# Patient Record
Sex: MALE | Race: BLACK OR AFRICAN AMERICAN | Employment: OTHER | URBAN - METROPOLITAN AREA
[De-identification: names, ages, dates, MRNs, and addresses within clinical notes are randomized per-mention and may not be internally consistent; named-entity substitution may affect disease eponyms.]

---

## 2017-01-16 ENCOUNTER — HOSPITAL ENCOUNTER (EMERGENCY)
Age: 57
Discharge: HOME | End: 2017-01-16
Attending: EMERGENCY MEDICINE
Payer: COMMERCIAL

## 2017-01-16 ENCOUNTER — APPOINTMENT (OUTPATIENT)
Dept: GENERAL RADIOLOGY | Age: 57
End: 2017-01-16
Attending: PHYSICIAN ASSISTANT
Payer: COMMERCIAL

## 2017-01-16 VITALS
TEMPERATURE: 98.1 F | HEART RATE: 69 BPM | DIASTOLIC BLOOD PRESSURE: 117 MMHG | RESPIRATION RATE: 18 BRPM | SYSTOLIC BLOOD PRESSURE: 192 MMHG | HEIGHT: 69 IN | OXYGEN SATURATION: 99 %

## 2017-01-16 DIAGNOSIS — M19.031 ARTHRITIS OF RIGHT WRIST: Primary | ICD-10-CM

## 2017-01-16 PROCEDURE — 99283 EMERGENCY DEPT VISIT LOW MDM: CPT

## 2017-01-16 PROCEDURE — 73110 X-RAY EXAM OF WRIST: CPT

## 2017-01-16 PROCEDURE — 74011250637 HC RX REV CODE- 250/637: Performed by: PHYSICIAN ASSISTANT

## 2017-01-16 RX ORDER — CARVEDILOL 25 MG/1
25 TABLET ORAL
Status: COMPLETED | OUTPATIENT
Start: 2017-01-16 | End: 2017-01-16

## 2017-01-16 RX ORDER — HYDROCODONE BITARTRATE AND ACETAMINOPHEN 5; 325 MG/1; MG/1
2 TABLET ORAL
Status: COMPLETED | OUTPATIENT
Start: 2017-01-16 | End: 2017-01-16

## 2017-01-16 RX ORDER — COLCHICINE 0.6 MG/1
0.6 CAPSULE ORAL DAILY
Qty: 10 CAP | Refills: 0 | Status: SHIPPED | OUTPATIENT
Start: 2017-01-16 | End: 2017-02-21 | Stop reason: SDUPTHER

## 2017-01-16 RX ORDER — HYDRALAZINE HYDROCHLORIDE 25 MG/1
25 TABLET, FILM COATED ORAL
Status: COMPLETED | OUTPATIENT
Start: 2017-01-16 | End: 2017-01-16

## 2017-01-16 RX ORDER — HYDROCODONE BITARTRATE AND ACETAMINOPHEN 5; 325 MG/1; MG/1
1 TABLET ORAL
Qty: 20 TAB | Refills: 0 | Status: SHIPPED | OUTPATIENT
Start: 2017-01-16 | End: 2017-02-21 | Stop reason: ALTCHOICE

## 2017-01-16 RX ADMIN — HYDROCODONE BITARTRATE AND ACETAMINOPHEN 2 TABLET: 5; 325 TABLET ORAL at 05:15

## 2017-01-16 RX ADMIN — HYDRALAZINE HYDROCHLORIDE 25 MG: 25 TABLET, FILM COATED ORAL at 05:26

## 2017-01-16 RX ADMIN — CARVEDILOL 25 MG: 25 TABLET, FILM COATED ORAL at 05:26

## 2017-01-16 NOTE — ED PROVIDER NOTES
HPI Comments: 50yo male presents to ER complaining of right wrist discomfort x 2-3 days with swelling in right hand x one day. History of similar in past.  Denies any recent or remote injuries. Patient is right hand dominant. Admits to gout but denies any known specific testing and denies taking any medications for gout. Patient has follow up with PCP in 2 days. Admits similar symptoms have happened in left wrist before. Denies any knee, ankle, toe swelling pain. Patient talks about not eating well as a cause of his symptoms suggesting to me that someone has given him the gout diet restrictions. Patient has poor insight. Patient is a 55 y.o. male presenting with hand swelling. Hand Swelling    Pertinent negatives include no back pain. Past Medical History:   Diagnosis Date    Chronic kidney disease     Diastolic [facility] ([facility]     Dilated cardiomyopathy ([facility]     History of alcohol abuse     History of echocardiogram 02/24/2014     Mod-marked LVE. EF 15%. Severe, diffuse hypk. Mild LVH. Gr 1 DDfx. Mod LAE. Mild-mod FIDELIA. Mild AoRE. No significant change from echo of 10/23/09.  History of gout     History of myocardial perfusion scan 05/25/2006     No evidence of ischemia or scarring. Gross LVE. EF 28%. Severe global hypk. Neg EKG on submaximal EST. Ex time 8 min 25 sec.  HTN (hypertension)     Hypercholesterolemia     Hypertensive cardiovascular disease        Past Surgical History:   Procedure Laterality Date    Hx hernia repair  04/2016         Family History:   Problem Relation Age of Onset    Cancer Father      Lung    Heart Failure Mother     Cancer Sister        Social History     Social History    Marital status: SINGLE     Spouse name: N/A    Number of children: N/A    Years of education: N/A     Occupational History    Not on file.      Social History Main Topics    Smoking status: Never Smoker    Smokeless tobacco: Never Used    Alcohol use No    Drug use: No    Sexual activity: No     Other Topics Concern    Not on file     Social History Narrative         ALLERGIES: Review of patient's allergies indicates no known allergies. Review of Systems   Constitutional: Negative for activity change and appetite change. HENT: Negative. Respiratory: Negative for cough and shortness of breath. Cardiovascular: Negative. Negative for chest pain. Gastrointestinal: Negative. Musculoskeletal: Positive for arthralgias and joint swelling. Negative for back pain and gait problem. All other systems reviewed and are negative. Vitals:    01/16/17 0210   BP: (!) 196/119   Pulse: 74   Resp: 18   Temp: 98.6 °F (37 °C)   SpO2: 99%   Height: 5' 9\" (1.753 m)            Physical Exam   Constitutional: He is oriented to person, place, and time. He appears well-developed. No distress. Obese   Neck: Normal range of motion. Cardiovascular: Normal rate, regular rhythm and normal heart sounds. Pulmonary/Chest: Effort normal. He has wheezes. Slight upper posterior lung field with expiratory wheezes otherwise clear. Cough sounds non-productive. Musculoskeletal:   Right wrist is moderately swollen has well as hand. Strong radial pulse. Pain with passive and active ROMof wrist joint. Mild warmth, no obvious overlying erythema. No swelling of forearm, normal ROM of elbow. Left hand appears slightly puffy, no TTP of wrist.    Bilateral lower extremities without pitting edema. Neurological: He is alert and oriented to person, place, and time. Skin: Skin is warm and dry. He is not diaphoretic. No erythema. Nursing note and vitals reviewed. MDM  Number of Diagnoses or Management Options  Diagnosis management comments: 50yo male presenting to ER with painful swollen right wrist.  Denies trauma. Xray show moderate arthritis. Given chronic renal insufficiency will avoid NSAIDS. Rx for Norco and colchicine written.   Patient advised to continue regular medications and follow up with PCP as scheduled this week.      ED Course       Procedures

## 2017-01-16 NOTE — ED NOTES
Patient A/O x 4, swelling noted to both hands and right wrist. Patient denies injury to wrist and hands. Patient states, his hands swells after he eats salty foods. Patient denies having Hx of gout today. Awaiting evaluation from provider.

## 2017-01-16 NOTE — LETTER
NOTIFICATION RETURN TO WORK / SCHOOL 
 
1/16/2017 4:55 AM 
 
Mr. Wanda Rojas [address] [facility] [id number] To Whom It May Concern: 
 
Wanda Rojas is currently under the care of [facility]. He will return to work/school on: 01/18/17 If there are questions or concerns please have the patient contact our office.  
 
 
 
Sincerely, 
 
 
 
 
 
 
ELHAM Correa

## 2017-01-16 NOTE — ED TRIAGE NOTES
Patient with both hands swollen. Patient states that left hand has been swelling last Saturday, and is starting to go down. Right hand is swelling at this time, it started swelling Saturday morning.

## 2017-01-16 NOTE — ED NOTES
I have reviewed discharge instructions with the patient. The patient verbalized understanding. Patient looks comfortable, left ED in stable condition. Patient denies any new complaints at this time. Ambulatory, steady gait noted.

## 2017-01-16 NOTE — ED NOTES
Patient blood pressure is elevated, ELHAM Tobias made aware. Received RBVO to administer patient's morning dose of his blood pressure medication. Will administer now.

## 2017-01-17 DIAGNOSIS — N18.30 CHRONIC KIDNEY DISEASE (CKD), STAGE III (MODERATE) (HCC): ICD-10-CM

## 2017-01-17 DIAGNOSIS — I10 ESSENTIAL HYPERTENSION: ICD-10-CM

## 2017-01-17 DIAGNOSIS — I50.32 DIASTOLIC CHF, CHRONIC (HCC): ICD-10-CM

## 2017-01-17 DIAGNOSIS — E78.2 MIXED HYPERLIPIDEMIA: ICD-10-CM

## 2017-01-17 NOTE — TELEPHONE ENCOUNTER
Requested Prescriptions     Pending Prescriptions Disp Refills    furosemide (LASIX) 40 mg tablet 30 Tab 0     Sig: Take 1 Tab by mouth daily.  carvedilol (COREG) 25 mg tablet 60 Tab 0     Sig: Take 1 Tab by mouth two (2) times daily (with meals).      Completely out of medication

## 2017-01-18 RX ORDER — ISOSORBIDE MONONITRATE 30 MG/1
30 TABLET, EXTENDED RELEASE ORAL DAILY
Qty: 30 TAB | Refills: 0 | Status: SHIPPED | OUTPATIENT
Start: 2017-01-18 | End: 2017-02-21 | Stop reason: SDUPTHER

## 2017-01-18 RX ORDER — PRAVASTATIN SODIUM 20 MG/1
20 TABLET ORAL
Qty: 30 TAB | Refills: 0 | Status: SHIPPED | OUTPATIENT
Start: 2017-01-18 | End: 2017-02-21 | Stop reason: SDUPTHER

## 2017-01-18 RX ORDER — HYDRALAZINE HYDROCHLORIDE 25 MG/1
25 TABLET, FILM COATED ORAL EVERY 8 HOURS
Qty: 90 TAB | Refills: 0 | Status: SHIPPED | OUTPATIENT
Start: 2017-01-18 | End: 2017-02-21 | Stop reason: SDUPTHER

## 2017-01-18 RX ORDER — FUROSEMIDE 40 MG/1
40 TABLET ORAL DAILY
Qty: 30 TAB | Refills: 0 | Status: SHIPPED | OUTPATIENT
Start: 2017-01-18 | End: 2017-02-21 | Stop reason: SDUPTHER

## 2017-01-18 RX ORDER — CARVEDILOL 25 MG/1
25 TABLET ORAL 2 TIMES DAILY WITH MEALS
Qty: 60 TAB | Refills: 0 | Status: SHIPPED | OUTPATIENT
Start: 2017-01-18 | End: 2017-02-21 | Stop reason: SDUPTHER

## 2017-01-18 NOTE — TELEPHONE ENCOUNTER
1/18/2017  10:01 AM    Chief Complaint   Patient presents with    Medication Refill       Noted refill for Lasix and Coreg. Last seen 11/2016 and upcoming appointment 2/2017. He also should be out of several other medications including Pravastatin, Hydralazine, and Imdur. Refilled all at this time for 30 days. Will follow up with patient per next scheduled visit.

## 2017-02-21 ENCOUNTER — OFFICE VISIT (OUTPATIENT)
Dept: FAMILY MEDICINE CLINIC | Age: 57
End: 2017-02-21

## 2017-02-21 VITALS
OXYGEN SATURATION: 96 % | HEIGHT: 69 IN | TEMPERATURE: 98.3 F | HEART RATE: 72 BPM | DIASTOLIC BLOOD PRESSURE: 100 MMHG | SYSTOLIC BLOOD PRESSURE: 150 MMHG | RESPIRATION RATE: 20 BRPM | WEIGHT: 224 LBS | BODY MASS INDEX: 33.18 KG/M2

## 2017-02-21 DIAGNOSIS — N18.30 CHRONIC KIDNEY DISEASE (CKD), STAGE III (MODERATE) (HCC): ICD-10-CM

## 2017-02-21 DIAGNOSIS — M10.9 GOUT OF HAND, UNSPECIFIED CAUSE, UNSPECIFIED CHRONICITY, UNSPECIFIED LATERALITY: ICD-10-CM

## 2017-02-21 DIAGNOSIS — Z12.11 SCREENING FOR COLORECTAL CANCER: ICD-10-CM

## 2017-02-21 DIAGNOSIS — I50.32 DIASTOLIC CHF, CHRONIC (HCC): ICD-10-CM

## 2017-02-21 DIAGNOSIS — E78.2 MIXED HYPERLIPIDEMIA: ICD-10-CM

## 2017-02-21 DIAGNOSIS — Z11.59 NEED FOR HEPATITIS C SCREENING TEST: ICD-10-CM

## 2017-02-21 DIAGNOSIS — I10 ESSENTIAL HYPERTENSION: Primary | ICD-10-CM

## 2017-02-21 DIAGNOSIS — I42.8 NONISCHEMIC CARDIOMYOPATHY (HCC): ICD-10-CM

## 2017-02-21 DIAGNOSIS — Z12.5 SCREENING FOR PROSTATE CANCER: ICD-10-CM

## 2017-02-21 DIAGNOSIS — Z12.12 SCREENING FOR COLORECTAL CANCER: ICD-10-CM

## 2017-02-21 RX ORDER — COLCHICINE 0.6 MG/1
CAPSULE ORAL
Qty: 3 CAP | Refills: 1 | Status: SHIPPED | OUTPATIENT
Start: 2017-02-21 | End: 2017-07-12 | Stop reason: SDUPTHER

## 2017-02-21 RX ORDER — PRAVASTATIN SODIUM 20 MG/1
20 TABLET ORAL
Qty: 30 TAB | Refills: 3 | Status: SHIPPED | OUTPATIENT
Start: 2017-02-21 | End: 2017-06-06 | Stop reason: SDUPTHER

## 2017-02-21 RX ORDER — HYDRALAZINE HYDROCHLORIDE 25 MG/1
25 TABLET, FILM COATED ORAL EVERY 8 HOURS
Qty: 90 TAB | Refills: 3 | Status: SHIPPED | OUTPATIENT
Start: 2017-02-21 | End: 2017-06-06 | Stop reason: SDUPTHER

## 2017-02-21 RX ORDER — GUAIFENESIN 100 MG/5ML
81 LIQUID (ML) ORAL DAILY
Qty: 30 TAB | Refills: 11 | Status: SHIPPED | OUTPATIENT
Start: 2017-02-21

## 2017-02-21 RX ORDER — FUROSEMIDE 40 MG/1
40 TABLET ORAL DAILY
Qty: 30 TAB | Refills: 3 | Status: SHIPPED | OUTPATIENT
Start: 2017-02-21 | End: 2017-06-06 | Stop reason: SDUPTHER

## 2017-02-21 RX ORDER — ISOSORBIDE MONONITRATE 30 MG/1
30 TABLET, EXTENDED RELEASE ORAL DAILY
Qty: 30 TAB | Refills: 3 | Status: SHIPPED | OUTPATIENT
Start: 2017-02-21 | End: 2017-06-06 | Stop reason: SDUPTHER

## 2017-02-21 RX ORDER — CARVEDILOL 25 MG/1
25 TABLET ORAL 2 TIMES DAILY WITH MEALS
Qty: 60 TAB | Refills: 3 | Status: SHIPPED | OUTPATIENT
Start: 2017-02-21 | End: 2017-06-06 | Stop reason: SDUPTHER

## 2017-02-21 NOTE — PROGRESS NOTES
HISTORY OF PRESENT ILLNESS  Maribell Lopez is a 55 y.o. male. 2/21/2017  4:21 PM    Chief Complaint   Patient presents with    Follow Up Chronic Condition     pt here for follow up chronic condition HTN, Diastolic CHF       HPI: Here today for follow up on chronic conditions. Last labs 11/2016. Not following with any specialists- should be seeing cardiology and nephrology. Hypertension/Hyperlipidemia/CHD/Cardiomyopathy: Reports that he has been taking his medications as prescribed- currently on 4 BP agents, statin, and ASA. He does have a BP monitor at home- no log available for review and he is unable to recall his last readings. Denies any headaches, chest pain, SOB, or leg swelling. Has not been following with cardiology. CKD: Has been referred to nephrology in past- he has not been following with. Not taking NSAIDs. Is on Lasix for fluid control. Gout: Not on any maintenance medication- recent flare in 1/2017- took Colchicine and symptoms resolved. No other complaints. Symptoms occurred in hands. Review of Systems   Constitutional: Negative for chills, fever and malaise/fatigue. Eyes: Negative for double vision, photophobia and pain. Respiratory: Negative for cough, shortness of breath and wheezing. Cardiovascular: Negative for chest pain, palpitations and leg swelling. Gastrointestinal: Negative for abdominal pain, constipation, diarrhea, nausea and vomiting. Genitourinary: Negative for dysuria, frequency and urgency. Musculoskeletal: Negative for back pain, joint pain and myalgias. Neurological: Negative for dizziness, weakness and headaches.         PHQ Screening   PHQ 2 / 9, over the last two weeks 11/16/2016   Little interest or pleasure in doing things Not at all   Feeling down, depressed or hopeless Not at all   Total Score PHQ 2 0         History  Past Medical History   Diagnosis Date    Chronic kidney disease     Di[facility]F ([facility] [address]     Dilated cardiomyopathy [facility])     History of alcohol abuse     History of echocardiogram 02/24/2014     Mod-marked LVE. EF 15%. Severe, diffuse hypk. Mild LVH. Gr 1 DDfx. Mod LAE. Mild-mod FIDELIA. Mild AoRE. No significant change from echo of 10/23/09.  History of gout     History of myocardial perfusion scan 05/25/2006     No evidence of ischemia or scarring. Gross LVE. EF 28%. Severe global hypk. Neg EKG on submaximal EST. Ex time 8 min 25 sec.  HTN (hypertension)     Hypercholesterolemia     Hypertensive cardiovascular disease        Past Surgical History   Procedure Laterality Date    Hx hernia repair  04/2016       Social History     Social History    Marital status: SINGLE     Spouse name: N/A    Number of children: N/A    Years of education: N/A     Occupational History    Not on file. Social History Main Topics    Smoking status: Never Smoker    Smokeless tobacco: Never Used    Alcohol use No    Drug use: No    Sexual activity: No     Other Topics Concern    Not on file     Social History Narrative       Family History   Problem Relation Age of Onset    Cancer Father      Lung    Heart Failure Mother     Cancer Sister        No Known Allergies    Current Outpatient Prescriptions   Medication Sig Dispense Refill    furosemide (LASIX) 40 mg tablet Take 1 Tab by mouth daily. 30 Tab 0    carvedilol (COREG) 25 mg tablet Take 1 Tab by mouth two (2) times daily (with meals). 60 Tab 0    pravastatin (PRAVACHOL) 20 mg tablet Take 1 Tab by mouth nightly. 30 Tab 0    hydrALAZINE (APRESOLINE) 25 mg tablet Take 1 Tab by mouth every eight (8) hours. 90 Tab 0    isosorbide mononitrate ER (IMDUR) 30 mg tablet Take 1 Tab by mouth daily. 30 Tab 0    aspirin 81 mg chewable tablet Take 1 Tab by mouth daily. 30 Tab 0    HYDROcodone-acetaminophen (NORCO) 5-325 mg per tablet Take 1 Tab by mouth every four (4) hours as needed for Pain. Max Daily Amount: 6 Tabs.  20 Tab 0    colchicine (MITIGARE) 0.6 mg capsule Take 1 Cap by mouth daily. 10 Cap 0       Health Maintenance and Screenings  Colon Cancer: Colonoscopy referral placed today  COPD Screen/ Lung Cancer:   CAD Screen: LDL 44 10/2014- Lipids reordered today- on statin and ASA  Diabetes: Glucose 92 4/2016- glucose reordered today  STD and HIV Screen: Hep C screening ordered today  Osteoporosis Screen: Not indicated for early screening   Prostate Cancer: PSA ordered today- no previous results available  Abdominal Aneurysm Screen:   Opthalmology:   Dentistry Services:   Hearing Impairment: No hearing loss noted  Skin Cancer Screen:   Obesity Screen: Body mass index is 33.08 kg/(m^2). Flu Vaccination: Declines  Pneumococcal Vaccine:   Shingles Vaccine:   Tetanus Booster:   Hepatitis B Vaccinations:     *will follow up on      Advance Care Planning:   Patient was offered the opportunity to discuss advance care planning NO   Does patient have an Advance Directive:  NO   If no, did you provide information on [facility]? Patient Care Team:  Patient Care Team:  Taras Brooks NP as PCP - General (Nurse Practitioner)  Eleni Franco MD (Cardiology)        LABS:  None new to review    RADIOLOGY:  None new to review      Physical Exam   Constitutional: He is oriented to person, place, and time. He appears well-developed and well-nourished. No distress. Neck: Normal range of motion. Neck supple. No thyromegaly present. Cardiovascular: Normal rate, regular rhythm and normal heart sounds. No murmur heard. Pulmonary/Chest: Effort normal and breath sounds normal. No respiratory distress. Abdominal: Soft. Bowel sounds are normal. There is no tenderness. Musculoskeletal: He exhibits no edema. Neurological: He is alert and oriented to person, place, and time. He exhibits normal muscle tone. Coordination normal.   Skin: Skin is warm and dry.         Vitals:    02/21/17 1617   BP: (!) 165/110   Pulse: 72   Resp: 20   Temp: 98.3 °F (36.8 °C)   TempSrc: Oral   SpO2: 96%   Weight: 224 lb (101.6 kg)   Height: 5' 9\" (1.753 m)   PainSc:   0 - No pain       Recheck manual  Visit Vitals    BP (!) 150/100       BP Readings from Last 3 Encounters:   02/21/17 (!) 165/110   01/16/17 (!) 192/117   11/16/16 127/74       ASSESSMENT and PLAN  Socorro Daugherty was seen today for follow up chronic condition. Diagnoses and all orders for this visit:    Essential hypertension  *Refilled on medications. Uncontrolled at this time. Recommended patient to monitor BP at home and take BP cuff with him to appointments. Recommended call to cardiology and nephrology as soon as possible to get in for appointment. *Check labs. -     furosemide (LASIX) 40 mg tablet; Take 1 Tab by mouth daily. -     carvedilol (COREG) 25 mg tablet; Take 1 Tab by mouth two (2) times daily (with meals). -     hydrALAZINE (APRESOLINE) 25 mg tablet; Take 1 Tab by mouth every eight (8) hours. -     isosorbide mononitrate ER (IMDUR) 30 mg tablet; Take 1 Tab by mouth daily.  -     METABOLIC PANEL, COMPREHENSIVE; Future    Mixed hyperlipidemia  *Refilled. Check labs. -     pravastatin (PRAVACHOL) 20 mg tablet; Take 1 Tab by mouth nightly. -     LIPID PANEL; Future  -     METABOLIC PANEL, COMPREHENSIVE; Future    Diastolic CHF, chronic (HCC)/ Nonischemic cardiomyopathy (HCC)  *Refilled. Encouraged patient to weigh self daily and monitor salt intake. Cardiology appointment encouraged. -     furosemide (LASIX) 40 mg tablet; Take 1 Tab by mouth daily. -     aspirin 81 mg chewable tablet; Take 1 Tab by mouth daily. Chronic kidney disease (CKD), stage III (moderate)  *Refilled. Nephrology appointment encouraged- given contact information.   -     furosemide (LASIX) 40 mg tablet; Take 1 Tab by mouth daily. Gout of hand, unspecified cause, unspecified chronicity, unspecified laterality  *Will check uric acid levels. He reports infrequent flares that are caused by foods that he eats.  Will check levels and if high chronically, consider prophylactic medication. Will dose PRN Colchicine at this time. -     URIC ACID; Future  -     colchicine (MITIGARE) 0.6 mg capsule; 1.2 mg PO at first sign of flare, then 0.6 mg 1 hr later. Screening for prostate cancer  -     PSA SCREENING (SCREENING); Future    Screening for colorectal cancer  -     REFERRAL FOR COLONOSCOPY    Need for hepatitis C screening test  -     HEPATITIS C AB; Future      *Plan of care reviewed with patient. Patient in agreement with plan and expresses understanding. All questions answered and patient encouraged to call or RTO if further questions or concerns. Follow-up Disposition:  Return in about 3 months (around 5/21/2017) for chronic disease followup- 30 min.

## 2017-02-21 NOTE — PATIENT INSTRUCTIONS
Learning About Heart Failure Zones  What are heart failure zones? Heart failure zones give you an easy way to see changes in your heart failure symptoms. They also tell you when you need to get help. Check every day to see which zone you are in. Green zone. You are doing well. This is where you want to be. · Your weight is stable. This means it is not going up or down. · You breathe easily. · You are sleeping well. You are able to lie flat without shortness of breath. · You can do your usual activities. Yellow zone. Be careful. Your symptoms are changing. Call your doctor. · You have new or increased shortness of breath. · You are dizzy or lightheaded, or you feel like you may faint. · You have sudden weight gain, such as 3 pounds or more in 2 to 3 days. · You have increased swelling in your legs, ankles, or feet. · You are so tired or weak that you cannot do your usual activities. · You are not sleeping well. Shortness of breath wakes you up at night. You need extra pillows. Your doctor's name: ____________________________________________________________  Your doctor's contact information: _________________________________________________  Red zone. This is an emergency. Call 911. You have symptoms of sudden heart failure, such as:  · You have severe trouble breathing. · You cough up pink, foamy mucus. · You have a new irregular or fast heartbeat. You have symptoms of a heart attack. These may include:  · Chest pain or pressure, or a strange feeling in the chest.  · Sweating. · Shortness of breath. · Nausea or vomiting. · Pain, pressure, or a strange feeling in the back, neck, jaw, or upper belly or in one or both shoulders or arms. · Lightheadedness or sudden weakness. · A fast or irregular heartbeat. If you have symptoms of a heart attack: After you call 911, the  may tell you to chew 1 adult-strength or 2 to 4 low-dose aspirin. Wait for an ambulance.  Do not try to drive yourself. Follow-up care is a key part of your treatment and safety. Be sure to make and go to all appointments, and call your doctor if you are having problems. It's also a good idea to know your test results and keep a list of the medicines you take. Where can you learn more? Go to http://rachael-lauren.info/. Enter T174 in the search box to learn more about \"Learning About Heart Failure Zones. \"  Current as of: January 27, 2016  Content Version: 11.1  © 4383-1073 [employer]. Care instructions adapted under license by [employer] (which disclaims liability or warranty for this information). If you have questions about a medical condition or this instruction, always ask your healthcare professional. [place] [address] any warranty or liability for your use of this information.       Cardiology    [facility]  [address] [place] [address] Hernandez Bianchi  Phone: [phone]  Fax: [phone]        Nephrology    Dr. Narayan [place] [address] Hernandez Bianchi  Phone: [phone]      *Need to make appointments as soon as possible

## 2017-02-21 NOTE — MR AVS SNAPSHOT
Visit Information Date & Time Provider Department Dept. Phone Encounter #  
 2/21/2017  4:30 PM Ben Hunter, [address] [facility] [phone] [id number] Follow-up Instructions Return in about 3 months (around 5/21/2017) for chronic disease followup- 30 min. Upcoming Health Maintenance Date Due Hepatitis C Screening 1960 COLONOSCOPY 3/1/1979 Pneumococcal 19-64 Highest Risk (1 of 3 - PCV13) 3/1/1980 DTaP/Tdap/Td series (1 - Tdap) 3/1/1982 Allergies as of 2/21/2017  Review Complete On: 2/21/2017 By: Ben Hunter NP No Known Allergies Current Immunizations  Never Reviewed No immunizations on file. Not reviewed this visit You Were Diagnosed With   
  
 Codes Comments Essential hypertension    -  Primary ICD-10-CM: I10 
ICD-9-CM: 401.9 Mixed hyperlipidemia     ICD-10-CM: E78.2 ICD-9-CM: 272.2 Diastolic CHF, chronic (HCC)     ICD-10-CM: I50.32 
ICD-9-CM: 428.32, 428.0 Nonischemic cardiomyopathy ([facility] [address]     ICD-10-CM: I42.9 ICD-9-CM: 425. 4 Chronic kidney disease (CKD), stage III (moderate)     ICD-10-CM: N18.3 ICD-9-CM: 031. 3 Screening for prostate cancer     ICD-10-CM: Z12.5 ICD-9-CM: V76.44 Gout of hand, unspecified cause, unspecified chronicity, unspecified laterality     ICD-10-CM: M10.9 ICD-9-CM: 274.9 Screening for colorectal cancer     ICD-10-CM: Z12.11, Z12.12 
ICD-9-CM: V76.51 Need for hepatitis C screening test     ICD-10-CM: Z11.59 
ICD-9-CM: V73.89 Vitals BP Pulse Temp Resp Height(growth percentile) Weight(growth percentile) (!) 150/100 72 98.3 °F (36.8 °C) (Oral) 20 5' 9\" (1.753 m) 224 lb (101.6 kg) SpO2 BMI Smoking Status 96% 33.08 kg/m2 Never Smoker Vitals History BMI and BSA Data Body Mass Index Body Surface Area 33.08 kg/m 2 2.22 m 2 Preferred Pharmacy Pharmacy Name Phone [facility] [address] [place] [address] [phone] Your Updated Medication List  
  
   
This list is accurate as of: 2/21/17  4:43 PM.  Always use your most recent med list.  
  
  
  
  
 aspirin 81 mg chewable tablet Take 1 Tab by mouth daily. carvedilol 25 mg tablet Commonly known as:  Kathlean Due Take 1 Tab by mouth two (2) times daily (with meals). colchicine 0.6 mg capsule Commonly known as:  MITIGARE  
1.2 mg PO at first sign of flare, then 0.6 mg 1 hr later. furosemide 40 mg tablet Commonly known as:  LASIX Take 1 Tab by mouth daily. hydrALAZINE 25 mg tablet Commonly known as:  APRESOLINE Take 1 Tab by mouth every eight (8) hours. isosorbide mononitrate ER 30 mg tablet Commonly known as:  IMDUR Take 1 Tab by mouth daily. pravastatin 20 mg tablet Commonly known as:  PRAVACHOL Take 1 Tab by mouth nightly. Prescriptions Sent to Pharmacy Refills  
 furosemide (LASIX) 40 mg tablet 3 Sig: Take 1 Tab by mouth daily. Class: Normal  
 Pharmacy: [address] Ph #: [phone] Route: Oral  
 carvedilol (COREG) 25 mg tablet 3 Sig: Take 1 Tab by mouth two (2) times daily (with meals). Class: Normal  
 Pharmacy: [address] Ph #: [phone] Route: Oral  
 pravastatin (PRAVACHOL) 20 mg tablet 3 Sig: Take 1 Tab by mouth nightly. Class: Normal  
 Pharmacy: [address] Ph #: [phone] Route: Oral  
 hydrALAZINE (APRESOLINE) 25 mg tablet 3 Sig: Take 1 Tab by mouth every eight (8) hours.   
 Class: Normal  
 Pharmacy: [address], [address] AT [address] [place] [address] Ph #: [phone] Route: Oral  
 isosorbide mononitrate ER (IMDUR) 30 mg tablet 3 Sig: Take 1 Tab by mouth daily. Class: Normal  
 Pharmacy: [facility] [address] [place], [place] [address] Ph #: [phone] Route: Oral  
 aspirin 81 mg chewable tablet 11 Sig: Take 1 Tab by mouth daily. Class: Normal  
 Pharmacy: [facility] [address] [place] [address] Ph #: [phone] Route: Oral  
 colchicine (MITIGARE) 0.6 mg capsule 1 Si.2 mg PO at first sign of flare, then 0.6 mg 1 hr later. Class: Normal  
 Pharmacy: [facility] [address] [place] [address] Ph #: [phone] We Performed the Following REFERRAL FOR COLONOSCOPY [[id number] Custom] Comments:  
 Please evaluate patient for screening colonoscopy. Referral to Alessio Duggan at [facility] if they take his insurance. Follow-up Instructions Return in about 3 months (around 2017) for chronic disease followup- 30 min. To-Do List   
 2017 Lab:  HEPATITIS C AB   
  
 2017 Lab:  PSA SCREENING (SCREENING) Around 2017 Lab:  URIC ACID Around 2017 Lab:  LIPID PANEL Around 2017 Lab:  METABOLIC PANEL, COMPREHENSIVE Referral Information Referral ID Referred By Referred To  
  
 [id number] Jae ORNELAS Not Available Visits Status Start Date End Date 1 New Request 17 If your referral has a status of pending review or denied, additional information will be sent to support the outcome of this decision. Patient Instructions Learning About Heart Failure Zones What are heart failure zones? Heart failure zones give you an easy way to see changes in your heart failure symptoms. They also tell you when you need to get help.  Check every day to see which zone you are in. Green zone. You are doing well. This is where you want to be. · Your weight is stable. This means it is not going up or down. · You breathe easily. · You are sleeping well. You are able to lie flat without shortness of breath. · You can do your usual activities. Yellow zone. Be careful. Your symptoms are changing. Call your doctor. · You have new or increased shortness of breath. · You are dizzy or lightheaded, or you feel like you may faint. · You have sudden weight gain, such as 3 pounds or more in 2 to 3 days. · You have increased swelling in your legs, ankles, or feet. · You are so tired or weak that you cannot do your usual activities. · You are not sleeping well. Shortness of breath wakes you up at night. You need extra pillows. Your doctor's name: ____________________________________________________________ Your doctor's contact information: _________________________________________________ Red zone. This is an emergency. Call 911. You have symptoms of sudden heart failure, such as: 
· You have severe trouble breathing. · You cough up pink, foamy mucus. · You have a new irregular or fast heartbeat. You have symptoms of a heart attack. These may include: · Chest pain or pressure, or a strange feeling in the chest. 
· Sweating. · Shortness of breath. · Nausea or vomiting. · Pain, pressure, or a strange feeling in the back, neck, jaw, or upper belly or in one or both shoulders or arms. · Lightheadedness or sudden weakness. · A fast or irregular heartbeat. If you have symptoms of a heart attack: After you call 911, the  may tell you to chew 1 adult-strength or 2 to 4 low-dose aspirin. Wait for an ambulance. Do not try to drive yourself. Follow-up care is a key part of your treatment and safety.  Be sure to make and go to all appointments, and call your doctor if you are having problems. It's also a good idea to know your test results and keep a list of the medicines you take. Where can you learn more? Go to http://rachael-lauren.info/. Enter T174 in the search box to learn more about \"Learning About Heart Failure Zones. \" Current as of: January 27, 2016 Content Version: 11.1 © 9321-5897 [employer]. Care instructions adapted under license by [employer] (which disclaims liability or warranty for this information). If you have questions about a medical condition or this instruction, always ask your healthcare professional. [place] [address] any warranty or liability for your use of this information. Cardiology [facility], [address] [place] [address]  Phone: [phone] Fax: [phone] Nephrology Dr. Carmelo Hernandez [address]  Phone: [phone] *Need to make appointments as soon as possible Introducing [facility]! Jessie Marley introduces [employer] patient portal. Now you can access parts of your medical record, email your doctor's office, and request medication refills online. 1. In your internet browser, go to [email or web address] 2. Click on the First Time User? Click Here link in the Sign In box. You will see the New Member Sign Up page. 3. Enter your [employer] Access Code exactly as it appears below. You will not need to use this code after youve completed the sign-up process. If you do not sign up before the expiration date, you must request a new code. · [employer] Access Code: [email or web address] Expires: 5/22/2017  4:43 PM 
 
4. Enter the last four digits of your Social Security Number (xxxx) and Date of Birth (mm/dd/yyyy) as indicated and click Submit. You will be taken to the next sign-up page. 5. Create a [employer] ID.  This will be your [employer] login ID and cannot be changed, so think of one that is secure and easy to remember. 6. Create a [employer] password. You can change your password at any time. 7. Enter your Password Reset Question and Answer. This can be used at a later time if you forget your password. 8. Enter your e-mail address. You will receive e-mail notification when new information is available in [address] 9. Click Sign Up. You can now view and download portions of your medical record. 10. Click the Download Summary menu link to download a portable copy of your medical information. If you have questions, please visit the Frequently Asked Questions section of the [employer] website. Remember, [employer] is NOT to be used for urgent needs. For medical emergencies, dial 911. Now available from your iPhone and Android! Please provide this summary of care documentation to your next provider. Your primary care clinician is listed as Ron Maza. If you have any questions after today's visit, please call [phone].

## 2017-02-22 DIAGNOSIS — E78.2 MIXED HYPERLIPIDEMIA: ICD-10-CM

## 2017-02-22 DIAGNOSIS — I10 ESSENTIAL HYPERTENSION: ICD-10-CM

## 2017-04-11 ENCOUNTER — HOSPITAL ENCOUNTER (OUTPATIENT)
Dept: LAB | Age: 57
Discharge: HOME | End: 2017-04-11

## 2017-04-11 PROCEDURE — 99001 SPECIMEN HANDLING PT-LAB: CPT | Performed by: NURSE PRACTITIONER

## 2017-04-12 LAB
ALBUMIN SERPL-MCNC: 4.2 G/DL (ref 3.5–5.5)
ALBUMIN/GLOB SERPL: 1.3 {RATIO} (ref 1.2–2.2)
ALP SERPL-CCNC: 89 IU/L (ref 39–117)
ALT SERPL-CCNC: 13 IU/L (ref 0–44)
AST SERPL-CCNC: 15 IU/L (ref 0–40)
BILIRUB SERPL-MCNC: 0.3 MG/DL (ref 0–1.2)
BUN SERPL-MCNC: 38 MG/DL (ref 6–24)
BUN/CREAT SERPL: 17 (ref 9–20)
CALCIUM SERPL-MCNC: 8.8 MG/DL (ref 8.7–10.2)
CHLORIDE SERPL-SCNC: 99 MMOL/L (ref 96–106)
CHOLEST SERPL-MCNC: 130 MG/DL (ref 100–199)
CO2 SERPL-SCNC: 26 MMOL/L (ref 18–29)
CREAT SERPL-MCNC: 2.27 MG/DL (ref 0.76–1.27)
GLOBULIN SER CALC-MCNC: 3.2 G/DL (ref 1.5–4.5)
GLUCOSE SERPL-MCNC: 85 MG/DL (ref 65–99)
HCV AB S/CO SERPL IA: <0.1 S/CO RATIO (ref 0–0.9)
HDLC SERPL-MCNC: 75 MG/DL
INTERPRETATION, 910389: NORMAL
INTERPRETATION: NORMAL
LDLC SERPL CALC-MCNC: 40 MG/DL (ref 0–99)
PDF IMAGE, 910387: NORMAL
POTASSIUM SERPL-SCNC: 5 MMOL/L (ref 3.5–5.2)
PROT SERPL-MCNC: 7.4 G/DL (ref 6–8.5)
PSA SERPL-MCNC: 0.7 NG/ML (ref 0–4)
SODIUM SERPL-SCNC: 140 MMOL/L (ref 134–144)
TRIGL SERPL-MCNC: 75 MG/DL (ref 0–149)
URATE SERPL-MCNC: 11.5 MG/DL (ref 3.7–8.6)
VLDLC SERPL CALC-MCNC: 15 MG/DL (ref 5–40)

## 2017-04-27 DIAGNOSIS — M10.9 GOUT OF HAND, UNSPECIFIED CAUSE, UNSPECIFIED CHRONICITY, UNSPECIFIED LATERALITY: Primary | ICD-10-CM

## 2017-04-27 RX ORDER — FEBUXOSTAT 40 MG/1
40 TABLET, FILM COATED ORAL DAILY
Qty: 30 TAB | Refills: 0 | Status: SHIPPED | OUTPATIENT
Start: 2017-04-27 | End: 2017-09-05 | Stop reason: SDDI

## 2017-05-19 ENCOUNTER — DOCUMENTATION ONLY (OUTPATIENT)
Dept: FAMILY MEDICINE CLINIC | Age: 57
End: 2017-05-19

## 2017-05-19 NOTE — PROGRESS NOTES
5/19/2017  3:39 PM    Chief Complaint   Patient presents with    Prior Auth       Noted that PA needed for [employer]. PA completed via CoverMyMeds. Approved until 5/19/2018. Faxed approval to pharmacy.

## 2017-06-06 ENCOUNTER — OFFICE VISIT (OUTPATIENT)
Dept: FAMILY MEDICINE CLINIC | Age: 57
End: 2017-06-06

## 2017-06-06 VITALS
DIASTOLIC BLOOD PRESSURE: 72 MMHG | WEIGHT: 226.8 LBS | SYSTOLIC BLOOD PRESSURE: 125 MMHG | HEIGHT: 69 IN | HEART RATE: 87 BPM | OXYGEN SATURATION: 96 % | RESPIRATION RATE: 18 BRPM | TEMPERATURE: 99.2 F | BODY MASS INDEX: 33.59 KG/M2

## 2017-06-06 DIAGNOSIS — E78.2 MIXED HYPERLIPIDEMIA: ICD-10-CM

## 2017-06-06 DIAGNOSIS — I50.32 DIASTOLIC CHF, CHRONIC (HCC): ICD-10-CM

## 2017-06-06 DIAGNOSIS — M10.9 GOUT OF HAND, UNSPECIFIED CAUSE, UNSPECIFIED CHRONICITY, UNSPECIFIED LATERALITY: ICD-10-CM

## 2017-06-06 DIAGNOSIS — N18.30 CHRONIC KIDNEY DISEASE (CKD), STAGE III (MODERATE) (HCC): ICD-10-CM

## 2017-06-06 DIAGNOSIS — I10 ESSENTIAL HYPERTENSION: Primary | ICD-10-CM

## 2017-06-06 RX ORDER — ISOSORBIDE MONONITRATE 30 MG/1
30 TABLET, EXTENDED RELEASE ORAL DAILY
Qty: 30 TAB | Refills: 3 | Status: SHIPPED | OUTPATIENT
Start: 2017-06-06 | End: 2017-09-05 | Stop reason: SINTOL

## 2017-06-06 RX ORDER — FUROSEMIDE 40 MG/1
40 TABLET ORAL DAILY
Qty: 30 TAB | Refills: 3 | Status: SHIPPED | OUTPATIENT
Start: 2017-06-06 | End: 2017-09-05 | Stop reason: SDUPTHER

## 2017-06-06 RX ORDER — PRAVASTATIN SODIUM 20 MG/1
20 TABLET ORAL
Qty: 30 TAB | Refills: 3 | Status: SHIPPED | OUTPATIENT
Start: 2017-06-06 | End: 2017-09-05 | Stop reason: SDUPTHER

## 2017-06-06 RX ORDER — CARVEDILOL 25 MG/1
25 TABLET ORAL 2 TIMES DAILY WITH MEALS
Qty: 60 TAB | Refills: 3 | Status: SHIPPED | OUTPATIENT
Start: 2017-06-06 | End: 2017-09-05 | Stop reason: SDUPTHER

## 2017-06-06 RX ORDER — HYDRALAZINE HYDROCHLORIDE 25 MG/1
25 TABLET, FILM COATED ORAL EVERY 8 HOURS
Qty: 90 TAB | Refills: 3 | Status: SHIPPED | OUTPATIENT
Start: 2017-06-06 | End: 2017-09-05 | Stop reason: SDUPTHER

## 2017-06-06 RX ORDER — FEBUXOSTAT 40 MG/1
40 TABLET, FILM COATED ORAL DAILY
Qty: 30 TAB | Refills: 0 | Status: CANCELLED | OUTPATIENT
Start: 2017-06-06

## 2017-06-06 NOTE — MR AVS SNAPSHOT
Visit Information Date & Time Provider Department Dept. Phone Encounter #  
 6/6/2017  4:00 PM [facility] [phone] [id number] Follow-up Instructions Return in about 3 months (around 9/6/2017) for chronic disease followup- 30 min. Upcoming Health Maintenance Date Due COLONOSCOPY 3/1/1979 DTaP/Tdap/Td series (1 - Tdap) 3/1/1982 INFLUENZA AGE 9 TO ADULT 8/1/2017 Prostate-Specific Antigen 4/11/2018 Allergies as of 6/6/2017  Review Complete On: 6/6/2017 By: Adiel Pelletier LPN No Known Allergies Current Immunizations  Never Reviewed No immunizations on file. Not reviewed this visit You Were Diagnosed With   
  
 Codes Comments Essential hypertension     ICD-10-CM: I10 
ICD-9-CM: 401.9 Chronic kidney disease (CKD), stage III (moderate)     ICD-10-CM: N18.3 ICD-9-CM: 567. 3 Diastolic CHF, chronic (HCC)     ICD-10-CM: I50.32 
ICD-9-CM: 428.32, 428.0 Gout of hand, unspecified cause, unspecified chronicity, unspecified laterality     ICD-10-CM: M10.9 ICD-9-CM: 274.9 Mixed hyperlipidemia     ICD-10-CM: E78.2 ICD-9-CM: 272.2 Vitals BP Pulse Temp Resp Height(growth percentile) Weight(growth percentile) 125/72 (BP 1 Location: Right arm, BP Patient Position: Sitting) 87 99.2 °F (37.3 °C) (Oral) 18 5' 9\" (1.753 m) 226 lb 12.8 oz (102.9 kg) SpO2 BMI Smoking Status 96% 33.49 kg/m2 Never Smoker BMI and BSA Data Body Mass Index Body Surface Area  
 33.49 kg/m 2 2.24 m 2 Preferred Pharmacy Pharmacy Name Phone [facility] [address] [place] [address] [place] [address] [place] [address] [phone] Your Updated Medication List  
  
   
This list is accurate as of: 6/6/17  4:39 PM.  Always use your most recent med list.  
  
  
  
  
 aspirin 81 mg chewable tablet Take 1 Tab by mouth daily. carvedilol 25 mg tablet Commonly known as:  Macarena  Take 1 Tab by mouth two (2) times daily (with meals). colchicine 0.6 mg capsule Commonly known as:  MITIGARE  
1.2 mg PO at first sign of flare, then 0.6 mg 1 hr later. febuxostat 40 mg Tab tablet Commonly known as:  Rosa Elena Lowing Take 1 Tab by mouth daily. furosemide 40 mg tablet Commonly known as:  LASIX Take 1 Tab by mouth daily. hydrALAZINE 25 mg tablet Commonly known as:  APRESOLINE Take 1 Tab by mouth every eight (8) hours. isosorbide mononitrate ER 30 mg tablet Commonly known as:  IMDUR Take 1 Tab by mouth daily. pravastatin 20 mg tablet Commonly known as:  PRAVACHOL Take 1 Tab by mouth nightly. Prescriptions Sent to Pharmacy Refills  
 furosemide (LASIX) 40 mg tablet 3 Sig: Take 1 Tab by mouth daily. Class: Normal  
 Pharmacy: [address] Ph #: [phone] Route: Oral  
 carvedilol (COREG) 25 mg tablet 3 Sig: Take 1 Tab by mouth two (2) times daily (with meals). Class: Normal  
 Pharmacy: [address] Ph #: [phone] Route: Oral  
 pravastatin (PRAVACHOL) 20 mg tablet 3 Sig: Take 1 Tab by mouth nightly. Class: Normal  
 Pharmacy: [address] Ph #: [phone] Route: Oral  
 hydrALAZINE (APRESOLINE) 25 mg tablet 3 Sig: Take 1 Tab by mouth every eight (8) hours. Class: Normal  
 Pharmacy: [address] Ph #: [phone] Route: Oral  
 isosorbide mononitrate ER (IMDUR) 30 mg tablet 3 Sig: Take 1 Tab by mouth daily. Class: Normal  
 Pharmacy: [address] Ph #: [phone] Route: Oral  
  
Follow-up Instructions Return in about 3 months (around 9/6/2017) for chronic disease followup- 30 min. Patient Instructions Cardiology [facility] [address], [address] [place] [address], [address] Phone: [phone] Fax: [phone] 
& Address: [place] [address]., [place], [address]  Phone: [phone] [facility]- also has [address] [facility] locations [address] [facility] [address], [place], [address] Phone: [phone] Nephrology Dr. Gardner [facility] [address] [facility], [address] Phone: [phone] Colonoscopy-  colorectal cancer [facility] [address], [address] [place], [facility] 53 also locations at [place] [address] Phone: [phone] Introducing [facility]! Susanna Chambers introduces [employer] patient portal. Now you can access parts of your medical record, email your doctor's office, and request medication refills online. 1. In your internet browser, go to [email or web address] 2. Click on the First Time User? Click Here link in the Sign In box. You will see the New Member Sign Up page. 3. Enter your [employer] Access Code exactly as it appears below. You will not need to use this code after youve completed the sign-up process. If you do not sign up before the expiration date, you must request a new code. · [employer] Access Code: [email or web address] Expires: 8/27/2017  4:36 PM 
 
4. Enter the last four digits of your Social Security Number (xxxx) and Date of Birth (mm/dd/yyyy) as indicated and click Submit. You will be taken to the next sign-up page. 5. Create a [employer] ID. This will be your [employer] login ID and cannot be changed, so think of one that is secure and easy to remember. 6. Create a [employer] password. You can change your password at any time. 7. Enter your Password Reset Question and Answer. This can be used at a later time if you forget your password. 8. Enter your e-mail address. You will receive e-mail notification when new information is available in [address] 9. Click Sign Up. You can now view and download portions of your medical record. 10. Click the Download Summary menu link to download a portable copy of your medical information. If you have questions, please visit the Frequently Asked Questions section of the [employer] website. Remember, [employer] is NOT to be used for urgent needs. For medical emergencies, dial 911. Now available from your iPhone and Android! Please provide this summary of care documentation to your next provider. Your primary care clinician is listed as Leigh Alejo. If you have any questions after today's visit, please call [phone].

## 2017-06-06 NOTE — PROGRESS NOTES
HISTORY OF PRESENT ILLNESS  Eve Borrero is a 65 y.o. male. 6/6/2017  4:25 PM    Chief Complaint   Patient presents with    Follow Up Chronic Condition     HTN, DENIES ANY BLURRED VISION, HEADACHES,OR CHEST PAIN       HPI: Here today for follow up on chronic conditions. Last labs done 4/2017. Not following with any specialists- should be seeing cardiology and nephrology. Hypertension/Hyperlipidemia/CHD/Cardiomyopathy: Reports that he has been taking his medications as prescribed- currently on 4 BP agents, statin, and ASA. He does have a BP monitor at home- no log available for review and he is unable to recall his last readings. Denies any headaches, chest pain, SOB, or leg swelling. Has not been following with cardiology. CKD: Has been referred to nephrology in past- he has not been following with. Not taking NSAIDs. Is on Lasix for fluid control. Gout: Not on any maintenance medication (previously sent in [place]- has bot been taking)- recent flare in 1/2017- took Colchicine and symptoms resolved. No other complaints. Symptoms occurred in hands. Review of Systems   Constitutional: Negative for chills, fever and malaise/fatigue. Eyes: Negative for double vision, photophobia and pain. Respiratory: Negative for cough and wheezing. Cardiovascular: Negative for palpitations and leg swelling. Gastrointestinal: Negative for constipation, diarrhea, nausea and vomiting. Genitourinary: Negative for dysuria, frequency and urgency. Musculoskeletal: Negative for back pain, joint pain and myalgias. Neurological: Negative for dizziness and weakness.         PHQ Screening   PHQ over the last two weeks 11/16/2016   Little interest or pleasure in doing things Not at all   Feeling down, depressed or hopeless Not at all   Total Score PHQ 2 0         History  Past Medical History:   Diagnosis Date    Chronic kidney disease     [facility]     Dilated cardiomyopathy ([facility]     History of alcohol abuse     History of echocardiogram 02/24/2014    Mod-marked LVE. EF 15%. Severe, diffuse hypk. Mild LVH. Gr 1 DDfx. Mod LAE. Mild-mod FIDELIA. Mild AoRE. No significant change from echo of 10/23/09.  History of gout     History of myocardial perfusion scan 05/25/2006    No evidence of ischemia or scarring. Gross LVE. EF 28%. Severe global hypk. Neg EKG on submaximal EST. Ex time 8 min 25 sec.  HTN (hypertension)     Hypercholesterolemia     Hypertensive cardiovascular disease        Past Surgical History:   Procedure Laterality Date    HX HERNIA REPAIR  04/2016       Social History     Social History    Marital status: SINGLE     Spouse name: N/A    Number of children: N/A    Years of education: N/A     Occupational History    Not on file. Social History Main Topics    Smoking status: Never Smoker    Smokeless tobacco: Never Used    Alcohol use No    Drug use: No    Sexual activity: No     Other Topics Concern    Not on file     Social History Narrative       Family History   Problem Relation Age of Onset    Cancer Father      Lung    Heart Failure Mother     Cancer Sister        No Known Allergies    Current Outpatient Prescriptions   Medication Sig Dispense Refill    febuxostat (ULORIC) 40 mg tab tablet Take 1 Tab by mouth daily. 30 Tab 0    furosemide (LASIX) 40 mg tablet Take 1 Tab by mouth daily. 30 Tab 3    carvedilol (COREG) 25 mg tablet Take 1 Tab by mouth two (2) times daily (with meals). 60 Tab 3    pravastatin (PRAVACHOL) 20 mg tablet Take 1 Tab by mouth nightly. 30 Tab 3    hydrALAZINE (APRESOLINE) 25 mg tablet Take 1 Tab by mouth every eight (8) hours. 90 Tab 3    isosorbide mononitrate ER (IMDUR) 30 mg tablet Take 1 Tab by mouth daily. 30 Tab 3    aspirin 81 mg chewable tablet Take 1 Tab by mouth daily. 30 Tab 11    colchicine (MITIGARE) 0.6 mg capsule 1.2 mg PO at first sign of flare, then 0.6 mg 1 hr later.  3 Cap 1       Health Maintenance and Screenings  Colon Cancer: Colonoscopy referral placed previously- encouraged to get set up for appt  COPD Screen/ Lung Cancer: None smoker  CAD Screen: LDL 40 4/2017 on statin and ASA  Diabetes: Glucose 85 4/2017  STD and HIV Screen: Hep C screening negative 4/2017  Osteoporosis Screen: Not indicated for early screening   Prostate Cancer: PSA 0.7 4/2017  Abdominal Aneurysm Screen: Not indicated- nonsmoker  Opthalmology:   Dentistry Services:   Hearing Impairment: No hearing loss noted  Skin Cancer Screen:   Obesity Screen: Body mass index is 33.49 kg/(m^2). Flu Vaccination: Out of season  Pneumococcal Vaccine: Not indicated for early vaccination  Shingles Vaccine: Not indicated for early vaccination   Tetanus Booster: Unsure of last booster- no medical indication today  Hepatitis B Vaccinations: Low risk, not indicated        Advance Care Planning:   Patient was offered the opportunity to discuss advance care planning NO   Does patient have an Advance Directive:  NO   If no, did you provide information on [facility]? Patient Care Team:  Patient Care Team:  Audrey Meredith NP as PCP - General (Nurse Practitioner)  Babs Dhaliwal MD (Nephrology)  Mago Robin MD (Cardiology)        LABS:     Ref.  Range 4/11/2017 16:35   Sodium Latest Ref Range: 134 - 144 mmol/L 140   Potassium Latest Ref Range: 3.5 - 5.2 mmol/L 5.0   Chloride Latest Ref Range: 96 - 106 mmol/L 99   CO2 Latest Ref Range: 18 - 29 mmol/L 26   Glucose Latest Ref Range: 65 - 99 mg/dL 85   BUN Latest Ref Range: 6 - 24 mg/dL 38 (H)   Creatinine Latest Ref Range: 0.76 - 1.27 mg/dL 2.27 (H)   BUN/Creatinine ratio Latest Ref Range: 9 - 20  17   Calcium Latest Ref Range: 8.7 - 10.2 mg/dL 8.8   GFR est non-AA Latest Ref Range: >59 mL/min/1.73 31 (L)   GFR est AA Latest Ref Range: >59 mL/min/1.73 36 (L)   Bilirubin, total Latest Ref Range: 0.0 - 1.2 mg/dL 0.3   Protein, total Latest Ref Range: 6.0 - 8.5 g/dL 7.4   Albumin Latest Ref Range: 3.5 - 5.5 g/dL 4.2   A-G Ratio Latest Ref Range: 1.2 - 2.2  1.3   ALT Latest Ref Range: 0 - 44 IU/L 13   AST Latest Ref Range: 0 - 40 IU/L 15   Alk. phosphatase Latest Ref Range: 39 - 117 IU/L 89   Uric acid Latest Ref Range: 3.7 - 8.6 mg/dL 11.5 (H)   Triglyceride Latest Ref Range: 0 - 149 mg/dL 75   Cholesterol, total Latest Ref Range: 100 - 199 mg/dL 130   HDL Cholesterol Latest Ref Range: >39 mg/dL 75   LDL, calculated Latest Ref Range: 0 - 99 mg/dL 40   VLDL, calculated Latest Ref Range: 5 - 40 mg/dL 15   HEPATITIS C AB Unknown Neg   Prostate Specific Ag Latest Ref Range: 0.0 - 4.0 ng/mL 0.7       RADIOLOGY:  None new to review      Physical Exam   Constitutional: He is oriented to person, place, and time. He appears well-developed and well-nourished. No distress. Neck: Normal range of motion. Neck supple. No thyromegaly present. Cardiovascular: Normal rate, regular rhythm and normal heart sounds. No murmur heard. Pulmonary/Chest: Effort normal and breath sounds normal. No respiratory distress. Abdominal: Soft. Bowel sounds are normal. There is no tenderness. Musculoskeletal: He exhibits no edema. Neurological: He is alert and oriented to person, place, and time. He exhibits normal muscle tone. Coordination normal.   Skin: Skin is warm and dry. Vitals:    06/06/17 1613   BP: 125/72   Pulse: 87   Resp: 18   Temp: 99.2 °F (37.3 °C)   TempSrc: Oral   SpO2: 96%   Weight: 226 lb 12.8 oz (102.9 kg)   Height: 5' 9\" (1.753 m)   PainSc:   0 - No pain       ASSESSMENT and PLAN  Peggy Nova was seen today for follow up chronic condition. Diagnoses and all orders for this visit:    Essential hypertension  *Controlled today. Continue current medications. - furosemide (LASIX) 40 mg tablet; Take 1 Tab by mouth daily. Dispense: 30 Tab; Refill: 3  - carvedilol (COREG) 25 mg tablet; Take 1 Tab by mouth two (2) times daily (with meals). Dispense: 60 Tab;  Refill: 3  - hydrALAZINE (APRESOLINE) 25 mg tablet; Take 1 Tab by mouth every eight (8) hours. Dispense: 90 Tab; Refill: 3  - isosorbide mononitrate ER (IMDUR) 30 mg tablet; Take 1 Tab by mouth daily. Dispense: 30 Tab; Refill: 3    Mixed hyperlipidemia  *Continue current statin. LDL controlled. - pravastatin (PRAVACHOL) 20 mg tablet; Take 1 Tab by mouth nightly. Dispense: 30 Tab; Refill: 3    Diastolic CHF, chronic (HCC)  *Refilled. Encouraged patient to weigh self daily and monitor salt intake. Cardiology appointment encouraged again. - furosemide (LASIX) 40 mg tablet; Take 1 Tab by mouth daily. Dispense: 30 Tab; Refill: 3    Chronic kidney disease (CKD), stage III (moderate)  *Refilled. Nephrology appointment encouraged- given contact information again. - furosemide (LASIX) 40 mg tablet; Take 1 Tab by mouth daily. Dispense: 30 Tab; Refill: 3    Gout of hand, unspecified cause, unspecified chronicity, unspecified laterality  *He reports infrequent flares that are caused by foods that he eats. Not taking prophylactic medication. He understands when to take Colchicine. *Plan of care reviewed with patient. Patient in agreement with plan and expresses understanding. All questions answered and patient encouraged to call or RTO if further questions or concerns. Follow-up Disposition:  Return in about 3 months (around 9/6/2017) for chronic disease followup- 30 min.

## 2017-06-06 NOTE — PATIENT INSTRUCTIONS
Cardiology    [facility]  [address], [address] [place] [address], [address]  Phone: [phone]  Fax: [phone]  &  Address: [address], [place], [address]   Phone: [phone]      [facility]- also has [place] and [place] locations  [facility], [address]  Phone: [phone]      Nephrology    Dr. Enrique Daigle  [address] [facility], [address]  Phone: [phone]        Colonoscopy-  colorectal cancer      [facility] [address] [place] [address], [address] [place], [address]- also locations at \[facility]\"" and [place]   Phone: [phone]

## 2017-07-12 DIAGNOSIS — M10.9 GOUT OF HAND, UNSPECIFIED CAUSE, UNSPECIFIED CHRONICITY, UNSPECIFIED LATERALITY: ICD-10-CM

## 2017-07-12 NOTE — TELEPHONE ENCOUNTER
Requested Prescriptions     Pending Prescriptions Disp Refills    colchicine (MITIGARE) 0.6 mg capsule 3 Cap 1     Si.2 mg PO at first sign of flare, then 0.6 mg 1 hr later.

## 2017-07-13 RX ORDER — COLCHICINE 0.6 MG/1
CAPSULE ORAL
Qty: 3 CAP | Refills: 1 | Status: SHIPPED | OUTPATIENT
Start: 2017-07-13 | End: 2017-07-21 | Stop reason: CLARIF

## 2017-07-13 NOTE — TELEPHONE ENCOUNTER
7/13/2017  9:23 AM    Chief Complaint   Patient presents with    Medication Refill       Noted refill request for colchicine that he uses PRN for gout flares. Last office visit 6/2017. Refill completed.

## 2017-07-19 ENCOUNTER — DOCUMENTATION ONLY (OUTPATIENT)
Dept: FAMILY MEDICINE CLINIC | Age: 57
End: 2017-07-19

## 2017-07-19 NOTE — PROGRESS NOTES
7/19/2017  8:30 AM    Chief Complaint   Patient presents with    Prior Auth       Noted PA required for colchicine used PRN for gout flares. PA completed via CoverMyMeds. Awaiting determination.

## 2017-07-21 ENCOUNTER — TELEPHONE (OUTPATIENT)
Dept: FAMILY MEDICINE CLINIC | Age: 57
End: 2017-07-21

## 2017-07-21 DIAGNOSIS — M10.9 GOUT OF HAND, UNSPECIFIED CAUSE, UNSPECIFIED CHRONICITY, UNSPECIFIED LATERALITY: Primary | ICD-10-CM

## 2017-07-21 RX ORDER — COLCHICINE 0.6 MG/1
TABLET ORAL
Qty: 3 TAB | Refills: 2 | Status: SHIPPED | OUTPATIENT
Start: 2017-07-21 | End: 2018-06-05 | Stop reason: SDUPTHER

## 2017-07-21 NOTE — TELEPHONE ENCOUNTER
7/21/2017  2:34 PM    Chief Complaint   Patient presents with    Medication Problem       Noted that insurance will not cover any form of colchicine. Ran pricing on [employer] and noted that both tablets and capsules #3 tabs are around $13. Called pharmacy at this time and LM agreeing with dispense as cash pricing.

## 2017-07-21 NOTE — PROGRESS NOTES
7/21/2017  1:26 PM    Colchicine capsules denied coverage by insurance, will try tablets. Sent to pharmacy at this time.

## 2017-07-21 NOTE — TELEPHONE ENCOUNTER
Pharmacy called and stated that the colchicine is not covered by insurance and neither is the generic brand. She would like to know if you want to change the medication or if you want to go ahead and have them fill it for the cash price.

## 2017-07-21 NOTE — PROGRESS NOTES
7/21/2017  1:23 PM    Chief Complaint   Patient presents with    Prior Auth       Noted in CoverMyMeds that colchicine capsules PA has been denied. Will attempt to get colchicine tablets for PRN use.

## 2017-07-22 DIAGNOSIS — I50.32 DIASTOLIC CHF, CHRONIC (HCC): ICD-10-CM

## 2017-07-22 DIAGNOSIS — N18.30 CHRONIC KIDNEY DISEASE (CKD), STAGE III (MODERATE) (HCC): ICD-10-CM

## 2017-07-22 DIAGNOSIS — I10 ESSENTIAL HYPERTENSION: ICD-10-CM

## 2017-07-26 DIAGNOSIS — I10 ESSENTIAL HYPERTENSION: ICD-10-CM

## 2017-07-26 DIAGNOSIS — N18.30 CHRONIC KIDNEY DISEASE (CKD), STAGE III (MODERATE) (HCC): ICD-10-CM

## 2017-07-26 DIAGNOSIS — I50.32 DIASTOLIC CHF, CHRONIC (HCC): ICD-10-CM

## 2017-07-26 RX ORDER — FUROSEMIDE 40 MG/1
TABLET ORAL
Qty: 30 TAB | Refills: 0 | OUTPATIENT
Start: 2017-07-26

## 2017-07-26 NOTE — TELEPHONE ENCOUNTER
7/26/2017  8:25 AM    Chief Complaint   Patient presents with    Medication Refill       Noted [employer] refill request for Lasix. This was sent previously on 6/6 with 3 refills. No further action needed.

## 2017-07-27 RX ORDER — FUROSEMIDE 40 MG/1
40 TABLET ORAL DAILY
Qty: 30 TAB | Refills: 3 | OUTPATIENT
Start: 2017-07-27

## 2017-07-27 NOTE — TELEPHONE ENCOUNTER
Call made to Pt using two identifiers name and . Pt made aware that he need to call his pharmacy because he has refills left on the Lasix. Pt verbalized understanding.

## 2017-07-27 NOTE — TELEPHONE ENCOUNTER
7/27/2017  4:00 PM    Chief Complaint   Patient presents with    Medication Refill       Noted refill request from patient for Lasix. Rx sent on 6/6 for 30 day supply and 3 refills. Patient only needs to call his pharmacy. Forwarded to clinical staff to make patient aware.

## 2017-09-05 ENCOUNTER — HOSPITAL ENCOUNTER (OUTPATIENT)
Dept: GENERAL RADIOLOGY | Age: 57
Discharge: HOME | End: 2017-09-05
Payer: COMMERCIAL

## 2017-09-05 ENCOUNTER — OFFICE VISIT (OUTPATIENT)
Dept: FAMILY MEDICINE CLINIC | Age: 57
End: 2017-09-05

## 2017-09-05 VITALS
BODY MASS INDEX: 34.04 KG/M2 | HEART RATE: 80 BPM | OXYGEN SATURATION: 97 % | DIASTOLIC BLOOD PRESSURE: 98 MMHG | TEMPERATURE: 98.8 F | HEIGHT: 69 IN | SYSTOLIC BLOOD PRESSURE: 151 MMHG | RESPIRATION RATE: 18 BRPM | WEIGHT: 229.8 LBS

## 2017-09-05 DIAGNOSIS — E78.2 MIXED HYPERLIPIDEMIA: ICD-10-CM

## 2017-09-05 DIAGNOSIS — G89.29 CHRONIC PAIN OF LEFT KNEE: ICD-10-CM

## 2017-09-05 DIAGNOSIS — M10.9 GOUT OF HAND, UNSPECIFIED CAUSE, UNSPECIFIED CHRONICITY, UNSPECIFIED LATERALITY: ICD-10-CM

## 2017-09-05 DIAGNOSIS — I10 ESSENTIAL HYPERTENSION: Primary | ICD-10-CM

## 2017-09-05 DIAGNOSIS — M25.562 CHRONIC PAIN OF LEFT KNEE: ICD-10-CM

## 2017-09-05 DIAGNOSIS — I50.32 CHRONIC DIASTOLIC CONGESTIVE HEART FAILURE (HCC): ICD-10-CM

## 2017-09-05 DIAGNOSIS — N18.30 CHRONIC KIDNEY DISEASE (CKD), STAGE III (MODERATE) (HCC): ICD-10-CM

## 2017-09-05 DIAGNOSIS — I42.8 NONISCHEMIC CARDIOMYOPATHY (HCC): ICD-10-CM

## 2017-09-05 PROCEDURE — 73564 X-RAY EXAM KNEE 4 OR MORE: CPT

## 2017-09-05 RX ORDER — FUROSEMIDE 40 MG/1
40 TABLET ORAL DAILY
Qty: 30 TAB | Refills: 1 | Status: SHIPPED | OUTPATIENT
Start: 2017-09-05 | End: 2017-12-12 | Stop reason: SDUPTHER

## 2017-09-05 RX ORDER — HYDRALAZINE HYDROCHLORIDE 25 MG/1
37.5 TABLET, FILM COATED ORAL EVERY 8 HOURS
Qty: 135 TAB | Refills: 1 | Status: SHIPPED | OUTPATIENT
Start: 2017-09-05 | End: 2018-01-27 | Stop reason: SDUPTHER

## 2017-09-05 RX ORDER — PRAVASTATIN SODIUM 20 MG/1
20 TABLET ORAL
Qty: 30 TAB | Refills: 1 | Status: SHIPPED | OUTPATIENT
Start: 2017-09-05 | End: 2017-11-06 | Stop reason: SDUPTHER

## 2017-09-05 RX ORDER — CARVEDILOL 25 MG/1
25 TABLET ORAL 2 TIMES DAILY WITH MEALS
Qty: 60 TAB | Refills: 1 | Status: SHIPPED | OUTPATIENT
Start: 2017-09-05 | End: 2017-12-19 | Stop reason: SDUPTHER

## 2017-09-05 RX ORDER — ALLOPURINOL 100 MG/1
100 TABLET ORAL DAILY
Qty: 60 TAB | Refills: 2 | Status: SHIPPED | OUTPATIENT
Start: 2017-09-05 | End: 2018-01-24 | Stop reason: SDUPTHER

## 2017-09-05 NOTE — MR AVS SNAPSHOT
Visit Information Date & Time Provider Department Dept. Phone Encounter #  
 9/5/2017  4:00 PM Denise Tracey, [address] [phone] [id number] Follow-up Instructions Return in about 3 months (around 12/5/2017) for chronic disease routine care- 30 min. Your Appointments 12/5/2017  4:00 PM  
FOLLOW UP EXAM with ELHAM Thibodeaux [address] [facility] ([address]) Appt Note: chronic disease f/u -30 minutes [address] [facility] [id number]  
[facility] [id number] Upcoming Health Maintenance Date Due COLONOSCOPY 3/1/1979 DTaP/Tdap/Td series (1 - Tdap) 3/1/1982 Prostate-Specific Antigen 4/11/2018 Allergies as of 9/5/2017  Review Complete On: 9/5/2017 By: Denise Tracey NP No Known Allergies Current Immunizations  Never Reviewed No immunizations on file. Not reviewed this visit You Were Diagnosed With   
  
 Codes Comments Essential hypertension    -  Primary ICD-10-CM: I10 
ICD-9-CM: 401.9 Mixed hyperlipidemia     ICD-10-CM: E78.2 ICD-9-CM: 272.2 Diastolic CHF, chronic (HCC)     ICD-10-CM: I50.32 
ICD-9-CM: 428.32, 428.0 Chronic kidney disease (CKD), stage III (moderate)     ICD-10-CM: N18.3 ICD-9-CM: 585.3 Gout of hand, unspecified cause, unspecified chronicity, unspecified laterality     ICD-10-CM: M10.9 ICD-9-CM: 274.9 Chronic pain of left knee     ICD-10-CM: M25.562, G89.29 ICD-9-CM: 719.46, 338.29 Vitals BP Pulse Temp Resp Height(growth percentile) Weight(growth percentile) (!) 151/98 (BP 1 Location: Right arm, BP Patient Position: Sitting) 80 98.8 °F (37.1 °C) (Oral) 18 5' 9\" (1.753 m) 229 lb 12.8 oz (104.2 kg) SpO2 BMI Smoking Status 97% 33.94 kg/m2 Never Smoker Vitals History BMI and BSA Data Body Mass Index Body Surface Area  33.94 kg/m 2 2.25 m 2  
  
  
 Preferred Pharmacy Pharmacy Name Phone [facility] [address] [place] [address] [phone] Your Updated Medication List  
  
   
This list is accurate as of: 9/5/17  5:19 PM.  Always use your most recent med list.  
  
  
  
  
 allopurinol 100 mg tablet Commonly known as:  Pollgwendolynn Raja Take 1 Tab by mouth daily. Increase to 200 mg in one week. aspirin 81 mg chewable tablet Take 1 Tab by mouth daily. carvedilol 25 mg tablet Commonly known as:  Imani Shade Take 1 Tab by mouth two (2) times daily (with meals). colchicine 0.6 mg tablet 2 tablets at first sign of gout flare and then 1 tablet 1 hour later  
  
 furosemide 40 mg tablet Commonly known as:  LASIX Take 1 Tab by mouth daily. hydrALAZINE 25 mg tablet Commonly known as:  APRESOLINE Take 1.5 Tabs by mouth every eight (8) hours. pravastatin 20 mg tablet Commonly known as:  PRAVACHOL Take 1 Tab by mouth nightly. Prescriptions Sent to Pharmacy Refills  
 allopurinol (ZYLOPRIM) 100 mg tablet 2 Sig: Take 1 Tab by mouth daily. Increase to 200 mg in one week. Class: Normal  
 Pharmacy: [address] Ph #: [phone] Route: Oral  
 furosemide (LASIX) 40 mg tablet 1 Sig: Take 1 Tab by mouth daily. Class: Normal  
 Pharmacy: [address] Ph #: [phone] Route: Oral  
 carvedilol (COREG) 25 mg tablet 1 Sig: Take 1 Tab by mouth two (2) times daily (with meals). Class: Normal  
 Pharmacy: [address] Ph #: [phone] Route: Oral  
 pravastatin (PRAVACHOL) 20 mg tablet 1 Sig: Take 1 Tab by mouth nightly.   
 Class: Normal  
 Pharmacy: [facility] [place] [address] [place] [address] Ph #: [phone] Route: Oral  
 hydrALAZINE (APRESOLINE) 25 mg tablet 1 Sig: Take 1.5 Tabs by mouth every eight (8) hours. Class: Normal  
 Pharmacy: [facility] [address] [place] [address] [address] Ph #: [phone] Route: Oral  
  
Follow-up Instructions Return in about 3 months (around 12/5/2017) for chronic disease routine care- 30 min. To-Do List   
 Around 09/05/2017 Lab:  URIC ACID   
  
 09/05/2017 Imaging:  XR KNEE LT MIN 4 V Around 09/06/2017 Lab:  CBC W/O DIFF Around 09/06/2017 Lab:  LIPID PANEL Around 09/06/2017 Lab:  METABOLIC PANEL, COMPREHENSIVE Patient Instructions Gastroenterology [facility] 1946- multiple locations [place] [address] [address], [place], [place] Phone: [phone] Introducing [facility]! [employer] introduces [employer] patient portal. Now you can access parts of your medical record, email your doctor's office, and request medication refills online. 1. In your internet browser, go to [email or web address] 2. Click on the First Time User? Click Here link in the Sign In box. You will see the New Member Sign Up page. 3. Enter your [employer] Access Code exactly as it appears below. You will not need to use this code after youve completed the sign-up process. If you do not sign up before the expiration date, you must request a new code. · [employer] Access Code: [email or web address] Expires: 12/4/2017  5:19 PM 
 
4. Enter the last four digits of your Social Security Number (xxxx) and Date of Birth (mm/dd/yyyy) as indicated and click Submit. You will be taken to the next sign-up page. 5. Create a [employer] ID.  This will be your [employer] login ID and cannot be changed, so think of one that is secure and easy to remember. 6. Create a [employer] password. You can change your password at any time. 7. Enter your Password Reset Question and Answer. This can be used at a later time if you forget your password. 8. Enter your e-mail address. You will receive e-mail notification when new information is available in [address] 9. Click Sign Up. You can now view and download portions of your medical record. 10. Click the Download Summary menu link to download a portable copy of your medical information. If you have questions, please visit the Frequently Asked Questions section of the [employer] website. Remember, [employer] is NOT to be used for urgent needs. For medical emergencies, dial 911. Now available from your iPhone and Android! Please provide this summary of care documentation to your next provider. Your primary care clinician is listed as Millie Woo. If you have any questions after today's visit, please call [phone].

## 2017-09-05 NOTE — PROGRESS NOTES
HISTORY OF PRESENT ILLNESS  Ольга Katz is a 65 y.o. male. 9/5/2017  4:25 PM    Chief Complaint   Patient presents with    Follow Up Chronic Condition     HTN, DOING WELL     Knee Swelling     Pt c/o having left knee pain and swelling for 3 months but has gotten worse       HPI: Here today for follow up on chronic conditions. Last labs done 4/2017. Not following with any specialists- should be seeing cardiology and nephrology. Hypertension/Hyperlipidemia/CHF/Cardiomyopathy: Taking most medications as prescribed- on Lasix, Coreg, Pravastatin, Hydralazine, and ASA- not taking Imdur any longer- states that it was giving him headaches all the time- since stopping, has not had. He does have a BP monitor at home- no log available for review and he is unable to recall his last readings. Denies any headaches, chest pain, SOB, or leg swelling. Has not been following with cardiology. CKD: Has been referred to nephrology in past- he has not been following with. Not taking NSAIDs. Is on Lasix for fluid control. Gout: Not on any maintenance medication (previously sent in [place]- has bot been taking). Has been having flares every month or two- using Colchicine but finds it hard to afford. Symptoms occurs in hands with flares. Complains of left knee pain that has been happening for about 2-3 months and has been worsening. Associated swelling noted. Has not been using anything for the pain. Denies any injuries that he can remember. Has not xrays completed yet. Review of Systems   Constitutional: Negative for chills, fever and malaise/fatigue. Eyes: Negative for double vision, photophobia and pain. Respiratory: Negative for cough, shortness of breath and wheezing. Cardiovascular: Negative for chest pain, palpitations and leg swelling. Gastrointestinal: Negative for abdominal pain, constipation, diarrhea, nausea and vomiting. Genitourinary: Negative for dysuria, frequency and urgency. Musculoskeletal: Positive for joint pain. Negative for myalgias. Neurological: Negative for dizziness, weakness and headaches. PHQ Screening   PHQ over the last two weeks 9/5/2017   Little interest or pleasure in doing things Not at all   Feeling down, depressed or hopeless Not at all   Total Score PHQ 2 0         History  Past Medical History:   Diagnosis Date    Chronic kidney disease     Diastolic CHF ([facility]     Dilated cardiomyopathy ([facility]     History of alcohol abuse     History of echocardiogram 02/24/2014    Mod-marked LVE. EF 15%. Severe, diffuse hypk. Mild LVH. Gr 1 DDfx. Mod LAE. Mild-mod FIDELIA. Mild AoRE. No significant change from echo of 10/23/09.  History of gout     History of myocardial perfusion scan 05/25/2006    No evidence of ischemia or scarring. Gross LVE. EF 28%. Severe global hypk. Neg EKG on submaximal EST. Ex time 8 min 25 sec.  HTN (hypertension)     Hypercholesterolemia     Hypertensive cardiovascular disease        Past Surgical History:   Procedure Laterality Date    HX HERNIA REPAIR  04/2016       Social History     Social History    Marital status: SINGLE     Spouse name: N/A    Number of children: N/A    Years of education: N/A     Occupational History    Not on file. Social History Main Topics    Smoking status: Never Smoker    Smokeless tobacco: Never Used    Alcohol use No    Drug use: No    Sexual activity: No     Other Topics Concern    Not on file     Social History Narrative       Family History   Problem Relation Age of Onset    Cancer Father      Lung    Heart Failure Mother     Cancer Sister        No Known Allergies    Current Outpatient Prescriptions   Medication Sig Dispense Refill    colchicine 0.6 mg tablet 2 tablets at first sign of gout flare and then 1 tablet 1 hour later 3 Tab 2    furosemide (LASIX) 40 mg tablet Take 1 Tab by mouth daily.  30 Tab 3    carvedilol (COREG) 25 mg tablet Take 1 Tab by mouth two (2) times daily (with meals). 60 Tab 3    pravastatin (PRAVACHOL) 20 mg tablet Take 1 Tab by mouth nightly. 30 Tab 3    hydrALAZINE (APRESOLINE) 25 mg tablet Take 1 Tab by mouth every eight (8) hours. 90 Tab 3    aspirin 81 mg chewable tablet Take 1 Tab by mouth daily. 30 Tab 11    isosorbide mononitrate ER (IMDUR) 30 mg tablet Take 1 Tab by mouth daily. 30 Tab 3       Health Maintenance and Screenings  Colon Cancer: Colonoscopy referral placed previously- encouraged to get set up for appt  COPD Screen/ Lung Cancer: Non smoker  CAD Screen: LDL 40 4/2017 on statin and ASA  Diabetes: Glucose 85 4/2017  STD and HIV Screen: Hep C screening negative 4/2017  Osteoporosis Screen: Not indicated for early screening   Prostate Cancer: PSA 0.7 4/2017  Abdominal Aneurysm Screen: Not indicated- nonsmoker  Opthalmology: Recommended  Dentistry Services: Recommended  Hearing Impairment: No hearing loss noted  Skin Cancer Screen: Self checks encouraged  Obesity Screen: Body mass index is 33.94 kg/(m^2). I have reviewed/discussed the above normal BMI with the patient. I have recommended the following interventions: dietary management education, guidance, and counseling and monitor weight . Cecil Goodman Flu Vaccination: Declines  Pneumococcal Vaccine: Not indicated for early vaccination  Shingles Vaccine: Declines at this time  Tetanus Booster: Unsure of last booster- no medical indication today  Hepatitis B Vaccinations: Low risk, not indicated        Advance Care Planning:   Patient was offered the opportunity to discuss advance care planning NO   Does patient have an Advance Directive:  NO   If no, did you provide information on [facility] Connections? Patient Care Team:  Patient Care Team:  Roberta Presslye NP as PCP - General (Nurse Practitioner)  Billy Benitez MD (Nephrology)  Noe Mejia MD (Cardiology)        LABS:     Ref.  Range 4/11/2017 16:35   Sodium Latest Ref Range: 134 - 144 mmol/L 140   Potassium Latest Ref Range: 3.5 - 5.2 mmol/L 5.0   Chloride Latest Ref Range: 96 - 106 mmol/L 99   CO2 Latest Ref Range: 18 - 29 mmol/L 26   Glucose Latest Ref Range: 65 - 99 mg/dL 85   BUN Latest Ref Range: 6 - 24 mg/dL 38 (H)   Creatinine Latest Ref Range: 0.76 - 1.27 mg/dL 2.27 (H)   BUN/Creatinine ratio Latest Ref Range: 9 - 20  17   Calcium Latest Ref Range: 8.7 - 10.2 mg/dL 8.8   GFR est non-AA Latest Ref Range: >59 mL/min/1.73 31 (L)   GFR est AA Latest Ref Range: >59 mL/min/1.73 36 (L)   Bilirubin, total Latest Ref Range: 0.0 - 1.2 mg/dL 0.3   Protein, total Latest Ref Range: 6.0 - 8.5 g/dL 7.4   Albumin Latest Ref Range: 3.5 - 5.5 g/dL 4.2   A-G Ratio Latest Ref Range: 1.2 - 2.2  1.3   ALT (SGPT) Latest Ref Range: 0 - 44 IU/L 13   AST Latest Ref Range: 0 - 40 IU/L 15   Alk. phosphatase Latest Ref Range: 39 - 117 IU/L 89   Uric acid Latest Ref Range: 3.7 - 8.6 mg/dL 11.5 (H)       RADIOLOGY:  None new to review      Physical Exam   Constitutional: He is oriented to person, place, and time. He appears well-developed and well-nourished. No distress. Neck: Normal range of motion. Neck supple. Cardiovascular: Normal rate, regular rhythm and normal heart sounds. No murmur heard. Pulmonary/Chest: Effort normal and breath sounds normal. No respiratory distress. Abdominal: Soft. Bowel sounds are normal. There is no tenderness. Musculoskeletal: He exhibits no edema. Left knee: He exhibits decreased range of motion, swelling and effusion. He exhibits no erythema, no LCL laxity, normal patellar mobility and no MCL laxity. Tenderness found. Neurological: He is alert and oriented to person, place, and time. He exhibits normal muscle tone. Coordination normal.   Skin: Skin is warm and dry.         Vitals:    09/05/17 1646 09/05/17 1650   BP: (!) 157/105 (!) 151/98   Pulse: 82 80   Resp: 18 18   Temp: 98.8 °F (37.1 °C)    TempSrc: Oral    SpO2: 97% 97%   Weight: 229 lb 12.8 oz (104.2 kg)    Height: 5' 9\" (1.753 m) PainSc:  10 - Worst pain ever    PainLoc: Knee        BP Readings from Last 3 Encounters:   09/05/17 (!) 151/98   06/06/17 125/72   02/21/17 (!) 150/100       ASSESSMENT and PLAN    Diagnoses and all orders for this visit:    Essential hypertension  *Elevated reading noted since self discontinuing Imdur. Will increase up on Hydralazine to accommodate.   -     furosemide (LASIX) 40 mg tablet; Take 1 Tab by mouth daily. -     carvedilol (COREG) 25 mg tablet; Take 1 Tab by mouth two (2) times daily (with meals). -     hydrALAZINE (APRESOLINE) 25 mg tablet; Take 1.5 Tabs by mouth every eight (8) hours. -     METABOLIC PANEL, COMPREHENSIVE; Future    Mixed hyperlipidemia  *Refilled. Check labs before next visit. -     pravastatin (PRAVACHOL) 20 mg tablet; Take 1 Tab by mouth nightly. -     LIPID PANEL; Future    Chronic diastolic congestive heart failure (HCC)/ Nonischemic cardiomyopathy ([facility] [address]  *I have encouraged and recommended for him to follow with cardiology on multiple occasions and continued noncompliance. *Will improve BP control, continue diuretic, and continue BB. Taken off ACE in 2016 during admission. Advised patient of need to monitor weight and follow DASH, low fat, low purine diet. -     furosemide (LASIX) 40 mg tablet; Take 1 Tab by mouth daily. -     carvedilol (COREG) 25 mg tablet; Take 1 Tab by mouth two (2) times daily (with meals). Assessment & Plan:  Stable, based on history, physical exam and review of pertinent labs, studies and medications; meds reconciled; continue current treatment plan, encouraged to see cardiology. Key CAD CHF Meds             furosemide (LASIX) 40 mg tablet  (Taking) Take 1 Tab by mouth daily. carvedilol (COREG) 25 mg tablet  (Taking) Take 1 Tab by mouth two (2) times daily (with meals). hydrALAZINE (APRESOLINE) 25 mg tablet  (Taking) Take 1.5 Tabs by mouth every eight (8) hours. aspirin 81 mg chewable tablet  (Taking) Take 1 Tab by mouth daily. Key Antihyperlipidemia Meds             pravastatin (PRAVACHOL) 20 mg tablet  (Taking) Take 1 Tab by mouth nightly. Lab Results   Component Value Date/Time    Sodium 140 04/11/2017 04:35 PM    Potassium 5.0 04/11/2017 04:35 PM    Cholesterol, total 130 04/11/2017 04:35 PM    HDL Cholesterol 75 04/11/2017 04:35 PM    LDL, calculated 40 04/11/2017 04:35 PM    Triglyceride 75 04/11/2017 04:35 PM    INR 1.1 11/09/2016 07:55 AM    Prothrombin time 13.5 11/09/2016 07:55 AM       Chronic kidney disease (CKD), stage III (moderate)  *Again, I highly recommended for him to see nephrology. Avoid use of NSAIDs. Continue on Lasix which is most likely contributing to elevated uric acid levels below, in addition to CKD. -     furosemide (LASIX) 40 mg tablet; Take 1 Tab by mouth daily.  -     CBC W/O DIFF; Future    Gout of hand, unspecified cause, unspecified chronicity, unspecified laterality  *Start on Allopurinol due to frequent flares that have been occurring since last visit. Check Uric Acid levels once on therapy for a few weeks. Continue with Colchicine and Tylenol PRN. -     allopurinol (ZYLOPRIM) 100 mg tablet; Take 1 Tab by mouth daily. Increase to 200 mg in one week. -     URIC ACID; Future    Chronic pain of left knee  *Discussed with patient regarding possible causes including arthritis vs gout. Recommend knee xray. PRN Tylenol, rest, and ice.   -     XR KNEE LT MIN 4 V; Future        *Plan of care reviewed with patient. Patient in agreement with plan and expresses understanding. All questions answered and patient encouraged to call or RTO if further questions or concerns. Follow-up Disposition:  Return in about 3 months (around 12/5/2017) for chronic disease routine care- 30 min.

## 2017-09-05 NOTE — PATIENT INSTRUCTIONS
Cardiology    [facility]- also has [address] and [address] locations  [facility], [address]  Phone: [phone]        Nephrology    Dr. Daniel Sampson  [address] [facility], [address] [place] [address]   Phone: [phone]           Colonoscopy-  colorectal cancer        [facility] [address] [place] [address], [address]- also locations at [facility] and [place]   Phone: [phone]

## 2017-09-06 DIAGNOSIS — N18.30 CHRONIC KIDNEY DISEASE (CKD), STAGE III (MODERATE) (HCC): ICD-10-CM

## 2017-09-06 DIAGNOSIS — I10 ESSENTIAL HYPERTENSION: ICD-10-CM

## 2017-09-06 DIAGNOSIS — E78.2 MIXED HYPERLIPIDEMIA: ICD-10-CM

## 2017-09-10 NOTE — ASSESSMENT & PLAN NOTE
Stable, based on history, physical exam and review of pertinent labs, studies and medications; meds reconciled; continue current treatment plan, encouraged to see cardiology. Key CAD CHF Meds             furosemide (LASIX) 40 mg tablet  (Taking) Take 1 Tab by mouth daily. carvedilol (COREG) 25 mg tablet  (Taking) Take 1 Tab by mouth two (2) times daily (with meals). hydrALAZINE (APRESOLINE) 25 mg tablet  (Taking) Take 1.5 Tabs by mouth every eight (8) hours. aspirin 81 mg chewable tablet  (Taking) Take 1 Tab by mouth daily. Key Antihyperlipidemia Meds             pravastatin (PRAVACHOL) 20 mg tablet  (Taking) Take 1 Tab by mouth nightly.         Lab Results   Component Value Date/Time    Sodium 140 04/11/2017 04:35 PM    Potassium 5.0 04/11/2017 04:35 PM    Cholesterol, total 130 04/11/2017 04:35 PM    HDL Cholesterol 75 04/11/2017 04:35 PM    LDL, calculated 40 04/11/2017 04:35 PM    Triglyceride 75 04/11/2017 04:35 PM    INR 1.1 11/09/2016 07:55 AM    Prothrombin time 13.5 11/09/2016 07:55 AM

## 2017-09-18 ENCOUNTER — APPOINTMENT (OUTPATIENT)
Dept: GENERAL RADIOLOGY | Age: 57
End: 2017-09-18
Attending: PHYSICIAN ASSISTANT
Payer: COMMERCIAL

## 2017-09-18 ENCOUNTER — HOSPITAL ENCOUNTER (EMERGENCY)
Age: 57
Discharge: HOME | End: 2017-09-18
Attending: EMERGENCY MEDICINE | Admitting: EMERGENCY MEDICINE
Payer: COMMERCIAL

## 2017-09-18 VITALS
OXYGEN SATURATION: 98 % | DIASTOLIC BLOOD PRESSURE: 93 MMHG | TEMPERATURE: 98.1 F | BODY MASS INDEX: 33.97 KG/M2 | RESPIRATION RATE: 16 BRPM | WEIGHT: 230 LBS | SYSTOLIC BLOOD PRESSURE: 194 MMHG | HEART RATE: 76 BPM

## 2017-09-18 DIAGNOSIS — N18.30 CKD (CHRONIC KIDNEY DISEASE) STAGE 3, GFR 30-59 ML/MIN (HCC): ICD-10-CM

## 2017-09-18 DIAGNOSIS — M79.89 LEFT LEG SWELLING: Primary | ICD-10-CM

## 2017-09-18 DIAGNOSIS — R03.0 ELEVATED BLOOD PRESSURE READING: ICD-10-CM

## 2017-09-18 LAB
ALBUMIN SERPL-MCNC: 3.2 G/DL (ref 3.4–5)
ALBUMIN/GLOB SERPL: 0.7 {RATIO} (ref 0.8–1.7)
ALP SERPL-CCNC: 93 U/L (ref 45–117)
ALT SERPL-CCNC: 14 U/L (ref 16–61)
ANION GAP SERPL CALC-SCNC: 3 MMOL/L (ref 3–18)
AST SERPL-CCNC: 11 U/L (ref 15–37)
BASOPHILS # BLD: 0 K/UL (ref 0–0.06)
BASOPHILS NFR BLD: 0 % (ref 0–2)
BILIRUB DIRECT SERPL-MCNC: 0.1 MG/DL (ref 0–0.2)
BILIRUB SERPL-MCNC: 0.3 MG/DL (ref 0.2–1)
BNP SERPL-MCNC: 1371 PG/ML (ref 0–900)
BUN SERPL-MCNC: 39 MG/DL (ref 7–18)
BUN/CREAT SERPL: 16 (ref 12–20)
CALCIUM SERPL-MCNC: 8.9 MG/DL (ref 8.5–10.1)
CHLORIDE SERPL-SCNC: 104 MMOL/L (ref 100–108)
CO2 SERPL-SCNC: 31 MMOL/L (ref 21–32)
CREAT SERPL-MCNC: 2.51 MG/DL (ref 0.6–1.3)
DIFFERENTIAL METHOD BLD: ABNORMAL
EOSINOPHIL # BLD: 0.1 K/UL (ref 0–0.4)
EOSINOPHIL NFR BLD: 2 % (ref 0–5)
ERYTHROCYTE [DISTWIDTH] IN BLOOD BY AUTOMATED COUNT: 14.3 % (ref 11.6–14.5)
GLOBULIN SER CALC-MCNC: 4.7 G/DL (ref 2–4)
GLUCOSE SERPL-MCNC: 103 MG/DL (ref 74–99)
HCT VFR BLD AUTO: 35.9 % (ref 36–48)
HGB BLD-MCNC: 11.2 G/DL (ref 13–16)
LYMPHOCYTES # BLD: 1.1 K/UL (ref 0.9–3.6)
LYMPHOCYTES NFR BLD: 17 % (ref 21–52)
MCH RBC QN AUTO: 27 PG (ref 24–34)
MCHC RBC AUTO-ENTMCNC: 31.2 G/DL (ref 31–37)
MCV RBC AUTO: 86.5 FL (ref 74–97)
MONOCYTES # BLD: 0.7 K/UL (ref 0.05–1.2)
MONOCYTES NFR BLD: 11 % (ref 3–10)
NEUTS SEG # BLD: 4.7 K/UL (ref 1.8–8)
NEUTS SEG NFR BLD: 70 % (ref 40–73)
PLATELET # BLD AUTO: 236 K/UL (ref 135–420)
PMV BLD AUTO: 9.6 FL (ref 9.2–11.8)
POTASSIUM SERPL-SCNC: 5.1 MMOL/L (ref 3.5–5.5)
PROT SERPL-MCNC: 7.9 G/DL (ref 6.4–8.2)
RBC # BLD AUTO: 4.15 M/UL (ref 4.7–5.5)
SODIUM SERPL-SCNC: 138 MMOL/L (ref 136–145)
WBC # BLD AUTO: 6.6 K/UL (ref 4.6–13.2)

## 2017-09-18 PROCEDURE — 83880 ASSAY OF NATRIURETIC PEPTIDE: CPT | Performed by: PHYSICIAN ASSISTANT

## 2017-09-18 PROCEDURE — 77030029684 HC NEB SM VOL KT MONA -A

## 2017-09-18 PROCEDURE — 99283 EMERGENCY DEPT VISIT LOW MDM: CPT

## 2017-09-18 PROCEDURE — 93971 EXTREMITY STUDY: CPT

## 2017-09-18 PROCEDURE — 74011000250 HC RX REV CODE- 250: Performed by: PHYSICIAN ASSISTANT

## 2017-09-18 PROCEDURE — 74011250636 HC RX REV CODE- 250/636: Performed by: PHYSICIAN ASSISTANT

## 2017-09-18 PROCEDURE — 94640 AIRWAY INHALATION TREATMENT: CPT

## 2017-09-18 PROCEDURE — 80076 HEPATIC FUNCTION PANEL: CPT | Performed by: PHYSICIAN ASSISTANT

## 2017-09-18 PROCEDURE — 96374 THER/PROPH/DIAG INJ IV PUSH: CPT

## 2017-09-18 PROCEDURE — 71010 XR CHEST PORT: CPT

## 2017-09-18 PROCEDURE — 85025 COMPLETE CBC W/AUTO DIFF WBC: CPT | Performed by: PHYSICIAN ASSISTANT

## 2017-09-18 PROCEDURE — 80048 BASIC METABOLIC PNL TOTAL CA: CPT | Performed by: PHYSICIAN ASSISTANT

## 2017-09-18 RX ORDER — IPRATROPIUM BROMIDE AND ALBUTEROL SULFATE 2.5; .5 MG/3ML; MG/3ML
3 SOLUTION RESPIRATORY (INHALATION)
Status: COMPLETED | OUTPATIENT
Start: 2017-09-18 | End: 2017-09-18

## 2017-09-18 RX ORDER — FUROSEMIDE 10 MG/ML
40 INJECTION INTRAMUSCULAR; INTRAVENOUS
Status: COMPLETED | OUTPATIENT
Start: 2017-09-18 | End: 2017-09-18

## 2017-09-18 RX ADMIN — IPRATROPIUM BROMIDE AND ALBUTEROL SULFATE 3 ML: .5; 3 SOLUTION RESPIRATORY (INHALATION) at 11:38

## 2017-09-18 RX ADMIN — FUROSEMIDE 40 MG: 10 INJECTION, SOLUTION INTRAMUSCULAR; INTRAVENOUS at 11:38

## 2017-09-18 NOTE — Clinical Note
Please return immediately to the Emergency Room for re-evaluation if you are not improving, develop any new symptoms, or develop worsening of current symptoms! If you have been prescribed a medication and are unable to take this medication for any r uma, please return to the Emergency Department for further evaluation! If you have been referred for follow-up to a specialist, but are unable to follow-up and your symptoms are either not improving or are worsening, please return to the Emergency D epartment for further evaluation!

## 2017-09-18 NOTE — DISCHARGE INSTRUCTIONS
Elevated Blood Pressure: Care Instructions  Your Care Instructions    Blood pressure is a measure of how hard the blood pushes against the walls of your arteries. It's normal for blood pressure to go up and down throughout the day. But if it stays up over time, you have high blood pressure. Two numbers tell you your blood pressure. The first number is the systolic pressure. It shows how hard the blood pushes when your heart is pumping. The second number is the diastolic pressure. It shows how hard the blood pushes between heartbeats, when your heart is relaxed and filling with blood. An ideal blood pressure in adults is less than 120/80 (say \"120 over 80\"). High blood pressure is 140/90 or higher. You have high blood pressure if your top number is 140 or higher or your bottom number is 90 or higher, or both. The main test for high blood pressure is simple, fast, and painless. To diagnose high blood pressure, your doctor will test your blood pressure at different times. After testing your blood pressure, your doctor may ask you to test it again when you are home. If you are diagnosed with high blood pressure, you can work with your doctor to make a long-term plan to manage it. Follow-up care is a key part of your treatment and safety. Be sure to make and go to all appointments, and call your doctor if you are having problems. It's also a good idea to know your test results and keep a list of the medicines you take. How can you care for yourself at home? · Do not smoke. Smoking increases your risk for heart attack and stroke. If you need help quitting, talk to your doctor about stop-smoking programs and medicines. These can increase your chances of quitting for good. · Stay at a healthy weight. · Try to limit how much sodium you eat to less than 2,300 milligrams (mg) a day. Your doctor may ask you to try to eat less than 1,500 mg a day. · Be physically active.  Get at least 30 minutes of exercise on most days of the week. Walking is a good choice. You also may want to do other activities, such as running, swimming, cycling, or playing tennis or team sports. · Avoid or limit alcohol. Talk to your doctor about whether you can drink any alcohol. · Eat plenty of fruits, vegetables, and low-fat dairy products. Eat less saturated and total fats. · Learn how to check your blood pressure at home. When should you call for help? Call your doctor now or seek immediate medical care if:  · Your blood pressure is much higher than normal (such as 180/110 or higher). · You think high blood pressure is causing symptoms such as:  ¨ Severe headache. ¨ Blurry vision. Watch closely for changes in your health, and be sure to contact your doctor if:  · You do not get better as expected. Where can you learn more? Go to http://rachaelWeBRANDlauren.info/. Enter K082 in the search box to learn more about \"Elevated Blood Pressure: Care Instructions. \"  Current as of: April 3, 2017  Content Version: 11.3  © 9416-4173 [employer]. Care instructions adapted under license by [employer] (which disclaims liability or warranty for this information). If you have questions about a medical condition or this instruction, always ask your healthcare professional. [address] any warranty or liability for your use of this information. Medicines to Avoid With Kidney Disease: Care Instructions  Your Care Instructions  Kidney disease means that your kidneys are not able to get rid of waste from the blood. So they can't keep your body's fluids and chemicals in balance. Usually, the kidneys get rid of waste from the blood through the urine. And they balance the fluids in the body. When your kidneys don't work as they should, you have to be careful about some medicines. They may harm your kidneys. Your doctor may tell you not to take them. Or he or she may change the dose.   Medicines for pain and swelling, such as ibuprofen (Advil or Motrin) or naproxen (Aleve), can cause harm. So can some antibiotics and antacids. And you need to be careful about some drugs that treat cancer, lower blood pressure, or get rid of water from the body. Some herbal products could cause harm too. Follow-up care is a key part of your treatment and safety. Be sure to make and go to all appointments, and call your doctor if you are having problems. It's also a good idea to know your test results and keep a list of the medicines you take. How can you care for yourself at home? · Tell your doctor all the prescription, herbal, or over-the-counter medicines you take. Do not take any new ones unless you talk to your doctor first.  · Do not take anti-inflammatory medicines. These include ibuprofen (Advil, Motrin) and naproxen (Aleve). You can use acetaminophen (Tylenol) for pain. · Do not take two or more pain medicines at the same time unless the doctor told you to. Many pain medicines have acetaminophen, which is Tylenol. Too much acetaminophen (Tylenol) can be harmful. · Tell all doctors and others who work with your health care that you have kidney disease. · Wear medical alert jewelry that lists your health problem. You can buy this at most drugstores. Where can you learn more? Go to http://rachael-lauren.info/. Enter T886 in the search box to learn more about \"Medicines to Avoid With Kidney Disease: Care Instructions. \"  Current as of: April 3, 2017  Content Version: 11.3  © 1388-4637 [employer]. Care instructions adapted under license by [employer] (which disclaims liability or warranty for this information). If you have questions about a medical condition or this instruction, always ask your healthcare professional. [address] any warranty or liability for your use of this information.          Low Sodium Diet (2,000 Milligram): Care Instructions  Your Care Instructions  Too much sodium causes your body to hold on to extra water. This can raise your blood pressure and force your heart and kidneys to work harder. In very serious cases, this could cause you to be put in the hospital. It might even be life-threatening. By limiting sodium, you will feel better and lower your risk of serious problems. The most common source of sodium is salt. People get most of the salt in their diet from canned, prepared, and packaged foods. Fast food and restaurant meals also are very high in sodium. Your doctor will probably limit your sodium to less than 2,000 milligrams (mg) a day. This limit counts all the sodium in prepared and packaged foods and any salt you add to your food. Follow-up care is a key part of your treatment and safety. Be sure to make and go to all appointments, and call your doctor if you are having problems. It's also a good idea to know your test results and keep a list of the medicines you take. How can you care for yourself at home? Read food labels  · Read labels on cans and food packages. The labels tell you how much sodium is in each serving. Make sure that you look at the serving size. If you eat more than the serving size, you have eaten more sodium. · Food labels also tell you the Percent Daily Value for sodium. Choose products with low Percent Daily Values for sodium. · Be aware that sodium can come in forms other than salt, including monosodium glutamate (MSG), sodium citrate, and sodium bicarbonate (baking soda). MSG is often added to Asian food. When you eat out, you can sometimes ask for food without MSG or added salt. Buy low-sodium foods  · Buy foods that are labeled \"unsalted\" (no salt added), \"sodium-free\" (less than 5 mg of sodium per serving), or \"low-sodium\" (less than 140 mg of sodium per serving). Foods labeled \"reduced-sodium\" and \"light sodium\" may still have too much sodium.  Be sure to read the label to see how much sodium you are getting. · Buy fresh vegetables, or frozen vegetables without added sauces. Buy low-sodium versions of canned vegetables, soups, and other canned goods. Prepare low-sodium meals  · Cut back on the amount of salt you use in cooking. This will help you adjust to the taste. Do not add salt after cooking. One teaspoon of salt has about 2,300 mg of sodium. · Take the salt shaker off the table. · Flavor your food with garlic, lemon juice, onion, vinegar, herbs, and spices. Do not use soy sauce, lite soy sauce, steak sauce, onion salt, garlic salt, celery salt, mustard, or ketchup on your food. · Use low-sodium salad dressings, sauces, and ketchup. Or make your own salad dressings and sauces without adding salt. · Use less salt (or none) when recipes call for it. You can often use half the salt a recipe calls for without losing flavor. Other foods such as rice, pasta, and grains do not need added salt. · Rinse canned vegetables, and cook them in fresh water. This removes some--but not all--of the salt. · Avoid water that is naturally high in sodium or that has been treated with water softeners, which add sodium. Call your local water company to find out the sodium content of your water supply. If you buy bottled water, read the label and choose a sodium-free brand. Avoid high-sodium foods  · Avoid eating:  ¨ Smoked, cured, salted, and canned meat, fish, and poultry. ¨ Ham, oglesby, hot dogs, and luncheon meats. ¨ Regular, hard, and processed cheese and regular peanut butter. ¨ Crackers with salted tops, and other salted snack foods such as pretzels, chips, and salted popcorn. ¨ Frozen prepared meals, unless labeled low-sodium. ¨ Canned and dried soups, broths, and bouillon, unless labeled sodium-free or low-sodium. ¨ Canned vegetables, unless labeled sodium-free or low-sodium. ¨ Western Franci fries, pizza, tacos, and other fast foods.   ¨ Pickles, olives, ketchup, and other condiments, especially soy sauce, unless labeled sodium-free or low-sodium. Where can you learn more? Go to http://rachael-lauren.info/. Enter W471 in the search box to learn more about \"Low Sodium Diet (2,000 Milligram): Care Instructions. \"  Current as of: July 26, 2016  Content Version: 11.3  © 9831-1435 [employer]. Care instructions adapted under license by [employer] (which disclaims liability or warranty for this information). If you have questions about a medical condition or this instruction, always ask your healthcare professional. [place] [address] any warranty or liability for your use of this information.

## 2017-09-18 NOTE — LETTER
NOTIFICATION OF RETURN TO WORK / SCHOOL 
 
9/18/2017 12:36 PM 
 
Mr. Can Cornejo [address] [facility] [address] Terese Abbott To Whom It May Concern: 
 
Can Cornejo was under the care of [facility] EMERGENCY DEPT. He will be able to return to work on Friday, 9/22/17. If there are questions or concerns please have the patient contact our office. Sincerely, Geoffrey Carl PA-C

## 2017-09-18 NOTE — ED PROVIDER NOTES
HPI Comments: Adriano Manzanares is a 65 y.o. Male with a PMHx of HTN, CHF, CKD, arthritis of L knee and gout who presents to the ED with c/o L foot pain and leg swelling for the past week. Notes swelling began in L knee and spread down to L foot. Reports hx of gout, notes sx are similar to gout flare. Denies fever, chills, nausea, vomiting or diarrhea. Denies hx of DM. Reports hx of CHF for which he takes Lasix. Reports standing 12 hours at work, denies recent change in activity. Denies recent travel or hx of DVT or PE. No other symptoms or concerns were expressed. The history is provided by the patient. Past Medical History:   Diagnosis Date    Arthritis of left knee 09/2017    moderate to severe, with effusion on xray    Chronic kidney disease     Diastolic CHF ([address]     Dilated cardiomyopathy ([address]     History of alcohol abuse     History of echocardiogram 02/24/2014    Mod-marked LVE. EF 15%. Severe, diffuse hypk. Mild LVH. Gr 1 DDfx. Mod LAE. Mild-mod FIDELIA. Mild AoRE. No significant change from echo of 10/23/09.  History of gout     History of myocardial perfusion scan 05/25/2006    No evidence of ischemia or scarring. Gross LVE. EF 28%. Severe global hypk. Neg EKG on submaximal EST. Ex time 8 min 25 sec.  HTN (hypertension)     Hypercholesterolemia     Hypertensive cardiovascular disease        Past Surgical History:   Procedure Laterality Date    HX HERNIA REPAIR  04/2016         Family History:   Problem Relation Age of Onset    Cancer Father      Lung    Heart Failure Mother     Cancer Sister        Social History     Social History    Marital status: SINGLE     Spouse name: N/A    Number of children: N/A    Years of education: N/A     Occupational History    Not on file.      Social History Main Topics    Smoking status: Never Smoker    Smokeless tobacco: Never Used    Alcohol use No    Drug use: No    Sexual activity: No     Other Topics Concern    Not on file     Social History Narrative         ALLERGIES: Review of patient's allergies indicates no known allergies. Review of Systems   Constitutional: Negative for chills and fever. HENT: Negative for congestion, rhinorrhea and sore throat. Respiratory: Negative for cough and shortness of breath. Cardiovascular: Positive for leg swelling. Negative for chest pain. Gastrointestinal: Negative for abdominal pain, blood in stool, constipation, diarrhea, nausea and vomiting. Genitourinary: Negative for dysuria, frequency and hematuria. Musculoskeletal: Positive for joint swelling. Negative for back pain and myalgias. Skin: Negative for rash and wound. Neurological: Negative for dizziness and headaches. Vitals:    09/18/17 0952 09/18/17 1002   BP: (!) 194/93    Pulse: 76    Resp: 16    Temp: 98.1 °F (36.7 °C)    SpO2: 98% 98%   Weight: 104.3 kg (230 lb)             Physical Exam   Constitutional: He is oriented to person, place, and time. He appears well-developed and well-nourished. No distress. HENT:   Head: Normocephalic and atraumatic. Eyes: Conjunctivae and EOM are normal. Right eye exhibits no discharge. Left eye exhibits no discharge. Neck: Normal range of motion. Neck supple. No thyromegaly present. Cardiovascular: Normal rate, regular rhythm and normal heart sounds. Pulmonary/Chest: Effort normal. No respiratory distress. He has wheezes. He has rales. He exhibits no tenderness. Fine bibasilar rales. Scarce wheezing. Abdominal: Soft. Bowel sounds are normal.   Musculoskeletal: Normal range of motion. He exhibits edema. He exhibits no deformity. 2+ pitting edema noted to LLE. 2+ distal pulses and normal sensation. Non-TTP. No erythema, crepitance, ecchymosis noted   Lymphadenopathy:     He has no cervical adenopathy. Neurological: He is alert and oriented to person, place, and time. Skin: Skin is warm and dry. He is not diaphoretic.    Psychiatric: He has a normal mood and affect. Nursing note and vitals reviewed. MDM  Number of Diagnoses or Management Options  CKD (chronic kidney disease) stage 3, GFR 30-59 ml/min: new and requires workup  Elevated blood pressure reading: new and requires workup  Left leg swelling: new and requires workup  Diagnosis management comments: Differential Diagnosis:  Abscess, cellulitis, erysipelas, folliculitis, impetigo, osteomyelitis, septic arthritis, lymphadenitis, lymphangitis, allergic reaction, DVT, necrotizing fasciitis, venous insufficiency    Plan:  Pt presents ambulatory in NAD, well-hydrated, non-toxic in appearance, with elevated BP and otherwise normal vitals. Exam c/w lower extremity edema. Few rales/wheezes on auscultation. Exam is not consistent with cellulitis or gout. No SOB. PVL negative for DVT. Labs are stable/unremarkable for patient. CXR with only minimal pulmonary vascular prominence. Mildly fluid overloaded. Treated here with IV Lasix, neb. Will defer additional diuretics to PCP given kidney function. At this time, patient is stable and appropriate for discharge home. Patient demonstrates understanding of current diagnoses and is in agreement with the treatment plan. They are advised that while the likelihood of serious underlying condition is low at this point given the evaluation performed today, we cannot fully rule it out. They are advised to immediately return with any new symptoms or worsening of current condition. All questions have been answered. Patient is given educational material regarding their diagnoses, including danger symptoms and when to return to the ED. Follow-up with PCP.              Amount and/or Complexity of Data Reviewed  Clinical lab tests: ordered and reviewed  Tests in the radiology section of CPT®: ordered and reviewed  Review and summarize past medical records: yes    Risk of Complications, Morbidity, and/or Mortality  Presenting problems: moderate  Diagnostic procedures: moderate  Management options: moderate    Patient Progress  Patient progress: improved    ED Course       Procedures    -------------------------------------------------------------------------------------------------------------------  Orders:  Orders Placed This Encounter    DUPLEX LOWER EXT VENOUS LEFT     Standing Status:   Standing     Number of Occurrences:   1     Order Specific Question:   Transport     Answer:   Wheelchair [7]     Order Specific Question:   Where do you want this procedure performed? Answer:   Vascular Lab    XR CHEST PORT     Standing Status:   Standing     Number of Occurrences:   1     Order Specific Question:   Reason for Exam     Answer:   chest pain, sob, and/or arrhythmia    CBC WITH AUTOMATED DIFF     Standing Status:   Standing     Number of Occurrences:   1    METABOLIC PANEL, BASIC     Standing Status:   Standing     Number of Occurrences:   1    HEPATIC FUNCTION PANEL     Standing Status:   Standing     Number of Occurrences:   1    Pro BNP     Standing Status:   Standing     Number of Occurrences:   1    SALINE LOCK IV ONE TIME STAT     Standing Status:   Standing     Number of Occurrences:   1    furosemide (LASIX) injection 40 mg    albuterol-ipratropium (DUO-NEB) 2.5 MG-0.5 MG/3 ML     Order Specific Question:   MODE OF DELIVERY     Answer:   Nebulizer        Lab Results:   Recent Results (from the past 12 hour(s))   CBC WITH AUTOMATED DIFF    Collection Time: 09/18/17 10:43 AM   Result Value Ref Range    WBC 6.6 4.6 - 13.2 K/uL    RBC 4.15 (L) 4.70 - 5.50 M/uL    HGB 11.2 (L) 13.0 - 16.0 g/dL    HCT 35.9 (L) 36.0 - 48.0 %    MCV 86.5 74.0 - 97.0 FL    MCH 27.0 24.0 - 34.0 PG    MCHC 31.2 31.0 - 37.0 g/dL    RDW 14.3 11.6 - 14.5 %    PLATELET 003 951 - 679 K/uL    MPV 9.6 9.2 - 11.8 FL    NEUTROPHILS 70 40 - 73 %    LYMPHOCYTES 17 (L) 21 - 52 %    MONOCYTES 11 (H) 3 - 10 %    EOSINOPHILS 2 0 - 5 %    BASOPHILS 0 0 - 2 %    ABS.  NEUTROPHILS 4.7 1.8 - 8.0 K/UL    ABS. LYMPHOCYTES 1.1 0.9 - 3.6 K/UL    ABS. MONOCYTES 0.7 0.05 - 1.2 K/UL    ABS. EOSINOPHILS 0.1 0.0 - 0.4 K/UL    ABS. BASOPHILS 0.0 0.0 - 0.06 K/UL    DF AUTOMATED     METABOLIC PANEL, BASIC    Collection Time: 09/18/17 10:43 AM   Result Value Ref Range    Sodium 138 136 - 145 mmol/L    Potassium 5.1 3.5 - 5.5 mmol/L    Chloride 104 100 - 108 mmol/L    CO2 31 21 - 32 mmol/L    Anion gap 3 3.0 - 18 mmol/L    Glucose 103 (H) 74 - 99 mg/dL    BUN 39 (H) 7.0 - 18 MG/DL    Creatinine 2.51 (H) 0.6 - 1.3 MG/DL    BUN/Creatinine ratio 16 12 - 20      GFR est AA 32 (L) >60 ml/min/1.73m2    GFR est non-AA 27 (L) >60 ml/min/1.73m2    Calcium 8.9 8.5 - 10.1 MG/DL   HEPATIC FUNCTION PANEL    Collection Time: 09/18/17 10:43 AM   Result Value Ref Range    Protein, total 7.9 6.4 - 8.2 g/dL    Albumin 3.2 (L) 3.4 - 5.0 g/dL    Globulin 4.7 (H) 2.0 - 4.0 g/dL    A-G Ratio 0.7 (L) 0.8 - 1.7      Bilirubin, total 0.3 0.2 - 1.0 MG/DL    Bilirubin, direct 0.1 0.0 - 0.2 MG/DL    Alk. phosphatase 93 45 - 117 U/L    AST (SGOT) 11 (L) 15 - 37 U/L    ALT (SGPT) 14 (L) 16 - 61 U/L   NT-PRO BNP    Collection Time: 09/18/17 10:43 AM   Result Value Ref Range    NT pro-BNP 1371 (H) 0 - 900 PG/ML       Radiology Results:  Xr Chest Port    Result Date: 9/18/2017  Portable chest x-ray, upright AP view, time 1038 hours on 9/18/2017: INDICATION: Chest pain. History of hypertension, diabetes, cardiomyopathy, and chronic kidney disease. COMPARISON STUDY: Chest x-ray on 11/9/2016, 2/25/2014. FINDINGS: There is mild pulmonary hypoventilation. There is borderline cardiac size. Pulmonary vascularity is minimally prominent but not cephalized. Otherwise, lungs are clear. See pins are sharp bilaterally. IMPRESSION: Borderline cardiac size. Only minimal pulmonary vascular prominence. Otherwise no cardiac decompensation. Mild pulmonary hypoventilation. Otherwise lungs are clear without definable focal abnormality.       Progress Notes:  12:09 PM:  Jessica Esquivel PA-C was at the pt's bedside, assessed the pt and answered the pt's questions regarding treatment.    -------------------------------------------------------------------------------------------------------------------    Disposition:  Diagnosis:   1. Left leg swelling    2. Elevated blood pressure reading    3. CKD (chronic kidney disease) stage 3, GFR 30-59 ml/min        Disposition: [place] Home    Follow-up Information     Follow up With Details Comments Contact Info    Tsering Galo NP Call in 1 day For follow-up [address] [facility] [address] [facility] Go to As needed, If symptoms worsen [address] [place] [id number] [phone]          Patient's Medications   Start Taking    No medications on file   Continue Taking    ALLOPURINOL (ZYLOPRIM) 100 MG TABLET    Take 1 Tab by mouth daily. Increase to 200 mg in one week. ASPIRIN 81 MG CHEWABLE TABLET    Take 1 Tab by mouth daily. CARVEDILOL (COREG) 25 MG TABLET    Take 1 Tab by mouth two (2) times daily (with meals). COLCHICINE 0.6 MG TABLET    2 tablets at first sign of gout flare and then 1 tablet 1 hour later    FUROSEMIDE (LASIX) 40 MG TABLET    Take 1 Tab by mouth daily. HYDRALAZINE (APRESOLINE) 25 MG TABLET    Take 1.5 Tabs by mouth every eight (8) hours. PRAVASTATIN (PRAVACHOL) 20 MG TABLET    Take 1 Tab by mouth nightly. These Medications have changed    No medications on file   Stop Taking    No medications on file            Scribe Attestation      Jossy Greco acting as a scribe for and in the presence of Ninfa Matt, [address]      September 18, 2017 at 10:42 AM       Provider Attestation:      I personally performed the services described in the documentation, reviewed the documentation, as recorded by the scribe in my presence, and it accurately and completely records my words and actions.  September 18, 2017 at 10:42 AM - ELHAM Bach

## 2017-09-18 NOTE — PROCEDURES
[facility] ***    Name: Nini Piedra  MRN: [id number]  : 01 Mar 1960  HIS Order #: [id number]  [id number] [facility] Visit #: [id number]  Date: 18 Sep 2017    TYPE OF TEST: Peripheral Venous Testing    REASON FOR TEST  Pain in limb, Limb swelling    Left Leg:-  Deep venous thrombosis:           No  Superficial venous thrombosis:    No  Deep venous insufficiency:        Not examined  Superficial venous insufficiency: Not examined      INTERPRETATION/FINDINGS  Duplex images were obtained using 2-D gray scale, color flow, and  spectral Doppler analysis. Left leg :  1. No evidence of deep venous thrombosis detected in the veins  visualized. 2. Deep vein(s) visualized include the common femoral, femoral,  popliteal, posterior tibial, and peroneal veins. 3. No evidence of superficial thrombosis detected. 4. Superficial vein(s) visualized include the great saphenous vein at  the sapheno-femoral junction. 5. No evidence of deep vein thrombosis in the contralateral common  femoral vein. ADDITIONAL COMMENTS    I have personally reviewed the data relevant to the interpretation of  this  study. TECHNOLOGIST: Irina Pereira RVT  Signed: 2017 11:11 AM    PHYSICIAN: Jody Santiago MD  Signed: 2017 12:09 AM

## 2017-10-06 ENCOUNTER — HOSPITAL ENCOUNTER (OUTPATIENT)
Dept: LAB | Age: 57
Discharge: HOME | End: 2017-10-06

## 2017-10-06 PROCEDURE — 99001 SPECIMEN HANDLING PT-LAB: CPT | Performed by: NURSE PRACTITIONER

## 2017-10-07 LAB
ALBUMIN SERPL-MCNC: 3.8 G/DL (ref 3.5–5.5)
ALBUMIN/GLOB SERPL: 1.2 {RATIO} (ref 1.2–2.2)
ALP SERPL-CCNC: 88 IU/L (ref 39–117)
ALT SERPL-CCNC: 11 IU/L (ref 0–44)
AST SERPL-CCNC: 13 IU/L (ref 0–40)
BILIRUB SERPL-MCNC: <0.2 MG/DL (ref 0–1.2)
BUN SERPL-MCNC: 28 MG/DL (ref 6–24)
BUN/CREAT SERPL: 14 (ref 9–20)
CALCIUM SERPL-MCNC: 8.7 MG/DL (ref 8.7–10.2)
CHLORIDE SERPL-SCNC: 104 MMOL/L (ref 96–106)
CHOLEST SERPL-MCNC: 103 MG/DL (ref 100–199)
CO2 SERPL-SCNC: 25 MMOL/L (ref 18–29)
CREAT SERPL-MCNC: 2.07 MG/DL (ref 0.76–1.27)
ERYTHROCYTE [DISTWIDTH] IN BLOOD BY AUTOMATED COUNT: 14.8 % (ref 12.3–15.4)
GLOBULIN SER CALC-MCNC: 3.1 G/DL (ref 1.5–4.5)
GLUCOSE SERPL-MCNC: 98 MG/DL (ref 65–99)
HCT VFR BLD AUTO: 32 % (ref 37.5–51)
HDLC SERPL-MCNC: 69 MG/DL
HGB BLD-MCNC: 10.3 G/DL (ref 12.6–17.7)
INTERPRETATION, 910389: NORMAL
INTERPRETATION: NORMAL
LDLC SERPL CALC-MCNC: 28 MG/DL (ref 0–99)
MCH RBC QN AUTO: 27.1 PG (ref 26.6–33)
MCHC RBC AUTO-ENTMCNC: 32.2 G/DL (ref 31.5–35.7)
MCV RBC AUTO: 84 FL (ref 79–97)
PDF IMAGE, 910387: NORMAL
PLATELET # BLD AUTO: 227 X10E3/UL (ref 150–379)
POTASSIUM SERPL-SCNC: 5.5 MMOL/L (ref 3.5–5.2)
PROT SERPL-MCNC: 6.9 G/DL (ref 6–8.5)
RBC # BLD AUTO: 3.8 X10E6/UL (ref 4.14–5.8)
SODIUM SERPL-SCNC: 143 MMOL/L (ref 134–144)
TRIGL SERPL-MCNC: 30 MG/DL (ref 0–149)
URATE SERPL-MCNC: 8.3 MG/DL (ref 3.7–8.6)
VLDLC SERPL CALC-MCNC: 6 MG/DL (ref 5–40)
WBC # BLD AUTO: 3.6 X10E3/UL (ref 3.4–10.8)

## 2017-10-11 ENCOUNTER — OFFICE VISIT (OUTPATIENT)
Dept: FAMILY MEDICINE CLINIC | Age: 57
End: 2017-10-11

## 2017-10-11 VITALS
OXYGEN SATURATION: 97 % | HEIGHT: 69 IN | TEMPERATURE: 98.6 F | BODY MASS INDEX: 33.33 KG/M2 | RESPIRATION RATE: 16 BRPM | HEART RATE: 75 BPM | DIASTOLIC BLOOD PRESSURE: 110 MMHG | SYSTOLIC BLOOD PRESSURE: 171 MMHG | WEIGHT: 225 LBS

## 2017-10-11 DIAGNOSIS — E87.5 HYPERKALEMIA: ICD-10-CM

## 2017-10-11 DIAGNOSIS — M25.462 SWELLING OF LEFT KNEE JOINT: Primary | ICD-10-CM

## 2017-10-11 DIAGNOSIS — M25.562 ACUTE PAIN OF LEFT KNEE: ICD-10-CM

## 2017-10-11 RX ORDER — HYDROCODONE BITARTRATE AND ACETAMINOPHEN 5; 325 MG/1; MG/1
1 TABLET ORAL
Qty: 10 TAB | Refills: 0 | Status: SHIPPED | OUTPATIENT
Start: 2017-10-11 | End: 2018-03-03

## 2017-10-11 NOTE — PROGRESS NOTES
Patient: Christin Aguilar MRN: [id number]  SSN: [id number]    YOB: 1960  Age: 65 y.o. Sex: male      Date of Service: 10/15/2017   Provider: ELHAM Navarro   Office Location:   [address] [facility], [address] [place] [address], [address]  Office Phone: [phone]  Office Fax: [phone]        REASON FOR VISIT:   Chief Complaint   Patient presents with    Leg Swelling     pt presents for left leg swelling and left foot swelling pt states \" that pt has been having this swelling for a while which is worst and very painful \" pt was seen at ER anp pt states \" that pt wa stold it was gout ot fluid. \"        VITALS:   Visit Vitals    BP (!) 171/110    Pulse 75    Temp 98.6 °F (37 °C) (Oral)    Resp 16    Ht 5' 9\" (1.753 m)    Wt 225 lb (102.1 kg)    SpO2 97%    BMI 33.23 kg/m2       MEDICATIONS:   Current Outpatient Prescriptions   Medication Sig Dispense Refill    HYDROcodone-acetaminophen (NORCO) 5-325 mg per tablet Take 1 Tab by mouth two (2) times daily as needed for Pain. Max Daily Amount: 2 Tabs. 10 Tab 0    allopurinol (ZYLOPRIM) 100 mg tablet Take 1 Tab by mouth daily. Increase to 200 mg in one week. 60 Tab 2    furosemide (LASIX) 40 mg tablet Take 1 Tab by mouth daily. 30 Tab 1    carvedilol (COREG) 25 mg tablet Take 1 Tab by mouth two (2) times daily (with meals). 60 Tab 1    pravastatin (PRAVACHOL) 20 mg tablet Take 1 Tab by mouth nightly. 30 Tab 1    hydrALAZINE (APRESOLINE) 25 mg tablet Take 1.5 Tabs by mouth every eight (8) hours. 135 Tab 1    aspirin 81 mg chewable tablet Take 1 Tab by mouth daily.  30 Tab 11    colchicine 0.6 mg tablet 2 tablets at first sign of gout flare and then 1 tablet 1 hour later 3 Tab 2        ALLERGIES:   No Known Allergies     ACTIVE MEDICAL PROBLEMS:  Patient Active Problem List   Diagnosis Code    Chronic kidney disease (CKD), stage III (moderate) N18.3    Nonischemic cardiomyopathy (HCC) R48.2    Diastolic CHF ([facility] I50.30    Hyperlipemia E78.5    Essential hypertension I10    Gout of hand M10.9        MEDICAL/SURGICAL HISTORY:  Past Medical History:   Diagnosis Date    Arthritis of left knee 09/2017    moderate to severe, with effusion on xray    Chronic kidney disease     Diastolic CHF ([facility]     [facility]     History of alcohol abuse     History of echocardiogram 02/24/2014    Mod-marked LVE. EF 15%. Severe, diffuse hypk. Mild LVH. Gr 1 DDfx. Mod LAE. Mild-mod FIDELIA. Mild AoRE. No significant change from echo of 10/23/09.  History of gout     History of myocardial perfusion scan 05/25/2006    No evidence of ischemia or scarring. Gross LVE. EF 28%. Severe global hypk. Neg EKG on submaximal EST. Ex time 8 min 25 sec.  HTN (hypertension)     Hypercholesterolemia     Hypertensive cardiovascular disease       Past Surgical History:   Procedure Laterality Date    HX HERNIA REPAIR  04/2016        FAMILY HISTORY:  Family History   Problem Relation Age of Onset    Cancer Father      Lung    Heart Failure Mother     Cancer Sister         SOCIAL HISTORY:  Social History   Substance Use Topics    Smoking status: Never Smoker    Smokeless tobacco: Never Used    Alcohol use No          HISTORY OF PRESENT ILLNESS:   Anum Pantoja is a 65 y.o. male who presents to the office for acute care. Patient complains of atraumatic pain and swelling in his left leg x over a month. He had mentioned this to his PCP at his last routine follow up visit on 9/5/17, and knee x-ray was ordered at that time, showing moderate to severe osteoarthritis with a small joint effusion and mild soft tissue swelling. He was offered a referral to ortho, but never contact the office to have this order placed. He was then seen in the ED on 9/18/17 for the same symptoms. Venous duplex was ordered and DVT ruled out. Pain and swelling were thought to be secondary to acute gout vs. a musculoskeletal injury. Patient reports he has never had gout in his knees, and that he has been taking his allopurinol diligently. Uric acid level has improved from 11.5 to 8.3. He denies any fevers, chills, redness of the joint or leg, bruising, rashes, numbness or tingling     REVIEW OF SYSTEMS:  Review of Systems   Constitutional: Negative for chills and fever. Respiratory: Negative for cough, shortness of breath and wheezing. Cardiovascular: Negative for chest pain and palpitations. Musculoskeletal: Positive for joint pain ( L knee). Neurological: Negative for dizziness and headaches. PHYSICAL EXAMINATION:  Physical Exam   Constitutional: He is oriented to person, place, and time and well-developed, well-nourished, and in no distress. Cardiovascular: Normal rate, regular rhythm and normal heart sounds. Exam reveals no gallop and no friction rub. No murmur heard. Pulmonary/Chest: Effort normal and breath sounds normal. He has no wheezes. He has no rales. Musculoskeletal: He exhibits edema ( L knee and lower leg. No calf tenderness). Neurological: He is alert and oriented to person, place, and time. gait antalgic, favoring the left   Skin: Skin is warm and dry. No rash noted. No erythema. No pallor. RESULTS:  No results found for this visit on 10/11/17. ASSESSMENT/PLAN:  Diagnoses and all orders for this visit:    1. Swelling of left knee joint  2. Acute pain of left knee  - Exact etiology unclear. Does not appear characteristic of gout, but may need joint aspiration to definitively r/o   - Small supply of Norco given as below to use sparingly as needed for pain  - Will refer to ortho for further evaluation   -     [facility]  -     HYDROcodone-acetaminophen (NORCO) 5-325 mg per tablet; Take 1 Tab by mouth two (2) times daily as needed for Pain. Max Daily Amount: 2 Tabs.     3. Hyperkalemia  -     METABOLIC PANEL, BASIC    Follow up as scheduled or sooner as needed if symptoms persist or worsen    Patient expresses understanding and is agreeable with the above plan.         PATIENT CARE TEAM:   Patient Care Team:  Heladio Connolly NP as PCP - General (Nurse Practitioner)  Allyson Edward MD (Nephrology)  Unique Slaughter MD (Cardiology)       Homer Doty   October 15, 2017    2:25 PM

## 2017-10-11 NOTE — MR AVS SNAPSHOT
Visit Information Date & Time Provider Department Dept. Phone Encounter #  
 10/11/2017  5:00 PM Dru Edmondson [facility] 81 [phone] [id number] Your Appointments 12/5/2017  4:00 PM  
FOLLOW UP EXAM with ELHAM Edmondson [facility] 81 ([address]) Appt Note: chronic disease f/u -30 minutes [address] [facility] [address]  
[facility] [address] Upcoming Health Maintenance Date Due COLONOSCOPY 3/1/1979 DTaP/Tdap/Td series (1 - Tdap) 3/1/1982 Prostate-Specific Antigen 4/11/2018 Allergies as of 10/11/2017  Review Complete On: 10/11/2017 By: Shelby Busby No Known Allergies Current Immunizations  Never Reviewed No immunizations on file. Not reviewed this visit You Were Diagnosed With   
  
 Codes Comments Swelling of left knee joint    -  Primary ICD-10-CM: K60.299 ICD-9-CM: 719.06 Acute pain of left knee     ICD-10-CM: M25.562 ICD-9-CM: 719.46 Hyperkalemia     ICD-10-CM: E87.5 ICD-9-CM: 276.7 Vitals BP Pulse Temp Resp Height(growth percentile) Weight(growth percentile) (!) 171/110 75 98.6 °F (37 °C) (Oral) 16 5' 9\" (1.753 m) 225 lb (102.1 kg) SpO2 BMI Smoking Status 97% 33.23 kg/m2 Never Smoker Vitals History BMI and BSA Data Body Mass Index Body Surface Area  
 33.23 kg/m 2 2.23 m 2 Preferred Pharmacy Pharmacy Name Phone [facility] [address] [place] [address] Ayala Valverde [address] [place] [address] [phone] Your Updated Medication List  
  
   
This list is accurate as of: 10/11/17  5:39 PM.  Always use your most recent med list.  
  
  
  
  
 allopurinol 100 mg tablet Commonly known as:  Estee Bibber Take 1 Tab by mouth daily. Increase to 200 mg in one week. aspirin 81 mg chewable tablet Take 1 Tab by mouth daily. carvedilol 25 mg tablet Commonly known as:  Haskel Scarce Take 1 Tab by mouth two (2) times daily (with meals). colchicine 0.6 mg tablet 2 tablets at first sign of gout flare and then 1 tablet 1 hour later  
  
 furosemide 40 mg tablet Commonly known as:  LASIX Take 1 Tab by mouth daily. hydrALAZINE 25 mg tablet Commonly known as:  APRESOLINE Take 1.5 Tabs by mouth every eight (8) hours. HYDROcodone-acetaminophen 5-325 mg per tablet Commonly known as:  Steven Herrlich Take 1 Tab by mouth two (2) times daily as needed for Pain. Max Daily Amount: 2 Tabs. pravastatin 20 mg tablet Commonly known as:  PRAVACHOL Take 1 Tab by mouth nightly. Prescriptions Printed Refills HYDROcodone-acetaminophen (NORCO) 5-325 mg per tablet 0 Sig: Take 1 Tab by mouth two (2) times daily as needed for Pain. Max Daily Amount: 2 Tabs. Class: Print Route: Oral  
  
We Performed the Following METABOLIC PANEL, BASIC [75890 CPT(R)] REFERRAL TO ORTHOPEDICS [VGL979 Custom] Referral Information Referral ID Referred By Referred To  
  
 [id number] [facility], [address] [place] [address], MD   
   [address] [place] [address] Orthopeadic and Spine Specialist Lenard Weinberg, [address] Phone: [phone] Fax: [phone] Visits Status Start Date End Date 1 New Request 10/11/17 10/11/18 If your referral has a status of pending review or denied, additional information will be sent to support the outcome of this decision. Introducing [facility] & HEALTH SERVICES! [facility] introduces 21via[employer] patient portal. Now you can access parts of your medical record, email your doctor's office, and request medication refills online. 1. In your internet browser, go to [email or web address] 2. Click on the First Time User? Click Here link in the Sign In box. You will see the New Member Sign Up page. 3. Enter your DxUpClose Access Code exactly as it appears below. You will not need to use this code after youve completed the sign-up process. If you do not sign up before the expiration date, you must request a new code. · DxUpClose Access Code: [email or web address] Expires: 12/4/2017  5:19 PM 
 
4. Enter the last four digits of your Social Security Number (xxxx) and Date of Birth (mm/dd/yyyy) as indicated and click Submit. You will be taken to the next sign-up page. 5. Create a DxUpClose ID. This will be your DxUpClose login ID and cannot be changed, so think of one that is secure and easy to remember. 6. Create a DxUpClose password. You can change your password at any time. 7. Enter your Password Reset Question and Answer. This can be used at a later time if you forget your password. 8. Enter your e-mail address. You will receive e-mail notification when new information is available in [address] 9. Click Sign Up. You can now view and download portions of your medical record. 10. Click the Download Summary menu link to download a portable copy of your medical information. If you have questions, please visit the Frequently Asked Questions section of the DxUpClose website. Remember, DxUpClose is NOT to be used for urgent needs. For medical emergencies, dial 911. Now available from your iPhone and Android! Please provide this summary of care documentation to your next provider. Your primary care clinician is listed as Geremias Hernandez. If you have any questions after today's visit, please call [phone].

## 2017-10-20 ENCOUNTER — OFFICE VISIT (OUTPATIENT)
Dept: ORTHOPEDIC SURGERY | Facility: CLINIC | Age: 57
End: 2017-10-20

## 2017-10-20 ENCOUNTER — TELEPHONE (OUTPATIENT)
Dept: FAMILY MEDICINE CLINIC | Age: 57
End: 2017-10-20

## 2017-10-20 ENCOUNTER — HOSPITAL ENCOUNTER (EMERGENCY)
Age: 57
Discharge: HOME | End: 2017-10-20
Attending: EMERGENCY MEDICINE
Payer: COMMERCIAL

## 2017-10-20 VITALS
SYSTOLIC BLOOD PRESSURE: 215 MMHG | RESPIRATION RATE: 19 BRPM | DIASTOLIC BLOOD PRESSURE: 122 MMHG | OXYGEN SATURATION: 99 % | WEIGHT: 222.4 LBS | TEMPERATURE: 97.8 F | HEIGHT: 69 IN | HEART RATE: 67 BPM | BODY MASS INDEX: 32.94 KG/M2

## 2017-10-20 VITALS
SYSTOLIC BLOOD PRESSURE: 142 MMHG | WEIGHT: 222 LBS | HEART RATE: 64 BPM | HEIGHT: 69 IN | BODY MASS INDEX: 32.88 KG/M2 | OXYGEN SATURATION: 99 % | DIASTOLIC BLOOD PRESSURE: 91 MMHG | RESPIRATION RATE: 18 BRPM | TEMPERATURE: 97.1 F

## 2017-10-20 DIAGNOSIS — M25.669 DECREASED RANGE OF KNEE MOVEMENT: Primary | ICD-10-CM

## 2017-10-20 DIAGNOSIS — I10 ESSENTIAL HYPERTENSION: Primary | ICD-10-CM

## 2017-10-20 DIAGNOSIS — I10 POORLY-CONTROLLED HYPERTENSION: ICD-10-CM

## 2017-10-20 DIAGNOSIS — M17.12 PRIMARY OSTEOARTHRITIS OF LEFT KNEE: ICD-10-CM

## 2017-10-20 DIAGNOSIS — R52 PAIN: ICD-10-CM

## 2017-10-20 PROCEDURE — 99283 EMERGENCY DEPT VISIT LOW MDM: CPT

## 2017-10-20 PROCEDURE — 74011250637 HC RX REV CODE- 250/637: Performed by: PHYSICIAN ASSISTANT

## 2017-10-20 RX ORDER — HYDRALAZINE HYDROCHLORIDE 25 MG/1
25 TABLET, FILM COATED ORAL
Status: COMPLETED | OUTPATIENT
Start: 2017-10-20 | End: 2017-10-20

## 2017-10-20 RX ORDER — CARVEDILOL 25 MG/1
25 TABLET ORAL
Status: COMPLETED | OUTPATIENT
Start: 2017-10-20 | End: 2017-10-20

## 2017-10-20 RX ORDER — CLONIDINE HYDROCHLORIDE 0.1 MG/1
0.1 TABLET ORAL
Status: COMPLETED | OUTPATIENT
Start: 2017-10-20 | End: 2017-10-20

## 2017-10-20 RX ORDER — HYDROCODONE BITARTRATE AND ACETAMINOPHEN 5; 325 MG/1; MG/1
1 TABLET ORAL
Status: COMPLETED | OUTPATIENT
Start: 2017-10-20 | End: 2017-10-20

## 2017-10-20 RX ADMIN — HYDRALAZINE HYDROCHLORIDE 25 MG: 25 TABLET, FILM COATED ORAL at 18:28

## 2017-10-20 RX ADMIN — CARVEDILOL 25 MG: 25 TABLET, FILM COATED ORAL at 18:28

## 2017-10-20 RX ADMIN — HYDROCODONE BITARTRATE AND ACETAMINOPHEN 1 TABLET: 5; 325 TABLET ORAL at 18:28

## 2017-10-20 RX ADMIN — CLONIDINE HYDROCHLORIDE 0.1 MG: 0.1 TABLET ORAL at 18:28

## 2017-10-20 NOTE — DISCHARGE INSTRUCTIONS
High Blood Pressure: Care Instructions  Your Care Instructions  If your blood pressure is usually above 140/90, you have high blood pressure, or hypertension. That means the top number is 140 or higher or the bottom number is 90 or higher, or both. Despite what a lot of people think, high blood pressure usually doesn't cause headaches or make you feel dizzy or lightheaded. It usually has no symptoms. But it does increase your risk for heart attack, stroke, and kidney or eye damage. The higher your blood pressure, the more your risk increases. Your doctor will give you a goal for your blood pressure. Your goal will be based on your health and your age. An example of a goal is to keep your blood pressure below 140/90. Lifestyle changes, such as eating healthy and being active, are always important to help lower blood pressure. You might also take medicine to reach your blood pressure goal.  Follow-up care is a key part of your treatment and safety. Be sure to make and go to all appointments, and call your doctor if you are having problems. It's also a good idea to know your test results and keep a list of the medicines you take. How can you care for yourself at home? Medical treatment  · If you stop taking your medicine, your blood pressure will go back up. You may take one or more types of medicine to lower your blood pressure. Be safe with medicines. Take your medicine exactly as prescribed. Call your doctor if you think you are having a problem with your medicine. · Talk to your doctor before you start taking aspirin every day. Aspirin can help certain people lower their risk of a heart attack or stroke. But taking aspirin isn't right for everyone, because it can cause serious bleeding. · See your doctor regularly. You may need to see the doctor more often at first or until your blood pressure comes down.   · If you are taking blood pressure medicine, talk to your doctor before you take decongestants or anti-inflammatory medicine, such as ibuprofen. Some of these medicines can raise blood pressure. · Learn how to check your blood pressure at home. Lifestyle changes  · Stay at a healthy weight. This is especially important if you put on weight around the waist. Losing even 10 pounds can help you lower your blood pressure. · If your doctor recommends it, get more exercise. Walking is a good choice. Bit by bit, increase the amount you walk every day. Try for at least 30 minutes on most days of the week. You also may want to swim, bike, or do other activities. · Avoid or limit alcohol. Talk to your doctor about whether you can drink any alcohol. · Try to limit how much sodium you eat to less than 2,300 milligrams (mg) a day. Your doctor may ask you to try to eat less than 1,500 mg a day. · Eat plenty of fruits (such as bananas and oranges), vegetables, legumes, whole grains, and low-fat dairy products. · Lower the amount of saturated fat in your diet. Saturated fat is found in animal products such as milk, cheese, and meat. Limiting these foods may help you lose weight and also lower your risk for heart disease. · Do not smoke. Smoking increases your risk for heart attack and stroke. If you need help quitting, talk to your doctor about stop-smoking programs and medicines. These can increase your chances of quitting for good. When should you call for help? Call 911 anytime you think you may need emergency care. This may mean having symptoms that suggest that your blood pressure is causing a serious heart or blood vessel problem. Your blood pressure may be over 180/110. For example, call 911 if:  · You have symptoms of a heart attack. These may include:  ¨ Chest pain or pressure, or a strange feeling in the chest.  ¨ Sweating. ¨ Shortness of breath. ¨ Nausea or vomiting. ¨ Pain, pressure, or a strange feeling in the back, neck, jaw, or upper belly or in one or both shoulders or arms.   ¨ Lightheadedness or sudden weakness. ¨ A fast or irregular heartbeat. · You have symptoms of a stroke. These may include:  ¨ Sudden numbness, tingling, weakness, or loss of movement in your face, arm, or leg, especially on only one side of your body. ¨ Sudden vision changes. ¨ Sudden trouble speaking. ¨ Sudden confusion or trouble understanding simple statements. ¨ Sudden problems with walking or balance. ¨ A sudden, severe headache that is different from past headaches. · You have severe back or belly pain. Do not wait until your blood pressure comes down on its own. Get help right away. Call your doctor now or seek immediate care if:  · Your blood pressure is much higher than normal (such as 180/110 or higher), but you don't have symptoms. · You think high blood pressure is causing symptoms, such as:  ¨ Severe headache. ¨ Blurry vision. Watch closely for changes in your health, and be sure to contact your doctor if:  · Your blood pressure measures 140/90 or higher at least 2 times. That means the top number is 140 or higher or the bottom number is 90 or higher, or both. · You think you may be having side effects from your blood pressure medicine. · Your blood pressure is usually normal, but it goes above normal at least 2 times. Where can you learn more? Go to http://rachael-lauren.info/. Enter H249 in the search box to learn more about \"High Blood Pressure: Care Instructions. \"  Current as of: August 8, 2016  Content Version: 11.3  © 0348-4464 [employer]. Care instructions adapted under license by [employer] (which disclaims liability or warranty for this information). If you have questions about a medical condition or this instruction, always ask your healthcare professional. [place] [address] any warranty or liability for your use of this information.          DASH Diet: Care Instructions  Your Care Instructions  The DASH diet is an eating plan that can help lower your blood pressure. DASH stands for Dietary Approaches to Stop Hypertension. Hypertension is high blood pressure. The DASH diet focuses on eating foods that are high in calcium, potassium, and magnesium. These nutrients can lower blood pressure. The foods that are highest in these nutrients are fruits, vegetables, low-fat dairy products, nuts, seeds, and legumes. But taking calcium, potassium, and magnesium supplements instead of eating foods that are high in those nutrients does not have the same effect. The DASH diet also includes whole grains, fish, and poultry. The DASH diet is one of several lifestyle changes your doctor may recommend to lower your high blood pressure. Your doctor may also want you to decrease the amount of sodium in your diet. Lowering sodium while following the DASH diet can lower blood pressure even further than just the DASH diet alone. Follow-up care is a key part of your treatment and safety. Be sure to make and go to all appointments, and call your doctor if you are having problems. It's also a good idea to know your test results and keep a list of the medicines you take. How can you care for yourself at home? Following the DASH diet  · Eat 4 to 5 servings of fruit each day. A serving is 1 medium-sized piece of fruit, ½ cup chopped or canned fruit, 1/4 cup dried fruit, or 4 ounces (½ cup) of fruit juice. Choose fruit more often than fruit juice. · Eat 4 to 5 servings of vegetables each day. A serving is 1 cup of lettuce or raw leafy vegetables, ½ cup of chopped or cooked vegetables, or 4 ounces (½ cup) of vegetable juice. Choose vegetables more often than vegetable juice. · Get 2 to 3 servings of low-fat and fat-free dairy each day. A serving is 8 ounces of milk, 1 cup of yogurt, or 1 ½ ounces of cheese. · Eat 6 to 8 servings of grains each day.  A serving is 1 slice of bread, 1 ounce of dry cereal, or ½ cup of cooked rice, pasta, or cooked cereal. Try to choose whole-grain products as much as possible. · Limit lean meat, poultry, and fish to 2 servings each day. A serving is 3 ounces, about the size of a deck of cards. · Eat 4 to 5 servings of nuts, seeds, and legumes (cooked dried beans, lentils, and split peas) each week. A serving is 1/3 cup of nuts, 2 tablespoons of seeds, or ½ cup of cooked beans or peas. · Limit fats and oils to 2 to 3 servings each day. A serving is 1 teaspoon of vegetable oil or 2 tablespoons of salad dressing. · Limit sweets and added sugars to 5 servings or less a week. A serving is 1 tablespoon jelly or jam, ½ cup sorbet, or 1 cup of lemonade. · Eat less than 2,300 milligrams (mg) of sodium a day. If you limit your sodium to 1,500 mg a day, you can lower your blood pressure even more. Tips for success  · Start small. Do not try to make dramatic changes to your diet all at once. You might feel that you are missing out on your favorite foods and then be more likely to not follow the plan. Make small changes, and stick with them. Once those changes become habit, add a few more changes. · Try some of the following:  ¨ Make it a goal to eat a fruit or vegetable at every meal and at snacks. This will make it easy to get the recommended amount of fruits and vegetables each day. ¨ Try yogurt topped with fruit and nuts for a snack or healthy dessert. ¨ Add lettuce, tomato, cucumber, and onion to sandwiches. ¨ Combine a ready-made pizza crust with low-fat mozzarella cheese and lots of vegetable toppings. Try using tomatoes, squash, spinach, broccoli, carrots, cauliflower, and onions. ¨ Have a variety of cut-up vegetables with a low-fat dip as an appetizer instead of chips and dip. ¨ Sprinkle sunflower seeds or chopped almonds over salads. Or try adding chopped walnuts or almonds to cooked vegetables. ¨ Try some vegetarian meals using beans and peas. Add garbanzo or kidney beans to salads.  Make burritos and tacos with mashed conte beans or black beans. Where can you learn more? Go to http://rachael-lauren.info/. Enter T956 in the search box to learn more about \"DASH Diet: Care Instructions. \"  Current as of: April 3, 2017  Content Version: 11.3  © 6540-1040 [employer]. Care instructions adapted under license by [employer] (which disclaims liability or warranty for this information). If you have questions about a medical condition or this instruction, always ask your healthcare professional. [address] any warranty or liability for your use of this information. [employer] Activation    Thank you for requesting access to [employer] Please follow the instructions below to securely access and download your online medical record. [employer] allows you to send messages to your doctor, view your test results, renew your prescriptions, schedule appointments, and more. How Do I Sign Up? 1. In your internet browser, go to [email or web address]  2. Click on the First Time User? Click Here link in the Sign In box. You will be redirect to the New Member Sign Up page. 3. Enter your [employer] Access Code exactly as it appears below. You will not need to use this code after youve completed the sign-up process. If you do not sign up before the expiration date, you must request a new code. [employer] Access Code: [email or web address]  Expires: 2017  5:19 PM (This is the date your [employer] access code will )    4. Enter the last four digits of your Social Security Number (xxxx) and Date of Birth (mm/dd/yyyy) as indicated and click Submit. You will be taken to the next sign-up page. 5. Create a [employer] ID. This will be your [employer] login ID and cannot be changed, so think of one that is secure and easy to remember. 6. Create a [employer] password. You can change your password at any time. 7. Enter your Password Reset Question and Answer.  This can be used at a later time if you forget your password. 8. Enter your e-mail address. You will receive e-mail notification when new information is available in [address] 9. Click Sign Up. You can now view and download portions of your medical record. 10. Click the Download Summary menu link to download a portable copy of your medical information. Additional Information    If you have questions, please visit the Frequently Asked Questions section of the GreenVolts website at [email or web address] Remember, GreenVolts is NOT to be used for urgent needs. For medical emergencies, dial 911.

## 2017-10-20 NOTE — MR AVS SNAPSHOT
Visit Information Date & Time Provider Department Dept. Phone Encounter #  
 10/20/2017  3:00 PM Lei Nageotte, PA-C [facility] [place] [phone] Your Appointments 12/5/2017  4:00 PM  
FOLLOW UP EXAM with ELHAM Navarro [facility] ([address]) Appt Note: chronic disease f/u -30 minutes [address] [facility] [address]  
[facility] [address] Upcoming Health Maintenance Date Due COLONOSCOPY 3/1/1979 DTaP/Tdap/Td series (1 - Tdap) 3/1/1982 Prostate-Specific Antigen 4/11/2018 Allergies as of 10/20/2017  Review Complete On: 10/20/2017 By: Lei Nageotte, PA-C No Known Allergies Current Immunizations  Never Reviewed No immunizations on file. Not reviewed this visit You Were Diagnosed With   
  
 Codes Comments Decreased range of knee movement    -  Primary ICD-10-CM: T47.765 ICD-9-CM: 719.56 Pain     ICD-10-CM: R52 ICD-9-CM: 780.96 Primary osteoarthritis of left knee     ICD-10-CM: M17.12 
ICD-9-CM: 715.16 Poorly-controlled hypertension     ICD-10-CM: I10 
ICD-9-CM: 401.9 Vitals BP Pulse Temp Resp Height(growth percentile) Weight(growth percentile) (!) 215/122 (BP 1 Location: Right arm, BP Patient Position: Sitting) 67 97.8 °F (36.6 °C) (Oral) 19 5' 9\" (1.753 m) 222 lb 6.4 oz (100.9 kg) SpO2 BMI Smoking Status 99% 32.84 kg/m2 Never Smoker Vitals History BMI and BSA Data Body Mass Index Body Surface Area  
 32.84 kg/m 2 2.22 m 2 Preferred Pharmacy Pharmacy Name Phone [facility] [address] [place], [place] [address]Ayala [address] [place] [address] [phone] Your Updated Medication List  
  
   
This list is accurate as of: 10/20/17  3:09 PM.  Always use your most recent med list.  
  
  
  
  
 allopurinol 100 mg tablet Commonly known as:  Bernett Raymundo Take 1 Tab by mouth daily. Increase to 200 mg in one week. aspirin 81 mg chewable tablet Take 1 Tab by mouth daily. carvedilol 25 mg tablet Commonly known as:  Caleb Liter Take 1 Tab by mouth two (2) times daily (with meals). colchicine 0.6 mg tablet 2 tablets at first sign of gout flare and then 1 tablet 1 hour later  
  
 furosemide 40 mg tablet Commonly known as:  LASIX Take 1 Tab by mouth daily. hydrALAZINE 25 mg tablet Commonly known as:  APRESOLINE Take 1.5 Tabs by mouth every eight (8) hours. HYDROcodone-acetaminophen 5-325 mg per tablet Commonly known as:  Airam Fothergill Take 1 Tab by mouth two (2) times daily as needed for Pain. Max Daily Amount: 2 Tabs. pravastatin 20 mg tablet Commonly known as:  PRAVACHOL Take 1 Tab by mouth nightly. Introducing [facility] & HEALTH SERVICES! [facility] introduces [employer] patient portal. Now you can access parts of your medical record, email your doctor's office, and request medication refills online. 1. In your internet browser, go to [email or web address] 2. Click on the First Time User? Click Here link in the Sign In box. You will see the New Member Sign Up page. 3. Enter your [employer] Access Code exactly as it appears below. You will not need to use this code after youve completed the sign-up process. If you do not sign up before the expiration date, you must request a new code. · [employer] Access Code: [email or web address] Expires: 12/4/2017  5:19 PM 
 
4. Enter the last four digits of your Social Security Number (xxxx) and Date of Birth (mm/dd/yyyy) as indicated and click Submit. You will be taken to the next sign-up page. 5. Create a Smartsyt ID. This will be your [employer] login ID and cannot be changed, so think of one that is secure and easy to remember. 6. Create a Smartsyt password. You can change your password at any time. 7. Enter your Password Reset Question and Answer. This can be used at a later time if you forget your password. 8. Enter your e-mail address. You will receive e-mail notification when new information is available in [address] 9. Click Sign Up. You can now view and download portions of your medical record. 10. Click the Download Summary menu link to download a portable copy of your medical information. If you have questions, please visit the Frequently Asked Questions section of the Isolation Network website. Remember, Is[employer] Network is NOT to be used for urgent needs. For medical emergencies, dial 911. Now available from your iPhone and Android! Please provide this summary of care documentation to your next provider. Your primary care clinician is listed as Srinivasan Hill. If you have any questions after today's visit, please call [phone].

## 2017-10-20 NOTE — PROGRESS NOTES
HISTORY OF PRESENT ILLNESS:  Kalpesh Cosme is a pleasant, 27-year-old, obese, -American gentleman who presents to the office for followup regarding left knee pain. He has a history of x-rays obtained on September 2017 ordered from his PCP, midlevel. X-rays are significant for moderate to severe medial joint space narrowing with other tricompartmental findings noted. He is limited in his abilities to walk and stand, as well as climb and descend stairs secondary to pain. The patient presented today with an intake blood pressure of 182/122. That was repeated to even be higher in a seated position over 212/122 again. The patient is asymptomatic while in the office today. No chest pain. No shortness of breath. No fevers, chills, or night sweats. No headache. PLAN:   An attempt to call his PCP today, but unfortunately, at 3:00 oclock in the afternoon on Friday, there was little that could be done for him. For his safety, I am sending him to the emergency department for evaluation and treatment. He may need IV Lopressor or Clonidine to reduce his pressures. Certainly, he is at great risk for a heart attack, stroke, or something that could be fatal involving the cardiovascular system. Today, all his questions were answered to his satisfaction. He may follow with our office once his blood pressure is better controlled.

## 2017-10-20 NOTE — ED PROVIDER NOTES
HPI Comments: 5:40 PM  65 y.o. male with PMH of CKD, CHF, gout, HTN who was sent to the ED by the orthopedic physician for elevated blood pressure reading in the office today. Pt notes he took his morning dose of blood pressure medication. Denies dizziness, headache, changes in vision, chest pain, dyspnea, weakness, numbness/tingling. Notes he has been taking Norco for L foot pain and thinks his pressure is elevated due to pain. Pt denies any other sxs or complaints. Written by Carlos Hobson PA-C      Patient is a 65 y.o. male presenting with foot pain and hypertension. The history is provided by the patient. Foot Pain    Pertinent negatives include no numbness. Hypertension    Pertinent negatives include no chest pain, no palpitations, no headaches, no dizziness, no shortness of breath, no nausea and no vomiting. Past Medical History:   Diagnosis Date    Arthritis of left knee 09/2017    moderate to severe, with effusion on xray    Chronic kidney disease     Diastolic CHF ([address]     Dilated cardiomyopathy ([address]     History of alcohol abuse     History of echocardiogram 02/24/2014    Mod-marked LVE. EF 15%. Severe, diffuse hypk. Mild LVH. Gr 1 DDfx. Mod LAE. Mild-mod FIDELIA. Mild AoRE. No significant change from echo of 10/23/09.  History of gout     History of myocardial perfusion scan 05/25/2006    No evidence of ischemia or scarring. Gross LVE. EF 28%. Severe global hypk. Neg EKG on submaximal EST. Ex time 8 min 25 sec.     HTN (hypertension)     Hypercholesterolemia     Hypertensive cardiovascular disease        Past Surgical History:   Procedure Laterality Date    HX HERNIA REPAIR  04/2016         Family History:   Problem Relation Age of Onset    Cancer Father      Lung    Heart Failure Mother     Cancer Sister        Social History     Social History    Marital status: SINGLE     Spouse name: N/A    Number of children: N/A    Years of education: N/A Occupational History    Not on file. Social History Main Topics    Smoking status: Never Smoker    Smokeless tobacco: Never Used    Alcohol use No    Drug use: No    Sexual activity: No     Other Topics Concern    Not on file     Social History Narrative         ALLERGIES: Review of patient's allergies indicates no known allergies. Review of Systems   Constitutional: Negative for chills, diaphoresis, fatigue and fever. Respiratory: Negative for shortness of breath. Cardiovascular: Negative for chest pain, palpitations and leg swelling. Gastrointestinal: Negative for abdominal pain, nausea and vomiting. Skin: Negative for rash. Neurological: Negative for dizziness, facial asymmetry, weakness, light-headedness, numbness and headaches. All other systems reviewed and are negative. Vitals:    10/20/17 1604 10/20/17 1922   BP: (!) 223/124 (!) 142/91   Pulse: 60 64   Resp: 16 18   Temp: 98 °F (36.7 °C) 97.1 °F (36.2 °C)   SpO2: 100% 99%   Weight: 100.7 kg (222 lb)    Height: 5' 9\" (1.753 m)             Physical Exam   Constitutional: He is oriented to person, place, and time. He appears well-developed and well-nourished. No distress. HENT:   Head: Normocephalic and atraumatic. Eyes: Pupils are equal, round, and reactive to light. Neck: Normal range of motion. Neck supple. Cardiovascular: Normal rate, regular rhythm and normal heart sounds. Exam reveals no gallop and no friction rub. No murmur heard. Pulmonary/Chest: Effort normal and breath sounds normal. No respiratory distress. He has no wheezes. He has no rales. Neurological: He is alert and oriented to person, place, and time. He has normal strength. No cranial nerve deficit or sensory deficit. Coordination and gait normal. GCS eye subscore is 4. GCS verbal subscore is 5. GCS motor subscore is 6. Skin: Skin is warm. No rash noted. He is not diaphoretic. Nursing note and vitals reviewed.        MDM  ED Course Procedures    RESULTS:    No orders to display       Labs Reviewed - No data to display    No results found for this or any previous visit (from the past 12 hour(s)). PROGRESS NOTE:   5:42 PM  Pt with asymptomatic HTN. Will give dose of nightly blood pressure medication, pain control for L foot pain, and repeat blood pressure reading. Reviewed importance of follow-up with PMD for reassessment and repeat blood pressure check Monday.     7:21 PM  BP reduced to 142/91. IMPRESSION AND MEDICAL DECISION MAKING:   HTN D/c: The patient has S/S c/w uncontrolled HTN w/o specific S/S of a CVA, TIA, or acute neurologic process, an acute cardiovascular, pulmonary, or renal process, or acute end-organ injury. The patient is stable for d/c with close outpatient follow-up. CONDITION ON DISCHARGE:  stable    DISCHARGE NOTE:  6:42 PM  Chas Robles's  results have been reviewed with him. He has been counseled regarding his diagnosis, treatment, and plan. He verbally conveys understanding and agreement of the signs, symptoms, diagnosis, treatment and prognosis and additionally agrees to follow up as discussed. He also agrees with the care-plan and conveys that all of his questions have been answered. I have also provided discharge instructions for him that include: educational information regarding their diagnosis and treatment, and list of reasons why they would want to return to the ED prior to their follow-up appointment, should his condition change. CLINICAL IMPRESSION:    1.  Essential hypertension        AFTER VISIT PLAN:    Current Discharge Medication List           Follow-up Information     Follow up With Details Comments Contact Info    [facility] EMERGENCY DEPT  If symptoms worsen [address] [place] [address] [address] [place], [place] In 2 days  [address]             Written by Hank Michelle PA-C

## 2017-10-20 NOTE — ED TRIAGE NOTES
Pt asymptomatic from dr office for left foot pain not due to trauma and severe hypertension, pt has been med compliant, pt denies HA, CP respirations even and unlabored, pt ambulatory strong steady gait,

## 2017-10-20 NOTE — TELEPHONE ENCOUNTER
[address] Specialist called stating patient has a blood pressure of 215/122 and asked if we could see him today. I explained that Kitty Santos was his PCP and offered the phone number for that location. She then asked if anyone was able to see the patient here at this location.  After speaking with the nurse, advised her to send patient to the ER with his blood pressure that high

## 2017-10-20 NOTE — TELEPHONE ENCOUNTER
Noted. Dr. Live Diego did not have any open appointments today to see the pt. Therefore it was advised that the pt go to the ER to be evaluated. Dr. Live Diego made aware. Forwarded to the provider for review.

## 2017-11-06 DIAGNOSIS — E78.2 MIXED HYPERLIPIDEMIA: ICD-10-CM

## 2017-11-07 RX ORDER — PRAVASTATIN SODIUM 20 MG/1
TABLET ORAL
Qty: 30 TAB | Refills: 0 | Status: SHIPPED | OUTPATIENT
Start: 2017-11-07 | End: 2017-12-05 | Stop reason: SDUPTHER

## 2017-12-05 DIAGNOSIS — E78.2 MIXED HYPERLIPIDEMIA: ICD-10-CM

## 2017-12-06 RX ORDER — PRAVASTATIN SODIUM 20 MG/1
TABLET ORAL
Qty: 30 TAB | Refills: 0 | Status: SHIPPED | OUTPATIENT
Start: 2017-12-06 | End: 2018-01-02 | Stop reason: SDUPTHER

## 2017-12-12 DIAGNOSIS — N18.30 CHRONIC KIDNEY DISEASE (CKD), STAGE III (MODERATE) (HCC): ICD-10-CM

## 2017-12-12 DIAGNOSIS — I10 ESSENTIAL HYPERTENSION: ICD-10-CM

## 2017-12-12 DIAGNOSIS — I50.32 CHRONIC DIASTOLIC CONGESTIVE HEART FAILURE (HCC): ICD-10-CM

## 2017-12-12 RX ORDER — FUROSEMIDE 40 MG/1
40 TABLET ORAL DAILY
Qty: 30 TAB | Refills: 1 | Status: SHIPPED | OUTPATIENT
Start: 2017-12-12 | End: 2018-02-16 | Stop reason: SDUPTHER

## 2017-12-12 NOTE — TELEPHONE ENCOUNTER
Requested Prescriptions     Pending Prescriptions Disp Refills    furosemide (LASIX) 40 mg tablet 30 Tab 1     Sig: Take 1 Tab by mouth daily.

## 2017-12-12 NOTE — TELEPHONE ENCOUNTER
Pt was last seen on 10/11/2017 next [facility]

## 2017-12-19 DIAGNOSIS — I10 ESSENTIAL HYPERTENSION: ICD-10-CM

## 2017-12-19 DIAGNOSIS — I50.32 CHRONIC DIASTOLIC CONGESTIVE HEART FAILURE (HCC): ICD-10-CM

## 2017-12-20 RX ORDER — CARVEDILOL 25 MG/1
TABLET ORAL
Qty: 60 TAB | Refills: 0 | Status: SHIPPED | OUTPATIENT
Start: 2017-12-20 | End: 2018-01-18 | Stop reason: SDUPTHER

## 2017-12-22 ENCOUNTER — OFFICE VISIT (OUTPATIENT)
Dept: FAMILY MEDICINE CLINIC | Age: 57
End: 2017-12-22

## 2017-12-22 VITALS
HEIGHT: 69 IN | WEIGHT: 236 LBS | SYSTOLIC BLOOD PRESSURE: 180 MMHG | OXYGEN SATURATION: 100 % | RESPIRATION RATE: 18 BRPM | DIASTOLIC BLOOD PRESSURE: 110 MMHG | BODY MASS INDEX: 34.96 KG/M2 | HEART RATE: 71 BPM | TEMPERATURE: 97.5 F

## 2017-12-22 DIAGNOSIS — I50.32 CHRONIC DIASTOLIC CONGESTIVE HEART FAILURE (HCC): ICD-10-CM

## 2017-12-22 DIAGNOSIS — N18.30 CHRONIC KIDNEY DISEASE (CKD), STAGE III (MODERATE) (HCC): ICD-10-CM

## 2017-12-22 DIAGNOSIS — E78.2 MIXED HYPERLIPIDEMIA: ICD-10-CM

## 2017-12-22 DIAGNOSIS — I10 ESSENTIAL HYPERTENSION: Primary | ICD-10-CM

## 2017-12-22 DIAGNOSIS — I42.8 NONISCHEMIC CARDIOMYOPATHY (HCC): ICD-10-CM

## 2017-12-22 RX ORDER — AMLODIPINE BESYLATE 5 MG/1
5 TABLET ORAL DAILY
Qty: 30 TAB | Refills: 1 | Status: SHIPPED | OUTPATIENT
Start: 2017-12-22 | End: 2018-01-24 | Stop reason: DRUGHIGH

## 2017-12-22 NOTE — PATIENT INSTRUCTIONS
DASH Diet: Care Instructions  Your Care Instructions    The DASH diet is an eating plan that can help lower your blood pressure. DASH stands for Dietary Approaches to Stop Hypertension. Hypertension is high blood pressure. The DASH diet focuses on eating foods that are high in calcium, potassium, and magnesium. These nutrients can lower blood pressure. The foods that are highest in these nutrients are fruits, vegetables, low-fat dairy products, nuts, seeds, and legumes. But taking calcium, potassium, and magnesium supplements instead of eating foods that are high in those nutrients does not have the same effect. The DASH diet also includes whole grains, fish, and poultry. The DASH diet is one of several lifestyle changes your doctor may recommend to lower your high blood pressure. Your doctor may also want you to decrease the amount of sodium in your diet. Lowering sodium while following the DASH diet can lower blood pressure even further than just the DASH diet alone. Follow-up care is a key part of your treatment and safety. Be sure to make and go to all appointments, and call your doctor if you are having problems. It's also a good idea to know your test results and keep a list of the medicines you take. How can you care for yourself at home? Following the DASH diet  · Eat 4 to 5 servings of fruit each day. A serving is 1 medium-sized piece of fruit, ½ cup chopped or canned fruit, 1/4 cup dried fruit, or 4 ounces (½ cup) of fruit juice. Choose fruit more often than fruit juice. · Eat 4 to 5 servings of vegetables each day. A serving is 1 cup of lettuce or raw leafy vegetables, ½ cup of chopped or cooked vegetables, or 4 ounces (½ cup) of vegetable juice. Choose vegetables more often than vegetable juice. · Get 2 to 3 servings of low-fat and fat-free dairy each day. A serving is 8 ounces of milk, 1 cup of yogurt, or 1 ½ ounces of cheese. · Eat 6 to 8 servings of grains each day.  A serving is 1 slice of bread, 1 ounce of dry cereal, or ½ cup of cooked rice, pasta, or cooked cereal. Try to choose whole-grain products as much as possible. · Limit lean meat, poultry, and fish to 2 servings each day. A serving is 3 ounces, about the size of a deck of cards. · Eat 4 to 5 servings of nuts, seeds, and legumes (cooked dried beans, lentils, and split peas) each week. A serving is 1/3 cup of nuts, 2 tablespoons of seeds, or ½ cup of cooked beans or peas. · Limit fats and oils to 2 to 3 servings each day. A serving is 1 teaspoon of vegetable oil or 2 tablespoons of salad dressing. · Limit sweets and added sugars to 5 servings or less a week. A serving is 1 tablespoon jelly or jam, ½ cup sorbet, or 1 cup of lemonade. · Eat less than 2,300 milligrams (mg) of sodium a day. If you limit your sodium to 1,500 mg a day, you can lower your blood pressure even more. Tips for success  · Start small. Do not try to make dramatic changes to your diet all at once. You might feel that you are missing out on your favorite foods and then be more likely to not follow the plan. Make small changes, and stick with them. Once those changes become habit, add a few more changes. · Try some of the following:  ¨ Make it a goal to eat a fruit or vegetable at every meal and at snacks. This will make it easy to get the recommended amount of fruits and vegetables each day. ¨ Try yogurt topped with fruit and nuts for a snack or healthy dessert. ¨ Add lettuce, tomato, cucumber, and onion to sandwiches. ¨ Combine a ready-made pizza crust with low-fat mozzarella cheese and lots of vegetable toppings. Try using tomatoes, squash, spinach, broccoli, carrots, cauliflower, and onions. ¨ Have a variety of cut-up vegetables with a low-fat dip as an appetizer instead of chips and dip. ¨ Sprinkle sunflower seeds or chopped almonds over salads. Or try adding chopped walnuts or almonds to cooked vegetables.   ¨ Try some vegetarian meals using beans and peas. Add garbanzo or kidney beans to salads. Make burritos and tacos with mashed conte beans or black beans. Where can you learn more? Go to http://rachael-lauren.info/. Enter L612 in the search box to learn more about \"DASH Diet: Care Instructions. \"  Current as of: September 21, 2016  Content Version: 11.4  © 7088-5562 [employer]. Care instructions adapted under license by [employer] (which disclaims liability or warranty for this information). If you have questions about a medical condition or this instruction, always ask your healthcare professional. [address] any warranty or liability for your use of this information. Low Sodium Diet (2,000 Milligram): Care Instructions  Your Care Instructions    Too much sodium causes your body to hold on to extra water. This can raise your blood pressure and force your heart and kidneys to work harder. In very serious cases, this could cause you to be put in the hospital. It might even be life-threatening. By limiting sodium, you will feel better and lower your risk of serious problems. The most common source of sodium is salt. People get most of the salt in their diet from canned, prepared, and packaged foods. Fast food and restaurant meals also are very high in sodium. Your doctor will probably limit your sodium to less than 2,000 milligrams (mg) a day. This limit counts all the sodium in prepared and packaged foods and any salt you add to your food. Follow-up care is a key part of your treatment and safety. Be sure to make and go to all appointments, and call your doctor if you are having problems. It's also a good idea to know your test results and keep a list of the medicines you take. How can you care for yourself at home? Read food labels  · Read labels on cans and food packages. The labels tell you how much sodium is in each serving. Make sure that you look at the serving size.  If you eat more than the serving size, you have eaten more sodium. · Food labels also tell you the Percent Daily Value for sodium. Choose products with low Percent Daily Values for sodium. · Be aware that sodium can come in forms other than salt, including monosodium glutamate (MSG), sodium citrate, and sodium bicarbonate (baking soda). MSG is often added to Asian food. When you eat out, you can sometimes ask for food without MSG or added salt. Buy low-sodium foods  · Buy foods that are labeled \"unsalted\" (no salt added), \"sodium-free\" (less than 5 mg of sodium per serving), or \"low-sodium\" (less than 140 mg of sodium per serving). Foods labeled \"reduced-sodium\" and \"light sodium\" may still have too much sodium. Be sure to read the label to see how much sodium you are getting. · Buy fresh vegetables, or frozen vegetables without added sauces. Buy low-sodium versions of canned vegetables, soups, and other canned goods. Prepare low-sodium meals  · Cut back on the amount of salt you use in cooking. This will help you adjust to the taste. Do not add salt after cooking. One teaspoon of salt has about 2,300 mg of sodium. · Take the salt shaker off the table. · Flavor your food with garlic, lemon juice, onion, vinegar, herbs, and spices. Do not use soy sauce, lite soy sauce, steak sauce, onion salt, garlic salt, celery salt, mustard, or ketchup on your food. · Use low-sodium salad dressings, sauces, and ketchup. Or make your own salad dressings and sauces without adding salt. · Use less salt (or none) when recipes call for it. You can often use half the salt a recipe calls for without losing flavor. Other foods such as rice, pasta, and grains do not need added salt. · Rinse canned vegetables, and cook them in fresh water. This removes some-but not all-of the salt. · Avoid water that is naturally high in sodium or that has been treated with water softeners, which add sodium.  Call your local water company to find out the sodium content of your water supply. If you buy bottled water, read the label and choose a sodium-free brand. Avoid high-sodium foods  · Avoid eating:  ¨ Smoked, cured, salted, and canned meat, fish, and poultry. ¨ Ham, oglesby, hot dogs, and luncheon meats. ¨ Regular, hard, and processed cheese and regular peanut butter. ¨ Crackers with salted tops, and other salted snack foods such as pretzels, chips, and salted popcorn. ¨ Frozen prepared meals, unless labeled low-sodium. ¨ Canned and dried soups, broths, and bouillon, unless labeled sodium-free or low-sodium. ¨ Canned vegetables, unless labeled sodium-free or low-sodium. ¨ Western [place]i fries, pizza, tacos, and other fast foods. ¨ Pickles, olives, ketchup, and other condiments, especially soy sauce, unless labeled sodium-free or low-sodium. Where can you learn more? Go to http://rachael-lauren.info/. Enter V954 in the search box to learn more about \"Low Sodium Diet (2,000 Milligram): Care Instructions. \"  Current as of: May 12, 2017  Content Version: 11.4  © 5203-4692 [employer]. Care instructions adapted under license by [employer] (which disclaims liability or warranty for this information). If you have questions about a medical condition or this instruction, always ask your healthcare professional. [place] [address] any warranty or liability for your use of this information.

## 2017-12-23 NOTE — PROGRESS NOTES
Today's Date:  2017   Patient:  Randolph Hartman  Patient :  1960    Subjective:     Chief Complaint   Patient presents with    Follow Up Chronic Condition       Randolph Hartman is a 65 y.o. male who presents for follow up for chronic diseases. BP today is not at goal today >130/80. States that he checks BP at home and is usually in the 180's/100's  Medications regimen is as follows: patient is taking . Daily exercise and diet are as follows: Does not exercise and does not follow a low salt low fat diet. Is compliant with medication and denies side effects.    Patient is on a statin denies myalgia    Has chronic Diastolic CHF but states that he has not seen a Cardiologist   Also has chronic kidney disease but states that he is not seeing Nephrology. Last LDL: 28 10/2017  Last DM eye exam: Long time ago  Last urine microalbumin:   Denies nausea, vomiting, diarrhea, CP dizziness, SOB, weakness, paresthesias, headache, lightheadedness or vision change. Current Outpatient Meds and Allergies     Current Outpatient Prescriptions on File Prior to Visit   Medication Sig Dispense Refill    carvedilol (COREG) 25 mg tablet TAKE 1 TABLET BY MOUTH TWICE DAILY WITH MEALS 60 Tab 0    furosemide (LASIX) 40 mg tablet Take 1 Tab by mouth daily. 30 Tab 1    pravastatin (PRAVACHOL) 20 mg tablet TAKE 1 TABLET BY MOUTH EVERY NIGHT 30 Tab 0    HYDROcodone-acetaminophen (NORCO) 5-325 mg per tablet Take 1 Tab by mouth two (2) times daily as needed for Pain. Max Daily Amount: 2 Tabs. 10 Tab 0    allopurinol (ZYLOPRIM) 100 mg tablet Take 1 Tab by mouth daily. Increase to 200 mg in one week. 60 Tab 2    hydrALAZINE (APRESOLINE) 25 mg tablet Take 1.5 Tabs by mouth every eight (8) hours. 135 Tab 1    aspirin 81 mg chewable tablet Take 1 Tab by mouth daily.  30 Tab 11    colchicine 0.6 mg tablet 2 tablets at first sign of gout flare and then 1 tablet 1 hour later 3 Tab 2     No current facility-administered medications on file prior to visit. These medications have been reviewed and reconciled with the patient during today's visit. No Known Allergies      ROS:     CONST:   Denies fatigue, weight change, appetite change   NEURO:   Denies headaches, vision changes, dizziness, loss of consciousness  CV:      Denies chest pain, palpitations, orthopnea, PND  PULM:  Denies SOB, wheezing, cough, hemoptysis  GI:             Denies nausea, vomiting, abdominal pain, greasy stools, blood in stool,     diarrhea, constipation  :       Denies dysuria, hematuria, change in urine  MS:      Denies muscle/joint pain, joint swelling  SKIN:        Denies rashes, skin changes  ALLERGY: Denies seasonal allergies, itchy eyes    Objective:     VS:    Visit Vitals    BP (!) 180/110 (BP 1 Location: Left arm, BP Patient Position: Sitting)    Pulse 71    Temp 97.5 °F (36.4 °C) (Oral)    Resp 18    Ht 5' 9\" (1.753 m)    Wt 236 lb (107 kg)    SpO2 100%    BMI 34.85 kg/m2       General:   Well-nourished, well-groomed, pleasant, alert, in no acute distress. Head:  Normocephalic, atraumatic, MMM,   Neck:  Neck supple with normal ROM for age, no thyromegaly,  Cardiovasc:   Regular rate and rhythm, no murmurs, no rubs, no gallops,   Pulmonary:   Clear breath sounds bilaterally, good air movement, no wheezing, no rales, no rhonchi, normal respiratory effort  Abdomen:   Abdomen soft, nontender, nondistended, NABS,  Extremities:   No edema, no tenderness with palpation of calves, warm and well-perfused  Neuro:   Alert, conversant, appropriate, following commands, no focal deficits. Pertinent diagnostic procedures include:  No visits with results within 3 Month(s) from this visit.   Latest known visit with results is:    Admission on 09/18/2017, Discharged on 09/18/2017   Component Date Value Ref Range Status    WBC 09/18/2017 6.6  4.6 - 13.2 K/uL Final    RBC 09/18/2017 4.15* 4.70 - 5.50 M/uL Final    HGB 09/18/2017 11.2* 13.0 - 16.0 g/dL Final    HCT 09/18/2017 35.9* 36.0 - 48.0 % Final    MCV 09/18/2017 86.5  74.0 - 97.0 FL Final    MCH 09/18/2017 27.0  24.0 - 34.0 PG Final    MCHC 09/18/2017 31.2  31.0 - 37.0 g/dL Final    RDW 09/18/2017 14.3  11.6 - 14.5 % Final    PLATELET 12/45/2956 645  135 - 420 K/uL Final    MPV 09/18/2017 9.6  9.2 - 11.8 FL Final    NEUTROPHILS 09/18/2017 70  40 - 73 % Final    LYMPHOCYTES 09/18/2017 17* 21 - 52 % Final    MONOCYTES 09/18/2017 11* 3 - 10 % Final    EOSINOPHILS 09/18/2017 2  0 - 5 % Final    BASOPHILS 09/18/2017 0  0 - 2 % Final    ABS. NEUTROPHILS 09/18/2017 4.7  1.8 - 8.0 K/UL Final    ABS. LYMPHOCYTES 09/18/2017 1.1  0.9 - 3.6 K/UL Final    ABS. MONOCYTES 09/18/2017 0.7  0.05 - 1.2 K/UL Final    ABS. EOSINOPHILS 09/18/2017 0.1  0.0 - 0.4 K/UL Final    ABS. BASOPHILS 09/18/2017 0.0  0.0 - 0.06 K/UL Final    DF 09/18/2017 AUTOMATED    Final    Sodium 09/18/2017 138  136 - 145 mmol/L Final    Potassium 09/18/2017 5.1  3.5 - 5.5 mmol/L Final    Chloride 09/18/2017 104  100 - 108 mmol/L Final    CO2 09/18/2017 31  21 - 32 mmol/L Final    Anion gap 09/18/2017 3  3.0 - 18 mmol/L Final    Glucose 09/18/2017 103* 74 - 99 mg/dL Final    BUN 09/18/2017 39* 7.0 - 18 MG/DL Final    Creatinine 09/18/2017 2.51* 0.6 - 1.3 MG/DL Final    BUN/Creatinine ratio 09/18/2017 16  12 - 20   Final    GFR est AA 09/18/2017 32* >60 ml/min/1.73m2 Final    GFR est non-AA 09/18/2017 27* >60 ml/min/1.73m2 Final    Comment: (NOTE)  Estimated GFR is calculated using the Modification of Diet in Renal   Disease (MDRD) Study equation, reported for both  Americans   (GFRAA) and non- Americans (GFRNA), and normalized to 1.73m2   body surface area. The physician must decide which value applies to   the patient. The MDRD study equation should only be used in   individuals age 25 or older.  It has not been validated for the   following: pregnant women, patients with serious comorbid conditions,   or on certain medications, or persons with extremes of body size,   muscle mass, or nutritional status.  Calcium 09/18/2017 8.9  8.5 - 10.1 MG/DL Final    Protein, total 09/18/2017 7.9  6.4 - 8.2 g/dL Final    Albumin 09/18/2017 3.2* 3.4 - 5.0 g/dL Final    Globulin 09/18/2017 4.7* 2.0 - 4.0 g/dL Final    A-G Ratio 09/18/2017 0.7* 0.8 - 1.7   Final    Bilirubin, total 09/18/2017 0.3  0.2 - 1.0 MG/DL Final    Bilirubin, direct 09/18/2017 0.1  0.0 - 0.2 MG/DL Final    Alk. phosphatase 09/18/2017 93  45 - 117 U/L Final    AST (SGOT) 09/18/2017 11* 15 - 37 U/L Final    ALT (SGPT) 09/18/2017 14* 16 - 61 U/L Final    NT pro-BNP 09/18/2017 1371* 0 - 900 PG/ML Final    Comment:           For patients with dyspnea, NT proBNP is highly sensitive for detecting acute congestive heart failure. Also, an NT proBNP <300 pg/mL effectively rules out acute congestive heart failure, with 99% negative predictive value. Our reference ranges are for acute dyspnea. Age              Range (pg/ml)                            0-49                0-450        50-75               0-900        >75                 0-1800       For ambulatory office patients, the ranges below apply:     Age 76 and below, use cutoff of 125 pg/ml     Age 68 and above, use cutoff of 450 pg/ml         Assessment:       1. Essential hypertension    2. Chronic kidney disease (CKD), stage III (moderate)    3. Chronic diastolic congestive heart failure ([facility] [address]    4. Nonischemic cardiomyopathy ([facility] [address]        Plan:       Orders Placed This Encounter    REFERRAL TO NEPHROLOGY     Referral Priority:   Routine     Referral Type:   Consultation     Referral Reason:   Specialty Services Required     [facility]. Referred to Provider:   MD Jeff Martinez Cardio ref Zygmunt Arlether [facility]     Referral Priority:   Routine     Referral Type:   Consultation     Referral Reason:   Specialty Services Required Referred to Provider:   Job Campos MD    REFERRAL TO OPHTHALMOLOGY     Referral Priority:   Routine     Referral Type:   Consultation     Referral Reason:   Specialty Services Required     Referred to Provider:   Familia Lomeli, OD    amLODIPine (NORVASC) 5 mg tablet     Sig: Take 1 Tab by mouth daily. Indications: hypertension     Dispense:  30 Tab     Refill:  1     Hypertension: BP uncontrolled, added low dose Amlodipine on his regiment; patient to check BP daily, record and bring log and next visit. Hyperlipidemia: Well controlled with medication. Chronic Kidney Disease: Referred to Nephrology. Chronic Diastolic Congestive Heart failure: Last EF was 15%, referred to Cardiology. Encourage to avoid food high in sal  I have discussed the diagnosis with the patient and the intended plan as seen in the above orders. The patient has received an after-visit summary along with patient information handout. I have discussed medication side effects and warnings with the patient as well. Pt verbalized understanding. Follow-up Disposition:  Return in about 2 weeks (around 1/5/2018), or if symptoms worsen or fail to improve.   STEFFANIE Rhdoes  12/22/2017, 7:15 PM

## 2018-01-02 DIAGNOSIS — E78.2 MIXED HYPERLIPIDEMIA: ICD-10-CM

## 2018-01-02 RX ORDER — PRAVASTATIN SODIUM 20 MG/1
TABLET ORAL
Qty: 30 TAB | Refills: 0 | Status: SHIPPED | OUTPATIENT
Start: 2018-01-02 | End: 2018-01-24 | Stop reason: SDUPTHER

## 2018-01-24 ENCOUNTER — HOSPITAL ENCOUNTER (OUTPATIENT)
Dept: LAB | Age: 58
Discharge: HOME | End: 2018-01-24

## 2018-01-24 ENCOUNTER — OFFICE VISIT (OUTPATIENT)
Dept: FAMILY MEDICINE CLINIC | Age: 58
End: 2018-01-24

## 2018-01-24 VITALS
OXYGEN SATURATION: 96 % | WEIGHT: 241 LBS | SYSTOLIC BLOOD PRESSURE: 166 MMHG | BODY MASS INDEX: 35.7 KG/M2 | DIASTOLIC BLOOD PRESSURE: 112 MMHG | HEART RATE: 77 BPM | RESPIRATION RATE: 18 BRPM | TEMPERATURE: 98.2 F | HEIGHT: 69 IN

## 2018-01-24 DIAGNOSIS — M10.9 GOUT OF HAND, UNSPECIFIED CAUSE, UNSPECIFIED CHRONICITY, UNSPECIFIED LATERALITY: ICD-10-CM

## 2018-01-24 DIAGNOSIS — N18.30 CHRONIC KIDNEY DISEASE (CKD), STAGE III (MODERATE) (HCC): ICD-10-CM

## 2018-01-24 DIAGNOSIS — E78.2 MIXED HYPERLIPIDEMIA: ICD-10-CM

## 2018-01-24 DIAGNOSIS — N18.30 CHRONIC KIDNEY DISEASE (CKD), STAGE III (MODERATE) (HCC): Primary | ICD-10-CM

## 2018-01-24 DIAGNOSIS — I50.32 CHRONIC DIASTOLIC CONGESTIVE HEART FAILURE (HCC): ICD-10-CM

## 2018-01-24 DIAGNOSIS — I10 ESSENTIAL HYPERTENSION: ICD-10-CM

## 2018-01-24 LAB — SENTARA SPECIMEN COL,SENBCF: NORMAL

## 2018-01-24 PROCEDURE — 99001 SPECIMEN HANDLING PT-LAB: CPT | Performed by: PHYSICIAN ASSISTANT

## 2018-01-24 RX ORDER — PRAVASTATIN SODIUM 20 MG/1
20 TABLET ORAL
Qty: 30 TAB | Refills: 5 | Status: SHIPPED | OUTPATIENT
Start: 2018-01-24 | End: 2018-06-05 | Stop reason: SDUPTHER

## 2018-01-24 RX ORDER — AMLODIPINE BESYLATE 10 MG/1
10 TABLET ORAL DAILY
Qty: 30 TAB | Refills: 2 | Status: SHIPPED | OUTPATIENT
Start: 2018-01-24 | End: 2018-04-17 | Stop reason: SDUPTHER

## 2018-01-24 RX ORDER — ALLOPURINOL 100 MG/1
100 TABLET ORAL DAILY
Qty: 60 TAB | Refills: 2 | Status: SHIPPED | OUTPATIENT
Start: 2018-01-24 | End: 2018-05-09 | Stop reason: SDUPTHER

## 2018-01-24 RX ORDER — AMLODIPINE BESYLATE 5 MG/1
5 TABLET ORAL DAILY
Qty: 30 TAB | Refills: 1 | Status: CANCELLED | OUTPATIENT
Start: 2018-01-24

## 2018-01-24 NOTE — PROGRESS NOTES
HISTORY OF PRESENT ILLNESS  Issac Wang is a 65 y.o. male. HPI  Issac Wang is a 65 y.o. male who presents to the office today for HTN. He comes in for HTN follow up. He was seen by Breana Alexander last month and she added norvasc 5mg to his regimen, which is hydralazine and coreg. His BP is slightly better but still quite elevated. After reviewing his diet, he is eating foods that are very high in salt. A lot of fast food daily, junk food, prepared meals, preserved meats, etc. He is not checking his blood pressure at home. He has CKD and has not seen nephrology yet, but says his appt is next week. He has a hx of CHF and has not seen cardiology yet as well, but his appt is scheduled for next week. He has gained 5lbs since last month. He is not following a low sodium diet, nor daily weights. Chief Complaint   Patient presents with    Follow Up Chronic Condition    Medication Refill       Current Outpatient Prescriptions on File Prior to Visit   Medication Sig Dispense Refill    carvedilol (COREG) 25 mg tablet TAKE 1 TABLET BY MOUTH TWICE DAILY WITH MEALS 60 Tab 3    furosemide (LASIX) 40 mg tablet Take 1 Tab by mouth daily. 30 Tab 1    HYDROcodone-acetaminophen (NORCO) 5-325 mg per tablet Take 1 Tab by mouth two (2) times daily as needed for Pain. Max Daily Amount: 2 Tabs. 10 Tab 0    hydrALAZINE (APRESOLINE) 25 mg tablet Take 1.5 Tabs by mouth every eight (8) hours. 135 Tab 1    colchicine 0.6 mg tablet 2 tablets at first sign of gout flare and then 1 tablet 1 hour later 3 Tab 2    aspirin 81 mg chewable tablet Take 1 Tab by mouth daily. 30 Tab 11     No current facility-administered medications on file prior to visit.       No Known Allergies  Past Medical History:   Diagnosis Date    Arthritis of left knee 09/2017    moderate to severe, with effusion on xray    Chronic kidney disease     Diastolic CHF ([address]     Dilated cardiomyopathy ([address]     History of alcohol abuse     History of echocardiogram 02/24/2014    Mod-marked LVE. EF 15%. Severe, diffuse hypk. Mild LVH. Gr 1 DDfx. Mod LAE. Mild-mod FIDELIA. Mild AoRE. No significant change from echo of 10/23/09.  History of gout     History of myocardial perfusion scan 05/25/2006    No evidence of ischemia or scarring. Gross LVE. EF 28%. Severe global hypk. Neg EKG on submaximal EST. Ex time 8 min 25 sec.  HTN (hypertension)     Hypercholesterolemia     Hypertensive cardiovascular disease      History   Smoking Status    Never Smoker   Smokeless Tobacco    Never Used     History   Alcohol Use No     Family History   Problem Relation Age of Onset    Cancer Father      Lung    Heart Failure Mother     Cancer Sister        Review of Systems   Constitutional: Negative for chills, diaphoresis and fever. Eyes: Negative for blurred vision and double vision. Respiratory: Negative for cough and shortness of breath. Cardiovascular: Positive for leg swelling. Negative for chest pain and palpitations. Gastrointestinal: Negative for abdominal pain, constipation, diarrhea, nausea and vomiting. Musculoskeletal: Negative for falls. Skin: Negative for rash. Neurological: Negative for dizziness, loss of consciousness, weakness and headaches. Psychiatric/Behavioral: The patient is not nervous/anxious. Visit Vitals    BP (!) 166/112 (BP 1 Location: Right arm, BP Patient Position: Sitting)    Pulse 77    Temp 98.2 °F (36.8 °C) (Oral)    Resp 18    Ht 5' 9\" (1.753 m)    Wt 241 lb (109.3 kg)    SpO2 96%    BMI 35.59 kg/m2     Physical Exam   Constitutional: He is oriented to person, place, and time. He appears well-developed and well-nourished. No distress. obese   Neck: Neck supple. No JVD present. No thyromegaly present. Cardiovascular: Normal rate, regular rhythm and normal heart sounds. Pulmonary/Chest: Effort normal and breath sounds normal. No respiratory distress. He has no wheezes. He has no rales. Musculoskeletal: He exhibits edema. Pitting LE edema   Lymphadenopathy:     He has no cervical adenopathy. Neurological: He is alert and oriented to person, place, and time. Psychiatric: He has a normal mood and affect. His behavior is normal. Thought content normal.   Nursing note and vitals reviewed. ASSESSMENT and PLAN    ICD-10-CM ICD-9-CM    1. Chronic kidney disease (CKD), stage III (moderate) N18.3 585.3 CBC WITH AUTOMATED DIFF      METABOLIC PANEL, COMPREHENSIVE   2. Mixed hyperlipidemia E78.2 272.2 pravastatin (PRAVACHOL) 20 mg tablet      CBC WITH AUTOMATED DIFF      METABOLIC PANEL, COMPREHENSIVE   3. Gout of hand, unspecified cause, unspecified chronicity, unspecified laterality M10.9 274.9 allopurinol (ZYLOPRIM) 100 mg tablet   4. Essential hypertension I10 401.9 amLODIPine (NORVASC) 10 mg tablet      CBC WITH AUTOMATED DIFF      METABOLIC PANEL, COMPREHENSIVE   5. Chronic diastolic congestive heart failure (HCC) I50.32 428.32      428.0       Increased norvasc to 10mg daily. Will continue hydralazine and coreg. Keep appt with cards next week and appreciate their help with BP control, fluid overload in setting of CKD. We had a long discussion on following a low sodium diet for HTN and heart failure. He says he will start making changes. He should also be monitoring his weight every morning. Reviewed medication and side effects. Patient agrees with the plan and verbalizes understanding. Follow-up Disposition:  Return in about 1 month (around 2/24/2018) for HTN.     Marilyn thakkar PA-C  1/24/2018

## 2018-01-24 NOTE — MR AVS SNAPSHOT
[address] [facility] [address] [phone] Patient: Jordyn Martines MRN: [id number] Visit Information Date & Time Provider Department Dept. Phone Encounter #  
 1/24/2018  3:00 PM [facility] [phone] [id number] Follow-up Instructions Return in about 1 month (around 2/24/2018) for HTN. Upcoming Health Maintenance Date Due COLONOSCOPY 3/1/1979 DTaP/Tdap/Td series (1 - Tdap) 3/1/1982 Prostate-Specific Antigen 4/11/2018 Allergies as of 1/24/2018  Review Complete On: 1/24/2018 By: Jaleel Trujillo PA-C No Known Allergies Current Immunizations  Never Reviewed No immunizations on file. Not reviewed this visit You Were Diagnosed With   
  
 Codes Comments Chronic kidney disease (CKD), stage III (moderate)    -  Primary ICD-10-CM: N18.3 ICD-9-CM: 620. 3 Mixed hyperlipidemia     ICD-10-CM: E78.2 ICD-9-CM: 272.2 Gout of hand, unspecified cause, unspecified chronicity, unspecified laterality     ICD-10-CM: M10.9 ICD-9-CM: 274.9 Essential hypertension     ICD-10-CM: I10 
ICD-9-CM: 401.9 Chronic diastolic congestive heart failure (HCC)     ICD-10-CM: I50.32 
ICD-9-CM: 428.32, 428.0 Vitals BP Pulse Temp Resp Height(growth percentile) Weight(growth percentile) (!) 166/112 (BP 1 Location: Right arm, BP Patient Position: Sitting) 77 98.2 °F (36.8 °C) (Oral) 18 5' 9\" (1.753 m) 241 lb (109.3 kg) SpO2 BMI Smoking Status 96% 35.59 kg/m2 Never Smoker Vitals History BMI and BSA Data Body Mass Index Body Surface Area 35.59 kg/m 2 2.31 m 2 Preferred Pharmacy Pharmacy Name Phone [facility] [address] [phone] Your Updated Medication List  
  
   
 This list is accurate as of: 1/24/18  3:20 PM.  Always use your most recent med list.  
  
  
  
  
 allopurinol 100 mg tablet Commonly known as:  Muna Leona Take 1 Tab by mouth daily. Increase to 200 mg in one week. amLODIPine 10 mg tablet Commonly known as:  Aranza Lopez Take 1 Tab by mouth daily. aspirin 81 mg chewable tablet Take 1 Tab by mouth daily. carvedilol 25 mg tablet Commonly known as:  COREG  
TAKE 1 TABLET BY MOUTH TWICE DAILY WITH MEALS  
  
 colchicine 0.6 mg tablet 2 tablets at first sign of gout flare and then 1 tablet 1 hour later  
  
 furosemide 40 mg tablet Commonly known as:  LASIX Take 1 Tab by mouth daily. hydrALAZINE 25 mg tablet Commonly known as:  APRESOLINE Take 1.5 Tabs by mouth every eight (8) hours. HYDROcodone-acetaminophen 5-325 mg per tablet Commonly known as:  [address] Take 1 Tab by mouth two (2) times daily as needed for Pain. Max Daily Amount: 2 Tabs. pravastatin 20 mg tablet Commonly known as:  PRAVACHOL Take 1 Tab by mouth nightly. Prescriptions Sent to Pharmacy Refills  
 pravastatin (PRAVACHOL) 20 mg tablet 5 Sig: Take 1 Tab by mouth nightly. Class: Normal  
 Pharmacy: [address] Ph #: [phone] Route: Oral  
 allopurinol (ZYLOPRIM) 100 mg tablet 2 Sig: Take 1 Tab by mouth daily. Increase to 200 mg in one week. Class: Normal  
 Pharmacy: [address] Ph #: [phone] Route: Oral  
 amLODIPine (NORVASC) 10 mg tablet 2 Sig: Take 1 Tab by mouth daily. Class: Normal  
 Pharmacy: [address] Ph #: [phone] Route: Oral  
  
Follow-up Instructions Return in about 1 month (around 2/24/2018) for HTN. To-Do List   
 01/24/2018 Lab:  CBC WITH AUTOMATED DIFF   
  
 01/24/2018 Lab:  METABOLIC PANEL, COMPREHENSIVE Patient Instructions DASH Diet: Care Instructions Your Care Instructions The DASH diet is an eating plan that can help lower your blood pressure. DASH stands for Dietary Approaches to Stop Hypertension. Hypertension is high blood pressure. The DASH diet focuses on eating foods that are high in calcium, potassium, and magnesium. These nutrients can lower blood pressure. The foods that are highest in these nutrients are fruits, vegetables, low-fat dairy products, nuts, seeds, and legumes. But taking calcium, potassium, and magnesium supplements instead of eating foods that are high in those nutrients does not have the same effect. The DASH diet also includes whole grains, fish, and poultry. The DASH diet is one of several lifestyle changes your doctor may recommend to lower your high blood pressure. Your doctor may also want you to decrease the amount of sodium in your diet. Lowering sodium while following the DASH diet can lower blood pressure even further than just the DASH diet alone. Follow-up care is a key part of your treatment and safety. Be sure to make and go to all appointments, and call your doctor if you are having problems. It's also a good idea to know your test results and keep a list of the medicines you take. How can you care for yourself at home? Following the DASH diet · Eat 4 to 5 servings of fruit each day. A serving is 1 medium-sized piece of fruit, ½ cup chopped or canned fruit, 1/4 cup dried fruit, or 4 ounces (½ cup) of fruit juice. Choose fruit more often than fruit juice. · Eat 4 to 5 servings of vegetables each day. A serving is 1 cup of lettuce or raw leafy vegetables, ½ cup of chopped or cooked vegetables, or 4 ounces (½ cup) of vegetable juice. Choose vegetables more often than vegetable juice. · Get 2 to 3 servings of low-fat and fat-free dairy each day.  A serving is 8 ounces of milk, 1 cup of yogurt, or 1 ½ ounces of cheese. · Eat 6 to 8 servings of grains each day. A serving is 1 slice of bread, 1 ounce of dry cereal, or ½ cup of cooked rice, pasta, or cooked cereal. Try to choose whole-grain products as much as possible. · Limit lean meat, poultry, and fish to 2 servings each day. A serving is 3 ounces, about the size of a deck of cards. · Eat 4 to 5 servings of nuts, seeds, and legumes (cooked dried beans, lentils, and split peas) each week. A serving is 1/3 cup of nuts, 2 tablespoons of seeds, or ½ cup of cooked beans or peas. · Limit fats and oils to 2 to 3 servings each day. A serving is 1 teaspoon of vegetable oil or 2 tablespoons of salad dressing. · Limit sweets and added sugars to 5 servings or less a week. A serving is 1 tablespoon jelly or jam, ½ cup sorbet, or 1 cup of lemonade. · Eat less than 2,300 milligrams (mg) of sodium a day. If you limit your sodium to 1,500 mg a day, you can lower your blood pressure even more. Tips for success · Start small. Do not try to make dramatic changes to your diet all at once. You might feel that you are missing out on your favorite foods and then be more likely to not follow the plan. Make small changes, and stick with them. Once those changes become habit, add a few more changes. · Try some of the following: ¨ Make it a goal to eat a fruit or vegetable at every meal and at snacks. This will make it easy to get the recommended amount of fruits and vegetables each day. ¨ Try yogurt topped with fruit and nuts for a snack or healthy dessert. ¨ Add lettuce, tomato, cucumber, and onion to sandwiches. ¨ Combine a ready-made pizza crust with low-fat mozzarella cheese and lots of vegetable toppings. Try using tomatoes, squash, spinach, broccoli, carrots, cauliflower, and onions. ¨ Have a variety of cut-up vegetables with a low-fat dip as an appetizer instead of chips and dip. ¨ Sprinkle sunflower seeds or chopped almonds over salads. Or try adding chopped walnuts or almonds to cooked vegetables. ¨ Try some vegetarian meals using beans and peas. Add garbanzo or kidney beans to salads. Make burritos and tacos with mashed conte beans or black beans. Where can you learn more? Go to http://rachael-lauren.info/. Enter T258 in the search box to learn more about \"DASH Diet: Care Instructions. \" Current as of: September 21, 2016 Content Version: 11.4 © 6007-1771 [employer]. Care instructions adapted under license by [employer] (which disclaims liability or warranty for this information). If you have questions about a medical condition or this instruction, always ask your healthcare professional. [address] any warranty or liability for your use of this information. Low Sodium Diet (2,000 Milligram): Care Instructions Your Care Instructions Too much sodium causes your body to hold on to extra water. This can raise your blood pressure and force your heart and kidneys to work harder. In very serious cases, this could cause you to be put in the hospital. It might even be life-threatening. By limiting sodium, you will feel better and lower your risk of serious problems. The most common source of sodium is salt. People get most of the salt in their diet from canned, prepared, and packaged foods. Fast food and restaurant meals also are very high in sodium. Your doctor will probably limit your sodium to less than 2,000 milligrams (mg) a day. This limit counts all the sodium in prepared and packaged foods and any salt you add to your food. Follow-up care is a key part of your treatment and safety. Be sure to make and go to all appointments, and call your doctor if you are having problems. It's also a good idea to know your test results and keep a list of the medicines you take. How can you care for yourself at home? Read food labels · Read labels on cans and food packages. The labels tell you how much sodium is in each serving. Make sure that you look at the serving size. If you eat more than the serving size, you have eaten more sodium. · Food labels also tell you the Percent Daily Value for sodium. Choose products with low Percent Daily Values for sodium. · Be aware that sodium can come in forms other than salt, including monosodium glutamate (MSG), sodium citrate, and sodium bicarbonate (baking soda). MSG is often added to Asian food. When you eat out, you can sometimes ask for food without MSG or added salt. Buy low-sodium foods · Buy foods that are labeled \"unsalted\" (no salt added), \"sodium-free\" (less than 5 mg of sodium per serving), or \"low-sodium\" (less than 140 mg of sodium per serving). Foods labeled \"reduced-sodium\" and \"light sodium\" may still have too much sodium. Be sure to read the label to see how much sodium you are getting. · Buy fresh vegetables, or frozen vegetables without added sauces. Buy low-sodium versions of canned vegetables, soups, and other canned goods. Prepare low-sodium meals · Cut back on the amount of salt you use in cooking. This will help you adjust to the taste. Do not add salt after cooking. One teaspoon of salt has about 2,300 mg of sodium. · Take the salt shaker off the table. · Flavor your food with garlic, lemon juice, onion, vinegar, herbs, and spices. Do not use soy sauce, lite soy sauce, steak sauce, onion salt, garlic salt, celery salt, mustard, or ketchup on your food. · Use low-sodium salad dressings, sauces, and ketchup. Or make your own salad dressings and sauces without adding salt. · Use less salt (or none) when recipes call for it. You can often use half the salt a recipe calls for without losing flavor. Other foods such as rice, pasta, and grains do not need added salt. · Rinse canned vegetables, and cook them in fresh water.  This removes some-but not all-of the salt. · Avoid water that is naturally high in sodium or that has been treated with water softeners, which add sodium. Call your local water company to find out the sodium content of your water supply. If you buy bottled water, read the label and choose a sodium-free brand. Avoid high-sodium foods · Avoid eating: ¨ Smoked, cured, salted, and canned meat, fish, and poultry. ¨ Ham, oglesby, hot dogs, and luncheon meats. ¨ Regular, hard, and processed cheese and regular peanut butter. ¨ Crackers with salted tops, and other salted snack foods such as pretzels, chips, and salted popcorn. ¨ Frozen prepared meals, unless labeled low-sodium. ¨ Canned and dried soups, broths, and bouillon, unless labeled sodium-free or low-sodium. ¨ Canned vegetables, unless labeled sodium-free or low-sodium. ¨ Western Franci fries, pizza, tacos, and other fast foods. ¨ Pickles, olives, ketchup, and other condiments, especially soy sauce, unless labeled sodium-free or low-sodium. Where can you learn more? Go to http://rachael-lauren.info/. Enter M559 in the search box to learn more about \"Low Sodium Diet (2,000 Milligram): Care Instructions. \" Current as of: May 12, 2017 Content Version: 11.4 © 6254-9692 [employer]. Care instructions adapted under license by [employer] (which disclaims liability or warranty for this information). If you have questions about a medical condition or this instruction, always ask your healthcare professional. [place] [address] any warranty or liability for your use of this information. Introducing [facility]! Odin Cruz introduces Clar[employer] patient portal. Now you can access parts of your medical record, email your doctor's office, and request medication refills online. 1. In your internet browser, go to [email or web address] 2. Click on the First Time User? Click Here link in the Sign In box. You will see the New Member Sign Up page. 3. Enter your Brightkite Access Code exactly as it appears below. You will not need to use this code after youve completed the sign-up process. If you do not sign up before the expiration date, you must request a new code. · Brightkite Access Code: [email or web address] Expires: 3/22/2018  5:10 PM 
 
4. Enter the last four digits of your Social Security Number (xxxx) and Date of Birth (mm/dd/yyyy) as indicated and click Submit. You will be taken to the next sign-up page. 5. Create a Brightkite ID. This will be your Brightkite login ID and cannot be changed, so think of one that is secure and easy to remember. 6. Create a Brightkite password. You can change your password at any time. 7. Enter your Password Reset Question and Answer. This can be used at a later time if you forget your password. 8. Enter your e-mail address. You will receive e-mail notification when new information is available in [address] 9. Click Sign Up. You can now view and download portions of your medical record. 10. Click the Download Summary menu link to download a portable copy of your medical information. If you have questions, please visit the Frequently Asked Questions section of the Brightkite website. Remember, Brightkite is NOT to be used for urgent needs. For medical emergencies, dial 911. Now available from your iPhone and Android! Please provide this summary of care documentation to your next provider. Your primary care clinician is listed as Phys Other. If you have any questions after today's visit, please call [phone].

## 2018-01-24 NOTE — PATIENT INSTRUCTIONS
DASH Diet: Care Instructions  Your Care Instructions    The DASH diet is an eating plan that can help lower your blood pressure. DASH stands for Dietary Approaches to Stop Hypertension. Hypertension is high blood pressure. The DASH diet focuses on eating foods that are high in calcium, potassium, and magnesium. These nutrients can lower blood pressure. The foods that are highest in these nutrients are fruits, vegetables, low-fat dairy products, nuts, seeds, and legumes. But taking calcium, potassium, and magnesium supplements instead of eating foods that are high in those nutrients does not have the same effect. The DASH diet also includes whole grains, fish, and poultry. The DASH diet is one of several lifestyle changes your doctor may recommend to lower your high blood pressure. Your doctor may also want you to decrease the amount of sodium in your diet. Lowering sodium while following the DASH diet can lower blood pressure even further than just the DASH diet alone. Follow-up care is a key part of your treatment and safety. Be sure to make and go to all appointments, and call your doctor if you are having problems. It's also a good idea to know your test results and keep a list of the medicines you take. How can you care for yourself at home? Following the DASH diet  · Eat 4 to 5 servings of fruit each day. A serving is 1 medium-sized piece of fruit, ½ cup chopped or canned fruit, 1/4 cup dried fruit, or 4 ounces (½ cup) of fruit juice. Choose fruit more often than fruit juice. · Eat 4 to 5 servings of vegetables each day. A serving is 1 cup of lettuce or raw leafy vegetables, ½ cup of chopped or cooked vegetables, or 4 ounces (½ cup) of vegetable juice. Choose vegetables more often than vegetable juice. · Get 2 to 3 servings of low-fat and fat-free dairy each day. A serving is 8 ounces of milk, 1 cup of yogurt, or 1 ½ ounces of cheese. · Eat 6 to 8 servings of grains each day.  A serving is 1 slice of bread, 1 ounce of dry cereal, or ½ cup of cooked rice, pasta, or cooked cereal. Try to choose whole-grain products as much as possible. · Limit lean meat, poultry, and fish to 2 servings each day. A serving is 3 ounces, about the size of a deck of cards. · Eat 4 to 5 servings of nuts, seeds, and legumes (cooked dried beans, lentils, and split peas) each week. A serving is 1/3 cup of nuts, 2 tablespoons of seeds, or ½ cup of cooked beans or peas. · Limit fats and oils to 2 to 3 servings each day. A serving is 1 teaspoon of vegetable oil or 2 tablespoons of salad dressing. · Limit sweets and added sugars to 5 servings or less a week. A serving is 1 tablespoon jelly or jam, ½ cup sorbet, or 1 cup of lemonade. · Eat less than 2,300 milligrams (mg) of sodium a day. If you limit your sodium to 1,500 mg a day, you can lower your blood pressure even more. Tips for success  · Start small. Do not try to make dramatic changes to your diet all at once. You might feel that you are missing out on your favorite foods and then be more likely to not follow the plan. Make small changes, and stick with them. Once those changes become habit, add a few more changes. · Try some of the following:  ¨ Make it a goal to eat a fruit or vegetable at every meal and at snacks. This will make it easy to get the recommended amount of fruits and vegetables each day. ¨ Try yogurt topped with fruit and nuts for a snack or healthy dessert. ¨ Add lettuce, tomato, cucumber, and onion to sandwiches. ¨ Combine a ready-made pizza crust with low-fat mozzarella cheese and lots of vegetable toppings. Try using tomatoes, squash, spinach, broccoli, carrots, cauliflower, and onions. ¨ Have a variety of cut-up vegetables with a low-fat dip as an appetizer instead of chips and dip. ¨ Sprinkle sunflower seeds or chopped almonds over salads. Or try adding chopped walnuts or almonds to cooked vegetables.   ¨ Try some vegetarian meals using beans and peas. Add garbanzo or kidney beans to salads. Make burritos and tacos with mashed conte beans or black beans. Where can you learn more? Go to http://rachael-lauren.info/. Enter H045 in the search box to learn more about \"DASH Diet: Care Instructions. \"  Current as of: September 21, 2016  Content Version: 11.4  © 9672-0228 [employer]. Care instructions adapted under license by [employer] (which disclaims liability or warranty for this information). If you have questions about a medical condition or this instruction, always ask your healthcare professional. [address] any warranty or liability for your use of this information. Low Sodium Diet (2,000 Milligram): Care Instructions  Your Care Instructions    Too much sodium causes your body to hold on to extra water. This can raise your blood pressure and force your heart and kidneys to work harder. In very serious cases, this could cause you to be put in the hospital. It might even be life-threatening. By limiting sodium, you will feel better and lower your risk of serious problems. The most common source of sodium is salt. People get most of the salt in their diet from canned, prepared, and packaged foods. Fast food and restaurant meals also are very high in sodium. Your doctor will probably limit your sodium to less than 2,000 milligrams (mg) a day. This limit counts all the sodium in prepared and packaged foods and any salt you add to your food. Follow-up care is a key part of your treatment and safety. Be sure to make and go to all appointments, and call your doctor if you are having problems. It's also a good idea to know your test results and keep a list of the medicines you take. How can you care for yourself at home? Read food labels  · Read labels on cans and food packages. The labels tell you how much sodium is in each serving. Make sure that you look at the serving size.  If you eat more than the serving size, you have eaten more sodium. · Food labels also tell you the Percent Daily Value for sodium. Choose products with low Percent Daily Values for sodium. · Be aware that sodium can come in forms other than salt, including monosodium glutamate (MSG), sodium citrate, and sodium bicarbonate (baking soda). MSG is often added to Asian food. When you eat out, you can sometimes ask for food without MSG or added salt. Buy low-sodium foods  · Buy foods that are labeled \"unsalted\" (no salt added), \"sodium-free\" (less than 5 mg of sodium per serving), or \"low-sodium\" (less than 140 mg of sodium per serving). Foods labeled \"reduced-sodium\" and \"light sodium\" may still have too much sodium. Be sure to read the label to see how much sodium you are getting. · Buy fresh vegetables, or frozen vegetables without added sauces. Buy low-sodium versions of canned vegetables, soups, and other canned goods. Prepare low-sodium meals  · Cut back on the amount of salt you use in cooking. This will help you adjust to the taste. Do not add salt after cooking. One teaspoon of salt has about 2,300 mg of sodium. · Take the salt shaker off the table. · Flavor your food with garlic, lemon juice, onion, vinegar, herbs, and spices. Do not use soy sauce, lite soy sauce, steak sauce, onion salt, garlic salt, celery salt, mustard, or ketchup on your food. · Use low-sodium salad dressings, sauces, and ketchup. Or make your own salad dressings and sauces without adding salt. · Use less salt (or none) when recipes call for it. You can often use half the salt a recipe calls for without losing flavor. Other foods such as rice, pasta, and grains do not need added salt. · Rinse canned vegetables, and cook them in fresh water. This removes some-but not all-of the salt. · Avoid water that is naturally high in sodium or that has been treated with water softeners, which add sodium.  Call your local water company to find out the sodium content of your water supply. If you buy bottled water, read the label and choose a sodium-free brand. Avoid high-sodium foods  · Avoid eating:  ¨ Smoked, cured, salted, and canned meat, fish, and poultry. ¨ Ham, oglesby, hot dogs, and luncheon meats. ¨ Regular, hard, and processed cheese and regular peanut butter. ¨ Crackers with salted tops, and other salted snack foods such as pretzels, chips, and salted popcorn. ¨ Frozen prepared meals, unless labeled low-sodium. ¨ Canned and dried soups, broths, and bouillon, unless labeled sodium-free or low-sodium. ¨ Canned vegetables, unless labeled sodium-free or low-sodium. ¨ Western Franci fries, pizza, tacos, and other fast foods. ¨ Pickles, olives, ketchup, and other condiments, especially soy sauce, unless labeled sodium-free or low-sodium. Where can you learn more? Go to http://rachael-lauren.info/. Enter B538 in the search box to learn more about \"Low Sodium Diet (2,000 Milligram): Care Instructions. \"  Current as of: May 12, 2017  Content Version: 11.4  © 7448-8877 [employer]. Care instructions adapted under license by [employer] (which disclaims liability or warranty for this information). If you have questions about a medical condition or this instruction, always ask your healthcare professional. [place] [address] any warranty or liability for your use of this information.

## 2018-01-25 LAB
A-G RATIO,AGRAT: 1.3 RATIO (ref 1.1–2.6)
ABSOLUTE LYMPHOCYTE COUNT, 10803: 1.3 K/UL (ref 1–4.8)
ALBUMIN SERPL-MCNC: 3.8 G/DL (ref 3.5–5)
ALP SERPL-CCNC: 92 U/L (ref 25–115)
ALT SERPL-CCNC: 8 U/L (ref 5–40)
ANION GAP SERPL CALC-SCNC: 14 MMOL/L
AST SERPL W P-5'-P-CCNC: 17 U/L (ref 10–37)
BASOPHILS # BLD: 0 K/UL (ref 0–0.2)
BASOPHILS NFR BLD: 1 % (ref 0–2)
BILIRUB SERPL-MCNC: 0.1 MG/DL (ref 0.2–1.2)
BUN SERPL-MCNC: 48 MG/DL (ref 6–22)
CALCIUM SERPL-MCNC: 8.1 MG/DL (ref 8.4–10.4)
CHLORIDE SERPL-SCNC: 101 MMOL/L (ref 98–110)
CO2 SERPL-SCNC: 22 MMOL/L (ref 20–32)
CREAT SERPL-MCNC: 2.9 MG/DL (ref 0.5–1.2)
EOSINOPHIL # BLD: 0.3 K/UL (ref 0–0.5)
EOSINOPHIL NFR BLD: 7 % (ref 0–6)
ERYTHROCYTE [DISTWIDTH] IN BLOOD BY AUTOMATED COUNT: 14.2 % (ref 10–16)
GFRAA, 66117: 27.5
GFRNA, 66118: 22.7
GLOBULIN,GLOB: 2.9 G/DL (ref 2–4)
GLUCOSE SERPL-MCNC: 91 MG/DL (ref 70–99)
GRANULOCYTES,GRANS: 47 % (ref 40–75)
HCT VFR BLD AUTO: 35.3 % (ref 39.3–51.6)
HGB BLD-MCNC: 10.7 G/DL (ref 13.1–17.2)
LYMPHOCYTES, LYMLT: 32 % (ref 27–45)
MCH RBC QN AUTO: 27 PG (ref 26–34)
MCHC RBC AUTO-ENTMCNC: 30 G/DL (ref 32–36)
MCV RBC AUTO: 89 FL (ref 80–95)
MONOCYTES # BLD: 0.6 K/UL (ref 0.1–0.9)
MONOCYTES NFR BLD: 14 % (ref 3–9)
NEUTROPHILS # BLD AUTO: 1.8 K/UL (ref 1.8–7.7)
PLATELET # BLD AUTO: 246 K/UL (ref 140–440)
PMV BLD AUTO: 10 FL (ref 6–10.8)
POTASSIUM SERPL-SCNC: 4.9 MMOL/L (ref 3.5–5.5)
PROT SERPL-MCNC: 6.7 G/DL (ref 6.4–8.3)
RBC # BLD AUTO: 3.97 M/UL (ref 3.8–5.8)
SODIUM SERPL-SCNC: 137 MMOL/L (ref 133–145)
WBC # BLD AUTO: 4 K/UL (ref 4–11)

## 2018-01-26 ENCOUNTER — TELEPHONE (OUTPATIENT)
Dept: FAMILY MEDICINE CLINIC | Age: 58
End: 2018-01-26

## 2018-01-26 NOTE — TELEPHONE ENCOUNTER
----- Message from [address], PA-C sent at 1/26/2018 11:31 AM EST -----  Kidney function is worsening. It is very important that he keep his appointment with nephrology.

## 2018-01-27 DIAGNOSIS — I10 ESSENTIAL HYPERTENSION: ICD-10-CM

## 2018-01-30 RX ORDER — HYDRALAZINE HYDROCHLORIDE 25 MG/1
TABLET, FILM COATED ORAL
Qty: 135 TAB | Refills: 0 | Status: SHIPPED | OUTPATIENT
Start: 2018-01-30 | End: 2018-03-16 | Stop reason: SDUPTHER

## 2018-01-30 NOTE — TELEPHONE ENCOUNTER
1/30/2018  3:19 PM    Chief Complaint   Patient presents with    Medication Refill       Noted refill request for below medication. Previously, I was PCP for patient. [facility]. still and last seen by KENDRA Strong on 1/24.  Forwarded to this provider for further discretion on refill request.       Requested Prescriptions     Pending Prescriptions Disp Refills    hydrALAZINE (APRESOLINE) 25 mg tablet [Pharmacy Med Name: HYDRALAZINE 25MG TABLETS (ORANGE)] 135 Tab 0     Sig: TAKE 1 AND 1/2 TABLETS BY MOUTH EVERY 8 HOURS

## 2018-02-07 ENCOUNTER — HOSPITAL ENCOUNTER (OUTPATIENT)
Dept: ULTRASOUND IMAGING | Age: 58
Discharge: HOME | End: 2018-02-07
Attending: INTERNAL MEDICINE
Payer: COMMERCIAL

## 2018-02-07 DIAGNOSIS — N18.30 CHRONIC KIDNEY DISEASE, STAGE III (MODERATE) (HCC): ICD-10-CM

## 2018-02-07 PROCEDURE — 76770 US EXAM ABDO BACK WALL COMP: CPT

## 2018-02-16 DIAGNOSIS — I50.32 CHRONIC DIASTOLIC CONGESTIVE HEART FAILURE (HCC): ICD-10-CM

## 2018-02-16 DIAGNOSIS — N18.30 CHRONIC KIDNEY DISEASE (CKD), STAGE III (MODERATE) (HCC): ICD-10-CM

## 2018-02-16 DIAGNOSIS — I10 ESSENTIAL HYPERTENSION: ICD-10-CM

## 2018-02-28 RX ORDER — FUROSEMIDE 40 MG/1
TABLET ORAL
Qty: 30 TAB | Refills: 0 | Status: SHIPPED | OUTPATIENT
Start: 2018-02-28 | End: 2018-03-29 | Stop reason: SDUPTHER

## 2018-03-03 ENCOUNTER — HOSPITAL ENCOUNTER (EMERGENCY)
Age: 58
Discharge: HOME | End: 2018-03-03
Attending: EMERGENCY MEDICINE
Payer: COMMERCIAL

## 2018-03-03 VITALS
SYSTOLIC BLOOD PRESSURE: 150 MMHG | TEMPERATURE: 97.9 F | DIASTOLIC BLOOD PRESSURE: 88 MMHG | WEIGHT: 220 LBS | HEIGHT: 67 IN | BODY MASS INDEX: 34.53 KG/M2 | RESPIRATION RATE: 14 BRPM | OXYGEN SATURATION: 98 % | HEART RATE: 78 BPM

## 2018-03-03 DIAGNOSIS — M10.9 ACUTE GOUT OF RIGHT WRIST, UNSPECIFIED CAUSE: Primary | ICD-10-CM

## 2018-03-03 PROCEDURE — 99281 EMR DPT VST MAYX REQ PHY/QHP: CPT

## 2018-03-03 RX ORDER — PREDNISONE 10 MG/1
TABLET ORAL
Qty: 1 PACKAGE | Refills: 0 | Status: SHIPPED | OUTPATIENT
Start: 2018-03-03 | End: 2018-03-12 | Stop reason: ALTCHOICE

## 2018-03-03 RX ORDER — HYDROCODONE BITARTRATE AND ACETAMINOPHEN 5; 325 MG/1; MG/1
TABLET ORAL
Qty: 5 TAB | Refills: 0 | Status: SHIPPED | OUTPATIENT
Start: 2018-03-03 | End: 2018-03-12 | Stop reason: ALTCHOICE

## 2018-03-03 NOTE — DISCHARGE INSTRUCTIONS
Gout: Care Instructions  Your Care Instructions    Gout is a form of arthritis caused by a buildup of uric acid crystals in a joint. It causes sudden attacks of pain, swelling, redness, and stiffness, usually in one joint, especially the big toe. Gout usually comes on without a cause. But it can be brought on by drinking alcohol (especially beer) or eating seafood and red meat. Taking certain medicines, such as diuretics or aspirin, also can bring on an attack of gout. Taking your medicines as prescribed and following up with your doctor regularly can help you avoid gout attacks in the future. Follow-up care is a key part of your treatment and safety. Be sure to make and go to all appointments, and call your doctor if you are having problems. It's also a good idea to know your test results and keep a list of the medicines you take. How can you care for yourself at home? · If the joint is swollen, put ice or a cold pack on the area for 10 to 20 minutes at a time. Put a thin cloth between the ice and your skin. · Prop up the sore limb on a pillow when you ice it or anytime you sit or lie down during the next 3 days. Try to keep it above the level of your heart. This will help reduce swelling. · Rest sore joints. Avoid activities that put weight or strain on the joints for a few days. Take short rest breaks from your regular activities during the day. · Take your medicines exactly as prescribed. Call your doctor if you think you are having a problem with your medicine. · Take pain medicines exactly as directed. ¨ If the doctor gave you a prescription medicine for pain, take it as prescribed. ¨ If you are not taking a prescription pain medicine, ask your doctor if you can take an over-the-counter medicine. · Eat less seafood and red meat. · Check with your doctor before drinking alcohol. · Losing weight, if you are overweight, may help reduce attacks of gout. But do not go on a [employer]. \" Losing a lot of weight in a short amount of time can cause a gout attack. When should you call for help? Call your doctor now or seek immediate medical care if:  ? · You have a fever. ? · The joint is so painful you cannot use it. ? · You have sudden, unexplained swelling, redness, warmth, or severe pain in one or more joints. ? Watch closely for changes in your health, and be sure to contact your doctor if:  ? · You have joint pain. ? · Your symptoms get worse or are not improving after 2 or 3 days. Where can you learn more? Go to http://rachael-lauren.info/. Enter C108 in the search box to learn more about \"Gout: Care Instructions. \"  Current as of: October 31, 2016  Content Version: 11.4  © 5984-2735 [employer]. Care instructions adapted under license by [employer] (which disclaims liability or warranty for this information). If you have questions about a medical condition or this instruction, always ask your healthcare professional. [place] [address] any warranty or liability for your use of this information. [employer] Activation    Thank you for requesting access to [employer]. Please follow the instructions below to securely access and download your online medical record. [employer] allows you to send messages to your doctor, view your test results, renew your prescriptions, schedule appointments, and more. How Do I Sign Up? 1. In your internet browser, go to [email or web address]  2. Click on the First Time User? Click Here link in the Sign In box. You will be redirect to the New Member Sign Up page. 3. Enter your [employer] Access Code exactly as it appears below. You will not need to use this code after youve completed the sign-up process. If you do not sign up before the expiration date, you must request a new code.     [employer] Access Code: [email or web address]  Expires: 3/22/2018  5:10 PM (This is the date your [employer] access code will )    4. Enter the last four digits of your Social Security Number (xxxx) and Date of Birth (mm/dd/yyyy) as indicated and click Submit. You will be taken to the next sign-up page. 5. Create a [employer] ID. This will be your [employer] login ID and cannot be changed, so think of one that is secure and easy to remember. 6. Create a [employer] password. You can change your password at any time. 7. Enter your Password Reset Question and Answer. This can be used at a later time if you forget your password. 8. Enter your e-mail address. You will receive e-mail notification when new information is available in [address] 9. Click Sign Up. You can now view and download portions of your medical record. 10. Click the Download Summary menu link to download a portable copy of your medical information. Additional Information    If you have questions, please visit the Frequently Asked Questions section of the [employer] website at [email or web address] Remember, [employer] is NOT to be used for urgent needs. For medical emergencies, dial 911.

## 2018-03-03 NOTE — ED PROVIDER NOTES
EMERGENCY DEPARTMENT HISTORY AND PHYSICAL EXAM    9:54 AM      Date: 3/3/2018  Patient Name: Olivia Figueroa    History of Presenting Illness     Chief Complaint   Patient presents with    Gout         History Provided By: Patient    Chief Complaint: R wrist pain and hand swelling x 2 days  Duration:  as noted above   Timing:  Acute  Location: as noted above  Quality: Aching and Sharp  Severity: 7 out of 10  Modifying Factors: none   Associated Symptoms: denies any other associated signs or symptoms      Additional History (Context): Olivia Figueroa is a 63 y.o. male with hypertension and gout  who presents with sx as noted above. No fall or injury to the site,  no numbness tingling to extremity. Per patient \" I had some sea food yesterday and might have flared my symptoms\"\  Currently on allopurinol. Desiree Gardner PCP: Moise Hart MD    Current Outpatient Prescriptions   Medication Sig Dispense Refill    predniSONE (STERAPRED DS) 10 mg dose pack 6 day dose pack per package instructions 1 Package 0    HYDROcodone-acetaminophen (NORCO) 5-325 mg per tablet Take one tablet every 4-6 hours for pain 5 Tab 0    furosemide (LASIX) 40 mg tablet TAKE 1 TABLET BY MOUTH DAILY 30 Tab 0    hydrALAZINE (APRESOLINE) 25 mg tablet TAKE 1 AND 1/2 TABLETS BY MOUTH EVERY 8 HOURS 135 Tab 0    pravastatin (PRAVACHOL) 20 mg tablet Take 1 Tab by mouth nightly. 30 Tab 5    allopurinol (ZYLOPRIM) 100 mg tablet Take 1 Tab by mouth daily. Increase to 200 mg in one week. 60 Tab 2    amLODIPine (NORVASC) 10 mg tablet Take 1 Tab by mouth daily. 30 Tab 2    carvedilol (COREG) 25 mg tablet TAKE 1 TABLET BY MOUTH TWICE DAILY WITH MEALS 60 Tab 3    colchicine 0.6 mg tablet 2 tablets at first sign of gout flare and then 1 tablet 1 hour later 3 Tab 2    aspirin 81 mg chewable tablet Take 1 Tab by mouth daily.  30 Tab 11       Past History     Past Medical History:  Past Medical History:   Diagnosis Date    Arthritis of left knee 09/2017 moderate to severe, with effusion on xray    Chronic kidney disease     Diastolic CHF ([address]     Dilated cardiomyopathy ([address]     History of alcohol abuse     History of echocardiogram 02/24/2014    Mod-marked LVE. EF 15%. Severe, diffuse hypk. Mild LVH. Gr 1 DDfx. Mod LAE. Mild-mod FIDELIA. Mild AoRE. No significant change from echo of 10/23/09.  History of gout     History of myocardial perfusion scan 05/25/2006    No evidence of ischemia or scarring. Gross LVE. EF 28%. Severe global hypk. Neg EKG on submaximal EST. Ex time 8 min 25 sec.  HTN (hypertension)     Hypercholesterolemia     Hypertensive cardiovascular disease        Past Surgical History:  Past Surgical History:   Procedure Laterality Date    HX HERNIA REPAIR  04/2016       Family History:  Family History   Problem Relation Age of Onset    Cancer Father      Lung    Heart Failure Mother     Cancer Sister        Social History:  Social History   Substance Use Topics    Smoking status: Never Smoker    Smokeless tobacco: Never Used    Alcohol use No       Allergies:  No Known Allergies      Review of Systems       Review of Systems   Constitutional: Negative for fever. Musculoskeletal:        R hand swelling. Neurological: Negative for numbness. All other systems reviewed and are negative. Physical Exam     Visit Vitals    /88 (BP 1 Location: Left arm, BP Patient Position: Sitting)    Pulse 78    Temp 97.9 °F (36.6 °C)    Resp 14    Ht 5' 7\" (1.702 m)    Wt 99.8 kg (220 lb)    SpO2 98%    BMI 34.46 kg/m2         Physical Exam   Constitutional: He is oriented to person, place, and time. He appears well-developed and well-nourished. No distress. HENT:   Head: Normocephalic and atraumatic. Eyes: Conjunctivae and EOM are normal. Pupils are equal, round, and reactive to light. Neck: Normal range of motion. Cardiovascular: Normal rate, regular rhythm and normal heart sounds.     Pulmonary/Chest: Effort normal and breath sounds normal. No respiratory distress. He has no wheezes. He has no rales. He exhibits no tenderness. Abdominal: Soft. Bowel sounds are normal. He exhibits no distension. There is no tenderness. There is no rebound and no guarding. Musculoskeletal:        Arms:  Neurological: He is alert and oriented to person, place, and time. Skin: Skin is warm. He is not diaphoretic. Psychiatric: He has a normal mood and affect. His behavior is normal. Judgment and thought content normal.         Diagnostic Study Results     Labs -  No results found for this or any previous visit (from the past 12 hour(s)). Radiologic Studies -   No orders to display         Medical Decision Making   I am the first provider for this patient. I reviewed the vital signs, available nursing notes, past medical history, past surgical history, family history and social history. Vital Signs-Reviewed the patient's vital signs. Provider Notes (Medical Decision Making):   Given rx for pain and prednisone. All question answered, will discharge. Diagnosis     Clinical Impression:   1. Acute gout of right wrist, unspecified cause        Disposition: HOME    Follow-up Information     Follow up With Details Comments Contact Info    primary care provider  Follow up with your PCP     [facility] EMERGENCY DEPT  If symptoms worsen [address] [facility] [address]  [phone]           Patient's Medications   Start Taking    HYDROCODONE-ACETAMINOPHEN (NORCO) 5-325 MG PER TABLET    Take one tablet every 4-6 hours for pain    PREDNISONE (STERAPRED DS) 10 MG DOSE PACK    6 day dose pack per package instructions   Continue Taking    ALLOPURINOL (ZYLOPRIM) 100 MG TABLET    Take 1 Tab by mouth daily. Increase to 200 mg in one week. AMLODIPINE (NORVASC) 10 MG TABLET    Take 1 Tab by mouth daily. ASPIRIN 81 MG CHEWABLE TABLET    Take 1 Tab by mouth daily.     CARVEDILOL (COREG) 25 MG TABLET    TAKE 1 TABLET BY MOUTH TWICE DAILY WITH MEALS    COLCHICINE 0.6 MG TABLET    2 tablets at first sign of gout flare and then 1 tablet 1 hour later    FUROSEMIDE (LASIX) 40 MG TABLET    TAKE 1 TABLET BY MOUTH DAILY    HYDRALAZINE (APRESOLINE) 25 MG TABLET    TAKE 1 AND 1/2 TABLETS BY MOUTH EVERY 8 HOURS    PRAVASTATIN (PRAVACHOL) 20 MG TABLET    Take 1 Tab by mouth nightly. These Medications have changed    No medications on file   Stop Taking    HYDROCODONE-ACETAMINOPHEN (NORCO) 5-325 MG PER TABLET    Take 1 Tab by mouth two (2) times daily as needed for Pain. Max Daily Amount: 2 Tabs.

## 2018-03-03 NOTE — LETTER
[facility] 
[address] [facility] [address] 
[phone] Work/School Note Date: 3/3/2018 To Whom It May concern: 
 
Cheryle Harris was seen and treated today in the emergency room by the following provider(s): 
Attending Provider: Dipesh Marcial MD 
Physician Assistant: ELHAM Stephens. Cheryle Harris may return to work on 03/06/2018.  
 
Sincerely, 
 
 
 
 
Yasemin Thao RN

## 2018-03-12 ENCOUNTER — OFFICE VISIT (OUTPATIENT)
Dept: FAMILY MEDICINE CLINIC | Age: 58
End: 2018-03-12

## 2018-03-12 VITALS
BODY MASS INDEX: 37.67 KG/M2 | WEIGHT: 240 LBS | RESPIRATION RATE: 18 BRPM | HEART RATE: 70 BPM | OXYGEN SATURATION: 98 % | HEIGHT: 67 IN | SYSTOLIC BLOOD PRESSURE: 176 MMHG | DIASTOLIC BLOOD PRESSURE: 118 MMHG | TEMPERATURE: 98 F

## 2018-03-12 DIAGNOSIS — I50.32 CHRONIC DIASTOLIC CONGESTIVE HEART FAILURE (HCC): ICD-10-CM

## 2018-03-12 DIAGNOSIS — N18.30 CHRONIC KIDNEY DISEASE (CKD), STAGE III (MODERATE) (HCC): ICD-10-CM

## 2018-03-12 DIAGNOSIS — M10.9 GOUT OF HAND, UNSPECIFIED CAUSE, UNSPECIFIED CHRONICITY, UNSPECIFIED LATERALITY: ICD-10-CM

## 2018-03-12 DIAGNOSIS — I10 ESSENTIAL HYPERTENSION: ICD-10-CM

## 2018-03-12 RX ORDER — COLCHICINE 0.6 MG/1
TABLET ORAL
Qty: 3 TAB | Refills: 2 | Status: CANCELLED | OUTPATIENT
Start: 2018-03-12

## 2018-03-12 RX ORDER — LISINOPRIL 10 MG/1
10 TABLET ORAL DAILY
Qty: 30 TAB | Refills: 1 | Status: SHIPPED | OUTPATIENT
Start: 2018-03-12 | End: 2018-03-14 | Stop reason: CLARIF

## 2018-03-12 RX ORDER — ALLOPURINOL 100 MG/1
100 TABLET ORAL DAILY
Qty: 60 TAB | Refills: 2 | Status: CANCELLED | OUTPATIENT
Start: 2018-03-12

## 2018-03-12 RX ORDER — FUROSEMIDE 40 MG/1
TABLET ORAL
Qty: 30 TAB | Refills: 0 | Status: CANCELLED | OUTPATIENT
Start: 2018-03-12

## 2018-03-12 NOTE — MR AVS SNAPSHOT
[address] [address] 
[phone] Patient: Jordyn Martines MRN: [id number] Visit Information Date & Time Provider Department Dept. Phone Encounter #  
 3/12/2018  5:00 PM [address] [phone] [id number] Upcoming Health Maintenance Date Due COLONOSCOPY 3/1/1979 DTaP/Tdap/Td series (1 - Tdap) 3/1/1982 Prostate-Specific Antigen 4/11/2018 Allergies as of 3/12/2018  Review Complete On: 3/12/2018 By: 2200 [facility], [facility] No Known Allergies Current Immunizations  Never Reviewed No immunizations on file. Not reviewed this visit You Were Diagnosed With   
  
 Codes Comments Gout of hand, unspecified cause, unspecified chronicity, unspecified laterality     ICD-10-CM: M10.9 ICD-9-CM: 274.9 Essential hypertension     ICD-10-CM: I10 
ICD-9-CM: 401.9 Chronic kidney disease (CKD), stage III (moderate)     ICD-10-CM: N18.3 ICD-9-CM: 342. 3 Chronic diastolic congestive heart failure (HCC)     ICD-10-CM: I50.32 
ICD-9-CM: 428.32, 428.0 Vitals BP Pulse Temp Resp Height(growth percentile) Weight(growth percentile) (!) 176/118 (BP 1 Location: Left arm, BP Patient Position: Sitting) 70 98 °F (36.7 °C) (Oral) 18 5' 7\" (1.702 m) 240 lb (108.9 kg) SpO2 BMI Smoking Status 98% 37.59 kg/m2 Never Smoker Vitals History BMI and BSA Data Body Mass Index Body Surface Area  
 37.59 kg/m 2 2.27 m 2 Preferred Pharmacy Pharmacy Name Phone [facility] [address] [place] [address] [phone] Your Updated Medication List  
  
   
This list is accurate as of 3/12/18  5:33 PM.  Always use your most recent med list.  
  
  
  
  
 allopurinol 100 mg tablet Commonly known as:  Anamaria Archibald  
 Take 1 Tab by mouth daily. Increase to 200 mg in one week. amLODIPine 10 mg tablet Commonly known as:  Cookie Boozer Take 1 Tab by mouth daily. aspirin 81 mg chewable tablet Take 1 Tab by mouth daily. carvedilol 25 mg tablet Commonly known as:  COREG  
TAKE 1 TABLET BY MOUTH TWICE DAILY WITH MEALS  
  
 colchicine 0.6 mg tablet 2 tablets at first sign of gout flare and then 1 tablet 1 hour later  
  
 furosemide 40 mg tablet Commonly known as:  LASIX TAKE 1 TABLET BY MOUTH DAILY  
  
 hydrALAZINE 25 mg tablet Commonly known as:  APRESOLINE  
TAKE 1 AND 1/2 TABLETS BY MOUTH EVERY 8 HOURS  
  
 lisinopril 10 mg tablet Commonly known as:  Leander Kalata Take 1 Tab by mouth daily. Indications: hypertension  
  
 pravastatin 20 mg tablet Commonly known as:  PRAVACHOL Take 1 Tab by mouth nightly. Prescriptions Sent to Pharmacy Refills  
 lisinopril (PRINIVIL, ZESTRIL) 10 mg tablet 1 Sig: Take 1 Tab by mouth daily. Indications: hypertension Class: Normal  
 Pharmacy: [address] [place] [address] [facility] [address] [place] [address] #: [phone] Route: Oral  
  
Patient Instructions Purine-Restricted Diet: Care Instructions Your Care Instructions Purines are substances that are found in some foods. Your body turns purines into uric acid. High levels of uric acid can cause gout, which is a form of arthritis that causes pain and inflammation in joints. You may be able to help control the amount of uric acid in your body by limiting high-purine foods in your diet. Follow-up care is a key part of your treatment and safety. Be sure to make and go to all appointments, and call your doctor if you are having problems. It's also a good idea to know your test results and keep a list of the medicines you take. How can you care for yourself at home?  
· Plan your meals and snacks around foods that are low in purines and are safe for you to eat. These foods include: ¨ Green vegetables and tomatoes. ¨ Fruits. ¨ Whole-grain breads, rice, and cereals. ¨ Eggs, peanut butter, and nuts. ¨ Low-fat milk, cheese, and other milk products. ¨ Popcorn. ¨ Gelatin desserts, chocolate, cocoa, and cakes and sweets, in small amounts. · You can eat certain foods that are medium-high in purines, but eat them only once in a while. These foods include: ¨ Legumes, such as dried beans and dried peas. You can have 1 cup cooked legumes each day. ¨ Asparagus, cauliflower, spinach, mushrooms, and green peas. ¨ Fish and seafood (other than very high-purine seafood). ¨ Oatmeal, wheat bran, and wheat germ. · Limit very high-purine foods, including: ¨ Organ meats, such as liver, kidneys, sweetbreads, and brains. ¨ Meats, including oglesby, beef, pork, and lamb. ¨ Game meats and any other meats in large amounts. ¨ Anchovies, sardines, herring, mackerel, and scallops. ¨ Gravy. ¨ Beer. Where can you learn more? Go to http://rachael-lauren.info/. Enter F448 in the search box to learn more about \"Purine-Restricted Diet: Care Instructions. \" Current as of: May 12, 2017 Content Version: 11.4 © 2667-8498 [employer]. Care instructions adapted under license by [employer] (which disclaims liability or warranty for this information). If you have questions about a medical condition or this instruction, always ask your healthcare professional. [place] [address] any warranty or liability for your use of this information. Low Sodium Diet (2,000 Milligram): Care Instructions Your Care Instructions Too much sodium causes your body to hold on to extra water. This can raise your blood pressure and force your heart and kidneys to work harder. In very serious cases, this could cause you to be put in the hospital. It might even be life-threatening.  By limiting sodium, you will feel better and lower your risk of serious problems. The most common source of sodium is salt. People get most of the salt in their diet from canned, prepared, and packaged foods. Fast food and restaurant meals also are very high in sodium. Your doctor will probably limit your sodium to less than 2,000 milligrams (mg) a day. This limit counts all the sodium in prepared and packaged foods and any salt you add to your food. Follow-up care is a key part of your treatment and safety. Be sure to make and go to all appointments, and call your doctor if you are having problems. It's also a good idea to know your test results and keep a list of the medicines you take. How can you care for yourself at home? Read food labels · Read labels on cans and food packages. The labels tell you how much sodium is in each serving. Make sure that you look at the serving size. If you eat more than the serving size, you have eaten more sodium. · Food labels also tell you the Percent Daily Value for sodium. Choose products with low Percent Daily Values for sodium. · Be aware that sodium can come in forms other than salt, including monosodium glutamate (MSG), sodium citrate, and sodium bicarbonate (baking soda). MSG is often added to Asian food. When you eat out, you can sometimes ask for food without MSG or added salt. Buy low-sodium foods · Buy foods that are labeled \"unsalted\" (no salt added), \"sodium-free\" (less than 5 mg of sodium per serving), or \"low-sodium\" (less than 140 mg of sodium per serving). Foods labeled \"reduced-sodium\" and \"light sodium\" may still have too much sodium. Be sure to read the label to see how much sodium you are getting. · Buy fresh vegetables, or frozen vegetables without added sauces. Buy low-sodium versions of canned vegetables, soups, and other canned goods. Prepare low-sodium meals · Cut back on the amount of salt you use in cooking.  This will help you adjust to the taste. Do not add salt after cooking. One teaspoon of salt has about 2,300 mg of sodium. · Take the salt shaker off the table. · Flavor your food with garlic, lemon juice, onion, vinegar, herbs, and spices. Do not use soy sauce, lite soy sauce, steak sauce, onion salt, garlic salt, celery salt, mustard, or ketchup on your food. · Use low-sodium salad dressings, sauces, and ketchup. Or make your own salad dressings and sauces without adding salt. · Use less salt (or none) when recipes call for it. You can often use half the salt a recipe calls for without losing flavor. Other foods such as rice, pasta, and grains do not need added salt. · Rinse canned vegetables, and cook them in fresh water. This removes some-but not all-of the salt. · Avoid water that is naturally high in sodium or that has been treated with water softeners, which add sodium. Call your local water company to find out the sodium content of your water supply. If you buy bottled water, read the label and choose a sodium-free brand. Avoid high-sodium foods · Avoid eating: ¨ Smoked, cured, salted, and canned meat, fish, and poultry. ¨ Ham, oglesby, hot dogs, and luncheon meats. ¨ Regular, hard, and processed cheese and regular peanut butter. ¨ Crackers with salted tops, and other salted snack foods such as pretzels, chips, and salted popcorn. ¨ Frozen prepared meals, unless labeled low-sodium. ¨ Canned and dried soups, broths, and bouillon, unless labeled sodium-free or low-sodium. ¨ Canned vegetables, unless labeled sodium-free or low-sodium. ¨ Western Franci fries, pizza, tacos, and other fast foods. ¨ Pickles, olives, ketchup, and other condiments, especially soy sauce, unless labeled sodium-free or low-sodium. Where can you learn more? Go to http://rachael-lauren.info/. Enter T762 in the search box to learn more about \"Low Sodium Diet (2,000 Milligram): Care Instructions. \" Current as of: May 12, 2017 Content Version: 11.4 © 7429-8009 Healthwise, [employer]. Care instructions adapted under license by [employer] (which disclaims liability or warranty for this information). If you have questions about a medical condition or this instruction, always ask your healthcare professional. [address] any warranty or liability for your use of this information. Introducing [facility]! [employer] introduces Gradwell patient portal. Now you can access parts of your medical record, email your doctor's office, and request medication refills online. 1. In your internet browser, go to [email or web address] 2. Click on the First Time User? Click Here link in the Sign In box. You will see the New Member Sign Up page. 3. Enter your Gradwell Access Code exactly as it appears below. You will not need to use this code after youve completed the sign-up process. If you do not sign up before the expiration date, you must request a new code. · Gradwell Access Code: [email or web address] Expires: 3/22/2018  6:10 PM 
 
4. Enter the last four digits of your Social Security Number (xxxx) and Date of Birth (mm/dd/yyyy) as indicated and click Submit. You will be taken to the next sign-up page. 5. Create a Gradwell ID. This will be your Gradwell login ID and cannot be changed, so think of one that is secure and easy to remember. 6. Create a Gradwell password. You can change your password at any time. 7. Enter your Password Reset Question and Answer. This can be used at a later time if you forget your password. 8. Enter your e-mail address. You will receive e-mail notification when new information is available in [address] 9. Click Sign Up. You can now view and download portions of your medical record. 10. Click the Download Summary menu link to download a portable copy of your medical information. If you have questions, please visit the Frequently Asked Questions section of the [employer] website. Remember, [employer] is NOT to be used for urgent needs. For medical emergencies, dial 911. Now available from your iPhone and Android! Please provide this summary of care documentation to your next provider. Your primary care clinician is listed as Phys Other. If you have any questions after today's visit, please call [phone].

## 2018-03-12 NOTE — PATIENT INSTRUCTIONS
Purine-Restricted Diet: Care Instructions  Your Care Instructions    Purines are substances that are found in some foods. Your body turns purines into uric acid. High levels of uric acid can cause gout, which is a form of arthritis that causes pain and inflammation in joints. You may be able to help control the amount of uric acid in your body by limiting high-purine foods in your diet. Follow-up care is a key part of your treatment and safety. Be sure to make and go to all appointments, and call your doctor if you are having problems. It's also a good idea to know your test results and keep a list of the medicines you take. How can you care for yourself at home? · Plan your meals and snacks around foods that are low in purines and are safe for you to eat. These foods include:  ¨ Green vegetables and tomatoes. ¨ Fruits. ¨ Whole-grain breads, rice, and cereals. ¨ Eggs, peanut butter, and nuts. ¨ Low-fat milk, cheese, and other milk products. ¨ Popcorn. ¨ Gelatin desserts, chocolate, cocoa, and cakes and sweets, in small amounts. · You can eat certain foods that are medium-high in purines, but eat them only once in a while. These foods include:  ¨ Legumes, such as dried beans and dried peas. You can have 1 cup cooked legumes each day. ¨ Asparagus, cauliflower, spinach, mushrooms, and green peas. ¨ Fish and seafood (other than very high-purine seafood). ¨ Oatmeal, wheat bran, and wheat germ. · Limit very high-purine foods, including:  ¨ Organ meats, such as liver, kidneys, sweetbreads, and brains. ¨ Meats, including oglesby, beef, pork, and lamb. ¨ Game meats and any other meats in large amounts. ¨ Anchovies, sardines, herring, mackerel, and scallops. ¨ Gravy. ¨ Beer. Where can you learn more? Go to http://rachael-lauren.info/. Enter F448 in the search box to learn more about \"Purine-Restricted Diet: Care Instructions. \"  Current as of:  May 12, 2017  Content Version: 11.4  © 6519-4078 Healthwise, [employer]. Care instructions adapted under license by [employer] (which disclaims liability or warranty for this information). If you have questions about a medical condition or this instruction, always ask your healthcare professional. [address] any warranty or liability for your use of this information. Low Sodium Diet (2,000 Milligram): Care Instructions  Your Care Instructions    Too much sodium causes your body to hold on to extra water. This can raise your blood pressure and force your heart and kidneys to work harder. In very serious cases, this could cause you to be put in the hospital. It might even be life-threatening. By limiting sodium, you will feel better and lower your risk of serious problems. The most common source of sodium is salt. People get most of the salt in their diet from canned, prepared, and packaged foods. Fast food and restaurant meals also are very high in sodium. Your doctor will probably limit your sodium to less than 2,000 milligrams (mg) a day. This limit counts all the sodium in prepared and packaged foods and any salt you add to your food. Follow-up care is a key part of your treatment and safety. Be sure to make and go to all appointments, and call your doctor if you are having problems. It's also a good idea to know your test results and keep a list of the medicines you take. How can you care for yourself at home? Read food labels  · Read labels on cans and food packages. The labels tell you how much sodium is in each serving. Make sure that you look at the serving size. If you eat more than the serving size, you have eaten more sodium. · Food labels also tell you the Percent Daily Value for sodium. Choose products with low Percent Daily Values for sodium. · Be aware that sodium can come in forms other than salt, including monosodium glutamate (MSG), sodium citrate, and sodium bicarbonate (baking soda).  MSG is often added to Asian food. When you eat out, you can sometimes ask for food without MSG or added salt. Buy low-sodium foods  · Buy foods that are labeled \"unsalted\" (no salt added), \"sodium-free\" (less than 5 mg of sodium per serving), or \"low-sodium\" (less than 140 mg of sodium per serving). Foods labeled \"reduced-sodium\" and \"light sodium\" may still have too much sodium. Be sure to read the label to see how much sodium you are getting. · Buy fresh vegetables, or frozen vegetables without added sauces. Buy low-sodium versions of canned vegetables, soups, and other canned goods. Prepare low-sodium meals  · Cut back on the amount of salt you use in cooking. This will help you adjust to the taste. Do not add salt after cooking. One teaspoon of salt has about 2,300 mg of sodium. · Take the salt shaker off the table. · Flavor your food with garlic, lemon juice, onion, vinegar, herbs, and spices. Do not use soy sauce, lite soy sauce, steak sauce, onion salt, garlic salt, celery salt, mustard, or ketchup on your food. · Use low-sodium salad dressings, sauces, and ketchup. Or make your own salad dressings and sauces without adding salt. · Use less salt (or none) when recipes call for it. You can often use half the salt a recipe calls for without losing flavor. Other foods such as rice, pasta, and grains do not need added salt. · Rinse canned vegetables, and cook them in fresh water. This removes some-but not all-of the salt. · Avoid water that is naturally high in sodium or that has been treated with water softeners, which add sodium. Call your local water company to find out the sodium content of your water supply. If you buy bottled water, read the label and choose a sodium-free brand. Avoid high-sodium foods  · Avoid eating:  ¨ Smoked, cured, salted, and canned meat, fish, and poultry. ¨ Ham, oglesby, hot dogs, and luncheon meats. ¨ Regular, hard, and processed cheese and regular peanut butter.   ¨ Crackers with salted tops, and other salted snack foods such as pretzels, chips, and salted popcorn. ¨ Frozen prepared meals, unless labeled low-sodium. ¨ Canned and dried soups, broths, and bouillon, unless labeled sodium-free or low-sodium. ¨ Canned vegetables, unless labeled sodium-free or low-sodium. ¨ Western Franci fries, pizza, tacos, and other fast foods. ¨ Pickles, olives, ketchup, and other condiments, especially soy sauce, unless labeled sodium-free or low-sodium. Where can you learn more? Go to http://rachael-lauren.info/. Enter L780 in the search box to learn more about \"Low Sodium Diet (2,000 Milligram): Care Instructions. \"  Current as of: May 12, 2017  Content Version: 11.4  © 0886-7116 Healthwise, [employer]. Care instructions adapted under license by [employer] (which disclaims liability or warranty for this information). If you have questions about a medical condition or this instruction, always ask your healthcare professional. [place] [address] any warranty or liability for your use of this information.

## 2018-03-12 NOTE — PROGRESS NOTES
Chief Complaint   Patient presents with    Hypertension     follow up    Medication Refill     1. Have you been to the ER, urgent care clinic since your last visit? Hospitalized since your last visit? Yes When: 3 mar 2018 Where: [facility] Reason for visit: Gout flare up in right hand    2. Have you seen or consulted any other health care providers outside of the [address] [place] [address] since your last visit? Include any pap smears or colon screening.  No

## 2018-03-14 ENCOUNTER — TELEPHONE (OUTPATIENT)
Dept: FAMILY MEDICINE CLINIC | Age: 58
End: 2018-03-14

## 2018-03-14 NOTE — TELEPHONE ENCOUNTER
Pt called stating we need to schedule an appt with Dr. Lorri Reed [phone]. He called them and they said for us to call and schedule.

## 2018-03-14 NOTE — TELEPHONE ENCOUNTER
I called Cardiology and they stated \" that pt had no showed to several appointments so that is why our office needed to call and schedule pt . And if pt continue not to no show then he may not be scheduled again. So pt has an appointment scheduled for 04/06/2018 at 11:15 am with Dr. Noelle Matson in the [place] office. I contacted pt and left him a voicemail of this.

## 2018-03-14 NOTE — PROGRESS NOTES
Today's Date:  3/14/2018   Patient:  Jessica Dowling  Patient :  1960    Subjective:     Chief Complaint   Patient presents with    Hypertension     follow up    Medication Refill       Jessica Dowling is a 63 y.o. male who presents for follow up     BP today is not at goal today >130/80.     Patient report compliance with medication and denies side effects. Daily exercise and diet are as follows: patient reports walking. Patient is on a statin denies myalgia    Was recently seen at the ED for Gout attack. States that he ate what he was not supposed to eat but it was his birthday. Denies headache, chest pain, lightheadedness, vision change. Has no complaints today. Current Outpatient Meds and Allergies     Current Outpatient Prescriptions on File Prior to Visit   Medication Sig Dispense Refill    furosemide (LASIX) 40 mg tablet TAKE 1 TABLET BY MOUTH DAILY 30 Tab 0    hydrALAZINE (APRESOLINE) 25 mg tablet TAKE 1 AND 1/2 TABLETS BY MOUTH EVERY 8 HOURS 135 Tab 0    pravastatin (PRAVACHOL) 20 mg tablet Take 1 Tab by mouth nightly. 30 Tab 5    allopurinol (ZYLOPRIM) 100 mg tablet Take 1 Tab by mouth daily. Increase to 200 mg in one week. 60 Tab 2    amLODIPine (NORVASC) 10 mg tablet Take 1 Tab by mouth daily. 30 Tab 2    carvedilol (COREG) 25 mg tablet TAKE 1 TABLET BY MOUTH TWICE DAILY WITH MEALS 60 Tab 3    colchicine 0.6 mg tablet 2 tablets at first sign of gout flare and then 1 tablet 1 hour later 3 Tab 2    aspirin 81 mg chewable tablet Take 1 Tab by mouth daily. 30 Tab 11     No current facility-administered medications on file prior to visit. These medications have been reviewed and reconciled with the patient during today's visit. No Known Allergies      ROS:     CONST:   Denies fatigue, weight change, appetite change   NEURO:   Denies headaches, vision changes, dizziness, loss of consciousness  CV:      Edema of the lower extremities;  Denies chest pain, palpitations, orthopnea, PND  PULM:  Denies SOB, wheezing, cough, hemoptysis  GI:             Denies nausea, vomiting, abdominal pain, greasy stools, blood in stool, diarrhea, constipation  :       Denies dysuria, hematuria, change in urine  MS:      Denies muscle/joint pain, joint swelling  SKIN:        Denies rashes, skin changes  PSYCH: No agitation, confusion/disorientation, suicidal or homicidal ideation. Objective:     VS:    Visit Vitals    BP (!) 176/118 (BP 1 Location: Left arm, BP Patient Position: Sitting)    Pulse 70    Temp 98 °F (36.7 °C) (Oral)    Resp 18    Ht 5' 7\" (1.702 m)    Wt 240 lb (108.9 kg)    SpO2 98%  Comment: room air    BMI 37.59 kg/m2       General:   Well-nourished, well-groomed, pleasant, alert, in no acute distress. Head:  Normocephalic, atraumatic, MMM,   Cardiovasc:   Regular rate and rhythm, no murmurs, no rubs, no gallops,   Pulmonary:   Clear breath sounds bilaterally, good air movement, no wheezing, no rales, no rhonchi, normal respiratory effort  Abdomen:   Abdomen soft, nontender, nondistended, NABS,  Extremities:   Left lower leg edema, no tenderness with palpation of calves, warm and well-perfused  Neuro:   Alert, conversant, appropriate, following commands, no focal deficits. Psychiatric: Oriented to time, place and person. Normal mood, no agitation or anxiety. Normal affect. Pleasant and cooperative.     Pertinent diagnostic procedures include:  Hospital Outpatient Visit on 01/24/2018   Component Date Value Ref Range Status    [facility] SPE[facility]IM[facility] COL 01/24/2018 Specimens collected/sent to [facility]    Final   Orders Only on 01/24/2018   Component Date Value Ref Range Status    WBC 01/24/2018 4.0  4.0 - 11.0 K/uL Final    RBC 01/24/2018 3.97  3.80 - 5.80 M/uL Final    HGB 01/24/2018 10.7* 13.1 - 17.2 g/dL Final    HCT 01/24/2018 35.3* 39.3 - 51.6 % Final    MCV 01/24/2018 89  80 - 95 fL Final    MCH 01/24/2018 27  26 - 34 pg Final    MCHC 01/24/2018 30* 32 - 36 g/dL Final    RDW 01/24/2018 14.2  10.0 - 16.0 % Final    PLATELET 74/97/2512 846  140 - 440 K/uL Final    MPV 01/24/2018 10.0  6.0 - 10.8 fL Final    NEUTROPHILS 01/24/2018 47  40 - 75 % Final    Lymphocytes 01/24/2018 32  27 - 45 % Final    MONOCYTES 01/24/2018 14* 3 - 9 % Final    EOSINOPHILS 01/24/2018 7* 0 - 6 % Final    BASOPHILS 01/24/2018 1  0 - 2 % Final    ABS. NEUTROPHILS 01/24/2018 1.8  1.8 - 7.7 K/uL Final    ABSOLUTE LYMPHOCYTE COUNT 01/24/2018 1.3  1.0 - 4.8 K/uL Final    ABS. MONOCYTES 01/24/2018 0.6  0.1 - 0.9 K/uL Final    ABS. EOSINOPHILS 01/24/2018 0.3  0.0 - 0.5 K/uL Final    ABS. BASOPHILS 01/24/2018 0.0  0.0 - 0.2 K/uL Final    Glucose 01/24/2018 91  70 - 99 mg/dL Final    BUN 01/24/2018 48* 6 - 22 mg/dL Final    Creatinine 01/24/2018 2.9* 0.5 - 1.2 mg/dL Final    Sodium 01/24/2018 137  133 - 145 mmol/L Final    Potassium 01/24/2018 4.9  3.5 - 5.5 mmol/L Final    Chloride 01/24/2018 101  98 - 110 mmol/L Final    CO2 01/24/2018 22  20 - 32 mmol/L Final    AST (SGOT) 01/24/2018 17  10 - 37 U/L Final    ALT (SGPT) 01/24/2018 8  5 - 40 U/L Final    Alk. phosphatase 01/24/2018 92  25 - 115 U/L Final    Bilirubin, total 01/24/2018 0.1* 0.2 - 1.2 mg/dL Final    Calcium 01/24/2018 8.1* 8.4 - 10.4 mg/dL Final    Protein, total 01/24/2018 6.7  6.4 - 8.3 g/dL Final    Albumin 01/24/2018 3.8  3.5 - 5.0 g/dL Final    A-G Ratio 01/24/2018 1.3  1.1 - 2.6 ratio Final    Globulin 01/24/2018 2.9  2.0 - 4.0 g/dL Final    Anion gap 01/24/2018 14.0  mmol/L Final    Comment: Test includes Albumin, Alkaline Phosphatase, ALT, AST, BUN, Calcium, CO2,  Chloride, Creatinine, Glucose, Potassium, Sodium, Total Bilirubin and Total  Protein. Estimated GFR results are reported in mL/min/1.73 sq.m. by the MDRD equation. This eGFR is validated for stable chronic renal failure patients. This   equation  is unreliable in acute illness or patients with normal renal function.       GFRAA 01/24/2018 27.5* >60.0 Final    GFRNA 01/24/2018 22.7* >60.0 Final       Assessment/Plan:       1. Gout of hand, unspecified cause, unspecified chronicity, unspecified laterality  Start taking Allopurinol. 2. Essential hypertension  Follow up with Cardiology    3. Chronic kidney disease (CKD), stage III (moderate)  Encouraged him to make an appointment to see Dr. Bala Lopez, Nephrology to who he was referred months ago. 4. Chronic diastolic congestive heart failure ([address]  Follow up with Cardiology. I have discussed the diagnosis with the patient and the intended plan as seen in the above orders. The patient has received an after-visit summary along with patient information handout. I have discussed medication side effects and warnings with the patient as well. Pt verbalized understanding. Follow-up Disposition:  Return in about 3 months (around 6/12/2018), or if symptoms worsen or fail to improve. STEFFANIE Chopra  3/14/2018, 10:29 AM    He was prescribed Lisinopril at this visit and discontinued today. Called the pharmacy but he has already pick medication up. Attempted to call him but was unable to reach him. Spoke with [employer], Medical assistant,  and asked her to call him and tell him to stop medication; also ask him to call the Nephrology and Cardiology offices and make appointments to be seen.

## 2018-03-14 NOTE — TELEPHONE ENCOUNTER
I called and spoke with pt per NP Mala Perez to inform pt to stop taking the medication Lisinopril due to pt Kidney issues. Pt was at work on his lunch break at the time I called and he states \" that he has so many medications let him get a pen and paper to wilder done the name of the medication\" I spelled  out the name of the medication  Lisinopril for pt as he wrote it down. I also discussed with pt that pt needs to contact Nephrology and Cardiology to schedule a follow up . Py states \" that he has an appointment on 03/28/2018 with Nephrology but doesn't have the number for Cardiology. \" I gave Mr. Taiwo Kelly the contact number for Cardiology Dr. Bessie Motley which he wrote down and he states \" that he will give them a call to scheduled. I also made sure that I told pt to just throw away pt medication Lisinopril so that he will not get it confused and continue to take this medication. Pt verbalized understanding.

## 2018-03-29 DIAGNOSIS — I10 ESSENTIAL HYPERTENSION: ICD-10-CM

## 2018-03-29 DIAGNOSIS — I50.32 CHRONIC DIASTOLIC CONGESTIVE HEART FAILURE (HCC): ICD-10-CM

## 2018-03-29 DIAGNOSIS — N18.30 CHRONIC KIDNEY DISEASE (CKD), STAGE III (MODERATE) (HCC): ICD-10-CM

## 2018-03-29 RX ORDER — FUROSEMIDE 40 MG/1
TABLET ORAL
Qty: 30 TAB | Refills: 1 | Status: SHIPPED | OUTPATIENT
Start: 2018-03-29 | End: 2018-05-27 | Stop reason: SDUPTHER

## 2018-03-29 NOTE — TELEPHONE ENCOUNTER
Last seen by you at [facility], [facility].

## 2018-03-29 NOTE — TELEPHONE ENCOUNTER
Requested Prescriptions     Pending Prescriptions Disp Refills    furosemide (LASIX) 40 mg tablet 30 Tab 0

## 2018-04-06 ENCOUNTER — OFFICE VISIT (OUTPATIENT)
Dept: CARDIOLOGY CLINIC | Age: 58
End: 2018-04-06

## 2018-04-06 VITALS
DIASTOLIC BLOOD PRESSURE: 106 MMHG | BODY MASS INDEX: 40.81 KG/M2 | SYSTOLIC BLOOD PRESSURE: 176 MMHG | WEIGHT: 260 LBS | HEIGHT: 67 IN | HEART RATE: 66 BPM

## 2018-04-06 DIAGNOSIS — N18.30 CHRONIC KIDNEY DISEASE (CKD), STAGE III (MODERATE) (HCC): ICD-10-CM

## 2018-04-06 DIAGNOSIS — I10 ESSENTIAL HYPERTENSION: ICD-10-CM

## 2018-04-06 DIAGNOSIS — I50.22 SYSTOLIC CHF, CHRONIC (HCC): Primary | ICD-10-CM

## 2018-04-06 DIAGNOSIS — I42.8 NONISCHEMIC CARDIOMYOPATHY (HCC): ICD-10-CM

## 2018-04-06 DIAGNOSIS — E78.5 HYPERLIPIDEMIA, UNSPECIFIED HYPERLIPIDEMIA TYPE: ICD-10-CM

## 2018-04-06 RX ORDER — HYDRALAZINE HYDROCHLORIDE 100 MG/1
100 TABLET, FILM COATED ORAL 3 TIMES DAILY
Qty: 90 TAB | Refills: 6 | Status: SHIPPED | OUTPATIENT
Start: 2018-04-06 | End: 2018-08-31

## 2018-04-06 NOTE — PROGRESS NOTES
1. Have you been to the ER, urgent care clinic since your last visit? Hospitalized since your last visit?    no    2. Have you seen or consulted any other health care providers outside of the [address] [place] [address] since your last visit? Include any pap smears or colon screening. Yes, pcp    3. Since your last visit, have you had any of the following symptoms? Swelling in legs    4. Have you had any blood work, X-rays or cardiac testing?      no            5.  Where do you normally have your labs drawn?   [facility]    6. Do you need any refills today?    no

## 2018-04-06 NOTE — MR AVS SNAPSHOT
Julian Chavez 
 
 
 [address], [address] [facility] [address] 
[phone] Patient: Dalton Rawls MRN: [id number] Visit Information Date & Time Provider Department Dept. Phone Encounter #  
 2018 11:15 AM Devan Jules MD [facility] [address] [place] [address] [id number] Follow-up Instructions Return in about 1 month (around 2018). Your Appointments 2018  4:00 PM  
FOLLOW UP EXAM with [address] [place] [address], [facility] ([address]) Appt Note: 4  wk f/u HTN  
 [address] [facility] [address]  
[facility] [address]  
  
    
 2018  1:30 PM  
PROCEDURE with [place] ECHO [facility] [place] ([address]) Appt Note: echo feder[facility] [address] [facility] [address]  
[address], [address] [facility] [address] 2018  3:00 PM  
Office Visit with Devan Jules MD  
[facility]) Appt Note: 1 month post Echo  
 [address] [facility] [address]  
[address] [place] [address] Upcoming Health Maintenance Date Due COLONOSCOPY 3/1/1979 DTaP/Tdap/Td series (1 - Tdap) 3/1/1982 Prostate-Specific Antigen 2018 Allergies as of 2018  Review Complete On: 2018 By: Jame Waters LPN No Known Allergies Current Immunizations  Never Reviewed No immunizations on file. Not reviewed this visit You Were Diagnosed With   
  
 Codes Comments Hyperlipidemia, unspecified hyperlipidemia type    -  Primary ICD-10-CM: E78.5 ICD-9-CM: 272.4 Essential hypertension     ICD-10-CM: I10 
ICD-9-CM: 401.9 Systolic CHF, chronic (HCC)     ICD-10-CM: I50.22 ICD-9-CM: 428.22, 428.0 Vitals BP Pulse Height(growth percentile) Weight(growth percentile) BMI Smoking Status (!) 176/106 66 5' 7\" (1.702 m) 260 lb (117.9 kg) 40.72 kg/m2 Never Smoker Vitals History BMI and BSA Data Body Mass Index Body Surface Area 40.72 kg/m 2 2.36 m 2 Preferred Pharmacy Pharmacy Name Phone [facility] [address] [place] [address] [phone] Your Updated Medication List  
  
   
This list is accurate as of 4/6/18 12:40 PM.  Always use your most recent med list.  
  
  
  
  
 allopurinol 100 mg tablet Commonly known as:  Bonna Fátima Take 1 Tab by mouth daily. Increase to 200 mg in one week. amLODIPine 10 mg tablet Commonly known as:  Maiden Bars Take 1 Tab by mouth daily. aspirin 81 mg chewable tablet Take 1 Tab by mouth daily. carvedilol 25 mg tablet Commonly known as:  COREG  
TAKE 1 TABLET BY MOUTH TWICE DAILY WITH MEALS  
  
 colchicine 0.6 mg tablet 2 tablets at first sign of gout flare and then 1 tablet 1 hour later  
  
 furosemide 40 mg tablet Commonly known as:  LASIX TAKE 1 TABLET BY MOUTH DAILY  
  
 hydrALAZINE 100 mg tablet Commonly known as:  APRESOLINE Take 1 Tab by mouth three (3) times daily. pravastatin 20 mg tablet Commonly known as:  PRAVACHOL Take 1 Tab by mouth nightly. Prescriptions Sent to Pharmacy Refills  
 hydrALAZINE (APRESOLINE) 100 mg tablet 6 Sig: Take 1 Tab by mouth three (3) times daily. Class: Normal  
 Pharmacy: [facility] [address] [place] [address] Ph #: [phone] Route: Oral  
  
We Performed the Following AMB POC EKG ROUTINE W/ 12 LEADS, INTER & REP [32335 CPT(R)] Follow-up Instructions Return in about 1 month (around 5/6/2018). To-Do List   
 05/06/2018 Cardiac Services:  2D ECHO COMPLETE ADULT (TTE) Introducing [facility] & HEALTH SERVICES! [employer] introduces Captual patient portal. Now you can access parts of your medical record, email your doctor's office, and request medication refills online. 1. In your internet browser, go to [email or web address] 2. Click on the First Time User? Click Here link in the Sign In box. You will see the New Member Sign Up page. 3. Enter your Captual Access Code exactly as it appears below. You will not need to use this code after youve completed the sign-up process. If you do not sign up before the expiration date, you must request a new code. · Captual Access Code: [email or web address] Expires: 6/16/2018  5:24 AM 
 
4. Enter the last four digits of your Social Security Number (xxxx) and Date of Birth (mm/dd/yyyy) as indicated and click Submit. You will be taken to the next sign-up page. 5. Create a Captual ID. This will be your Captual login ID and cannot be changed, so think of one that is secure and easy to remember. 6. Create a Captual password. You can change your password at any time. 7. Enter your Password Reset Question and Answer. This can be used at a later time if you forget your password. 8. Enter your e-mail address. You will receive e-mail notification when new information is available in [address] 9. Click Sign Up. You can now view and download portions of your medical record. 10. Click the Download Summary menu link to download a portable copy of your medical information. If you have questions, please visit the Frequently Asked Questions section of the Captual website. Remember, Captual is NOT to be used for urgent needs. For medical emergencies, dial 911. Now available from your iPhone and Android! Please provide this summary of care documentation to your next provider. Your primary care clinician is listed as Phys Other. If you have any questions after today's visit, please call [phone].

## 2018-04-09 ENCOUNTER — OFFICE VISIT (OUTPATIENT)
Dept: FAMILY MEDICINE CLINIC | Age: 58
End: 2018-04-09

## 2018-04-09 VITALS
OXYGEN SATURATION: 99 % | DIASTOLIC BLOOD PRESSURE: 83 MMHG | WEIGHT: 255 LBS | RESPIRATION RATE: 18 BRPM | HEIGHT: 67 IN | SYSTOLIC BLOOD PRESSURE: 144 MMHG | HEART RATE: 79 BPM | BODY MASS INDEX: 40.02 KG/M2 | TEMPERATURE: 97.9 F

## 2018-04-09 DIAGNOSIS — M10.9 GOUT OF HAND, UNSPECIFIED CAUSE, UNSPECIFIED CHRONICITY, UNSPECIFIED LATERALITY: ICD-10-CM

## 2018-04-09 PROBLEM — E66.01 SEVERE OBESITY (BMI 35.0-39.9) WITH COMORBIDITY (HCC): Status: ACTIVE | Noted: 2018-04-09

## 2018-04-09 RX ORDER — HYDRALAZINE HYDROCHLORIDE 25 MG/1
25 TABLET, FILM COATED ORAL 3 TIMES DAILY
COMMUNITY
End: 2018-06-05 | Stop reason: DRUGHIGH

## 2018-04-09 NOTE — MR AVS SNAPSHOT
[address] [facility] [address] 
[phone] Patient: Yeny Alegria MRN: [id number] QDO:[birth year] Visit Information Date & Time Provider Department Dept. Phone Encounter #  
 4/9/2018  4:00 PM Siddharth BurchSTEFFANIE [facility] [phone] [id number] Follow-up Instructions Return in about 3 months (around 7/9/2018). Your Appointments 5/9/2018  1:30 PM  
PROCEDURE with [place] ECHO [facility] [place] [facility] Appt Note: echo federico [address] [place] [address]  
[address], [address] [facility] [address] [place] [address] 5/17/2018  3:00 PM  
Office Visit with Rad Vyas MD  
[facility] Appt Note: 1 month post Echo  
 [address], [address] [place] [address]  
[address], [address] Upcoming Health Maintenance Date Due COLONOSCOPY 3/1/1979 DTaP/Tdap/Td series (1 - Tdap) 3/1/1982 Prostate-Specific Antigen 4/11/2018 Allergies as of 4/9/2018  Review Complete On: 4/9/2018 By: Georgina Neal LPN No Known Allergies Current Immunizations  Never Reviewed No immunizations on file. Not reviewed this visit You Were Diagnosed With   
  
 Codes Comments Gout of hand, unspecified cause, unspecified chronicity, unspecified laterality     ICD-10-CM: M10.9 ICD-9-CM: 274.9 Vitals BP Pulse Temp Resp Height(growth percentile) Weight(growth percentile) 144/83 (BP 1 Location: Left arm, BP Patient Position: Sitting) 79 97.9 °F (36.6 °C) (Oral) 18 5' 7\" (1.702 m) 255 lb (115.7 kg) SpO2 BMI Smoking Status 99% 39.94 kg/m2 Never Smoker Vitals History BMI and BSA Data Body Mass Index Body Surface Area  
 39.94 kg/m 2 2.34 m 2 Preferred Pharmacy Pharmacy Name Phone [facility] [address] [facility] [address] [facility] [phone] Your Updated Medication List  
  
   
This list is accurate as of 4/9/18  4:35 PM.  Always use your most recent med list.  
  
  
  
  
 allopurinol 100 mg tablet Commonly known as:  Theoplis Spry Take 1 Tab by mouth daily. Increase to 200 mg in one week. amLODIPine 10 mg tablet Commonly known as:  Marina Raddle Take 1 Tab by mouth daily. aspirin 81 mg chewable tablet Take 1 Tab by mouth daily. carvedilol 25 mg tablet Commonly known as:  COREG  
TAKE 1 TABLET BY MOUTH TWICE DAILY WITH MEALS  
  
 colchicine 0.6 mg tablet 2 tablets at first sign of gout flare and then 1 tablet 1 hour later  
  
 furosemide 40 mg tablet Commonly known as:  LASIX TAKE 1 TABLET BY MOUTH DAILY * hydrALAZINE 25 mg tablet Commonly known as:  APRESOLINE Take 25 mg by mouth three (3) times daily. * hydrALAZINE 100 mg tablet Commonly known as:  APRESOLINE Take 1 Tab by mouth three (3) times daily. pravastatin 20 mg tablet Commonly known as:  PRAVACHOL Take 1 Tab by mouth nightly. * Notice: This list has 2 medication(s) that are the same as other medications prescribed for you. Read the directions carefully, and ask your doctor or other care provider to review them with you. Follow-up Instructions Return in about 3 months (around 7/9/2018). Patient Instructions Purine-Restricted Diet: Care Instructions Your Care Instructions Purines are substances that are found in some foods. Your body turns purines into uric acid. High levels of uric acid can cause gout, which is a form of arthritis that causes pain and inflammation in joints. You may be able to help control the amount of uric acid in your body by limiting high-purine foods in your diet. Follow-up care is a key part of your treatment and safety.  Be sure to make and go to all appointments, and call your doctor if you are having problems. It's also a good idea to know your test results and keep a list of the medicines you take. How can you care for yourself at home? · Plan your meals and snacks around foods that are low in purines and are safe for you to eat. These foods include: ¨ Green vegetables and tomatoes. ¨ Fruits. ¨ Whole-grain breads, rice, and cereals. ¨ Eggs, peanut butter, and nuts. ¨ Low-fat milk, cheese, and other milk products. ¨ Popcorn. ¨ Gelatin desserts, chocolate, cocoa, and cakes and sweets, in small amounts. · You can eat certain foods that are medium-high in purines, but eat them only once in a while. These foods include: ¨ Legumes, such as dried beans and dried peas. You can have 1 cup cooked legumes each day. ¨ Asparagus, cauliflower, spinach, mushrooms, and green peas. ¨ Fish and seafood (other than very high-purine seafood). ¨ Oatmeal, wheat bran, and wheat germ. · Limit very high-purine foods, including: ¨ Organ meats, such as liver, kidneys, sweetbreads, and brains. ¨ Meats, including oglesby, beef, pork, and lamb. ¨ Game meats and any other meats in large amounts. ¨ Anchovies, sardines, herring, mackerel, and scallops. ¨ Gravy. ¨ Beer. Where can you learn more? Go to http://rachael-lauren.info/. Enter F448 in the search box to learn more about \"Purine-Restricted Diet: Care Instructions. \" Current as of: May 12, 2017 Content Version: 11.4 © 4176-5471 [employer]. Care instructions adapted under license by [employer] (which disclaims liability or warranty for this information). If you have questions about a medical condition or this instruction, always ask your healthcare professional. Fabio Jacinto any warranty or liability for your use of this information. Introducing [facility] & HEALTH SERVICES! [employer] introduces Smallaa patient portal. Now you can access parts of your medical record, email your doctor's office, and request medication refills online. 1. In your internet browser, go to [email or web address]/[employer] 2. Click on the First Time User? Click Here link in the Sign In box. You will see the New Member Sign Up page. 3. Enter your Smallaa Access Code exactly as it appears below. You will not need to use this code after youve completed the sign-up process. If you do not sign up before the expiration date, you must request a new code. · Smallaa Access Code: [email or web address] Expires: 6/16/2018  5:24 AM 
 
4. Enter the last four digits of your Social Security Number (xxxx) and Date of Birth (mm/dd/yyyy) as indicated and click Submit. You will be taken to the next sign-up page. 5. Create a Smallaa ID. This will be your Smallaa login ID and cannot be changed, so think of one that is secure and easy to remember. 6. Create a Smallaa password. You can change your password at any time. 7. Enter your Password Reset Question and Answer. This can be used at a later time if you forget your password. 8. Enter your e-mail address. You will receive e-mail notification when new information is available in [address] 9. Click Sign Up. You can now view and download portions of your medical record. 10. Click the Download Summary menu link to download a portable copy of your medical information. If you have questions, please visit the Frequently Asked Questions section of the Smallaa website. Remember, Smallaa is NOT to be used for urgent needs. For medical emergencies, dial 911. Now available from your iPhone and Android! Please provide this summary of care documentation to your next provider. Your primary care clinician is listed as Phys Other. If you have any questions after today's visit, please call [phone].

## 2018-04-09 NOTE — PROGRESS NOTES
Today's Date:  2018   Patient:  Ольга Katz  Patient :  1960    Subjective:     Chief Complaint   Patient presents with    Hypertension       Ольга Katz is a 63 y.o. male who presents for follow up for Hypertension and Gout. BP today is not at goal today >130/80 but is better than previously. States that dose of medication was increased by Cardiology.      Patient report compliance with medication and denies side effects. Brought in medication bag. Daily exercise and diet are as follows: patient reports walking. Has not had gout flare ups. States that he is trying to eat a low punine diet. Current Outpatient Meds and Allergies     Current Outpatient Prescriptions on File Prior to Visit   Medication Sig Dispense Refill    hydrALAZINE (APRESOLINE) 100 mg tablet Take 1 Tab by mouth three (3) times daily. 90 Tab 6    furosemide (LASIX) 40 mg tablet TAKE 1 TABLET BY MOUTH DAILY 30 Tab 1    pravastatin (PRAVACHOL) 20 mg tablet Take 1 Tab by mouth nightly. 30 Tab 5    allopurinol (ZYLOPRIM) 100 mg tablet Take 1 Tab by mouth daily. Increase to 200 mg in one week. 60 Tab 2    amLODIPine (NORVASC) 10 mg tablet Take 1 Tab by mouth daily. 30 Tab 2    carvedilol (COREG) 25 mg tablet TAKE 1 TABLET BY MOUTH TWICE DAILY WITH MEALS 60 Tab 3    colchicine 0.6 mg tablet 2 tablets at first sign of gout flare and then 1 tablet 1 hour later 3 Tab 2    aspirin 81 mg chewable tablet Take 1 Tab by mouth daily. 30 Tab 11     No current facility-administered medications on file prior to visit. These medications have been reviewed and reconciled with the patient during today's visit.       No Known Allergies      ROS:     CONST:   Denies fatigue, weight change, appetite change   NEURO:   Denies headaches, vision changes, dizziness, loss of consciousness  CV:      Denies chest pain, palpitations, orthopnea, PND  PULM:  Denies SOB, wheezing, cough, hemoptysis  MS:      Denies muscle/joint pain, joint swelling    Objective:     VS:    Visit Vitals    /83 (BP 1 Location: Left arm, BP Patient Position: Sitting)    Pulse 79    Temp 97.9 °F (36.6 °C) (Oral)    Resp 18    Ht 5' 7\" (1.702 m)    Wt 255 lb (115.7 kg)    SpO2 99%    BMI 39.94 kg/m2       General:   Well-nourished, well-groomed, pleasant, alert, in no acute distress. Cardiovasc:   Regular rate and rhythm, no murmurs, no rubs, no gallops,   Pulmonary:   Clear breath sounds bilaterally, good air movement, no wheezing, no rales, no rhonchi, normal respiratory effort  Extremities:   No edema, no tenderness with palpation of calves, warm and well-perfused  Neuro:   Alert, conversant, appropriate, following commands, no focal deficits. Pertinent diagnostic procedures include:  Hospital Outpatient Visit on 01/24/2018   Component Date Value Ref Range Status    [facility] 01/24/2018 Specimens collected/sent to [facility]    Final   Orders Only on 01/24/2018   Component Date Value Ref Range Status    WBC 01/24/2018 4.0  4.0 - 11.0 K/uL Final    RBC 01/24/2018 3.97  3.80 - 5.80 M/uL Final    HGB 01/24/2018 10.7* 13.1 - 17.2 g/dL Final    HCT 01/24/2018 35.3* 39.3 - 51.6 % Final    MCV 01/24/2018 89  80 - 95 fL Final    MCH 01/24/2018 27  26 - 34 pg Final    MCHC 01/24/2018 30* 32 - 36 g/dL Final    RDW 01/24/2018 14.2  10.0 - 16.0 % Final    PLATELET 83/14/5946 491  140 - 440 K/uL Final    MPV 01/24/2018 10.0  6.0 - 10.8 fL Final    NEUTROPHILS 01/24/2018 47  40 - 75 % Final    Lymphocytes 01/24/2018 32  27 - 45 % Final    MONOCYTES 01/24/2018 14* 3 - 9 % Final    EOSINOPHILS 01/24/2018 7* 0 - 6 % Final    BASOPHILS 01/24/2018 1  0 - 2 % Final    ABS. NEUTROPHILS 01/24/2018 1.8  1.8 - 7.7 K/uL Final    ABSOLUTE LYMPHOCYTE COUNT 01/24/2018 1.3  1.0 - 4.8 K/uL Final    ABS. MONOCYTES 01/24/2018 0.6  0.1 - 0.9 K/uL Final    ABS. EOSINOPHILS 01/24/2018 0.3  0.0 - 0.5 K/uL Final    ABS.  BASOPHILS 01/24/2018 0.0  0.0 - 0.2 K/uL Final    Glucose 01/24/2018 91  70 - 99 mg/dL Final    BUN 01/24/2018 48* 6 - 22 mg/dL Final    Creatinine 01/24/2018 2.9* 0.5 - 1.2 mg/dL Final    Sodium 01/24/2018 137  133 - 145 mmol/L Final    Potassium 01/24/2018 4.9  3.5 - 5.5 mmol/L Final    Chloride 01/24/2018 101  98 - 110 mmol/L Final    CO2 01/24/2018 22  20 - 32 mmol/L Final    AST (SGOT) 01/24/2018 17  10 - 37 U/L Final    ALT (SGPT) 01/24/2018 8  5 - 40 U/L Final    Alk. phosphatase 01/24/2018 92  25 - 115 U/L Final    Bilirubin, total 01/24/2018 0.1* 0.2 - 1.2 mg/dL Final    Calcium 01/24/2018 8.1* 8.4 - 10.4 mg/dL Final    Protein, total 01/24/2018 6.7  6.4 - 8.3 g/dL Final    Albumin 01/24/2018 3.8  3.5 - 5.0 g/dL Final    A-G Ratio 01/24/2018 1.3  1.1 - 2.6 ratio Final    Globulin 01/24/2018 2.9  2.0 - 4.0 g/dL Final    Anion gap 01/24/2018 14.0  mmol/L Final    Comment: Test includes Albumin, Alkaline Phosphatase, ALT, AST, BUN, Calcium, CO2,  Chloride, Creatinine, Glucose, Potassium, Sodium, Total Bilirubin and Total  Protein. Estimated GFR results are reported in mL/min/1.73 sq.m. by the MDRD equation. This eGFR is validated for stable chronic renal failure patients. This   equation  is unreliable in acute illness or patients with normal renal function.  GFRAA 01/24/2018 27.5* >60.0 Final    GFRNA 01/24/2018 22.7* >60.0 Final       Assessment/Plan:       1. Gout of hand, unspecified cause, unspecified chronicity, unspecified laterality  Continue current treatment     I have discussed the diagnosis with the patient and the intended plan as seen in the above orders. The patient has received an after-visit summary along with patient information handout. I have discussed medication side effects and warnings with the patient as well. Pt verbalized understanding. Follow-up Disposition:  Return in about 3 months (around 7/9/2018).   STEFFANIE Meraz  4/9/2018, 4:35 PM

## 2018-04-09 NOTE — PROGRESS NOTES
Cicero Landau is a 63 y.o. male here today for a follow up on his HTN. He needs a refill on his Allopurinol 100 mg. Learning assessment previously completed. 1. Have you been to the ER, urgent care clinic or hospitalized since your last visit? no     2. Have you seen or consulted any other health care providers outside of the [address] [place] [address] since your last visit (Include any pap smears or colon screening)? Cardio last week     Do you have an Advanced Directive? no    Would you like information on Advanced Directives?  no

## 2018-04-09 NOTE — PATIENT INSTRUCTIONS
Purine-Restricted Diet: Care Instructions  Your Care Instructions    Purines are substances that are found in some foods. Your body turns purines into uric acid. High levels of uric acid can cause gout, which is a form of arthritis that causes pain and inflammation in joints. You may be able to help control the amount of uric acid in your body by limiting high-purine foods in your diet. Follow-up care is a key part of your treatment and safety. Be sure to make and go to all appointments, and call your doctor if you are having problems. It's also a good idea to know your test results and keep a list of the medicines you take. How can you care for yourself at home? · Plan your meals and snacks around foods that are low in purines and are safe for you to eat. These foods include:  ¨ Green vegetables and tomatoes. ¨ Fruits. ¨ Whole-grain breads, rice, and cereals. ¨ Eggs, peanut butter, and nuts. ¨ Low-fat milk, cheese, and other milk products. ¨ Popcorn. ¨ Gelatin desserts, chocolate, cocoa, and cakes and sweets, in small amounts. · You can eat certain foods that are medium-high in purines, but eat them only once in a while. These foods include:  ¨ Legumes, such as dried beans and dried peas. You can have 1 cup cooked legumes each day. ¨ Asparagus, cauliflower, spinach, mushrooms, and green peas. ¨ Fish and seafood (other than very high-purine seafood). ¨ Oatmeal, wheat bran, and wheat germ. · Limit very high-purine foods, including:  ¨ Organ meats, such as liver, kidneys, sweetbreads, and brains. ¨ Meats, including [place], beef, pork, and lamb. ¨ Game meats and any other meats in large amounts. ¨ Anchovies, sardines, herring, mackerel, and scallops. ¨ Gravy. ¨ Beer. Where can you learn more? Go to http://rachael-lauren.info/. Enter F448 in the search box to learn more about \"Purine-Restricted Diet: Care Instructions. \"  Current as of:  May 12, 2017  Content Version: 11.4  © 4520-1761 [employer]. Care instructions adapted under license by TM (which disclaims liability or warranty for this information). If you have questions about a medical condition or this instruction, always ask your healthcare professional. [place] [address] any warranty or liability for your use of this information.

## 2018-04-12 NOTE — PROGRESS NOTES
HISTORY OF PRESENT ILLNESS  Ani Colbert is a 63 y.o. male. CHF   The history is provided by the patient. This is a chronic problem. The problem occurs every several days. The problem has been gradually improving. Pertinent negatives include no chest pain, no abdominal pain, no headaches and no shortness of breath. Hypertension   The history is provided by the patient. This is a chronic problem. The problem occurs daily. The problem has been gradually worsening. Pertinent negatives include no chest pain, no abdominal pain, no headaches and no shortness of breath. Review of Systems   Constitutional: Positive for malaise/fatigue. Negative for chills, diaphoresis, fever and weight loss. HENT: Negative for congestion, ear discharge, ear pain, hearing loss, nosebleeds and tinnitus. Eyes: Negative for blurred vision. Respiratory: Negative for cough, hemoptysis, sputum production, shortness of breath, wheezing and stridor. Cardiovascular: Negative for chest pain, palpitations, orthopnea, claudication, leg swelling and PND. Gastrointestinal: Negative for abdominal pain, heartburn, nausea and vomiting. Musculoskeletal: Negative for myalgias and neck pain. Skin: Negative for itching and rash. Neurological: Negative for dizziness, tingling, tremors, focal weakness, loss of consciousness, weakness and headaches. Psychiatric/Behavioral: Negative for depression and suicidal ideas. Family History   Problem Relation Age of Onset    Cancer Father      Lung    Heart Failure Mother     Cancer Sister        Past Medical History:   Diagnosis Date    Arthritis of left knee 09/2017    moderate to severe, with effusion on xray    Chronic kidney disease     Diastolic CHF ([address]     Dilated cardiomyopathy ([address]     History of alcohol abuse     History of echocardiogram 02/24/2014    Mod-marked LVE. EF 15%. Severe, diffuse hypk. Mild LVH. Gr 1 DDfx. Mod LAE. Mild-mod FIDELIA. Mild AoRE.   No significant change from echo of 10/23/09.  History of gout     History of myocardial perfusion scan 05/25/2006    No evidence of ischemia or scarring. Gross LVE. EF 28%. Severe global hypk. Neg EKG on submaximal EST. Ex time 8 min 25 sec.  HTN (hypertension)     Hypercholesterolemia     Hypertensive cardiovascular disease        Past Surgical History:   Procedure Laterality Date    HX HERNIA REPAIR  04/2016       Social History   Substance Use Topics    Smoking status: Never Smoker    Smokeless tobacco: Never Used    Alcohol use No      Comment: stop 3 years ago in feb       No Known Allergies    Outpatient Prescriptions Marked as Taking for the 4/6/18 encounter (Office Visit) with Vimal Rothman MD   Medication Sig Dispense Refill    hydrALAZINE (APRESOLINE) 100 mg tablet Take 1 Tab by mouth three (3) times daily. 90 Tab 6    furosemide (LASIX) 40 mg tablet TAKE 1 TABLET BY MOUTH DAILY 30 Tab 1    pravastatin (PRAVACHOL) 20 mg tablet Take 1 Tab by mouth nightly. 30 Tab 5    allopurinol (ZYLOPRIM) 100 mg tablet Take 1 Tab by mouth daily. Increase to 200 mg in one week. 60 Tab 2    amLODIPine (NORVASC) 10 mg tablet Take 1 Tab by mouth daily. 30 Tab 2    carvedilol (COREG) 25 mg tablet TAKE 1 TABLET BY MOUTH TWICE DAILY WITH MEALS 60 Tab 3    colchicine 0.6 mg tablet 2 tablets at first sign of gout flare and then 1 tablet 1 hour later 3 Tab 2    aspirin 81 mg chewable tablet Take 1 Tab by mouth daily. 30 Tab 11        Visit Vitals    BP (!) 176/106    Pulse 66    Ht 5' 7\" (1.702 m)    Wt 117.9 kg (260 lb)    BMI 40.72 kg/m2     Physical Exam   Constitutional: He is oriented to person, place, and time. He appears well-developed and well-nourished. No distress. HENT:   Head: Atraumatic. Mouth/Throat: No oropharyngeal exudate. Eyes: Conjunctivae are normal. No scleral icterus. Neck: Neck supple. No JVD present. Cardiovascular: Normal rate and regular rhythm.   Exam reveals no gallop. No murmur heard. Pulmonary/Chest: Effort normal and breath sounds normal. No stridor. He has no wheezes. He has no rales. Abdominal: Soft. There is no tenderness. There is no rebound. Musculoskeletal: Normal range of motion. He exhibits no edema or tenderness. Neurological: He is alert and oriented to person, place, and time. He exhibits normal muscle tone. Skin: Skin is warm. He is not diaphoretic. Psychiatric: He has a normal mood and affect. His behavior is normal.     Echo 2016:  SUMMARY:  Left ventricle: The ventricle was dilated. Ejection fraction was estimated  to be 15 %. There was severe diffuse hypokinesis. Wall thickness was  moderately increased. Features were consistent with a pseudonormal left  ventricular filling pattern, with concomitant abnormal relaxation and  increased filling pressure (grade 2 diastolic dysfunction). Left atrium: The atrium was severely dilated. Mitral valve: There was mild regurgitation, there were two jets noted in  the apical two window. Aortic valve: There was moderate regurgitation. ASSESSMENT and PLAN    ICD-10-CM ICD-9-CM    1. Systolic CHF, chronic (HCC) I50.22 428.22 2D ECHO COMPLETE ADULT (TTE)     428.0     NYHA class II   2. Hyperlipidemia, unspecified hyperlipidemia type E78.5 272.4 AMB POC EKG ROUTINE W/ 12 LEADS, INTER & REP   3. Essential hypertension I10 401.9    4. Nonischemic cardiomyopathy (HCC) I42.8 425.4    5. Chronic kidney disease (CKD), stage III (moderate) N18.3 585.3      Orders Placed This Encounter    2D ECHO COMPLETE ADULT (TTE)     Standing Status:   Future     Standing Expiration Date:   10/6/2018     Order Specific Question:   Reason for Exam:     Answer:   CHF    AMB POC EKG ROUTINE W/ 12 LEADS, INTER & REP     Order Specific Question:   Reason for Exam:     Answer:   htn    hydrALAZINE (APRESOLINE) 100 mg tablet     Sig: Take 1 Tab by mouth three (3) times daily.      Dispense:  90 Tab     Refill:  6 Follow-up Disposition:  Return in about 1 month (around 5/6/2018). reviewed diet, exercise and weight control  cardiovascular risk and specific lipid/LDL goals reviewed  use of aspirin to prevent MI and TIA's discussed. Patient with suspected non ischemic cardiomyopathy EF 15% seen for f/u  Will increase hydralazine to 100 mg tid.   Continue current meds  Will repeat echo to assess LV function and f/u in 1 month

## 2018-05-09 DIAGNOSIS — M10.9 GOUT OF HAND, UNSPECIFIED CAUSE, UNSPECIFIED CHRONICITY, UNSPECIFIED LATERALITY: ICD-10-CM

## 2018-05-09 NOTE — TELEPHONE ENCOUNTER
Requested Prescriptions     Pending Prescriptions Disp Refills    allopurinol (ZYLOPRIM) 100 mg tablet 60 Tab 2     Sig: Take 1 Tab by mouth daily. Increase to 200 mg in one week.        [facility] [place]

## 2018-05-11 RX ORDER — ALLOPURINOL 100 MG/1
200 TABLET ORAL DAILY
Qty: 120 TAB | Refills: 2 | Status: SHIPPED | OUTPATIENT
Start: 2018-05-11

## 2018-06-05 ENCOUNTER — OFFICE VISIT (OUTPATIENT)
Dept: FAMILY MEDICINE CLINIC | Facility: CLINIC | Age: 58
End: 2018-06-05

## 2018-06-05 VITALS
DIASTOLIC BLOOD PRESSURE: 103 MMHG | HEIGHT: 67 IN | SYSTOLIC BLOOD PRESSURE: 160 MMHG | WEIGHT: 256.8 LBS | OXYGEN SATURATION: 96 % | HEART RATE: 87 BPM | RESPIRATION RATE: 18 BRPM | BODY MASS INDEX: 40.3 KG/M2 | TEMPERATURE: 98.1 F

## 2018-06-05 DIAGNOSIS — N18.4 CKD (CHRONIC KIDNEY DISEASE) STAGE 4, GFR 15-29 ML/MIN (HCC): ICD-10-CM

## 2018-06-05 DIAGNOSIS — E66.01 CLASS 3 SEVERE OBESITY DUE TO EXCESS CALORIES WITH SERIOUS COMORBIDITY AND BODY MASS INDEX (BMI) OF 40.0 TO 44.9 IN ADULT (HCC): ICD-10-CM

## 2018-06-05 DIAGNOSIS — I50.32 CHRONIC DIASTOLIC CONGESTIVE HEART FAILURE (HCC): ICD-10-CM

## 2018-06-05 DIAGNOSIS — E78.2 MIXED HYPERLIPIDEMIA: ICD-10-CM

## 2018-06-05 DIAGNOSIS — E78.5 HYPERLIPIDEMIA, UNSPECIFIED HYPERLIPIDEMIA TYPE: ICD-10-CM

## 2018-06-05 DIAGNOSIS — Z12.11 COLON CANCER SCREENING: ICD-10-CM

## 2018-06-05 DIAGNOSIS — Z12.5 PROSTATE CANCER SCREENING: ICD-10-CM

## 2018-06-05 DIAGNOSIS — M1A.09X0 IDIOPATHIC CHRONIC GOUT OF MULTIPLE SITES WITHOUT TOPHUS: ICD-10-CM

## 2018-06-05 DIAGNOSIS — I10 ESSENTIAL HYPERTENSION: Primary | ICD-10-CM

## 2018-06-05 PROBLEM — M1A.09X1 IDIOPATHIC CHRONIC GOUT OF MULTIPLE SITES WITH TOPHUS: Status: ACTIVE | Noted: 2017-02-21

## 2018-06-05 RX ORDER — PRAVASTATIN SODIUM 20 MG/1
20 TABLET ORAL
Qty: 90 TAB | Refills: 3 | Status: SHIPPED | OUTPATIENT
Start: 2018-06-05 | End: 2019-12-17 | Stop reason: SDUPTHER

## 2018-06-05 RX ORDER — COLCHICINE 0.6 MG/1
TABLET ORAL
Qty: 3 TAB | Refills: 5 | Status: SHIPPED | OUTPATIENT
Start: 2018-06-05

## 2018-06-05 RX ORDER — TERAZOSIN 1 MG/1
1 CAPSULE ORAL
Qty: 30 CAP | Refills: 11 | Status: ON HOLD | OUTPATIENT
Start: 2018-06-05 | End: 2018-11-15

## 2018-06-05 RX ORDER — CARVEDILOL 25 MG/1
25 TABLET ORAL 2 TIMES DAILY WITH MEALS
Qty: 180 TAB | Refills: 3 | Status: SHIPPED | OUTPATIENT
Start: 2018-06-05 | End: 2018-08-31

## 2018-06-05 NOTE — PROGRESS NOTES
HISTORY OF PRESENT ILLNESS  Pebbles Mayorga is a 63 y.o. male. HPI Comments: Presents to establish care for HTN, hyperlipidemia, CHF, CKD Stage 3, gout, obesity. No problems with medications. He gets gout with dietary indiscretions - colchicine works when this happens. His uric acid was still high in October. He is followed by Cardiology for CHF. He is due for colon cancer screening. Hypertension    Pertinent negatives include no chest pain, no palpitations, no headaches, no shortness of breath, no nausea and no vomiting. CHF   Pertinent negatives include no chest pain, no headaches and no shortness of breath. Past Medical History:   Diagnosis Date    Arthritis of left knee 09/2017    moderate to severe, with effusion on xray    Chronic kidney disease     Diastolic CHF ([address]     Dilated cardiomyopathy ([address]     History of alcohol abuse     History of echocardiogram 02/24/2014    Mod-marked LVE. EF 15%. Severe, diffuse hypk. Mild LVH. Gr 1 DDfx. Mod LAE. Mild-mod FIDELIA. Mild AoRE. No significant change from echo of 10/23/09.  History of gout     History of myocardial perfusion scan 05/25/2006    No evidence of ischemia or scarring. Gross LVE. EF 28%. Severe global hypk. Neg EKG on submaximal EST. Ex time 8 min 25 sec.  HTN (hypertension)     Hypercholesterolemia     Hypertensive cardiovascular disease        Past Surgical History:   Procedure Laterality Date    HX HERNIA REPAIR  04/2016       History   Smoking Status    Never Smoker   Smokeless Tobacco    Never Used     Current Outpatient Prescriptions   Medication Sig    furosemide (LASIX) 40 mg tablet TAKE 1 TABLET BY MOUTH DAILY    allopurinol (ZYLOPRIM) 100 mg tablet Take 2 Tabs by mouth daily.  amLODIPine (NORVASC) 10 mg tablet TAKE 1 TABLET BY MOUTH DAILY    hydrALAZINE (APRESOLINE) 100 mg tablet Take 1 Tab by mouth three (3) times daily.  pravastatin (PRAVACHOL) 20 mg tablet Take 1 Tab by mouth nightly.     carvedilol (COREG) 25 mg tablet TAKE 1 TABLET BY MOUTH TWICE DAILY WITH MEALS    colchicine 0.6 mg tablet 2 tablets at first sign of gout flare and then 1 tablet 1 hour later    aspirin 81 mg chewable tablet Take 1 Tab by mouth daily. No current facility-administered medications for this visit. Review of Systems   Constitutional: Negative for chills and fever. Respiratory: Negative for shortness of breath. Cardiovascular: Negative for chest pain, palpitations and leg swelling. Gastrointestinal: Negative for nausea and vomiting. Neurological: Negative for tingling, focal weakness and headaches. Psychiatric/Behavioral: The patient does not have insomnia. Visit Vitals    BP (!) 160/103 (BP 1 Location: Left arm, BP Patient Position: Sitting)    Pulse 87    Temp 98.1 °F (36.7 °C)    Resp 18    Ht 5' 7\" (1.702 m)    Wt 256 lb 12.8 oz (116.5 kg)    SpO2 96%    BMI 40.22 kg/m2       Physical Exam   Constitutional: He is oriented to person, place, and time. He appears well-developed and well-nourished. No distress. Neck: Neck supple. No thyromegaly present. Carotid bruit absent   Cardiovascular: Normal rate, regular rhythm and intact distal pulses. Exam reveals no gallop and no friction rub. No murmur heard. Pulmonary/Chest: Effort normal and breath sounds normal. No respiratory distress. Musculoskeletal: He exhibits edema. Lymphadenopathy:     He has no cervical adenopathy. Neurological: He is alert and oriented to person, place, and time. Skin: Skin is warm and dry. Psychiatric: He has a normal mood and affect. His behavior is normal. Judgment and thought content normal.   Nursing note and vitals reviewed. ASSESSMENT and PLAN    ICD-10-CM ICD-9-CM    1. Essential hypertension I10 401.9 carvedilol (COREG) 25 mg tablet      terazosin (HYTRIN) 1 mg capsule   2. Idiopathic chronic gout of multiple sites without tophus M1A. 09X0 274.02 colchicine 0.6 mg tablet URIC ACID   3. CKD (chronic kidney disease) stage 4, GFR 15-29 ml/min ([facility]) N18.4 585.4    4. Hyperlipidemia, unspecified hyperlipidemia type E78.5 272.4 pravastatin (PRAVACHOL) 20 mg tablet   5. Class 3 severe obesity due to excess calories with serious comorbidity and body mass index (BMI) of 40.0 to 44.9 in adult ([facility]) E66.01 278.01     Z68.41 V85.41    6. Chronic diastolic congestive heart failure ([facility]) I50.32 428.32 carvedilol (COREG) 25 mg tablet     428.0    7. Prostate cancer screening Z12.5 V76.44 PSA, DIAGNOSTIC (PROSTATE SPECIFIC AG)   8. Colon cancer screening Z12.11 V76.51 REFERRAL TO COLON AND RECTAL SURGERY     Follow-up Disposition:  Return in about 3 months (around 9/5/2018). the following changes in treatment are made: Add Hytrin for BP control. Refills as noted. lab results and schedule of future lab studies reviewed with patient  reviewed diet, exercise and weight control  Referral for colonoscopy. reviewed medications and side effects in detail  Plan of care reviewed - patient verbalize(s) understanding and agreement.

## 2018-06-05 NOTE — PROGRESS NOTES
Chief Complaint   Patient presents with    Hypertension     follow up    CHF     follow up patient seen at [facility], [facility].       Pt preferred language for health care discussion is english. Is someone accompanying this pt? no    Is the patient using any DME equipment during OV? no    Depression Screening:  PHQ over the last two weeks 6/5/2018 4/6/2018 3/12/2018 9/5/2017 11/16/2016 4/15/2015 6/5/2014   Little interest or pleasure in doing things Not at all Not at all Not at all Not at all Not at all Not at all Not at all   Feeling down, depressed or hopeless Not at all Not at all Not at all Not at all Not at all Not at all Not at all   Total Score PHQ 2 0 0 0 0 0 0 0       Learning Assessment:  Learning Assessment 4/6/2018 4/15/2015 4/11/2014   PRIMARY LEARNER Patient Patient Patient   HIGHEST LEVEL OF EDUCATION - PRIMARY LEARNER  - GRADUATED [address] LEARNER - - [address] CAREGIVER - - No   PRIMARY LANGUAGE ENGLISH ENGLISH ENGLISH   LEARNER PREFERENCE PRIMARY LISTENING DEMONSTRATION READING     - LISTENING -     - VIDEOS -     - READING -   ANSWERED BY patient patient patient   [id number] Maintenance reviewed and discussed per provider. Yes    Pt is due for   Health Maintenance Due   Topic Date Due    COLONOSCOPY  03/01/1979    DTaP/Tdap/Td series (1 - Tdap) 03/01/1982    Prostate-Specific Antigen  04/11/2018   . Please order/place referral if appropriate. Coordination of Care:  1. Have you been to the ER, urgent care clinic since your last visit? Hospitalized since your last visit? no    2. Have you seen or consulted any other health care providers outside of the [address] [place] [address] since your last visit? Include any pap smears or colon screening.  no

## 2018-06-05 NOTE — MR AVS SNAPSHOT
[address] [facility] [address] 
[phone] Patient: Reynaldo Hope MRN: [id number] GAC:[id number] Visit Information Date & Time Provider Department Dept. Phone Encounter #  
 6/5/2018  1:00 PM Holli Rao MD [employer] [phone] [id number] Follow-up Instructions Return in about 3 months (around 9/5/2018). Your Appointments 6/8/2018 10:45 AM  
PROCEDURE with [place] E[facility] ([facility]) Appt Note: echo federico; lmom 5/8/18 hlr; [facility] [address] [facility], [address] [facility] [address]  
[address], [address] [address]  
  
    
 6/28/2018  3:00 PM  
Office Visit with Sasha Soares MD  
[facility]) Appt Note: 1 month post Echo; [facility] [address] [facility] [facility] [address], [address] [facility] [address]  
[address], [address] Upcoming Health Maintenance Date Due COLONOSCOPY 3/1/1979 DTaP/Tdap/Td series (1 - Tdap) 3/1/1982 Prostate-Specific Antigen 4/11/2018 Influenza Age 5 to Adult 8/1/2018 Allergies as of 6/5/2018  Review Complete On: 6/5/2018 By: Holli Rao MD  
 No Known Allergies Current Immunizations  Never Reviewed No immunizations on file. Not reviewed this visit You Were Diagnosed With   
  
 Codes Comments Essential hypertension    -  Primary ICD-10-CM: I10 
ICD-9-CM: 401.9 Idiopathic chronic gout of multiple sites without tophus     ICD-10-CM: M1A. 39D2 ICD-9-CM: 274.02   
 CKD (chronic kidney disease) stage 4, GFR 15-29 ml/min ([facility])     ICD-10-CM: N18.4 ICD-9-CM: 385. 4 Hyperlipidemia, unspecified hyperlipidemia type     ICD-10-CM: E78.5 ICD-9-CM: 272.4  Class 3 severe obesity due to excess calories with serious comorbidity and body mass index (BMI) of 40.0 to 44.9 in adult [facility])     ICD-10-CM: E66.01, Z68.41 
ICD-9-CM: 278.01, V85.41 Chronic diastolic congestive heart failure (HCC)     ICD-10-CM: I50.32 
ICD-9-CM: 428.32, 428.0 Prostate cancer screening     ICD-10-CM: Z12.5 ICD-9-CM: V76.44 Colon cancer screening     ICD-10-CM: Z12.11 ICD-9-CM: V76.51 Vitals BP Pulse Temp Resp Height(growth percentile) Weight(growth percentile) (!) 160/103 (BP 1 Location: Left arm, BP Patient Position: Sitting) 87 98.1 °F (36.7 °C) 18 5' 7\" (1.702 m) 256 lb 12.8 oz (116.5 kg) SpO2 BMI Smoking Status 96% 40.22 kg/m2 Never Smoker BMI and BSA Data Body Mass Index Body Surface Area  
 40.22 kg/m 2 2.35 m 2 Preferred Pharmacy Pharmacy Name Phone [facility] [address] [phone] Your Updated Medication List  
  
   
This list is accurate as of 6/5/18  2:03 PM.  Always use your most recent med list.  
  
  
  
  
 allopurinol 100 mg tablet Commonly known as:  Geovanna Torres Take 2 Tabs by mouth daily. amLODIPine 10 mg tablet Commonly known as:  Jhonatan Lenora TAKE 1 TABLET BY MOUTH DAILY  
  
 aspirin 81 mg chewable tablet Take 1 Tab by mouth daily. carvedilol 25 mg tablet Commonly known as:  Logan Lacrosse Take 1 Tab by mouth two (2) times daily (with meals). Indications: chronic heart failure, hypertension  
  
 colchicine 0.6 mg tablet 2 tablets at first sign of gout flare and then 1 tablet 1 hour later  Indications: acute gouty arthritis  
  
 furosemide 40 mg tablet Commonly known as:  LASIX TAKE 1 TABLET BY MOUTH DAILY  
  
 hydrALAZINE 100 mg tablet Commonly known as:  APRESOLINE Take 1 Tab by mouth three (3) times daily. pravastatin 20 mg tablet Commonly known as:  PRAVACHOL Take 1 Tab by mouth nightly. Indications: hyperlipidemia  
  
 terazosin 1 mg capsule Commonly known as:  HYTRIN Take 1 Cap by mouth nightly. Indications: hypertension Prescriptions Sent to Pharmacy Refills  
 carvedilol (COREG) 25 mg tablet 3 Sig: Take 1 Tab by mouth two (2) times daily (with meals). Indications: chronic heart failure, hypertension Class: Normal  
 Pharmacy: [facility] [address] [place] [address] Ph #: [phone] Route: Oral  
 pravastatin (PRAVACHOL) 20 mg tablet 3 Sig: Take 1 Tab by mouth nightly. Indications: hyperlipidemia Class: Normal  
 Pharmacy: [facility] [address] [place] [address] Ph #: [phone] Route: Oral  
 colchicine 0.6 mg tablet 5 Si tablets at first sign of gout flare and then 1 tablet 1 hour later  Indications: acute gouty arthritis Class: Normal  
 Pharmacy: [facility] [id number] Ph #: [phone]  
 terazosin (HYTRIN) 1 mg capsule 11 Sig: Take 1 Cap by mouth nightly. Indications: hypertension Class: Normal  
 Pharmacy: [facility] [address] [place] [address] Ph #: [phone] Route: Oral  
  
We Performed the Following REFERRAL TO COLON AND RECTAL SURGERY [REF17 Custom] Comments:  
 Please see for colon cancer screening. Follow-up Instructions Return in about 3 months (around 2018). To-Do List   
 Around 2018 Lab:  PSA, DIAGNOSTIC (PROSTATE SPECIFIC AG) 2018 Lab:  URIC ACID Referral Information Referral ID Referred By Referred To  
  
 [id number] [place], [address] [facility] [address] Phone: [phone] Fax: [phone] Visits Status Start Date End Date 1 New Request 18 If your referral has a status of pending review or denied, additional information will be sent to support the outcome of this decision. Introducing [facility]! [employer] introduces [employer] patient portal. Now you can access parts of your medical record, email your doctor's office, and request medication refills online. 1. In your internet browser, go to [email or web address] 2. Click on the First Time User? Click Here link in the Sign In box. You will see the New Member Sign Up page. 3. Enter your [employer] Access Code exactly as it appears below. You will not need to use this code after youve completed the sign-up process. If you do not sign up before the expiration date, you must request a new code. · [employer] Access Code: [email or web address] Expires: 6/16/2018  5:24 AM 
 
4. Enter the last four digits of your Social Security Number (xxxx) and Date of Birth (mm/dd/yyyy) as indicated and click Submit. You will be taken to the next sign-up page. 5. Create a [employer] ID. This will be your [employer] login ID and cannot be changed, so think of one that is secure and easy to remember. 6. Create a [employer] password. You can change your password at any time. 7. Enter your Password Reset Question and Answer. This can be used at a later time if you forget your password. 8. Enter your e-mail address. You will receive e-mail notification when new information is available in [address] 9. Click Sign Up. You can now view and download portions of your medical record. 10. Click the Download Summary menu link to download a portable copy of your medical information. If you have questions, please visit the Frequently Asked Questions section of the [employer] website. Remember, [employer] is NOT to be used for urgent needs. For medical emergencies, dial 911. Now available from your iPhone and Android! Please provide this summary of care documentation to your next provider. Your primary care clinician is listed as Enrique Hilliard. If you have any questions after today's visit, please call [phone].

## 2018-06-06 ENCOUNTER — HOSPITAL ENCOUNTER (OUTPATIENT)
Dept: LAB | Age: 58
Discharge: HOME | End: 2018-06-06

## 2018-06-06 LAB — SENTARA SPECIMEN COL,SENBCF: NORMAL

## 2018-06-06 PROCEDURE — 99001 SPECIMEN HANDLING PT-LAB: CPT | Performed by: INTERNAL MEDICINE

## 2018-06-07 ENCOUNTER — HOSPITAL ENCOUNTER (OUTPATIENT)
Dept: LAB | Age: 58
Discharge: HOME | End: 2018-06-07

## 2018-06-07 LAB
PSA SERPL-MCNC: 0.56 NG/ML
SENTARA SPECIMEN COL,SENBCF: NORMAL
URATE SERPL-MCNC: 8.3 MG/DL (ref 3.9–9)

## 2018-06-07 PROCEDURE — 99001 SPECIMEN HANDLING PT-LAB: CPT | Performed by: INTERNAL MEDICINE

## 2018-06-08 RX ORDER — PRAVASTATIN SODIUM 20 MG/1
TABLET ORAL
Qty: 30 TAB | Refills: 0 | OUTPATIENT
Start: 2018-06-08

## 2018-06-27 NOTE — PROGRESS NOTES
Please notify patient that results are normal except that uric acid isn't low enough yet. Will adjust Allopurinol at his next visit in September.

## 2018-07-20 NOTE — PROGRESS NOTES
Attempted to contact pt at  number, no answer. Lvm for pt that we will see him at his upcoming appointment in September, but to call the office at [phone] with any questions.

## 2018-08-23 ENCOUNTER — APPOINTMENT (OUTPATIENT)
Dept: GENERAL RADIOLOGY | Age: 58
DRG: 280 | End: 2018-08-23
Attending: EMERGENCY MEDICINE
Payer: COMMERCIAL

## 2018-08-23 ENCOUNTER — HOSPITAL ENCOUNTER (INPATIENT)
Age: 58
LOS: 8 days | Discharge: HOME HEALTH | DRG: 280 | End: 2018-08-31
Attending: EMERGENCY MEDICINE | Admitting: INTERNAL MEDICINE
Payer: COMMERCIAL

## 2018-08-23 ENCOUNTER — APPOINTMENT (OUTPATIENT)
Dept: ULTRASOUND IMAGING | Age: 58
DRG: 280 | End: 2018-08-23
Attending: INTERNAL MEDICINE
Payer: COMMERCIAL

## 2018-08-23 DIAGNOSIS — R77.8 ELEVATED TROPONIN: ICD-10-CM

## 2018-08-23 DIAGNOSIS — I50.9 ACUTE ON CHRONIC CONGESTIVE HEART FAILURE, UNSPECIFIED HEART FAILURE TYPE (HCC): ICD-10-CM

## 2018-08-23 DIAGNOSIS — I16.1 HYPERTENSIVE EMERGENCY: ICD-10-CM

## 2018-08-23 DIAGNOSIS — R06.03 RESPIRATORY DISTRESS: Primary | ICD-10-CM

## 2018-08-23 DIAGNOSIS — E87.29 RESPIRATORY ACIDOSIS: ICD-10-CM

## 2018-08-23 DIAGNOSIS — N17.9 ACUTE KIDNEY INJURY SUPERIMPOSED ON CHRONIC KIDNEY DISEASE (HCC): ICD-10-CM

## 2018-08-23 DIAGNOSIS — N18.9 ACUTE KIDNEY INJURY SUPERIMPOSED ON CHRONIC KIDNEY DISEASE (HCC): ICD-10-CM

## 2018-08-23 DIAGNOSIS — J18.9 PNEUMONIA OF RIGHT LOWER LOBE DUE TO INFECTIOUS ORGANISM: ICD-10-CM

## 2018-08-23 LAB
AMORPH CRY URNS QL MICRO: ABNORMAL
AMPHET UR QL SCN: NEGATIVE
ANION GAP SERPL CALC-SCNC: 12 MMOL/L (ref 3–18)
ANION GAP SERPL CALC-SCNC: 12 MMOL/L (ref 3–18)
ANION GAP SERPL CALC-SCNC: 8 MMOL/L (ref 3–18)
APPEARANCE UR: ABNORMAL
APTT PPP: 28.1 SEC (ref 23–36.4)
ARTERIAL PATENCY WRIST A: YES
ATRIAL RATE: 104 BPM
BACTERIA SPEC CULT: NORMAL
BACTERIA URNS QL MICRO: ABNORMAL /HPF
BARBITURATES UR QL SCN: NEGATIVE
BASE DEFICIT BLD-SCNC: 2 MMOL/L
BASE DEFICIT BLD-SCNC: 4 MMOL/L
BASE DEFICIT BLD-SCNC: 6 MMOL/L
BASOPHILS # BLD: 0 K/UL (ref 0–0.1)
BASOPHILS NFR BLD: 0 % (ref 0–2)
BDY SITE: ABNORMAL
BENZODIAZ UR QL: NEGATIVE
BILIRUB UR QL: NEGATIVE
BNP SERPL-MCNC: ABNORMAL PG/ML (ref 0–900)
BODY TEMPERATURE: 97.9
BODY TEMPERATURE: 98.3
BUN SERPL-MCNC: 67 MG/DL (ref 7–18)
BUN SERPL-MCNC: 68 MG/DL (ref 7–18)
BUN SERPL-MCNC: 71 MG/DL (ref 7–18)
BUN/CREAT SERPL: 10 (ref 12–20)
CALCIUM SERPL-MCNC: 6.9 MG/DL (ref 8.5–10.1)
CALCIUM SERPL-MCNC: 7.2 MG/DL (ref 8.5–10.1)
CALCIUM SERPL-MCNC: 7.3 MG/DL (ref 8.5–10.1)
CALCULATED P AXIS, ECG09: 46 DEGREES
CALCULATED R AXIS, ECG10: -40 DEGREES
CALCULATED T AXIS, ECG11: 108 DEGREES
CANNABINOIDS UR QL SCN: NEGATIVE
CHLORIDE SERPL-SCNC: 108 MMOL/L (ref 100–108)
CHLORIDE SERPL-SCNC: 108 MMOL/L (ref 100–108)
CHLORIDE SERPL-SCNC: 109 MMOL/L (ref 100–108)
CHOLEST SERPL-MCNC: 155 MG/DL
CK MB CFR SERPL CALC: 1.1 % (ref 0–4)
CK MB SERPL-MCNC: 4.8 NG/ML (ref 5–25)
CK SERPL-CCNC: 426 U/L (ref 39–308)
CO2 SERPL-SCNC: 22 MMOL/L (ref 21–32)
CO2 SERPL-SCNC: 23 MMOL/L (ref 21–32)
CO2 SERPL-SCNC: 27 MMOL/L (ref 21–32)
COCAINE UR QL SCN: NEGATIVE
COLOR UR: YELLOW
CREAT SERPL-MCNC: 6.88 MG/DL (ref 0.6–1.3)
CREAT SERPL-MCNC: 6.9 MG/DL (ref 0.6–1.3)
CREAT SERPL-MCNC: 6.93 MG/DL (ref 0.6–1.3)
CREAT UR-MCNC: 56.8 MG/DL (ref 30–125)
DIAGNOSIS, 93000: NORMAL
DIFFERENTIAL METHOD BLD: ABNORMAL
EOSINOPHIL # BLD: 0.3 K/UL (ref 0–0.4)
EOSINOPHIL #/AREA URNS HPF: NORMAL /[HPF]
EOSINOPHIL NFR BLD: 4 % (ref 0–5)
EPITH CASTS URNS QL MICRO: NEGATIVE /LPF (ref 0–5)
ERYTHROCYTE [DISTWIDTH] IN BLOOD BY AUTOMATED COUNT: 13.4 % (ref 11.6–14.5)
EST. AVERAGE GLUCOSE BLD GHB EST-MCNC: ABNORMAL MG/DL
GAS FLOW.O2 O2 DELIVERY SYS: ABNORMAL L/MIN
GAS FLOW.O2 SETTING OXYMISER: 3 L/M
GLUCOSE BLD STRIP.AUTO-MCNC: 112 MG/DL (ref 70–110)
GLUCOSE BLD STRIP.AUTO-MCNC: 122 MG/DL (ref 70–110)
GLUCOSE BLD STRIP.AUTO-MCNC: 70 MG/DL (ref 70–110)
GLUCOSE SERPL-MCNC: 135 MG/DL (ref 74–99)
GLUCOSE SERPL-MCNC: 234 MG/DL (ref 74–99)
GLUCOSE SERPL-MCNC: 87 MG/DL (ref 74–99)
GLUCOSE UR STRIP.AUTO-MCNC: 100 MG/DL
HBA1C MFR BLD: 4 % (ref 4.2–5.6)
HCO3 BLD-SCNC: 22.6 MMOL/L (ref 22–26)
HCO3 BLD-SCNC: 23.3 MMOL/L (ref 22–26)
HCO3 BLD-SCNC: 23.3 MMOL/L (ref 22–26)
HCT VFR BLD AUTO: 35.7 % (ref 36–48)
HDLC SERPL-MCNC: 72 MG/DL (ref 40–60)
HDLC SERPL: 2.2 {RATIO} (ref 0–5)
HDSCOM,HDSCOM: NORMAL
HGB BLD-MCNC: 11 G/DL (ref 13–16)
HGB UR QL STRIP: ABNORMAL
INR PPP: 1 (ref 0.8–1.2)
KETONES UR QL STRIP.AUTO: NEGATIVE MG/DL
LACTATE BLD-SCNC: 1.1 MMOL/L (ref 0.4–2)
LDLC SERPL CALC-MCNC: 71 MG/DL (ref 0–100)
LEUKOCYTE ESTERASE UR QL STRIP.AUTO: NEGATIVE
LIPID PROFILE,FLP: ABNORMAL
LYMPHOCYTES # BLD: 2.7 K/UL (ref 0.9–3.6)
LYMPHOCYTES NFR BLD: 35 % (ref 21–52)
MCH RBC QN AUTO: 27.2 PG (ref 24–34)
MCHC RBC AUTO-ENTMCNC: 30.8 G/DL (ref 31–37)
MCV RBC AUTO: 88.1 FL (ref 74–97)
METHADONE UR QL: NEGATIVE
MONOCYTES # BLD: 0.9 K/UL (ref 0.05–1.2)
MONOCYTES NFR BLD: 11 % (ref 3–10)
NEUTS SEG # BLD: 3.7 K/UL (ref 1.8–8)
NEUTS SEG NFR BLD: 50 % (ref 40–73)
NITRITE UR QL STRIP.AUTO: NEGATIVE
O2/TOTAL GAS SETTING VFR VENT: 60 %
O2/TOTAL GAS SETTING VFR VENT: 60 %
OPIATES UR QL: NEGATIVE
P-R INTERVAL, ECG05: 168 MS
PCO2 BLD: 38.5 MMHG (ref 35–45)
PCO2 BLD: 54.1 MMHG (ref 35–45)
PCO2 BLD: 61 MMHG (ref 35–45)
PCP UR QL: NEGATIVE
PEEP RESPIRATORY: 6 CMH2O
PEEP RESPIRATORY: 7 CMH2O
PH BLD: 7.18 [PH] (ref 7.35–7.45)
PH BLD: 7.24 [PH] (ref 7.35–7.45)
PH BLD: 7.39 [PH] (ref 7.35–7.45)
PH UR STRIP: 5.5 [PH] (ref 5–8)
PIP ISTAT,IPIP: 18
PIP ISTAT,IPIP: 19
PLATELET # BLD AUTO: 229 K/UL (ref 135–420)
PMV BLD AUTO: 9.9 FL (ref 9.2–11.8)
PO2 BLD: 63 MMHG (ref 80–100)
PO2 BLD: 67 MMHG (ref 80–100)
PO2 BLD: 98 MMHG (ref 80–100)
POTASSIUM SERPL-SCNC: 4.5 MMOL/L (ref 3.5–5.5)
POTASSIUM SERPL-SCNC: 4.8 MMOL/L (ref 3.5–5.5)
POTASSIUM SERPL-SCNC: 5.4 MMOL/L (ref 3.5–5.5)
PRESSURE SUPPORT SETTING VENT: 12 CMH2O
PRESSURE SUPPORT SETTING VENT: 12 CMH2O
PROT UR STRIP-MCNC: >300 MG/DL
PROT UR-MCNC: 194 MG/DL
PROTHROMBIN TIME: 13.2 SEC (ref 11.5–15.2)
Q-T INTERVAL, ECG07: 406 MS
QRS DURATION, ECG06: 110 MS
QTC CALCULATION (BEZET), ECG08: 533 MS
RBC # BLD AUTO: 4.05 M/UL (ref 4.7–5.5)
RBC #/AREA URNS HPF: NEGATIVE /HPF (ref 0–5)
SAO2 % BLD: 85 % (ref 92–97)
SAO2 % BLD: 93 % (ref 92–97)
SAO2 % BLD: 96 % (ref 92–97)
SERVICE CMNT-IMP: 60 L/MIN
SERVICE CMNT-IMP: 60 L/MIN
SERVICE CMNT-IMP: ABNORMAL
SERVICE CMNT-IMP: NORMAL
SODIUM SERPL-SCNC: 143 MMOL/L (ref 136–145)
SODIUM UR-SCNC: 93 MMOL/L (ref 20–110)
SP GR UR REFRACTOMETRY: 1.02 (ref 1–1.03)
SPECIMEN TYPE: ABNORMAL
SPONTANEOUS TIMED, IST: YES
SPONTANEOUS TIMED, IST: YES
T4 FREE SERPL-MCNC: 1 NG/DL (ref 0.7–1.5)
TOTAL RESP. RATE, ITRR: 18
TOTAL RESP. RATE, ITRR: 37
TOTAL RESP. RATE, ITRR: 43
TRIGL SERPL-MCNC: 60 MG/DL (ref ?–150)
TROPONIN I SERPL-MCNC: 0.77 NG/ML (ref 0–0.04)
TROPONIN I SERPL-MCNC: 0.8 NG/ML (ref 0–0.04)
TROPONIN I SERPL-MCNC: 0.82 NG/ML (ref 0–0.04)
TSH SERPL DL<=0.05 MIU/L-ACNC: 1.17 UIU/ML (ref 0.36–3.74)
UROBILINOGEN UR QL STRIP.AUTO: 0.2 EU/DL (ref 0.2–1)
VENTRICULAR RATE, ECG03: 104 BPM
VLDLC SERPL CALC-MCNC: 12 MG/DL
WBC # BLD AUTO: 7.7 K/UL (ref 4.6–13.2)
WBC URNS QL MICRO: ABNORMAL /HPF (ref 0–4)

## 2018-08-23 PROCEDURE — 74011250636 HC RX REV CODE- 250/636: Performed by: INTERNAL MEDICINE

## 2018-08-23 PROCEDURE — 84300 ASSAY OF URINE SODIUM: CPT | Performed by: INTERNAL MEDICINE

## 2018-08-23 PROCEDURE — 74011250637 HC RX REV CODE- 250/637: Performed by: INTERNAL MEDICINE

## 2018-08-23 PROCEDURE — 74011000250 HC RX REV CODE- 250: Performed by: PHYSICIAN ASSISTANT

## 2018-08-23 PROCEDURE — 5A09357 ASSISTANCE WITH RESPIRATORY VENTILATION, LESS THAN 24 CONSECUTIVE HOURS, CONTINUOUS POSITIVE AIRWAY PRESSURE: ICD-10-PCS | Performed by: INTERNAL MEDICINE

## 2018-08-23 PROCEDURE — 87641 MR-STAPH DNA AMP PROBE: CPT | Performed by: INTERNAL MEDICINE

## 2018-08-23 PROCEDURE — 80307 DRUG TEST PRSMV CHEM ANLYZR: CPT | Performed by: INTERNAL MEDICINE

## 2018-08-23 PROCEDURE — 36600 WITHDRAWAL OF ARTERIAL BLOOD: CPT

## 2018-08-23 PROCEDURE — 84443 ASSAY THYROID STIM HORMONE: CPT | Performed by: INTERNAL MEDICINE

## 2018-08-23 PROCEDURE — 77030037878 HC DRSG MEPILEX >48IN BORD MOLN -B

## 2018-08-23 PROCEDURE — 82570 ASSAY OF URINE CREATININE: CPT | Performed by: INTERNAL MEDICINE

## 2018-08-23 PROCEDURE — 82550 ASSAY OF CK (CPK): CPT | Performed by: EMERGENCY MEDICINE

## 2018-08-23 PROCEDURE — 82962 GLUCOSE BLOOD TEST: CPT

## 2018-08-23 PROCEDURE — 96375 TX/PRO/DX INJ NEW DRUG ADDON: CPT

## 2018-08-23 PROCEDURE — 94640 AIRWAY INHALATION TREATMENT: CPT

## 2018-08-23 PROCEDURE — 77030013033 HC MSK BPAP/CPAP MMKA -B

## 2018-08-23 PROCEDURE — 83036 HEMOGLOBIN GLYCOSYLATED A1C: CPT | Performed by: INTERNAL MEDICINE

## 2018-08-23 PROCEDURE — 83605 ASSAY OF LACTIC ACID: CPT

## 2018-08-23 PROCEDURE — 74011250637 HC RX REV CODE- 250/637: Performed by: PHYSICIAN ASSISTANT

## 2018-08-23 PROCEDURE — 85025 COMPLETE CBC W/AUTO DIFF WBC: CPT | Performed by: EMERGENCY MEDICINE

## 2018-08-23 PROCEDURE — 74011000258 HC RX REV CODE- 258: Performed by: INTERNAL MEDICINE

## 2018-08-23 PROCEDURE — 94660 CPAP INITIATION&MGMT: CPT

## 2018-08-23 PROCEDURE — 84439 ASSAY OF FREE THYROXINE: CPT | Performed by: INTERNAL MEDICINE

## 2018-08-23 PROCEDURE — 85610 PROTHROMBIN TIME: CPT | Performed by: EMERGENCY MEDICINE

## 2018-08-23 PROCEDURE — 80061 LIPID PANEL: CPT | Performed by: INTERNAL MEDICINE

## 2018-08-23 PROCEDURE — 76770 US EXAM ABDO BACK WALL COMP: CPT

## 2018-08-23 PROCEDURE — 74011250636 HC RX REV CODE- 250/636: Performed by: PHYSICIAN ASSISTANT

## 2018-08-23 PROCEDURE — 93005 ELECTROCARDIOGRAM TRACING: CPT

## 2018-08-23 PROCEDURE — 71045 X-RAY EXAM CHEST 1 VIEW: CPT

## 2018-08-23 PROCEDURE — 81001 URINALYSIS AUTO W/SCOPE: CPT | Performed by: INTERNAL MEDICINE

## 2018-08-23 PROCEDURE — 87040 BLOOD CULTURE FOR BACTERIA: CPT | Performed by: PHYSICIAN ASSISTANT

## 2018-08-23 PROCEDURE — 80048 BASIC METABOLIC PNL TOTAL CA: CPT | Performed by: INTERNAL MEDICINE

## 2018-08-23 PROCEDURE — 36415 COLL VENOUS BLD VENIPUNCTURE: CPT | Performed by: INTERNAL MEDICINE

## 2018-08-23 PROCEDURE — 65610000006 HC RM INTENSIVE CARE

## 2018-08-23 PROCEDURE — 84156 ASSAY OF PROTEIN URINE: CPT | Performed by: INTERNAL MEDICINE

## 2018-08-23 PROCEDURE — 83880 ASSAY OF NATRIURETIC PEPTIDE: CPT | Performed by: EMERGENCY MEDICINE

## 2018-08-23 PROCEDURE — 80048 BASIC METABOLIC PNL TOTAL CA: CPT | Performed by: EMERGENCY MEDICINE

## 2018-08-23 PROCEDURE — 87205 SMEAR GRAM STAIN: CPT | Performed by: INTERNAL MEDICINE

## 2018-08-23 PROCEDURE — 96365 THER/PROPH/DIAG IV INF INIT: CPT

## 2018-08-23 PROCEDURE — 85730 THROMBOPLASTIN TIME PARTIAL: CPT | Performed by: EMERGENCY MEDICINE

## 2018-08-23 PROCEDURE — 99285 EMERGENCY DEPT VISIT HI MDM: CPT

## 2018-08-23 PROCEDURE — 96367 TX/PROPH/DG ADDL SEQ IV INF: CPT

## 2018-08-23 PROCEDURE — 77010033678 HC OXYGEN DAILY

## 2018-08-23 PROCEDURE — 82803 BLOOD GASES ANY COMBINATION: CPT

## 2018-08-23 RX ORDER — LORAZEPAM 2 MG/ML
1 INJECTION INTRAMUSCULAR
Status: DISCONTINUED | OUTPATIENT
Start: 2018-08-23 | End: 2018-08-31 | Stop reason: HOSPADM

## 2018-08-23 RX ORDER — MAGNESIUM SULFATE 100 %
4 CRYSTALS MISCELLANEOUS AS NEEDED
Status: DISCONTINUED | OUTPATIENT
Start: 2018-08-23 | End: 2018-08-23 | Stop reason: SDUPTHER

## 2018-08-23 RX ORDER — LORAZEPAM 2 MG/ML
3 INJECTION INTRAMUSCULAR
Status: DISCONTINUED | OUTPATIENT
Start: 2018-08-23 | End: 2018-08-31 | Stop reason: HOSPADM

## 2018-08-23 RX ORDER — FOLIC ACID 1 MG/1
1 TABLET ORAL DAILY
Status: DISCONTINUED | OUTPATIENT
Start: 2018-08-24 | End: 2018-08-28

## 2018-08-23 RX ORDER — DEXTROSE 50 % IN WATER (D50W) INTRAVENOUS SYRINGE
25-50 AS NEEDED
Status: DISCONTINUED | OUTPATIENT
Start: 2018-08-23 | End: 2018-08-23 | Stop reason: SDUPTHER

## 2018-08-23 RX ORDER — NITROGLYCERIN 40 MG/100ML
0-20 INJECTION INTRAVENOUS
Status: DISCONTINUED | OUTPATIENT
Start: 2018-08-23 | End: 2018-08-24

## 2018-08-23 RX ORDER — PRAVASTATIN SODIUM 20 MG/1
20 TABLET ORAL
Status: DISCONTINUED | OUTPATIENT
Start: 2018-08-23 | End: 2018-08-31 | Stop reason: HOSPADM

## 2018-08-23 RX ORDER — INSULIN LISPRO 100 [IU]/ML
INJECTION, SOLUTION INTRAVENOUS; SUBCUTANEOUS EVERY 6 HOURS
Status: DISCONTINUED | OUTPATIENT
Start: 2018-08-23 | End: 2018-08-23

## 2018-08-23 RX ORDER — MAGNESIUM SULFATE 100 %
4 CRYSTALS MISCELLANEOUS AS NEEDED
Status: DISCONTINUED | OUTPATIENT
Start: 2018-08-23 | End: 2018-08-31 | Stop reason: HOSPADM

## 2018-08-23 RX ORDER — HYDRALAZINE HYDROCHLORIDE 20 MG/ML
10 INJECTION INTRAMUSCULAR; INTRAVENOUS ONCE
Status: COMPLETED | OUTPATIENT
Start: 2018-08-23 | End: 2018-08-23

## 2018-08-23 RX ORDER — SODIUM CHLORIDE 0.9 % (FLUSH) 0.9 %
5-10 SYRINGE (ML) INJECTION AS NEEDED
Status: DISCONTINUED | OUTPATIENT
Start: 2018-08-23 | End: 2018-08-31 | Stop reason: HOSPADM

## 2018-08-23 RX ORDER — FUROSEMIDE 10 MG/ML
60 INJECTION INTRAMUSCULAR; INTRAVENOUS
Status: COMPLETED | OUTPATIENT
Start: 2018-08-23 | End: 2018-08-23

## 2018-08-23 RX ORDER — THERA TABS 400 MCG
1 TAB ORAL DAILY
Status: DISCONTINUED | OUTPATIENT
Start: 2018-08-24 | End: 2018-08-28

## 2018-08-23 RX ORDER — AMLODIPINE BESYLATE 10 MG/1
10 TABLET ORAL DAILY
Status: DISCONTINUED | OUTPATIENT
Start: 2018-08-24 | End: 2018-08-27

## 2018-08-23 RX ORDER — GUAIFENESIN 100 MG/5ML
81 LIQUID (ML) ORAL DAILY
Status: DISCONTINUED | OUTPATIENT
Start: 2018-08-24 | End: 2018-08-31 | Stop reason: HOSPADM

## 2018-08-23 RX ORDER — SODIUM CHLORIDE 0.9 % (FLUSH) 0.9 %
5-10 SYRINGE (ML) INJECTION EVERY 8 HOURS
Status: DISCONTINUED | OUTPATIENT
Start: 2018-08-23 | End: 2018-08-31 | Stop reason: HOSPADM

## 2018-08-23 RX ORDER — HYDRALAZINE HYDROCHLORIDE 50 MG/1
100 TABLET, FILM COATED ORAL 3 TIMES DAILY
Status: DISCONTINUED | OUTPATIENT
Start: 2018-08-23 | End: 2018-08-27

## 2018-08-23 RX ORDER — DEXTROSE 50 % IN WATER (D50W) INTRAVENOUS SYRINGE
25-50 AS NEEDED
Status: DISCONTINUED | OUTPATIENT
Start: 2018-08-23 | End: 2018-08-31 | Stop reason: HOSPADM

## 2018-08-23 RX ORDER — ACETAMINOPHEN 325 MG/1
650 TABLET ORAL
Status: DISCONTINUED | OUTPATIENT
Start: 2018-08-23 | End: 2018-08-31 | Stop reason: HOSPADM

## 2018-08-23 RX ORDER — INSULIN LISPRO 100 [IU]/ML
INJECTION, SOLUTION INTRAVENOUS; SUBCUTANEOUS
Status: DISCONTINUED | OUTPATIENT
Start: 2018-08-23 | End: 2018-08-31 | Stop reason: HOSPADM

## 2018-08-23 RX ORDER — ASPIRIN 325 MG
325 TABLET ORAL
Status: COMPLETED | OUTPATIENT
Start: 2018-08-23 | End: 2018-08-23

## 2018-08-23 RX ORDER — SODIUM CHLORIDE 0.9 % (FLUSH) 0.9 %
5-10 SYRINGE (ML) INJECTION AS NEEDED
Status: DISCONTINUED | OUTPATIENT
Start: 2018-08-23 | End: 2018-08-24 | Stop reason: SDUPTHER

## 2018-08-23 RX ORDER — HEPARIN SODIUM 5000 [USP'U]/ML
5000 INJECTION, SOLUTION INTRAVENOUS; SUBCUTANEOUS EVERY 8 HOURS
Status: DISCONTINUED | OUTPATIENT
Start: 2018-08-23 | End: 2018-08-23

## 2018-08-23 RX ORDER — SODIUM BICARBONATE 1 MEQ/ML
50 SYRINGE (ML) INTRAVENOUS
Status: COMPLETED | OUTPATIENT
Start: 2018-08-23 | End: 2018-08-23

## 2018-08-23 RX ORDER — LORAZEPAM 1 MG/1
1 TABLET ORAL
Status: DISCONTINUED | OUTPATIENT
Start: 2018-08-23 | End: 2018-08-31 | Stop reason: HOSPADM

## 2018-08-23 RX ORDER — LORAZEPAM 2 MG/ML
2 INJECTION INTRAMUSCULAR
Status: DISCONTINUED | OUTPATIENT
Start: 2018-08-23 | End: 2018-08-31 | Stop reason: HOSPADM

## 2018-08-23 RX ORDER — LORAZEPAM 1 MG/1
2 TABLET ORAL
Status: DISCONTINUED | OUTPATIENT
Start: 2018-08-23 | End: 2018-08-31 | Stop reason: HOSPADM

## 2018-08-23 RX ORDER — HEPARIN SODIUM 5000 [USP'U]/ML
5000 INJECTION, SOLUTION INTRAVENOUS; SUBCUTANEOUS EVERY 8 HOURS
Status: DISCONTINUED | OUTPATIENT
Start: 2018-08-23 | End: 2018-08-31 | Stop reason: HOSPADM

## 2018-08-23 RX ORDER — IPRATROPIUM BROMIDE AND ALBUTEROL SULFATE 2.5; .5 MG/3ML; MG/3ML
3 SOLUTION RESPIRATORY (INHALATION)
Status: DISCONTINUED | OUTPATIENT
Start: 2018-08-23 | End: 2018-08-24

## 2018-08-23 RX ORDER — LANOLIN ALCOHOL/MO/W.PET/CERES
100 CREAM (GRAM) TOPICAL DAILY
Status: DISCONTINUED | OUTPATIENT
Start: 2018-08-24 | End: 2018-08-31 | Stop reason: HOSPADM

## 2018-08-23 RX ORDER — ASPIRIN 325 MG/1
100 TABLET, FILM COATED ORAL DAILY
Status: DISCONTINUED | OUTPATIENT
Start: 2018-08-24 | End: 2018-08-23 | Stop reason: RX

## 2018-08-23 RX ORDER — SODIUM CHLORIDE 0.9 % (FLUSH) 0.9 %
5-10 SYRINGE (ML) INJECTION EVERY 8 HOURS
Status: DISCONTINUED | OUTPATIENT
Start: 2018-08-23 | End: 2018-08-23

## 2018-08-23 RX ADMIN — CEFTRIAXONE SODIUM 2 G: 2 INJECTION, POWDER, FOR SOLUTION INTRAMUSCULAR; INTRAVENOUS at 09:13

## 2018-08-23 RX ADMIN — HYDRALAZINE HYDROCHLORIDE 100 MG: 50 TABLET, FILM COATED ORAL at 15:11

## 2018-08-23 RX ADMIN — Medication 10 ML: at 21:00

## 2018-08-23 RX ADMIN — FUROSEMIDE 60 MG: 10 INJECTION, SOLUTION INTRAVENOUS at 09:07

## 2018-08-23 RX ADMIN — NITROGLYCERIN 10 MCG/MIN: 40 INJECTION INTRAVENOUS at 08:32

## 2018-08-23 RX ADMIN — AZITHROMYCIN MONOHYDRATE 500 MG: 500 INJECTION, POWDER, LYOPHILIZED, FOR SOLUTION INTRAVENOUS at 09:24

## 2018-08-23 RX ADMIN — IPRATROPIUM BROMIDE AND ALBUTEROL SULFATE 3 ML: .5; 3 SOLUTION RESPIRATORY (INHALATION) at 17:09

## 2018-08-23 RX ADMIN — ASPIRIN 325 MG ORAL TABLET 325 MG: 325 PILL ORAL at 09:12

## 2018-08-23 RX ADMIN — SODIUM BICARBONATE 50 MEQ: 84 INJECTION, SOLUTION INTRAVENOUS at 13:48

## 2018-08-23 RX ADMIN — Medication 10 ML: at 13:53

## 2018-08-23 RX ADMIN — HEPARIN SODIUM 5000 UNITS: 5000 INJECTION, SOLUTION INTRAVENOUS; SUBCUTANEOUS at 22:12

## 2018-08-23 RX ADMIN — PRAVASTATIN SODIUM 20 MG: 20 TABLET ORAL at 22:12

## 2018-08-23 RX ADMIN — HEPARIN SODIUM 5000 UNITS: 5000 INJECTION, SOLUTION INTRAVENOUS; SUBCUTANEOUS at 15:11

## 2018-08-23 RX ADMIN — SODIUM CHLORIDE 10 MG/HR: 900 INJECTION, SOLUTION INTRAVENOUS at 16:20

## 2018-08-23 RX ADMIN — ACETAMINOPHEN 650 MG: 325 TABLET ORAL at 15:11

## 2018-08-23 RX ADMIN — IPRATROPIUM BROMIDE AND ALBUTEROL SULFATE 3 ML: .5; 3 SOLUTION RESPIRATORY (INHALATION) at 23:56

## 2018-08-23 RX ADMIN — HYDRALAZINE HYDROCHLORIDE 100 MG: 50 TABLET, FILM COATED ORAL at 22:12

## 2018-08-23 RX ADMIN — IPRATROPIUM BROMIDE AND ALBUTEROL SULFATE 3 ML: .5; 3 SOLUTION RESPIRATORY (INHALATION) at 19:32

## 2018-08-23 RX ADMIN — Medication 10 ML: at 14:50

## 2018-08-23 RX ADMIN — HYDRALAZINE HYDROCHLORIDE 10 MG: 20 INJECTION INTRAMUSCULAR; INTRAVENOUS at 15:33

## 2018-08-23 NOTE — ROUTINE PROCESS
Bedside and Verbal shift change report given to Rae Fuller RN (oncoming nurse) by Aren Sanchez RN (offgoing nurse). Report included the following information SBAR, Kardex, MAR and Recent Results. SITUATION:    Code Status: Full Code   Reason for Admission: Respiratory distress   [facility]F [facility]congestive heart failure) ([facility]   Hypertensive emergency    [facility] day: 0   Problem List:       Hospital Problems  Date Reviewed: 6/5/2018          Codes Class Noted POA    CHF (congestive heart failure) ([facility] ICD-10-CM: I50.9  ICD-9-CM: 428.0  8/23/2018 Unknown        Respiratory distress ICD-10-CM: R06.03  ICD-9-CM: 786.09  8/23/2018 Unknown        Hypertensive emergency ICD-10-CM: I16.1  ICD-9-CM: 401.9  8/23/2018 Unknown              BACKGROUND:    Past Medical History:   Past Medical History:   Diagnosis Date    Arthritis of left knee 09/2017    moderate to severe, with effusion on xray    Chronic kidney disease     Diastolic CHF ([facility]     Dilated cardiomyopathy ([facility]     History of alcohol abuse     History of echocardiogram 02/24/2014    Mod-marked LVE. EF 15%. Severe, diffuse hypk. Mild LVH. Gr 1 DDfx. Mod LAE. Mild-mod FIDELIA. Mild AoRE. No significant change from echo of 10/23/09.  History of gout     History of myocardial perfusion scan 05/25/2006    No evidence of ischemia or scarring. Gross LVE. EF 28%. Severe global hypk. Neg EKG on submaximal EST. Ex time 8 min 25 sec.  HTN (hypertension)     Hypercholesterolemia     Hypertensive cardiovascular disease          Patient taking anticoagulants: Yes     ASSESSMENT:    Changes in Assessment Throughout Shift: Admitted to ICU # [id number] from ED. NTG drip titrated off due symptomatic SBP < 160 mmHg. Furosemide drip started at 10 mg/hr. BIPAP removed and tolerating 3 LPM NC. Tolerating diet without complication.      Patient has Central Line: No     Patient has Vitale Cath: Yes: Reasons if yes: Retention; Strict intake & output  Last Vitals:     Vitals:    08/23/18 1830 08/23/18 1845 08/23/18 1900 08/23/18 1932   BP: 139/69 138/71 145/78    Pulse: 82 80 90    Resp: 17 18 19    Temp:       SpO2: 97% 97% 97% 99%   Weight:       Height:            IV and DRAINS (will only show if present)   Peripheral IV 08/23/18 Left Antecubital-Site Assessment: Clean, dry, & intact  Peripheral IV 08/23/18 Right Antecubital-Site Assessment: Clean, dry, & intact     WOUND (if present)   Wound Type:  none   Dressing present Dressing Present : Yes (Preventative Mepilex dressing applied to sacrum)   Wound Concerns/Notes:  none     PAIN    Pain Assessment    Pain Intensity 1: 0 (08/23/18 1600)    Pain Location 1: Head    Pain Intervention(s) 1: Medication (see MAR) (PRN Tylenol given)    Patient Stated Pain Goal: 0  o Interventions for Pain:  PRN Tylenol  o Intervention effective: yes  o Time of last intervention: 0464   o Reassessment Completed: yes      Last 3 Weights:  Last 3 Recorded Weights in this Encounter    08/23/18 0800 08/23/18 1417   Weight: 117.9 kg (260 lb) 115.6 kg (254 lb 12.8 oz)     Weight change:      INTAKE/OUPUT    Current Shift:      Last three shifts: 08/22 0701 - 08/23 1900  In: 42.7 [I.V.:42.7]  Out: 1495 [Urine:1495]     LAB RESULTS     Recent Labs      08/23/18   0750   WBC  7.7   HGB  11.0*   HCT  35.7*   PLT  229        Recent Labs      08/23/18   1550  08/23/18   0750   NA  143  143   K  5.4  4.8   GLU  87  234*   BUN  68*  67*   CREA  6.90*  6.88*   CA  6.9*  7.3*   INR   --   1.0       RECOMMENDATIONS AND DISCHARGE PLANNING     1. Pending tests/procedures/ Plan of Care or Other Needs: Echocardiogram; Monitor respiratory status; Monitor BP    2. Discharge plan for patient and Needs/Barriers: TBD    3. Estimated Discharge Date: TBD; Posted on Whiteboard in Patients Room: Yes      4. The patient's care plan was reviewed with the oncoming nurse.        \"HEALS\" SAFETY CHECK      Fall Risk    Total Score: 2    Safety Measures: Safety Measures: Bed/Chair-Wheels locked, Bed in low position, Call light within reach, Emergency bedside equipment, Fall prevention (comment), Nurse at bedside, Side rails X 3    A safety check occurred in the patient's room between off going nurse and oncoming nurse listed above. The safety check included the below items  Area Items   H  High Alert Medications - Verify all high alert medication drips (heparin, PCA, etc.)   E  Equipment - Suction is set up for ALL patients (with arabella)  - Red plugs utilized for all equipment (IV pumps, etc.)  - WOWs wiped down at end of shift.  - Room stocked with oxygen, suction, and other unit-specific supplies   A  Alarms - Bed alarm is set for fall risk patients  - Ensure chair alarm is in place and activated if patient is up in a chair   L  Lines - Check IV for any infiltration  - Vitale bag is empty if patient has a Vitale   - Tubing and IV bags are labeled   S  Safety   - Room is clean, patient is clean, and equipment is clean. - Hallways are clear from equipment besides carts. - Fall bracelet on for fall risk patients  - Ensure room is clear and free of clutter  - Suction is set up for ALL patients (with arabella)  - Hallways are clear from equipment besides carts.    - Isolation precautions followed, supplies available outside room, sign posted     Aminah Laird RN

## 2018-08-23 NOTE — CONSULTS
[facility]  Pulmonary, Critical Care, and Sleep Medicine    Initial Patient Consult    Name: Christin Aguilar MRN: [id number]   : 1960 Hospital: [address] [place] [address]   Date: 2018        IMPRESSION:   · Acute hypoxic hypercapnic respiratory failure due to pulmonary edema/fluid overload/acute on chronic kidney injury superimposed on chronic systolic heart failure, responding to BIPAP and diuresis  · Hypertensive emergency  · Acute in chronic kidney injury Creatinine, baseline 2.9  · Dilated cardiomyopathy EF 15% in 2016  · Doubt pneumonia as pt without fever or leukocytosis or left shift  · Hyperglycemia likely related to stress       RECOMMENDATIONS:   · Continue BIPAP prn and transition to NC as tolerated  · NPO while on BIPAP  · Respiratory failure likely pulmonary edema and unlikely to be pneumonia patti with lack of fever and leukocytosis with left shift and a normal lactate. Would hold off on antibiotic therapy  · Monitor renal indices, HD per renal service. Doubt need for emergent dialysis   · Schedule new Echo  · DVT prophylaxis  · Glycemic monitoring and control, see orders  · NTG gtt titrated to BP response, resume Coreg and Norvasc once over CHF acute decompensation   · Titrate FiO2 to maintain saturation greater than 90%  · Discussed with nursing  · Critical care time 50 minutes excluding procedures     Subjective: This patient has been seen and evaluated at the request of JOVAN Mcknight for acute respiratory failure. Patient is a 63 y.o. male with chronic kidney injury and HTN who presented with worsening shortness of breath over a week, preceded by PND, orthopnea and progressive pedal edema. Pt denies chest pain or hemoptysis but was admitted and intubated in  for similar symptoms. He was placed on BIPAP and diuresed in ED and reports significant improvement in SOB. Upon arrival pt was hypertensive needing NTG gtt.   Pt is being slated for ICU admission    Past Medical History:   Diagnosis Date    Arthritis of left knee 09/2017    moderate to severe, with effusion on xray    Chronic kidney disease     Di[facility]     Dilated cardiomyopathy ([facility]     History of alcohol abuse     History of echocardiogram 02/24/2014    Mod-marked LVE. EF 15%. Severe, diffuse hypk. Mild LVH. Gr 1 DDfx. Mod LAE. Mild-mod FIDELIA. Mild AoRE. No significant change from echo of 10/23/09.  History of gout     History of myocardial perfusion scan 05/25/2006    No evidence of ischemia or scarring. Gross LVE. EF 28%. Severe global hypk. Neg EKG on submaximal EST. Ex time 8 min 25 sec.  HTN (hypertension)     Hypercholesterolemia     Hypertensive cardiovascular disease       Past Surgical History:   Procedure Laterality Date    HX HERNIA REPAIR  04/2016      Prior to Admission medications    Medication Sig Start Date End Date Taking? Authorizing Provider   carvedilol (COREG) 25 mg tablet Take 1 Tab by mouth two (2) times daily (with meals). Indications: chronic heart failure, hypertension 6/5/18   Arcelia Fu MD   pravastatin (PRAVACHOL) 20 mg tablet Take 1 Tab by mouth nightly. Indications: hyperlipidemia 6/5/18   Arcelia Fu MD   colchicine 0.6 mg tablet 2 tablets at first sign of gout flare and then 1 tablet 1 hour later  Indications: acute gouty arthritis 6/5/18   Arcelia Fu MD   terazosin (HYTRIN) 1 mg capsule Take 1 Cap by mouth nightly. Indications: hypertension 6/5/18   Arcelia Fu MD   furosemide (LASIX) 40 mg tablet TAKE 1 TABLET BY MOUTH DAILY 5/29/18   STEFFANIE Phelan   allopurinol (ZYLOPRIM) 100 mg tablet Take 2 Tabs by mouth daily. 5/11/18   STEFFANIE Phelan   amLODIPine (NORVASC) 10 mg tablet TAKE 1 TABLET BY MOUTH DAILY 4/19/18   STEFFANIE Phelan   hydrALAZINE (APRESOLINE) 100 mg tablet Take 1 Tab by mouth three (3) times daily. 4/6/18   Marlene Diaz MD   aspirin 81 mg chewable tablet Take 1 Tab by mouth daily. 17   Ron Maza NP     No Known Allergies   Social History   Substance Use Topics    Smoking status: Never Smoker    Smokeless tobacco: Never Used    Alcohol use No      Comment: stop 3 years ago in feb      Family History   Problem Relation Age of Onset    Cancer Father      Lung    Heart Failure Mother     Cancer Sister         Current Facility-Administered Medications   Medication Dose Route Frequency    cefTRIAXone (ROCEPHIN) 2 g in sterile water (preservative free) 20 mL IV syringe  2 g IntraVENous Q24H    azithromycin (ZITHROMAX) 500 mg in 0.9% sodium chloride (MBP/ADV) 250 mL adv  500 mg IntraVENous Q24H    nitroglycerin (TRIDIL) 400 mcg/ml infusion  0-20 mcg/min IntraVENous TITRATE    sodium bicarbonate 8.4 % (1 mEq/mL) injection 50 mEq  50 mEq IntraVENous NOW       Review of Systems:  Pertinent items are noted in HPI. Objective:   Vital Signs:    Visit Vitals    BP (!) 185/124    Pulse 74    Temp 98.3 °F (36.8 °C)    Resp 15    Ht 5' 7\" (1.702 m)    Wt 117.9 kg (260 lb)    SpO2 100%    BMI 40.72 kg/m2       O2 Device: BIPAP       Temp (24hrs), Av.3 °F (36.8 °C), Min:98.3 °F (36.8 °C), Max:98.3 °F (36.8 °C)       Intake/Output:   Last shift:       0701 -  1900  In: -   Out: 800 [Urine:800]  Last 3 shifts:      Intake/Output Summary (Last 24 hours) at 18 1344  Last data filed at 18 1306   Gross per 24 hour   Intake                0 ml   Output              800 ml   Net             -800 ml      Physical Exam:   General:  Alert, cooperative, no distress on BIPAP, appears stated age. Head:  Normocephalic, without obvious abnormality, atraumatic. Eyes:  Conjunctivae/corneas clear. PERRL, EOMs intact. Nose: BIPAP   Throat: BIPAP   Neck: Supple, symmetrical, trachea midline, no adenopathy, thyroid: no enlargment/tenderness/nodules, no carotid bruit and no JVD.    Back:   Symmetric    Lungs:   Bilateral scattered rales without egophony, no wheezes or rhonchi   Chest wall:  No tenderness or deformity. Heart:  Regular rate and rhythm, S1, S2 normal, no murmur, click, rub or gallop. Abdomen:   Soft, non-tender. Bowel sounds normal. No masses,  No organomegaly. Extremities: Extremities normal, atraumatic, no cyanosis or edema. Pulses: 2+ and symmetric all extremities. Skin: Skin color, texture, turgor normal. No rashes or lesions   Lymph nodes: Cervical nodes normal.   Neurologic: Grossly nonfocal     Data review:     Recent Results (from the past 24 hour(s))   METABOLIC PANEL, BASIC    Collection Time: 08/23/18  7:50 AM   Result Value Ref Range    Sodium 143 136 - 145 mmol/L    Potassium 4.8 3.5 - 5.5 mmol/L    Chloride 108 100 - 108 mmol/L    CO2 23 21 - 32 mmol/L    Anion gap 12 3.0 - 18 mmol/L    Glucose 234 (H) 74 - 99 mg/dL    BUN 67 (H) 7.0 - 18 MG/DL    Creatinine 6.88 (H) 0.6 - 1.3 MG/DL    BUN/Creatinine ratio 10 (L) 12 - 20      GFR est AA 10 (L) >60 ml/min/1.73m2    GFR est non-AA 8 (L) >60 ml/min/1.73m2    Calcium 7.3 (L) 8.5 - 10.1 MG/DL   CBC WITH AUTOMATED DIFF    Collection Time: 08/23/18  7:50 AM   Result Value Ref Range    WBC 7.7 4.6 - 13.2 K/uL    RBC 4.05 (L) 4.70 - 5.50 M/uL    HGB 11.0 (L) 13.0 - 16.0 g/dL    HCT 35.7 (L) 36.0 - 48.0 %    MCV 88.1 74.0 - 97.0 FL    MCH 27.2 24.0 - 34.0 PG    MCHC 30.8 (L) 31.0 - 37.0 g/dL    RDW 13.4 11.6 - 14.5 %    PLATELET 415 630 - 288 K/uL    MPV 9.9 9.2 - 11.8 FL    NEUTROPHILS 50 40 - 73 %    LYMPHOCYTES 35 21 - 52 %    MONOCYTES 11 (H) 3 - 10 %    EOSINOPHILS 4 0 - 5 %    BASOPHILS 0 0 - 2 %    ABS. NEUTROPHILS 3.7 1.8 - 8.0 K/UL    ABS. LYMPHOCYTES 2.7 0.9 - 3.6 K/UL    ABS. MONOCYTES 0.9 0.05 - 1.2 K/UL    ABS. EOSINOPHILS 0.3 0.0 - 0.4 K/UL    ABS.  BASOPHILS 0.0 0.0 - 0.1 K/UL    DF AUTOMATED     NT-PRO BNP    Collection Time: 08/23/18  7:50 AM   Result Value Ref Range    NT pro-BNP 58810 (H) 0 - 900 PG/ML   PROTHROMBIN TIME + INR    Collection Time: 08/23/18  7:50 AM   Result Value Ref Range    Prothrombin time 13.2 11.5 - 15.2 sec    INR 1.0 0.8 - 1.2     PTT    Collection Time: 08/23/18  7:50 AM   Result Value Ref Range    aPTT 28.1 23.0 - 36.4 SEC   CARDIAC PANEL,(CK, CKMB & TROPONIN)    Collection Time: 08/23/18  7:50 AM   Result Value Ref Range     (H) 39 - 308 U/L    CK - MB 4.8 (H) <3.6 ng/ml    CK-MB Index 1.1 0.0 - 4.0 %    Troponin-I, Qt. 0.82 (H) 0.0 - 0.045 NG/ML   EKG, 12 LEAD, INITIAL    Collection Time: 08/23/18  7:59 AM   Result Value Ref Range    Ventricular Rate 104 BPM    Atrial Rate 104 BPM    P-R Interval 168 ms    QRS Duration 110 ms    Q-T Interval 406 ms    QTC Calculation (Bezet) 533 ms    Calculated P Axis 46 degrees    Calculated R Axis -40 degrees    Calculated T Axis 108 degrees    Diagnosis       Sinus tachycardia  Possible Left atrial enlargement  Left axis deviation  Left ventricular hypertrophy  ST & T wave abnormality, consider lateral ischemia  Prolonged QT  Abnormal ECG  When compared with ECG of 09-NOV-2016 07:50,  premature ventricular complexes are no longer present  ST now depressed in Lateral leads  T wave inversion more evident in Lateral leads     POC G3    Collection Time: 08/23/18  8:13 AM   Result Value Ref Range    Device: BIPAP      FIO2 (POC) 60 %    pH (POC) 7.176 (LL) 7.35 - 7.45      pCO2 (POC) 61.0 (H) 35.0 - 45.0 MMHG    pO2 (POC) 63 (L) 80 - 100 MMHG    HCO3 (POC) 22.6 22 - 26 MMOL/L    sO2 (POC) 85 (L) 92 - 97 %    Base deficit (POC) 6 mmol/L    PEEP/CPAP (POC) 6 cmH2O    PIP (POC) 18      Pressure support 12 cmH2O    Allens test (POC) YES      Bleed In 60 L/min    Total resp.  rate 43      Site LEFT RADIAL      Patient temp. 98.3      Specimen type (POC) ARTERIAL      Performed by Daniella García timed YES     POC LACTIC ACID    Collection Time: 08/23/18  8:35 AM   Result Value Ref Range    Lactic Acid (POC) 1.1 0.4 - 2.0 mmol/L   POC G3    Collection Time: 08/23/18  9:47 AM   Result Value Ref Range    Device: BIPAP FIO2 (POC) 60 %    pH (POC) 7.242 (LL) 7.35 - 7.45      pCO2 (POC) 54.1 (H) 35.0 - 45.0 MMHG    pO2 (POC) 98 80 - 100 MMHG    HCO3 (POC) 23.3 22 - 26 MMOL/L    sO2 (POC) 96 92 - 97 %    Base deficit (POC) 4 mmol/L    PEEP/CPAP (POC) 7 cmH2O    PIP (POC) 19      Pressure support 12 cmH2O    Allens test (POC) YES      Bleed In 60 L/min    Total resp. rate 37      Site RIGHT RADIAL      Specimen type (POC) ARTERIAL      Performed by Yadira Doran     Spontaneous timed YES     URINALYSIS W/ RFLX MICROSCOPIC    Collection Time: 08/23/18 12:31 PM   Result Value Ref Range    Color YELLOW      Appearance HAZY      Specific gravity 1.020 1.003 - 1.030      pH (UA) 5.5 5.0 - 8.0      Protein >300 (A) NEG mg/dL    Glucose 100 (A) NEG mg/dL    Ketone NEGATIVE  NEG mg/dL    Bilirubin NEGATIVE  NEG      Blood SMALL (A) NEG      Urobilinogen 0.2 0.2 - 1.0 EU/dL    Nitrites NEGATIVE  NEG      Leukocyte Esterase NEGATIVE  NEG     CREATININE, UR, RANDOM    Collection Time: 08/23/18 12:31 PM   Result Value Ref Range    Creatinine, urine 56.80 30 - 125 mg/dL   PROTEIN URINE, RANDOM    Collection Time: 08/23/18 12:31 PM   Result Value Ref Range    Protein, urine random 194 (H) <11.9 mg/dL   URINE MICROSCOPIC ONLY    Collection Time: 08/23/18 12:31 PM   Result Value Ref Range    WBC 0 to 1 0 - 4 /hpf    RBC NEGATIVE  0 - 5 /hpf    Epithelial cells NEGATIVE  0 - 5 /lpf    Bacteria 1+ (A) NEG /hpf    Amorphous Crystals FEW (A) NEG         Imaging:  I have personally reviewed the patients radiographs and have reviewed the reports:  XR Results (most recent):    Results from Hospital Encounter encounter on 08/23/18   XR CHEST PORT   Narrative EXAM:  Chest Portable    INDICATION:  Shortness of breath. COMPARISON:  9/18/17. TECHNIQUE:  AP portable chest study obtained in semiupright position. FINDINGS:     - New airspace opacities are noted in the right mid to lower lung zones.     - There may be additional areas of airspace opacities in the retrocardiac region  as well. - No pneumothorax or pleural effusion is detected. - No significant cardiac silhouette enlargement. Impression IMPRESSION:    New airspace opacities at least in the right mid to lower lung zones. Possible  additional area of airspace opacities in the retrocardiac region. Differential  considerations of pneumonia vs. aspiration vs. hemorrhage.                       Malinda Mercado MD

## 2018-08-23 NOTE — CONSULTS
Consult noted for JULIA , Patient seen and examined , orders placed, A/P as below    Assessment:   1) JULIA on CKD4 ( baseline ~ 2.5-3) in the setting of acute CHF/respiratory failure/HTN urgency       Volume overloaded, presently  Anuric for 2 hrs , will place a martinez and try diuresing , if not       Responding to diuresis may need isolated UF. Electrolytes and acid base acceptable range      Discussed with the patient.  Renal US to evaluate for acute hydro  2) HTN urgency , on nitro drip , goal BP ~ 160-180 in 24 hrs , avoid hypotension      Renal duplex US to evaluate for KELLY  3) AC on chronic CHF , EF 15% in 2016 , needs a new ECHO  4) AC respiratory failure d/t pulm edema , possible PNA , on AB , diuresis/RRT as above       Presently on BIPAP in the ED  5) H/O alcohol abuse    Please call with questions,    Ousmane Morrow MD [facility]  Cell [phone]  Pager: [phone]

## 2018-08-23 NOTE — IP AVS SNAPSHOT
Oksana Marcelo 
 
 
 [address] [facility] [address] 
[phone] Patient: Christin Aguilar MRN: [id number] PMD:0/1/4891 About your hospitalization You were admitted on:  August 23, 2018 You last received care in the:  [facility] 2 CV STEPDOWN You were discharged on:  August 31, 2018 Why you were hospitalized Your primary diagnosis was:  Not on File Your diagnoses also included:  Chf (Congestive Heart Failure) (Hcc), Respiratory Distress, Hypertensive Emergency Follow-up Information Follow up With Details Comments Contact Info Zev Serrano MD   [address] [facility] [address] [place], [place] [address] 
[phone] Your Scheduled Appointments Wednesday September 05, 2018  1:30 PM EDT ROUTINE CARE with Zev Serrano MD  
[facility] Main Office ([facility]) [address] [facility] [address] [facility] [address] [phone] Tuesday September 18, 2018 12:30 PM EDT Office Visit with Solitario Martin MD  
[facility] [address], [address] [address] [phone] Discharge Orders None A check shannon indicates which time of day the medication should be taken. My Medications START taking these medications Instructions Each Dose to Equal  
 Morning Noon Evening Bedtime  
 b complex-vitamin c-folic acid 1 mg capsule Commonly known as:  Janessa Ambrose Start taking on:  9/1/2018 Your last dose was: Your next dose is: Take 1 Cap by mouth daily. 1 Cap  
    
   
   
   
  
 calcium acetate 667 mg Cap Commonly known as:  PHOSLO Your last dose was: Your next dose is: Take 1 Cap by mouth three (3) times daily (with meals). 1 Cap  
    
   
   
   
  
 tamsulosin 0.4 mg capsule Commonly known as:  FLOMAX Your last dose was: Your next dose is: Take 1 Cap by mouth nightly. 0.4 mg  
    
   
   
   
  
 thiamine  mg tablet Commonly known as:  B-1 Your last dose was: Your next dose is: Take 1 Tab by mouth daily. 100 mg CHANGE how you take these medications Instructions Each Dose to Equal  
 Morning Noon Evening Bedtime  
 carvedilol 12.5 mg tablet Commonly known as:  Kwan Jack What changed:   
- medication strength 
- how much to take - when to take this Your last dose was: Your next dose is: Take 1 Tab by mouth every twelve (12) hours. 12.5 mg  
    
   
   
   
  
 hydrALAZINE 50 mg tablet Commonly known as:  APRESOLINE What changed:   
- medication strength 
- how much to take - when to take this Your last dose was: Your next dose is: Take 1 Tab by mouth every eight (8) hours. 50 mg CONTINUE taking these medications Instructions Each Dose to Equal  
 Morning Noon Evening Bedtime  
 allopurinol 100 mg tablet Commonly known as:  Fiona De León Your last dose was: Your next dose is: Take 2 Tabs by mouth daily. 200 mg  
    
   
   
   
  
 amLODIPine 10 mg tablet Commonly known as:  Rosalind Holder Your last dose was: Your next dose is: TAKE 1 TABLET BY MOUTH DAILY  
     
   
   
   
  
 aspirin 81 mg chewable tablet Your last dose was: Your next dose is: Take 1 Tab by mouth daily. 81 mg  
    
   
   
   
  
 colchicine 0.6 mg tablet Your last dose was: Your next dose is:    
   
   
 2 tablets at first sign of gout flare and then 1 tablet 1 hour later  Indications: acute gouty arthritis  
     
   
   
   
  
 pravastatin 20 mg tablet Commonly known as:  PRAVACHOL Your last dose was: Your next dose is: Take 1 Tab by mouth nightly. Indications: hyperlipidemia 20 mg  
    
   
   
   
  
 terazosin 1 mg capsule Commonly known as:  HYTRIN Your last dose was: Your next dose is: Take 1 Cap by mouth nightly. Indications: hypertension 1 mg STOP taking these medications   
 furosemide 40 mg tablet Commonly known as:  LASIX Where to Get Your Medications Information on where to get these meds will be given to you by the nurse or doctor. ! Ask your nurse or doctor about these medications  
  b complex-vitamin c-folic acid 1 mg capsule  
 calcium acetate 667 mg Cap  
 carvedilol 12.5 mg tablet  
 hydrALAZINE 50 mg tablet  
 tamsulosin 0.4 mg capsule  
 thiamine  mg tablet Discharge Instructions [facility] Announce[facility] We are excited to announce that we are making your provider's discharge notes available to you in [employer]. You will see these notes when they are completed and signed by the physician that discharged you from your recent hospital stay. If you have any questions or concerns about any information you see in [employer], please call the Health Information Department where you were seen or reach out to your Primary Care Provider for more information about your plan of care. Introducing [facility]! Vasyl West introduces [employer] patient portal. Now you can access parts of your medical record, email your doctor's office, and request medication refills online. 1. In your internet browser, go to [email or web address] 2. Click on the First Time User? Click Here link in the Sign In box. You will see the New Member Sign Up page. 3. Enter your [employer] Access Code exactly as it appears below. You will not need to use this code after youve completed the sign-up process. If you do not sign up before the expiration date, you must request a new code. · E[employer] Access Code: [email or web address] Expires: 11/21/2018  8:05 AM 
 
4. Enter the last four digits of your Social Security Number (xxxx) and Date of Birth (mm/dd/yyyy) as indicated and click Submit. You will be taken to the next sign-up page. 5. Create a E[employer]rog[employer]es ID. This will be your [employer] login ID and cannot be changed, so think of one that is secure and easy to remember. 6. Create a [employer]es password. You can change your password at any time. 7. Enter your Password Reset Question and Answer. This can be used at a later time if you forget your password. 8. Enter your e-mail address. You will receive e-mail notification when new information is available in [address] 9. Click Sign Up. You can now view and download portions of your medical record. 10. Click the Download Summary menu link to download a portable copy of your medical information. If you have questions, please visit the Frequently Asked Questions section of the [employer] website. Remember, [employer] is NOT to be used for urgent needs. For medical emergencies, dial 911. Now available from your iPhone and Android! Introducing Faustino Juárez As a [facility] patient, I wanted to make you aware of our electronic visit tool called Faustino Juárez. [facility] 24/7 allows you to connect within minutes with a medical provider 24 hours a day, seven days a week via a mobile device or tablet or logging into a secure website from your computer. You can access Faustino Juárez from anywhere in the [place]. A virtual visit might be right for you when you have a simple condition and feel like you just dont want to get out of bed, or cant get away from work for an appointment, when your regular [facility] provider is not available (evenings, weekends or holidays), or when youre out of town and need minor care.   Electronic visits cost only $49 and if the [employer] [facility] 24/7 provider determines a prescription is needed to treat your condition, one can be electronically transmitted to a nearby pharmacy*. Please take a moment to enroll today if you have not already done so. The enrollment process is free and takes just a few minutes. To enroll, please download the [employer] 24/7 etelvina to your tablet or phone, or visit [email or web address] to enroll on your computer. And, as an [address] [place] [address] patient with a [facility] Online account, the results of your visits will be scanned into your electronic medical record and your primary care provider will be able to view the scanned results. We urge you to continue to see your regular [facility] provider for your ongoing medical care. And while your primary care provider may not be the one available when you seek a [employer] virtual visit, the peace of mind you get from getting a real diagnosis real time can be priceless. For more information on [employer], view our Frequently Asked Questions (FAQs) at [email or web address]. Sincerely, 
 
Marcel Tang MD 
Chief Medical Officer [address] [place] [address] *:  certain medications cannot be prescribed via [employer]omNow Providers Seen During Your Hospitalization Provider Specialty Primary office phone Lin Krishna DO Emergency Medicine [phone] Dorene Carr MD Internal Medicine [phone] Yakov Lema MD Internal Medicine [phone] Brielle Medellin MD Family Practice [phone] Yaneli Pike MD Internal Medicine [phone] Your Primary Care Physician (PCP) Primary Care Physician Office Phone Office Fax 1039 [address] [phone] You are allergic to the following No active allergies Recent Documentation Height Weight BMI Smoking Status 1.702 m 111.5 kg 38.5 kg/m2 Never Smoker Emergency Contacts Name Discharge Info Relation Home Work Mobile [facility] DISCHARGE CAREGIVER [3] Sister [23] [phone] Patient Belongings The following personal items are in your possession at time of discharge: 
  Dental Appliances: None  Visual Aid: At bedside      Home Medications: None   Jewelry: None  Clothing: At bedside, Footwear, Shirt, Socks, Pants    Other Valuables: [employer], [employer]  Personal Items Sent to Safe: None Please provide this summary of care documentation to your next provider. Signatures-by signing, you are acknowledging that this After Visit Summary has been reviewed with you and you have received a copy. Patient Signature:  ____________________________________________________________ Date:  ____________________________________________________________  
  
[facility] Provider Signature:  ____________________________________________________________ Date:  ____________________________________________________________

## 2018-08-23 NOTE — ED TRIAGE NOTES
The patient presents for evaluation of shortness of breath. Denies chest pain. He arrived to the ED on C-PAP. He received Nitroglycerin 0.4 mg SL and 1 inch Nitroglycerin paste placed on left anterior chest wall by medic prior to arrival.  He is currently on BiPAP.

## 2018-08-23 NOTE — PROGRESS NOTES
Pt removed from Bipap and placed on NC 3L.      08/23/18 1448   Oxygen Therapy   O2 Sat (%) 100 %   Pulse via Oximetry 79 beats per minute   O2 Device Nasal cannula   O2 Flow Rate (L/min) 3 l/min   Respiratory   Respiratory (WDL) WDL   CPAP/BIPAP   CPAP/BIPAP Start/Stop Off

## 2018-08-23 NOTE — Clinical Note
Status[de-identified] Inpatient [101] Type of Bed: Intensive Care [6] Inpatient Hospitalization Certified Necessary for the Following Reasons: 3. Patient receiving treatment that can only be provided in an inpatient setting (further clarification in H&P documentation) Admitting Diagnosis: Respiratory distress [402561] Admitting Diagnosis: [facility] (congestive [facility] failure) ([facility] [[id number]] Admitting Physician: David Brownlee [[id number]] Attending Physician: David Brownlee [[id number]] Estimated Length of Stay: 3-4 Midnights Discharge Plan[de-identified] Home with Office Follow-up

## 2018-08-23 NOTE — ED NOTES
Patient laying in bed alert and oriented x3 on bi-pap. Patient remains on monitors. No signs of distress noted at this time. Will continue to monitor.

## 2018-08-23 NOTE — ED PROVIDER NOTES
EMERGENCY DEPARTMENT HISTORY AND PHYSICAL EXAM    8:09 AM      Date: 8/23/2018  Patient Name: Nicolette Malone    History of Presenting Illness     Chief Complaint   Patient presents with    Shortness of Breath         History Provided By: Patient and EMS    Chief Complaint: SOB       Additional History (Context): Nicolette Malone is a 63 y.o. male with hypertension, hyperlipidemia, renal insufficiency and diastolic CHF with EF 30% who presents with shortness of breath that started this morning. Mild symptoms yesterday. Has had productive cough for about a week. He confirms lower extremity swelling. He is not able to give a complete history as he is on Bipap and in distress. He has been taking his meds as prescribed. He denies chest pain. He does not have history of lung disease. He has history of admit for pulmonary edema with similar presentation requiring intubation upon arrival to ED in 2014. PCP: Damian Shen MD    Current Facility-Administered Medications   Medication Dose Route Frequency Provider Last Rate Last Dose    cefTRIAXone (ROCEPHIN) 2 g in sterile water (preservative free) 20 mL IV syringe  2 g IntraVENous Q24H Javier Mcknight PA-C   2 g at 08/23/18 0913    azithromycin (ZITHROMAX) 500 mg in 0.9% sodium chloride (MBP/ADV) 250 mL adv  500 mg IntraVENous Q24H Javier Mcknight PA-C 250 mL/hr at 08/23/18 0924 500 mg at 08/23/18 7628    nitroglycerin (TRIDIL) 400 mcg/ml infusion  0-20 mcg/min IntraVENous TITRATE Javier Mcknight PA-C 3 mL/hr at 08/23/18 0911 20 mcg/min at 08/23/18 0911    sodium chloride (NS) flush 5-10 mL  5-10 mL IntraVENous PRN Javier Mcknight PA-C        sodium bicarbonate 8.4 % (1 mEq/mL) injection 50 mEq  50 mEq IntraVENous NOW Javier Mcknight PA-C         Current Outpatient Prescriptions   Medication Sig Dispense Refill    carvedilol (COREG) 25 mg tablet Take 1 Tab by mouth two (2) times daily (with meals).  Indications: chronic heart failure, hypertension 180 Tab 3    pravastatin (PRAVACHOL) 20 mg tablet Take 1 Tab by mouth nightly. Indications: hyperlipidemia 90 Tab 3    colchicine 0.6 mg tablet 2 tablets at first sign of gout flare and then 1 tablet 1 hour later  Indications: acute gouty arthritis 3 Tab 5    terazosin (HYTRIN) 1 mg capsule Take 1 Cap by mouth nightly. Indications: hypertension 30 Cap 11    furosemide (LASIX) 40 mg tablet TAKE 1 TABLET BY MOUTH DAILY 30 Tab 1    allopurinol (ZYLOPRIM) 100 mg tablet Take 2 Tabs by mouth daily. 120 Tab 2    amLODIPine (NORVASC) 10 mg tablet TAKE 1 TABLET BY MOUTH DAILY 30 Tab 2    hydrALAZINE (APRESOLINE) 100 mg tablet Take 1 Tab by mouth three (3) times daily. 90 Tab 6    aspirin 81 mg chewable tablet Take 1 Tab by mouth daily. 27 Tab 11       Past History     Past Medical History:  Past Medical History:   Diagnosis Date    Arthritis of left knee 09/2017    moderate to severe, with effusion on xray    Chronic kidney disease     [facility]     Dilated cardiomyopathy ([facility]     History of alcohol abuse     History of echocardiogram 02/24/2014    Mod-marked LVE. EF 15%. Severe, diffuse hypk. Mild LVH. Gr 1 DDfx. Mod LAE. Mild-mod FIDELIA. Mild AoRE. No significant change from echo of 10/23/09.  History of gout     History of myocardial perfusion scan 05/25/2006    No evidence of ischemia or scarring. Gross LVE. EF 28%. Severe global hypk. Neg EKG on submaximal EST. Ex time 8 min 25 sec.     HTN (hypertension)     Hypercholesterolemia     Hypertensive cardiovascular disease        Past Surgical History:  Past Surgical History:   Procedure Laterality Date    HX HERNIA REPAIR  04/2016       Family History:  Family History   Problem Relation Age of Onset    Cancer Father      Lung    Heart Failure Mother     Cancer Sister        Social History:  Social History   Substance Use Topics    Smoking status: Never Smoker    Smokeless tobacco: Never Used    Alcohol use No Comment: stop 3 years ago in feb       Allergies:  No Known Allergies      Review of Systems       Review of Systems   Constitutional: Positive for diaphoresis. Negative for fever. HENT: Negative for facial swelling. Eyes: Negative for visual disturbance. Respiratory: Positive for cough and shortness of breath. Cardiovascular: Positive for leg swelling. Negative for chest pain. Gastrointestinal: Negative for abdominal pain. Genitourinary: Negative for dysuria. Musculoskeletal: Negative for neck pain. Skin: Negative for rash. Neurological: Negative for dizziness. Psychiatric/Behavioral: Negative for confusion. All other systems reviewed and are negative. Physical Exam     Visit Vitals    BP (!) 206/130    Pulse 100    Temp 98.3 °F (36.8 °C)    Resp (!) 45    Ht 5' 7\" (1.702 m)    Wt 117.9 kg (260 lb)    SpO2 90%    BMI 40.72 kg/m2         Physical Exam   Constitutional: He appears well-developed and well-nourished. He appears distressed. HENT:   Head: Normocephalic and atraumatic. Eyes: Conjunctivae are normal.   Neck: Normal range of motion. Neck supple. Cardiovascular: Regular rhythm and normal heart sounds. Tachycardia present. Pulmonary/Chest: He is in respiratory distress. He has rales. Abdominal: Soft. There is no tenderness. Musculoskeletal: Normal range of motion. 2+ pitting edema BLE    Neurological: He is alert. Skin: Skin is warm. He is diaphoretic. Psychiatric: He has a normal mood and affect. Nursing note and vitals reviewed.         Diagnostic Study Results     Labs -  Recent Results (from the past 12 hour(s))   METABOLIC PANEL, BASIC    Collection Time: 08/23/18  7:50 AM   Result Value Ref Range    Sodium 143 136 - 145 mmol/L    Potassium 4.8 3.5 - 5.5 mmol/L    Chloride 108 100 - 108 mmol/L    CO2 23 21 - 32 mmol/L    Anion gap 12 3.0 - 18 mmol/L    Glucose 234 (H) 74 - 99 mg/dL    BUN 67 (H) 7.0 - 18 MG/DL    Creatinine 6.88 (H) 0.6 - 1.3 MG/DL    BUN/Creatinine ratio 10 (L) 12 - 20      GFR est AA 10 (L) >60 ml/min/1.73m2    GFR est non-AA 8 (L) >60 ml/min/1.73m2    Calcium 7.3 (L) 8.5 - 10.1 MG/DL   CBC WITH AUTOMATED DIFF    Collection Time: 08/23/18  7:50 AM   Result Value Ref Range    WBC 7.7 4.6 - 13.2 K/uL    RBC 4.05 (L) 4.70 - 5.50 M/uL    HGB 11.0 (L) 13.0 - 16.0 g/dL    HCT 35.7 (L) 36.0 - 48.0 %    MCV 88.1 74.0 - 97.0 FL    MCH 27.2 24.0 - 34.0 PG    MCHC 30.8 (L) 31.0 - 37.0 g/dL    RDW 13.4 11.6 - 14.5 %    PLATELET 971 682 - 839 K/uL    MPV 9.9 9.2 - 11.8 FL    NEUTROPHILS 50 40 - 73 %    LYMPHOCYTES 35 21 - 52 %    MONOCYTES 11 (H) 3 - 10 %    EOSINOPHILS 4 0 - 5 %    BASOPHILS 0 0 - 2 %    ABS. NEUTROPHILS 3.7 1.8 - 8.0 K/UL    ABS. LYMPHOCYTES 2.7 0.9 - 3.6 K/UL    ABS. MONOCYTES 0.9 0.05 - 1.2 K/UL    ABS. EOSINOPHILS 0.3 0.0 - 0.4 K/UL    ABS.  BASOPHILS 0.0 0.0 - 0.1 K/UL    DF AUTOMATED     NT-PRO BNP    Collection Time: 08/23/18  7:50 AM   Result Value Ref Range    NT pro-BNP 33259 (H) 0 - 900 PG/ML   PROTHROMBIN TIME + INR    Collection Time: 08/23/18  7:50 AM   Result Value Ref Range    Prothrombin time 13.2 11.5 - 15.2 sec    INR 1.0 0.8 - 1.2     PTT    Collection Time: 08/23/18  7:50 AM   Result Value Ref Range    aPTT 28.1 23.0 - 36.4 SEC   CARDIAC PANEL,(CK, CKMB & TROPONIN)    Collection Time: 08/23/18  7:50 AM   Result Value Ref Range     (H) 39 - 308 U/L    CK - MB 4.8 (H) <3.6 ng/ml    CK-MB Index 1.1 0.0 - 4.0 %    Troponin-I, Qt. 0.82 (H) 0.0 - 0.045 NG/ML   EKG, 12 LEAD, INITIAL    Collection Time: 08/23/18  7:59 AM   Result Value Ref Range    Ventricular Rate 104 BPM    Atrial Rate 104 BPM    P-R Interval 168 ms    QRS Duration 110 ms    Q-T Interval 406 ms    QTC Calculation (Bezet) 533 ms    Calculated P Axis 46 degrees    Calculated R Axis -40 degrees    Calculated T Axis 108 degrees    Diagnosis       Sinus tachycardia  Possible Left atrial enlargement  Left axis deviation  Left ventricular hypertrophy  ST & T wave abnormality, consider lateral ischemia  Prolonged QT  Abnormal ECG  When compared with ECG of 09-NOV-2016 07:50,  premature ventricular complexes are no longer present  ST now depressed in Lateral leads  T wave inversion more evident in Lateral leads     POC G3    Collection Time: 08/23/18  8:13 AM   Result Value Ref Range    Device: BIPAP      FIO2 (POC) 60 %    pH (POC) 7.176 (LL) 7.35 - 7.45      pCO2 (POC) 61.0 (H) 35.0 - 45.0 MMHG    pO2 (POC) 63 (L) 80 - 100 MMHG    HCO3 (POC) 22.6 22 - 26 MMOL/L    sO2 (POC) 85 (L) 92 - 97 %    Base deficit (POC) 6 mmol/L    PEEP/CPAP (POC) 6 cmH2O    PIP (POC) 18      Pressure support 12 cmH2O    Allens test (POC) YES      Bleed In 60 L/min    Total resp. rate 43      Site LEFT RADIAL      Patient temp. 98.3      Specimen type (POC) ARTERIAL      Performed by Ortega Schwalbe     Spontaneous timed YES     POC LACTIC ACID    Collection Time: 08/23/18  8:35 AM   Result Value Ref Range    Lactic Acid (POC) 1.1 0.4 - 2.0 mmol/L   POC G3    Collection Time: 08/23/18  9:47 AM   Result Value Ref Range    Device: BIPAP      FIO2 (POC) 60 %    pH (POC) 7.242 (LL) 7.35 - 7.45      pCO2 (POC) 54.1 (H) 35.0 - 45.0 MMHG    pO2 (POC) 98 80 - 100 MMHG    HCO3 (POC) 23.3 22 - 26 MMOL/L    sO2 (POC) 96 92 - 97 %    Base deficit (POC) 4 mmol/L    PEEP/CPAP (POC) 7 cmH2O    PIP (POC) 19      Pressure support 12 cmH2O    Allens test (POC) YES      Bleed In 60 L/min    Total resp. rate 37      Site RIGHT RADIAL      Specimen type (POC) ARTERIAL      Performed by Ortega Schwalbe     Spontaneous timed YES         Radiologic Studies -   XR CHEST PORT   Final Result            Medical Decision Making   I am the first provider for this patient. I reviewed the vital signs, available nursing notes, past medical history, past surgical history, family history and social history. Vital Signs-Reviewed the patient's vital signs.     Pulse Oximetry Analysis -  93% on Bipap (Interpretation)    Cardiac Monitor:  Rate: 98  Rhythm:  Sinus Tachycardia      EKG: Interpreted by the EP. Dr. Yoni Lawrence   Time Interpreted: 0802   Rate: 104   Rhythm: Sinus Tachycardia     Interpretation: sinus tach, LA enlargement, LAD, LVH, prolonged QT (QTc 533), T wave inversion in lateral   Comparison: ST depression in lateral leads new, T wave inversion unchanged. Previous EKG from 2016     Records Reviewed: Nursing Notes, Old Medical Records, Previous electrocardiograms and Previous Laboratory Studies (Time of Review: 8:09 AM)    ED Course: Progress Notes, Reevaluation, and Consults:    8:10 AM  Patient evaluated upon arrival by myself and Dr. Yoni Lawrence. He is diaphoretic and in respiratory distress. Lungs fields have diffuse rales and BLE have 2+ pitting edema. He does not have h/o respiratory dz but has CHF with EF of 15%. BP is elevated. He was given nitro SL and paste en route and started on CPAP. Aspirin, nitro drip, lasix, and Bipap were ordered. abx to cover for possible pneumonia. Sepsis workup ordered; fluid overload is most likely cause of vital signs as opposed to sepsis so fluids were held. Afebrile. ABG and labs pending. Discussed plan with Dr. Yoni Lawrence. 9:48 AM  Patient is breathing better, states symptoms have improved. No longer diaphoretic.      9:57 AM  Rpeat ABG confirms improvement in respiratory acidosis. Intubation was initially considered but he has gradually improved with BiPap and treatment. Fluids continue to be held due to fluid overload and confirmed CHF exacerbation. Possible pneumonia on CXR, also treated; white count and lactic and temp were all normal so doubt sepsis. Acute worsening of CKD. Trop and BNP both elevated, both could be related to the elevated Creatinine. Trop has been elevated like this in the past.  He is not complaining of any chest pain. Consulted hospitalist for admit to ICU.      10:29 AM  Dr. Dev Gordon agrees to admit.   Consulted intensivist for ICU care.      11:55 AM  I have discussed plan with hospitalist as well as nephrology who will both consult on patient. Provider Notes (Medical Decision Making): MDM  Number of Diagnoses or Management Options  Diagnosis management comments: The patient presents with chest pain with a differential diagnosis of  ACS, acute MI (no STEMI on EKG), pulmonary edema/CHF, bronchitis, pericarditis, pnuemothorax (unlikely, no pain or signs on exam), COPD (unlikely, no history) and pnuemonia. Based on history, CHF most likely vs ACS vs pneumonia. Attending attestation of a face to face patient encounter:  7:50 AM  The patient presents with the following symptoms: sob, respiratory distress with history CHF. My exam shows: severe respiratory distress, using accessory abd muscles  Impression/plan:emergent switch from EMS cpap to bipap in ED, give lasix, nitro gtt, monitor closely for airway, consider intubate. Get ABG, admit. Will repeat ABG. I have reviewed the chart, personally evaluated the patient, and discussed with the ALENA our assessment and plan. I agree with the documentation recorded by the ALENA, including the findings, treatment plan, and disposition. [employer], DO      Procedures:     Core Measures:     Critical Care Time: 100 minutes   Critical Care Time:  The services I provided to this patient were to treat and/or prevent clinically significant deterioration that could result in the failure of one or more body systems and/or organ systems due to respiratory distress and hypertensive emergency.     Services included the following:  -reviewing nursing notes and old charts  -vital sign assessments  -direct patient care  -medication orders and management  -interpreting and reviewing diagnostic studies/labs  -re-evaluations  -documentation time    Aggregate critical care time was 100 minutes, which includes only time during which I was engaged in work directly related to the patient's care as described above, whether I was at bedside or elsewhere in the Emergency Department. It did not include time spent performing other reported procedures or the services of residents, students, nurses, or advance practice providers. Javier Mcknight PA-C      9:56 AM      For Hospitalized Patients:    1. Hospitalization Decision Time:  The decision to hospitalize the patient was made by Dr. Sarina Hyman at 1029AM on 8/23/2018    2. Aspirin: Aspirin was given    Diagnosis     Clinical Impression:   1. Respiratory distress    2. Acute on chronic congestive heart failure, unspecified heart failure type ([facility] [address]    3. Pneumonia of right lower lobe due to infectious organism ([facility]    4. Acute kidney injury superimposed on chronic kidney disease ([facility]    5. Respiratory acidosis    6. Elevated troponin    7. Hypertensive emergency        Disposition:     Follow-up Information     None           Patient's Medications   Start Taking    No medications on file   Continue Taking    ALLOPURINOL (ZYLOPRIM) 100 MG TABLET    Take 2 Tabs by mouth daily. AMLODIPINE (NORVASC) 10 MG TABLET    TAKE 1 TABLET BY MOUTH DAILY    ASPIRIN 81 MG CHEWABLE TABLET    Take 1 Tab by mouth daily. CARVEDILOL (COREG) 25 MG TABLET    Take 1 Tab by mouth two (2) times daily (with meals). Indications: chronic heart failure, hypertension    COLCHICINE 0.6 MG TABLET    2 tablets at first sign of gout flare and then 1 tablet 1 hour later  Indications: acute gouty arthritis    FUROSEMIDE (LASIX) 40 MG TABLET    TAKE 1 TABLET BY MOUTH DAILY    HYDRALAZINE (APRESOLINE) 100 MG TABLET    Take 1 Tab by mouth three (3) times daily. PRAVASTATIN (PRAVACHOL) 20 MG TABLET    Take 1 Tab by mouth nightly. Indications: hyperlipidemia    TERAZOSIN (HYTRIN) 1 MG CAPSULE    Take 1 Cap by mouth nightly.  Indications: hypertension   These Medications have changed    No medications on file   Stop Taking    No medications on file _______________________________    Attestations:  8:09 AM  8/23/2018  Danitza Thompson PA-C  _______________________________        I was personally available for consultation in the emergency department. I have reviewed the chart prior to the patient being discharged and agree with the documentation recorded by the [facility], including the assessment, treatment plan, and disposition.   Lin Krishna DO

## 2018-08-23 NOTE — IP AVS SNAPSHOT
Summary of Care Report The Summary of Care report has been created to help improve care coordination. Users with access to [employer] or [employer] [address] (Web-based application) may access additional patient information including the Discharge Summary. If you are not currently a [address] user and need more information, please call the number listed below in the [place] [address] section and ask to be connected with Medical Records. Facility Information Name Address Phone [address] [place] [address] [facility] [id number] [phone] Patient Information Patient Name Sex CINDY Pantoja ([id number]) Male 1960 Discharge Information Admitting Provider Service Area Unit You Sharp MD / [address] [place] [address] / [phone] Discharge Provider Discharge Date/Time Discharge Disposition Destination (none) 2018 (Pending) [facility] (none) Patient Language Language ENGLISH [13] Hospital Problems as of 2018  Reviewed: 2018  1:27 PM by Arcelia Fu MD  
  
  
  
 Class Noted - Resolved Last Modified POA Active Problems CHF (congestive heart failure) ([facility]  2018 - Present 2018 by Sofía Barrow PA-C Unknown Entered by Sofía Barrow PA-C Respiratory distress  2018 - Present 2018 by Sofía Barrow PA-C Unknown Entered by Sofía Barrow PA-C Hypertensive emergency  2018 - Present 2018 by You Sharp MD Unknown Entered by You Sharp MD  
  
Non-Hospital Problems as of 2018  Reviewed: 2018  1:27 PM by Arcelia Fu MD  
  
  
  
 Class Noted - Resolved Last Modified Active Problems Chronic kidney disease (CKD), stage III (moderate)  2014 - Present 2016 by Corrinne Linea, MD  
  Entered by Lenin Aldana Nonischemic cardiomyopathy ([facility]  2/24/2014 - Present 11/9/2016 by Kianna Raymundo MD  
  Entered by Pacheco Ramey Diastolic CHF ([facility]  Unknown - Present 4/11/2014 by Adiel Burch Entered by Adiel Burch Hyperlipidemia  5/27/2014 - Present 6/5/2018 by Kaylee Kang MD  
  Entered by Xavi Wallis Essential hypertension  11/17/2016 - Present 11/17/2016 by Morgan Elena, NP Entered by Morgan Elena, NP Idiopathic chronic gout of multiple sites without tophus  2/21/2017 - Present 6/5/2018 by Kaylee Kang MD  
  Entered by Morgan Elena NP Systolic CHF, chronic ([facility]  4/6/2018 - Present 4/6/2018 by Larry Zapien MD  
  Entered by Larry Zapien MD  
  Severe obesity (BMI 35.0-39. 9) with comorbidity ([facility]  4/9/2018 - Present 4/9/2018 by STEFFANIE Danielle Entered by STEFFANIE Danielle You are allergic to the following No active allergies Current Discharge Medication List  
  
START taking these medications Dose & Instructions Dispensing Information Comments  
 b complex-vitamin c-folic acid 1 mg capsule Commonly known as:  Ebony Sacks Start taking on:  9/1/2018 Dose:  1 Cap Take 1 Cap by mouth daily. Quantity:  30 Cap Refills:  1  
   
 calcium acetate 667 mg Cap Commonly known as:  PHOSLO Dose:  1 Cap Take 1 Cap by mouth three (3) times daily (with meals). Quantity:  90 Cap Refills:  1  
   
 tamsulosin 0.4 mg capsule Commonly known as:  FLOMAX Dose:  0.4 mg Take 1 Cap by mouth nightly. Quantity:  30 Cap Refills:  1  
   
 thiamine  mg tablet Commonly known as:  B-1 Dose:  100 mg Take 1 Tab by mouth daily. Quantity:  30 Tab Refills:  1 CONTINUE these medications which have CHANGED Dose & Instructions Dispensing Information Comments  
 carvedilol 12.5 mg tablet Commonly known as:  Lindsey David What changed:   
- medication strength 
- how much to take - when to take this Dose:  12.5 mg Take 1 Tab by mouth every twelve (12) hours. Quantity:  60 Tab Refills:  1  
   
 hydrALAZINE 50 mg tablet Commonly known as:  APRESOLINE What changed:   
- medication strength 
- how much to take - when to take this Dose:  50 mg Take 1 Tab by mouth every eight (8) hours. Quantity:  90 Tab Refills:  1 CONTINUE these medications which have NOT CHANGED Dose & Instructions Dispensing Information Comments  
 allopurinol 100 mg tablet Commonly known as:  Gaylan Elders Dose:  200 mg Take 2 Tabs by mouth daily. Quantity:  120 Tab Refills:  2  
   
 amLODIPine 10 mg tablet Commonly known as:  Fabi Darting TAKE 1 TABLET BY MOUTH DAILY Quantity:  30 Tab Refills:  2  
   
 aspirin 81 mg chewable tablet Dose:  81 mg Take 1 Tab by mouth daily. Quantity:  30 Tab Refills:  11  
   
 colchicine 0.6 mg tablet 2 tablets at first sign of gout flare and then 1 tablet 1 hour later  Indications: acute gouty arthritis Quantity:  3 Tab Refills:  5  
   
 pravastatin 20 mg tablet Commonly known as:  PRAVACHOL Dose:  20 mg Take 1 Tab by mouth nightly. Indications: hyperlipidemia Quantity:  90 Tab Refills:  3  
   
 terazosin 1 mg capsule Commonly known as:  HYTRIN Dose:  1 mg Take 1 Cap by mouth nightly. Indications: hypertension Quantity:  30 Cap Refills:  11 STOP taking these medications Comments  
 furosemide 40 mg tablet Commonly known as:  LASIX Follow-up Information Follow up With Details Comments Contact Info Damian Shen MD   14 [facility] [address] [place] [address] 
[phone] Discharge Instructions None Chart Review Routing History Recipient Method Report Sent By Niecy Regalado MD  
Phone: [phone]  In Basket IP Auto Routed Tete Monreal MD [[id number]] 3/3/2014  7:30 PM 03/03/2014 Noreen Peralta MD  
Phone: [phone] In [employer] IP Auto Routed MD Veena [[id number]] 3/3/2014  7:30 PM 03/03/2014 Antonio Antonio MD  
Phone: [phone] In [employer] IP Auto Routed MD Veena [[id number]] 3/3/2014  7:30 PM 03/03/2014 Edilberto Gutiérrez MD  
Phone: [phone] In [employer] Routed Barb Landry MD [[id number]] 4/25/2016  9:29 AM 04/25/2016 Jacy Montano MD  
Phone: [phone] In [employer] Routed Barb Landry MD [[id number]] 4/25/2016  9:29 AM 04/25/2016 Virgil Chamberlain MD  
Phone: [phone] In [employer] Routed Barb Landry MD [[id number]] 4/25/2016  9:29 AM 04/25/2016 Edilberto Gutiérrez MD  
Phone: [phone] In [employer] Routed Barb Landry MD [[id number]] 4/26/2016 10:26 AM 04/26/2016 Virgil Chamberlain MD  
Phone: [phone] In [employer] Routed Barb Landry MD [[id number]] 4/26/2016 10:26 AM 04/26/2016 Edilberto Gutiérrez MD  
Phone: [phone] In [employer] Routed Barb Landry MD [[id number]] 4/27/2016  7:22 AM 04/27/2016 Virgil Chamberlain MD  
Phone: [phone] In [employer] Routed Barb Landry MD [[id number]] 4/27/2016  7:22 AM 04/27/2016 Karen Sahu MD  
Fax: [phone] Phone: [phone] Fax IP Auto Routed Konrad Landry  [phone] 11/11/2016 12:34 PM 11/11/2016 Joselyn Mccullough MD  
Phone: [phone] In Basket IP Auto Routed [employer], MD [[id number]] 11/11/2016 12:34 PM 11/11/2016 Ines Templeton MD  
Phone: [phone] In Basket IP Auto Routed [employer], MD [[id number]] 11/11/2016 12:34 PM 11/11/2016 ELHAM Cavanaugh Fax: [phone] Phone: [phone] Fax IP Auto Routed Konrad Landry  [phone] 11/11/2016 12:34 PM 11/11/2016 Richard Fox MD  
Fax: [phone] Phone: [phone] Fax IP Auto Routed Konrad Landry  [phone] 11/11/2016 12:34 PM 11/11/2016

## 2018-08-23 NOTE — H&P
Hospitalist Admission Note    NAME: Ania Alicia   :  1960   MRN:  [id number]     Date/Time of admission:  2018 10:28 AM    Patient PCP: Zachariah Rodriguez MD  ________________________________________________________________________    My assessment of this patient's clinical condition and my plan of care is as follows. Assessment / Plan:  1. Hypertensive emergency  2. Acute on chronic end stage CHF exacerbation - EF 15% in 2016  3. Acute respiratory acidosis d/t above  4. Bilateral diffuse pulmonary opacities - likely d/t #2, but pna vs hemorrhage not yet ruled out  5. JULIA on CKD III d/t #1  6. H/o Hypertensive cardiomyopathy  7. Type 2 DM  8. NSTEMI, type 2 (demand ischemia d/t #2 and #2)  9. HLD  10. H/o ETOH abuse    1. Admit to ICU and continue nitro gtt and diuresis  2. Continue bipap and wean off as able - ED calling PCCM  3. Will have to hold ace-I/arb, aldactone, and other renal toxic medications for now  4. Start bblockade in 1-2 days once more compensated  5. Consult cardiology and update echocardiogram  6. ASA, Statin  7. Trend cardiac enzymes, thyroid studies, a1c, lipids  8. Correctional coverage for DM  9. [facility] coverage    Code Status: full for now, but pt needs palliative discussion  Surrogate Decision Maker: patient    DVT Prophylaxis: sc hep  GI Prophylaxis: not indicated          Subjective:   CHIEF COMPLAINT: sob    HISTORY OF PRESENT ILLNESS:     Sowmya Brown is a 63 y.o.  male who presents with extensive sob and respiratory acidosis. Pt has the pmhx of CHF with EF 15%, malignant HTN, DM, CKD, ETOH abuse. He reportedly had a progressive cough for 1 week and his sob became overwhelming this morning. EMS was called and placed patient on cpap. Upon reaching the ED, pt was acutely acidodic and placed on bipap. CXR showed dense diffuse opacities and pt's bp was in range of htn emergency with sbp in the 250's.  Pt was started on a nitro gtt, give 60 mg of iv lasix, started on empiric abx for pna and abg improved from 7.18/61/63 to 7/24/54/98 while on bipap. Pt was given ASA and bicarb as well. Pt's NT pro bnp was elevated at 50,822 and troponin was elevated at 0.82. Upon review of chart, pt's last echocardiogram was in 11/2016 and his EF was 15% then. He does not have an AICD. We were asked to admit for work up and evaluation of the above problems. Past Medical History:   Diagnosis Date    Arthritis of left knee 09/2017    moderate to severe, with effusion on xray    Chronic kidney disease     Diastolic CHF ([address]     Dilated cardiomyopathy ([address]     History of alcohol abuse     History of echocardiogram 02/24/2014    Mod-marked LVE. EF 15%. Severe, diffuse hypk. Mild LVH. Gr 1 DDfx. Mod LAE. Mild-mod FIDELIA. Mild AoRE. No significant change from echo of 10/23/09.  History of gout     History of myocardial perfusion scan 05/25/2006    No evidence of ischemia or scarring. Gross LVE. EF 28%. Severe global hypk. Neg EKG on submaximal EST. Ex time 8 min 25 sec.  HTN (hypertension)     Hypercholesterolemia     Hypertensive cardiovascular disease         Past Surgical History:   Procedure Laterality Date    HX HERNIA REPAIR  04/2016       Social History   Substance Use Topics    Smoking status: Never Smoker    Smokeless tobacco: Never Used    Alcohol use No      Comment: stop 3 years ago in feb        Family History   Problem Relation Age of Onset    Cancer Father      Lung    Heart Failure Mother     Cancer Sister      No Known Allergies     Prior to Admission medications    Medication Sig Start Date End Date Taking? Authorizing Provider   carvedilol (COREG) 25 mg tablet Take 1 Tab by mouth two (2) times daily (with meals). Indications: chronic heart failure, hypertension 6/5/18   Iván John MD   pravastatin (PRAVACHOL) 20 mg tablet Take 1 Tab by mouth nightly.  Indications: hyperlipidemia 6/5/18   Iván John MD   colchicine 0.6 mg tablet 2 tablets at first sign of gout flare and then 1 tablet 1 hour later  Indications: acute gouty arthritis 6/5/18   Russ Israel MD   terazosin (HYTRIN) 1 mg capsule Take 1 Cap by mouth nightly. Indications: hypertension 6/5/18   Russ Israel MD   furosemide (LASIX) 40 mg tablet TAKE 1 TABLET BY MOUTH DAILY 5/29/18   STEFFANIE Tavarez   allopurinol (ZYLOPRIM) 100 mg tablet Take 2 Tabs by mouth daily. 5/11/18   STEFFANIE Tavarez   amLODIPine (NORVASC) 10 mg tablet TAKE 1 TABLET BY MOUTH DAILY 4/19/18   JojoSTEFFANIE Chavira   hydrALAZINE (APRESOLINE) 100 mg tablet Take 1 Tab by mouth three (3) times daily. 4/6/18   Nahid Clemons MD   aspirin 81 mg chewable tablet Take 1 Tab by mouth daily. 2/21/17   Joey Kaye NP       REVIEW OF SYSTEMS:     I am not able to complete the review of systems because:    The patient is intubated and sedated    The patient has altered mental status due to his acute medical problems    The patient has baseline aphasia from prior stroke(s)    The patient has baseline dementia and is not reliable historian    The patient is in acute medical distress and unable to provide information           Total of 12 systems reviewed as follows:       POSITIVE= bolded text  Negative = text not underlined  General:  fever, chills, sweats, generalized weakness, weight loss/gain,      loss of appetite   Eyes:    blurred vision, eye pain, loss of vision, double vision  ENT:    rhinorrhea, pharyngitis   Respiratory:   cough, sputum production, SOB, BUSTAMANTE, wheezing, pleuritic pain   Cardiology:   chest pain, palpitations, orthopnea, PND, edema, syncope   Gastrointestinal:  abdominal pain , N/V, diarrhea, dysphagia, constipation, bleeding   Genitourinary:  frequency, urgency, dysuria, hematuria, incontinence   Muskuloskeletal :  arthralgia, myalgia, back pain  Hematology:  easy bruising, nose or gum bleeding, lymphadenopathy   Dermatological: rash, ulceration, pruritis, color change / jaundice  Endocrine:   hot flashes or polydipsia   Neurological:  headache, dizziness, confusion, focal weakness, paresthesia,     Speech difficulties, memory loss, gait difficulty  Psychological: Feelings of anxiety, depression, agitation    Objective:   VITALS:    Visit Vitals    BP (!) 206/130    Pulse 100    Temp 98.3 °F (36.8 °C)    Resp (!) 45    Ht 5' 7\" (1.702 m)    Wt 117.9 kg (260 lb)    SpO2 90%    BMI 40.72 kg/m2       PHYSICAL EXAM:    General:    Alert, cooperative, moderate distress, appears stated age. HEENT: Atraumatic, anicteric sclerae, pink conjunctivae     No oral ulcers, mucosa moist, throat clear, dentition fair  Neck:  Supple, symmetrical,  thyroid: non tender  Lungs:   Rales diffusely; decreased breath sounds on right  Chest wall:  No tenderness  No Accessory muscle use. Heart:   Regular  rhythm,  No  murmur  3+ edema  Abdomen:   Soft, non-tender. Not distended. Bowel sounds normal  Extremities: No cyanosis. No clubbing,      Skin turgor normal, Capillary refill normal, Radial dial pulse 2+  Skin:     Not pale. Not Jaundiced  No rashes   Psych:  Good insight. Not depressed. Not anxious or agitated. Neurologic: EOMs intact. No facial asymmetry. No aphasia or slurred speech. Symmetrical strength, Sensation grossly intact.  Alert and oriented X 4.     _______________________________________________________________________  Care Plan discussed with:    Comments   Patient x    Family      RN     Care Manager                    Consultant:      _______________________________________________________________________  Expected  Disposition:   Home with Family    HH/PT/OT/RN x   SNF/LTC    PERCY    ________________________________________________________________________  TOTAL TIME:  72 Minutes    Critical Care Provided     Minutes non procedure based      Comments    x Reviewed previous records   >50% of visit spent in counseling and coordination of care x Discussion with patient and/or family and questions answered       ________________________________________________________________________      Procedures: see electronic medical records for all procedures/Xrays and details which were not copied into this note but were reviewed prior to creation of Plan. LAB DATA REVIEWED:    Recent Results (from the past 24 hour(s))   METABOLIC PANEL, BASIC    Collection Time: 08/23/18  7:50 AM   Result Value Ref Range    Sodium 143 136 - 145 mmol/L    Potassium 4.8 3.5 - 5.5 mmol/L    Chloride 108 100 - 108 mmol/L    CO2 23 21 - 32 mmol/L    Anion gap 12 3.0 - 18 mmol/L    Glucose 234 (H) 74 - 99 mg/dL    BUN 67 (H) 7.0 - 18 MG/DL    Creatinine 6.88 (H) 0.6 - 1.3 MG/DL    BUN/Creatinine ratio 10 (L) 12 - 20      GFR est AA 10 (L) >60 ml/min/1.73m2    GFR est non-AA 8 (L) >60 ml/min/1.73m2    Calcium 7.3 (L) 8.5 - 10.1 MG/DL   CBC WITH AUTOMATED DIFF    Collection Time: 08/23/18  7:50 AM   Result Value Ref Range    WBC 7.7 4.6 - 13.2 K/uL    RBC 4.05 (L) 4.70 - 5.50 M/uL    HGB 11.0 (L) 13.0 - 16.0 g/dL    HCT 35.7 (L) 36.0 - 48.0 %    MCV 88.1 74.0 - 97.0 FL    MCH 27.2 24.0 - 34.0 PG    MCHC 30.8 (L) 31.0 - 37.0 g/dL    RDW 13.4 11.6 - 14.5 %    PLATELET 945 918 - 436 K/uL    MPV 9.9 9.2 - 11.8 FL    NEUTROPHILS 50 40 - 73 %    LYMPHOCYTES 35 21 - 52 %    MONOCYTES 11 (H) 3 - 10 %    EOSINOPHILS 4 0 - 5 %    BASOPHILS 0 0 - 2 %    ABS. NEUTROPHILS 3.7 1.8 - 8.0 K/UL    ABS. LYMPHOCYTES 2.7 0.9 - 3.6 K/UL    ABS. MONOCYTES 0.9 0.05 - 1.2 K/UL    ABS. EOSINOPHILS 0.3 0.0 - 0.4 K/UL    ABS.  BASOPHILS 0.0 0.0 - 0.1 K/UL    DF AUTOMATED     NT-PRO BNP    Collection Time: 08/23/18  7:50 AM   Result Value Ref Range    NT pro-BNP 13631 (H) 0 - 900 PG/ML   PROTHROMBIN TIME + INR    Collection Time: 08/23/18  7:50 AM   Result Value Ref Range    Prothrombin time 13.2 11.5 - 15.2 sec    INR 1.0 0.8 - 1.2     PTT    Collection Time: 08/23/18  7:50 AM   Result Value Ref Range    aPTT 28.1 23.0 - 36.4 SEC CARDIAC PANEL,(CK, CKMB & TROPONIN)    Collection Time: 08/23/18  7:50 AM   Result Value Ref Range     (H) 39 - 308 U/L    CK - MB 4.8 (H) <3.6 ng/ml    CK-MB Index 1.1 0.0 - 4.0 %    Troponin-I, Qt. 0.82 (H) 0.0 - 0.045 NG/ML   EKG, 12 LEAD, INITIAL    Collection Time: 08/23/18  7:59 AM   Result Value Ref Range    Ventricular Rate 104 BPM    Atrial Rate 104 BPM    P-R Interval 168 ms    QRS Duration 110 ms    Q-T Interval 406 ms    QTC Calculation (Bezet) 533 ms    Calculated P Axis 46 degrees    Calculated R Axis -40 degrees    Calculated T Axis 108 degrees    Diagnosis       Sinus tachycardia  Possible Left atrial enlargement  Left axis deviation  Left ventricular hypertrophy  ST & T wave abnormality, consider lateral ischemia  Prolonged QT  Abnormal ECG  When compared with ECG of 09-NOV-2016 07:50,  premature ventricular complexes are no longer present  ST now depressed in Lateral leads  T wave inversion more evident in Lateral leads     POC G3    Collection Time: 08/23/18  8:13 AM   Result Value Ref Range    Device: BIPAP      FIO2 (POC) 60 %    pH (POC) 7.176 (LL) 7.35 - 7.45      pCO2 (POC) 61.0 (H) 35.0 - 45.0 MMHG    pO2 (POC) 63 (L) 80 - 100 MMHG    HCO3 (POC) 22.6 22 - 26 MMOL/L    sO2 (POC) 85 (L) 92 - 97 %    Base deficit (POC) 6 mmol/L    PEEP/CPAP (POC) 6 cmH2O    PIP (POC) 18      Pressure support 12 cmH2O    Allens test (POC) YES      Bleed In 60 L/min    Total resp.  rate 43      Site LEFT RADIAL      Patient temp. 98.3      Specimen type (POC) ARTERIAL      Performed by Tamy Aguero     Spontaneous timed YES     POC LACTIC ACID    Collection Time: 08/23/18  8:35 AM   Result Value Ref Range    Lactic Acid (POC) 1.1 0.4 - 2.0 mmol/L   POC G3    Collection Time: 08/23/18  9:47 AM   Result Value Ref Range    Device: BIPAP      FIO2 (POC) 60 %    pH (POC) 7.242 (LL) 7.35 - 7.45      pCO2 (POC) 54.1 (H) 35.0 - 45.0 MMHG    pO2 (POC) 98 80 - 100 MMHG    HCO3 (POC) 23.3 22 - 26 MMOL/L    sO2 (POC) 96 92 - 97 %    Base deficit (POC) 4 mmol/L    PEEP/CPAP (POC) 7 cmH2O    PIP (POC) 19      Pressure support 12 cmH2O    Allens test (POC) YES      Bleed In 60 L/min    Total resp.  rate 37      Site RIGHT RADIAL      Specimen type (POC) ARTERIAL      Performed by Nikolai Llamas     Spontaneous timed Verito Wan MD  Internal Medicine  Hospitalist Division

## 2018-08-23 NOTE — ED NOTES
No changes noted in patient status at this time, will continue to monitor. Patient remains on bi-pap.

## 2018-08-23 NOTE — ROUTINE PROCESS
TRANSFER - IN REPORT:    Verbal report received from Yanique Hernandez RN (name) on Christin Aguilar  being received from ED (unit) for routine progression of care      Report consisted of patients Situation, Background, Assessment and   Recommendations(SBAR). Information from the following report(s) ED Summary, Intake/Output and Recent Results was reviewed with the receiving nurse. Opportunity for questions and clarification was provided. Assessment completed upon patients arrival to unit and care assumed. See doc flowsheets for admission assessment details.

## 2018-08-23 NOTE — CONSULTS
Consult Note  Consult requested by: Dr. Chambers Margy is a 63 y.o. male [address] who is being seen on consult for acute on chronic kidney disease  Chief Complaint   Patient presents with    Shortness of Breath     Admission diagnosis: short of breath. 61y M admitted for short of breath, seen for JULIA  Impression & Plan:   IMPRESSION:   Acute on chronic kidney injury , cardiorenal syndrome, HTN urgency, was on diuretics at home. CKD 4; HTN/DM nephropathy, with heavy protienuria   Acute on chronic systolic heart failure, low EF  Short of breath  From heart failure  HTN, uncontrolled, on nitro drip  H/o etoh abuse   PLAN:    He received lasix 60mg in ER, will repeat chemistry and lasix later today . If cardiorenal syndorme then diuresis would improve renal perfusion and renal function, monitor repsone. Discussed with him if his renal function is refractory to diuresis then he will need HD support for better volume management. He agrees with dialysis if needed. Follow up echo report and cardiology colleauge input. Continue nitro drip per ICU team, avoid hypotension. Check urine toxicology  Adjust meds per current renal function status. Following very closely. Discussed with Dr. Adriel Corona HPI:  61y M with PMH HTN, DM, known CKD 4, heart failure presented to ER today with short of breath. His work ups in ER shows, severe elevated BP,  JULIA, elvated BNP. His imaging study shows congestive heart failure. He does not have any fever or elevated leucocytosis. His blood gas in ER shows hypercapnic respiratory acidosis, required bipap. He is known to me over year now, follows in office,his last creatinine was at 3 in June. I reviewed his chart from my office. During my evaluation, he is on bipap, denies for any chest pain, nausea, vomiting. He has martinez catheter placed, approx 200cc urine in martinez catheter. His blood pressure is gradually improving with nitro drip.    Past Medical History:   Diagnosis Date    Arthritis of left knee 09/2017    moderate to severe, with effusion on xray    Chronic kidney disease     Diastolic CHF ([address]     Dilated cardiomyopathy ([address]     History of alcohol abuse     History of echocardiogram 02/24/2014    Mod-marked LVE. EF 15%. Severe, diffuse hypk. Mild LVH. Gr 1 DDfx. Mod LAE. Mild-mod FIDELIA. Mild AoRE. No significant change from echo of 10/23/09.  History of gout     History of myocardial perfusion scan 05/25/2006    No evidence of ischemia or scarring. Gross LVE. EF 28%. Severe global hypk. Neg EKG on submaximal EST. Ex time 8 min 25 sec.  HTN (hypertension)     Hypercholesterolemia     Hypertensive cardiovascular disease       Past Surgical History:   Procedure Laterality Date    HX HERNIA REPAIR  04/2016       Social History     Social History    Marital status: SINGLE     Spouse name: N/A    Number of children: N/A    Years of education: N/A     Occupational History    Not on file. Social History Main Topics    Smoking status: Never Smoker    Smokeless tobacco: Never Used    Alcohol use No      Comment: stop 3 years ago in feb    Drug use: No    Sexual activity: No     Other Topics Concern    Not on file     Social History Narrative       Family History   Problem Relation Age of Onset    Cancer Father      Lung    Heart Failure Mother     Cancer Sister      No Known Allergies     Home Medications:     Prior to Admission Medications   Prescriptions Last Dose Informant Patient Reported? Taking?   allopurinol (ZYLOPRIM) 100 mg tablet   No No   Sig: Take 2 Tabs by mouth daily. amLODIPine (NORVASC) 10 mg tablet   No No   Sig: TAKE 1 TABLET BY MOUTH DAILY   aspirin 81 mg chewable tablet   No No   Sig: Take 1 Tab by mouth daily. carvedilol (COREG) 25 mg tablet   No No   Sig: Take 1 Tab by mouth two (2) times daily (with meals).  Indications: chronic heart failure, hypertension   colchicine 0.6 mg tablet No No   Si tablets at first sign of gout flare and then 1 tablet 1 hour later  Indications: acute gouty arthritis   furosemide (LASIX) 40 mg tablet   No No   Sig: TAKE 1 TABLET BY MOUTH DAILY   hydrALAZINE (APRESOLINE) 100 mg tablet   No No   Sig: Take 1 Tab by mouth three (3) times daily. pravastatin (PRAVACHOL) 20 mg tablet   No No   Sig: Take 1 Tab by mouth nightly. Indications: hyperlipidemia   terazosin (HYTRIN) 1 mg capsule   No No   Sig: Take 1 Cap by mouth nightly. Indications: hypertension      Facility-Administered Medications: None       Current Facility-Administered Medications   Medication Dose Route Frequency    cefTRIAXone (ROCEPHIN) 2 g in sterile water (preservative free) 20 mL IV syringe  2 g IntraVENous Q24H    azithromycin (ZITHROMAX) 500 mg in 0.9% sodium chloride (MBP/ADV) 250 mL adv  500 mg IntraVENous Q24H    nitroglycerin (TRIDIL) 400 mcg/ml infusion  0-20 mcg/min IntraVENous TITRATE    sodium chloride (NS) flush 5-10 mL  5-10 mL IntraVENous PRN    sodium bicarbonate 8.4 % (1 mEq/mL) injection 50 mEq  50 mEq IntraVENous NOW     Current Outpatient Prescriptions   Medication Sig    carvedilol (COREG) 25 mg tablet Take 1 Tab by mouth two (2) times daily (with meals). Indications: chronic heart failure, hypertension    pravastatin (PRAVACHOL) 20 mg tablet Take 1 Tab by mouth nightly. Indications: hyperlipidemia    colchicine 0.6 mg tablet 2 tablets at first sign of gout flare and then 1 tablet 1 hour later  Indications: acute gouty arthritis    terazosin (HYTRIN) 1 mg capsule Take 1 Cap by mouth nightly. Indications: hypertension    furosemide (LASIX) 40 mg tablet TAKE 1 TABLET BY MOUTH DAILY    allopurinol (ZYLOPRIM) 100 mg tablet Take 2 Tabs by mouth daily.  amLODIPine (NORVASC) 10 mg tablet TAKE 1 TABLET BY MOUTH DAILY    hydrALAZINE (APRESOLINE) 100 mg tablet Take 1 Tab by mouth three (3) times daily.  aspirin 81 mg chewable tablet Take 1 Tab by mouth daily. Review of Systems:      Complete 10-point review of systems were obtained and discussed in length  with the patient. Complete review of systems was negative/unremarkable  except as mentioned in HPI section. Data Review:    Labs: Results:       Chemistry Recent Labs      08/23/18   0750   GLU  234*   NA  143   K  4.8   CL  108   CO2  23   BUN  67*   CREA  6.88*   CA  7.3*   AGAP  12   BUCR  10*      CBC w/Diff Recent Labs      08/23/18   0750   WBC  7.7   RBC  4.05*   HGB  11.0*   HCT  35.7*   PLT  229   GRANS  50   LYMPH  35   EOS  4      Coagulation Recent Labs      08/23/18   0750   PTP  13.2   INR  1.0   APTT  28.1       Iron/Ferritin No results for input(s): IRON in the last 72 hours. No lab exists for component: TIBCCALC   BNP No results for input(s): BNPP in the last 72 hours. Cardiac Enzymes Recent Labs      08/23/18   0750   CPK  426*   CKND1  1.1      Liver Enzymes No results for input(s): TP, ALB, TBIL, AP, SGOT, GPT in the last 72 hours.     No lab exists for component: DBIL   Thyroid Studies Lab Results   Component Value Date/Time    TSH 0.88 11/09/2016 05:00 PM         EKG: sinus   Physical Assessment:     Visit Vitals    BP (!) 185/124    Pulse 74    Temp 98.3 °F (36.8 °C)    Resp 15    Ht 5' 7\" (1.702 m)    Wt 117.9 kg (260 lb)    SpO2 100%    BMI 40.72 kg/m2     Weight change:     Intake/Output Summary (Last 24 hours) at 08/23/18 1345  Last data filed at 08/23/18 1306   Gross per 24 hour   Intake                0 ml   Output              800 ml   Net             -800 ml     Physical Exam:   General: comfortable, no acute distress   HEENT sclera anicteric, supple neck, no thyromegaly  CVS: S1S2 heard,  no rub  RS: + air entry b/l, + crakles   Abd: Soft, Non tender,   Neuro: non focal, awake, alert , CN II-XII are grossly intact  Extrm: ++edema, no cyanosis, clubbing   Skin: no visible  Rash  Musculoskeletal: No gross joints or bone deformities     Procedures/imaging: see electronic medical records for all procedures, Xrays and details which were not copied into this note but were reviewed prior to creation of Vale Bingham MD  August 23, 2018  [facility]  Office [phone]

## 2018-08-23 NOTE — IP AVS SNAPSHOT
[address] [address] [phone] Patient: Kalyan Mcdowell MRN: [id number] M:[birth year] A check shannon indicates which time of day the medication should be taken. My Medications START taking these medications Instructions Each Dose to Equal  
 Morning Noon Evening Bedtime  
 b complex-vitamin c-folic acid 1 mg capsule Commonly known as:  Silke Flavors Start taking on:  9/1/2018 Your last dose was: Your next dose is: Take 1 Cap by mouth daily. 1 Cap  
    
   
   
   
  
 calcium acetate 667 mg Cap Commonly known as:  PHOSLO Your last dose was: Your next dose is: Take 1 Cap by mouth three (3) times daily (with meals). 1 Cap  
    
   
   
   
  
 tamsulosin 0.4 mg capsule Commonly known as:  FLOMAX Your last dose was: Your next dose is: Take 1 Cap by mouth nightly. 0.4 mg  
    
   
   
   
  
 thiamine  mg tablet Commonly known as:  B-1 Your last dose was: Your next dose is: Take 1 Tab by mouth daily. 100 mg CHANGE how you take these medications Instructions Each Dose to Equal  
 Morning Noon Evening Bedtime  
 carvedilol 12.5 mg tablet Commonly known as:  Miguel Cooley What changed:   
- medication strength 
- how much to take - when to take this Your last dose was: Your next dose is: Take 1 Tab by mouth every twelve (12) hours. 12.5 mg  
    
   
   
   
  
 hydrALAZINE 50 mg tablet Commonly known as:  APRESOLINE What changed:   
- medication strength 
- how much to take - when to take this Your last dose was: Your next dose is: Take 1 Tab by mouth every eight (8) hours. 50 mg CONTINUE taking these medications  Instructions Each Dose to Equal  
 Morning Noon Evening Bedtime  
 allopurinol 100 mg tablet Commonly known as:  Fernando Leal Your last dose was: Your next dose is: Take 2 Tabs by mouth daily. 200 mg  
    
   
   
   
  
 amLODIPine 10 mg tablet Commonly known as:  Rodrigo Lawanda Your last dose was: Your next dose is: TAKE 1 TABLET BY MOUTH DAILY  
     
   
   
   
  
 aspirin 81 mg chewable tablet Your last dose was: Your next dose is: Take 1 Tab by mouth daily. 81 mg  
    
   
   
   
  
 colchicine 0.6 mg tablet Your last dose was: Your next dose is:    
   
   
 2 tablets at first sign of gout flare and then 1 tablet 1 hour later  Indications: acute gouty arthritis  
     
   
   
   
  
 pravastatin 20 mg tablet Commonly known as:  PRAVACHOL Your last dose was: Your next dose is: Take 1 Tab by mouth nightly. Indications: hyperlipidemia 20 mg  
    
   
   
   
  
 terazosin 1 mg capsule Commonly known as:  HYTRIN Your last dose was: Your next dose is: Take 1 Cap by mouth nightly. Indications: hypertension 1 mg STOP taking these medications   
 furosemide 40 mg tablet Commonly known as:  LASIX Where to Get Your Medications Information on where to get these meds will be given to you by the nurse or doctor. ! Ask your nurse or doctor about these medications  
  b complex-vitamin c-folic acid 1 mg capsule  
 calcium acetate 667 mg Cap  
 carvedilol 12.5 mg tablet  
 hydrALAZINE 50 mg tablet  
 tamsulosin 0.4 mg capsule  
 thiamine  mg tablet

## 2018-08-23 NOTE — ED NOTES
Patient on bi-pap, tolerating well. No signs of distress noted at this time. Patient states that he is feeling better than upon arrival. He is currently on cardiac monitoring equipment, will continue to monitor.

## 2018-08-24 ENCOUNTER — APPOINTMENT (OUTPATIENT)
Dept: NON INVASIVE DIAGNOSTICS | Age: 58
DRG: 280 | End: 2018-08-24
Attending: INTERNAL MEDICINE
Payer: COMMERCIAL

## 2018-08-24 ENCOUNTER — APPOINTMENT (OUTPATIENT)
Dept: GENERAL RADIOLOGY | Age: 58
DRG: 280 | End: 2018-08-24
Attending: PHYSICIAN ASSISTANT
Payer: COMMERCIAL

## 2018-08-24 LAB
ANION GAP SERPL CALC-SCNC: 10 MMOL/L (ref 3–18)
ANION GAP SERPL CALC-SCNC: 8 MMOL/L (ref 3–18)
BASOPHILS # BLD: 0 K/UL (ref 0–0.1)
BASOPHILS NFR BLD: 0 % (ref 0–2)
BUN SERPL-MCNC: 70 MG/DL (ref 7–18)
BUN SERPL-MCNC: 70 MG/DL (ref 7–18)
BUN/CREAT SERPL: 10 (ref 12–20)
BUN/CREAT SERPL: 10 (ref 12–20)
CALCIUM SERPL-MCNC: 7 MG/DL (ref 8.5–10.1)
CALCIUM SERPL-MCNC: 7 MG/DL (ref 8.5–10.1)
CALCIUM SERPL-MCNC: 7.4 MG/DL (ref 8.5–10.1)
CHLORIDE SERPL-SCNC: 107 MMOL/L (ref 100–108)
CHLORIDE SERPL-SCNC: 107 MMOL/L (ref 100–108)
CO2 SERPL-SCNC: 25 MMOL/L (ref 21–32)
CO2 SERPL-SCNC: 25 MMOL/L (ref 21–32)
CREAT SERPL-MCNC: 7.09 MG/DL (ref 0.6–1.3)
CREAT SERPL-MCNC: 7.1 MG/DL (ref 0.6–1.3)
DIFFERENTIAL METHOD BLD: ABNORMAL
EOSINOPHIL # BLD: 0.1 K/UL (ref 0–0.4)
EOSINOPHIL NFR BLD: 1 % (ref 0–5)
ERYTHROCYTE [DISTWIDTH] IN BLOOD BY AUTOMATED COUNT: 13.5 % (ref 11.6–14.5)
GLUCOSE BLD STRIP.AUTO-MCNC: 114 MG/DL (ref 70–110)
GLUCOSE BLD STRIP.AUTO-MCNC: 117 MG/DL (ref 70–110)
GLUCOSE BLD STRIP.AUTO-MCNC: 119 MG/DL (ref 70–110)
GLUCOSE BLD STRIP.AUTO-MCNC: 168 MG/DL (ref 70–110)
GLUCOSE SERPL-MCNC: 105 MG/DL (ref 74–99)
GLUCOSE SERPL-MCNC: 152 MG/DL (ref 74–99)
HCT VFR BLD AUTO: 32.6 % (ref 36–48)
HGB BLD-MCNC: 10 G/DL (ref 13–16)
LYMPHOCYTES # BLD: 1 K/UL (ref 0.9–3.6)
LYMPHOCYTES NFR BLD: 18 % (ref 21–52)
MAGNESIUM SERPL-MCNC: 2 MG/DL (ref 1.6–2.6)
MCH RBC QN AUTO: 26.6 PG (ref 24–34)
MCHC RBC AUTO-ENTMCNC: 30.7 G/DL (ref 31–37)
MCV RBC AUTO: 86.7 FL (ref 74–97)
MONOCYTES # BLD: 0.7 K/UL (ref 0.05–1.2)
MONOCYTES NFR BLD: 13 % (ref 3–10)
NEUTS SEG # BLD: 4 K/UL (ref 1.8–8)
NEUTS SEG NFR BLD: 68 % (ref 40–73)
PLATELET # BLD AUTO: 194 K/UL (ref 135–420)
PMV BLD AUTO: 10.7 FL (ref 9.2–11.8)
POTASSIUM SERPL-SCNC: 4.3 MMOL/L (ref 3.5–5.5)
POTASSIUM SERPL-SCNC: 4.3 MMOL/L (ref 3.5–5.5)
PTH-INTACT SERPL-MCNC: 602.9 PG/ML (ref 18.4–88)
RBC # BLD AUTO: 3.76 M/UL (ref 4.7–5.5)
SODIUM SERPL-SCNC: 140 MMOL/L (ref 136–145)
SODIUM SERPL-SCNC: 142 MMOL/L (ref 136–145)
WBC # BLD AUTO: 5.9 K/UL (ref 4.6–13.2)

## 2018-08-24 PROCEDURE — 74011250637 HC RX REV CODE- 250/637: Performed by: INTERNAL MEDICINE

## 2018-08-24 PROCEDURE — 74011000258 HC RX REV CODE- 258: Performed by: INTERNAL MEDICINE

## 2018-08-24 PROCEDURE — 94640 AIRWAY INHALATION TREATMENT: CPT

## 2018-08-24 PROCEDURE — 71045 X-RAY EXAM CHEST 1 VIEW: CPT

## 2018-08-24 PROCEDURE — 74011636637 HC RX REV CODE- 636/637: Performed by: INTERNAL MEDICINE

## 2018-08-24 PROCEDURE — 83970 ASSAY OF PARATHORMONE: CPT | Performed by: INTERNAL MEDICINE

## 2018-08-24 PROCEDURE — 74011250636 HC RX REV CODE- 250/636

## 2018-08-24 PROCEDURE — 80048 BASIC METABOLIC PNL TOTAL CA: CPT | Performed by: INTERNAL MEDICINE

## 2018-08-24 PROCEDURE — 74011250636 HC RX REV CODE- 250/636: Performed by: INTERNAL MEDICINE

## 2018-08-24 PROCEDURE — 80048 BASIC METABOLIC PNL TOTAL CA: CPT | Performed by: PHYSICIAN ASSISTANT

## 2018-08-24 PROCEDURE — 74011250637 HC RX REV CODE- 250/637: Performed by: PHYSICIAN ASSISTANT

## 2018-08-24 PROCEDURE — 36415 COLL VENOUS BLD VENIPUNCTURE: CPT | Performed by: PHYSICIAN ASSISTANT

## 2018-08-24 PROCEDURE — 65660000000 HC RM CCU STEPDOWN

## 2018-08-24 PROCEDURE — 85025 COMPLETE CBC W/AUTO DIFF WBC: CPT | Performed by: PHYSICIAN ASSISTANT

## 2018-08-24 PROCEDURE — 83735 ASSAY OF MAGNESIUM: CPT | Performed by: PHYSICIAN ASSISTANT

## 2018-08-24 PROCEDURE — 93306 TTE W/DOPPLER COMPLETE: CPT

## 2018-08-24 PROCEDURE — 74011000250 HC RX REV CODE- 250: Performed by: PHYSICIAN ASSISTANT

## 2018-08-24 PROCEDURE — 82962 GLUCOSE BLOOD TEST: CPT

## 2018-08-24 RX ORDER — LABETALOL HCL 20 MG/4 ML
20 SYRINGE (ML) INTRAVENOUS ONCE
Status: COMPLETED | OUTPATIENT
Start: 2018-08-24 | End: 2018-08-24

## 2018-08-24 RX ORDER — IPRATROPIUM BROMIDE AND ALBUTEROL SULFATE 2.5; .5 MG/3ML; MG/3ML
3 SOLUTION RESPIRATORY (INHALATION)
Status: DISCONTINUED | OUTPATIENT
Start: 2018-08-24 | End: 2018-08-31 | Stop reason: HOSPADM

## 2018-08-24 RX ORDER — FUROSEMIDE 40 MG/1
40 TABLET ORAL 2 TIMES DAILY
Status: DISCONTINUED | OUTPATIENT
Start: 2018-08-24 | End: 2018-08-26

## 2018-08-24 RX ORDER — LABETALOL HCL 20 MG/4 ML
SYRINGE (ML) INTRAVENOUS
Status: COMPLETED
Start: 2018-08-24 | End: 2018-08-24

## 2018-08-24 RX ADMIN — AMLODIPINE BESYLATE 10 MG: 10 TABLET ORAL at 08:39

## 2018-08-24 RX ADMIN — Medication 10 ML: at 16:11

## 2018-08-24 RX ADMIN — HEPARIN SODIUM 5000 UNITS: 5000 INJECTION, SOLUTION INTRAVENOUS; SUBCUTANEOUS at 06:32

## 2018-08-24 RX ADMIN — HYDRALAZINE HYDROCHLORIDE 100 MG: 50 TABLET, FILM COATED ORAL at 08:39

## 2018-08-24 RX ADMIN — FOLIC ACID 1 MG: 1 TABLET ORAL at 08:39

## 2018-08-24 RX ADMIN — HEPARIN SODIUM 5000 UNITS: 5000 INJECTION, SOLUTION INTRAVENOUS; SUBCUTANEOUS at 16:08

## 2018-08-24 RX ADMIN — HYDRALAZINE HYDROCHLORIDE 100 MG: 50 TABLET, FILM COATED ORAL at 21:44

## 2018-08-24 RX ADMIN — Medication 10 ML: at 22:10

## 2018-08-24 RX ADMIN — IPRATROPIUM BROMIDE AND ALBUTEROL SULFATE 3 ML: .5; 3 SOLUTION RESPIRATORY (INHALATION) at 08:48

## 2018-08-24 RX ADMIN — Medication 100 MG: at 08:39

## 2018-08-24 RX ADMIN — HEPARIN SODIUM 5000 UNITS: 5000 INJECTION, SOLUTION INTRAVENOUS; SUBCUTANEOUS at 22:10

## 2018-08-24 RX ADMIN — SODIUM CHLORIDE 10 MG/HR: 900 INJECTION, SOLUTION INTRAVENOUS at 03:13

## 2018-08-24 RX ADMIN — Medication 20 MG: at 06:31

## 2018-08-24 RX ADMIN — HYDRALAZINE HYDROCHLORIDE 100 MG: 50 TABLET, FILM COATED ORAL at 16:08

## 2018-08-24 RX ADMIN — FUROSEMIDE 40 MG: 40 TABLET ORAL at 11:13

## 2018-08-24 RX ADMIN — Medication 10 ML: at 05:51

## 2018-08-24 RX ADMIN — PRAVASTATIN SODIUM 20 MG: 20 TABLET ORAL at 21:44

## 2018-08-24 RX ADMIN — THERA TABS 1 TABLET: TAB at 08:39

## 2018-08-24 RX ADMIN — INSULIN LISPRO 2 UNITS: 100 INJECTION, SOLUTION INTRAVENOUS; SUBCUTANEOUS at 12:18

## 2018-08-24 RX ADMIN — FUROSEMIDE 40 MG: 40 TABLET ORAL at 17:10

## 2018-08-24 RX ADMIN — ASPIRIN 81 MG CHEWABLE TABLET 81 MG: 81 TABLET CHEWABLE at 08:39

## 2018-08-24 RX ADMIN — LABETALOL 20 MG/4 ML (5 MG/ML) INTRAVENOUS SYRINGE 20 MG: at 06:31

## 2018-08-24 NOTE — PROGRESS NOTES
Progress Note  Pulmonary, Critical Care, and Sleep Medicine    Name: Kalyan Mcdowell MRN: [id number]   : 1960 Hospital: [facility]   Date: 2018        IMPRESSION:   · Acute hypoxic hypercapnic respiratory failure due to pulmonary edema/fluid overload/acute on chronic kidney injury superimposed on chronic systolic heart failure, responding to BIPAP and diuresis. Oxygenation and CXR improved  · Hypertensive emergency off NTG gtt  · Acute in chronic kidney injury Creatinine, baseline 2.9, worse today  · Dilated cardiomyopathy EF 15% in 2016  · Doubt pneumonia as pt without fever or leukocytosis or left shift  · Hyperglycemia likely related to stress        PLAN:   · No current need for BIPAP, pt doing well on RA  · Diet advanced  · Glycemic  Control  · Echo results pending  · Renal indices worsening, pt may be heading to dialysis but will defer to Nephrology  · Bronchial hygiene  · BP meds resumed  · DVT prophylaxis  · Discussed in ICU interdisciplinary rounds  · Discussed with Dr. Delma Ogden      Subjective/Interval History:   I have reviewed the flowsheet and previous days notes. Reviewed interval history. Discussed management with nursing staff. Pt did well overnight, with resolution of SOB and good oxygenation on RA. Denies chest pain or orthopnea but still with pedal edema. This patient has been seen and evaluated at the request of JOVAN Mcknight for acute respiratory failure. Patient is a 63 y.o. male with chronic kidney injury and HTN who presented with worsening shortness of breath over a week, preceded by PND, orthopnea and progressive pedal edema. Pt denies chest pain or hemoptysis but was admitted and intubated in  for similar symptoms. He was placed on BIPAP and diuresed in ED and reports significant improvement in SOB. Upon arrival pt was hypertensive needing NTG gtt. ROS:Pertinent items are noted in HPI. Orders reviewed including medications. Changes made if indicated. Telemetry monitor reviewed at the bedside. Objective:   Vital Signs:    Visit Vitals    /84    Pulse (!) 101    Temp 98.6 °F (37 °C)    Resp 23    Ht 5' 7\" (1.702 m)    Wt 115.2 kg (254 lb)    SpO2 96%    BMI 39.78 kg/m2       O2 Device: Room air   O2 Flow Rate (L/min): 2 l/min   Temp (24hrs), Av.2 °F (36.8 °C), Min:97.9 °F (36.6 °C), Max:98.6 °F (37 °C)       Intake/Output:   Last shift:       07 -  1900  In: 30 [I.V.:30]  Out: 2937 [Urine:1073]  Last 3 shifts: 1901 -  0700  In: 762.7 [P.O.:600; I.V.:162.7]  Out: 3495 [Urine:3495]    Intake/Output Summary (Last 24 hours) at 18 1324  Last data filed at 18 1200   Gross per 24 hour   Intake           792.69 ml   Output             3768 ml   Net         -2975.31 ml        Physical Exam:    General:  Alert, cooperative, in no distress, appears stated age. Head:  Normocephalic, without obvious abnormality, atraumatic. Eyes:  ANicteric, PERRL,   Nose: Nares normal.  Mucosa normal. No drainage or sinus tenderness. Throat: Lips, mucosa, and tongue normal. NO Thrush   Neck: Supple, symmetrical, trachea midline, no adenopathy, thyroid: no enlargment/tenderness/nodules    Back:   Symmetric    Lungs:   Bilateral auscultation minimal crackles right base, no wheezes   Chest wall:  No tenderness or deformity. NO intercostal retractions   Heart:  Regular rate and rhythm, S1, S2 normal, no murmur, click, rub or gallop. Abdomen:   Soft, non-tender. Bowel sounds normal. No masses,  No organomegaly. NO paradoxical motion   Extremities: normal, atraumatic, no cyanosis, 1+ pedal edema   Pulses: 2+ and symmetric all extremities. Skin: Skin color, texture, turgor normal. No rashes or lesions.  NO clubbing   Lymph nodes: Cervical nodes normal.   Neurologic: Grossly nonfocal      :        Devices:             Drips:    DATA:  Labs:  Recent Labs      18   0234  18   0750   WBC  5.9  7.7   HGB  10.0*  11.0*   HCT 32.6*  35.7*   PLT  194  229     Recent Labs      08/24/18   1145  08/24/18   0234  08/23/18   2140   08/23/18   0750   NA  140  142  143   < >  143   K  4.3  4.3  4.5   < >  4.8   CL  107  107  109*   < >  108   CO2  25  25  22   < >  23   GLU  152*  105*  135*   < >  234*   BUN  70*  70*  71*   < >  67*   CREA  7.10*  7.09*  6.93*   < >  6.88*   CA  7.0*  7.0*  7.2*   < >  7.3*   MG   --   2.0   --    --    --    INR   --    --    --    --   1.0    < > = values in this interval not displayed. No results for input(s): PH, PCO2, PO2, HCO3, FIO2 in the last 72 hours. Imaging:  []        I have personally reviewed the patients radiographs and reports  []         []        No change from prior, tubes and lines in adequate position  []        Improved   []        Worsening  High complexity decision making was performed during this consultation and evaluation.  Critical care time exclusive of procedures spent managing the patient:  46    minutes    Shahnaz Alves MD

## 2018-08-24 NOTE — PROGRESS NOTES
conducted an initial consultation and Spiritual Assessment for Pau Foss, who is a 63 y. o.,male. Patients Primary Language is: [place]. According to the patients EMR Faith Affiliation is: No Christian. The reason the Patient came to the hospital is:   Patient Active Problem List    Diagnosis Date Noted    CHF (congestive heart failure) ([facility] 08/23/2018    Respiratory distress 08/23/2018    Hypertensive emergency 08/23/2018    Severe obesity (BMI 35.0-39. 9) with comorbidity ([address] 30/21/6751    Systolic CHF, chronic ([address] 04/06/2018    Idiopathic chronic gout of multiple sites without tophus 02/21/2017    Essential hypertension 11/17/2016    Hyperlipidemia 34/15/3245    Diastolic CHF (HCC)     Chronic kidney disease (CKD), stage III (moderate) 02/24/2014    Nonischemic cardiomyopathy ([facility] 02/24/2014        The  provided the following Interventions:  Initiated a relationship of care and support. Explored issues of con, belief, spirituality and Scientology/ritual needs while hospitalized. Listened empathically. Provided information about Spiritual Care Services. Offered prayer and assurance of continued prayers on patient's behalf. Chart reviewed. 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  confirmed Patient's Zoroastrianism Faith Affiliation. Patient processed feeling about current hospitalization. Patient expressed gratitude for 's visit. Assessment:  Patient does not have any Scientology/cultural needs that will affect patients preferences in health care. There are no spiritual or Scientology issues which require intervention at this time. Plan:  Chaplains will continue to follow and will provide pastoral care on an as needed/requested basis.    recommends bedside caregivers page  on duty if patient shows signs of acute spiritual or emotional distress.     Chaplain Resident [address]   [phone]

## 2018-08-24 NOTE — PROGRESS NOTES
[facility] Hospitalist Group  Progress Note    Patient: Arin Lee Age: 63 y.o. : 1960 MR#: [id number] SSN: [id number]  Date/Time: 2018    Subjective:     Patient lying in bed in NAD, awake, denies CP or sob    Assessment/Plan:     1- Acute on chronic systolic chf exac  2- Hypertensive emergency at admission requiring nitro drip  3- JULIA on CKD3  4- Acute hypoxic and hypercapnic resp failure  In the setting of chf and hypertensive emergency- initially required BIPAP      PLAN  Off bIPAP  On lasix, I/O  Cardio consulted  Monitor renal function, nephro following, will likely need HD  On amlodipine, hydralazine, clonidine  D/w patient  Full code      Case discussed with:  [x]Patient  []Family  []Nursing  []Case Management  DVT Prophylaxis:  []Lovenox  []Hep SQ  []SCDs  []Coumadin   []On Heparin gtt    Objective:   VS:   Visit Vitals    /82    Pulse 96    Temp 98.4 °F (36.9 °C)    Resp 27    Ht 5' 7\" (1.702 m)    Wt 115.2 kg (254 lb)    SpO2 99%    BMI 39.78 kg/m2      Tmax/24hrs: Temp (24hrs), Av.4 °F (36.9 °C), Min:98 °F (36.7 °C), Max:98.6 °F (37 °C)    Input/Output:   Intake/Output Summary (Last 24 hours) at 18 1939  Last data filed at 18 1800   Gross per 24 hour   Intake              150 ml   Output             3619 ml   Net            -3469 ml       General:  Awake, alert  Cardiovascular:  S1S2+, RRR  Pulmonary:  Decreased BS b/l bases  GI:  Soft, BS+, NT, ND  Extremities:  +edema        Labs:    Recent Results (from the past 24 hour(s))   TROPONIN I    Collection Time: 18  9:40 PM   Result Value Ref Range    Troponin-I, Qt. 0.80 (H) 0.0 - 7.735 NG/ML   METABOLIC PANEL, BASIC    Collection Time: 18  9:40 PM   Result Value Ref Range    Sodium 143 136 - 145 mmol/L    Potassium 4.5 3.5 - 5.5 mmol/L    Chloride 109 (H) 100 - 108 mmol/L    CO2 22 21 - 32 mmol/L    Anion gap 12 3.0 - 18 mmol/L    Glucose 135 (H) 74 - 99 mg/dL    BUN 71 (H) 7.0 - 18 MG/DL    Creatinine 6.93 (H) 0.6 - 1.3 MG/DL    BUN/Creatinine ratio 10 (L) 12 - 20      GFR est AA 10 (L) >60 ml/min/1.73m2    GFR est non-AA 8 (L) >60 ml/min/1.73m2    Calcium 7.2 (L) 8.5 - 10.1 MG/DL   GLUCOSE, POC    Collection Time: 08/23/18 10:38 PM   Result Value Ref Range    Glucose (POC) 112 (H) 70 - 110 mg/dL   CBC WITH AUTOMATED DIFF    Collection Time: 08/24/18  2:34 AM   Result Value Ref Range    WBC 5.9 4.6 - 13.2 K/uL    RBC 3.76 (L) 4.70 - 5.50 M/uL    HGB 10.0 (L) 13.0 - 16.0 g/dL    HCT 32.6 (L) 36.0 - 48.0 %    MCV 86.7 74.0 - 97.0 FL    MCH 26.6 24.0 - 34.0 PG    MCHC 30.7 (L) 31.0 - 37.0 g/dL    RDW 13.5 11.6 - 14.5 %    PLATELET 454 388 - 516 K/uL    MPV 10.7 9.2 - 11.8 FL    NEUTROPHILS 68 40 - 73 %    LYMPHOCYTES 18 (L) 21 - 52 %    MONOCYTES 13 (H) 3 - 10 %    EOSINOPHILS 1 0 - 5 %    BASOPHILS 0 0 - 2 %    ABS. NEUTROPHILS 4.0 1.8 - 8.0 K/UL    ABS. LYMPHOCYTES 1.0 0.9 - 3.6 K/UL    ABS. MONOCYTES 0.7 0.05 - 1.2 K/UL    ABS. EOSINOPHILS 0.1 0.0 - 0.4 K/UL    ABS.  BASOPHILS 0.0 0.0 - 0.1 K/UL    DF AUTOMATED     MAGNESIUM    Collection Time: 08/24/18  2:34 AM   Result Value Ref Range    Magnesium 2.0 1.6 - 2.6 mg/dL   METABOLIC PANEL, BASIC    Collection Time: 08/24/18  2:34 AM   Result Value Ref Range    Sodium 142 136 - 145 mmol/L    Potassium 4.3 3.5 - 5.5 mmol/L    Chloride 107 100 - 108 mmol/L    CO2 25 21 - 32 mmol/L    Anion gap 10 3.0 - 18 mmol/L    Glucose 105 (H) 74 - 99 mg/dL    BUN 70 (H) 7.0 - 18 MG/DL    Creatinine 7.09 (H) 0.6 - 1.3 MG/DL    BUN/Creatinine ratio 10 (L) 12 - 20      GFR est AA 10 (L) >60 ml/min/1.73m2    GFR est non-AA 8 (L) >60 ml/min/1.73m2    Calcium 7.0 (L) 8.5 - 10.1 MG/DL   GLUCOSE, POC    Collection Time: 08/24/18  8:06 AM   Result Value Ref Range    Glucose (POC) 119 (H) 70 - 110 mg/dL   GLUCOSE, POC    Collection Time: 08/24/18 11:33 AM   Result Value Ref Range    Glucose (POC) 168 (H) 70 - 294 mg/dL   METABOLIC PANEL, BASIC    Collection Time: 08/24/18 11:45 AM   Result Value Ref Range    Sodium 140 136 - 145 mmol/L    Potassium 4.3 3.5 - 5.5 mmol/L    Chloride 107 100 - 108 mmol/L    CO2 25 21 - 32 mmol/L    Anion gap 8 3.0 - 18 mmol/L    Glucose 152 (H) 74 - 99 mg/dL    BUN 70 (H) 7.0 - 18 MG/DL    Creatinine 7.10 (H) 0.6 - 1.3 MG/DL    BUN/Creatinine ratio 10 (L) 12 - 20      GFR est AA 10 (L) >60 ml/min/1.73m2    GFR est non-AA 8 (L) >60 ml/min/1.73m2    Calcium 7.0 (L) 8.5 - 10.1 MG/DL   PTH INTACT    Collection Time: 08/24/18  1:55 PM   Result Value Ref Range    Calcium 7.4 (L) 8.5 - 10.1 MG/DL    PTH, Intact 602.9 (H) 18.4 - 88.0 pg/mL   ECHO ADULT COMPLETE    Collection Time: 08/24/18  2:37 PM   Result Value Ref Range    LA Volume 80.76 18 - 58 mL    LV E' Lateral Velocity 5.94 cm/s    LV E' Septal Velocity 4.64 cm/s    Tapse 3.21 (A) 1.5 - 2.0 cm    Ao Root D 4.11 cm    AO ASC D 4.14 cm    LVIDd 5.02 4.2 - 5.9 cm    LVPWd 2.02 (A) 0.6 - 1.0 cm    LVIDs 4.10 cm    IVSd 1.67 (A) 0.6 - 1.0 cm    LV ED Vol A2C 120.8 mL    LV ES Vol A4C 62.3 mL    LV ES Vol BP 69.5 (A) 22 - 58 mL    LVOT d 2.30 cm    LVOT Peak Velocity 133.36 cm/s    LVOT Peak Gradient 7.1 mmHg    LVOT VTI 19.28 cm    MV A Nando 94.18 cm/s    MV E Nando 0.67 cm/s    MV E/A 0.01     BP EF 46.6 (A) 55 - 100 %    LV Ejection Fraction MOD 4C 51 %    LV Ejection Fraction MOD 2C 40 %    LA Vol 4C 59.91 (A) 18 - 58 mL    LA Vol 2C 93.04 (A) 18 - 58 mL    LA Area 4C 23.0 cm2    E/E' lateral 0.11     E/E' septal 0.14     E/E' ratio (averaged) 0.13     LV ES Vol A2C 72.1 mL    LVES Vol Index BP 31.1 mL/m2    LV ED Vol A4C 125.9 mL    LVED Vol Index BP 58.2 mL/m2    Mitral Valve E Wave Deceleration Time 178.6 ms    Triscuspid Valve Regurgitation Peak Gradient 18.4 mmHg    LV ED Vol .2 67 - 155 ml    TR Max Velocity 214.48 cm/s    LA Vol Index 36.08 ml/m2    LA Vol Index 41.57 ml/m2    LA Vol Index 26.77 ml/m2    LVED Vol Index A4C 56.2 mL/m2    LVED Vol Index A2C 54.0 mL/m2    LVES Vol Index A4C 27.8 mL/m2    LVES Vol Index A2C 32.2 mL/m2   GLUCOSE, POC    Collection Time: 08/24/18  3:54 PM   Result Value Ref Range    Glucose (POC) 114 (H) 70 - 110 mg/dL     Additional Data Reviewed:      Signed By: Yaneth Naranjo MD     August 24, 2018 7:39 PM

## 2018-08-24 NOTE — PROGRESS NOTES
No indication for the use of Bipap at this time.  Pt presents w/ no signs of distress w/ stable vitals & resp status

## 2018-08-24 NOTE — PROGRESS NOTES
[facility]  ICU Progress Note      Name: Wanda Rojas   : 1960   MRN: [id number]   Date: 2018 8:33 AM     []I have reviewed the flowsheet and previous days notes. Events overnight reviewed and discussed with nursing staff. Vital signs and records reviewed. Subjective:  Patient is a 63 y.o. male with CKD, HTN, hyperlipidemia, h/o ETOH abuse, diastolic CHF with EF 15% who is being treated for acute respiratory failure. He presented to the ED with worsening shortness of breath x1 week, preceded by PND, orthopnea and progressive pedal edema. Upon arrival to the ED, he was placed on BIPAP and diuresed with lasix, His SBP was in the 250s so nitro gtt was initiated. Pt was admitted to ICU for further management. 18  No new events overnight  Resting in bed. Drowsy but oriented. In no acute distress. No complaints. Denies chest pain, SOB, N/V  Afebrile. Hypertensive this morning but given hydralazine, norvasc. SBP now in 120s-140s. Off Bipap over night, oxygenating well on room air. 2L UOP overnight, 400cc/hr this morning. Good appetite                ROS:A comprehensive review of systems was negative except for that written in the HPI. Medication Review:  · Pressors - none  · Sedation - none  · Antibiotics - none  · Pain - none  · GI/ DVT - heparin sq  · Others (other gtts) -     Safety Bundles: CAUTI    Vital Signs:    Visit Vitals    BP (!) 183/104    Pulse 76    Temp 98.2 °F (36.8 °C)    Resp 19    Ht 5' 7\" (1.702 m)    Wt 115.6 kg (254 lb 12.8 oz)    SpO2 92%    BMI 39.91 kg/m2       O2 Device: Nasal cannula   O2 Flow Rate (L/min): 2 l/min   Temp (24hrs), Av °F (36.7 °C), Min:97.9 °F (36.6 °C), Max:98.2 °F (36.8 °C)       Intake/Output:   Last shift:         Last 3 shifts:  1901 -  0700  In: 762.7 [P.O.:600;  I.V.:162.7]  Out: 3495 [Urine:3495]    Intake/Output Summary (Last 24 hours) at 18 3800  Last data filed at 18 0700 Gross per 24 hour   Intake           762.69 ml   Output             3495 ml   Net         -2732.31 ml       Physical Exam:    General: resting in bed, slightly drowsy when awakened but oriented. In no distress. No c/o pain. On room air  HEENT:  Anicteric sclerae; pink palpebral conjunctivae; mucosa moist  Resp:  Symmetrical chest expansion, no accessory muscle use; mild rales noted in b/l lungs  CV:  S1, S2 present; regular rate and rhythm  GI:  Abdomen soft, non-tender; (+) active bowel sounds  Extremities:  +2 pulses on all extremities; trace edema in UE, moderate edema in LE. Poor toenail hygiene.   Skin:  Warm; no rashes/ lesions noted  Neurologic:  Non-focal  Devices:  PIV, martinez    DATA:     Current Facility-Administered Medications   Medication Dose Route Frequency    nitroglycerin (TRIDIL) 400 mcg/ml infusion  0-20 mcg/min IntraVENous TITRATE    sodium chloride (NS) flush 5-10 mL  5-10 mL IntraVENous PRN    amLODIPine (NORVASC) tablet 10 mg  10 mg Oral DAILY    aspirin chewable tablet 81 mg  81 mg Oral DAILY    hydrALAZINE (APRESOLINE) tablet 100 mg  100 mg Oral TID    pravastatin (PRAVACHOL) tablet 20 mg  20 mg Oral QHS    sodium chloride (NS) flush 5-10 mL  5-10 mL IntraVENous Q8H    sodium chloride (NS) flush 5-10 mL  5-10 mL IntraVENous PRN    LORazepam (ATIVAN) tablet 1 mg  1 mg Oral Q1H PRN    Or    LORazepam (ATIVAN) injection 1 mg  1 mg IntraVENous Q1H PRN    LORazepam (ATIVAN) tablet 2 mg  2 mg Oral Q1H PRN    Or    LORazepam (ATIVAN) injection 2 mg  2 mg IntraVENous Q1H PRN    LORazepam (ATIVAN) injection 3 mg  3 mg IntraVENous Q15MIN PRN    sodium chloride (NS) flush 5-10 mL  5-10 mL IntraVENous PRN    folic acid (FOLVITE) tablet 1 mg  1 mg Oral DAILY    therapeutic multivitamin (THERAGRAN) tablet 1 Tab  1 Tab Oral DAILY    acetaminophen (TYLENOL) tablet 650 mg  650 mg Oral Q6H PRN    heparin (porcine) injection 5,000 Units  5,000 Units SubCUTAneous Q8H    insulin lispro (HUMALOG) injection   SubCUTAneous AC&HS    glucose chewable tablet 16 g  4 Tab Oral PRN    glucagon (GLUCAGEN) injection 1 mg  1 mg IntraMUSCular PRN    dextrose (D50W) injection syrg 12.5-25 g  25-50 mL IntraVENous PRN    furosemide (LASIX) 100 mg in 0.9% sodium chloride 100 mL infusion  10 mg/hr IntraVENous CONTINUOUS    albuterol-ipratropium (DUO-NEB) 2.5 MG-0.5 MG/3 ML  3 mL Nebulization Q4H RT    thiamine HCL (B-1) tablet 100 mg  100 mg Oral DAILY         Labs: Results:       Chemistry Recent Labs      08/24/18   0234  08/23/18   2140  08/23/18   1550   GLU  105*  135*  87   NA  142  143  143   K  4.3  4.5  5.4   CL  107  109*  108   CO2  25  22  27   BUN  70*  71*  68*   CREA  7.09*  6.93*  6.90*   CA  7.0*  7.2*  6.9*   AGAP  10  12  8   BUCR  10*  10*  10*      CBC w/Diff Recent Labs      08/24/18   0234  08/23/18   0750   WBC  5.9  7.7   RBC  3.76*  4.05*   HGB  10.0*  11.0*   HCT  32.6*  35.7*   PLT  194  229   GRANS  68  50   LYMPH  18*  35   EOS  1  4      Coagulation Recent Labs      08/23/18   0750   PTP  13.2   INR  1.0   APTT  28.1       Liver Enzymes No results for input(s): TP, ALB, TBIL, AP, SGOT, GPT in the last 72 hours. No lab exists for component: DBIL   ABG Lab Results   Component Value Date/Time    PHI 7.389 08/23/2018 06:34 PM    PCO2I 38.5 08/23/2018 06:34 PM    PO2I 67 (L) 08/23/2018 06:34 PM    HCO3I 23.3 08/23/2018 06:34 PM    FIO2I 60 08/23/2018 09:47 AM      Microbiology Recent Labs      08/23/18   1545  08/23/18   0835  08/23/18   0815   CULT  MRSA target DNA is not detected (presumptive not colonized with MRSA)  NO GROWTH AFTER 22 HOURS  NO GROWTH AFTER 22 HOURS          Telemetry: [x]Sinus []A-flutter []Paced    []A-fib []Multiple PVCs                    Imaging:  CXR Results  (Last 48 hours)               08/24/18 0307  XR CHEST PORT Final result    Impression:  IMPRESSION:       Resolving acute heart failure.            Narrative:  EXAM: CHEST ONE VIEW portable 0232 hours       CLINICAL HISTORY/INDICATION: follow CHF , acute on chronic kidney disease stage   IV, cardiorenal syndrome, hypertensive urgency       COMPARISON: Chest x-ray August 23, 2018, September 18, 2017. TECHNIQUE: Single view obtained. FINDINGS:        The cardiac silhouette is mildly enlarged. There is tortuosity of the aorta with   calcified plaque. The lungs are adequately inflated. Pulmonary vascularity is   cephalized and slightly ill-defined. Marked interval decrease in the previously   demonstrated perihilar central alveolar and interstitial infiltrates. The   costophrenic angles are sharply defined. No bony abnormalities are seen. 08/23/18 0825  XR CHEST PORT Final result    Impression:  IMPRESSION:       New airspace opacities at least in the right mid to lower lung zones. Possible   additional area of airspace opacities in the retrocardiac region. Differential   considerations of pneumonia vs. aspiration vs. hemorrhage. Narrative:  EXAM:  Chest Portable       INDICATION:  Shortness of breath. COMPARISON:  9/18/17. TECHNIQUE:  AP portable chest study obtained in semiupright position. FINDINGS:        - New airspace opacities are noted in the right mid to lower lung zones. - There may be additional areas of airspace opacities in the retrocardiac region   as well. - No pneumothorax or pleural effusion is detected. - No significant cardiac silhouette enlargement. IMPRESSION:   · Acute respiratory failure in setting of pulmonary edema, acute on chronic systolic HF - responded well to lasix gtt and bipap. On room air, PO lasix. CXR improved. · Hypertensive emergency - SBP now 120-140s  · Acute on chronic kidney injury - worsening, creatinine now 7.10; baseline 2.9  · Hx of dilated cardiomyopathy - EF 15% 2016; repeat echo pending  · Anemia of chronic kidney disease - H/H stable.   · Hx of HTN, hyperlipidemia  · Hx of Gout  · Hx of ETOH abuse        PLAN:   · Resp - Stable on room air. Prn duonebs. · ID - Afebrile. Aleukocytosis. MRSA negative. Follow blood c/s. Trend WBC, temp. · CVS - Follow up echo. Monitor hemodynamics. Con't PO home meds for BP control. · Heme/onc -Trend H/H, daily CBC. Monitor for signs of bleeding. · Metabolic - Trend electrolytes and replace accordingly. Daily BMP, mag, phos. · Renal - trend renal indices, monitor UOP. Nephrology following  · Endocrine - glycemic control with SSI.   · Neuro/ Pain/ Sedation - lorazepam prn for agitation  · GI - advanced to regular diet    · Prophylaxis - DVT (heparin sq), GI (none indicated)  · Discussed in interdisciplinary rounds          The patient is: [] acutely ill Risk of deterioration: [] moderate    [] critically ill  [] high     []See my orders for details    My assessment/plan was discussed with:  []nursing []PT/OT    []respiratory therapy []Dr. Allison Matthews []     []Total critical care time exclusive of procedures       minutes    Gregory Ceballos

## 2018-08-24 NOTE — PROGRESS NOTES
RENAL DAILY PROGRESS NOTE    Patient: Cedrick Smith               Sex: male          DOA: 8/23/2018  7:43 AM        YOB: 1960      Age:  63 y.o.        LOS:  LOS: 1 day     Subjective:     Cedrick Smith is a 63 y.o.  who presents with Respiratory distress  CHF (congestive heart failure) ([facility] [address]  Hypertensive emergency. Asked to evaluate for renal failure. hx of crf stage 4,htn,chf.admitted with resp distress,htn  Chief complains: Patient denies nausea, vomiting, chest pain, dizziness, shortness of breath or headache.  - Reviewed last 24 hrs events     Current Facility-Administered Medications   Medication Dose Route Frequency    furosemide (LASIX) tablet 40 mg  40 mg Oral BID    albuterol-ipratropium (DUO-NEB) 2.5 MG-0.5 MG/3 ML  3 mL Nebulization Q4H PRN    sodium chloride (NS) flush 5-10 mL  5-10 mL IntraVENous PRN    amLODIPine (NORVASC) tablet 10 mg  10 mg Oral DAILY    aspirin chewable tablet 81 mg  81 mg Oral DAILY    hydrALAZINE (APRESOLINE) tablet 100 mg  100 mg Oral TID    pravastatin (PRAVACHOL) tablet 20 mg  20 mg Oral QHS    sodium chloride (NS) flush 5-10 mL  5-10 mL IntraVENous Q8H    sodium chloride (NS) flush 5-10 mL  5-10 mL IntraVENous PRN    LORazepam (ATIVAN) tablet 1 mg  1 mg Oral Q1H PRN    Or    LORazepam (ATIVAN) injection 1 mg  1 mg IntraVENous Q1H PRN    LORazepam (ATIVAN) tablet 2 mg  2 mg Oral Q1H PRN    Or    LORazepam (ATIVAN) injection 2 mg  2 mg IntraVENous Q1H PRN    LORazepam (ATIVAN) injection 3 mg  3 mg IntraVENous Q15MIN PRN    sodium chloride (NS) flush 5-10 mL  5-10 mL IntraVENous PRN    folic acid (FOLVITE) tablet 1 mg  1 mg Oral DAILY    therapeutic multivitamin (THERAGRAN) tablet 1 Tab  1 Tab Oral DAILY    acetaminophen (TYLENOL) tablet 650 mg  650 mg Oral Q6H PRN    heparin (porcine) injection 5,000 Units  5,000 Units SubCUTAneous Q8H    insulin lispro (HUMALOG) injection   SubCUTAneous AC&HS    glucose chewable tablet 16 g  4 Tab Oral PRN    glucagon (GLUCAGEN) injection 1 mg  1 mg IntraMUSCular PRN    dextrose (D50W) injection syrg 12.5-25 g  25-50 mL IntraVENous PRN    thiamine HCL (B-1) tablet 100 mg  100 mg Oral DAILY       Objective:     Visit Vitals    /84    Pulse (!) 101    Temp 98.6 °F (37 °C)    Resp 23    Ht 5' 7\" (1.702 m)    Wt 115.2 kg (254 lb)    SpO2 96%    BMI 39.78 kg/m2       Intake/Output Summary (Last 24 hours) at 08/24/18 1312  Last data filed at 08/24/18 1200   Gross per 24 hour   Intake           792.69 ml   Output             3768 ml   Net         -2975.31 ml       Physical Examination:     GEN: AAO X 3, NAD  RS: Chest is bilateral equal, no wheezing / rales / crackles  CVS: S1-S2 heard, RRR, No S3 / murmur  Abdomen: Soft, Non tender, Not distended, Positive bowel sounds, no organomegaly, no CVA / supra pubic tenderness  Extremities: + edema, no cyanosis, skin is warm on touch  CNS: Awake & follows commands, CN II-XII are grossly intact. HEENT: Head is atraumatic, PERRLA, conjunctiva pink & non icteric. No JVD or carotid bruit   Psychiatric: Normal mood, affect, judgement & memory    Musculoskeletal: No gross joints or bone deformities   Lymph Node: No palpable cervical, axillary or groin lymphadenopathy. Data Review:      Labs:     Hematology: Recent Labs      08/24/18   0234  08/23/18   0750   WBC  5.9  7.7   HGB  10.0*  11.0*   HCT  32.6*  35.7*     Chemistry: Recent Labs      08/24/18   1145  08/24/18   0234  08/23/18   2140  08/23/18   1550  08/23/18   0750   BUN  70*  70*  71*  68*  67*   CREA  7.10*  7.09*  6.93*  6.90*  6.88*   CA  7.0*  7.0*  7.2*  6.9*  7.3*   K  4.3  4.3  4.5  5.4  4.8   NA  140  142  143  143  143   CL  107  107  109*  108  108   CO2  25  25  22  27  23   GLU  152*  105*  135*  87  234*        Images:    XR (Most Recent).  CXR reviewed by me and compared with previous CXR   Results from Hospital Encounter encounter on 08/23/18   XR CHEST PORT Narrative EXAM: CHEST ONE VIEW portable 0235 hours    CLINICAL HISTORY/INDICATION: follow CHF , acute on chronic kidney disease stage  IV, cardiorenal syndrome, hypertensive urgency    COMPARISON: Chest x-ray August 23, 2018, September 18, 2017. TECHNIQUE: Single view obtained. FINDINGS:     The cardiac silhouette is mildly enlarged. There is tortuosity of the aorta with  calcified plaque. The lungs are adequately inflated. Pulmonary vascularity is  cephalized and slightly ill-defined. Marked interval decrease in the previously  demonstrated perihilar central alveolar and interstitial infiltrates. The  costophrenic angles are sharply defined. No bony abnormalities are seen. Impression IMPRESSION:    Resolving acute heart failure. CT (Most Recent)   Results from Hospital Encounter encounter on 04/24/16   CT ABD PELV WO CONT   Narrative CT abdomen and pelvis    CPT code: 25239 and 65027. INDICATION: Left upper abdominal pain. Left scrotal swelling    TECHNIQUE: 5 mm collimation axial images obtained from the diaphragm to the  level of the pubic symphysis without oral or nonionic intravenous contrast.    COMPARISON: None    Abdomen Findings:   Heart size is top normal, without pericardial effusion. There is small amount of  patchy peripheral airspace opacity at the right costophrenic angle, unclear if  this could be inflammatory or atelectasis. No pleural effusion. Lack of intravenous contrast renders this study suboptimal for evaluating solid  abdominal organs. Given this, liver appears normal size. Gallbladder not  distended. No calcified intraluminal stones. Spleen normal size. No adrenal  mass. Pancreas poorly  from adjacent unopacified small bowel and colon  for evaluation. Normal caliber abdominal aorta. Symmetric renal size. No  hydronephrosis. No renal stones. No free fluid in the upper abdomen. Pelvis Findings:    There is a left-sided inguinal hernia containing a loop of small bowel which  appears to be ileum. In the hemiscrotum there is diffuse wall thickening of this  loop of bowel measuring up to 7 mm diameter. Upon its exit proximal and distal  there is wall thickening and edema, with some internal fecal material on image  60. This appears to be the proximal loop of bowel. The distal terminal ileum  demonstrates wall thickening on images 53 through 46 to the level of the cecum. No pneumatosis identified. Cannot well evaluate for wall edema or viable  enhancement given lack of intravenous contrast. There is a sizable left-sided  hydrocele. Bladder under distended and not optimally evaluated. Amorphous calcifications  within nonenlarged prostate. Dense calcification suggested within nonenlarged  appendix on image 51. Impression IMPRESSION:  1. Left-sided inguinal hernia containing a length of ileum which demonstrates  diffuse wall thickening/edema and some proximal distention with internal fecal  material proximal to the hernia. Distal ileum to terminal ileum demonstrates  mild diffuse wall thickening/edema as well. - Concern for mechanical obstruction given the bowel wall thickening. Findings discussed with Dr. Amber Berrios at 1520 hours. 2. Left-sided scrotal hydrocele is likely reactive. 3. No more proximal bowel or gastric distention. No colon distention. EKG Results for orders placed or performed in visit on 04/11/14   AMB POC EKG ROUTINE W/ 12 LEADS, INTER & REP     Status: None    Narrative    Sinus bradycardia rate 59. Left atrial enlargement. There is a  borderline complete left bundle branch block. Left ventricular  hypertrophy by precordial voltage criteria with some asymmetric  T-wave inversions anterolaterally in the high lateral leads  consistent with LV strain. I have personally reviewed the old medical records and patient's previously known baseline  creatinine     Plan / Recommendation:      1.  Acute/crf stage 4.discussed with patient. will probably needs dialysis. explained risks and benefits  2.pulm edema,resolved. lasix drip stopped thia am  3.htn,good control  4.hypocalcemia,check pth    D/w Dr. Calos Reyna MD  Nephrology  8/24/2018

## 2018-08-24 NOTE — ROUTINE PROCESS
Bedside and Verbal shift change report given to NATHANAEL Ruiz (oncoming nurse) by Aris Castro RN (offgoing nurse). Report included the following information SBAR, ED Summary, Intake/Output, MAR, Recent Results, Med Rec Status and Cardiac Rhythm (Sinus Rhythm).

## 2018-08-25 LAB
ANION GAP SERPL CALC-SCNC: 9 MMOL/L (ref 3–18)
BASOPHILS # BLD: 0 K/UL (ref 0–0.1)
BASOPHILS NFR BLD: 0 % (ref 0–2)
BUN SERPL-MCNC: 74 MG/DL (ref 7–18)
BUN/CREAT SERPL: 10 (ref 12–20)
CALCIUM SERPL-MCNC: 7.1 MG/DL (ref 8.5–10.1)
CHLORIDE SERPL-SCNC: 106 MMOL/L (ref 100–108)
CO2 SERPL-SCNC: 25 MMOL/L (ref 21–32)
CREAT SERPL-MCNC: 7.38 MG/DL (ref 0.6–1.3)
DIFFERENTIAL METHOD BLD: ABNORMAL
ECHO AO ASC DIAM: 4.14 CM
ECHO AO ROOT DIAM: 4.11 CM
ECHO LA AREA 4C: 23 CM2
ECHO LA VOL 2C: 93.04 ML (ref 18–58)
ECHO LA VOL 4C: 59.91 ML (ref 18–58)
ECHO LA VOL BP: 80.76 ML (ref 18–58)
ECHO LA VOL/BSA BIPLANE: 36.08 ML/M2
ECHO LA VOLUME INDEX A2C: 41.57 ML/M2
ECHO LA VOLUME INDEX A4C: 26.77 ML/M2
ECHO LV E' LATERAL VELOCITY: 5.94 CM/S
ECHO LV E' SEPTAL VELOCITY: 4.64 CM/S
ECHO LV EDV A2C: 120.8 ML
ECHO LV EDV A4C: 125.9 ML
ECHO LV EDV BP: 130.2 ML (ref 67–155)
ECHO LV EDV INDEX A4C: 56.2 ML/M2
ECHO LV EDV INDEX BP: 58.2 ML/M2
ECHO LV EDV NDEX A2C: 54 ML/M2
ECHO LV EJECTION FRACTION A2C: 40 %
ECHO LV EJECTION FRACTION A4C: 51 %
ECHO LV EJECTION FRACTION BIPLANE: 46.6 % (ref 55–100)
ECHO LV ESV A2C: 72.1 ML
ECHO LV ESV A4C: 62.3 ML
ECHO LV ESV BP: 69.5 ML (ref 22–58)
ECHO LV ESV INDEX A2C: 32.2 ML/M2
ECHO LV ESV INDEX A4C: 27.8 ML/M2
ECHO LV ESV INDEX BP: 31.1 ML/M2
ECHO LV INTERNAL DIMENSION DIASTOLIC: 5.02 CM (ref 4.2–5.9)
ECHO LV INTERNAL DIMENSION SYSTOLIC: 4.1 CM
ECHO LV IVSD: 1.67 CM (ref 0.6–1)
ECHO LV POSTERIOR WALL DIASTOLIC: 2.02 CM (ref 0.6–1)
ECHO LVOT DIAM: 2.3 CM
ECHO LVOT PEAK GRADIENT: 7.1 MMHG
ECHO LVOT PEAK VELOCITY: 133.36 CM/S
ECHO LVOT VTI: 19.28 CM
ECHO MV A VELOCITY: 94.18 CM/S
ECHO MV E DECELERATION TIME (DT): 178.6 MS
ECHO MV E VELOCITY: 0.67 CM/S
ECHO MV E/A RATIO: 0.01
ECHO MV E/E' LATERAL: 0.11
ECHO MV E/E' RATIO (AVERAGED): 0.13
ECHO MV E/E' SEPTAL: 0.14
ECHO RV TAPSE: 3.21 CM (ref 1.5–2)
ECHO TV REGURGITANT MAX VELOCITY: 214.48 CM/S
ECHO TV REGURGITANT PEAK GRADIENT: 18.4 MMHG
EOSINOPHIL # BLD: 0.2 K/UL (ref 0–0.4)
EOSINOPHIL NFR BLD: 3 % (ref 0–5)
ERYTHROCYTE [DISTWIDTH] IN BLOOD BY AUTOMATED COUNT: 13.7 % (ref 11.6–14.5)
GLUCOSE BLD STRIP.AUTO-MCNC: 109 MG/DL (ref 70–110)
GLUCOSE BLD STRIP.AUTO-MCNC: 115 MG/DL (ref 70–110)
GLUCOSE BLD STRIP.AUTO-MCNC: 137 MG/DL (ref 70–110)
GLUCOSE SERPL-MCNC: 113 MG/DL (ref 74–99)
HCT VFR BLD AUTO: 33.1 % (ref 36–48)
HGB BLD-MCNC: 10.2 G/DL (ref 13–16)
LYMPHOCYTES # BLD: 1.2 K/UL (ref 0.9–3.6)
LYMPHOCYTES NFR BLD: 19 % (ref 21–52)
MAGNESIUM SERPL-MCNC: 1.9 MG/DL (ref 1.6–2.6)
MCH RBC QN AUTO: 26.5 PG (ref 24–34)
MCHC RBC AUTO-ENTMCNC: 30.8 G/DL (ref 31–37)
MCV RBC AUTO: 86 FL (ref 74–97)
MONOCYTES # BLD: 0.8 K/UL (ref 0.05–1.2)
MONOCYTES NFR BLD: 13 % (ref 3–10)
NEUTS SEG # BLD: 3.9 K/UL (ref 1.8–8)
NEUTS SEG NFR BLD: 65 % (ref 40–73)
PHOSPHATE SERPL-MCNC: 5.9 MG/DL (ref 2.5–4.9)
PLATELET # BLD AUTO: 202 K/UL (ref 135–420)
PMV BLD AUTO: 10.6 FL (ref 9.2–11.8)
POTASSIUM SERPL-SCNC: 3.9 MMOL/L (ref 3.5–5.5)
RBC # BLD AUTO: 3.85 M/UL (ref 4.7–5.5)
SODIUM SERPL-SCNC: 140 MMOL/L (ref 136–145)
WBC # BLD AUTO: 6 K/UL (ref 4.6–13.2)

## 2018-08-25 PROCEDURE — 85025 COMPLETE CBC W/AUTO DIFF WBC: CPT | Performed by: INTERNAL MEDICINE

## 2018-08-25 PROCEDURE — 83735 ASSAY OF MAGNESIUM: CPT | Performed by: INTERNAL MEDICINE

## 2018-08-25 PROCEDURE — 74011250636 HC RX REV CODE- 250/636: Performed by: INTERNAL MEDICINE

## 2018-08-25 PROCEDURE — 74011250637 HC RX REV CODE- 250/637: Performed by: INTERNAL MEDICINE

## 2018-08-25 PROCEDURE — 65660000004 HC RM CVT STEPDOWN

## 2018-08-25 PROCEDURE — 82962 GLUCOSE BLOOD TEST: CPT

## 2018-08-25 PROCEDURE — 36415 COLL VENOUS BLD VENIPUNCTURE: CPT | Performed by: INTERNAL MEDICINE

## 2018-08-25 PROCEDURE — 84100 ASSAY OF PHOSPHORUS: CPT | Performed by: INTERNAL MEDICINE

## 2018-08-25 PROCEDURE — 80048 BASIC METABOLIC PNL TOTAL CA: CPT | Performed by: INTERNAL MEDICINE

## 2018-08-25 PROCEDURE — 74011250637 HC RX REV CODE- 250/637: Performed by: PHYSICIAN ASSISTANT

## 2018-08-25 RX ORDER — CLONIDINE HYDROCHLORIDE 0.1 MG/1
0.1 TABLET ORAL 2 TIMES DAILY
Status: DISCONTINUED | OUTPATIENT
Start: 2018-08-25 | End: 2018-08-26

## 2018-08-25 RX ORDER — CALCIUM ACETATE 667 MG/1
1 CAPSULE ORAL
Status: DISCONTINUED | OUTPATIENT
Start: 2018-08-25 | End: 2018-08-31 | Stop reason: HOSPADM

## 2018-08-25 RX ORDER — CALCITRIOL 0.25 UG/1
0.25 CAPSULE ORAL DAILY
Status: DISCONTINUED | OUTPATIENT
Start: 2018-08-26 | End: 2018-08-28

## 2018-08-25 RX ADMIN — HEPARIN SODIUM 5000 UNITS: 5000 INJECTION, SOLUTION INTRAVENOUS; SUBCUTANEOUS at 14:49

## 2018-08-25 RX ADMIN — CLONIDINE HYDROCHLORIDE 0.1 MG: 0.1 TABLET ORAL at 12:53

## 2018-08-25 RX ADMIN — HYDRALAZINE HYDROCHLORIDE 100 MG: 50 TABLET, FILM COATED ORAL at 09:31

## 2018-08-25 RX ADMIN — CALCIUM ACETATE 667 MG: 667 CAPSULE ORAL at 14:49

## 2018-08-25 RX ADMIN — ASPIRIN 81 MG CHEWABLE TABLET 81 MG: 81 TABLET CHEWABLE at 09:31

## 2018-08-25 RX ADMIN — Medication 10 ML: at 14:00

## 2018-08-25 RX ADMIN — Medication 100 MG: at 09:31

## 2018-08-25 RX ADMIN — THERA TABS 1 TABLET: TAB at 09:31

## 2018-08-25 RX ADMIN — FUROSEMIDE 40 MG: 40 TABLET ORAL at 09:31

## 2018-08-25 RX ADMIN — HEPARIN SODIUM 5000 UNITS: 5000 INJECTION, SOLUTION INTRAVENOUS; SUBCUTANEOUS at 06:13

## 2018-08-25 RX ADMIN — HYDRALAZINE HYDROCHLORIDE 100 MG: 50 TABLET, FILM COATED ORAL at 21:59

## 2018-08-25 RX ADMIN — FUROSEMIDE 40 MG: 40 TABLET ORAL at 17:35

## 2018-08-25 RX ADMIN — CALCIUM ACETATE 667 MG: 667 CAPSULE ORAL at 17:35

## 2018-08-25 RX ADMIN — HYDRALAZINE HYDROCHLORIDE 100 MG: 50 TABLET, FILM COATED ORAL at 17:35

## 2018-08-25 RX ADMIN — FOLIC ACID 1 MG: 1 TABLET ORAL at 09:31

## 2018-08-25 RX ADMIN — PRAVASTATIN SODIUM 20 MG: 20 TABLET ORAL at 21:59

## 2018-08-25 RX ADMIN — CLONIDINE HYDROCHLORIDE 0.1 MG: 0.1 TABLET ORAL at 17:35

## 2018-08-25 RX ADMIN — Medication 10 ML: at 22:00

## 2018-08-25 RX ADMIN — AMLODIPINE BESYLATE 10 MG: 10 TABLET ORAL at 09:31

## 2018-08-25 RX ADMIN — Medication 10 ML: at 06:13

## 2018-08-25 NOTE — PROGRESS NOTES
[facility] Hospitalist Group  Progress Note    Patient: Jurgen Lipscomb Age: 63 y.o. : 1960 MR#: [id number] SSN: [id number]  Date/Time: 2018    Subjective:     I personally saw and evaluated this patient on 18  Patient sitting in bed in NAD, awake, has flood    Assessment/Plan:     1- Acute on chronic systolic chf exac  2- Hypertensive emergency at admission requiring nitro drip  3- JULIA on CKD4  4- Acute hypoxic and hypercapnic resp failure  In the setting of chf and hypertensive emergency- initially required BIPAP      PLAN  Off bIPAP  On lasix, I/O  Cardio following  Monitor renal function, nephro following, may need Dialysis  On amlodipine, hydralazine  Monitor BP  D/w patient  Full code      Case discussed with:  [x]Patient  []Family  []Nursing  []Case Management  DVT Prophylaxis:  []Lovenox  []Hep SQ  []SCDs  []Coumadin   []On Heparin gtt    Objective:   VS:   Visit Vitals    /90 (BP 1 Location: Left arm, BP Patient Position: At rest)    Pulse (!) 103    Temp 98.1 °F (36.7 °C)    Resp 20    Ht 5' 7\" (1.702 m)    Wt 117.3 kg (258 lb 9.6 oz)    SpO2 93%    BMI 40.5 kg/m2      Tmax/24hrs: Temp (24hrs), Av.5 °F (36.9 °C), Min:98.1 °F (36.7 °C), Max:98.8 °F (37.1 °C)    Input/Output:     Intake/Output Summary (Last 24 hours) at 18 1456  Last data filed at 18 1345   Gross per 24 hour   Intake              240 ml   Output             2914 ml   Net            -2674 ml       General:  Awake, alert  Cardiovascular:  S1S2+, RRR  Pulmonary:  Decreased bs b/l bases  GI:  Soft, BS+, NT, ND  Extremities:  trace edema      Labs:    Recent Results (from the past 24 hour(s))   GLUCOSE, POC    Collection Time: 18  3:54 PM   Result Value Ref Range    Glucose (POC) 114 (H) 70 - 110 mg/dL   GLUCOSE, POC    Collection Time: 18  9:44 PM   Result Value Ref Range    Glucose (POC) 117 (H) 70 - 110 mg/dL   CBC WITH AUTOMATED DIFF    Collection Time: 08/25/18  1:30 AM   Result Value Ref Range    WBC 6.0 4.6 - 13.2 K/uL    RBC 3.85 (L) 4.70 - 5.50 M/uL    HGB 10.2 (L) 13.0 - 16.0 g/dL    HCT 33.1 (L) 36.0 - 48.0 %    MCV 86.0 74.0 - 97.0 FL    MCH 26.5 24.0 - 34.0 PG    MCHC 30.8 (L) 31.0 - 37.0 g/dL    RDW 13.7 11.6 - 14.5 %    PLATELET 817 850 - 682 K/uL    MPV 10.6 9.2 - 11.8 FL    NEUTROPHILS 65 40 - 73 %    LYMPHOCYTES 19 (L) 21 - 52 %    MONOCYTES 13 (H) 3 - 10 %    EOSINOPHILS 3 0 - 5 %    BASOPHILS 0 0 - 2 %    ABS. NEUTROPHILS 3.9 1.8 - 8.0 K/UL    ABS. LYMPHOCYTES 1.2 0.9 - 3.6 K/UL    ABS. MONOCYTES 0.8 0.05 - 1.2 K/UL    ABS. EOSINOPHILS 0.2 0.0 - 0.4 K/UL    ABS.  BASOPHILS 0.0 0.0 - 0.1 K/UL    DF AUTOMATED     MAGNESIUM    Collection Time: 08/25/18  1:30 AM   Result Value Ref Range    Magnesium 1.9 1.6 - 2.6 mg/dL   METABOLIC PANEL, BASIC    Collection Time: 08/25/18  1:30 AM   Result Value Ref Range    Sodium 140 136 - 145 mmol/L    Potassium 3.9 3.5 - 5.5 mmol/L    Chloride 106 100 - 108 mmol/L    CO2 25 21 - 32 mmol/L    Anion gap 9 3.0 - 18 mmol/L    Glucose 113 (H) 74 - 99 mg/dL    BUN 74 (H) 7.0 - 18 MG/DL    Creatinine 7.38 (H) 0.6 - 1.3 MG/DL    BUN/Creatinine ratio 10 (L) 12 - 20      GFR est AA 9 (L) >60 ml/min/1.73m2    GFR est non-AA 8 (L) >60 ml/min/1.73m2    Calcium 7.1 (L) 8.5 - 10.1 MG/DL   PHOSPHORUS    Collection Time: 08/25/18  1:30 AM   Result Value Ref Range    Phosphorus 5.9 (H) 2.5 - 4.9 MG/DL   GLUCOSE, POC    Collection Time: 08/25/18 12:23 PM   Result Value Ref Range    Glucose (POC) 137 (H) 70 - 110 mg/dL     Additional Data Reviewed:      Signed By: Aline Bell MD     August 25, 2018 7:39 PM

## 2018-08-25 NOTE — PROGRESS NOTES
Problem: Falls - Risk of  Goal: *Absence of Falls  Document Maggie Fall Risk and appropriate interventions in the flowsheet.    Outcome: Progressing Towards Goal  Fall Risk Interventions:  Mobility Interventions: Assess mobility with egress test, Patient to call before getting OOB         Medication Interventions: Bed/chair exit alarm, Patient to call before getting OOB, Teach patient to arise slowly    Elimination Interventions: Call light in reach, Patient to call for help with toileting needs    History of Falls Interventions: Door open when patient unattended, Evaluate medications/consider consulting pharmacy        Problem: Infection - Risk of, Urinary Catheter-Associated Urinary Tract Infection  Goal: *Absence of infection signs and symptoms  Outcome: Progressing Towards Goal  Prevention of CAUTI implemented by following interventions:  Needs assessment  Urine assessment  Infection S&S monitoring  Lab monitoring  Urinary cath mgmt  Discontinuation     Problem: Hypertension  Goal: *Blood pressure within specified parameters  Outcome: Progressing Towards Goal  Blood pressure within specific parameter implemented by following:    VS assessment  Cardiac assessment  Neuro assessment  Anxiety assessment  Medication   Reassurance  Decreased sensory stimulation n

## 2018-08-25 NOTE — PROGRESS NOTES
Bedside and Verbal shift change report given to Marcy Ray (oncoming nurse) by Sharmin Pathak  (offgoing nurse). Report included the following information SBAR, Kardex, Intake/Output, MAR and Recent Results.

## 2018-08-25 NOTE — PROGRESS NOTES
Bedside and Verbal shift change report given to Ryan Brand RN (oncoming nurse) by Jim Miller RN   (offgoing nurse). Report included the following information SBAR, Kardex, Intake/Output, MAR, Recent Results, Med Rec Status, Cardiac Rhythm NSR and Alarm Parameters .

## 2018-08-25 NOTE — PROGRESS NOTES
RENAL DAILY PROGRESS NOTE    Patient: Kalyan Mcdowell               Sex: male          DOA: 8/23/2018  7:43 AM        YOB: 1960      Age:  63 y.o.        LOS:  LOS: 2 days     Subjective:     Kalyan Mcdowell is a 63 y.o.  who presents with Respiratory distress  [facility]  Hypertensive emergency. Asked to evaluate for renal failure. hx of crf stage 4,htn,chf.admitted with resp distress,htn  Chief complains: Patient denies nausea, vomiting, chest pain, dizziness, shortness of breath or headache.  - Reviewed last 24 hrs events     Current Facility-Administered Medications   Medication Dose Route Frequency    cloNIDine HCl (CATAPRES) tablet 0.1 mg  0.1 mg Oral BID    furosemide (LASIX) tablet 40 mg  40 mg Oral BID    albuterol-ipratropium (DUO-NEB) 2.5 MG-0.5 MG/3 ML  3 mL Nebulization Q4H PRN    sodium chloride (NS) flush 5-10 mL  5-10 mL IntraVENous PRN    amLODIPine (NORVASC) tablet 10 mg  10 mg Oral DAILY    aspirin chewable tablet 81 mg  81 mg Oral DAILY    hydrALAZINE (APRESOLINE) tablet 100 mg  100 mg Oral TID    pravastatin (PRAVACHOL) tablet 20 mg  20 mg Oral QHS    sodium chloride (NS) flush 5-10 mL  5-10 mL IntraVENous Q8H    LORazepam (ATIVAN) tablet 1 mg  1 mg Oral Q1H PRN    Or    LORazepam (ATIVAN) injection 1 mg  1 mg IntraVENous Q1H PRN    LORazepam (ATIVAN) tablet 2 mg  2 mg Oral Q1H PRN    Or    LORazepam (ATIVAN) injection 2 mg  2 mg IntraVENous Q1H PRN    LORazepam (ATIVAN) injection 3 mg  3 mg IntraVENous [id number] PRN    folic acid (FOLVITE) tablet 1 mg  1 mg Oral DAILY    therapeutic multivitamin (THERAGRAN) tablet 1 Tab  1 Tab Oral DAILY    acetaminophen (TYLENOL) tablet 650 mg  650 mg Oral Q6H PRN    heparin (porcine) injection 5,000 Units  5,000 Units SubCUTAneous Q8H    insulin lispro (HUMALOG) injection   SubCUTAneous AC&HS    glucose chewable tablet 16 g  4 Tab Oral PRN    glucagon (GLUCAGEN) injection 1 mg  1 mg IntraMUSCular PRN    dextrose (D50W) injection syrg 12.5-25 g  25-50 mL IntraVENous PRN    thiamine HCL (B-1) tablet 100 mg  100 mg Oral DAILY       Objective:     Visit Vitals    BP (!) 142/109 (BP 1 Location: Left arm, BP Patient Position: At rest)    Pulse 96    Temp 98.6 °F (37 °C)    Resp 22    Ht 5' 7\" (1.702 m)    Wt 117.3 kg (258 lb 9.6 oz)    SpO2 98%    BMI 40.5 kg/m2       Intake/Output Summary (Last 24 hours) at 08/25/18 1302  Last data filed at 08/25/18 0800   Gross per 24 hour   Intake              240 ml   Output             2105 ml   Net            -1865 ml       Physical Examination:     GEN: AAO X 3, NAD  RS: Chest is bilateral equal, no wheezing / rales / crackles  CVS: S1-S2 heard, RRR, No S3 / murmur  Abdomen: Soft, Non tender, Not distended, Positive bowel sounds, no organomegaly, no CVA / supra pubic tenderness  Extremities: + edema, no cyanosis, skin is warm on touch  CNS: Awake & follows commands, CN II-XII are grossly intact. HEENT: Head is atraumatic, PERRLA, conjunctiva pink & non icteric. No JVD or carotid bruit   Psychiatric: Normal mood, affect, judgement & memory    Musculoskeletal: No gross joints or bone deformities   Lymph Node: No palpable cervical, axillary or groin lymphadenopathy.       Data Review:      Labs:     Hematology:   Recent Labs      08/25/18   0130  08/24/18   0234  08/23/18   0750   WBC  6.0  5.9  7.7   HGB  10.2*  10.0*  11.0*   HCT  33.1*  32.6*  35.7*     Chemistry:   Recent Labs      08/25/18   0130  08/24/18   1355  08/24/18   1145  08/24/18   0234  08/23/18   2140  08/23/18   1550  08/23/18   0750   BUN  74*   --   70*  70*  71*  68*  67*   CREA  7.38*   --   7.10*  7.09*  6.93*  6.90*  6.88*   CA  7.1*  7.4*  7.0*  7.0*  7.2*  6.9*  7.3*   K  3.9   --   4.3  4.3  4.5  5.4  4.8   NA  140   --   140  142  143  143  143   CL  106   --   107  107  109*  108  108   CO2  25   --   25  25  22  27  23   PHOS  5.9*   --    --    --    --    --    --    GLU 113*   --   152*  105*  135*  87  234*        Images:    XR (Most Recent). CXR reviewed by me and compared with previous CXR   Results from Hospital Encounter encounter on 08/23/18   XR CHEST PORT   Narrative EXAM: CHEST ONE VIEW portable 0235 hours    CLINICAL HISTORY/INDICATION: follow CHF , acute on chronic kidney disease stage  IV, cardiorenal syndrome, hypertensive urgency    COMPARISON: Chest x-ray August 23, 2018, September 18, 2017. TECHNIQUE: Single view obtained. FINDINGS:     The cardiac silhouette is mildly enlarged. There is tortuosity of the aorta with  calcified plaque. The lungs are adequately inflated. Pulmonary vascularity is  cephalized and slightly ill-defined. Marked interval decrease in the previously  demonstrated perihilar central alveolar and interstitial infiltrates. The  costophrenic angles are sharply defined. No bony abnormalities are seen. Impression IMPRESSION:    Resolving acute heart failure. CT (Most Recent)   Results from Hospital Encounter encounter on 04/24/16   CT ABD PELV WO CONT   Narrative CT abdomen and pelvis    CPT code: 66478 and 41144. INDICATION: Left upper abdominal pain. Left scrotal swelling    TECHNIQUE: 5 mm collimation axial images obtained from the diaphragm to the  level of the pubic symphysis without oral or nonionic intravenous contrast.    COMPARISON: None    Abdomen Findings:   Heart size is top normal, without pericardial effusion. There is small amount of  patchy peripheral airspace opacity at the right costophrenic angle, unclear if  this could be inflammatory or atelectasis. No pleural effusion. Lack of intravenous contrast renders this study suboptimal for evaluating solid  abdominal organs. Given this, liver appears normal size. Gallbladder not  distended. No calcified intraluminal stones. Spleen normal size. No adrenal  mass. Pancreas poorly  from adjacent unopacified small bowel and colon  for evaluation. Normal caliber abdominal aorta. Symmetric renal size. No  hydronephrosis. No renal stones. No free fluid in the upper abdomen. Pelvis Findings: There is a left-sided inguinal hernia containing a loop of small bowel which  appears to be ileum. In the hemiscrotum there is diffuse wall thickening of this  loop of bowel measuring up to 7 mm diameter. Upon its exit proximal and distal  there is wall thickening and edema, with some internal fecal material on image  60. This appears to be the proximal loop of bowel. The distal terminal ileum  demonstrates wall thickening on images 53 through 46 to the level of the cecum. No pneumatosis identified. Cannot well evaluate for wall edema or viable  enhancement given lack of intravenous contrast. There is a sizable left-sided  hydrocele. Bladder under distended and not optimally evaluated. Amorphous calcifications  within nonenlarged prostate. Dense calcification suggested within nonenlarged  appendix on image 51. Impression IMPRESSION:  1. Left-sided inguinal hernia containing a length of ileum which demonstrates  diffuse wall thickening/edema and some proximal distention with internal fecal  material proximal to the hernia. Distal ileum to terminal ileum demonstrates  mild diffuse wall thickening/edema as well. - Concern for mechanical obstruction given the bowel wall thickening. Findings discussed with Dr. Dee Ellsworth at 1520 hours. 2. Left-sided scrotal hydrocele is likely reactive. 3. No more proximal bowel or gastric distention. No colon distention. EKG Results for orders placed or performed in visit on 04/11/14   AMB POC EKG ROUTINE W/ 12 LEADS, INTER & REP     Status: None    Narrative    Sinus bradycardia rate 59. Left atrial enlargement. There is a  borderline complete left bundle branch block.  Left ventricular  hypertrophy by precordial voltage criteria with some asymmetric  T-wave inversions anterolaterally in the high lateral leads  consistent with LV strain. I have personally reviewed the old medical records and patient's previously known baseline  creatinine     Plan / Recommendation:      1. Acute/crf stage 4.discussed with patient. will probably needs dialysis. explained risks and benefits. possibly start monday  2.pulm edema,resolved. lasix drip stopped . On po lasix  3.htn,adjust meds  4.hypocalcemia,hyperphosphatemia,start ca acetate    D/w Dr. Red Flores MD  Nephrology  8/25/2018

## 2018-08-25 NOTE — PROGRESS NOTES
NUTRITION    Nutrition Consult: General Nutrition Management & Supplements     RECOMMENDATIONS / PLAN:     - Change to renal, diabetic diet. - Monitor phosphorus. Consider starting phosphate binder as medically appropriate per nephrology. - Continue RD inpatient monitoring and evaluation. NUTRITION INTERVENTIONS & DIAGNOSIS:     [x] Meals/snacks: modify composition    Nutrition Diagnosis: Altered nutrition related laboratory values related to decreased renal function and excessive dietary intake as evidenced by pt with hyperphosphatemia. ASSESSMENT:     Pt with CKD stage IV. Good meal intake of diabetic diet, noted elevated phosphorus. Nephrology following stating pt will probably need dialysis. BG levels controlled. Briefly discussed reasoning for renal diet.     Average po intake adequate to meet patients estimated nutritional needs:   [x] Yes     [] No   [] Unable to determine at this time    Diet: DIET RENAL Regular; Consistent Carb 2000kcal      Food Allergies: NKFA  Current Appetite:   [x] Good     [] Fair     [] Poor     [] Other:  Appetite/meal intake prior to admission:   [x] Good     [] Fair     [] Poor     [] Other:  Feeding Limitations:  [] Swallowing difficulty    [] Chewing difficulty    [] Other:  Current Meal Intake: Patient Vitals for the past 100 hrs:   % Diet Eaten   08/24/18 1700 100 %   08/24/18 1200 100 %   08/24/18 0900 100 %       BM: 8/24  Skin Integrity: WDL   Edema:   [] No     [x] Yes LEs  Pertinent Medications: Reviewed[de-identified] folic acid, lasix, SSI, theragran, thiamine    Recent Labs      08/25/18   0130  08/24/18   1355  08/24/18   1145  08/24/18   0234   NA  140   --   140  142   K  3.9   --   4.3  4.3   CL  106   --   107  107   CO2  25   --   25 25   GLU  113*   --   152*  105*   BUN  74*   --   70*  70*   CREA  7.38*   --   7.10*  7.09*   CA  7.1*  7.4*  7.0*  7.0*   MG  1.9   --    --   2.0   PHOS  5.9*   --    --    --        Intake/Output Summary (Last 24 hours) at 08/25/18 [address] filed at 08/25/18 0620   Gross per 24 hour   Intake              480 ml   Output             2287 ml   Net            -1807 ml       Anthropometrics:  Ht Readings from Last 1 Encounters:   08/24/18 5' 7\" (1.702 m)     Last 3 Recorded Weights in this Encounter    08/23/18 1417 08/24/18 1308 08/25/18 0200   Weight: 115.6 kg (254 lb 12.8 oz) 115.2 kg (254 lb) 117.3 kg (258 lb 9.6 oz)     Body mass index is 40.5 kg/(m^2). Obese Class III    Weight History: Pt reports weight gain PTA    Weight Metrics 8/25/2018 6/5/2018 4/9/2018 4/6/2018 3/12/2018 3/3/2018 1/24/2018   Weight 258 lb 9.6 oz 256 lb 12.8 oz 255 lb 260 lb 240 lb 220 lb 241 lb   BMI 40.5 kg/m2 40.22 kg/m2 39.94 kg/m2 40.72 kg/m2 37.59 kg/m2 34.46 kg/m2 35.59 kg/m2        Admitting Diagnosis: Respiratory distress  CHF (congestive heart failure) ([facility]  Hypertensive emergency  Pertinent PMHx: CKD, CHF, HTN, hypercholesterolemia    Education Needs:        [x] None identified  [] Identified - Not appropriate at this time  []  Identified and addressed - refer to education log  Learning Limitations:   [x] None identified  [] Identified    Cultural, Judaism & ethnic food preferences:  [x] None identified    [] Identified and addressed     ESTIMATED NUTRITION NEEDS:     Calories: 0560-5897 kcal (MSJx1.2-1.3) based on  [x] Actual BW: 117 kg      [] IBW   Protein:  gm (0.8-1 gm/kg) based on  [x] Actual BW      [] IBW   Fluid: 1 mL/kcal     MONITORING & EVALUATION:     Nutrition Goal(s):   1. Po intake of meals will meet >75% of patient estimated nutritional needs within the next 7 days.   Outcome:  [] Met/Ongoing    []  Not Met    [x] New/Initial Goal     Monitoring:   [x] Food and beverage intake   [x] Diet order   [x] Nutrition-focused physical findings   [x] Treatment/therapy   [] Weight   [] Enteral nutrition intake        Previous Recommendations (for follow-up assessments only):     []   Implemented       []   Not Implemented (RD to address)      [] No Longer Appropriate     [] No Recommendation Made     Discharge Planning: diabetic diet   [x] Participated in care planning, discharge planning, & interdisciplinary rounds as appropriate      Arnie Bailey, KAREN   Pager: [phone]

## 2018-08-26 LAB
GLUCOSE BLD STRIP.AUTO-MCNC: 114 MG/DL (ref 70–110)
GLUCOSE BLD STRIP.AUTO-MCNC: 116 MG/DL (ref 70–110)
GLUCOSE BLD STRIP.AUTO-MCNC: 140 MG/DL (ref 70–110)
GLUCOSE BLD STRIP.AUTO-MCNC: 142 MG/DL (ref 70–110)

## 2018-08-26 PROCEDURE — 74011250637 HC RX REV CODE- 250/637: Performed by: INTERNAL MEDICINE

## 2018-08-26 PROCEDURE — 74011250637 HC RX REV CODE- 250/637: Performed by: EMERGENCY MEDICINE

## 2018-08-26 PROCEDURE — 87340 HEPATITIS B SURFACE AG IA: CPT | Performed by: INTERNAL MEDICINE

## 2018-08-26 PROCEDURE — 36415 COLL VENOUS BLD VENIPUNCTURE: CPT | Performed by: INTERNAL MEDICINE

## 2018-08-26 PROCEDURE — 74011000250 HC RX REV CODE- 250: Performed by: INTERNAL MEDICINE

## 2018-08-26 PROCEDURE — 51798 US URINE CAPACITY MEASURE: CPT

## 2018-08-26 PROCEDURE — 74011250637 HC RX REV CODE- 250/637: Performed by: PHYSICIAN ASSISTANT

## 2018-08-26 PROCEDURE — 74011000258 HC RX REV CODE- 258: Performed by: INTERNAL MEDICINE

## 2018-08-26 PROCEDURE — 86704 HEP B CORE ANTIBODY TOTAL: CPT | Performed by: INTERNAL MEDICINE

## 2018-08-26 PROCEDURE — 65660000004 HC RM CVT STEPDOWN

## 2018-08-26 PROCEDURE — 82962 GLUCOSE BLOOD TEST: CPT

## 2018-08-26 PROCEDURE — 74011250636 HC RX REV CODE- 250/636: Performed by: INTERNAL MEDICINE

## 2018-08-26 RX ORDER — CLONIDINE HYDROCHLORIDE 0.1 MG/1
0.2 TABLET ORAL 2 TIMES DAILY
Status: DISCONTINUED | OUTPATIENT
Start: 2018-08-26 | End: 2018-08-27

## 2018-08-26 RX ORDER — TAMSULOSIN HYDROCHLORIDE 0.4 MG/1
0.4 CAPSULE ORAL
Status: DISCONTINUED | OUTPATIENT
Start: 2018-08-26 | End: 2018-08-31 | Stop reason: HOSPADM

## 2018-08-26 RX ADMIN — TAMSULOSIN HYDROCHLORIDE 0.4 MG: 0.4 CAPSULE ORAL at 22:24

## 2018-08-26 RX ADMIN — Medication 10 ML: at 22:24

## 2018-08-26 RX ADMIN — CALCIUM ACETATE 667 MG: 667 CAPSULE ORAL at 08:55

## 2018-08-26 RX ADMIN — HYDRALAZINE HYDROCHLORIDE 100 MG: 50 TABLET, FILM COATED ORAL at 22:24

## 2018-08-26 RX ADMIN — CLONIDINE HYDROCHLORIDE 0.2 MG: 0.1 TABLET ORAL at 17:27

## 2018-08-26 RX ADMIN — CALCIUM ACETATE 667 MG: 667 CAPSULE ORAL at 14:00

## 2018-08-26 RX ADMIN — THERA TABS 1 TABLET: TAB at 08:55

## 2018-08-26 RX ADMIN — Medication 10 ML: at 06:00

## 2018-08-26 RX ADMIN — HYDRALAZINE HYDROCHLORIDE 100 MG: 50 TABLET, FILM COATED ORAL at 08:55

## 2018-08-26 RX ADMIN — HEPARIN SODIUM 5000 UNITS: 5000 INJECTION, SOLUTION INTRAVENOUS; SUBCUTANEOUS at 22:23

## 2018-08-26 RX ADMIN — HEPARIN SODIUM 5000 UNITS: 5000 INJECTION, SOLUTION INTRAVENOUS; SUBCUTANEOUS at 16:02

## 2018-08-26 RX ADMIN — AMLODIPINE BESYLATE 10 MG: 10 TABLET ORAL at 08:55

## 2018-08-26 RX ADMIN — CALCITRIOL 0.25 MCG: 0.25 CAPSULE ORAL at 08:54

## 2018-08-26 RX ADMIN — FOLIC ACID 1 MG: 1 TABLET ORAL at 08:55

## 2018-08-26 RX ADMIN — HYDRALAZINE HYDROCHLORIDE 100 MG: 50 TABLET, FILM COATED ORAL at 16:02

## 2018-08-26 RX ADMIN — SODIUM CHLORIDE 0.5 MG/HR: 900 INJECTION, SOLUTION INTRAVENOUS at 17:31

## 2018-08-26 RX ADMIN — FUROSEMIDE 40 MG: 40 TABLET ORAL at 08:54

## 2018-08-26 RX ADMIN — CALCIUM ACETATE 667 MG: 667 CAPSULE ORAL at 17:27

## 2018-08-26 RX ADMIN — PRAVASTATIN SODIUM 20 MG: 20 TABLET ORAL at 22:24

## 2018-08-26 RX ADMIN — ASPIRIN 81 MG CHEWABLE TABLET 81 MG: 81 TABLET CHEWABLE at 08:55

## 2018-08-26 RX ADMIN — Medication 100 MG: at 08:55

## 2018-08-26 RX ADMIN — CLONIDINE HYDROCHLORIDE 0.1 MG: 0.1 TABLET ORAL at 08:55

## 2018-08-26 RX ADMIN — Medication 10 ML: at 16:03

## 2018-08-26 RX ADMIN — HEPARIN SODIUM 5000 UNITS: 5000 INJECTION, SOLUTION INTRAVENOUS; SUBCUTANEOUS at 05:25

## 2018-08-26 RX ADMIN — HEPARIN SODIUM 5000 UNITS: 5000 INJECTION, SOLUTION INTRAVENOUS; SUBCUTANEOUS at 07:00

## 2018-08-26 NOTE — PROGRESS NOTES
[facility]      CARDIOLOGY PROGRESS NOTE  RECS:  1. Hypertension, severe, uncontrolled. Increase clonidine. Urine output poor despite IV Lasix. Will switch to Bumex drip until dialysis starts as discussed with Dr. Osiris Anderson of renal  2. Congestive heart failure acute on chronic combined systolic and diastolic: Needs fluid removal.  Will try Bumex drip given his acute kidney failure but will hopefully feel much better after dialysis is started. 3. Diabetes. Treatment per Medicine. 4. Severe obesity. Weight loss has been strongly encouraged by following dietary restrictions and an exercise routine. 5. Acute on chronic renal insufficiency. Calculated GFR is less than 10 now, but his creatinine was 2.9 in January of this year. Management per Nephrology. Hopefully dialysis can start tomorrow  6. Elevated cardiac enzymes. Likely due to renal insufficiency and congestive heart failure will do ischemic workup later. Overall ejection fraction has improved compared to a 3 years ago.     ASSESSMENT:  Hospital Problems  Date Reviewed: 6/5/2018          Codes Class Noted POA    [facility] ICD-10-CM: I50.9  ICD-9-CM: 428.0  8/23/2018 Unknown        Respiratory distress ICD-10-CM: R06.03  ICD-9-CM: 786.09  8/23/2018 Unknown        Hypertensive emergency ICD-10-CM: I16.1  ICD-9-CM: 401.9  8/23/2018 Unknown                SUBJECTIVE:  No CP  Mild improvement in SOB    OBJECTIVE:    VS:   Visit Vitals    /88 (BP 1 Location: Left arm, BP Patient Position: At rest)    Pulse 86    Temp 98.3 °F (36.8 °C)    Resp 19    Ht 5' 7\" (1.702 m)    Wt 99 kg (218 lb 4.1 oz)    SpO2 98%    BMI 34.18 kg/m2         Intake/Output Summary (Last 24 hours) at 08/26/18 1508  Last data filed at 08/26/18 0900   Gross per 24 hour   Intake              240 ml   Output              450 ml   Net             -210 ml     TELE: normal sinus rhythm    General: alert and in no apparent distress  HENT: Normocephalic, atraumatic. Normal external eye. Neck :  no bruit, JVD difficult to assess due to obesity  Cardiac:  regular rate and rhythm, S1, S2 normal, no murmur, click, rub or gallop  Chest/Lungs:harsh breath sounds noted both lower lobes  Abdomen: Soft, nontender, no masses  Extremities:  No c/c2-3+/edema, peripheral pulses absent      Labs: Results:       Chemistry Recent Labs      08/25/18   0130  08/24/18   1355  08/24/18   1145  08/24/18   0234   GLU  113*   --   152*  105*   NA  140   --   140  142   K  3.9   --   4.3  4.3   CL  106   --   107  107   CO2  25   --   25  25   BUN  74*   --   70*  70*   CREA  7.38*   --   7.10*  7.09*   CA  7.1*  7.4*  7.0*  7.0*   MG  1.9   --    --   2.0   PHOS  5.9*   --    --    --    AGAP  9   --   8  10   BUCR  10*   --   10*  10*      CBC w/Diff Recent Labs      08/25/18   0130  08/24/18   0234   WBC  6.0  5.9   RBC  3.85*  3.76*   HGB  10.2*  10.0*   HCT  33.1*  32.6*   PLT  202  194   GRANS  65  68   LYMPH  19*  18*   EOS  3  1      Cardiac Enzymes No results for input(s): CPK, CKND1, SHRAVAN in the last 72 hours. No lab exists for component: CKRMB, TROIP   Coagulation No results for input(s): PTP, INR, APTT in the last 72 hours. No lab exists for component: INREXT    Lipid Panel Lab Results   Component Value Date/Time    Cholesterol, total 155 08/23/2018 03:50 PM    HDL Cholesterol 72 (H) 08/23/2018 03:50 PM    LDL, calculated 71 08/23/2018 03:50 PM    VLDL, calculated 12 08/23/2018 03:50 PM    Triglyceride 60 08/23/2018 03:50 PM    CHOL/HDL Ratio 2.2 08/23/2018 03:50 PM      BNP No results for input(s): BNPP in the last 72 hours. Liver Enzymes No results for input(s): TP, ALB, TBIL, AP, SGOT, GPT in the last 72 hours.     No lab exists for component: DBIL   Digoxin    Thyroid Studies Lab Results   Component Value Date/Time    TSH 1.17 08/23/2018 03:50 PM              Bobby Jang MD   Pager # [phone]

## 2018-08-26 NOTE — PROGRESS NOTES
RENAL DAILY PROGRESS NOTE    Patient: Kathaleen Bernheim               Sex: male          DOA: 8/23/2018  7:43 AM        YOB: 1960      Age:  63 y.o.        LOS:  LOS: 3 days     Subjective:     Kathaleen Bernheim is a 63 y.o.  who presents with Respiratory distress  CHF (congestive heart failure) ([facility] [address]  Hypertensive emergency. Asked to evaluate for renal failure. hx of crf stage 4,htn,chf.admitted with resp distress,htn  Chief complains: Patient denies nausea, vomiting, chest pain, dizziness, shortness of breath or headache.  - Reviewed last 24 hrs events     Current Facility-Administered Medications   Medication Dose Route Frequency    cloNIDine HCl (CATAPRES) tablet 0.2 mg  0.2 mg Oral BID    bumetanide (BUMEX) 12.5 mg in 0.9% sodium chloride 100 mL infusion  0.5 mg/hr IntraVENous CONTINUOUS    calcium acetate (PHOSLO) capsule 667 mg  1 Cap Oral TID WITH MEALS    calcitRIOL (ROCALTROL) capsule 0.25 mcg  0.25 mcg Oral DAILY    albuterol-ipratropium (DUO-NEB) 2.5 MG-0.5 MG/3 ML  3 mL Nebulization Q4H PRN    sodium chloride (NS) flush 5-10 mL  5-10 mL IntraVENous PRN    amLODIPine (NORVASC) tablet 10 mg  10 mg Oral DAILY    aspirin chewable tablet 81 mg  81 mg Oral DAILY    hydrALAZINE (APRESOLINE) tablet 100 mg  100 mg Oral TID    pravastatin (PRAVACHOL) tablet 20 mg  20 mg Oral QHS    sodium chloride (NS) flush 5-10 mL  5-10 mL IntraVENous Q8H    LORazepam (ATIVAN) tablet 1 mg  1 mg Oral Q1H PRN    Or    LORazepam (ATIVAN) injection 1 mg  1 mg IntraVENous Q1H PRN    LORazepam (ATIVAN) tablet 2 mg  2 mg Oral Q1H PRN    Or    LORazepam (ATIVAN) injection 2 mg  2 mg IntraVENous Q1H PRN    LORazepam (ATIVAN) injection 3 mg  3 mg IntraVENous U11FHC PRN    folic acid (FOLVITE) tablet 1 mg  1 mg Oral DAILY    therapeutic multivitamin (THERAGRAN) tablet 1 Tab  1 Tab Oral DAILY    acetaminophen (TYLENOL) tablet 650 mg  650 mg Oral Q6H PRN    heparin (porcine) injection 5,000 Units  5,000 Units SubCUTAneous Q8H    insulin lispro (HUMALOG) injection   SubCUTAneous AC&HS    glucose chewable tablet 16 g  4 Tab Oral PRN    glucagon (GLUCAGEN) injection 1 mg  1 mg IntraMUSCular PRN    dextrose (D50W) injection syrg 12.5-25 g  25-50 mL IntraVENous PRN    thiamine HCL (B-1) tablet 100 mg  100 mg Oral DAILY       Objective:     Visit Vitals    BP (!) 149/97 (BP 1 Location: Left arm, BP Patient Position: At rest)    Pulse 93    Temp 97.6 °F (36.4 °C)    Resp 19    Ht 5' 7\" (1.702 m)    Wt 99 kg (218 lb 4.1 oz)    SpO2 100%    BMI 34.18 kg/m2       Intake/Output Summary (Last 24 hours) at 08/26/18 1530  Last data filed at 08/26/18 0900   Gross per 24 hour   Intake              240 ml   Output              450 ml   Net             -210 ml       Physical Examination:     GEN: AAO X 3, NAD  RS: Chest is bilateral equal, no wheezing / rales / crackles  CVS: S1-S2 heard, RRR, No S3 / murmur  Abdomen: Soft, Non tender, Not distended, Positive bowel sounds, no organomegaly, no CVA / supra pubic tenderness  Extremities: + edema, no cyanosis, skin is warm on touch  CNS: Awake & follows commands, CN II-XII are grossly intact. HEENT: Head is atraumatic, PERRLA, conjunctiva pink & non icteric. No JVD or carotid bruit   Psychiatric: Normal mood, affect, judgement & memory    Musculoskeletal: No gross joints or bone deformities   Lymph Node: No palpable cervical, axillary or groin lymphadenopathy.       Data Review:      Labs:     Hematology:   Recent Labs      08/25/18   0130  08/24/18   0234   WBC  6.0  5.9   HGB  10.2*  10.0*   HCT  33.1*  32.6*     Chemistry:   Recent Labs      08/25/18   0130  08/24/18   1355  08/24/18   1145  08/24/18   0234  08/23/18   2140  08/23/18   1550   BUN  74*   --   70*  70*  71*  68*   CREA  7.38*   --   7.10*  7.09*  6.93*  6.90*   CA  7.1*  7.4*  7.0*  7.0*  7.2*  6.9*   K  3.9   --   4.3  4.3  4.5  5.4   NA  140   --   140  142  143  143   CL  106   --   107 107  109*  108   CO2  25   --   25  25  22  27   PHOS  5.9*   --    --    --    --    --    GLU  113*   --   152*  105*  135*  87        Images:    XR (Most Recent). CXR reviewed by me and compared with previous CXR   Results from Hospital Encounter encounter on 08/23/18   XR CHEST PORT   Narrative EXAM: CHEST ONE VIEW portable 0235 hours    CLINICAL HISTORY/INDICATION: follow CHF , acute on chronic kidney disease stage  IV, cardiorenal syndrome, hypertensive urgency    COMPARISON: Chest x-ray August 23, 2018, September 18, 2017. TECHNIQUE: Single view obtained. FINDINGS:     The cardiac silhouette is mildly enlarged. There is tortuosity of the aorta with  calcified plaque. The lungs are adequately inflated. Pulmonary vascularity is  cephalized and slightly ill-defined. Marked interval decrease in the previously  demonstrated perihilar central alveolar and interstitial infiltrates. The  costophrenic angles are sharply defined. No bony abnormalities are seen. Impression IMPRESSION:    Resolving acute heart failure. CT (Most Recent)   Results from Hospital Encounter encounter on 04/24/16   CT ABD PELV WO CONT   Narrative CT abdomen and pelvis    CPT code: 67790 and 05288. INDICATION: Left upper abdominal pain. Left scrotal swelling    TECHNIQUE: 5 mm collimation axial images obtained from the diaphragm to the  level of the pubic symphysis without oral or nonionic intravenous contrast.    COMPARISON: None    Abdomen Findings:   Heart size is top normal, without pericardial effusion. There is small amount of  patchy peripheral airspace opacity at the right costophrenic angle, unclear if  this could be inflammatory or atelectasis. No pleural effusion. Lack of intravenous contrast renders this study suboptimal for evaluating solid  abdominal organs. Given this, liver appears normal size. Gallbladder not  distended. No calcified intraluminal stones. Spleen normal size. No adrenal  mass. Pancreas poorly  from adjacent unopacified small bowel and colon  for evaluation. Normal caliber abdominal aorta. Symmetric renal size. No  hydronephrosis. No renal stones. No free fluid in the upper abdomen. Pelvis Findings: There is a left-sided inguinal hernia containing a loop of small bowel which  appears to be ileum. In the hemiscrotum there is diffuse wall thickening of this  loop of bowel measuring up to 7 mm diameter. Upon its exit proximal and distal  there is wall thickening and edema, with some internal fecal material on image  60. This appears to be the proximal loop of bowel. The distal terminal ileum  demonstrates wall thickening on images 53 through 46 to the level of the cecum. No pneumatosis identified. Cannot well evaluate for wall edema or viable  enhancement given lack of intravenous contrast. There is a sizable left-sided  hydrocele. Bladder under distended and not optimally evaluated. Amorphous calcifications  within nonenlarged prostate. Dense calcification suggested within nonenlarged  appendix on image 51. Impression IMPRESSION:  1. Left-sided inguinal hernia containing a length of ileum which demonstrates  diffuse wall thickening/edema and some proximal distention with internal fecal  material proximal to the hernia. Distal ileum to terminal ileum demonstrates  mild diffuse wall thickening/edema as well. - Concern for mechanical obstruction given the bowel wall thickening. Findings discussed with Dr. Titi Cohen at 1520 hours. 2. Left-sided scrotal hydrocele is likely reactive. 3. No more proximal bowel or gastric distention. No colon distention. EKG Results for orders placed or performed in visit on 04/11/14   AMB POC EKG ROUTINE W/ 12 LEADS, INTER & REP     Status: None    Narrative    Sinus bradycardia rate 59. Left atrial enlargement. There is a  borderline complete left bundle branch block.  Left ventricular  hypertrophy by precordial voltage criteria with some asymmetric  T-wave inversions anterolaterally in the high lateral leads  consistent with LV strain. I have personally reviewed the old medical records and patient's previously known baseline  creatinine     Plan / Recommendation:      1. Acute/crf stage 4.discussed with patient. he needs dialysis. explained risks and benefits. consult dr Bam Castro for permcath in am.start dialysis tomorrow  2.pulm edema,resolved. has significant edema,resume iv bumex  3.htn,adjust meds  4.hypocalcemia,hyperphosphatemia,started ca acetate    D/w Dr. Tara Nair MD  Nephrology  8/26/2018

## 2018-08-26 NOTE — PROGRESS NOTES
TRANSFER - IN REPORT:    Verbal report received from [facility] (name) on Jessica Londono  being received from ICU(unit) for routine progression of care      Report consisted of patients Situation, Background, Assessment and   Recommendations(SBAR). Information from the following report(s) SBAR, Kardex, Procedure Summary, Intake/Output, MAR and Recent Results was reviewed with the receiving nurse. Opportunity for questions and clarification was provided. Assessment completed upon patients arrival to unit and care assumed.

## 2018-08-26 NOTE — PROGRESS NOTES
Bedside shift change report given to Ky Avalos RN (oncoming nurse) by Chelo Flores RN (offgoing nurse). Report included the following information SBAR, Kardex, OR Summary, Procedure Summary, Intake/Output, MAR and Recent Results.

## 2018-08-26 NOTE — CONSULTS
[address] [facility]    Maryellen Carranza  MR#: [id number]  : 1960  ACCOUNT #: [de-identified]   DATE OF SERVICE: 2018    REASON FOR CONSULTATION:  Shortness of breath and CHF/flash pulmonary edema. HISTORY OF PRESENT ILLNESS:  The patient is a 60-year-old [facility] [employer] male who was admitted with worsening shortness of breath for last 2-3 days and it became severe, so he came to the hospital.  He was noted to have bilateral diffuse pulmonary opacities and respiratory acidosis along with acute renal insufficiency on CKD. He denies any chest pain. He has noted edema in the legs for 3-4 days, but possibly longer. Denies any palpitations, significant dizziness or loss of consciousness. PAST HISTORY:  He is known to have ejection fraction of 15% by echo in 2016 and a new echo is pending. He has been known to be noncompliant, but he states that he ran out of his diuretics about 3-4 days ago, but was taking all other medications for his blood pressure including hydralazine 3 times a day. PAST MEDICAL HISTORY:  Significant for hypertension, diabetes, previous CHF, obesity. REVIEW OF SYSTEMS:  He denies any stroke, seizures, bleeding disorders. No chronic liver or lung disease. He is unaware of chronic kidney disease. Also, no recent fever, vomiting, diarrhea, hematuria, dysuria. He has developed a cough since coming to this hospital and thinks it is due to the cold room. ALLERGIES:  NONE KNOWN. [address]:  Include Norvasc, aspirin, Lasix 40 IV twice a day, hydralazine 100 three times a day, Norvasc 10 mg a day, Pravachol, vitamins. PERSONAL AND SOCIAL HISTORY:  Patient was an alcoholic, but quit drinking alcohol completely 3 years ago in . He was never a smoker. Denies any drug abuse. FAMILY HISTORY:  Two brothers have , one in his 45s and one in his 62s. Patient does not know the details.   Denies any sudden death in the family. Denies any known bypasses, strokes. PHYSICAL EXAMINATION:  GENERAL:  Patient is alert, oriented. He seems to be in mild respiratory distress. He coughs intermittently during the interview. VITAL SIGNS:  Heart rate is in 90s to low 100s, sinus rhythm and sinus tachycardia. Blood pressure 142/109 at present. On admission, blood pressure was as high as 254/149. HEENT:  Normocephalic, atraumatic. He is severely obese. NECK:  JVD is difficult to see. No carotid bruits. LUNGS:  Bilateral diffuse heart sounds. HEART:  S1, S2 regular. No definite murmur, rub or gallop heard. ABDOMEN:  Obese, nontender. EXTREMITIES:  2+ edema. Distal pulses were not palpable. LABORATORY DATA:  Hematology with normal white count and platelets. Hemoglobin is low at 10.2, but stable. Chemistry with BUN 74, creatinine 7.3, K 3.9, magnesium 1.9, PT and INR are normal.  Echo from 2016 with 15% ejection fraction. Echo was done today and I will see and report it, but preliminary reports says ejection fraction is 50-55% with severe LVH. A 12-lead EKG with sinus tachycardia, left atrial dilatation, LVH, ST-T changes in anterolateral leads, which could be due to LV strain, but ischemia cannot be ruled out. IMPRESSION:  Congestive heart failure, acute on chronic congestive heart failure and systolic dysfunction. We will check a repeat echo and if in fact the ejection fraction may have improved. Agree with diuretics. Urine output seems good on present doses. It is a little over 3 liters a day. We will leave that decision to nephrology service. 1.  Hypertension, severe, uncontrolled, contributed by noncompliance as the patient admitted to not taking diuretics for 3-4 days as well as eating salty foods. 2.  Diabetes. Treatment per Medicine. 3.  Severe obesity. Weight loss has been strongly encouraged by following dietary restrictions and an exercise routine. 4.  Acute on chronic renal insufficiency. Calculated GFR is less than 10 now, but his creatinine was 2.9 in January of this year. Management per Nephrology. 5.  Elevated cardiac enzymes. PLAN:  Continue diuretics. Keep negative balance. Continue present medications for hypertension. We will add clonidine to better control the pressure. We will start with 0.1 mg twice a day. We will follow up on the echo. Troponins are elevated. I do not see any definite stress test in the chart and we can consider it once he improves, though it could be demand ischemia due to uncontrolled hypertension and underlying severe LVH. Will need ischemic evaluation once he is stable.       MD Marichuy Ulloa / [place]  D: 08/25/2018 12:21     T: 08/26/2018 00:20  JOB #: [id number]

## 2018-08-26 NOTE — PROGRESS NOTES
[facility] Hospitalist Group  Progress Note    Patient: Christy Cortez Age: 63 y.o. : 1960 MR#: [id number] SSN: [id number]  Date/Time: 2018    Subjective:     Patient sitting in bed in NAD, awake, alert, has flood, denies cp    Assessment/Plan:     1- Acute on chronic systolic chf exac  2- Hypertensive emergency at admission requiring nitro drip  3- JULIA on CKD3  4- Acute hypoxic and hypercapnic resp failure  In the setting of chf and hypertensive emergency- initially required BIPAP      PLAN  Off bIPAP  bumex drip per cardio  Monitor renal function, nephro considering dialysis  On amlodipine, hydralazine, clonidine  D/w patient  Full code      Case discussed with:  [x]Patient  []Family  []Nursing  []Case Management  DVT Prophylaxis:  []Lovenox  []Hep SQ  []SCDs  []Coumadin   []On Heparin gtt    Objective:   VS:   Visit Vitals    BP (!) 149/97 (BP 1 Location: Left arm, BP Patient Position: At rest)    Pulse 93    Temp 97.6 °F (36.4 °C)    Resp 19    Ht 5' 7\" (1.702 m)    Wt 99 kg (218 lb 4.1 oz)    SpO2 100%    BMI 34.18 kg/m2      Tmax/24hrs: Temp (24hrs), Av.2 °F (36.8 °C), Min:97.6 °F (36.4 °C), Max:98.4 °F (36.9 °C)    Input/Output:     Intake/Output Summary (Last 24 hours) at 18 1803  Last data filed at 18 0900   Gross per 24 hour   Intake                0 ml   Output              350 ml   Net             -350 ml       General:  Awake, alert  Cardiovascular:  S1S2+, RRR  Pulmonary:  CTA b/l  GI:  Soft, BS+, NT, ND  Extremities:  +edema        Labs:    Recent Results (from the past 24 hour(s))   GLUCOSE, POC    Collection Time: 18  9:58 PM   Result Value Ref Range    Glucose (POC) 115 (H) 70 - 110 mg/dL   GLUCOSE, POC    Collection Time: 18  7:45 AM   Result Value Ref Range    Glucose (POC) 114 (H) 70 - 110 mg/dL   GLUCOSE, POC    Collection Time: 18 11:03 AM   Result Value Ref Range    Glucose (POC) 116 (H) 70 - 110 mg/dL   GLUCOSE, POC    Collection Time: 08/26/18  3:11 PM   Result Value Ref Range    Glucose (POC) 142 (H) 70 - 110 mg/dL     Additional Data Reviewed:      Signed By: Kelley Alejandro MD     August 26, 2018

## 2018-08-26 NOTE — PROGRESS NOTES
Verbal shift change report given to Lourdes Winn (oncoming nurse) by Eros Beltran RN (offgoing nurse). Report included the following information SBAR, Kardex, Intake/Output, MAR and Recent Results.

## 2018-08-27 ENCOUNTER — APPOINTMENT (OUTPATIENT)
Dept: INTERVENTIONAL RADIOLOGY/VASCULAR | Age: 58
DRG: 280 | End: 2018-08-27
Attending: RADIOLOGY
Payer: COMMERCIAL

## 2018-08-27 LAB
ANION GAP SERPL CALC-SCNC: 16 MMOL/L (ref 3–18)
APTT PPP: 25.8 SEC (ref 23–36.4)
BASOPHILS # BLD: 0 K/UL (ref 0–0.1)
BASOPHILS NFR BLD: 0 % (ref 0–2)
BUN SERPL-MCNC: 96 MG/DL (ref 7–18)
BUN/CREAT SERPL: 12 (ref 12–20)
CALCIUM SERPL-MCNC: 8 MG/DL (ref 8.5–10.1)
CHLORIDE SERPL-SCNC: 102 MMOL/L (ref 100–108)
CO2 SERPL-SCNC: 19 MMOL/L (ref 21–32)
CREAT SERPL-MCNC: 7.74 MG/DL (ref 0.6–1.3)
DIFFERENTIAL METHOD BLD: ABNORMAL
EOSINOPHIL # BLD: 0.2 K/UL (ref 0–0.4)
EOSINOPHIL NFR BLD: 4 % (ref 0–5)
ERYTHROCYTE [DISTWIDTH] IN BLOOD BY AUTOMATED COUNT: 13.5 % (ref 11.6–14.5)
GLUCOSE BLD STRIP.AUTO-MCNC: 107 MG/DL (ref 70–110)
GLUCOSE BLD STRIP.AUTO-MCNC: 119 MG/DL (ref 70–110)
GLUCOSE BLD STRIP.AUTO-MCNC: 128 MG/DL (ref 70–110)
GLUCOSE BLD STRIP.AUTO-MCNC: 129 MG/DL (ref 70–110)
GLUCOSE SERPL-MCNC: 109 MG/DL (ref 74–99)
HBV SURFACE AG SER QL: <0.1 INDEX
HBV SURFACE AG SER QL: NEGATIVE
HCT VFR BLD AUTO: 31.4 % (ref 36–48)
HGB BLD-MCNC: 9.9 G/DL (ref 13–16)
INR PPP: 1.1 (ref 0.8–1.2)
LYMPHOCYTES # BLD: 1.1 K/UL (ref 0.9–3.6)
LYMPHOCYTES NFR BLD: 19 % (ref 21–52)
MAGNESIUM SERPL-MCNC: 2.4 MG/DL (ref 1.6–2.6)
MCH RBC QN AUTO: 26.9 PG (ref 24–34)
MCHC RBC AUTO-ENTMCNC: 31.5 G/DL (ref 31–37)
MCV RBC AUTO: 85.3 FL (ref 74–97)
MONOCYTES # BLD: 0.9 K/UL (ref 0.05–1.2)
MONOCYTES NFR BLD: 16 % (ref 3–10)
NEUTS SEG # BLD: 3.4 K/UL (ref 1.8–8)
NEUTS SEG NFR BLD: 61 % (ref 40–73)
PLATELET # BLD AUTO: 209 K/UL (ref 135–420)
PMV BLD AUTO: 10.8 FL (ref 9.2–11.8)
POTASSIUM SERPL-SCNC: 4.4 MMOL/L (ref 3.5–5.5)
PROTHROMBIN TIME: 13.5 SEC (ref 11.5–15.2)
RBC # BLD AUTO: 3.68 M/UL (ref 4.7–5.5)
SODIUM SERPL-SCNC: 137 MMOL/L (ref 136–145)
WBC # BLD AUTO: 5.6 K/UL (ref 4.6–13.2)

## 2018-08-27 PROCEDURE — C1893 INTRO/SHEATH, FIXED,NON-PEEL: HCPCS

## 2018-08-27 PROCEDURE — 74011250637 HC RX REV CODE- 250/637: Performed by: EMERGENCY MEDICINE

## 2018-08-27 PROCEDURE — 99157 MOD SED OTHER PHYS/QHP EA: CPT

## 2018-08-27 PROCEDURE — 74011250637 HC RX REV CODE- 250/637: Performed by: INTERNAL MEDICINE

## 2018-08-27 PROCEDURE — 77030011943

## 2018-08-27 PROCEDURE — 97162 PT EVAL MOD COMPLEX 30 MIN: CPT

## 2018-08-27 PROCEDURE — 85025 COMPLETE CBC W/AUTO DIFF WBC: CPT | Performed by: INTERNAL MEDICINE

## 2018-08-27 PROCEDURE — 74011250637 HC RX REV CODE- 250/637: Performed by: FAMILY MEDICINE

## 2018-08-27 PROCEDURE — C1750 CATH, HEMODIALYSIS,LONG-TERM: HCPCS

## 2018-08-27 PROCEDURE — 74011000250 HC RX REV CODE- 250: Performed by: RADIOLOGY

## 2018-08-27 PROCEDURE — 90935 HEMODIALYSIS ONE EVALUATION: CPT

## 2018-08-27 PROCEDURE — 85610 PROTHROMBIN TIME: CPT | Performed by: RADIOLOGY

## 2018-08-27 PROCEDURE — 74011250637 HC RX REV CODE- 250/637: Performed by: NURSE PRACTITIONER

## 2018-08-27 PROCEDURE — 80048 BASIC METABOLIC PNL TOTAL CA: CPT | Performed by: INTERNAL MEDICINE

## 2018-08-27 PROCEDURE — 74011250636 HC RX REV CODE- 250/636: Performed by: INTERNAL MEDICINE

## 2018-08-27 PROCEDURE — 85730 THROMBOPLASTIN TIME PARTIAL: CPT | Performed by: RADIOLOGY

## 2018-08-27 PROCEDURE — 77030005538 HC CATH URETH FOL44 BARD -B

## 2018-08-27 PROCEDURE — C1769 GUIDE WIRE: HCPCS

## 2018-08-27 PROCEDURE — 74011250636 HC RX REV CODE- 250/636: Performed by: RADIOLOGY

## 2018-08-27 PROCEDURE — 65660000004 HC RM CVT STEPDOWN

## 2018-08-27 PROCEDURE — 51798 US URINE CAPACITY MEASURE: CPT

## 2018-08-27 PROCEDURE — 82962 GLUCOSE BLOOD TEST: CPT

## 2018-08-27 PROCEDURE — 83735 ASSAY OF MAGNESIUM: CPT | Performed by: INTERNAL MEDICINE

## 2018-08-27 PROCEDURE — 36415 COLL VENOUS BLD VENIPUNCTURE: CPT | Performed by: INTERNAL MEDICINE

## 2018-08-27 PROCEDURE — 5A1D70Z PERFORMANCE OF URINARY FILTRATION, INTERMITTENT, LESS THAN 6 HOURS PER DAY: ICD-10-PCS | Performed by: INTERNAL MEDICINE

## 2018-08-27 PROCEDURE — 0JH63XZ INSERTION OF TUNNELED VASCULAR ACCESS DEVICE INTO CHEST SUBCUTANEOUS TISSUE AND FASCIA, PERCUTANEOUS APPROACH: ICD-10-PCS | Performed by: RADIOLOGY

## 2018-08-27 PROCEDURE — 02HV33Z INSERTION OF INFUSION DEVICE INTO SUPERIOR VENA CAVA, PERCUTANEOUS APPROACH: ICD-10-PCS | Performed by: RADIOLOGY

## 2018-08-27 RX ORDER — HYDROCODONE BITARTRATE AND ACETAMINOPHEN 5; 325 MG/1; MG/1
1-2 TABLET ORAL
Status: DISCONTINUED | OUTPATIENT
Start: 2018-08-27 | End: 2018-08-31 | Stop reason: HOSPADM

## 2018-08-27 RX ORDER — CLONIDINE HYDROCHLORIDE 0.1 MG/1
0.1 TABLET ORAL EVERY 12 HOURS
Status: DISCONTINUED | OUTPATIENT
Start: 2018-08-27 | End: 2018-08-29

## 2018-08-27 RX ORDER — CARVEDILOL 12.5 MG/1
12.5 TABLET ORAL EVERY 12 HOURS
Status: DISCONTINUED | OUTPATIENT
Start: 2018-08-27 | End: 2018-08-31 | Stop reason: HOSPADM

## 2018-08-27 RX ORDER — ALBUMIN HUMAN 250 G/1000ML
25 SOLUTION INTRAVENOUS
Status: DISCONTINUED | OUTPATIENT
Start: 2018-08-27 | End: 2018-08-31 | Stop reason: HOSPADM

## 2018-08-27 RX ORDER — CLONIDINE HYDROCHLORIDE 0.1 MG/1
0.1 TABLET ORAL 2 TIMES DAILY
Status: DISCONTINUED | OUTPATIENT
Start: 2018-08-27 | End: 2018-08-27

## 2018-08-27 RX ORDER — HYDRALAZINE HYDROCHLORIDE 25 MG/1
25 TABLET, FILM COATED ORAL 3 TIMES DAILY
Status: DISCONTINUED | OUTPATIENT
Start: 2018-08-27 | End: 2018-08-27

## 2018-08-27 RX ORDER — HYDRALAZINE HYDROCHLORIDE 25 MG/1
25 TABLET, FILM COATED ORAL EVERY 8 HOURS
Status: DISCONTINUED | OUTPATIENT
Start: 2018-08-27 | End: 2018-08-30

## 2018-08-27 RX ORDER — CARVEDILOL 6.25 MG/1
6.25 TABLET ORAL EVERY 12 HOURS
Status: DISCONTINUED | OUTPATIENT
Start: 2018-08-27 | End: 2018-08-27

## 2018-08-27 RX ORDER — CEFAZOLIN SODIUM 2 G/50ML
2 SOLUTION INTRAVENOUS ONCE
Status: COMPLETED | OUTPATIENT
Start: 2018-08-27 | End: 2018-08-27

## 2018-08-27 RX ORDER — AMLODIPINE BESYLATE 5 MG/1
5 TABLET ORAL DAILY
Status: DISCONTINUED | OUTPATIENT
Start: 2018-08-28 | End: 2018-08-28

## 2018-08-27 RX ORDER — MIDAZOLAM HYDROCHLORIDE 1 MG/ML
1 INJECTION, SOLUTION INTRAMUSCULAR; INTRAVENOUS
Status: DISCONTINUED | OUTPATIENT
Start: 2018-08-27 | End: 2018-08-28

## 2018-08-27 RX ORDER — FENTANYL CITRATE 50 UG/ML
12.5-5 INJECTION, SOLUTION INTRAMUSCULAR; INTRAVENOUS
Status: DISCONTINUED | OUTPATIENT
Start: 2018-08-27 | End: 2018-08-28

## 2018-08-27 RX ORDER — HEPARIN SODIUM 1000 [USP'U]/ML
5000 INJECTION, SOLUTION INTRAVENOUS; SUBCUTANEOUS ONCE
Status: COMPLETED | OUTPATIENT
Start: 2018-08-27 | End: 2018-08-27

## 2018-08-27 RX ORDER — HEPARIN SODIUM 1000 [USP'U]/ML
INJECTION, SOLUTION INTRAVENOUS; SUBCUTANEOUS
Status: DISPENSED
Start: 2018-08-27 | End: 2018-08-28

## 2018-08-27 RX ORDER — HEPARIN SODIUM (PORCINE) LOCK FLUSH IV SOLN 100 UNIT/ML 100 UNIT/ML
300 SOLUTION INTRAVENOUS AS NEEDED
Status: DISCONTINUED | OUTPATIENT
Start: 2018-08-27 | End: 2018-08-31 | Stop reason: HOSPADM

## 2018-08-27 RX ADMIN — CALCIUM ACETATE 667 MG: 667 CAPSULE ORAL at 17:52

## 2018-08-27 RX ADMIN — PRAVASTATIN SODIUM 20 MG: 20 TABLET ORAL at 21:36

## 2018-08-27 RX ADMIN — CARVEDILOL 12.5 MG: 12.5 TABLET, FILM COATED ORAL at 09:04

## 2018-08-27 RX ADMIN — HYDRALAZINE HYDROCHLORIDE 25 MG: 25 TABLET, FILM COATED ORAL at 21:36

## 2018-08-27 RX ADMIN — HYDRALAZINE HYDROCHLORIDE 100 MG: 50 TABLET, FILM COATED ORAL at 09:03

## 2018-08-27 RX ADMIN — THERA TABS 1 TABLET: TAB at 17:51

## 2018-08-27 RX ADMIN — AMLODIPINE BESYLATE 10 MG: 10 TABLET ORAL at 09:03

## 2018-08-27 RX ADMIN — Medication 10 ML: at 05:58

## 2018-08-27 RX ADMIN — CALCITRIOL 0.25 MCG: 0.25 CAPSULE ORAL at 17:51

## 2018-08-27 RX ADMIN — ASPIRIN 81 MG CHEWABLE TABLET 81 MG: 81 TABLET CHEWABLE at 17:52

## 2018-08-27 RX ADMIN — HEPARIN SODIUM 5000 UNITS: 5000 INJECTION, SOLUTION INTRAVENOUS; SUBCUTANEOUS at 23:01

## 2018-08-27 RX ADMIN — MIDAZOLAM HYDROCHLORIDE 1 MG: 1 INJECTION, SOLUTION INTRAMUSCULAR; INTRAVENOUS at 11:40

## 2018-08-27 RX ADMIN — FENTANYL CITRATE 50 MCG: 50 INJECTION INTRAMUSCULAR; INTRAVENOUS at 11:50

## 2018-08-27 RX ADMIN — HEPARIN SODIUM 5000 UNITS: 1000 INJECTION, SOLUTION INTRAVENOUS; SUBCUTANEOUS at 11:56

## 2018-08-27 RX ADMIN — HYDROCODONE BITARTRATE AND ACETAMINOPHEN 1 TABLET: 5; 325 TABLET ORAL at 17:51

## 2018-08-27 RX ADMIN — LIDOCAINE HYDROCHLORIDE 300 MG: 10; .005 INJECTION, SOLUTION EPIDURAL; INFILTRATION; INTRACAUDAL; PERINEURAL at 11:45

## 2018-08-27 RX ADMIN — CLONIDINE HYDROCHLORIDE 0.1 MG: 0.1 TABLET ORAL at 21:36

## 2018-08-27 RX ADMIN — Medication 100 MG: at 17:51

## 2018-08-27 RX ADMIN — CEFAZOLIN SODIUM 2 G: 2 SOLUTION INTRAVENOUS at 11:35

## 2018-08-27 RX ADMIN — Medication 10 ML: at 22:00

## 2018-08-27 RX ADMIN — FENTANYL CITRATE 50 MCG: 50 INJECTION INTRAMUSCULAR; INTRAVENOUS at 11:40

## 2018-08-27 RX ADMIN — HEPARIN SODIUM 5000 UNITS: 5000 INJECTION, SOLUTION INTRAVENOUS; SUBCUTANEOUS at 06:00

## 2018-08-27 RX ADMIN — CARVEDILOL 12.5 MG: 12.5 TABLET, FILM COATED ORAL at 21:36

## 2018-08-27 RX ADMIN — HYDROCODONE BITARTRATE AND ACETAMINOPHEN 1 TABLET: 5; 325 TABLET ORAL at 21:41

## 2018-08-27 RX ADMIN — TAMSULOSIN HYDROCHLORIDE 0.4 MG: 0.4 CAPSULE ORAL at 21:36

## 2018-08-27 RX ADMIN — FOLIC ACID 1 MG: 1 TABLET ORAL at 17:52

## 2018-08-27 RX ADMIN — MIDAZOLAM HYDROCHLORIDE 1 MG: 1 INJECTION, SOLUTION INTRAMUSCULAR; INTRAVENOUS at 11:50

## 2018-08-27 NOTE — PROGRESS NOTES
Problem: Mobility Impaired (Adult and Pediatric)  Goal: *Acute Goals and Plan of Care (Insert Text)  Physical Therapy Goals  Initiated 8/27/2018 and to be accomplished within 7 day(s)  1. Patient will move from supine to sit and sit to supine , scoot up and down and roll side to side in bed with minimal assistance assist.     2.  Patient will transfer from bed to chair and chair to bed with minimal assistance assist using the least restrictive device. 3.  Patient will perform sit to stand with minimal assistance. 4.  Patient will ambulate with minimal assistance for 10 feet with the least restrictive device. Outcome: Progressing Towards Goal  physical Therapy EVALUATION    Patient: Kalyan Mcdowell (64 y.o. male)  Date: 8/27/2018  Primary Diagnosis: Respiratory distress  CHF (congestive heart failure) ([facility] [address]  Hypertensive emergency        Precautions:   Fall    ASSESSMENT :  PT orders received and patient cleared by nursing to participate with therapy. Patient is a 63 y.o. male admitted to the hospital due to hypertensive emergency and NSTEMI. Pt has history of EF 15%. Patient consents to PT evaluation and treatment. Pt's PLOF is independent with all mobility including gait no AD. 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 Pt does live alone in a 2nd story apartment. Pt sitting EOB with HOB raised prior to PT. Pt has very little active movement of B LE and little to no movement of L UE during functional activities. Pt's B LE strength is 2- or less/5. Pt unable to put weight through his LE for sit to stands at this time. Pt has noticeable swelling in B LE and in L UE. Pt required max/total Ax2 for sit to supine. Pt required total Ax2 for scooting up in bed. Pt had very little LE movement in bed as well. Pt ended therapy supine in bed with all needs met with transport present for a procedure. Pt will require 2 person assist at this time for therapy.  No goals made for stairs at this time. Will update goals pending progress. Patient will benefit from skilled intervention to address the above impairments and increase functional independence. Patients rehabilitation potential is considered to be Good  Factors which may influence rehabilitation potential include:   []         None noted  []         Mental ability/status  []         Medical condition  []         Home/family situation and support systems  []         Safety awareness  []         Pain tolerance/management  []         Other:      PLAN :  Recommendations and Planned Interventions:  [x]           Bed Mobility Training             [x]    Neuromuscular Re-Education  [x]           Transfer Training                   []    Orthotic/Prosthetic Training  [x]           Gait Training                          []    Modalities  [x]           Therapeutic Exercises          []    Edema Management/Control  [x]           Therapeutic Activities            [x]    Patient and Family Training/Education  []           Other (comment):    Frequency/Duration: Patient will be followed by physical therapy 1-2 times per day to address goals. Discharge Recommendations: Inpatient Rehab  Further Equipment Recommendations for Discharge: TBD     SUBJECTIVE:   Patient stated I can't move my arm.  \"I can't move my legs\"    OBJECTIVE DATA SUMMARY:     Past Medical History:   Diagnosis Date    Arthritis of left knee 09/2017    moderate to severe, with effusion on xray    Chronic kidney disease     Diastolic CHF ([facility] [address]     Dilated cardiomyopathy ([facility] [address]     History of alcohol abuse     History of echocardiogram 02/24/2014    Mod-marked LVE. EF 15%. Severe, diffuse hypk. Mild LVH. Gr 1 DDfx. Mod LAE. Mild-mod FIDELIA. Mild AoRE. No significant change from echo of 10/23/09.  History of gout     History of myocardial perfusion scan 05/25/2006    No evidence of ischemia or scarring. Gross LVE. EF 28%. Severe global hypk.   Neg EKG on submaximal EST. Ex time 8 min 25 sec.  HTN (hypertension)     Hypercholesterolemia     Hypertensive cardiovascular disease      Past Surgical History:   Procedure Laterality Date    HX HERNIA REPAIR  04/2016     Barriers to Learning/Limitations: None  Compensate with: N/A  Prior Level of Function/Home Situation: Independent with all mobility including gait no AD. Pt works at a food plant 12 hour shifts. Home Situation  Home Environment: Apartment  # Steps to Enter: 21  Rails to Enter: Yes  Hand Rails : Right  One/Two Story Residence: One story  Living Alone: Yes  Support Systems: Family member(s)  Patient Expects to be Discharged to[de-identified] Rehabilitation facility  Current DME Used/Available at Home: None  Critical Behavior:  Neurologic State: Alert  Psychosocial  Patient Behaviors: Calm; Cooperative  Needs Expressed: Educational  Purposeful Interaction: Yes  Pt Identified Daily Priority: Clinical issues (comment)  [facility] Process: Nurture loving kindness;Establish trust;Teaching/learning; Attend basic human needs; Supportive expression;Create healing environment  Caring Interventions: Reassure; Therapeutic modalities  Reassure: Therapeutic listening; Informing; Acceptance; Instilling con and hope; Sit with patient;Caring rounds  Therapeutic Modalities: Humor; Intentional therapeutic touch  Strength:    Strength: Grossly decreased, non-functional (B LE 2-/5 or less today)  Tone & Sensation:   Tone: Normal (B LE)  Sensation: Intact (B LE)   Range Of Motion:  AROM: Grossly decreased, non-functional (B LE)  Functional Mobility:  Bed Mobility:  Sit to Supine: Maximum assistance; Total assistance;Assist x2  Scooting: Total assistance;Assist x2  Transfers:   unable to stand, will be total assistance at this time.    Balance:   Sitting: Impaired  Sitting - Static: Good (unsupported)  Sitting - Dynamic: Fair (occasional) (plus)  Therapeutic Exercises:   Reviewed ankle pumps  Pain:  Pre: 10/10 in stomach especially with yawning  Post: 10/10 in stomach  Activity Tolerance:   poor  Please refer to the flowsheet for vital signs taken during this treatment. After treatment:   [] Patient left in no apparent distress sitting up in chair  [] Patient left sitting on EOB  [x] Patient left in no apparent distress in bed  [] Patient declined to be OOB at this time due to    [x] Call bell left within reach  [x] Nursing notified(Fl[place])  [] Caregiver present  [] Bed alarm activated  [x] Personal items in reach     COMMUNICATION/EDUCATION:   [x]         Fall prevention education was provided and the patient/caregiver indicated understanding. [x]         Patient/family have participated as able in goal setting and plan of care. [x]         Patient/family agree to work toward stated goals and plan of care. []         Patient understands intent and goals of therapy, but is neutral about his/her participation. []         Patient is unable to participate in goal setting and plan of care. Thank you for this referral.  Abigail Rodriguez, PT, DPT   Time Calculation: 10 mins      Mobility  Current  CN= 100%   Goal  CK= 40-59%. The severity rating is based on the Level of Assistance required for Functional Mobility and ADLs.     Eval Complexity: History: MEDIUM  Complexity : 1-2 comorbidities / personal factors will impact the outcome/ POC Exam:HIGH Complexity : 4+ Standardized tests and measures addressing body structure, function, activity limitation and / or participation in recreation  Presentation: MEDIUM Complexity : Evolving with changing characteristics  Clinical Decision Making:Medium Complexity   Overall Complexity:MEDIUM

## 2018-08-27 NOTE — CARDIO/PULMONARY
Cardiac rehab in to see patient. Educational information given to patient. He was getting ready to leave the room for testing. Briefly explained information. Will follow up at a later time to review information in more detail.

## 2018-08-27 NOTE — PROGRESS NOTES
RENAL DAILY PROGRESS NOTE    Patient: Jeremy Padilla               Sex: male          DOA: 8/23/2018  7:43 AM        YOB: 1960      Age:  63 y.o.        LOS:  LOS: 4 days     Subjective:     Jeremy Padilla is a 63 y.o.  who presents with Respiratory distress  CHF (congestive heart failure) ([facility] [address]  Hypertensive emergency. Asked to evaluate for renal failure. hx of crf stage 4,htn,chf.admitted with resp distress,htn.c/o swelling left hand from blood draw  Chief complains: Patient denies nausea, vomiting, chest pain, dizziness, shortness of breath or headache.  - Reviewed last 24 hrs events     Current Facility-Administered Medications   Medication Dose Route Frequency    carvedilol (COREG) tablet 12.5 mg  12.5 mg Oral Q12H    cloNIDine HCl (CATAPRES) tablet 0.2 mg  0.2 mg Oral BID    bumetanide (BUMEX) 12.5 mg in 0.9% sodium chloride 100 mL infusion  0.5 mg/hr IntraVENous CONTINUOUS    tamsulosin (FLOMAX) capsule 0.4 mg  0.4 mg Oral QHS    calcium acetate (PHOSLO) capsule 667 mg  1 Cap Oral TID WITH MEALS    calcitRIOL (ROCALTROL) capsule 0.25 mcg  0.25 mcg Oral DAILY    albuterol-ipratropium (DUO-NEB) 2.5 MG-0.5 MG/3 ML  3 mL Nebulization Q4H PRN    sodium chloride (NS) flush 5-10 mL  5-10 mL IntraVENous PRN    amLODIPine (NORVASC) tablet 10 mg  10 mg Oral DAILY    aspirin chewable tablet 81 mg  81 mg Oral DAILY    hydrALAZINE (APRESOLINE) tablet 100 mg  100 mg Oral TID    pravastatin (PRAVACHOL) tablet 20 mg  20 mg Oral QHS    sodium chloride (NS) flush 5-10 mL  5-10 mL IntraVENous Q8H    LORazepam (ATIVAN) tablet 1 mg  1 mg Oral Q1H PRN    Or    LORazepam (ATIVAN) injection 1 mg  1 mg IntraVENous Q1H PRN    LORazepam (ATIVAN) tablet 2 mg  2 mg Oral Q1H PRN    Or    LORazepam (ATIVAN) injection 2 mg  2 mg IntraVENous Q1H PRN    LORazepam (ATIVAN) injection 3 mg  3 mg IntraVENous [id number] PRN    folic acid (FOLVITE) tablet 1 mg  1 mg Oral DAILY    therapeutic multivitamin (THERAGRAN) tablet 1 Tab  1 Tab Oral DAILY    acetaminophen (TYLENOL) tablet 650 mg  650 mg Oral Q6H PRN    heparin (porcine) injection 5,000 Units  5,000 Units SubCUTAneous Q8H    insulin lispro (HUMALOG) injection   SubCUTAneous AC&HS    glucose chewable tablet 16 g  4 Tab Oral PRN    glucagon (GLUCAGEN) injection 1 mg  1 mg IntraMUSCular PRN    dextrose (D50W) injection syrg 12.5-25 g  25-50 mL IntraVENous PRN    thiamine HCL (B-1) tablet 100 mg  100 mg Oral DAILY       Objective:     Visit Vitals    /82    Pulse (!) 104    Temp 97.8 °F (36.6 °C)    Resp 20    Ht 5' 7\" (1.702 m)    Wt 99 kg (218 lb 4.1 oz)    SpO2 98%    BMI 34.18 kg/m2       Intake/Output Summary (Last 24 hours) at 08/27/18 0957  Last data filed at 08/27/18 0314   Gross per 24 hour   Intake                0 ml   Output              500 ml   Net             -500 ml       Physical Examination:     GEN: AAO X 3, NAD  RS: Chest is bilateral equal, no wheezing / rales / crackles  CVS: S1-S2 heard, RRR, No S3 / murmur  Abdomen: Soft, Non tender, Not distended, Positive bowel sounds, no organomegaly, no CVA / supra pubic tenderness  Extremities: + edema, no cyanosis, skin is warm on touch  CNS: Awake & follows commands, CN II-XII are grossly intact. HEENT: Head is atraumatic, PERRLA, conjunctiva pink & non icteric. No JVD or carotid bruit   Psychiatric: Normal mood, affect, judgement & memory    Musculoskeletal: No gross joints or bone deformities   Lymph Node: No palpable cervical, axillary or groin lymphadenopathy.       Data Review:      Labs:     Hematology:   Recent Labs      08/27/18   0528  08/25/18   0130   WBC  5.6  6.0   HGB  9.9*  10.2*   HCT  31.4*  33.1*     Chemistry:   Recent Labs      08/27/18   0528  08/25/18   0130  08/24/18   1355  08/24/18   1145   BUN  96*  74*   --   70*   CREA  7.74*  7.38*   --   7.10*   CA  8.0*  7.1*  7.4*  7.0*   K  4.4  3.9   --   4.3   NA  137  140   --   140   CL  102  106 --   107   CO2  19*  25   --   25   PHOS   --   5.9*   --    --    GLU  109*  113*   --   152*        Images:    XR (Most Recent). CXR reviewed by me and compared with previous CXR   Results from Hospital Encounter encounter on 08/23/18   XR CHEST PORT   Narrative EXAM: CHEST ONE VIEW portable 0235 hours    CLINICAL HISTORY/INDICATION: follow CHF , acute on chronic kidney disease stage  IV, cardiorenal syndrome, hypertensive urgency    COMPARISON: Chest x-ray August 23, 2018, September 18, 2017. TECHNIQUE: Single view obtained. FINDINGS:     The cardiac silhouette is mildly enlarged. There is tortuosity of the aorta with  calcified plaque. The lungs are adequately inflated. Pulmonary vascularity is  cephalized and slightly ill-defined. Marked interval decrease in the previously  demonstrated perihilar central alveolar and interstitial infiltrates. The  costophrenic angles are sharply defined. No bony abnormalities are seen. Impression IMPRESSION:    Resolving acute heart failure. CT (Most Recent)   Results from Hospital Encounter encounter on 04/24/16   CT ABD PELV WO CONT   Narrative CT abdomen and pelvis    CPT code: 00930 and 30657. INDICATION: Left upper abdominal pain. Left scrotal swelling    TECHNIQUE: 5 mm collimation axial images obtained from the diaphragm to the  level of the pubic symphysis without oral or nonionic intravenous contrast.    COMPARISON: None    Abdomen Findings:   Heart size is top normal, without pericardial effusion. There is small amount of  patchy peripheral airspace opacity at the right costophrenic angle, unclear if  this could be inflammatory or atelectasis. No pleural effusion. Lack of intravenous contrast renders this study suboptimal for evaluating solid  abdominal organs. Given this, liver appears normal size. Gallbladder not  distended. No calcified intraluminal stones. Spleen normal size. No adrenal  mass.  Pancreas poorly  from adjacent unopacified small bowel and colon  for evaluation. Normal caliber abdominal aorta. Symmetric renal size. No  hydronephrosis. No renal stones. No free fluid in the upper abdomen. Pelvis Findings: There is a left-sided inguinal hernia containing a loop of small bowel which  appears to be ileum. In the hemiscrotum there is diffuse wall thickening of this  loop of bowel measuring up to 7 mm diameter. Upon its exit proximal and distal  there is wall thickening and edema, with some internal fecal material on image  60. This appears to be the proximal loop of bowel. The distal terminal ileum  demonstrates wall thickening on images 53 through 46 to the level of the cecum. No pneumatosis identified. Cannot well evaluate for wall edema or viable  enhancement given lack of intravenous contrast. There is a sizable left-sided  hydrocele. Bladder under distended and not optimally evaluated. Amorphous calcifications  within nonenlarged prostate. Dense calcification suggested within nonenlarged  appendix on image 51. Impression IMPRESSION:  1. Left-sided inguinal hernia containing a length of ileum which demonstrates  diffuse wall thickening/edema and some proximal distention with internal fecal  material proximal to the hernia. Distal ileum to terminal ileum demonstrates  mild diffuse wall thickening/edema as well. - Concern for mechanical obstruction given the bowel wall thickening. Findings discussed with Dr. Mckinley Goodpasture at 1520 hours. 2. Left-sided scrotal hydrocele is likely reactive. 3. No more proximal bowel or gastric distention. No colon distention. EKG Results for orders placed or performed in visit on 04/11/14   AMB POC EKG ROUTINE W/ 12 LEADS, INTER & REP     Status: None    Narrative    Sinus bradycardia rate 59. Left atrial enlargement. There is a  borderline complete left bundle branch block.  Left ventricular  hypertrophy by precordial voltage criteria with some asymmetric  T-wave inversions anterolaterally in the high lateral leads  consistent with LV strain. I have personally reviewed the old medical records and patient's previously known baseline  creatinine     Plan / Recommendation:      1. Acute/crf stage 4.reached esrd. discussed with patient and sister. start dialysis today. explained risks and benefits. consulted dr Jonna Dickey for permcath .   2.has significant edema,resumed iv bumex  3.htn,adjust meds  4.hypocalcemia,hyperphosphatemia,started ca acetate,calcitriol.give hectorol with dialysis  5.anemia,give epo with dialysis    D/w Dr. Karla Mckeon MD  Nephrology  8/27/2018

## 2018-08-27 NOTE — PROGRESS NOTES
TRANSFER - IN REPORT:    Verbal report received from [id number] RN(name) on Hemant Pepper  being received from [facility](unit) for ordered procedure      Report consisted of patients Situation, Background, Assessment and   Recommendations(SBAR). Information from the following report(s) SBAR, Kardex and MAR was reviewed with the receiving nurse. Opportunity for questions and clarification was provided. Assessment completed upon patients arrival to unit and care assumed.

## 2018-08-27 NOTE — PROCEDURES
RADIOLOGY POST PROCEDURE NOTE     August 27, 2018       12:13 PM     Preoperative Diagnosis:   ESRD. Postoperative Diagnosis:  Same. :  Dr. Simon Cox    Assistant:  None. Type of Anesthesia: 1% plain lidocaine and IV moderate sedation with Versed and Fentanyl. Procedure/Description:  Image guided RIJ TDC placement. Findings:   No bleeding. Estimated blood Loss:  Minimal    Specimen Removed:   no    Blood transfusions:  None. Implants:  14.5F double lumen 19 cm cuff to tip TDC Palindrome.     Complications: None    Condition: Stable    Discharge Plan:  continue present therapy     Catheter is OK for use    Alfie Suarez MD

## 2018-08-27 NOTE — H&P
Preprocedure Assessment      Today 8/27/2018     Indication/Symptoms:   Kenan Portillo is a 63 y.o. Male here for [facility] placement. CKD4 requiring HD. The H & P and/or progress notes and any available imaging were reviewed. The risks, indications and possible alternatives to the procedure, including doing nothing, were discussed and informed consent was obtained. Physical Exam:      Heart:   RRR   Lungs:   Bibasilar crackles    The patient is an appropriate candidate to undergo the planned procedure and sedation.     Manuel Coffey MD

## 2018-08-27 NOTE — DIALYSIS
ACUTE HEMODIALYSIS FLOW SHEET    HEMODIALYSIS ORDERS: Physician: hannah      Dialyzer: revaclear         Duration: 2.5 hr  BFR: 250   DFR: 600   Dialysate:  Temp *37 K+   2    Ca+  3 Na 140 Bicarb 30   Weight:  99 kg    Bed Scale [x]     Unable to Obtain []      Dry weight/UF Goal: 2000 Access CVL  Needle Gauge     Heparin []  Bolus      Units    [] Hourly       Units    [x]None     Catheter locking solution heparin   Pre BP:   144/87    Pulse:     90     Temperature:   97.6  Respirations: 16  Tx: NS       ml/Bolus  Other        [x] N/A   Labs: Pre        Post:        [x] N/A   Additional Orders(medications, blood products, hypotension management):       [x] N/A     [x] Time Out/Safety Check  [x] [facility] Consent Verified     CATHETER ACCESS: []N/A   [x]Right   []Left   [x]IJ     []Fem   [x] First use X-ray verified     [x]Tunnel                [] Non Tunneled   [x]No S/S infection  []Redness  []Drainage []Cultured []Swelling []Pain   [x]Medical Aseptic Prep Utilized   []Dressing Changed  [x] Biopatch  Date: 08/27/18      []Clotted   [x]Patent   Flows: [x]Good  []Poor  []Reversed   If access problem,  notified: []Yes    []N/A  Date:           GRAFT/FISTULA ACCESS:  [x]N/A     []Right     []Left     []UE     []LE   []AVG   []AVF        []Buttonhole    []Medical Aseptic Prep Utilized   []No S/S infection  []Redness  []Drainage []Cultured []Swelling []Pain    Bruit:   [] Strong    [] Weak       Thrill :   [] Strong    [] Weak       Needle Gauge:    Length:     If access problem,  notified: []Yes     []N/A  Date:        Please describe access if present and not used:       GENERAL ASSESSMENT:    LUNGS:  Rate  SaO2%        [x] N/A    [x] Clear  [] Coarse  [] Crackles  [] Wheezing        [] Diminished     Location : []RLL   []LLL    []RUL  []DIEGO   Cough: []Productive  []Dry  [x]N/A   Respirations:  [x]Easy  []Labored   Therapy:  [x]RA  []NC  l/min    Mask: []NRB []Venti       O2% []Ventilator  []Intubated  [] Trach  [] BiPaP   CARDIAC: [x]Regular      [] Irregular   [] Pericardial Rub  [] JVD        []  Monitored  [] Bedside  [] Remotely monitored [] N/A  Rhythm:    EDEMA: [] None  [x]Generalized  [x] Pitting [] 1    [] 2    [x] 3    [] 4                 [] Facial  [] Pedal  []  UE  [] LE   SKIN:   [x] Warm  [] Hot     [] Cold   [x] Dry     [] Pale   [] Diaphoretic                  [] Flushed  [] Jaundiced  [] Cyanotic  [] Rash  [] Weeping   LOC:    [x] Alert      [x]Oriented:    [x] Person     [x] Place  [x]Time               [] Confused  [] Lethargic  [] Medicated  [] Non-responsive     GI / ABDOMEN:  [] Flat    [] Distended    [x] Soft    [] Firm   []  Obese                             [] Diarrhea  [x] Bowel Sounds  [] Nausea  [] Vomiting       / URINE ASSESSMENT:[] Voiding   [x] Oliguria  [] Anuria   []  Vitale     [] Incontinent    []  Incontinent Brief      []  Bathroom Privileges     PAIN: [x] 0 []1  []2   []3   []4   []5   []6   []7   []8   []9   []10            Scale 0-10  Action/Follow Up:    MOBILITY:  [] Amb    [] Amb/Assist    [x] Bed    [] Wheelchair  [] Stretcher      All Vitals and Treatment Details on Attached [address]: [facility]          Room # 3418     [] 1st Time Acute  [] Stat  [x] Routine  [] Urgent     [x] Acute Room  []  Bedside  [] ICU/CCU  [] ER   Isolation Precautions:  [x] Dialysis   [] Airborne   [] Contact    [] Reverse   Special Considerations:         [] Blood Consent Verified [x]N/A     ALLERGIES:   [x] NKA          Code Status:  [x] Full Code  [] DNR  [] Other           HBsAg ONLY: Date Drawn 08/26/18         [x]Negative []Positive []Unknown   HBsAb: Date 08/26/18    [x] Susceptible   [] [id number] []Not Drawn  [] Drawn     Current Labs:    Date of Labs: Today [x]        Cut and paste current labs here.                                                                                                                                   DIET: [x] Renal    [] Other     [] NPO     []  Diabetic      PRIMARY NURSE REPORT: First initial/Last name/Title      Pre Dialysis: Kate Lucio RN    Time: 6331      EDUCATION:    [x] Patient [] Other         Knowledge Basis: []None [x]Minimal [] Substantial   Barriers to learning  [x]N/A   [] Access Care     [] S&S of infection     [] Fluid Management     []K+     [x]Procedural    []Albumin     [] Medications     [] Tx Options     [] Transplant     [] Diet     [] Other   Teaching Tools:  [x] Explain  [] Demo  [] Handouts [] Video  Patient response:   [x] Verbalized understanding  [] Teach back  [] Return demonstration [] Requires follow up   Inappropriate due to            [address] Before each treatment:     Machine Number:                   [facility]                                   [x] Unit Machine # 6 with centralized RO                                  [] Portable Machine #1/RO serial # [id number]                                  [] Portable Machine #2/RO serial # [id number]                                  [] Portable Machine #3/RO serial # [id number]                                                                                                       [facility]                                  [] Portable Machine #11/RO serial # [id number]                                   [] Portable Machine #12/RO serial # [id number]                                  [] Portable Machine #13/RO serial #  [id number]      Alarm Test:  Pass time 3388         Other:         [x] RO/Machine Log Complete      Temp    37            [x]Extracorporeal Circuit Tested for integrity   Dialysate: pH  7.4 Conductivity: Meter   14     HD Machine   14                  TCD: 14  Dialyzer Lot # [id number]        Blood Tubing Lot # [id number]     Saline Lot #       CHLORINE TESTING-Before each treatment and every 4 hours    Total Chlorine: [x] less than 0.1 ppm  Time: 1000 4 Hr/2nd Check Time: 1400   (if greater than 0.1 ppm from Primary then every 30 minutes from Secondary)     TREATMENT INITIATION  with Dialysis Precautions:   [x] All Connections Secured                 [x] Saline Line Double Clamped   [x] Venous Parameters Set                  [x] Arterial Parameters Set    [x] Prime Given  250 ml               [x]Air Foam Detector Engaged      Treatment Initiation Note: pt arrived in stable condition via bed CVL accessed and treatment initiated without difficulty. Dr Kassie Ryan at bedside     Medication Dose Volume Route Initials Dialyzer Cleared: [] Good [x] Fair  [] Poor    Blood processed:  36.4 L  UF Removed  2000 Ml    Post Wt: 97    kg  POst BP:   128/80       Pulse: 93      Respirations: 16  Temperature: 98                                   Post Tx Vascular Access: AVF/AVG: Bleeding stopped Art  min. Terrell. Min   N/A                                   Catheter: Locking solution: Heparin 1:1000 Art. 1.6  Terrell. 1.6                                   Post Assessment:                                    Skin:  [x] Warm  [x] Dry [] Diaphoretic    [] Flushed  [] Pale [] Cyanotic   [facility] Signatures Title Initials  Time Lungs: [x] Clear    [] Course  [] Crackles  [] Wheezing [] Diminished   Brooks Rosen RN    Cardiac: [x] Regular   [] Irregular   [] Monitor  [] N/A  Rhythm:           Edema:  [] None    [x] General     [] Facial   [] Pedal    [] UE    [] LE       Pain: [x]0  []1  []2   []3  []4   []5   []6   []7   []8   []9   []10         Post Treatment Note: HD well tolerated. 2L UF removed. No acute distress noted during or post HD treatment.      POST TREATMENT PRIMARY NURSE HANDOFF REPORT:     First initial/Last name/Title         Post Dialysis: Rogelio Christianson RN Time:  7411     Abbreviations: AVG-arterial venous graft, AVF-arterial venous fistula, IJ-Internal Jugular, Subcl-Subclavian, Fem-Femoral, Tx-treatment, AP/HR-apical heart rate, DFR-dialysate flow rate, BFR-blood flow rate, AP-arterial pressure, -venous pressure, UF-ultrafiltrate, TMP-transmembrane pressure, Terrell-Venous, Art-Arterial, RO-Reverse Osmosis

## 2018-08-27 NOTE — CONSULTS
Consult Note    Patient: Flory Moore               Sex: male          DOA: 8/23/2018         YOB: 1960      Age:  63 y.o.        LOS:  LOS: 4 days              HPI:     Flory Moore is a 63 y.o. male who has been seen for CKD4 and [facility] placement. Past Medical History:   Diagnosis Date    Arthritis of left knee 09/2017    moderate to severe, with effusion on xray    Chronic kidney disease     Diastolic CHF ([facility] [address]     Dilated cardiomyopathy ([facility] [address]     History of alcohol abuse     History of echocardiogram 02/24/2014    Mod-marked LVE. EF 15%. Severe, diffuse hypk. Mild LVH. Gr 1 DDfx. Mod LAE. Mild-mod FIDELIA. Mild AoRE. No significant change from echo of 10/23/09.  History of gout     History of myocardial perfusion scan 05/25/2006    No evidence of ischemia or scarring. Gross LVE. EF 28%. Severe global hypk. Neg EKG on submaximal EST. Ex time 8 min 25 sec.  HTN (hypertension)     Hypercholesterolemia     Hypertensive cardiovascular disease        Past Surgical History:   Procedure Laterality Date    HX HERNIA REPAIR  04/2016       Family History   Problem Relation Age of Onset    Cancer Father      Lung    Heart Failure Mother     Cancer Sister        Social History     Social History    Marital status: SINGLE     Spouse name: N/A    Number of children: N/A    Years of education: N/A     Social History Main Topics    Smoking status: Never Smoker    Smokeless tobacco: Never Used    Alcohol use No      Comment: stop 3 years ago in feb    Drug use: No    Sexual activity: No     Other Topics Concern    None     Social History Narrative       Prior to Admission medications    Medication Sig Start Date End Date Taking? Authorizing Provider   carvedilol (COREG) 25 mg tablet Take 1 Tab by mouth two (2) times daily (with meals).  Indications: chronic heart failure, hypertension 6/5/18  Yes Iván John MD   pravastatin (PRAVACHOL) 20 mg tablet Take 1 Tab by mouth nightly. Indications: hyperlipidemia 6/5/18  Yes Kaylee Kang MD   colchicine 0.6 mg tablet 2 tablets at first sign of gout flare and then 1 tablet 1 hour later  Indications: acute gouty arthritis 6/5/18  Yes Kaylee Kang MD   terazosin (HYTRIN) 1 mg capsule Take 1 Cap by mouth nightly. Indications: hypertension 6/5/18  Yes Kaylee Kang MD   furosemide (LASIX) 40 mg tablet TAKE 1 TABLET BY MOUTH DAILY 5/29/18  Yes STEFFANIE Watson   allopurinol (ZYLOPRIM) 100 mg tablet Take 2 Tabs by mouth daily. 5/11/18  Yes JojoSTEFFANIE Reed   amLODIPine (NORVASC) 10 mg tablet TAKE 1 TABLET BY MOUTH DAILY 4/19/18  Yes STEFFANIE Watson   hydrALAZINE (APRESOLINE) 100 mg tablet Take 1 Tab by mouth three (3) times daily. 4/6/18  Yes Larry Zapien MD   aspirin 81 mg chewable tablet Take 1 Tab by mouth daily. 2/21/17  Yes Terrance Real NP       No Known Allergies    Review of Systems  Pertinent items are noted in the History of Present Illness. Physical Exam:      Visit Vitals    /82    Pulse (!) 104    Temp 97.8 °F (36.6 °C)    Resp 20    Ht 5' 7\" (1.702 m)    Wt 99 kg (218 lb 4.1 oz)    SpO2 98%    BMI 34.18 kg/m2       Physical Exam:  Physical exam not obtained due to patient factors. Labs Reviewed:  Lab results reviewed. For significant abnormal values and values requiring intervention, see assessment and plan.     Assessment/Plan     Active Problems:    CHF (congestive heart failure) ([facility]) (8/23/2018)      Respiratory distress (8/23/2018)      Hypertensive emergency (8/23/2018)        TDC will be done in am  NPO   Coags    No blood thinners    Thank you

## 2018-08-27 NOTE — PROGRESS NOTES
[facility] Hospitalist Group  Progress Note    Patient: Christin Aguilar Age: 63 y.o. : 1960 MR#: [id number] SSN: [id number]  Date/Time: 2018 12:06 PM    Subjective/24-hour events:     Patient just recently back to floor from tunneled catheter placement. Complains of generalized pain but no acute SOB or chest pain. Assessment:   CKD 4 now ESRD  Acute hypoxic and hypercapnic respiratory failure, resolved  Acute on chronic mixed CHF  HTN with resolved hypertensive emergency present on admission  Hypocalcemia and hyperphosphatemia  Anemia of chronic renal disease  Obesity, BMI 34.1    Plan:  First hemodialysis today - management per nephrology. Will benefit from volume removal.  BP control. Adjust meds as necessary. Calcium replacement and phoslo. PRN analgesic ordered for pain. Follow. Case discussed with:  [x]Patient  []Family  [x]Nursing  []Case Management  DVT Prophylaxis:  []Lovenox  [x]Hep SQ  []SCDs  []Coumadin   []On Heparin gtt    Objective:   VS:   Visit Vitals    /74 (BP 1 Location: Left arm, BP Patient Position: Supine)    Pulse 83    Temp 97.5 °F (36.4 °C)    Resp 22    Ht 5' 7\" (1.702 m)    Wt 99 kg (218 lb 4.1 oz)    SpO2 99%    BMI 34.18 kg/m2      Tmax/24hrs: Temp (24hrs), Av.6 °F (36.4 °C), Min:97.3 °F (36.3 °C), Max:98 °F (36.7 °C)    Intake/Output Summary (Last 24 hours) at 18 1206  Last data filed at 18 0314   Gross per 24 hour   Intake                0 ml   Output              500 ml   Net             -500 ml       General:  In NAD. Nontoxic-appearing. Cardiovascular:  RRR. Pulmonary:  No wheezes. GI:  Abdomen soft, NTTP. Extremities:  Generalized edema. No ischemia. Neuro:  Awake and alert, moves extremities spontaneously.     Labs:    Recent Results (from the past 24 hour(s))   GLUCOSE, POC    Collection Time: 18  3:11 PM   Result Value Ref Range    Glucose (POC) 142 (H) 70 - 110 mg/dL   HEP B SURFACE AG Collection Time: 08/26/18  4:12 PM   Result Value Ref Range    Hepatitis B surface Ag <0.10 <1.00 Index    Hep B surface Ag Interp. NEGATIVE  NEG     GLUCOSE, POC    Collection Time: 08/26/18  9:04 PM   Result Value Ref Range    Glucose (POC) 140 (H) 70 - 315 mg/dL   METABOLIC PANEL, BASIC    Collection Time: 08/27/18  5:28 AM   Result Value Ref Range    Sodium 137 136 - 145 mmol/L    Potassium 4.4 3.5 - 5.5 mmol/L    Chloride 102 100 - 108 mmol/L    CO2 19 (L) 21 - 32 mmol/L    Anion gap 16 3.0 - 18 mmol/L    Glucose 109 (H) 74 - 99 mg/dL    BUN 96 (H) 7.0 - 18 MG/DL    Creatinine 7.74 (H) 0.6 - 1.3 MG/DL    BUN/Creatinine ratio 12 12 - 20      GFR est AA 9 (L) >60 ml/min/1.73m2    GFR est non-AA 7 (L) >60 ml/min/1.73m2    Calcium 8.0 (L) 8.5 - 10.1 MG/DL   MAGNESIUM    Collection Time: 08/27/18  5:28 AM   Result Value Ref Range    Magnesium 2.4 1.6 - 2.6 mg/dL   CBC WITH AUTOMATED DIFF    Collection Time: 08/27/18  5:28 AM   Result Value Ref Range    WBC 5.6 4.6 - 13.2 K/uL    RBC 3.68 (L) 4.70 - 5.50 M/uL    HGB 9.9 (L) 13.0 - 16.0 g/dL    HCT 31.4 (L) 36.0 - 48.0 %    MCV 85.3 74.0 - 97.0 FL    MCH 26.9 24.0 - 34.0 PG    MCHC 31.5 31.0 - 37.0 g/dL    RDW 13.5 11.6 - 14.5 %    PLATELET 943 960 - 154 K/uL    MPV 10.8 9.2 - 11.8 FL    NEUTROPHILS 61 40 - 73 %    LYMPHOCYTES 19 (L) 21 - 52 %    MONOCYTES 16 (H) 3 - 10 %    EOSINOPHILS 4 0 - 5 %    BASOPHILS 0 0 - 2 %    ABS. NEUTROPHILS 3.4 1.8 - 8.0 K/UL    ABS. LYMPHOCYTES 1.1 0.9 - 3.6 K/UL    ABS. MONOCYTES 0.9 0.05 - 1.2 K/UL    ABS. EOSINOPHILS 0.2 0.0 - 0.4 K/UL    ABS.  BASOPHILS 0.0 0.0 - 0.1 K/UL    DF AUTOMATED     GLUCOSE, POC    Collection Time: 08/27/18  7:32 AM   Result Value Ref Range    Glucose (POC) 107 70 - 110 mg/dL   PTT    Collection Time: 08/27/18 10:45 AM   Result Value Ref Range    aPTT 25.8 23.0 - 36.4 SEC   PROTHROMBIN TIME + INR    Collection Time: 08/27/18 10:45 AM   Result Value Ref Range    Prothrombin time 13.5 11.5 - 15.2 sec INR 1.1 0.8 - 1.2     GLUCOSE, POC    Collection Time: 08/27/18 10:55 AM   Result Value Ref Range    Glucose (POC) 128 (H) 70 - 110 mg/dL       Signed By: Hitesh Pappas MD     August 27, 2018 12:06 PM

## 2018-08-27 NOTE — PROGRESS NOTES
1730 - Pt back from Dialysis. Pt still in generalized pain, requested pain med for comfort - Norco 1 tab given. Pt attempted to urinate (with 2 person assist) standing up, but unable. Pt has not voided all day. Notified Dr Mateo Dominguez, order to straight cath patient. 1900 - Bladder scan showed 425cc's urine. Sterile technique for straight cath -- output of 500 cc's clear yellow urine. Patient tolerated well. Pt states he feels a little better s/p straight cath. Patient's mobility is compromised due to pain and swelling -- high fall risk; 2 person assist to stand up. Patient states he was told by MD that he doesn't have to get stuck for blood draws anymore. Priyanka Torres ? 1924 - Bedside and Verbal shift change report given to Shelby Nielsen RN (oncoming nurse) by Ang Acosta RN (offgoing nurse). Report included the following information SBAR, Kardex, Intake/Output, MAR, Recent Results and Cardiac Rhythm NSR.

## 2018-08-27 NOTE — PROGRESS NOTES
Patient not on floor and has gone for dialysis. Chart reviewed. Blood pressure reducing. Reduce medications per orders and discontinue diuretics as dialysis has been started. We will follow-up tomorrow.

## 2018-08-27 NOTE — PROGRESS NOTES
NUTRITION    Nutrition Consult: General Nutrition Management & Supplements     RECOMMENDATIONS / PLAN:     - Resume renal, diabetic diet when medically appropriate after procedure. - Plan to evaluate for possible need for diet education prior to discharge. - Continue RD inpatient monitoring and evaluation. NUTRITION INTERVENTIONS & DIAGNOSIS:     [x] Meals/snacks: modify composition  [x] Nutrition Education: possible need identified by nurse    Nutrition Diagnosis: Altered nutrition related laboratory values related to decreased renal function and excessive dietary intake as evidenced by pt with hyperphosphatemia. ASSESSMENT:     8/27: Out of room at time of visit for [facility] placement to start dialysis. Good meal intake prior to NPO for line placement. Phos binder started. Per nurse, pt asking questions about diet, will follow up for possible diet education at later time. 8/25: Pt with CKD stage IV. Good meal intake of diabetic diet, noted elevated phosphorus. Nephrology following stating pt will probably need dialysis. BG levels controlled. Briefly discussed reasoning for renal diet.     Average po intake adequate to meet patients estimated nutritional needs:   [x] Yes (prior to NPO)     [x] No   [] Unable to determine at this time    Diet: DIET NPO Except Meds      Food Allergies: NKFA  Current Appetite:   [x] Good     [] Fair     [] Poor     [] Other:  Appetite/meal intake prior to admission:   [x] Good     [] Fair     [] Poor     [] Other:  Feeding Limitations:  [] Swallowing difficulty    [] Chewing difficulty    [] Other:  Current Meal Intake: Patient Vitals for the past 100 hrs:   % Diet Eaten   08/25/18 1630 100 %   08/24/18 1700 100 %   08/24/18 1200 100 %   08/24/18 0900 100 %       BM: 8/25  Skin Integrity: WDL   Edema:   [] No     [x] Yes LEs  Pertinent Medications: Reviewed[de-identified] folic acid, lasix, SSI, theragran, thiamine, phoslo, rocaltrol    Recent Labs      08/27/18   0528  08/25/18   0130 08/24/18   1355   NA  137  140   --    K  4.4  3.9   --    CL  102  106   --    CO2  19*  25   --    GLU  109*  113*   --    BUN  96*  74*   --    CREA  7.74*  7.38*   --    CA  8.0*  7.1*  7.4*   MG  2.4  1.9   --    PHOS   --   5.9*   --        Intake/Output Summary (Last 24 hours) at 08/27/18 1228  Last data filed at 08/27/18 0314   Gross per 24 hour   Intake                0 ml   Output              500 ml   Net             -500 ml       Anthropometrics:  Ht Readings from Last 1 Encounters:   08/26/18 5' 7\" (1.702 m)     Last 3 Recorded Weights in this Encounter    08/24/18 1308 08/25/18 0200 08/26/18 0853   Weight: 115.2 kg (254 lb) 117.3 kg (258 lb 9.6 oz) 99 kg (218 lb 4.1 oz)     Body mass index is 34.18 kg/(m^2). Obese Class III    Weight History: Pt reports weight gain PTA    Weight Metrics 8/26/2018 6/5/2018 4/9/2018 4/6/2018 3/12/2018 3/3/2018 1/24/2018   Weight 218 lb 4.1 oz 256 lb 12.8 oz 255 lb 260 lb 240 lb 220 lb 241 lb   BMI 34.18 kg/m2 40.22 kg/m2 39.94 kg/m2 40.72 kg/m2 37.59 kg/m2 34.46 kg/m2 35.59 kg/m2        Admitting Diagnosis: Respiratory distress  CHF (congestive heart failure) ([facility] [address]  Hypertensive emergency  Pertinent PMHx: CKD, CHF, HTN, hypercholesterolemia    Education Needs:        [x] None identified: possible need identified  [] Identified - Not appropriate at this time  []  Identified and addressed - refer to education log  Learning Limitations:   [x] None identified  [] Identified    Cultural, Restorationist & ethnic food preferences:  [x] None identified    [] Identified and addressed     ESTIMATED NUTRITION NEEDS:     Calories: 5947-8519 kcal (MSJx1.2-1.3) based on  [x] Actual BW: 117 kg      [] IBW   Protein:  gm (0.8-1 gm/kg) based on  [x] Actual BW      [] IBW   Fluid: 1 mL/kcal     MONITORING & EVALUATION:     Nutrition Goal(s):   1. Po intake of meals will meet >75% of patient estimated nutritional needs within the next 7 days.   Outcome:  [] Met/Ongoing    [x]  Not Met [] New/Initial Goal     Monitoring:   [x] Food and beverage intake   [x] Diet order   [x] Nutrition-focused physical findings   [x] Treatment/therapy   [] Weight   [] Enteral nutrition intake        Previous Recommendations (for follow-up assessments only):     [x]   Implemented       []   Not Implemented (RD to address)      [] No Longer Appropriate     [] No Recommendation Made     Discharge Planning: diabetic diet   [x] Participated in care planning, discharge planning, & interdisciplinary rounds as appropriate      Serjio Mcgee RD, [address]   Pager: [phone]

## 2018-08-28 LAB
ANION GAP SERPL CALC-SCNC: 12 MMOL/L (ref 3–18)
BUN SERPL-MCNC: 78 MG/DL (ref 7–18)
BUN/CREAT SERPL: 11 (ref 12–20)
CALCIUM SERPL-MCNC: 6.9 MG/DL (ref 8.5–10.1)
CHLORIDE SERPL-SCNC: 99 MMOL/L (ref 100–108)
CO2 SERPL-SCNC: 25 MMOL/L (ref 21–32)
CREAT SERPL-MCNC: 7.26 MG/DL (ref 0.6–1.3)
GLUCOSE BLD STRIP.AUTO-MCNC: 102 MG/DL (ref 70–110)
GLUCOSE BLD STRIP.AUTO-MCNC: 108 MG/DL (ref 70–110)
GLUCOSE BLD STRIP.AUTO-MCNC: 89 MG/DL (ref 70–110)
GLUCOSE BLD STRIP.AUTO-MCNC: 90 MG/DL (ref 70–110)
GLUCOSE SERPL-MCNC: 140 MG/DL (ref 74–99)
HBV CORE AB SERPL QL IA: NEGATIVE
POTASSIUM SERPL-SCNC: 4.4 MMOL/L (ref 3.5–5.5)
SODIUM SERPL-SCNC: 136 MMOL/L (ref 136–145)

## 2018-08-28 PROCEDURE — 74011250637 HC RX REV CODE- 250/637: Performed by: EMERGENCY MEDICINE

## 2018-08-28 PROCEDURE — 74011250637 HC RX REV CODE- 250/637: Performed by: NURSE PRACTITIONER

## 2018-08-28 PROCEDURE — 74011250637 HC RX REV CODE- 250/637: Performed by: INTERNAL MEDICINE

## 2018-08-28 PROCEDURE — 90935 HEMODIALYSIS ONE EVALUATION: CPT

## 2018-08-28 PROCEDURE — 82962 GLUCOSE BLOOD TEST: CPT

## 2018-08-28 PROCEDURE — 65660000004 HC RM CVT STEPDOWN

## 2018-08-28 PROCEDURE — 80048 BASIC METABOLIC PNL TOTAL CA: CPT | Performed by: INTERNAL MEDICINE

## 2018-08-28 PROCEDURE — 74011250636 HC RX REV CODE- 250/636

## 2018-08-28 PROCEDURE — 74011250636 HC RX REV CODE- 250/636: Performed by: INTERNAL MEDICINE

## 2018-08-28 RX ORDER — DOXERCALCIFEROL 4 UG/2ML
2 INJECTION INTRAVENOUS
Status: DISCONTINUED | OUTPATIENT
Start: 2018-08-29 | End: 2018-08-31 | Stop reason: HOSPADM

## 2018-08-28 RX ORDER — HEPARIN SODIUM 1000 [USP'U]/ML
INJECTION, SOLUTION INTRAVENOUS; SUBCUTANEOUS
Status: COMPLETED
Start: 2018-08-28 | End: 2018-08-28

## 2018-08-28 RX ADMIN — CLONIDINE HYDROCHLORIDE 0.1 MG: 0.1 TABLET ORAL at 21:15

## 2018-08-28 RX ADMIN — HYDRALAZINE HYDROCHLORIDE 25 MG: 25 TABLET, FILM COATED ORAL at 06:59

## 2018-08-28 RX ADMIN — HEPARIN SODIUM 5000 UNITS: 5000 INJECTION, SOLUTION INTRAVENOUS; SUBCUTANEOUS at 23:36

## 2018-08-28 RX ADMIN — CALCIUM ACETATE 667 MG: 667 CAPSULE ORAL at 09:41

## 2018-08-28 RX ADMIN — HEPARIN SODIUM 5000 UNITS: 5000 INJECTION, SOLUTION INTRAVENOUS; SUBCUTANEOUS at 07:00

## 2018-08-28 RX ADMIN — CALCITRIOL 0.25 MCG: 0.25 CAPSULE ORAL at 09:41

## 2018-08-28 RX ADMIN — Medication 100 MG: at 09:41

## 2018-08-28 RX ADMIN — THERA TABS 1 TABLET: TAB at 09:41

## 2018-08-28 RX ADMIN — PRAVASTATIN SODIUM 20 MG: 20 TABLET ORAL at 21:14

## 2018-08-28 RX ADMIN — HYDRALAZINE HYDROCHLORIDE 25 MG: 25 TABLET, FILM COATED ORAL at 21:15

## 2018-08-28 RX ADMIN — AMLODIPINE BESYLATE 5 MG: 5 TABLET ORAL at 09:41

## 2018-08-28 RX ADMIN — Medication 10 ML: at 14:00

## 2018-08-28 RX ADMIN — TAMSULOSIN HYDROCHLORIDE 0.4 MG: 0.4 CAPSULE ORAL at 21:15

## 2018-08-28 RX ADMIN — Medication 10 ML: at 23:37

## 2018-08-28 RX ADMIN — FOLIC ACID 1 MG: 1 TABLET ORAL at 09:41

## 2018-08-28 RX ADMIN — CALCIUM ACETATE 667 MG: 667 CAPSULE ORAL at 16:02

## 2018-08-28 RX ADMIN — HEPARIN SODIUM: 1000 INJECTION INTRAVENOUS; SUBCUTANEOUS at 13:00

## 2018-08-28 RX ADMIN — CARVEDILOL 12.5 MG: 12.5 TABLET, FILM COATED ORAL at 09:41

## 2018-08-28 RX ADMIN — Medication 10 ML: at 06:00

## 2018-08-28 RX ADMIN — ASPIRIN 81 MG CHEWABLE TABLET 81 MG: 81 TABLET CHEWABLE at 09:41

## 2018-08-28 RX ADMIN — CARVEDILOL 12.5 MG: 12.5 TABLET, FILM COATED ORAL at 21:15

## 2018-08-28 RX ADMIN — HYDRALAZINE HYDROCHLORIDE 25 MG: 25 TABLET, FILM COATED ORAL at 15:56

## 2018-08-28 RX ADMIN — HEPARIN SODIUM 5000 UNITS: 5000 INJECTION, SOLUTION INTRAVENOUS; SUBCUTANEOUS at 15:56

## 2018-08-28 NOTE — PROGRESS NOTES
[facility] Hospitalist Group Progress Note Patient: Cate Cohen Age: 63 y.o. : 1960 MR#: [id number] SSN: [id number] Date/Time: 2018 1:30 PM 
 
Subjective/24-hour events:  
 
Just recently back to floor from HD. No chest pain or SOB acutely. Assessment:  
CKD 4 now ESRD Acute hypoxic and hypercapnic respiratory failure, resolved Acute on chronic mixed CHF 
HTN with resolved hypertensive emergency present on admission Hypocalcemia and hyperphosphatemia Anemia of chronic renal disease Obesity, BMI 34.1 Plan: HD per nephrology - plan for third treatment tomorrow per patient. Outpatient unit to be arranged. BPs stable overall. Adjust medical regimen as necessary. PT/OT eval/treat. Disposition TBD. Case discussed with:  [x]Patient  []Family  [x]Nursing  []Case Management DVT Prophylaxis:  []Lovenox  [x]Hep SQ  []SCDs  []Coumadin   []On Heparin gtt Objective:  
VS:  
Visit Vitals  /73  Pulse 78  Temp 98.1 °F (36.7 °C) (Oral)  Resp 16  
 Ht 5' 7\" (1.702 m)  Wt 108.9 kg (240 lb)  SpO2 98%  BMI 37.59 kg/m2 Tmax/24hrs: Temp (24hrs), Av.1 °F (36.7 °C), Min:98 °F (36.7 °C), Max:98.3 °F (36.8 °C) Intake/Output Summary (Last 24 hours) at 18 1330 Last data filed at 18 3566 Gross per 24 hour Intake              120 ml Output             2750 ml Net            -2630 ml General:  In NAD. Nontoxic-appearing. Cardiovascular:  RRR. Pulmonary:  No wheezes. GI:  Abdomen soft, NTTP. Extremities:  Generalized edema. No ischemia. Neuro:  Awake and alert, moves extremities spontaneously. Labs:   
Recent Results (from the past 24 hour(s)) GLUCOSE, POC Collection Time: 18  5:20 PM  
Result Value Ref Range Glucose (POC) 119 (H) 70 - 110 mg/dL GLUCOSE, POC Collection Time: 18  9:13 PM  
Result Value Ref Range Glucose (POC) 129 (H) 70 - 110 mg/dL GLUCOSE, POC  
 Collection Time: 08/28/18  7:44 AM  
Result Value Ref Range Glucose (POC) 108 70 - 110 mg/dL METABOLIC PANEL, BASIC Collection Time: 08/28/18 10:00 AM  
Result Value Ref Range Sodium 136 136 - 145 mmol/L Potassium 4.4 3.5 - 5.5 mmol/L Chloride 99 (L) 100 - 108 mmol/L  
 CO2 25 21 - 32 mmol/L Anion gap 12 3.0 - 18 mmol/L Glucose 140 (H) 74 - 99 mg/dL BUN 78 (H) 7.0 - 18 MG/DL Creatinine 7.26 (H) 0.6 - 1.3 MG/DL  
 BUN/Creatinine ratio 11 (L) 12 - 20 GFR est AA 9 (L) >60 ml/min/1.73m2 GFR est non-AA 8 (L) >60 ml/min/1.73m2 Calcium 6.9 (L) 8.5 - 10.1 MG/DL Signed By: Jong Mendiola MD   
 August 28, 2018 1:30 PM

## 2018-08-28 NOTE — PROGRESS NOTES
Nutrition:  Attempted to follow up with patient to assess need for diet education. Pt off unit for dialysis. Nutrition education handouts left in room with contact information. Will follow up at later time. Angeli Kennedy RD, [facility] Pager: [phone]

## 2018-08-28 NOTE — PROGRESS NOTES
HEMODIALYSIS ROUNDING NOTE Patient: Arin Lee               Sex: male          DOA: 8/23/2018  7:43 AM  
    
YOB: 1960      Age:  63 y.o.        LOS:  LOS: 5 days Subjective:  
 
Arin Lee is a 63 y.o.  who presents with Respiratory distress CHF (congestive heart failure) ([facility] [address] Hypertensive emergency. The patient is dialyzing utilizing the following method:Intermittent Hemodialysis Chief Complains: Patient was seen on dialysis, denies nausea / vomiting / headache / dizziness / SOB / chest pain. - Reviewed last 24 hrs events Current Facility-Administered Medications Medication Dose Route Frequency  [START ON 8/29/2018] epoetin sheila (EPOGEN;PROCRIT) injection 5,000 Units  5,000 Units IntraVENous DIALYSIS MON, WED & FRI  
 [START ON 8/29/2018] doxercalciferol (HECTOROL) 4 mcg/2 mL injection 2 mcg  2 mcg IntraVENous Q MON, WED & FRI  B complex-vitaminC-folic acid (NEPHROCAP) cap  1 Cap Oral DAILY  carvedilol (COREG) tablet 12.5 mg  12.5 mg Oral Q12H  
 heparin (porcine) 100 unit/mL injection 300 Units  300 Units InterCATHeter PRN  
 HYDROcodone-acetaminophen (NORCO) 5-325 mg per tablet 1-2 Tab  1-2 Tab Oral Q4H PRN  
 albumin human 25% (BUMINATE) solution 25 g  25 g IntraVENous DIALYSIS PRN  
 cloNIDine HCl (CATAPRES) tablet 0.1 mg  0.1 mg Oral Q12H  hydrALAZINE (APRESOLINE) tablet 25 mg  25 mg Oral Q8H  
 tamsulosin (FLOMAX) capsule 0.4 mg  0.4 mg Oral QHS  calcium acetate (PHOSLO) capsule 667 mg  1 Cap Oral TID WITH MEALS  
 albuterol-ipratropium (DUO-NEB) 2.5 MG-0.5 MG/3 ML  3 mL Nebulization Q4H PRN  
 sodium chloride (NS) flush 5-10 mL  5-10 mL IntraVENous PRN  
 aspirin chewable tablet 81 mg  81 mg Oral DAILY  pravastatin (PRAVACHOL) tablet 20 mg  20 mg Oral QHS  sodium chloride (NS) flush 5-10 mL  5-10 mL IntraVENous Q8H  
  LORazepam (ATIVAN) tablet 1 mg  1 mg Oral Q1H PRN Or  
 LORazepam (ATIVAN) injection 1 mg  1 mg IntraVENous Q1H PRN  
 LORazepam (ATIVAN) tablet 2 mg  2 mg Oral Q1H PRN Or  
 LORazepam (ATIVAN) injection 2 mg  2 mg IntraVENous Q1H PRN  
 LORazepam (ATIVAN) injection 3 mg  3 mg IntraVENous Q15MIN PRN  
 acetaminophen (TYLENOL) tablet 650 mg  650 mg Oral Q6H PRN  
 heparin (porcine) injection 5,000 Units  5,000 Units SubCUTAneous Q8H  
 insulin lispro (HUMALOG) injection   SubCUTAneous AC&HS  
 glucose chewable tablet 16 g  4 Tab Oral PRN  
 glucagon (GLUCAGEN) injection 1 mg  1 mg IntraMUSCular PRN  
 dextrose (D50W) injection syrg 12.5-25 g  25-50 mL IntraVENous PRN  thiamine HCL (B-1) tablet 100 mg  100 mg Oral DAILY Objective:  
 
Visit Vitals  /85  Pulse 95  Temp 98.3 °F (36.8 °C)  Resp 18  Ht 5' 7\" (1.702 m)  Wt 108.9 kg (240 lb)  SpO2 98%  BMI 37.59 kg/m2 Intake/Output Summary (Last 24 hours) at 08/28/18 1024 Last data filed at 08/28/18 4682 Gross per 24 hour Intake              120 ml Output             2750 ml Net            -2630 ml Physical Examination: 
 
GEN: AAO X 3, NAD 
RS: Chest is bilateral equal, no wheezing / rales / crackles CVS: S1-S2 heard, RRR Abdomen: Soft, Non tender, Not distended, Positive bowel sounds Extremities: No edema, no cyanosis, skin is warm on touch CNS: Awake & follows commands, CN II-XII are grossly intact. HEENT: Head is atraumatic, PERRLA, conjunctiva pink & non icteric. No JVD or carotid bruit Data Review:   
 
Labs:  
 
Hematology: Recent Labs  
   08/27/18 
 0126 WBC  5.6 HGB  9.9*  
HCT  31.4* Chemistry: Recent Labs  
   08/27/18 
 8936 BUN  96* CREA  7.74* CA  8.0*  
K  4.4 NA  137 CL  102 CO2  19* GLU  109* Images:  
 
XR (Most Recent). CXR reviewed by me and compared with previous CXR Results from [place] Encounter encounter on 08/23/18 XR CHEST PORT Narrative EXAM: CHEST ONE VIEW portable 0235 hours CLINICAL HISTORY/INDICATION: follow CHF , acute on chronic kidney disease stage IV, cardiorenal syndrome, hypertensive urgency COMPARISON: Chest x-ray August 23, 2018, September 18, 2017. TECHNIQUE: Single view obtained. FINDINGS:  
 
The cardiac silhouette is mildly enlarged. There is tortuosity of the aorta with 
calcified plaque. The lungs are adequately inflated. Pulmonary vascularity is 
cephalized and slightly ill-defined. Marked interval decrease in the previously 
demonstrated perihilar central alveolar and interstitial infiltrates. The 
costophrenic angles are sharply defined. No bony abnormalities are seen. Impression IMPRESSION: 
 
Resolving acute heart failure. CT (Most Recent[place] [facility] [place]16 CT ABD PELV WO CONT Narrative CT abdomen and pelvis CPT code: 43814 and 96666. INDICATION: Left upper abdominal pain. Left scrotal swelling TECHNIQUE: 5 mm collimation axial images obtained from the diaphragm to the 
level of the pubic symphysis without oral or nonionic intravenous contrast. 
 
COMPARISON: None Abdomen Findings:  
Heart size is top normal, without pericardial effusion. There is small amount of 
patchy peripheral airspace opacity at the right costophrenic angle, unclear if 
this could be inflammatory or atelectasis. No pleural effusion. Lack of intravenous contrast renders this study suboptimal for evaluating solid 
abdominal organs. Given this, liver appears normal size. Gallbladder not 
distended. No calcified intraluminal stones. Spleen normal size. No adrenal 
mass. Pancreas poorly  from adjacent unopacified small bowel and colon 
for evaluation. Normal caliber abdominal aorta. Symmetric renal size. No 
hydronephrosis. No renal stones. No free fluid in the upper abdomen. Pelvis Findings: There is a left-sided inguinal hernia containing a loop of small bowel which 
appears to be ileum. In the hemiscrotum there is diffuse wall thickening of this 
loop of bowel measuring up to 7 mm diameter. Upon its exit proximal and distal 
there is wall thickening and edema, with some internal fecal material on image 60. This appears to be the proximal loop of bowel. The distal terminal ileum 
demonstrates wall thickening on images 53 through 46 to the level of the cecum. No pneumatosis identified. Cannot well evaluate for wall edema or viable 
enhancement given lack of intravenous contrast. There is a sizable left-sided 
hydrocele. Bladder under distended and not optimally evaluated. Amorphous calcifications 
within nonenlarged prostate. Dense calcification suggested within nonenlarged 
appendix on image 51. Impression IMPRESSION: 
1. Left-sided inguinal hernia containing a length of ileum which demonstrates 
diffuse wall thickening/edema and some proximal distention with internal fecal 
material proximal to the hernia. Distal ileum to terminal ileum demonstrates 
mild diffuse wall thickening/edema as well. - Concern for mechanical obstruction given the bowel wall thickening. Findings discussed with Dr. Timoteo Bustos at 1520 hours. 2. Left-sided scrotal hydrocele is likely reactive. 3. No more proximal bowel or gastric distention. No colon distention. Plan / Recommendation: End Stage Renal Disease:  Plan HD today At 10:24 AM on 8/28/2018, I saw and examined patient during hemodialysis treatment. The patient was receiving hemodialysis for treatment of end stage renal disease. I have also reviewed vital signs, intake and output, lab results and recent events, and agreed with today's dialysis order. Access: No issue Anemia:  epo Steph Moore MD 
Nephrology 8/28/2018

## 2018-08-28 NOTE — DIALYSIS
ACUTE HEMODIALYSIS FLOW SHEET 
 
HEMODIALYSIS ORDERS: Physician: hannah Dialyzer: revaclear         Duration: 3 hr  BFR: 300   DFR: 600 Dialysate:  Temp *37 K+   2    Ca+  3 Na 140 Bicarb 30 Weight:  108.9 kg    Bed Scale [x]     Unable to Obtain []      Dry weight/UF Goal: 2000 Access CVL Needle Gauge Heparin []  Bolus      Units    [] Hourly       Units    [x]None Catheter locking solution heparin Pre BP:   129/71    Pulse:     86     Temperature:   98.1  Respirations: 16  Tx: NS       ml/Bolus  Other        [x] N/A Labs: Pre        Post:        [x] N/A Additional Orders(medications, blood products, hypotension management):       [x] N/A [x] Time Out/Safety Check  [x] [facility] Consent Verified CATHETER ACCESS: []N/A   [x]Right   []Left   [x]IJ     []Fem [] First use X-ray verified     [x]Tunnel                [] Non Tunneled [x]No S/S infection  []Redness  []Drainage []Cultured []Swelling []Pain  
[x]Medical Aseptic Prep Utilized   []Dressing Changed  [x] Biopatch  Date: 08/27/18 []Clotted   [x]Patent   Flows: [x]Good  []Poor  []Reversed If access problem,  notified: []Yes    []N/A  Date:        
 
GRAFT/FISTULA ACCESS:  [x]N/A     []Right     []Left     []UE     []LE []AVG   []AVF        []Buttonhole    []Medical Aseptic Prep Utilized []No S/S infection  []Redness  []Drainage []Cultured []Swelling []Pain Bruit:   [] Strong    [] Weak       Thrill :   [] Strong    [] Weak Needle Gauge:    Length: If access problem,  notified: []Yes     []N/A  Date:       
Please describe access if present and not used:  
 
 
GENERAL ASSESSMENT:   
LUNGS:  Rate  SaO2%        [x] N/A    [x] Clear  [] Coarse  [] Crackles  [] Wheezing 
      [] Diminished     Location : []RLL   []LLL    []RUL  []DIEGO Cough: []Productive  []Dry  [x]N/A   Respirations:  [x]Easy  []Labored Therapy:  [x]RA  []NC  l/min    Mask: []NRB []Venti       O2% []Ventilator  []Intubated  [] Trach  [] BiPaP CARDIAC: [x]Regular      [] Irregular   [] Pericardial Rub  [] JVD []  Monitored  [] Bedside  [] Remotely monitored [] N/A  Rhythm: EDEMA: [] None  [x]Generalized  [x] Pitting [] 1    [] 2    [x] 3    [] 4 [] Facial  [] Pedal  []  UE  [] LE  
SKIN:   [x] Warm  [] Hot     [] Cold   [x] Dry     [] Pale   [] Diaphoretic    
             [] Flushed  [] Jaundiced  [] Cyanotic  [] Rash  [] Weeping LOC:    [x] Alert      [x]Oriented:    [x] Person     [x] Place  [x]Time 
             [] Confused  [] Lethargic  [] Medicated  [] Non-responsive GI / ABDOMEN:  [] Flat    [] Distended    [x] Soft    [] Firm   []  Obese 
                           [] Diarrhea  [x] Bowel Sounds  [] Nausea  [] Vomiting  / URINE ASSESSMENT:[] Voiding   [x] Oliguria  [] Anuria   []  Vitale [] Incontinent    []  Incontinent Brief      []  Bathroom Privileges PAIN: [x] 0 []1  []2   []3   []4   []5   []6   []7   []8   []9   []10 Scale 0-10  Action/Follow Up: MOBILITY:  [] Amb    [] Amb/Assist    [x] Bed    [] Wheelchair  [] Stretcher All Vitals and Treatment Details on Attached Flowsheet Hospital: [facility] Room # 108.9 [] 1st Time Acute  [] Stat  [x] Routine  [] Urgent [x] Acute Room  []  Bedside  [] ICU/CCU  [] ER Isolation Precautions:  [x] Dialysis   [] Airborne   [] Contact    [] Reverse Special Considerations:         [] Blood Consent Verified [x]N/A ALLERGIES:   [x] NKA Code Status:  [x] Full Code  [] DNR  [] Other HBsAg ONLY: Date Drawn 08/26/18         [x]Negative []Positive []Unknown HBsAb: Date 08/28/18    [] Susceptible   [] [id number] []Not Drawn  [x] Drawn Current Labs:    Date of Labs: Today [x] Cut and paste current labs here. DIET:  [x] Renal    [] Other     [] NPO     []  Diabetic PRIMARY NURSE REPORT: First initial/Last name/Title Pre Dialysis: Hayde Alaniz RN    Time: 7007 EDUCATION:   
[x] Patient [] Other         Knowledge Basis: []None [x]Minimal [] Substantial  
Barriers to learning  [x]N/A  
[] Access Care     [] S&S of infection     [] Fluid Management     []K+     [x]Procedural   
[]Albumin     [] Medications     [] Tx Options     [] Transplant     [] Diet     [] Other Teaching Tools:  [x] Explain  [] Demo  [] Handouts [] Video Patient response:   [x] Verbalized understanding  [] Teach back  [] Return demonstration [] Requires follow up Inappropriate due to         
 
[address] Before each treatment:    
Machine Number:                   [address] [facility]  [x] Unit Machine # 8 with centralized RO 
                                [] Portable Machine #1/RO serial # [id number] [] Portable Machine #2/RO serial # [id number] [] Portable Machine #3/RO serial # [id number] [facility] 
                                [] Portable Machine #11/RO serial # [id number] [] Portable Machine #12/RO serial # [id number] [] Portable Machine #13/RO serial #  [id number] Alarm Test:  Pass time 0930         Other:        
[x] RO/Machine Log Complete Temp    37            [x]Extracorporeal Circuit Tested for integrity Dialysate: pH  7.4 Conductivity: Meter   14     HD Machine   14                  TCD: 14 
Dialyzer Lot # [id number]        Blood Tubing Lot # [id number]     Saline Lot #  [id number] CHLORINE TESTING-Before each treatment and every 4 hours Total Chlorine: [x] less than 0.1 ppm  Time: 1000 4 Hr/2nd Check Time:   
(if greater than 0.1 ppm from Primary then every 30 minutes from Secondary) TREATMENT INITIATION  with Dialysis Precautions:  
[x] All Connections Secured                 [x] Saline Line Double Clamped  
[x] Venous Parameters Set                  [x] Arterial Parameters Set [x] Prime Given  250 ml               [x]Air Foam Detector Engaged Treatment Initiation Note: pt arrived in stable condition via bed CVL accessed and treatment initiated without difficulty. Dr Kalen Mcdonnell at bedside Medication Dose Volume Route Initials Dialyzer Cleared: [] Good [x] Fair  [] Poor Blood processed:  49.4 L 
UF Removed  2000 Ml Post Wt: 106.9    kg POst BP:   122/71       Pulse: 76      Respirations: 16  Temperature: 98.6 Post Tx Vascular Access: AVF/AVG: Bleeding stopped Art  min. Terrell. Min   N/A Catheter: Locking solution: Heparin 1:1000 Art. 1.6  Terrell. 1.6 Post Assessment:  
  
                              Skin:  [x] Warm  [x] Dry [] Diaphoretic    [] Flushed  [] Pale [] Cyanotic [facility] Signatures Title Initials  Time Lungs: [x] Clear    [] Course  [] Crackles  [] Wheezing [] Diminished Andiene FADIA Freeman    Cardiac: [x] Regular   [] Irregular   [] Monitor  [] N/A  Rhythm:      
    Edema:  [] None    [x] General     [] Facial   [] Pedal    [] UE    [] LE  
    Pain: [x]0  []1  []2   []3  []4   []5   []6   []7   []8   []9   []10 Post Treatment Note: HD well tolerated. 2L UF removed. No acute distress noted during or post HD treatment. POST TREATMENT PRIMARY NURSE HANDOFF REPORT:  
 
First initial/Last name/Title Post Dialysis: Bk Rodriguez RN Time:  7284 Abbreviations: AVG-arterial venous graft, AVF-arterial venous fistula, IJ-Internal Jugular, Subcl-Subclavian, Fem-Femoral, Tx-treatment, AP/HR-apical heart rate, DFR-dialysate flow rate, BFR-blood flow rate, AP-arterial pressure, -venous pressure, UF-ultrafiltrate, TMP-transmembrane pressure, Terrell-Venous, Art-Arterial, RO-Reverse Osmosis

## 2018-08-28 NOTE — PROGRESS NOTES
Per [address] (CM) sent SNF referral to [facility] in [place]. Informed [address] referral has been sent.

## 2018-08-28 NOTE — ROUTINE PROCESS
{[facility] BEDSIDE_VERBAL_ shift change report given to [address] (oncoming nurse) by Melvin Watkins RN (offgoing nurse). Report included the following information SBAR, Intake/Output and Recent Results.

## 2018-08-28 NOTE — PROGRESS NOTES
Problem: Mobility Impaired (Adult and Pediatric) Goal: *Acute Goals and Plan of Care (Insert Text) Physical Therapy Goals Initiated 8/27/2018 and to be accomplished within 7 day(s) 1. Patient will move from supine to sit and sit to supine , scoot up and down and roll side to side in bed with minimal assistance assist.    
2.  Patient will transfer from bed to chair and chair to bed with minimal assistance assist using the least restrictive device. 3.  Patient will perform sit to stand with minimal assistance. 4.  Patient will ambulate with minimal assistance for 10 feet with the least restrictive device. Pt is off floor at [phone] when attempting PT tx. Will f/u at later date.

## 2018-08-28 NOTE — PROGRESS NOTES
conducted a Follow up consultation and Spiritual Assessment for Kenan Portillo, who is a 63 y. o.,male. The  provided the following Interventions: 
Continued the relationship of care and support. Listened empathically. Offered prayer and assurance of continued prayer on patients behalf. Chart reviewed. The following outcomes were achieved: 
Patient expressed gratitude for 's visit. Patient is looking forward to do rehab in the next few days. Assessment: 
There are no further spiritual or Yazidi issues which require Spiritual Care Services interventions at this time. Plan: 
Chaplains will continue to follow and will provide pastoral care on an as needed/requested basis.  recommends bedside caregivers page  on duty if patient shows signs of acute spiritual or emotional distress. Chaplain Resident [facility] Spiritual Care  
[phone]

## 2018-08-28 NOTE — PROGRESS NOTES
Reason for Admission:   CHF 
                
RRAT Score:          11 Plan for utilizing home health:  Rec. Is for tansfer to SNF Likelihood of Readmission:  low Transition of Care Plan: Spoke with Pt, brother and sister at . Pt gave permission to discuss discharge planning with family present. Demographics confirmed. Pt states that he lives at home and manages his own ADL's, however does not drive and transportation assistance is by family and friends when needed. Discussed with Pt PT/OT recommendation  To transfer to SNF when medically cleared for discharge. PT agreed with plan and [facility] signed for [employer]. Also discussed need for dialysis treatment center for 3x/wk dialysis. Pt stated that [facility] on [address] is close to his residence. Referral pending. CM following for YOU. Care Management Interventions PCP Verified by CM: Yes Mode of Transport at Discharge: Self Transition of Care Consult (CM Consult): SNF MyChart Signup: No 
Discharge Durable Medical Equipment: No 
Physical Therapy Consult: Yes Occupational Therapy Consult: Yes Speech Therapy Consult: No 
Current Support Network: Own Home, Lives Alone Confirm Follow Up Transport: Family Plan discussed with Pt/Family/Caregiver: Yes Freedom of Choice Offered: Yes Discharge Location Discharge Placement: Skilled nursing facility

## 2018-08-28 NOTE — PROGRESS NOTES
Bedside shift change report given to Chelo Solis RN (oncoming nurse) by Kj Turner RN (offgoing nurse). Report included the following information SBAR, Kardex, ED Summary, Procedure Summary, Intake/Output, MAR and Recent Results.

## 2018-08-28 NOTE — PROGRESS NOTES
[facility] 
 
 
CARDIOLOGY PROGRESS NOTE 
RECS: 
1. Hypertension, severe, uncontrolled. improving since HD started. 2. Congestive heart failure acute on chronic combined systolic and diastolic: feels much better since dialysis is started. 3. Diabetes. Treatment per Medicine. 4. Severe obesity. Weight loss has been strongly encouraged by following dietary restrictions and an exercise routine. 5. Acute on chronic renal insufficiency. Calculated GFR is less than 10 now, but his creatinine was 2.9 in January of this year. Management per Nephrology. Dialysis per renal. 
6. Elevated cardiac enzymes. Likely due to renal insufficiency and congestive heart failure. Stress test in am. Overall ejection fraction has improved compared to a 3 years ago. ASSESSMENT: 
Hospital Problems  Date Reviewed: 6/5/2018 Codes Class Noted POA  
 [facility] ICD-10-CM: I50.9 ICD-9-CM: 428.0  8/23/2018 Unknown Respiratory distress ICD-10-CM: R06.03 
ICD-9-CM: 786.09  8/23/2018 Unknown Hypertensive emergency ICD-10-CM: I16.1 ICD-9-CM: 401.9  8/23/2018 Unknown SUBJECTIVE: 
No CP 
improvement in SOB/edema OBJECTIVE: 
 
VS:  
Visit Vitals  /82 (BP 1 Location: Right arm, BP Patient Position: Supine)  Pulse 73  Temp 98.3 °F (36.8 °C)  Resp 19  
 Ht 5' 7\" (1.702 m)  Wt 108.9 kg (240 lb)  SpO2 100%  BMI 37.59 kg/m2 Intake/Output Summary (Last 24 hours) at 08/28/18 1715 Last data filed at 08/28/18 1330 Gross per 24 hour Intake              120 ml Output             2750 ml Net            -2630 ml  
 
TELE: normal sinus rhythm General: alert and in no apparent distress HENT: Normocephalic, atraumatic. Normal external eye. Neck :  no bruit, JVD difficult to assess due to obesity Cardiac:  regular rate and rhythm, S1, S2 normal, no murmur, click, rub or gallop Chest/Lungs:harsh breath sounds noted both lower lobes Abdomen: Soft, nontender, no masses Extremities:  No c/c, 2+/edema, peripheral pulses absent Labs: Results:  
   
Chemistry Recent Labs  
   08/28/18 
 1000  08/27/18 
 9803 GLU  140*  109* NA  136  137  
K  4.4  4.4 CL  99*  102 CO2  25  19* BUN  78*  96* CREA  7.26*  7.74* CA  6.9*  8.0*  
MG   --   2.4 AGAP  12  16 BUCR  11*  12  
  
CBC w/Diff Recent Labs  
   08/27/18 
 0528 WBC  5.6  
RBC  3.68* HGB  9.9*  
HCT  31.4* PLT  209 GRANS  61 LYMPH  19* EOS  4 Cardiac Enzymes No results for input(s): CPK, CKND1, SHRAVAN in the last 72 hours. No lab exists for component: Apoorva Lust Coagulation Recent Labs  
   08/27/18 
 1045 PTP  13.5 INR  1.1 APTT  25.8 Lipid Panel Lab Results Component Value Date/Time Cholesterol, total 155 08/23/2018 03:50 PM  
 HDL Cholesterol 72 (H) 08/23/2018 03:50 PM  
 LDL, calculated 71 08/23/2018 03:50 PM  
 VLDL, calculated 12 08/23/2018 03:50 PM  
 Triglyceride 60 08/23/2018 03:50 PM  
 CHOL/HDL Ratio 2.2 08/23/2018 03:50 PM  
  
BNP No results for input(s): BNPP in the last 72 hours. Liver Enzymes No results for input(s): TP, ALB, TBIL, AP, SGOT, GPT in the last 72 hours. No lab exists for component: DBIL Digoxin Thyroid Studies Lab Results Component Value Date/Time TSH 1.17 08/23/2018 03:50 PM  
    
 
 
 
Dimple Kenny MD   Pager # [phone]

## 2018-08-28 NOTE — PROGRESS NOTES
OT order received and chart reviewed. Patient off the floor x2 for dialysis. Will continue to follow and see patient when appropriate/as available.   Thank you for the referral.  Annelise Poe MS OTR/L

## 2018-08-28 NOTE — CARDIO/PULMONARY
Cardiac rehab by to see patient. He was lying in bed. We reviewed the educational packet that was given to him yesterday. Opportunity given for patient to ask questions. Will follow.

## 2018-08-28 NOTE — PROGRESS NOTES
Spoke with Jaime Rivera from [address] to start referral for dialysis tratments upon dicharge.

## 2018-08-29 ENCOUNTER — APPOINTMENT (OUTPATIENT)
Dept: NON INVASIVE DIAGNOSTICS | Age: 58
DRG: 280 | End: 2018-08-29
Attending: INTERNAL MEDICINE
Payer: COMMERCIAL

## 2018-08-29 LAB
ANION GAP SERPL CALC-SCNC: 10 MMOL/L (ref 3–18)
ANION GAP SERPL CALC-SCNC: 11 MMOL/L (ref 3–18)
BACTERIA SPEC CULT: NORMAL
BACTERIA SPEC CULT: NORMAL
BASOPHILS # BLD: 0 K/UL (ref 0–0.1)
BASOPHILS NFR BLD: 0 % (ref 0–2)
BUN SERPL-MCNC: 58 MG/DL (ref 7–18)
BUN SERPL-MCNC: 62 MG/DL (ref 7–18)
BUN/CREAT SERPL: 10 (ref 12–20)
BUN/CREAT SERPL: 11 (ref 12–20)
CALCIUM SERPL-MCNC: 8.4 MG/DL (ref 8.5–10.1)
CALCIUM SERPL-MCNC: 8.5 MG/DL (ref 8.5–10.1)
CHLORIDE SERPL-SCNC: 101 MMOL/L (ref 100–108)
CHLORIDE SERPL-SCNC: 101 MMOL/L (ref 100–108)
CO2 SERPL-SCNC: 23 MMOL/L (ref 21–32)
CO2 SERPL-SCNC: 24 MMOL/L (ref 21–32)
CREAT SERPL-MCNC: 5.62 MG/DL (ref 0.6–1.3)
CREAT SERPL-MCNC: 5.75 MG/DL (ref 0.6–1.3)
DIFFERENTIAL METHOD BLD: ABNORMAL
EOSINOPHIL # BLD: 0.2 K/UL (ref 0–0.4)
EOSINOPHIL NFR BLD: 4 % (ref 0–5)
ERYTHROCYTE [DISTWIDTH] IN BLOOD BY AUTOMATED COUNT: 13.1 % (ref 11.6–14.5)
GLUCOSE BLD STRIP.AUTO-MCNC: 167 MG/DL (ref 70–110)
GLUCOSE BLD STRIP.AUTO-MCNC: 96 MG/DL (ref 70–110)
GLUCOSE BLD STRIP.AUTO-MCNC: 98 MG/DL (ref 70–110)
GLUCOSE BLD STRIP.AUTO-MCNC: 99 MG/DL (ref 70–110)
GLUCOSE SERPL-MCNC: 85 MG/DL (ref 74–99)
GLUCOSE SERPL-MCNC: 90 MG/DL (ref 74–99)
HBV SURFACE AB SER QL IA: NEGATIVE
HBV SURFACE AB SERPL IA-ACNC: 4.01 MIU/ML
HCT VFR BLD AUTO: 28.3 % (ref 36–48)
HEP BS AB COMMENT,HBSAC: ABNORMAL
HGB BLD-MCNC: 8.9 G/DL (ref 13–16)
LYMPHOCYTES # BLD: 0.9 K/UL (ref 0.9–3.6)
LYMPHOCYTES NFR BLD: 19 % (ref 21–52)
MCH RBC QN AUTO: 26.7 PG (ref 24–34)
MCHC RBC AUTO-ENTMCNC: 31.4 G/DL (ref 31–37)
MCV RBC AUTO: 85 FL (ref 74–97)
MONOCYTES # BLD: 0.9 K/UL (ref 0.05–1.2)
MONOCYTES NFR BLD: 19 % (ref 3–10)
NEUTS SEG # BLD: 2.9 K/UL (ref 1.8–8)
NEUTS SEG NFR BLD: 58 % (ref 40–73)
NUC REST EJECTION FRACTION: 35 %
PLATELET # BLD AUTO: 169 K/UL (ref 135–420)
PMV BLD AUTO: 9.8 FL (ref 9.2–11.8)
POTASSIUM SERPL-SCNC: 4.2 MMOL/L (ref 3.5–5.5)
POTASSIUM SERPL-SCNC: 4.6 MMOL/L (ref 3.5–5.5)
RBC # BLD AUTO: 3.33 M/UL (ref 4.7–5.5)
SERVICE CMNT-IMP: NORMAL
SERVICE CMNT-IMP: NORMAL
SODIUM SERPL-SCNC: 135 MMOL/L (ref 136–145)
SODIUM SERPL-SCNC: 135 MMOL/L (ref 136–145)
STRESS BASELINE DIAS BP: 87 MMHG
STRESS BASELINE HR: 78 BPM
STRESS BASELINE SYS BP: 135 MMHG
STRESS ESTIMATED WORKLOAD: 1 METS
STRESS EXERCISE DUR MIN: NORMAL
STRESS PEAK DIAS BP: 83 MMHG
STRESS PEAK SYS BP: 151 MMHG
STRESS PERCENT HR ACHIEVED: 82 %
STRESS POST PEAK HR: 114 BPM
STRESS RATE PRESSURE PRODUCT: NORMAL BPM*MMHG
STRESS ST DEPRESSION: 0 MM
STRESS ST ELEVATION: 0 MM
STRESS STAGE 1 BP: NORMAL MMHG
STRESS STAGE 1 DURATION: 1 MIN:SEC
STRESS STAGE 1 HR: 100 BPM
STRESS STAGE 2 BP: NORMAL MMHG
STRESS STAGE 2 DURATION: 1 MIN:SEC
STRESS STAGE 2 HR: 111 BPM
STRESS STAGE 3 BP: NORMAL MMHG
STRESS STAGE 3 DURATION: 1 MIN:SEC
STRESS STAGE 3 HR: 100 BPM
STRESS STAGE 4 BP: NORMAL MMHG
STRESS STAGE 4 DURATION: 1 MIN:SEC
STRESS STAGE 4 HR: 99 BPM
STRESS STAGE RECOVERY 1 BP: NORMAL MMHG
STRESS STAGE RECOVERY 1 DURATION: 2 MIN:SEC
STRESS STAGE RECOVERY 1 HR: 93 BPM
STRESS TARGET HR: 139 BPM
TID: 1
WBC # BLD AUTO: 4.9 K/UL (ref 4.6–13.2)

## 2018-08-29 PROCEDURE — 78452 HT MUSCLE IMAGE SPECT MULT: CPT

## 2018-08-29 PROCEDURE — 74011250637 HC RX REV CODE- 250/637: Performed by: INTERNAL MEDICINE

## 2018-08-29 PROCEDURE — 80048 BASIC METABOLIC PNL TOTAL CA: CPT | Performed by: INTERNAL MEDICINE

## 2018-08-29 PROCEDURE — 90935 HEMODIALYSIS ONE EVALUATION: CPT

## 2018-08-29 PROCEDURE — 82962 GLUCOSE BLOOD TEST: CPT

## 2018-08-29 PROCEDURE — 86706 HEP B SURFACE ANTIBODY: CPT | Performed by: INTERNAL MEDICINE

## 2018-08-29 PROCEDURE — 74011250636 HC RX REV CODE- 250/636: Performed by: INTERNAL MEDICINE

## 2018-08-29 PROCEDURE — 85025 COMPLETE CBC W/AUTO DIFF WBC: CPT | Performed by: INTERNAL MEDICINE

## 2018-08-29 PROCEDURE — 74011250637 HC RX REV CODE- 250/637: Performed by: NURSE PRACTITIONER

## 2018-08-29 PROCEDURE — 74011250637 HC RX REV CODE- 250/637: Performed by: EMERGENCY MEDICINE

## 2018-08-29 PROCEDURE — 65660000004 HC RM CVT STEPDOWN

## 2018-08-29 PROCEDURE — 36415 COLL VENOUS BLD VENIPUNCTURE: CPT | Performed by: INTERNAL MEDICINE

## 2018-08-29 RX ORDER — SODIUM CHLORIDE 9 MG/ML
250 INJECTION, SOLUTION INTRAVENOUS CONTINUOUS
Status: DISCONTINUED | OUTPATIENT
Start: 2018-08-29 | End: 2018-08-29

## 2018-08-29 RX ADMIN — PRAVASTATIN SODIUM 20 MG: 20 TABLET ORAL at 21:21

## 2018-08-29 RX ADMIN — CALCIUM ACETATE 667 MG: 667 CAPSULE ORAL at 13:58

## 2018-08-29 RX ADMIN — Medication 100 MG: at 13:58

## 2018-08-29 RX ADMIN — CARVEDILOL 12.5 MG: 12.5 TABLET, FILM COATED ORAL at 21:21

## 2018-08-29 RX ADMIN — CALCIUM ACETATE 667 MG: 667 CAPSULE ORAL at 16:46

## 2018-08-29 RX ADMIN — ERYTHROPOIETIN 5000 UNITS: 3000 INJECTION, SOLUTION INTRAVENOUS; SUBCUTANEOUS at 14:08

## 2018-08-29 RX ADMIN — Medication 5 ML: at 21:24

## 2018-08-29 RX ADMIN — HYDRALAZINE HYDROCHLORIDE 25 MG: 25 TABLET, FILM COATED ORAL at 05:26

## 2018-08-29 RX ADMIN — ASPIRIN 81 MG CHEWABLE TABLET 81 MG: 81 TABLET CHEWABLE at 13:58

## 2018-08-29 RX ADMIN — DOXERCALCIFEROL 2 MCG: 4 INJECTION, SOLUTION INTRAVENOUS at 14:08

## 2018-08-29 RX ADMIN — TAMSULOSIN HYDROCHLORIDE 0.4 MG: 0.4 CAPSULE ORAL at 21:21

## 2018-08-29 RX ADMIN — REGADENOSON 0.4 MG: 0.08 INJECTION, SOLUTION INTRAVENOUS at 09:15

## 2018-08-29 RX ADMIN — HYDRALAZINE HYDROCHLORIDE 25 MG: 25 TABLET, FILM COATED ORAL at 21:21

## 2018-08-29 RX ADMIN — HYDRALAZINE HYDROCHLORIDE 25 MG: 25 TABLET, FILM COATED ORAL at 13:58

## 2018-08-29 RX ADMIN — SODIUM CHLORIDE 250 ML: 900 INJECTION, SOLUTION INTRAVENOUS at 09:15

## 2018-08-29 RX ADMIN — Medication 1 CAPSULE: at 13:58

## 2018-08-29 RX ADMIN — HEPARIN SODIUM 5000 UNITS: 5000 INJECTION, SOLUTION INTRAVENOUS; SUBCUTANEOUS at 23:50

## 2018-08-29 RX ADMIN — Medication 5 ML: at 05:28

## 2018-08-29 RX ADMIN — HEPARIN SODIUM 5000 UNITS: 5000 INJECTION, SOLUTION INTRAVENOUS; SUBCUTANEOUS at 14:00

## 2018-08-29 NOTE — PROGRESS NOTES
Occupational Therapy Note:  Attempted to see patient for skilled OT evaluation but patient off the floor x2 attempts for dialysis and IR. Will continue to follow and see patient when available. Thank you.   Kayden Cabrera, MS OTR/L

## 2018-08-29 NOTE — PROGRESS NOTES
Problem: Falls - Risk of 
Goal: *Absence of Falls Document Redgie Curet Fall Risk and appropriate interventions in the flowsheet. Outcome: Progressing Towards Goal 
Fall Risk prevention implemented by following interventions: 
 
Fall Risk Interventions: 
Mobility Interventions: Patient to call before getting OOB, Strengthening exercises (ROM-active/passive), Communicate number of staff needed for ambulation/transfer Medication Interventions: Evaluate medications/consider consulting pharmacy, Patient to call before getting OOB, Teach patient to arise slowly Elimination Interventions: Call light in reach, Toilet paper/wipes in reach, Toileting schedule/hourly rounds, Urinal in reach History of Falls Interventions: Door open when patient unattended, Bed/chair exit alarm, Room close to nurse's station Problem: Infection - Risk of, Urinary Catheter-Associated Urinary Tract Infection Goal: *Absence of infection signs and symptoms Outcome: Progressing Towards Goal 
Pt remains free of CAUTI infection due to martinez catheter removal due to assessing pt's need for catheter, urine assessment, monitoring for s&s of infection, lab monitoring, catheter mgmt and discontinuation. Problem: Hypertension Goal: *Blood pressure within specified parameters Outcome: Progressing Towards Goal 
Pt's HTN controlled via keeping bp within specific parameters through these interventions: 
 
Vs assessment Cardiac assessment Neuro assessment Anxiety assessment Med administration Reassurance and emotional support Decreases stimulation

## 2018-08-29 NOTE — PROGRESS NOTES
Bedside shift change report given to Filemon LOAIZA (oncoming nurse) by Eloy Arias RN (offgoing nurse). Report included the following information SBAR, Kardex, Procedure Summary, Intake/Output, MAR and Recent Results.

## 2018-08-29 NOTE — PROGRESS NOTES
Patient was given 10.2 mCi of Sestamibi for the Resting pictures. Patient received 0.4 mg of Lexiscan for the exercise portion of the Stress test. Patient was then given 33 mCi of Sestamibi for the Stress pictures. Patient went back to room with armband still on.

## 2018-08-29 NOTE — ROUTINE PROCESS
Bedside shift change report given to Jules Zavaleta 15 (oncoming nurse) by Mike Wylie (offgoing nurse). Report given with SBAR, Kardex, Intake/Output, MAR and Recent Results.

## 2018-08-29 NOTE — PROGRESS NOTES
HEMODIALYSIS ROUNDING NOTE Patient: Sampson Macdonald               Sex: male          DOA: 8/23/2018  7:43 AM  
    
YOB: 1960      Age:  63 y.o.        LOS:  LOS: 6 days Subjective:  
 
Sampson Macdonald is a 63 y.o.  who presents with Respiratory distress CHF (congestive heart failure) ([facility] Hypertensive emergency. The patient is dialyzing utilizing the following method:Intermittent Hemodialysis Chief Complains: Patient was seen on dialysis, denies nausea / vomiting / headache / dizziness / SOB / chest pain. - Reviewed last 24 hrs events Current Facility-Administered Medications Medication Dose Route Frequency  epoetin sheila (EPOGEN;PROCRIT) 5,000 Units  5,000 Units IntraVENous DIALYSIS MON, WED & FRI  
 doxercalciferol (HECTOROL) 4 mcg/2 mL injection 2 mcg  2 mcg IntraVENous DIALYSIS MON, WED & FRI  B complex-vitaminC-folic acid (NEPHROCAP) cap  1 Cap Oral DAILY  carvedilol (COREG) tablet 12.5 mg  12.5 mg Oral Q12H  
 heparin (porcine) 100 unit/mL injection 300 Units  300 Units InterCATHeter PRN  
 HYDROcodone-acetaminophen (NORCO) 5-325 mg per tablet 1-2 Tab  1-2 Tab Oral Q4H PRN  
 albumin human 25% (BUMINATE) solution 25 g  25 g IntraVENous DIALYSIS PRN  
 cloNIDine HCl (CATAPRES) tablet 0.1 mg  0.1 mg Oral Q12H  hydrALAZINE (APRESOLINE) tablet 25 mg  25 mg Oral Q8H  
 tamsulosin (FLOMAX) capsule 0.4 mg  0.4 mg Oral QHS  calcium acetate (PHOSLO) capsule 667 mg  1 Cap Oral TID WITH MEALS  
 albuterol-ipratropium (DUO-NEB) 2.5 MG-0.5 MG/3 ML  3 mL Nebulization Q4H PRN  
 sodium chloride (NS) flush 5-10 mL  5-10 mL IntraVENous PRN  
 aspirin chewable tablet 81 mg  81 mg Oral DAILY  pravastatin (PRAVACHOL) tablet 20 mg  20 mg Oral QHS  sodium chloride (NS) flush 5-10 mL  5-10 mL IntraVENous Q8H  
 LORazepam (ATIVAN) tablet 1 mg  1 mg Oral Q1H PRN  Or  
  LORazepam (ATIVAN) injection 1 mg  1 mg IntraVENous Q1H PRN  
 LORazepam (ATIVAN) tablet 2 mg  2 mg Oral Q1H PRN Or  
 LORazepam (ATIVAN) injection 2 mg  2 mg IntraVENous Q1H PRN  
 LORazepam (ATIVAN) injection 3 mg  3 mg IntraVENous Q15MIN PRN  
 acetaminophen (TYLENOL) tablet 650 mg  650 mg Oral Q6H PRN  
 heparin (porcine) injection 5,000 Units  5,000 Units SubCUTAneous Q8H  
 insulin lispro (HUMALOG) injection   SubCUTAneous AC&HS  
 glucose chewable tablet 16 g  4 Tab Oral PRN  
 glucagon (GLUCAGEN) injection 1 mg  1 mg IntraMUSCular PRN  
 dextrose (D50W) injection syrg 12.5-25 g  25-50 mL IntraVENous PRN  thiamine HCL (B-1) tablet 100 mg  100 mg Oral DAILY Objective:  
 
Visit Vitals  /87  Pulse 83  Temp 98.1 °F (36.7 °C) (Oral)  Resp 16  
 Ht 5' 7\" (1.702 m)  Wt 108.9 kg (240 lb)  SpO2 96%  BMI 37.59 kg/m2 Intake/Output Summary (Last 24 hours) at 08/29/18 1050 Last data filed at 08/29/18 5982 Gross per 24 hour Intake              360 ml Output             2650 ml Net            -2290 ml Physical Examination: 
 
GEN: AAO X 3, NAD 
RS: Chest is bilateral equal, no wheezing / rales / crackles CVS: S1-S2 heard, RRR Abdomen: Soft, Non tender, Not distended, Positive bowel sounds Extremities: + edema, no cyanosis, skin is warm on touch CNS: Awake & follows commands, HEENT: Head is atraumatic, PERRLA, conjunctiva pink & non icteric. No JVD or carotid bruit Data Review:   
 
Labs:  
 
Hematology:  
Recent Labs  
   08/27/18 
 5681 WBC  5.6 HGB  9.9*  
HCT  31.4* Chemistry:  
Recent Labs  
   08/29/18 
 0529  08/28/18 
 1000  08/27/18 
 6575 BUN  58*  78*  96* CREA  5.62*  7.26*  7.74* CA  8.4*  6.9*  8.0*  
K  4.6  4.4  4.4 NA  135*  136  137 CL  101  99*  102 CO2  23  25  19* GLU  85  140*  109* Images:  
 
XR (Most Recent).  CXR reviewed by me and compared with previous CXR  
 Results from [facility] Encounter encounter on 08/23/18 XR CHEST PORT Narrative EXAM: CHEST ONE VIEW portable 0235 hours CLINICAL HISTORY/INDICATION: follow CHF , acute on chronic kidney disease stage IV, cardiorenal syndrome, hypertensive urgency COMPARISON: Chest x-ray August 23, 2018, September 18, 2017. TECHNIQUE: Single view obtained. FINDINGS:  
 
The cardiac silhouette is mildly enlarged. There is tortuosity of the aorta with 
calcified plaque. The lungs are adequately inflated. Pulmonary vascularity is 
cephalized and slightly ill-defined. Marked interval decrease in the previously 
demonstrated perihilar central alveolar and interstitial infiltrates. The 
costophrenic angles are sharply defined. No bony abnormalities are seen. Impression IMPRESSION: 
 
Resolving acute heart failure. CT (Most Recent) Results from [facility] Encounter encounter on 04/24/16 CT ABD PELV WO CONT Narrative CT abdomen and pelvis CPT code: 13647 and 51628. INDICATION: Left upper abdominal pain. Left scrotal swelling TECHNIQUE: 5 mm collimation axial images obtained from the diaphragm to the 
level of the pubic symphysis without oral or nonionic intravenous contrast. 
 
COMPARISON: None Abdomen Findings:  
Heart size is top normal, without pericardial effusion. There is small amount of 
patchy peripheral airspace opacity at the right costophrenic angle, unclear if 
this could be inflammatory or atelectasis. No pleural effusion. Lack of intravenous contrast renders this study suboptimal for evaluating solid 
abdominal organs. Given this, liver appears normal size. Gallbladder not 
distended. No calcified intraluminal stones. Spleen normal size. No adrenal 
mass. Pancreas poorly  from adjacent unopacified small bowel and colon 
for evaluation. Normal caliber abdominal aorta. Symmetric renal size. No 
hydronephrosis. No renal stones. No free fluid in the upper abdomen. Pelvis Findings: There is a left-sided inguinal hernia containing a loop of small bowel which 
appears to be ileum. In the hemiscrotum there is diffuse wall thickening of this 
loop of bowel measuring up to 7 mm diameter. Upon its exit proximal and distal 
there is wall thickening and edema, with some internal fecal material on image 60. This appears to be the proximal loop of bowel. The distal terminal ileum 
demonstrates wall thickening on images 53 through 46 to the level of the cecum. No pneumatosis identified. Cannot well evaluate for wall edema or viable 
enhancement given lack of intravenous contrast. There is a sizable left-sided 
hydrocele. Bladder under distended and not optimally evaluated. Amorphous calcifications 
within nonenlarged prostate. Dense calcification suggested within nonenlarged 
appendix on image 51. Impression IMPRESSION: 
1. Left-sided inguinal hernia containing a length of ileum which demonstrates 
diffuse wall thickening/edema and some proximal distention with internal fecal 
material proximal to the hernia. Distal ileum to terminal ileum demonstrates 
mild diffuse wall thickening/edema as well. - Concern for mechanical obstruction given the bowel wall thickening. Findings discussed with Dr. Clark Nearcecilia at 1520 hours. 2. Left-sided scrotal hydrocele is likely reactive. 3. No more proximal bowel or gastric distention. No colon distention. Plan / Recommendation: End Stage Renal Disease:  Plan HD today At 10:34 AM on 8/29/2018, I saw and examined patient during hemodialysis treatment. The patient was receiving hemodialysis for treatment of end stage renal disease. I have also reviewed vital signs, intake and output, lab results and recent events, and agreed with today's dialysis order. Access: No issue Anemia:  epo Yolanda Hsieh MD 
Nephrology 8/29/2018

## 2018-08-29 NOTE — PROGRESS NOTES
Problem: Mobility Impaired (Adult and Pediatric) Goal: *Acute Goals and Plan of Care (Insert Text) Physical Therapy Goals Initiated 8/27/2018 and to be accomplished within 7 day(s) 1. Patient will move from supine to sit and sit to supine , scoot up and down and roll side to side in bed with minimal assistance assist.    
2.  Patient will transfer from bed to chair and chair to bed with minimal assistance assist using the least restrictive device. 3.  Patient will perform sit to stand with minimal assistance. 4.  Patient will ambulate with minimal assistance for 10 feet with the least restrictive device. 2603 - Pt off floor for HD. Will f/u later in day. 1050 - Pt still off floor at this time. Will f/u later in day.

## 2018-08-29 NOTE — PROGRESS NOTES
Nutrition:  Attempted to follow up with patient to assess need for diet education. Pt off unit. Nutrition education handouts left in room on previous day, with contact information. Will follow up at later time. Princess Muhammad RD Pager: [phone]

## 2018-08-29 NOTE — PROGRESS NOTES
-New referral information sent to [facility] for new HD at [facility] location. Waiting for chair time Spoke with Jocelin at [employer]  Patient intake and will call with chair time and day [facility] [phone])

## 2018-08-29 NOTE — DIALYSIS
[facility] ACUTE HEMODIALYSIS FLOW SHEET-RN note [x] Sharron Consent Verified CATHETER ACCESS: []N/A   [x]Right   []Left   [x]IJ     []Fem [] First use X-ray verified     [x]Tunnel                [] Non Tunneled [x]No S/S infection  []Redness  []Drainage []Cultured []Swelling []Pain  
[x]Medical Aseptic Prep Utilized   []Dressing Changed  [x] Biopatch  Date: 2/28/2018 []Clotted   [x]Patent   Flows: []Good  []Poor  [x]Reversed If access problem,  notified: []Yes    [x]N/A  Date:        
 
GRAFT/FISTULA ACCESS:  [x]N/A     []Right     []Left     []UE     []LE []AVG   []AVF        []Buttonhole    []Medical Aseptic Prep Utilized []No S/S infection  []Redness  []Drainage []Cultured []Swelling []Pain Bruit:   [] Strong    [] Weak       Thrill :   [] Strong    [] Weak Needle Gauge:    Length: If access problem,  notified: []Yes     []N/A  Date:       
Please describe access if present and not used:  
 
 
GENERAL ASSESSMENT:   
LUNGS:  Rate 16 SaO2%        [] N/A    [x] Clear  [] Coarse  [] Crackles  [] Wheezing 
      [] Diminished     Location : []RLL   []LLL    []RUL  []DIEGO Cough: []Productive  []Dry  [x]N/A   Respirations:  [x]Easy  []Labored Therapy:  [x]RA  []NC  l/min    Mask: []NRB []Venti       O2% []Ventilator  []Intubated  [] Trach  [] BiPaP CARDIAC: [x]Regular      [] Irregular   [] Pericardial Rub  [] JVD []  Monitored  [] Bedside  [] Remotely monitored [] N/A  Rhythm: EDEMA: [] None  [x]Generalized  [] Pitting [] 1    [] 2    [] 3    [] 4 [] Facial  [] Pedal  []  UE  [x] LE  
SKIN:   [x] Warm  [] Hot     [] Cold   [x] Dry     [] Pale   [] Diaphoretic    
             [] Flushed  [] Jaundiced  [] Cyanotic  [] Rash  [] Weeping LOC:    [x] Alert      [x]Oriented:    [x] Person     [x] Place  [x]Time 
             [] Confused  [] Lethargic  [] Medicated  [] Non-responsive GI / ABDOMEN:  [] Flat    [] Distended    [x] Soft    [] Firm   []  Obese 
                           [] Diarrhea  [x] Bowel Sounds  [] Nausea  [] Vomiting  / URINE ASSESSMENT:[x] Voiding   [] Oliguria  [] Anuria   []  Vitale [] Incontinent    []  Incontinent Brief      [x]  Bathroom Privileges PAIN: [x] 0 []1  []2   []3   []4   []5   []6   []7   []8   []9   []10 Scale 0-10  Action/Follow Up: MOBILITY:  [] Amb    [] Amb/Assist    [x] Bed    [] Wheelchair  [] Stretcher ALLERGIES:   [x] NKA Code Status:  [x] Full Code  [] DNR  [] Other PRIMARY NURSE REPORT: First initial/Last name/Title Pre Dialysis: Devi President, RN   [x][x][x][x][x][x][x][x][x][x][x][x][x][x][x][x][x][x][x][x][x][x][x][x][x][x][x][x][x][x][x][x][x][x][x][x][x][x][x][x][x][x][x][x][x][x][x][x][x][x][x][x][x][x][x][x][x][x][x][x][x][x][x][x][x][x][x][x][x][x][x][x][x][x][x][x][x][x][x][x][x]

## 2018-08-29 NOTE — PROGRESS NOTES
[facility] Progress Note Patient: Maribell Lopez Age: 63 y.o. : 1960 MR#: [id number] SSN: [id number] Date/Time: 2018 4:41 PM 
 
Subjective/24-hour events:  
 
HD this afternoon. No new issues overnight. Assessment:  
CKD 4 now ESRD Acute hypoxic and hypercapnic respiratory failure, resolved Acute on chronic mixed CHF 
HTN with resolved hypertensive emergency present on admission Hypocalcemia and hyperphosphatemia Anemia of chronic renal disease Obesity, BMI 34.1 Plan: 
Continue HD per nephrology. Awaiting arrangement of outpatient dialysis, which is in progress. Await results of nuclear stress test - further cardiology recs pending this information. Pending conclusion on cardiology workup, disposition when volume status adequate and outpatient HD arranged. Case discussed with:  [x]Patient  []Family  [x]Nursing  []Case Management DVT Prophylaxis:  []Lovenox  [x]Hep SQ  []SCDs  []Coumadin   []On Heparin gtt Objective:  
VS:  
Visit Vitals  /77  Pulse 95  Temp 99.1 °F (37.3 °C)  Resp 18  Ht 5' 7\" (1.702 m)  Wt 108.9 kg (240 lb)  SpO2 96%  BMI 37.59 kg/m2 Tmax/24hrs: Temp (24hrs), Av.7 °F (37.1 °C), Min:98.1 °F (36.7 °C), Max:99.5 °F (37.5 °C) Intake/Output Summary (Last 24 hours) at 18 1641 Last data filed at 18 1400 Gross per 24 hour Intake              600 ml Output             3650 ml Net            -3050 ml General:  In NAD. Nontoxic-appearing. Cardiovascular:  RRR. Pulmonary:  No wheezes. GI:  Abdomen soft, NTTP. Extremities:  Generalized edema. No ischemia. Neuro:  Awake and alert, moves extremities spontaneously. Labs:   
Recent Results (from the past 24 hour(s)) GLUCOSE, POC Collection Time: 18  8:30 PM  
Result Value Ref Range Glucose (POC) 90 70 - 110 mg/dL METABOLIC PANEL, BASIC  Collection Time: 18  5:29 AM  
 Result Value Ref Range Sodium 135 (L) 136 - 145 mmol/L Potassium 4.6 3.5 - 5.5 mmol/L Chloride 101 100 - 108 mmol/L  
 CO2 23 21 - 32 mmol/L Anion gap 11 3.0 - 18 mmol/L Glucose 85 74 - 99 mg/dL BUN 58 (H) 7.0 - 18 MG/DL Creatinine 5.62 (H) 0.6 - 1.3 MG/DL  
 BUN/Creatinine ratio 10 (L) 12 - 20 GFR est AA 13 (L) >60 ml/min/1.73m2 GFR est non-AA 11 (L) >60 ml/min/1.73m2 Calcium 8.4 (L) 8.5 - 10.1 MG/DL  
GLUCOSE, POC Collection Time: 08/29/18  7:40 AM  
Result Value Ref Range Glucose (POC) 98 70 - 110 mg/dL HEP B SURFACE AB Collection Time: 08/29/18 10:20 AM  
Result Value Ref Range Hepatitis B surface Ab 4.01 (L) >10.0 mIU/mL Hep B surface Ab Interp. NEGATIVE  (A) POS Hep B surface Ab comment Samples with a  value of 10 mIU/mL or greater are considered positive (protective immunity) in accordance with the CDC guidelines. NUCLEAR CARDIAC STRESS TEST Collection Time: 08/29/18 10:28 AM  
Result Value Ref Range Target  bpm  
 Exercise duration time 00:04:00 Stress Base Systolic  mmHg Stress Base Diastolic BP 83 mmHg Post peak  BPM  
 Baseline HR 78 BPM  
 Estimated workload 1.0 METS Baseline  mmHg Percent HR 82 % Stress Base Diastolic BP 87 mmHg Stress Rate Pressure Product 73828 BPM*mmHg Stress Stage 1 Duration 1 min:sec Stress Stage 1  bpm  
 Stress Stage 1 /85 mmHg Stress Stage 2 Duration 1 min:sec Stress Stage 2  bpm  
 Stress Stage 2 /83 mmHg Stress Stage 3 Duration 1 min:sec Stress Stage 3  bpm  
 Stress Stage 3 /90 mmHg Stress Stage 4 Duration 1 min:sec Stress Stage 4 HR 99 bpm  
 Stress Stage 4 /83 mmHg Recovery Stage 1 Duration 2 min:sec Recovery Stage 1 HR 93 bpm  
 Recovery Stage 1 /81 mmHg TID 1.00 Nuc Rest EF 30 % CBC WITH AUTOMATED DIFF Collection Time: 08/29/18 10:30 AM  
Result Value Ref Range WBC 4.9 4.6 - 13.2 K/uL  
 RBC 3.33 (L) 4.70 - 5.50 M/uL HGB 8.9 (L) 13.0 - 16.0 g/dL HCT 28.3 (L) 36.0 - 48.0 % MCV 85.0 74.0 - 97.0 FL  
 MCH 26.7 24.0 - 34.0 PG  
 MCHC 31.4 31.0 - 37.0 g/dL  
 RDW 13.1 11.6 - 14.5 % PLATELET 603 876 - 293 K/uL MPV 9.8 9.2 - 11.8 FL  
 NEUTROPHILS 58 40 - 73 % LYMPHOCYTES 19 (L) 21 - 52 % MONOCYTES 19 (H) 3 - 10 % EOSINOPHILS 4 0 - 5 % BASOPHILS 0 0 - 2 %  
 ABS. NEUTROPHILS 2.9 1.8 - 8.0 K/UL  
 ABS. LYMPHOCYTES 0.9 0.9 - 3.6 K/UL  
 ABS. MONOCYTES 0.9 0.05 - 1.2 K/UL  
 ABS. EOSINOPHILS 0.2 0.0 - 0.4 K/UL  
 ABS. BASOPHILS 0.0 0.0 - 0.1 K/UL  
 DF AUTOMATED METABOLIC PANEL, BASIC Collection Time: 08/29/18 10:30 AM  
Result Value Ref Range Sodium 135 (L) 136 - 145 mmol/L Potassium 4.2 3.5 - 5.5 mmol/L Chloride 101 100 - 108 mmol/L  
 CO2 24 21 - 32 mmol/L Anion gap 10 3.0 - 18 mmol/L Glucose 90 74 - 99 mg/dL BUN 62 (H) 7.0 - 18 MG/DL Creatinine 5.75 (H) 0.6 - 1.3 MG/DL  
 BUN/Creatinine ratio 11 (L) 12 - 20 GFR est AA 12 (L) >60 ml/min/1.73m2 GFR est non-AA 10 (L) >60 ml/min/1.73m2 Calcium 8.5 8.5 - 10.1 MG/DL  
GLUCOSE, POC Collection Time: 08/29/18  1:56 PM  
Result Value Ref Range Glucose (POC) 96 70 - 110 mg/dL GLUCOSE, POC Collection Time: 08/29/18  4:03 PM  
Result Value Ref Range Glucose (POC) 167 (H) 70 - 110 mg/dL Signed By: Janell Raymundo MD   
 August 29, 2018 4:41 PM

## 2018-08-29 NOTE — DIALYSIS
ACUTE HEMODIALYSIS FLOW SHEET- PCT Note HEMODIALYSIS ORDERS: Physician: Ta Nagy Dialyzer: revaclear   Duration: 3 hr  BFR: 300  DFR: 600 Dialysate:  Temp 37 K+   2    Ca+  2.5 Na 138 Bicarb 35 Weight:  108.9 kg kg    Patient Chart []     Unable to Obtain []   Dry weight/UF Goal: 3000 Access CVC Subclavian Needle Gauge N/A Heparin []  Bolus      Units    [] Hourly       Units    [x]None Catheter locking solution 1.6 mL each Pre BP:   134/81    Pulse:     71   Temperature:   98.1  Respirations: 16  Tx: NS       ml/Bolus  Other        [x] N/A Labs: Pre CBC, HepB       Post:        [] N/A Additional Orders(medications, blood products, hypotension management):    [x] N/A [x] [facility] Consent Verified CATHETER ACCESS: []N/A   [x]Right   []Left   []IJ     [x]Sub  
[] First use X-ray verified     [x]Tunnel                [] Non Tunneled [x]No S/S infection  []Redness  []Drainage []Cultured []Swelling []Pain  
[x]Medical Aseptic Prep Utilized   []Dressing Changed  [] Biopatch  Date:     
[]Clotted   [x]Patent   Flows: [x]Good  []Poor  []Reversed If access problem,  notified: []Yes    [x]N/A  Date:        
 
GRAFT/FISTULA ACCESS:  [x]N/A     []Right     []Left     []UE     []LE []AVG   []AVF        []Buttonhole    []Medical Aseptic Prep Utilized []No S/S infection  []Redness  []Drainage []Cultured []Swelling []Pain Bruit:   [] Strong    [] Weak       Thrill :   [] Strong    [] Weak Needle Gauge:    Length: If access problem,  notified: []Yes     [x]N/A  Date:       
Please describe access if present and not used:  
 
 
Pre Treatment  PCT Note: 
 
CVC patent. No signs of infection. Patient stable and has no complaints of any issues. Hospital:  [facility] Room # [id number] [] 1st Time Acute  [] Stat  [x] Routine  [] Urgent [x] Acute Room  []  Bedside  [] ICU/CCU  [] ER Isolation Precautions:  [x] Dialysis   [] Airborne   [] Contact    [] Reverse Special Considerations:         [] Blood Consent Verified [x]N/A ALLERGIES:   [x] NKA Code Status:  [x] Full Code  [] DNR  [] Other HBsAg ONLY: Date Drawn 08/26/2018         [x]Negative []Positive []Unknown HBsAb: Date 08/29/2018    [x] Susceptible   [] [id number] []Not Drawn  [] Drawn Current Labs:    Date of Labs: 08/29/2018       Today [x] Results for Mindy Alpha (MRN [id number]) as of 8/29/2018 12:09 Ref. Range 8/29/2018 10:30 WBC Latest Ref Range: 4.6 - 13.2 K/uL 4.9  
RBC Latest Ref Range: 4.70 - 5.50 M/uL 3.33 (L) HGB Latest Ref Range: 13.0 - 16.0 g/dL 8.9 (L) HCT Latest Ref Range: 36.0 - 48.0 % 28.3 (L) MCV Latest Ref Range: 74.0 - 97.0 FL 85.0 MCH Latest Ref Range: 24.0 - 34.0 PG 26.7 MCHC Latest Ref Range: 31.0 - 37.0 g/dL 31.4 RDW Latest Ref Range: 11.6 - 14.5 % 13.1 PLATELET Latest Ref Range: 135 - 420 K/uL 169 MPV Latest Ref Range: 9.2 - 11.8 FL 9.8 NEUTROPHILS Latest Ref Range: 40 - 73 % 58 LYMPHOCYTES Latest Ref Range: 21 - 52 % 19 (L) MONOCYTES Latest Ref Range: 3 - 10 % 19 (H) EOSINOPHILS Latest Ref Range: 0 - 5 % 4 BASOPHILS Latest Ref Range: 0 - 2 % 0  
DF Latest Units:   AUTOMATED  
ABS. NEUTROPHILS Latest Ref Range: 1.8 - 8.0 K/UL 2.9  
ABS. LYMPHOCYTES Latest Ref Range: 0.9 - 3.6 K/UL 0.9  
ABS. MONOCYTES Latest Ref Range: 0.05 - 1.2 K/UL 0.9  
ABS. EOSINOPHILS Latest Ref Range: 0.0 - 0.4 K/UL 0.2  
ABS. BASOPHILS Latest Ref Range: 0.0 - 0.1 K/UL 0.0 Sodium Latest Ref Range: 136 - 145 mmol/L 135 (L) Potassium Latest Ref Range: 3.5 - 5.5 mmol/L 4.2 Chloride Latest Ref Range: 100 - 108 mmol/L 101 CO2 Latest Ref Range: 21 - 32 mmol/L 24 Anion gap Latest Ref Range: 3.0 - 18 mmol/L 10 Glucose Latest Ref Range: 74 - 99 mg/dL 90 BUN Latest Ref Range: 7.0 - 18 MG/DL 62 (H) Creatinine Latest Ref Range: 0.6 - 1.3 MG/DL 5.75 (H) BUN/Creatinine ratio Latest Ref Range: 12 - 20   11 (L) Calcium Latest Ref Range: 8.5 - 10.1 MG/DL 8.5  
GFR est non-AA Latest Ref Range: >60 ml/min/1.73m2 10 (L) GFR est AA Latest Ref Range: >60 ml/min/1.73m2 12 (L) DIET:  [x] Renal    [] Other     [] NPO     []  Diabetic RO/HEMODIALYSIS MACHINE SAFETY CHECKS  Before each treatment:    
Machine Number:                   [facility] [x] Unit Machine # 9 with centralized RO 
                                [] Portable Machine #1/RO serial # [id number] [] Portable Machine #2/RO serial # [id number] [] Portable Machine #4/RO serial # [id number] [address] [facility] 
                                [] Portable Machine #11/RO serial # [id number] [] Portable Machine #12/RO serial # [id number] [] Portable Machine #13/RO serial #  [id number] Alarm Test:  Pass time 4058       Other:        
[x]RO/Machine Log Complete Temp    37.3 Dialysate: pH  7.4 Conductivity: Meter   13.8     HD Machine [x][x][x][x][x][x][x][x][x][x]

## 2018-08-29 NOTE — PROGRESS NOTES
[facility] 
 
 
CARDIOLOGY PROGRESS NOTE 
RECS: 
 
 
1. Hypertension, severe, uncontrolled. improving since HD started. D/c clonidine as has been held off and on x 2 days. NUC stress test today to r/o CAD 2. Congestive heart failure acute on chronic combined systolic and diastolic: feels much better since dialysis is started. volume status per renal consider to Decreased target weight as patient has  BLE edema 3. Diabetes. Treatment per Medicine. 4. Severe obesity. Weight loss has been strongly encouraged by following dietary restrictions and an exercise routine. 5. Acute on chronic renal insufficiency. Calculated GFR is less than 10 now, but his creatinine was 2.9 in January of this year. Management per Nephrology. Dialysis per renal. 
6. Elevated cardiac enzymes. Likely due to renal insufficiency and congestive heart failure. Overall ejection fraction has improved compared to a 3 years ago. Continue Coreg and statin ASSESSMENT: 
Hospital Problems  Date Reviewed: 6/5/2018 Codes Class Noted POA  
 [facility]congest[facility] ICD-10-CM: I50.9 ICD-9-CM: 428.0  8/23/2018 Unknown Respiratory distress ICD-10-CM: R06.03 
ICD-9-CM: 786.09  8/23/2018 Unknown Hypertensive emergency ICD-10-CM: I16.1 ICD-9-CM: 401.9  8/23/2018 Unknown SUBJECTIVE: 
 
No CP 
improvement in SOB/ edema OBJECTIVE: 
 
VS:  
Visit Vitals  /84  Pulse 90  Temp 99.5 °F (37.5 °C)  Resp 18  Ht 5' 7\" (1.702 m)  Wt 108.9 kg (240 lb)  SpO2 96%  BMI 37.59 kg/m2 Intake/Output Summary (Last 24 hours) at 08/29/18 9525 Last data filed at 08/29/18 2829 Gross per 24 hour Intake              360 ml Output             2650 ml Net            -2290 ml TELE: normal sinus rhythm General: alert and in no apparent distress HENT: Normocephalic, atraumatic. Normal external eye. Neck :  no bruit, JVD difficult to assess due to obesity Cardiac:  regular rate and rhythm, S1, S2 normal, no murmur, click, rub or gallop Chest/Lungs:harsh breath sounds noted both lower lobes Abdomen: Soft, nontender, no masses Extremities:  Noted  1+edema BLE.  peripheral pulses absent Labs: Results:  
   
Chemistry Recent Labs  
   08/29/18 
 0529  08/28/18 
 1000  08/27/18 
 3869 GLU  85  140*  109* NA  135*  136  137  
K  4.6  4.4  4.4 CL  101  99*  102 CO2  23  25  19* BUN  58*  78*  96* CREA  5.62*  7.26*  7.74* CA  8.4*  6.9*  8.0*  
MG   --    --   2.4 AGAP  11  12  16 BUCR  10*  11*  12  
  
CBC w/Diff Recent Labs  
   08/27/18 
 0528 WBC  5.6  
RBC  3.68* HGB  9.9*  
HCT  31.4* PLT  209 GRANS  61 LYMPH  19* EOS  4 Cardiac Enzymes No results for input(s): CPK, CKND1, SHRAVAN in the last 72 hours. No lab exists for component: Vernelle Pilar Coagulation Recent Labs  
   08/27/18 
 1045 PTP  13.5 INR  1.1 APTT  25.8 Lipid Panel Lab Results Component Value Date/Time Cholesterol, total 155 08/23/2018 03:50 PM  
 HDL Cholesterol 72 (H) 08/23/2018 03:50 PM  
 LDL, calculated 71 08/23/2018 03:50 PM  
 VLDL, calculated 12 08/23/2018 03:50 PM  
 Triglyceride 60 08/23/2018 03:50 PM  
 CHOL/HDL Ratio 2.2 08/23/2018 03:50 PM  
  
BNP No results for input(s): BNPP in the last 72 hours. Liver Enzymes No results for input(s): TP, ALB, TBIL, AP, SGOT, GPT in the last 72 hours. No lab exists for component: DBIL Digoxin Thyroid Studies Lab Results Component Value Date/Time TSH 1.17 08/23/2018 03:50 PM  
    
 
 
 
Roselia Sylvester, NP-C Patient seen independently Discussed the details with NP and patient. Please see orders & recommendations Sarah Camejo MD

## 2018-08-30 LAB
GLUCOSE BLD STRIP.AUTO-MCNC: 110 MG/DL (ref 70–110)
GLUCOSE BLD STRIP.AUTO-MCNC: 136 MG/DL (ref 70–110)
GLUCOSE BLD STRIP.AUTO-MCNC: 92 MG/DL (ref 70–110)
GLUCOSE BLD STRIP.AUTO-MCNC: 98 MG/DL (ref 70–110)

## 2018-08-30 PROCEDURE — 74011250637 HC RX REV CODE- 250/637: Performed by: INTERNAL MEDICINE

## 2018-08-30 PROCEDURE — 74011250637 HC RX REV CODE- 250/637: Performed by: FAMILY MEDICINE

## 2018-08-30 PROCEDURE — 65660000004 HC RM CVT STEPDOWN

## 2018-08-30 PROCEDURE — 82962 GLUCOSE BLOOD TEST: CPT

## 2018-08-30 PROCEDURE — 74011250637 HC RX REV CODE- 250/637: Performed by: NURSE PRACTITIONER

## 2018-08-30 PROCEDURE — 74011250637 HC RX REV CODE- 250/637: Performed by: EMERGENCY MEDICINE

## 2018-08-30 PROCEDURE — 74011250636 HC RX REV CODE- 250/636: Performed by: INTERNAL MEDICINE

## 2018-08-30 PROCEDURE — 97166 OT EVAL MOD COMPLEX 45 MIN: CPT

## 2018-08-30 RX ORDER — HYDRALAZINE HYDROCHLORIDE 20 MG/ML
10 INJECTION INTRAMUSCULAR; INTRAVENOUS
Status: DISCONTINUED | OUTPATIENT
Start: 2018-08-30 | End: 2018-08-31 | Stop reason: HOSPADM

## 2018-08-30 RX ORDER — HYDRALAZINE HYDROCHLORIDE 50 MG/1
50 TABLET, FILM COATED ORAL EVERY 8 HOURS
Status: DISCONTINUED | OUTPATIENT
Start: 2018-08-30 | End: 2018-08-30

## 2018-08-30 RX ORDER — HYDRALAZINE HYDROCHLORIDE 50 MG/1
50 TABLET, FILM COATED ORAL EVERY 8 HOURS
Status: DISCONTINUED | OUTPATIENT
Start: 2018-08-30 | End: 2018-08-31 | Stop reason: HOSPADM

## 2018-08-30 RX ADMIN — HEPARIN SODIUM 5000 UNITS: 5000 INJECTION, SOLUTION INTRAVENOUS; SUBCUTANEOUS at 22:10

## 2018-08-30 RX ADMIN — CALCIUM ACETATE 667 MG: 667 CAPSULE ORAL at 09:17

## 2018-08-30 RX ADMIN — Medication 1 CAPSULE: at 09:17

## 2018-08-30 RX ADMIN — HYDRALAZINE HYDROCHLORIDE 25 MG: 25 TABLET, FILM COATED ORAL at 05:35

## 2018-08-30 RX ADMIN — HEPARIN SODIUM 5000 UNITS: 5000 INJECTION, SOLUTION INTRAVENOUS; SUBCUTANEOUS at 09:17

## 2018-08-30 RX ADMIN — CARVEDILOL 12.5 MG: 12.5 TABLET, FILM COATED ORAL at 21:53

## 2018-08-30 RX ADMIN — PRAVASTATIN SODIUM 20 MG: 20 TABLET ORAL at 21:53

## 2018-08-30 RX ADMIN — HYDRALAZINE HYDROCHLORIDE 50 MG: 50 TABLET, FILM COATED ORAL at 21:53

## 2018-08-30 RX ADMIN — Medication 100 MG: at 09:18

## 2018-08-30 RX ADMIN — CALCIUM ACETATE 667 MG: 667 CAPSULE ORAL at 12:08

## 2018-08-30 RX ADMIN — Medication 10 ML: at 12:11

## 2018-08-30 RX ADMIN — HEPARIN SODIUM 5000 UNITS: 5000 INJECTION, SOLUTION INTRAVENOUS; SUBCUTANEOUS at 16:01

## 2018-08-30 RX ADMIN — HYDRALAZINE HYDROCHLORIDE 50 MG: 50 TABLET, FILM COATED ORAL at 12:08

## 2018-08-30 RX ADMIN — TAMSULOSIN HYDROCHLORIDE 0.4 MG: 0.4 CAPSULE ORAL at 21:53

## 2018-08-30 RX ADMIN — CARVEDILOL 12.5 MG: 12.5 TABLET, FILM COATED ORAL at 09:17

## 2018-08-30 RX ADMIN — CALCIUM ACETATE 667 MG: 667 CAPSULE ORAL at 17:56

## 2018-08-30 RX ADMIN — ASPIRIN 81 MG CHEWABLE TABLET 81 MG: 81 TABLET CHEWABLE at 09:18

## 2018-08-30 RX ADMIN — Medication 10 ML: at 05:36

## 2018-08-30 RX ADMIN — Medication 10 ML: at 21:54

## 2018-08-30 NOTE — PROGRESS NOTES
RENAL DAILY PROGRESS NOTE Patient: Sampson Macdonald               Sex: male          DOA: 8/23/2018  7:43 AM  
    
YOB: 1960      Age:  63 y.o.        LOS:  LOS: 7 days Subjective:  
 
Sampson Macdonald is a 63 y.o.  who presents with Respiratory distress CHF (congestive heart failure) ([facility] Hypertensive emergency. Asked to evaluate for renal failure. hx of crf stage 4,htn,chf.admitted with resp distress,htn. Chief complains: Patient denies nausea, vomiting, chest pain, dizziness, shortness of breath or headache. 
- Reviewed last 24 hrs events Current Facility-Administered Medications Medication Dose Route Frequency  hydrALAZINE (APRESOLINE) tablet 50 mg  50 mg Oral Q8H  
 epoetin sheila (EPOGEN;PROCRIT) 5,000 Units  5,000 Units IntraVENous DIALYSIS MON, WED & FRI  
 doxercalciferol (HECTOROL) 4 mcg/2 mL injection 2 mcg  2 mcg IntraVENous DIALYSIS MON, WED & FRI  B complex-vitaminC-folic acid (NEPHROCAP) cap  1 Cap Oral DAILY  carvedilol (COREG) tablet 12.5 mg  12.5 mg Oral Q12H  
 heparin (porcine) 100 unit/mL injection 300 Units  300 Units InterCATHeter PRN  
 HYDROcodone-acetaminophen (NORCO) 5-325 mg per tablet 1-2 Tab  1-2 Tab Oral Q4H PRN  
 albumin human 25% (BUMINATE) solution 25 g  25 g IntraVENous DIALYSIS PRN  
 tamsulosin (FLOMAX) capsule 0.4 mg  0.4 mg Oral QHS  calcium acetate (PHOSLO) capsule 667 mg  1 Cap Oral TID WITH MEALS  
 albuterol-ipratropium (DUO-NEB) 2.5 MG-0.5 MG/3 ML  3 mL Nebulization Q4H PRN  
 sodium chloride (NS) flush 5-10 mL  5-10 mL IntraVENous PRN  
 aspirin chewable tablet 81 mg  81 mg Oral DAILY  pravastatin (PRAVACHOL) tablet 20 mg  20 mg Oral QHS  sodium chloride (NS) flush 5-10 mL  5-10 mL IntraVENous Q8H  
 LORazepam (ATIVAN) tablet 1 mg  1 mg Oral Q1H PRN Or  
 LORazepam (ATIVAN) injection 1 mg  1 mg IntraVENous Q1H PRN  
 LORazepam (ATIVAN) tablet 2 mg  2 mg Oral Q1H PRN  Or  
  LORazepam (ATIVAN) injection 2 mg  2 mg IntraVENous Q1H PRN  
 LORazepam (ATIVAN) injection 3 mg  3 mg IntraVENous Q15MIN PRN  
 acetaminophen (TYLENOL) tablet 650 mg  650 mg Oral Q6H PRN  
 heparin (porcine) injection 5,000 Units  5,000 Units SubCUTAneous Q8H  
 insulin lispro (HUMALOG) injection   SubCUTAneous AC&HS  
 glucose chewable tablet 16 g  4 Tab Oral PRN  
 glucagon (GLUCAGEN) injection 1 mg  1 mg IntraMUSCular PRN  
 dextrose (D50W) injection syrg 12.5-25 g  25-50 mL IntraVENous PRN  thiamine HCL (B-1) tablet 100 mg  100 mg Oral DAILY Objective:  
 
Visit Vitals  BP (!) 165/109 (BP 1 Location: Right arm, BP Patient Position: At rest)  Pulse 80  Temp 98.3 °F (36.8 °C)  Resp 18  Ht 5' 7\" (1.702 m)  Wt 111.2 kg (245 lb 2.4 oz)  SpO2 97%  BMI 38.4 kg/m2 Intake/Output Summary (Last 24 hours) at 08/30/18 1137 Last data filed at 08/30/18 9133 Gross per 24 hour Intake             1080 ml Output             3450 ml Net            -2370 ml Physical Examination:  
 
GEN: AAO X 3, NAD 
RS: Chest is bilateral equal, no wheezing / rales / crackles CVS: S1-S2 heard, RRR, No S3 / murmur Abdomen: Soft, Non tender, Not distended, Positive bowel sounds, no organomegaly, no CVA / supra pubic tenderness Extremities: + edema, no cyanosis, skin is warm on touch CNS: Awake & follows commands, CN II-XII are grossly intact. HEENT: Head is atraumatic, PERRLA, conjunctiva pink & non icteric. No JVD or carotid bruit Psychiatric: Normal mood, affect, judgement & memory Musculoskeletal: No gross joints or bone deformities Lymph Node: No palpable cervical, axillary or groin lymphadenopathy. Data Review:   
 
Labs:  
 
Hematology:  
Recent Labs  
   08/29/18 
 1030 WBC  4.9 HGB  8.9* HCT  28.3* Chemistry:  
Recent Labs  
   08/29/18 
 1030  08/29/18 
 0529  08/28/18 
 1000 BUN  62*  58*  78* CREA  5.75*  5.62*  7.26* CA  8.5  8.4*  6.9*  
 K  4.2  4.6  4.4 NA  135*  135*  136 CL  101  101  99* CO2  24  23  25 GLU  90  85  140* Images: 
 
XR (Most Recent). CXR reviewed by me and compared with previous CXR Results from [facility] Encounter encounter on 08/23/18 XR CHEST PORT Narrative EXAM: CHEST ONE VIEW portable 0235 hours CLINICAL HISTORY/INDICATION: follow CHF , acute on chronic kidney disease stage IV, cardiorenal syndrome, hypertensive urgency COMPARISON: Chest x-ray August 23, 2018, September 18, 2017. TECHNIQUE: Single view obtained. FINDINGS:  
 
The cardiac silhouette is mildly enlarged. There is tortuosity of the aorta with 
calcified plaque. The lungs are adequately inflated. Pulmonary vascularity is 
cephalized and slightly ill-defined. Marked interval decrease in the previously 
demonstrated perihilar central alveolar and interstitial infiltrates. The 
costophrenic angles are sharply defined. No bony abnormalities are seen. Impression IMPRESSION: 
 
Resolving acute heart failure. CT (Most Recent) Results from [facility] Encounter encounter on 04/24/16 CT ABD PELV WO CONT Narrative CT abdomen and pelvis CPT code: 37392 and 36510. INDICATION: Left upper abdominal pain. Left scrotal swelling TECHNIQUE: 5 mm collimation axial images obtained from the diaphragm to the 
level of the pubic symphysis without oral or nonionic intravenous contrast. 
 
COMPARISON: None Abdomen Findings:  
Heart size is top normal, without pericardial effusion. There is small amount of 
patchy peripheral airspace opacity at the right costophrenic angle, unclear if 
this could be inflammatory or atelectasis. No pleural effusion. Lack of intravenous contrast renders this study suboptimal for evaluating solid 
abdominal organs. Given this, liver appears normal size. Gallbladder not 
distended. No calcified intraluminal stones. Spleen normal size.  No adrenal 
 mass. Pancreas poorly  from adjacent unopacified small bowel and colon 
for evaluation. Normal caliber abdominal aorta. Symmetric renal size. No 
hydronephrosis. No renal stones. No free fluid in the upper abdomen. Pelvis Findings: There is a left-sided inguinal hernia containing a loop of small bowel which 
appears to be ileum. In the hemiscrotum there is diffuse wall thickening of this 
loop of bowel measuring up to 7 mm diameter. Upon its exit proximal and distal 
there is wall thickening and edema, with some internal fecal material on image 60. This appears to be the proximal loop of bowel. The distal terminal ileum 
demonstrates wall thickening on images 53 through 46 to the level of the cecum. No pneumatosis identified. Cannot well evaluate for wall edema or viable 
enhancement given lack of intravenous contrast. There is a sizable left-sided 
hydrocele. Bladder under distended and not optimally evaluated. Amorphous calcifications 
within nonenlarged prostate. Dense calcification suggested within nonenlarged 
appendix on image 51. Impression IMPRESSION: 
1. Left-sided inguinal hernia containing a length of ileum which demonstrates 
diffuse wall thickening/edema and some proximal distention with internal fecal 
material proximal to the hernia. Distal ileum to terminal ileum demonstrates 
mild diffuse wall thickening/edema as well. - Concern for mechanical obstruction given the bowel wall thickening. Findings discussed with Dr. Ora Cheatham at 1520 hours. 2. Left-sided scrotal hydrocele is likely reactive. 3. No more proximal bowel or gastric distention. No colon distention. EKG Results for orders placed or performed in visit on 04/11/14 AMB POC EKG ROUTINE W/ 12 LEADS, INTER & REP     Status: None Narrative Sinus bradycardia rate 59. Left atrial enlargement. There is a 
borderline complete left bundle branch block. Left ventricular hypertrophy by precordial voltage criteria with some asymmetric T-wave inversions anterolaterally in the high lateral leads 
consistent with LV strain. I have personally reviewed the old medical records and patient's previously known baseline  creatinine Plan / Recommendation: 1. Acute/crf stage 4.reached esrd. discussed with patient and sister. made arrangements for outpatient dialysis at [facility] w f 11 30 am 
2.has significant edema,remove fluids  during dialysis as tolerated 3.htn,adjust meds 4.hypocalcemia,hyperphosphatemia,sec hyperparathyroidism. continue phoslo ,hectorol 5.anemia,give epo with dialysis D/w Dr. Linden Jimenez MD 
Nephrology 8/30/2018

## 2018-08-30 NOTE — PROGRESS NOTES
Problem: Mobility Impaired (Adult and Pediatric) Goal: *Acute Goals and Plan of Care (Insert Text) Physical Therapy Goals Initiated 8/27/2018 and to be accomplished within 7 day(s) 1. Patient will move from supine to sit and sit to supine , scoot up and down and roll side to side in bed with minimal assistance assist.    
2.  Patient will transfer from bed to chair and chair to bed with minimal assistance assist using the least restrictive device. 3.  Patient will perform sit to stand with minimal assistance. 4.  Patient will ambulate with minimal assistance for 10 feet with the least restrictive device. 1117 - Pt's BP just taken by CNA and found to be 165/109, nurse notified. Will hold PT tx and f/u as appropriate.

## 2018-08-30 NOTE — PROGRESS NOTES
NUTRITION Nutrition Consult: General Nutrition Management & Supplements RECOMMENDATIONS / PLAN:  
 
- Continue renal, diabetic diet. - Continue RD inpatient monitoring and evaluation. NUTRITION INTERVENTIONS & DIAGNOSIS:  
 
[x] Meals/snacks: modified composition 
[x] Nutrition Education: renal diet education 8/30 Nutrition Diagnosis: Altered nutrition related laboratory values related to decreased renal function and excessive dietary intake as evidenced by pt with hyperphosphatemia. -Resolved ASSESSMENT:  
 
8/30: Tolerating diet. Meal intake is good. Pt reviewed educational handouts provided at last visit. Reports understanding the diet. Discussed key points, protein, sodium, potassium, and phosphorus with pt.    
8/28: Attempted to follow up with patient to assess need for diet education. Pt off unit for dialysis. Nutrition education handouts left in room with contact information. 8/27: Out of room at time of visit for [facility] placement to start dialysis. Good meal intake prior to NPO for line placement. Phos binder started. Per nurse, pt asking questions about diet, will follow up for possible diet education at later time. 8/25: Pt with CKD stage IV. Good meal intake of diabetic diet, noted elevated phosphorus. Nephrology following stating pt will probably need dialysis. BG levels controlled. Briefly discussed reasoning for renal diet. Average po intake adequate to meet patients estimated nutritional needs:   [x] Yes      [] No   [] Unable to determine at this time Diet: DIET RENAL WITH OPTIONS 70-70-70 (House); Regular; Consistent Carb 2000kcal     
Food Allergies: NKFA Current Appetite:   [x] Good     [] Fair     [] Poor     [] Other: 
Appetite/meal intake prior to admission:   [x] Good     [] Fair     [] Poor     [] Other: 
Feeding Limitations:  [] Swallowing difficulty    [] Chewing difficulty    [] Other: 
Current Meal Intake: Patient Vitals for the past 100 hrs: % Diet Eaten 08/30/18 1200 100 % 08/29/18 1742 100 % 08/29/18 1400 100 % 08/28/18 0857 100 % BM: 8/28 Skin Integrity: WDL Edema:   [] No     [x] Yes Pertinent Medications: Reviewed: SSI, nephrocap, thiamine, phoslo, rocaltrol Recent Labs  
   08/29/18 
 1030  08/29/18 
 0529  08/28/18 
 1000 NA  135*  135*  136  
K  4.2  4.6  4.4  
CL  101  101  99* CO2  24  23  25 GLU  90  85  140* BUN  62*  58*  78* CREA  5.75*  5.62*  7.26* CA  8.5  8.4*  6.9* Intake/Output Summary (Last 24 hours) at 08/30/18 1225 Last data filed at 08/30/18 1209 Gross per 24 hour Intake             1320 ml Output             3700 ml Net            -2380 ml Anthropometrics: 
Ht Readings from Last 1 Encounters:  
08/29/18 5' 7\" (1.702 m) Last 3 Recorded Weights in this Encounter 08/29/18 8280 08/29/18 1029 08/30/18 2627 Weight: 108.9 kg (240 lb) 108.9 kg (240 lb) 111.2 kg (245 lb 2.4 oz) Body mass index is 38.4 kg/(m^2). Obese Class III Weight History: Pt reports weight gain PTA Weight Metrics 8/30/2018 6/5/2018 4/9/2018 4/6/2018 3/12/2018 3/3/2018 1/24/2018 Weight 245 lb 2.4 oz 256 lb 12.8 oz 255 lb 260 lb 240 lb 220 lb 241 lb BMI 38.4 kg/m2 40.22 kg/m2 39.94 kg/m2 40.72 kg/m2 37.59 kg/m2 34.46 kg/m2 35.59 kg/m2 Admitting Diagnosis: Respiratory distress CHF (congestive heart failure) ([facility] Hypertensive emergency Pertinent PMHx: CKD, CHF, HTN, hypercholesterolemia Education Needs:        [] None identified: possible need identified  [] Identified - Not appropriate at this time  [x]  Identified and addressed - refer to education log Learning Limitations:   [x] None identified  [] Identified Cultural, Latter day & ethnic food preferences:  [x] None identified    [] Identified and addressed ESTIMATED NUTRITION NEEDS:  
 
Calories: 5337-3157 kcal (MSJx1.2-1.3) based on  [x] Actual BW: 117 kg      [] IBW Protein:  gm (0.8-1 gm/kg) based on  [x] Actual BW      [] IBW Fluid: 1 mL/kcal 
  
MONITORING & EVALUATION:  
 
Nutrition Goal(s): 1. Po intake of meals will meet >75% of patient estimated nutritional needs within the next 7 days. Outcome:  [x] Met/Ongoing    []  Not Met    [] New/Initial Goal  
2. Patient will increase knowledge of appropriate food choices on a renal  diet within 7 days. Outcome:  [x] Met    []  Not Met    [] New/Initial Goal  
 
Monitoring:   [x] Food and beverage intake   [x] Diet order   [x] Nutrition-focused physical findings   [x] Treatment/therapy   [] Weight   [] Enteral nutrition intake Previous Recommendations (for follow-up assessments only):     [x]   Implemented       []   Not Implemented (RD to address)      [] No Longer Appropriate     [] No Recommendation Made Discharge Planning: diabetic diet [x] Participated in care planning, discharge planning, & interdisciplinary rounds as appropriate Tamara Mello RD, [facility] Pager: [phone]

## 2018-08-30 NOTE — PROGRESS NOTES
Notified Dr. Nevin Kitchen of elevated BP of : 165/109. RN request to restart PTA meds. Will continue to monitor

## 2018-08-30 NOTE — PROGRESS NOTES
[facility] Hospitalist Group Progress Note Patient: Luis Lora Age: 63 y.o. : 1960 MR#: [id number] SSN: [id number] Date/Time: 2018 3:48 PM 
 
Subjective/24-hour events:  
 
No chest pain or SOB. BPs running a bit high at times. Assessment:  
CKD 4 now ESRD Acute hypoxic and hypercapnic respiratory failure, resolved Acute on chronic mixed CHF 
HTN with resolved hypertensive emergency present on admission Hypocalcemia and hyperphosphatemia Anemia of chronic renal disease Obesity, BMI 34.1 Plan: 
Continue hemodialysis - management per nephrology. Monitor BPs. Continue current oral regimen, PRN agent ordered with parameters. Disposition once volume status optimized and outpatient HD arranged. Case discussed with:  [x]Patient  []Family  [x]Nursing  []Case Management DVT Prophylaxis:  []Lovenox  [x]Hep SQ  []SCDs  []Coumadin   []On Heparin gtt Objective:  
VS:  
Visit Vitals  BP (!) 165/109 (BP 1 Location: Right arm, BP Patient Position: At rest) Comment: Nurse danielle aware and notified  Pulse 80  Temp 98.3 °F (36.8 °C)  Resp 18  Ht 5' 7\" (1.702 m)  Wt 111.2 kg (245 lb 2.4 oz)  SpO2 97%  BMI 38.4 kg/m2 Tmax/24hrs: Temp (24hrs), Av.6 °F (37 °C), Min:98 °F (36.7 °C), Max:99.1 °F (37.3 °C) Intake/Output Summary (Last 24 hours) at 18 1548 Last data filed at 18 1209 Gross per 24 hour Intake             1080 ml Output              700 ml Net              380 ml General:  In NAD. Nontoxic-appearing. Cardiovascular:  RRR. Pulmonary:  No wheezes. GI:  Abdomen soft, NTTP. Extremities:  Generalized edema. No ischemia. Neuro:  Awake and alert, moves extremities spontaneously. Labs:   
Recent Results (from the past 24 hour(s)) GLUCOSE, POC Collection Time: 18  4:03 PM  
Result Value Ref Range Glucose (POC) 167 (H) 70 - 110 mg/dL GLUCOSE, POC  
 Collection Time: 08/29/18  9:20 PM  
Result Value Ref Range Glucose (POC) 99 70 - 110 mg/dL GLUCOSE, POC Collection Time: 08/30/18  8:11 AM  
Result Value Ref Range Glucose (POC) 136 (H) 70 - 110 mg/dL GLUCOSE, POC Collection Time: 08/30/18 11:18 AM  
Result Value Ref Range Glucose (POC) 98 70 - 110 mg/dL Signed By: Elizabeth Miguel MD   
 August 30, 2018 3:48 PM

## 2018-08-30 NOTE — ROUTINE PROCESS
Pt is resting quietly in bed. Right upper chest dry and intact. Pt denies pain. Bedside shift change report given to [address] [place] [address] (oncoming nurse) by Carlita Hurd (offgoing nurse). Report given with SBAR, Kardex, Intake/Output, MAR and Recent Results.

## 2018-08-30 NOTE — PROGRESS NOTES
Problem: Self Care Deficits Care Plan (Adult) Goal: *Acute Goals and Plan of Care (Insert Text) Outcome: Resolved/Met Date Met: 08/30/18 Occupational Therapy EVALUATION/discharge Patient: Flory Moore (51 y.o. male) Date: 8/30/2018 Primary Diagnosis: Respiratory distress CHF (congestive heart failure) ([facility] [address] Hypertensive emergency Precautions:   Aspiration, Fall ASSESSMENT AND RECOMMENDATIONS: 
Based on the objective data described below, the patient presents with s/p acute respiratory failure and CHF with CKD4 now ESRD with dialysis beginning. Pt states he is feeling much better now and has family support as well as brother who is willing for patient to move into him. Pt sitting edge of bed eating breakfast MI. Pt performed doffing/donning sock MI. Pt BUE strength and ROM assessed with WFL findings. Pt performed functional ambulation to toilet without AD and MI. Pt does demonstrate BLE edema impacting functional ambulation. Pt performed toilet transfer MI. Pt educated on tub seat in shower for energy conservation and safety as well as seeking assistance for high level IADLs from family such as grocery shopping and meal prep when returning home. Skilled occupational therapy is not indicated at this time. Discharge Recommendations: None Further Equipment Recommendations for Discharge: shower chair Barriers to Learning/Limitations: None Compensate with: visual, verbal, tactile, kinesthetic cues/model COMPLEXITY Eval Complexity: History: MEDIUM Complexity : Expanded review of history including physical, cognitive and psychosocial  history ; Examination: LOW Complexity : 1-3 performance deficits relating to physical, cognitive , or psychosocial skils that result in activity limitations and / or participation restrictions ; Decision Making:MEDIUM Complexity : Patient may present with comorbidities that affect occupational performnce.  Roly Dailey to moderate modification of tasks or assistance (eg, physical or verbal ) with assesment(s) is necessary to enable patient to complete evaluation  Assessment: low Complexity G-CODES:  
 
Self Care  Current  CI= 1-19%  Goal  CI= 1-19%  D/C  CI= 1-19%. The severity rating is based on the Level of Assistance required for Functional Mobility and ADLs. SUBJECTIVE:  
Patient stated I feel much better.  OBJECTIVE DATA SUMMARY:  
 
Past Medical History:  
Diagnosis Date  Arthritis of left knee 09/2017  
 moderate to severe, with effusion on xray  Chronic kidney disease  Diastolic CHF ([address]  Dilated cardiomyopathy ([facility] [address]  History of alcohol abuse  History of echocardiogram 02/24/2014 Mod-marked LVE. EF 15%. Severe, diffuse hypk. Mild LVH. Gr 1 DDfx. Mod LAE. Mild-mod FIDELIA. Mild AoRE. No significant change from echo of 10/23/09.  History of gout  History of myocardial perfusion scan 05/25/2006 No evidence of ischemia or scarring. Gross LVE. EF 28%. Severe global hypk. Neg EKG on submaximal EST. Ex time 8 min 25 sec.  HTN (hypertension)  Hypercholesterolemia  Hypertensive cardiovascular disease Past Surgical History:  
Procedure Laterality Date  HX HERNIA REPAIR  04/2016 Prior Level of Function/Home Situation: I with self care, working (pt applying for disability currently), IADLs (was eating fast food), and I in ambulation Home Situation Home Environment: Apartment # Steps to Enter: 20 
Rails to Enter: Yes Hand Rails : Right One/Two Story Residence: One story Living Alone: Yes Support Systems: Family member(s) Patient Expects to be Discharged to[de-identified] Rehabilitation facility Current DME Used/Available at Home: None Tub or Shower Type: Tub/Shower combination 
[x]     Right hand dominant   []     Left hand dominant Cognitive/Behavioral Status: 
Neurologic State: Alert Orientation Level: Oriented X4 
 Cognition: Appropriate for age attention/concentration; Follows commands Safety/Judgement: Home safety; Fall prevention Skin: intact BUEs Edema: none noted BUEs Coordination: 
Fine Motor Skills-Upper: Left Intact; Right Intact Gross Motor Skills-Upper: Left Intact; Right Intact Balance: 
Sitting - Static: Good (unsupported) Sitting - Dynamic: Fair (occasional) Standing: Impaired; Without support Strength: 
Strength: Within functional limits (BUEs) Tone & Sensation: 
Tone: Normal (BUEs) Sensation: Intact (BUEs) Range of Motion: 
AROM: Within functional limits (BUEs) Functional Mobility and Transfers for ADLs: 
Bed Mobility: 
Supine to Sit: Modified independent Transfers: 
Sit to Stand: Modified independent Toilet Transfer : Modified independent ADL Assessment: 
Feeding: Independent Oral Facial Hygiene/Grooming: Modified Independent Bathing: Supervision Upper Body Dressing: Modified independent Lower Body Dressing: Modified independent Toileting: Modified independent ADL Intervention: 
 Cognitive Retraining Safety/Judgement: Home safety; Fall prevention Pain: 
Pt reports 0/10 pain or discomfort prior to treatment.   
Pt reports 0/10 pain or discomfort post treatment. Activity Tolerance:  
good Please refer to the flowsheet for vital signs taken during this treatment. After treatment:  
[x]  Patient left in no apparent distress sitting up at edge of bed 
[]  Patient left in no apparent distress in bed 
[x]  Call bell left within reach 
[]  Nursing notified 
[]  Caregiver present 
[]  Bed alarm activated COMMUNICATION/EDUCATION:  
Communication/Collaboration: 
[x]      Home safety education was provided and the patient/caregiver indicated understanding. [x]      Patient/family have participated as able and agree with findings and recommendations. []      Patient is unable to participate in plan of care at this time. Gilbert Blevins OT Time Calculation: 19 mins

## 2018-08-30 NOTE — PROGRESS NOTES
[facility] 
 
 
CARDIOLOGY PROGRESS NOTE 
RECS: 
 
 
1. Hypertension, severe, uncontrolled. improving since HD started- slightly elevated today increased hydralazine. NUC stress test showing moderate fixed defect non reversible- patient denies anginal symptoms will continue with medical management for CAD asa,bb,statin 2. Congestive heart failure acute on chronic combined systolic and diastolic: feels much better since dialysis is started. volume status per renal.  
3. Diabetes. Treatment per Medicine. 4. Severe obesity. Weight loss has been strongly encouraged by following dietary restrictions and an exercise routine. 5. Acute on chronic renal insufficiency. Calculated GFR is less than 10 now, but his creatinine was 2.9 in January of this year. Management per Nephrology. Dialysis per renal. 
6. Elevated cardiac enzymes. Likely due to renal insufficiency and congestive heart failure. Overall ejection fraction has improved compared to a 3 years ago. Continue Coreg and statin Hopefully discharge soon. NUC stress test 8/29/18 · Baseline ECG: Normal sinus rhythm, occasional PVCs, non-specific ST-T wave abnormalitiesLVH with QRS widening, T wave abnormality. · Gated SPECT: Left ventricular function post-stress was abnormal. Calculated ejection fraction is 35%. · Visually estimated ejection fraction appears better than calculated. · Inconclusive stress electrocardiogram. 
· Left ventricular perfusion is abnormal. 
· Myocardial perfusion imaging defect 1: There is a defect that is moderate in size with a moderate reduction in uptake present in the basal inferior location(s) that is non-reversible. There is abnormal wall motion in the defect area. Viability in the area is poor. The defect appears to be infarction. Perfusion defect was visually present without quantitative evidence. Echo 08/18 · Left ventricular low normal systolic function.  Estimated left ventricular ejection fraction is 51 - 55%. Biplane method used to measure ejection fraction. Left ventricular severe concentric hypertrophy. Left ventricular global hypokinesis. · Mild aortic valve regurgitation is present. · LV strain shows relative apical sparing with reduced global strain at discharge -9.1% · Aortic root and ascending aorta measures 4.1 cm. · Pulmonary arterial systolic pressure is 26 mmHg. There is no evidence of pulmonary hypertension. · Trivial pericardial effusion. · Left atrial cavity size is mildly dilated. · Mitral annular calcification. Trace mitral valve regurgitation. ASSESSMENT: 
Hospital Problems  Date Reviewed: 6/5/2018 Codes Class Noted POA  
 CHF (congestive heart failure) ([facility] [address] ICD-10-CM: I50.9 ICD-9-CM: 428.0  8/23/2018 Unknown Respiratory distress ICD-10-CM: R06.03 
ICD-9-CM: 786.09  8/23/2018 Unknown Hypertensive emergency ICD-10-CM: I16.1 ICD-9-CM: 401.9  8/23/2018 Unknown SUBJECTIVE: 
 
No CP Significant improvement in SOB/ edema OBJECTIVE: 
 
VS:  
Visit Vitals  BP (!) 157/95 (BP 1 Location: Right arm, BP Patient Position: At rest)  Pulse 85  Temp 98 °F (36.7 °C)  Resp 18  Ht 5' 7\" (1.702 m)  Wt 111.2 kg (245 lb 2.4 oz)  SpO2 96%  BMI 38.4 kg/m2 Intake/Output Summary (Last 24 hours) at 08/30/18 0900 Last data filed at 08/30/18 4394 Gross per 24 hour Intake             1080 ml Output             3450 ml Net            -2370 ml TELE: normal sinus rhythm General: alert and in no apparent distress HENT: Normocephalic, atraumatic. Normal external eye. Neck :  no bruit, JVD difficult to assess due to obesity Cardiac:  regular rate and rhythm, S1, S2 normal, no murmur, click, rub or gallop Chest/Lungs: Clear bilaterally without any rales or rhonchi. Abdomen: Soft, nontender, no masses Extremities:  Noted  1+edema BLE.  peripheral pulses absent Labs: Results: Chemistry Recent Labs  
   08/29/18 
 1030  08/29/18 
 0529  08/28/18 
 1000 GLU  90  85  140* NA  135*  135*  136  
K  4.2  4.6  4.4  
CL  101  101  99* CO2  24  23  25 BUN  62*  58*  78* CREA  5.75*  5.62*  7.26* CA  8.5  8.4*  6.9* AGAP  10  11  12 BUCR  11*  10*  11* CBC w/Diff Recent Labs  
   08/29/18 
 1030 WBC  4.9  
RBC  3.33* HGB  8.9* HCT  28.3*  
PLT  169 GRANS  58 LYMPH  19* EOS  4 Cardiac Enzymes No results for input(s): CPK, CKND1, SHRAVAN in the last 72 hours. No lab exists for component: Duane Maroon Coagulation Recent Labs  
   08/27/18 
 1045 PTP  13.5 INR  1.1 APTT  25.8 Lipid Panel Lab Results Component Value Date/Time Cholesterol, total 155 08/23/2018 03:50 PM  
 HDL Cholesterol 72 (H) 08/23/2018 03:50 PM  
 LDL, calculated 71 08/23/2018 03:50 PM  
 VLDL, calculated 12 08/23/2018 03:50 PM  
 Triglyceride 60 08/23/2018 03:50 PM  
 CHOL/HDL Ratio 2.2 08/23/2018 03:50 PM  
  
BNP No results for input(s): BNPP in the last 72 hours. Liver Enzymes No results for input(s): TP, ALB, TBIL, AP, SGOT, GPT in the last 72 hours. No lab exists for component: DBIL Digoxin Thyroid Studies Lab Results Component Value Date/Time TSH 1.17 08/23/2018 03:50 PM  
    
 
 
 
Roselia Dixon NP-C Patient seen independently Discussed the details with NP and patient. Please see orders & recommendations Citlali Frausto MD

## 2018-08-30 NOTE — PROGRESS NOTES
Bedside and Verbal shift change report given to Akua Rivas RN (oncoming nurse) by Dave Pham RN (offgoing nurse). Report included the following information SBAR, Kardex, ED Summary, Intake/Output, MAR, Recent Results and Cardiac Rhythm NSR.

## 2018-08-31 VITALS
TEMPERATURE: 98.9 F | RESPIRATION RATE: 16 BRPM | HEART RATE: 83 BPM | DIASTOLIC BLOOD PRESSURE: 86 MMHG | BODY MASS INDEX: 38.58 KG/M2 | WEIGHT: 245.8 LBS | SYSTOLIC BLOOD PRESSURE: 128 MMHG | HEIGHT: 67 IN | OXYGEN SATURATION: 96 %

## 2018-08-31 LAB
ANION GAP SERPL CALC-SCNC: 12 MMOL/L (ref 3–18)
BASOPHILS # BLD: 0 K/UL (ref 0–0.1)
BASOPHILS NFR BLD: 1 % (ref 0–2)
BUN SERPL-MCNC: 63 MG/DL (ref 7–18)
BUN/CREAT SERPL: 11 (ref 12–20)
CALCIUM SERPL-MCNC: 7.7 MG/DL (ref 8.5–10.1)
CHLORIDE SERPL-SCNC: 99 MMOL/L (ref 100–108)
CO2 SERPL-SCNC: 26 MMOL/L (ref 21–32)
CREAT SERPL-MCNC: 5.97 MG/DL (ref 0.6–1.3)
DIFFERENTIAL METHOD BLD: ABNORMAL
EOSINOPHIL # BLD: 0.2 K/UL (ref 0–0.4)
EOSINOPHIL NFR BLD: 4 % (ref 0–5)
ERYTHROCYTE [DISTWIDTH] IN BLOOD BY AUTOMATED COUNT: 12.7 % (ref 11.6–14.5)
GLUCOSE BLD STRIP.AUTO-MCNC: 110 MG/DL (ref 70–110)
GLUCOSE BLD STRIP.AUTO-MCNC: 129 MG/DL (ref 70–110)
GLUCOSE SERPL-MCNC: 127 MG/DL (ref 74–99)
HCT VFR BLD AUTO: 26.9 % (ref 36–48)
HGB BLD-MCNC: 8.6 G/DL (ref 13–16)
LYMPHOCYTES # BLD: 1.3 K/UL (ref 0.9–3.6)
LYMPHOCYTES NFR BLD: 30 % (ref 21–52)
MCH RBC QN AUTO: 27 PG (ref 24–34)
MCHC RBC AUTO-ENTMCNC: 32 G/DL (ref 31–37)
MCV RBC AUTO: 84.6 FL (ref 74–97)
MONOCYTES # BLD: 0.5 K/UL (ref 0.05–1.2)
MONOCYTES NFR BLD: 12 % (ref 3–10)
NEUTS SEG # BLD: 2.3 K/UL (ref 1.8–8)
NEUTS SEG NFR BLD: 53 % (ref 40–73)
PLATELET # BLD AUTO: 169 K/UL (ref 135–420)
PMV BLD AUTO: 10 FL (ref 9.2–11.8)
POTASSIUM SERPL-SCNC: 4.3 MMOL/L (ref 3.5–5.5)
RBC # BLD AUTO: 3.18 M/UL (ref 4.7–5.5)
SODIUM SERPL-SCNC: 137 MMOL/L (ref 136–145)
WBC # BLD AUTO: 4.3 K/UL (ref 4.6–13.2)

## 2018-08-31 PROCEDURE — 74011250637 HC RX REV CODE- 250/637: Performed by: NURSE PRACTITIONER

## 2018-08-31 PROCEDURE — 74011250637 HC RX REV CODE- 250/637: Performed by: INTERNAL MEDICINE

## 2018-08-31 PROCEDURE — 82962 GLUCOSE BLOOD TEST: CPT

## 2018-08-31 PROCEDURE — 74011250636 HC RX REV CODE- 250/636: Performed by: INTERNAL MEDICINE

## 2018-08-31 PROCEDURE — 90935 HEMODIALYSIS ONE EVALUATION: CPT

## 2018-08-31 PROCEDURE — 74011250637 HC RX REV CODE- 250/637: Performed by: FAMILY MEDICINE

## 2018-08-31 PROCEDURE — 80048 BASIC METABOLIC PNL TOTAL CA: CPT | Performed by: INTERNAL MEDICINE

## 2018-08-31 PROCEDURE — 85025 COMPLETE CBC W/AUTO DIFF WBC: CPT | Performed by: INTERNAL MEDICINE

## 2018-08-31 RX ORDER — CARVEDILOL 12.5 MG/1
12.5 TABLET ORAL EVERY 12 HOURS
Qty: 60 TAB | Refills: 1 | Status: SHIPPED | OUTPATIENT
Start: 2018-08-31 | End: 2018-11-01 | Stop reason: DRUGHIGH

## 2018-08-31 RX ORDER — CALCIUM ACETATE 667 MG/1
1 CAPSULE ORAL
Qty: 90 CAP | Refills: 1 | Status: ON HOLD | OUTPATIENT
Start: 2018-08-31 | End: 2018-11-15

## 2018-08-31 RX ORDER — TAMSULOSIN HYDROCHLORIDE 0.4 MG/1
0.4 CAPSULE ORAL
Qty: 30 CAP | Refills: 1 | Status: ON HOLD | OUTPATIENT
Start: 2018-08-31 | End: 2018-11-15

## 2018-08-31 RX ORDER — LANOLIN ALCOHOL/MO/W.PET/CERES
100 CREAM (GRAM) TOPICAL DAILY
Qty: 30 TAB | Refills: 1 | Status: ON HOLD | OUTPATIENT
Start: 2018-08-31 | End: 2018-11-15

## 2018-08-31 RX ORDER — HYDRALAZINE HYDROCHLORIDE 50 MG/1
50 TABLET, FILM COATED ORAL EVERY 8 HOURS
Qty: 90 TAB | Refills: 1 | Status: ON HOLD | OUTPATIENT
Start: 2018-08-31 | End: 2018-11-15

## 2018-08-31 RX ADMIN — ERYTHROPOIETIN 8000 UNITS: 4000 INJECTION, SOLUTION INTRAVENOUS; SUBCUTANEOUS at 13:48

## 2018-08-31 RX ADMIN — HYDRALAZINE HYDROCHLORIDE 50 MG: 50 TABLET, FILM COATED ORAL at 06:24

## 2018-08-31 RX ADMIN — CALCIUM ACETATE 667 MG: 667 CAPSULE ORAL at 09:21

## 2018-08-31 RX ADMIN — CALCIUM ACETATE 667 MG: 667 CAPSULE ORAL at 15:31

## 2018-08-31 RX ADMIN — ASPIRIN 81 MG CHEWABLE TABLET 81 MG: 81 TABLET CHEWABLE at 09:21

## 2018-08-31 RX ADMIN — DOXERCALCIFEROL 2 MCG: 4 INJECTION, SOLUTION INTRAVENOUS at 13:48

## 2018-08-31 RX ADMIN — CARVEDILOL 12.5 MG: 12.5 TABLET, FILM COATED ORAL at 09:21

## 2018-08-31 RX ADMIN — HEPARIN SODIUM 5000 UNITS: 5000 INJECTION, SOLUTION INTRAVENOUS; SUBCUTANEOUS at 15:32

## 2018-08-31 RX ADMIN — HEPARIN SODIUM 5000 UNITS: 5000 INJECTION, SOLUTION INTRAVENOUS; SUBCUTANEOUS at 06:24

## 2018-08-31 RX ADMIN — Medication 10 ML: at 15:32

## 2018-08-31 RX ADMIN — Medication 10 ML: at 06:24

## 2018-08-31 RX ADMIN — HYDRALAZINE HYDROCHLORIDE 50 MG: 50 TABLET, FILM COATED ORAL at 15:31

## 2018-08-31 RX ADMIN — Medication 1 CAPSULE: at 09:21

## 2018-08-31 NOTE — PROGRESS NOTES
[facility] 
 
 
CARDIOLOGY PROGRESS NOTE 
RECS: 
 
 
1. Hypertension, severe, uncontrolled. improving since HD started- better today. NUC stress test showing moderate fixed defect non reversible- patient denies anginal symptoms will continue with medical management for CAD asa,bb,statin 2. Congestive heart failure acute on chronic combined systolic and diastolic: feels much better since dialysis is started. volume status per renal.  
3. Diabetes. Treatment per Medicine. 4. Severe obesity. Weight loss has been strongly encouraged by following dietary restrictions and an exercise routine. 5. Acute on chronic renal insufficiency. Calculated GFR is less than 10 now, but his creatinine was 2.9 in January of this year. Management per Nephrology. Dialysis per renal. 
6. Elevated cardiac enzymes. Likely due to renal insufficiency and congestive heart failure. Overall ejection fraction has improved compared to a 3 years ago. Continue Coreg and statin No further cardiac workup needed. Stress test with fixed defect, likely from previous MI or soft tissue attenuation. Medical treatment for CHF and possible CAD. Lipid profile is excellent. Little more to add from the [facility] . We will sign off. Should the clinical stituation change or there be any need for further cardiac input please do not hesitate to contact us. Raheem Adams for discharge from cardiac view Follow up in office in 1-2 wks NUC stress test 8/29/18 · Baseline ECG: Normal sinus rhythm, occasional PVCs, non-specific ST-T wave abnormalitiesLVH with QRS widening, T wave abnormality. · Gated SPECT: Left ventricular function post-stress was abnormal. Calculated ejection fraction is 35%. · Visually estimated ejection fraction appears better than calculated.  
· Inconclusive stress electrocardiogram. 
· Left ventricular perfusion is abnormal. 
· Myocardial perfusion imaging defect 1: There is a defect that is moderate in size with a moderate reduction in uptake present in the basal inferior location(s) that is non-reversible. There is abnormal wall motion in the defect area. Viability in the area is poor. The defect appears to be infarction. Perfusion defect was visually present without quantitative evidence. Echo 08/18 · Left ventricular low normal systolic function. Estimated left ventricular ejection fraction is 51 - 55%. Biplane method used to measure ejection fraction. Left ventricular severe concentric hypertrophy. Left ventricular global hypokinesis. · Mild aortic valve regurgitation is present. · LV strain shows relative apical sparing with reduced global strain at discharge -9.1% · Aortic root and ascending aorta measures 4.1 cm. · Pulmonary arterial systolic pressure is 26 mmHg. There is no evidence of pulmonary hypertension. · Trivial pericardial effusion. · Left atrial cavity size is mildly dilated. · Mitral annular calcification. Trace mitral valve regurgitation. ASSESSMENT: 
Hospital Problems  Date Reviewed: 6/5/2018 Codes Class Noted POA  
 CHF (congestive heart failure) ([facility] [address] ICD-10-CM: I50.9 ICD-9-CM: 428.0  8/23/2018 Unknown Respiratory distress ICD-10-CM: R06.03 
ICD-9-CM: 786.09  8/23/2018 Unknown Hypertensive emergency ICD-10-CM: I16.1 ICD-9-CM: 401.9  8/23/2018 Unknown SUBJECTIVE: 
 
No CP Significant improvement in SOB/ edema OBJECTIVE: 
 
VS:  
Visit Vitals  /83  Pulse 83  Temp 99.1 °F (37.3 °C)  Resp 20  
 Ht 5' 7\" (1.702 m)  Wt 111.5 kg (245 lb 12.8 oz)  SpO2 95%  BMI 38.5 kg/m2 Intake/Output Summary (Last 24 hours) at 08/31/18 1033 Last data filed at 08/31/18 1976 Gross per 24 hour Intake             1040 ml Output             1750 ml Net             -710 ml  
 
TELE: normal sinus rhythm General: alert and in no apparent distress HENT: Normocephalic, atraumatic. Normal external eye. Neck :  no bruit, JVD difficult to assess due to obesity Cardiac:  regular rate and rhythm, S1, S2 normal, no murmur, click, rub or gallop Chest/Lungs: Clear bilaterally without any rales or rhonchi. Abdomen: Soft, nontender, no masses Extremities:  Noted  Trace edema BLE.  peripheral pulses absent Labs: Results:  
   
Chemistry Recent Labs  
   08/29/18 
 1030  08/29/18 
 0529 GLU  90  85 NA  135*  135* K  4.2  4.6 CL  101  101 CO2  24  23 BUN  62*  58* CREA  5.75*  5.62* CA  8.5  8.4* AGAP  10  11 BUCR  11*  10* CBC w/Diff Recent Labs  
   08/29/18 
 1030 WBC  4.9  
RBC  3.33* HGB  8.9* HCT  28.3*  
PLT  169 GRANS  58 LYMPH  19* EOS  4 Cardiac Enzymes No results for input(s): CPK, CKND1, SHRAVAN in the last 72 hours. No lab exists for component: Ondina Law Coagulation No results for input(s): PTP, INR, APTT in the last 72 hours. No lab exists for component: INREXT, INREXT Lipid Panel Lab Results Component Value Date/Time Cholesterol, total 155 08/23/2018 03:50 PM  
 HDL Cholesterol 72 (H) 08/23/2018 03:50 PM  
 LDL, calculated 71 08/23/2018 03:50 PM  
 VLDL, calculated 12 08/23/2018 03:50 PM  
 Triglyceride 60 08/23/2018 03:50 PM  
 CHOL/HDL Ratio 2.2 08/23/2018 03:50 PM  
  
BNP No results for input(s): BNPP in the last 72 hours. Liver Enzymes No results for input(s): TP, ALB, TBIL, AP, SGOT, GPT in the last 72 hours. No lab exists for component: DBIL Digoxin Thyroid Studies Lab Results Component Value Date/Time TSH 1.17 08/23/2018 03:50 PM  
    
 
 
 
BRANDEE CorralesC Patient seen independently Discussed the details with NP and patient. Please see orders & recommendations Shabana Eduardo MD

## 2018-08-31 NOTE — PROGRESS NOTES
[facility] notified that pt will be discharged today. No other follow up needed at this time

## 2018-08-31 NOTE — PROGRESS NOTES
Problem: Mobility Impaired (Adult and Pediatric) Goal: *Acute Goals and Plan of Care (Insert Text) Physical Therapy Goals Initiated 8/27/2018 and to be accomplished within 7 day(s) 1. Patient will move from supine to sit and sit to supine , scoot up and down and roll side to side in bed with minimal assistance assist.    
2.  Patient will transfer from bed to chair and chair to bed with minimal assistance assist using the least restrictive device. 3.  Patient will perform sit to stand with minimal assistance. 4.  Patient will ambulate with minimal assistance for 10 feet with the least restrictive device. (1013 and 1050) Pt is off floor for HD. Will f/u at later date.

## 2018-08-31 NOTE — PROGRESS NOTES
conducted a Follow up consultation and Spiritual Assessment for Maribell Lopez, who is a 63 y. o.,male. The  provided the following Interventions: 
Continued the relationship of care and support. Listened empathically as patient stated he is feeling so much better that he is ready to go home once his fluid retention is resolved. Ted Rodas Offered prayer and assurance of continued prayer on patients behalf. Chart reviewed. The following outcomes were achieved: 
Patient expressed gratitude for 's visit. Assessment: 
There are no further spiritual or Yazdanism issues which require Spiritual Care Services interventions at this time. Plan: 
Chaplains will continue to follow and will provide pastoral care on an as needed/requested basis.  recommends bedside caregivers page  on duty if patient shows signs of acute spiritual or emotional distress. Chaplain Resident Eros Pablo Spiritual Care  
[phone]

## 2018-08-31 NOTE — PROGRESS NOTES
Pt has been set up with outpt HD at [facility]-chair time11:15am. First tx pt should be there at 10:45am. Pt has been updated and given a copy of the information.

## 2018-08-31 NOTE — PROGRESS NOTES
RENAL DAILY PROGRESS NOTE 61y M admitted for short of breath, seen for JULIA Subjective:  
 
Complaint:  
Feels better Wants to go home Discussed he has chair at [address] IMPRESSION:  
Acute on chronic kidney injury, likely now progressing to ESRD. CKD 4 HTN/DM nephropathy Acute on chronic systolic heart failure, Anemia HTN, improving Hyperphosphatemia Elevated PTH, on hecterol during HD. PLAN:  
 Plan for HD today , Hb dropping, increase epogen during HD. Okay to discharge from renal stand point, has chair at Discussed with cardiology colleague. At 11:19 AM on 2018, I saw and examined patient during hemodialysis treatment. The patient was receiving hemodialysis for treatment of  renal failure. I have also reviewed vital signs, intake and output, lab results and recent events, and agreed with today's dialysis order. HD rounding Blood pressure 132/83, pulse 83, temperature 99.1 °F (37.3 °C), resp. rate 20, height 5' 7\" (1.702 m), weight 111.5 kg (245 lb 12.8 oz), SpO2 95 %. Temp (24hrs), Av.6 °F (37 °C), Min:98.3 °F (36.8 °C), Max:99.1 °F (37.3 °C) Blood Pressure: BP: 132/83 Pulse: Pulse (Heart Rate): 83 Temp:  Temp: 99.1 °F (37.3 °C) Artificial Kidney Dialyzer/Set Up Inspection: Revaclear  
hours Duration of Treatment (hours): 3 hours Heparin Bolus Blood flow rate Blood Flow Rate (ml/min): 300 ml/min Dialysate rate Arterial Access Pressure Arterial Access Pressure (mmHg): -140 Venous Return Pressure Venous Return Pressure (mmHg): 120 Ultrafiltration Rate Goal/Amount of Fluid to Remove (mL): 2000 mL Fluid Removal Fluid Removed (mL): 3500 Net Fluid Removal NET Fluid Removed (mL): 3000 ml Current Facility-Administered Medications Medication Dose Route Frequency  epoetin sheila (EPOGEN;PROCRIT) injection 8,000 Units  8,000 Units IntraVENous DIALYSIS MON, WED & FRI  
  hydrALAZINE (APRESOLINE) tablet 50 mg  50 mg Oral Q8H  
 hydrALAZINE (APRESOLINE) 20 mg/mL injection 10 mg  10 mg IntraVENous Q6H PRN  
 doxercalciferol (HECTOROL) 4 mcg/2 mL injection 2 mcg  2 mcg IntraVENous DIALYSIS MON, WED & FRI  B complex-vitaminC-folic acid (NEPHROCAP) cap  1 Cap Oral DAILY  carvedilol (COREG) tablet 12.5 mg  12.5 mg Oral Q12H  
 heparin (porcine) 100 unit/mL injection 300 Units  300 Units InterCATHeter PRN  
 HYDROcodone-acetaminophen (NORCO) 5-325 mg per tablet 1-2 Tab  1-2 Tab Oral Q4H PRN  
 albumin human 25% (BUMINATE) solution 25 g  25 g IntraVENous DIALYSIS PRN  
 tamsulosin (FLOMAX) capsule 0.4 mg  0.4 mg Oral QHS  calcium acetate (PHOSLO) capsule 667 mg  1 Cap Oral TID WITH MEALS  
 albuterol-ipratropium (DUO-NEB) 2.5 MG-0.5 MG/3 ML  3 mL Nebulization Q4H PRN  
 sodium chloride (NS) flush 5-10 mL  5-10 mL IntraVENous PRN  
 aspirin chewable tablet 81 mg  81 mg Oral DAILY  pravastatin (PRAVACHOL) tablet 20 mg  20 mg Oral QHS  sodium chloride (NS) flush 5-10 mL  5-10 mL IntraVENous Q8H  
 LORazepam (ATIVAN) tablet 1 mg  1 mg Oral Q1H PRN Or  
 LORazepam (ATIVAN) injection 1 mg  1 mg IntraVENous Q1H PRN  
 LORazepam (ATIVAN) tablet 2 mg  2 mg Oral Q1H PRN Or  
 LORazepam (ATIVAN) injection 2 mg  2 mg IntraVENous Q1H PRN  
 LORazepam (ATIVAN) injection 3 mg  3 mg IntraVENous Q15MIN PRN  
 acetaminophen (TYLENOL) tablet 650 mg  650 mg Oral Q6H PRN  
 heparin (porcine) injection 5,000 Units  5,000 Units SubCUTAneous Q8H  
 insulin lispro (HUMALOG) injection   SubCUTAneous AC&HS  
 glucose chewable tablet 16 g  4 Tab Oral PRN  
 glucagon (GLUCAGEN) injection 1 mg  1 mg IntraMUSCular PRN  
 dextrose (D50W) injection syrg 12.5-25 g  25-50 mL IntraVENous PRN  thiamine HCL (B-1) tablet 100 mg  100 mg Oral DAILY Review of Symptoms: comprehensive ROS negative except above. Objective:  
 
Patient Vitals for the past 24 hrs: Temp Pulse Resp BP SpO2  
08/31/18 0737 99.1 °F (37.3 °C) 83 20 132/83 95 % 08/31/18 0400 98.4 °F (36.9 °C) 84 18 150/76 96 % 08/31/18 0000 98.6 °F (37 °C) 82 18 123/68 98 % 08/30/18 1930 98.4 °F (36.9 °C) 78 18 147/90 98 % 08/30/18 1603 98.6 °F (37 °C) 81 18 119/67 96 % 08/30/18 1120 98.3 °F (36.8 °C) 80 18 (!) 165/109 97 % Weight change: 2.631 kg (5 lb 12.8 oz) 08/29 1901 - 08/31 0700 In: 1280 [P.O.:1280] Out: 1800 [Urine:1800] Intake/Output Summary (Last 24 hours) at 08/31/18 1118 Last data filed at 08/31/18 7927 Gross per 24 hour Intake             1040 ml Output             1750 ml Net             -710 ml Physical Exam:  
General: comfortable, no acute distress HEENT sclera anicteric, supple neck, no thyromegaly CVS: S1S2 heard,  no rub RS: + air entry b/l, Abd: Soft, Non tender, Not distended, Neuro: non focal, awake, alert , CN II-XII are grossly intact Extrm: + edema, improving Skin: no visible  Rash Musculoskeletal: No gross joints or bone deformities Data Review:  
 
LABS:  
Hematology:  
Recent Labs 08/31/18 
 1000  08/29/18 
 1030 WBC  4.3*  4.9 HGB  8.6*  8.9* HCT  26.9*  28.3* Chemistry:  
Recent Labs 08/31/18 
 1000  08/29/18 
 1030  08/29/18 
 0529 BUN  63*  62*  58* CREA  5.97*  5.75*  5.62* CA  7.7*  8.5  8.4*  
K  4.3  4.2  4.6 NA  137  135*  135* CL  99*  101  101 CO2  26  24  23 GLU  127*  90  85 Procedures/imaging: see electronic medical records for all procedures, Xrays and details which were not copied into this note but were reviewed prior to creation of Plan Assessment & Plan: As above Jsese Salinas MD 
8/31/2018 
9:41 AM

## 2018-08-31 NOTE — PROGRESS NOTES
RENAL DAILY PROGRESS NOTE 61y M admitted for short of breath, seen for JULIA Subjective:  
 
Complaint:  
Feels better Wants to go home Discussed he has chair at [address] IMPRESSION:  
Acute on chronic kidney injury, likely now progressing to ESRD. CKD 4 HTN/DM nephropathy Acute on chronic systolic heart failure, Anemia HTN, improving Hyperphosphatemia Elevated PTH, on hecterol during HD. PLAN:  
 Plan for HD today , Hb dropping, increase epogen during HD. Okay to discharge from renal stand point, has chair at Discussed with cardiology colleague. Current Facility-Administered Medications Medication Dose Route Frequency  epoetin sheila (EPOGEN;PROCRIT) injection 8,000 Units  8,000 Units IntraVENous DIALYSIS MON, WED & FRI  hydrALAZINE (APRESOLINE) tablet 50 mg  50 mg Oral Q8H  
 hydrALAZINE (APRESOLINE) 20 mg/mL injection 10 mg  10 mg IntraVENous Q6H PRN  
 doxercalciferol (HECTOROL) 4 mcg/2 mL injection 2 mcg  2 mcg IntraVENous DIALYSIS MON, WED & FRI  B complex-vitaminC-folic acid (NEPHROCAP) cap  1 Cap Oral DAILY  carvedilol (COREG) tablet 12.5 mg  12.5 mg Oral Q12H  
 heparin (porcine) 100 unit/mL injection 300 Units  300 Units InterCATHeter PRN  
 HYDROcodone-acetaminophen (NORCO) 5-325 mg per tablet 1-2 Tab  1-2 Tab Oral Q4H PRN  
 albumin human 25% (BUMINATE) solution 25 g  25 g IntraVENous DIALYSIS PRN  
 tamsulosin (FLOMAX) capsule 0.4 mg  0.4 mg Oral QHS  calcium acetate (PHOSLO) capsule 667 mg  1 Cap Oral TID WITH MEALS  
 albuterol-ipratropium (DUO-NEB) 2.5 MG-0.5 MG/3 ML  3 mL Nebulization Q4H PRN  
 sodium chloride (NS) flush 5-10 mL  5-10 mL IntraVENous PRN  
 aspirin chewable tablet 81 mg  81 mg Oral DAILY  pravastatin (PRAVACHOL) tablet 20 mg  20 mg Oral QHS  sodium chloride (NS) flush 5-10 mL  5-10 mL IntraVENous Q8H  
 LORazepam (ATIVAN) tablet 1 mg  1 mg Oral Q1H PRN  Or  
  LORazepam (ATIVAN) injection 1 mg  1 mg IntraVENous Q1H PRN  
 LORazepam (ATIVAN) tablet 2 mg  2 mg Oral Q1H PRN Or  
 LORazepam (ATIVAN) injection 2 mg  2 mg IntraVENous Q1H PRN  
 LORazepam (ATIVAN) injection 3 mg  3 mg IntraVENous Q15MIN PRN  
 acetaminophen (TYLENOL) tablet 650 mg  650 mg Oral Q6H PRN  
 heparin (porcine) injection 5,000 Units  5,000 Units SubCUTAneous Q8H  
 insulin lispro (HUMALOG) injection   SubCUTAneous AC&HS  
 glucose chewable tablet 16 g  4 Tab Oral PRN  
 glucagon (GLUCAGEN) injection 1 mg  1 mg IntraMUSCular PRN  
 dextrose (D50W) injection syrg 12.5-25 g  25-50 mL IntraVENous PRN  thiamine HCL (B-1) tablet 100 mg  100 mg Oral DAILY Review of Symptoms: comprehensive ROS negative except above. Objective:  
Patient Vitals for the past 24 hrs: 
 Temp Pulse Resp BP SpO2  
08/31/18 0737 99.1 °F (37.3 °C) 83 20 132/83 95 % 08/31/18 0400 98.4 °F (36.9 °C) 84 18 150/76 96 % 08/31/18 0000 98.6 °F (37 °C) 82 18 123/68 98 % 08/30/18 1930 98.4 °F (36.9 °C) 78 18 147/90 98 % 08/30/18 1603 98.6 °F (37 °C) 81 18 119/67 96 % 08/30/18 1120 98.3 °F (36.8 °C) 80 18 (!) 165/109 97 % Weight change: 2.631 kg (5 lb 12.8 oz) 08/29 1901 - 08/31 0700 In: 1280 [P.O.:1280] Out: 1800 [Urine:1800] Intake/Output Summary (Last 24 hours) at 08/31/18 4955 Last data filed at 08/31/18 9379 Gross per 24 hour Intake             1040 ml Output             1750 ml Net             -710 ml Physical Exam:  
General: comfortable, no acute distress HEENT sclera anicteric, supple neck, no thyromegaly CVS: S1S2 heard,  no rub RS: + air entry b/l, Abd: Soft, Non tender, Not distended, Neuro: non focal, awake, alert , CN II-XII are grossly intact Extrm: + edema, improving Skin: no visible  Rash Musculoskeletal: No gross joints or bone deformities Data Review:  
 
LABS:  
Hematology: Recent Labs  
   08/29/18 
 1030 WBC  4.9 HGB  8.9*  
 HCT  28.3* Chemistry: Recent Labs  
   08/29/18 
 1030  08/29/18 
 0529  08/28/18 
 1000 BUN  62*  58*  78* CREA  5.75*  5.62*  7.26* CA  8.5  8.4*  6.9*  
K  4.2  4.6  4.4 NA  135*  135*  136 CL  101  101  99* CO2  24  23  25 GLU  90  85  140* Procedures/imaging: see electronic medical records for all procedures, Xrays and details which were not copied into this note but were reviewed prior to creation of Plan Assessment & Plan: As above Rachel Justice MD 
8/31/2018 
9:41 AM

## 2018-08-31 NOTE — DISCHARGE SUMMARY
[facility] Discharge Summary Patient: Jackie Cooper Age: 63 y.o. : 1960 MR#: [id number] SSN: [id number] PCP on record: Marzena Reynoso MD 
Admit date: 2018 Discharge date: 2018 Consults:  -LENNY Peters,-nephrology - NATHANAEL Noel,-pccm 
-MONTANA Aguirre,-cardiology 
-CECI Baires.-IR Procedures:-18 Image guided RIJ TDC placement. -    
Significant Diagnostic Studies: -cxr 18: IMPRESSION IMPRESSION: 
  
Resolving acute heart failure. -  Renal ultrasound 18: IMPRESSION: 
  
Increase echogenicity of the kidneys consistent with medical renal disease. Bilateral simple renal cysts. Possible small calculus right kidney midpole nonobstructing. 
 
- Cardiac stress test 18: 
Abnormal myocardial perfusion imaging. Fixed defect consistent with prior myocardial infarction. Myocardial perfusion imaging supports an intermediate risk stress test.  
  
   
  Interpretation Summary     
  · Baseline ECG: Normal sinus rhythm, occasional PVCs, non-specific ST-T wave abnormalitiesLVH with QRS widening, T wave abnormality. · Gated SPECT: Left ventricular function post-stress was abnormal. Calculated ejection fraction is 35%. · Visually estimated ejection fraction appears better than calculated. · Inconclusive stress electrocardiogram. 
· Left ventricular perfusion is abnormal. 
· Myocardial perfusion imaging defect 1: There is a defect that is moderate in size with a moderate reduction in uptake present in the basal inferior location(s) that is non-reversible. There is abnormal wall motion in the defect area. Viability in the area is poor. The defect appears to be infarction. Perfusion defect was visually present without quantitative evidence. · Abnormal myocardial perfusion imaging. Fixed defect consistent with prior myocardial infarction.  Myocardial perfusion imaging supports an intermediate risk stress test. 
    
   
 
 - Cardiac echo 08/24/18: Interpretation Summary     
  · Left ventricular low normal systolic function. Estimated left ventricular ejection fraction is 51 - 55%. Biplane method used to measure ejection fraction. Left ventricular severe concentric hypertrophy. Left ventricular global hypokinesis. · Mild aortic valve regurgitation is present. · LV strain shows relative apical sparing with reduced global strain at discharge -9.1% · Aortic root and ascending aorta measures 4.1 cm. · Pulmonary arterial systolic pressure is 26 mmHg. There is no evidence of pulmonary hypertension. · Trivial pericardial effusion. · Left atrial cavity size is mildly dilated. · Mitral annular calcification. Trace mitral valve regurgitation Discharge Diagnoses: -  ESRD 
-Acute hypoxic and hypercapnic respiratory failure 
-Acute on chronic systolic diastolic heart failure 
-Secondary hyperparathyroidism, Hypocalcemia and hyperphosphatemia 
-Anemia of chronic disease Patient Active Problem List  
Diagnosis Code  Chronic kidney disease (CKD), stage III (moderate) N18.3  Nonischemic cardiomyopathy (HCC) I42.8  Diastolic CHF (HCC) O24.73  Hyperlipidemia E78.5  Essential hypertension I10  
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 44W2  Systolic CHF, chronic (HCC) I50.22  Severe obesity (BMI 35.0-39. 9) with comorbidity (HCC) E66.01  
 CHF (congestive heart failure) (HCC) I50.9  Respiratory distress R06.03  
 Hypertensive emergency I16.1 Hospital Course by Problem 63 y o male presented to the ED w/ cc of SOB, noted to be acidotic and required BiPAP, and noted to have elevated bp's as high as 250's, he was given nitro gtt, lasix and had improvement in his blood gases. His respiratory failure was thought to be secondary to pulmonary edema and less due to pna.  Nephrology evaluated the pt and had the impression that pt had cardiorenal syndrome. He had minimal response with diuresis and eventually had placement of HD access and was started on HD. In terms of his cardiac failure, he was evaluated by the cardiology team and had an echo done, result pasted above. He was started on bp meds including clonidine. His elevated trop was thought to be due to demand ischemia. He underwent cardiac stress test, result pasted above with overall decision made by cardiology to optimize medical management due to lack of anginal symptoms. His bp improved after pt started HD. Pt on date of dc had no complaints. Outpt HD has been arranged and pt stable for dc. Today's examination of the patient revealed:  
 
Subjective:  
 
Objective:  
VS:  
Visit Vitals  /86  Pulse 83  Temp 98.9 °F (37.2 °C)  Resp 16  
 Ht 5' 7\" (1.702 m)  Wt 111.5 kg (245 lb 12.8 oz)  SpO2 96%  BMI 38.5 kg/m2 Tmax/24hrs: Temp (24hrs), Av.6 °F (37 °C), Min:98.4 °F (36.9 °C), Max:99.1 °F (37.3 °C) Input/Output:  
Intake/Output Summary (Last 24 hours) at 18 1536 Last data filed at 18 6185 Gross per 24 hour Intake              800 ml Output             1500 ml Net             -700 ml General:  Alert, awake in NAD Cardiovascular: rrr, no murmurs Pulmonary:  ctab GI:  Soft, nt, nd 
Extremities:  Edematous lower extremities Additional:   
 
Labs:   
Recent Results (from the past 24 hour(s)) GLUCOSE, POC Collection Time: 18  4:02 PM  
Result Value Ref Range Glucose (POC) 110 70 - 110 mg/dL GLUCOSE, POC Collection Time: 18 10:03 PM  
Result Value Ref Range Glucose (POC) 92 70 - 110 mg/dL GLUCOSE, POC Collection Time: 18  8:02 AM  
Result Value Ref Range Glucose (POC) 129 (H) 70 - 110 mg/dL CBC WITH AUTOMATED DIFF Collection Time: 18 10:00 AM  
Result Value Ref Range WBC 4.3 (L) 4.6 - 13.2 K/uL  
 RBC 3.18 (L) 4.70 - 5.50 M/uL HGB 8.6 (L) 13.0 - 16.0 g/dL HCT 26.9 (L) 36.0 - 48.0 % MCV 84.6 74.0 - 97.0 FL  
 MCH 27.0 24.0 - 34.0 PG  
 MCHC 32.0 31.0 - 37.0 g/dL  
 RDW 12.7 11.6 - 14.5 % PLATELET 567 450 - 445 K/uL MPV 10.0 9.2 - 11.8 FL  
 NEUTROPHILS 53 40 - 73 % LYMPHOCYTES 30 21 - 52 % MONOCYTES 12 (H) 3 - 10 % EOSINOPHILS 4 0 - 5 % BASOPHILS 1 0 - 2 %  
 ABS. NEUTROPHILS 2.3 1.8 - 8.0 K/UL  
 ABS. LYMPHOCYTES 1.3 0.9 - 3.6 K/UL  
 ABS. MONOCYTES 0.5 0.05 - 1.2 K/UL  
 ABS. EOSINOPHILS 0.2 0.0 - 0.4 K/UL  
 ABS. BASOPHILS 0.0 0.0 - 0.1 K/UL  
 DF AUTOMATED METABOLIC PANEL, BASIC Collection Time: 08/31/18 10:00 AM  
Result Value Ref Range Sodium 137 136 - 145 mmol/L Potassium 4.3 3.5 - 5.5 mmol/L Chloride 99 (L) 100 - 108 mmol/L  
 CO2 26 21 - 32 mmol/L Anion gap 12 3.0 - 18 mmol/L Glucose 127 (H) 74 - 99 mg/dL BUN 63 (H) 7.0 - 18 MG/DL Creatinine 5.97 (H) 0.6 - 1.3 MG/DL  
 BUN/Creatinine ratio 11 (L) 12 - 20 GFR est AA 12 (L) >60 ml/min/1.73m2 GFR est non-AA 10 (L) >60 ml/min/1.73m2 Calcium 7.7 (L) 8.5 - 10.1 MG/DL Additional Data Reviewed: 
  
Condition: stable Disposition:  
 []Home   [x]Home with Home Health   []SNF/NH   []Rehab   []Home with family []Alternate Facility:____________________ Discharge Medications:  
 
Current Discharge Medication List  
  
START taking these medications Details  
b complex-vitamin c-folic acid (NEPHROCAPS) 1 mg capsule Take 1 Cap by mouth daily. Qty: 30 Cap, Refills: 1  
  
calcium acetate (PHOSLO) 667 mg cap Take 1 Cap by mouth three (3) times daily (with meals). Qty: 90 Cap, Refills: 1  
  
tamsulosin (FLOMAX) 0.4 mg capsule Take 1 Cap by mouth nightly. Qty: 30 Cap, Refills: 1  
  
thiamine HCL (B-1) 100 mg tablet Take 1 Tab by mouth daily. Qty: 30 Tab, Refills: 1 CONTINUE these medications which have CHANGED Details  
carvedilol (COREG) 12.5 mg tablet Take 1 Tab by mouth every twelve (12) hours. Qty: 60 Tab, Refills: 1  
  
hydrALAZINE (APRESOLINE) 50 mg tablet Take 1 Tab by mouth every eight (8) hours. Qty: 90 Tab, Refills: 1 CONTINUE these medications which have NOT CHANGED Details  
pravastatin (PRAVACHOL) 20 mg tablet Take 1 Tab by mouth nightly. Indications: hyperlipidemia Qty: 90 Tab, Refills: 3 Associated Diagnoses: Hyperlipidemia, unspecified hyperlipidemia type  
  
colchicine 0.6 mg tablet 2 tablets at first sign of gout flare and then 1 tablet 1 hour later  Indications: acute gouty arthritis Qty: 3 Tab, Refills: 5 Associated Diagnoses: Idiopathic chronic gout of multiple sites without tophus  
  
terazosin (HYTRIN) 1 mg capsule Take 1 Cap by mouth nightly. Indications: hypertension 
Qty: 30 Cap, Refills: 11  
 Associated Diagnoses: Essential hypertension  
  
allopurinol (ZYLOPRIM) 100 mg tablet Take 2 Tabs by mouth daily. Qty: 120 Tab, Refills: 2 Associated Diagnoses: Gout of hand, unspecified cause, unspecified chronicity, unspecified laterality  
  
amLODIPine (NORVASC) 10 mg tablet TAKE 1 TABLET BY MOUTH DAILY Qty: 30 Tab, Refills: 2 Associated Diagnoses: Essential hypertension  
  
aspirin 81 mg chewable tablet Take 1 Tab by mouth daily. Qty: 30 Tab, Refills: 11  
 Associated Diagnoses: Nonischemic cardiomyopathy ([address] STOP taking these medications  
  
 furosemide (LASIX) 40 mg tablet Comments:  
Reason for Stopping: Follow-up Appointments:  
1. Your PCP: Angel Feliciano MD, within 8-60QTFF >30 minutes spent coordinating this discharge (review instructions/follow-up, prescriptions, preparing report for sign off) Signed: 
Yaneli Pike MD 
8/31/2018 
3:36 PM

## 2018-09-17 ENCOUNTER — TELEPHONE (OUTPATIENT)
Dept: CARDIAC REHAB | Age: 58
End: 2018-09-17

## 2018-09-17 NOTE — TELEPHONE ENCOUNTER
Cardiac Rehab called patient about the program. He is interested but was in dialysis and wants a call back tomorrow. Will follow up at that time.     Thank you,  Carissa Kwok

## 2018-09-25 ENCOUNTER — OFFICE VISIT (OUTPATIENT)
Dept: FAMILY MEDICINE CLINIC | Facility: CLINIC | Age: 58
End: 2018-09-25

## 2018-09-25 ENCOUNTER — TELEPHONE (OUTPATIENT)
Dept: CARDIAC REHAB | Age: 58
End: 2018-09-25

## 2018-09-25 VITALS
BODY MASS INDEX: 36.73 KG/M2 | HEART RATE: 88 BPM | HEIGHT: 67 IN | OXYGEN SATURATION: 95 % | TEMPERATURE: 98.4 F | SYSTOLIC BLOOD PRESSURE: 107 MMHG | WEIGHT: 234 LBS | DIASTOLIC BLOOD PRESSURE: 71 MMHG | RESPIRATION RATE: 18 BRPM

## 2018-09-25 DIAGNOSIS — E66.01 SEVERE OBESITY (BMI 35.0-39.9) WITH COMORBIDITY (HCC): ICD-10-CM

## 2018-09-25 DIAGNOSIS — I10 ESSENTIAL HYPERTENSION: Primary | ICD-10-CM

## 2018-09-25 DIAGNOSIS — N18.6 ESRD (END STAGE RENAL DISEASE) ON DIALYSIS (HCC): ICD-10-CM

## 2018-09-25 DIAGNOSIS — I42.8 NONISCHEMIC CARDIOMYOPATHY (HCC): ICD-10-CM

## 2018-09-25 DIAGNOSIS — M1A.09X0 IDIOPATHIC CHRONIC GOUT OF MULTIPLE SITES WITHOUT TOPHUS: ICD-10-CM

## 2018-09-25 DIAGNOSIS — Z99.2 ESRD (END STAGE RENAL DISEASE) ON DIALYSIS (HCC): ICD-10-CM

## 2018-09-25 DIAGNOSIS — E78.5 HYPERLIPIDEMIA, UNSPECIFIED HYPERLIPIDEMIA TYPE: ICD-10-CM

## 2018-09-25 PROBLEM — I16.1 HYPERTENSIVE EMERGENCY: Status: RESOLVED | Noted: 2018-08-23 | Resolved: 2018-09-25

## 2018-09-25 PROBLEM — R06.03 RESPIRATORY DISTRESS: Status: RESOLVED | Noted: 2018-08-23 | Resolved: 2018-09-25

## 2018-09-25 NOTE — MR AVS SNAPSHOT
[address] [place] [id number] 
[phone] Patient: Luis Lora MRN: [id number] FNZ:2/7/1928 Visit Information Date & Time Provider Department Dept. Phone Encounter #  
 9/25/2018  2:30 PM Trenton Goodrich MD [facility] [phone] [id number] Follow-up Instructions Return in about 3 months (around 12/25/2018). Your Appointments 10/18/2018  9:30 AM  
Office Visit with [facility] ([facility]) Appt Note: iov [address] [facility] Keli Guadalupe [address] [facility]  
[phone] [address] [facility] Mary Burgos 200 [facility] Se Upcoming Health Maintenance Date Due COLONOSCOPY 3/1/1979 DTaP/Tdap/Td series (1 - Tdap) 3/1/1982 Shingrix Vaccine Age 50> (1 of 2) 3/1/2011 Influenza Age 5 to Adult 8/1/2018 Prostate-Specific Antigen 6/6/2019 Allergies as of 9/25/2018  Review Complete On: 9/25/2018 By: Trenton Goodrich MD  
 No Known Allergies Current Immunizations  Never Reviewed Name Date Influenza Vaccine 9/21/2018 Not reviewed this visit You Were Diagnosed With   
  
 Codes Comments Essential hypertension    -  Primary ICD-10-CM: I10 
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 19O9 ICD-9-CM: 274.02 Severe obesity (BMI 35.0-39. 9) with comorbidity ([address]     ICD-10-CM: E66.01 
ICD-9-CM: 278.01 Vitals BP Pulse Temp Resp Height(growth percentile) Weight(growth percentile) 107/71 88 98.4 °F (36.9 °C) (Oral) 18 5' 7\" (1.702 m) 234 lb (106.1 kg) SpO2 BMI Smoking Status 95% 36.65 kg/m2 Never Smoker Vitals History BMI and BSA Data Body Mass Index Body Surface Area  
 36.65 kg/m 2 2.24 m 2 [facility] Ayala Valverde [address] [place] [address] [phone] Your Updated Medication List  
  
   
This list is accurate as of 9/25/18  3:00 PM.  Always use your most recent med list.  
  
  
  
  
 allopurinol 100 mg tablet Commonly known as:  Acquanetta Bunk Take 2 Tabs by mouth daily. amLODIPine 10 mg tablet Commonly known as:  Castro Del TAKE 1 TABLET BY MOUTH DAILY  
  
 aspirin 81 mg chewable tablet Take 1 Tab by mouth daily. b complex-vitamin c-folic acid 1 mg capsule Commonly known as:  Kinsey Bear Take 1 Cap by mouth daily. calcium acetate 667 mg Cap Commonly known as:  PHOSLO Take 1 Cap by mouth three (3) times daily (with meals). carvedilol 12.5 mg tablet Commonly known as:  Zay Bourdon Take 1 Tab by mouth every twelve (12) hours. colchicine 0.6 mg tablet 2 tablets at first sign of gout flare and then 1 tablet 1 hour later  Indications: acute gouty arthritis  
  
 hydrALAZINE 50 mg tablet Commonly known as:  APRESOLINE Take 1 Tab by mouth every eight (8) hours. pravastatin 20 mg tablet Commonly known as:  PRAVACHOL Take 1 Tab by mouth nightly. Indications: hyperlipidemia  
  
 tamsulosin 0.4 mg capsule Commonly known as:  FLOMAX Take 1 Cap by mouth nightly. terazosin 1 mg capsule Commonly known as:  HYTRIN Take 1 Cap by mouth nightly. Indications: hypertension  
  
 thiamine  mg tablet Commonly known as:  B-1 Take 1 Tab by mouth daily. Follow-up Instructions Return in about 3 months (around 12/25/2018). Introducing Jody Jessica! [facility] introduces [employer] patient portal. Now you can access parts of your medical record, email your doctor's office, and request medication refills online. 1. In your internet browser, go to [email or web address] 2. Click on the First Time User? Click Here link in the Sign In box. You will see the New Member Sign Up page. 3. Enter your [employer] Access Code exactly as it appears below. You will not need to use this code after youve completed the sign-up process. If you do not sign up before the expiration date, you must request a new code. · [employer] Access Code: [email or web address] Expires: 11/21/2018  8:05 AM 
 
4. Enter the last four digits of your Social Security Number (xxxx) and Date of Birth (mm/dd/yyyy) as indicated and click Submit. You will be taken to the next sign-up page. 5. Create a [employer]ia ID. This will be your [employer] login ID and cannot be changed, so think of one that is secure and easy to remember. 6. Create a [employer] password. You can change your password at any time. 7. Enter your Password Reset Question and Answer. This can be used at a later time if you forget your password. 8. Enter your e-mail address. You will receive e-mail notification when new information is available in [address] 9. Click Sign Up. You can now view and download portions of your medical record. 10. Click the Download Summary menu link to download a portable copy of your medical information. If you have questions, please visit the Frequently Asked Questions section of the [employer] website. Remember, [employer] is NOT to be used for urgent needs. For medical emergencies, dial 911. Now available from your iPhone and Android! Please provide this summary of care documentation to your next provider. Your primary care clinician is listed as Krish Adhikari. If you have any questions after today's visit, please call [phone].

## 2018-09-25 NOTE — PROGRESS NOTES
HISTORY OF PRESENT ILLNESS Kalyan Mcdowell is a 63 y.o. male. HPI Comments: Seen in follow-up HTN, hyperlipidemia, CHF, ESRD with dialysis, gout, obesity. No problems with medication. He started dialysis since his last visit, and hasn't had a colonoscopy appointment. He had a flu shot last week, and possibly a Pneumovax (at dialysis). He will be getting an A-V fistula next month. No other concerns. Follow Up Chronic Condition Pertinent negatives include no chest pain, no headaches and no shortness of breath. Past Medical History:  
Diagnosis Date  Arthritis of left knee 09/2017  
 moderate to severe, with effusion on xray  Chronic kidney disease  Diastolic CHF ([address]  Dilated cardiomyopathy ([address]  History of alcohol abuse  History of echocardiogram 02/24/2014 Mod-marked LVE. EF 15%. Severe, diffuse hypk. Mild LVH. Gr 1 DDfx. Mod LAE. Mild-mod FIDELIA. Mild AoRE. No significant change from echo of 10/23/09.  History of gout  History of myocardial perfusion scan 05/25/2006 No evidence of ischemia or scarring. Gross LVE. EF 28%. Severe global hypk. Neg EKG on submaximal EST. Ex time 8 min 25 sec.  HTN (hypertension)  Hypercholesterolemia  Hypertensive cardiovascular disease Past Surgical History:  
Procedure Laterality Date  HX HERNIA REPAIR  04/2016 History Smoking Status  Never Smoker Smokeless Tobacco  
 Never Used Current Outpatient Prescriptions Medication Sig  carvedilol (COREG) 12.5 mg tablet Take 1 Tab by mouth every twelve (12) hours.  hydrALAZINE (APRESOLINE) 50 mg tablet Take 1 Tab by mouth every eight (8) hours.  b complex-vitamin c-folic acid (NEPHROCAPS) 1 mg capsule Take 1 Cap by mouth daily.  calcium acetate (PHOSLO) 667 mg cap Take 1 Cap by mouth three (3) times daily (with meals).  tamsulosin (FLOMAX) 0.4 mg capsule Take 1 Cap by mouth nightly.  thiamine HCL (B-1) 100 mg tablet Take 1 Tab by mouth daily.  pravastatin (PRAVACHOL) 20 mg tablet Take 1 Tab by mouth nightly. Indications: hyperlipidemia  colchicine 0.6 mg tablet 2 tablets at first sign of gout flare and then 1 tablet 1 hour later  Indications: acute gouty arthritis  terazosin (HYTRIN) 1 mg capsule Take 1 Cap by mouth nightly. Indications: hypertension  allopurinol (ZYLOPRIM) 100 mg tablet Take 2 Tabs by mouth daily.  amLODIPine (NORVASC) 10 mg tablet TAKE 1 TABLET BY MOUTH DAILY  aspirin 81 mg chewable tablet Take 1 Tab by mouth daily. No current facility-administered medications for this visit. Review of Systems Constitutional: Negative for chills and fever. Respiratory: Negative for shortness of breath. Cardiovascular: Negative for chest pain, palpitations and leg swelling. Gastrointestinal: Negative for nausea and vomiting. Neurological: Negative for tingling, focal weakness and headaches. Psychiatric/Behavioral: The patient does not have insomnia. Visit Vitals  /71  Pulse 88  Temp 98.4 °F (36.9 °C) (Oral)  Resp 18  Ht 5' 7\" (1.702 m)  Wt 234 lb (106.1 kg)  SpO2 95%  BMI 36.65 kg/m2 Physical Exam  
Constitutional: He is oriented to person, place, and time. He appears well-developed and well-nourished. No distress. Neck: Neck supple. No thyromegaly present. Carotid bruit absent Cardiovascular: Normal rate, regular rhythm and intact distal pulses. Exam reveals no gallop and no friction rub. No murmur heard. Pulmonary/Chest: Effort normal and breath sounds normal. No respiratory distress. Musculoskeletal: He exhibits no edema. Lymphadenopathy:  
  He has no cervical adenopathy. Neurological: He is alert and oriented to person, place, and time. Skin: Skin is warm and dry. Psychiatric: He has a normal mood and affect.  His behavior is normal. Judgment and thought content normal.  
 Nursing note and vitals reviewed. ASSESSMENT and PLAN 
  ICD-10-CM ICD-9-CM 1. Essential hypertension I10 401.9 2. Hyperlipidemia, unspecified hyperlipidemia type E78.5 272.4 3. ESRD (end stage renal disease) on dialysis (HCC) N18.6 585.6 Z99.2 V45.11   
4. Nonischemic cardiomyopathy (HCC) I42.8 425.4 5. Idiopathic chronic gout of multiple sites without tophus M1A. 09X0 274.02   
6. Severe obesity (BMI 35.0-39. 9) with comorbidity ([facility] E66.01 278.01 Follow-up Disposition: 
Return in about 3 months (around 12/25/2018). current treatment plan is effective, no change in therapy Given contact information for colonoscopy. reviewed diet, exercise and weight control 
reviewed medications and side effects in detail Plan of care reviewed - patient verbalize(s) understanding and agreement.

## 2018-10-16 ENCOUNTER — TELEPHONE (OUTPATIENT)
Dept: CARDIAC REHAB | Age: 58
End: 2018-10-16

## 2018-10-16 NOTE — TELEPHONE ENCOUNTER
Cardiac Rehab called patient and spoke to him about the program. He declined services because he is doing well and feels pretty healthy.     Thank you,  Rain Castelan

## 2018-10-18 ENCOUNTER — OFFICE VISIT (OUTPATIENT)
Dept: VASCULAR SURGERY | Age: 58
End: 2018-10-18

## 2018-10-18 VITALS
BODY MASS INDEX: 36.73 KG/M2 | RESPIRATION RATE: 17 BRPM | SYSTOLIC BLOOD PRESSURE: 110 MMHG | HEIGHT: 67 IN | HEART RATE: 68 BPM | DIASTOLIC BLOOD PRESSURE: 70 MMHG | WEIGHT: 234 LBS

## 2018-10-18 DIAGNOSIS — Z99.2 ESRD (END STAGE RENAL DISEASE) ON DIALYSIS (HCC): Primary | ICD-10-CM

## 2018-10-18 DIAGNOSIS — I42.9 CARDIOMYOPATHY, UNSPECIFIED TYPE (HCC): ICD-10-CM

## 2018-10-18 DIAGNOSIS — N18.6 ESRD (END STAGE RENAL DISEASE) ON DIALYSIS (HCC): Primary | ICD-10-CM

## 2018-10-18 DIAGNOSIS — I10 ESSENTIAL HYPERTENSION: ICD-10-CM

## 2018-10-18 NOTE — PROGRESS NOTES
History and Physical     Valente Mcburney is a 63 y.o. male with recent diagnosis of ESRD. He had a RIJ permcath placed per IR which is functioning without problems and presents today to discuss permanent dialysis access. Vein mapping was done in our office today which showed he has adequate veins for fistula creation bilaterally. He is right handed. He does have history of dilated CMO with an EF of 35% and follows with Dr Christopher James for this. Currently he has no signs or symptoms of active heart failure. He states that he feels good overall and is without complaint in the office today. No fever/chills. Past Medical History:   Diagnosis Date    Arthritis of left knee 09/2017    moderate to severe, with effusion on xray    Chronic kidney disease     Diastolic CHF ([address]     Dilated cardiomyopathy ([address]     History of alcohol abuse     History of echocardiogram 02/24/2014    Mod-marked LVE. EF 15%. Severe, diffuse hypk. Mild LVH. Gr 1 DDfx. Mod LAE. Mild-mod FIDELIA. Mild AoRE. No significant change from echo of 10/23/09.  History of gout     History of myocardial perfusion scan 05/25/2006    No evidence of ischemia or scarring. Gross LVE. EF 28%. Severe global hypk. Neg EKG on submaximal EST. Ex time 8 min 25 sec.  HTN (hypertension)     Hypercholesterolemia     Hypertensive cardiovascular disease        Past Surgical History:   Procedure Laterality Date    HX HERNIA REPAIR  04/2016         Current Outpatient Medications:     carvedilol (COREG) 12.5 mg tablet, Take 1 Tab by mouth every twelve (12) hours. , Disp: 60 Tab, Rfl: 1    hydrALAZINE (APRESOLINE) 50 mg tablet, Take 1 Tab by mouth every eight (8) hours. , Disp: 90 Tab, Rfl: 1    b complex-vitamin c-folic acid (NEPHROCAPS) 1 mg capsule, Take 1 Cap by mouth daily. , Disp: 30 Cap, Rfl: 1    calcium acetate (PHOSLO) 667 mg cap, Take 1 Cap by mouth three (3) times daily (with meals). , Disp: 90 Cap, Rfl: 1    tamsulosin (FLOMAX) 0.4 mg capsule, Take 1 Cap by mouth nightly., Disp: 30 Cap, Rfl: 1    thiamine HCL (B-1) 100 mg tablet, Take 1 Tab by mouth daily. , Disp: 30 Tab, Rfl: 1    pravastatin (PRAVACHOL) 20 mg tablet, Take 1 Tab by mouth nightly. Indications: hyperlipidemia, Disp: 90 Tab, Rfl: 3    colchicine 0.6 mg tablet, 2 tablets at first sign of gout flare and then 1 tablet 1 hour later  Indications: acute gouty arthritis, Disp: 3 Tab, Rfl: 5    terazosin (HYTRIN) 1 mg capsule, Take 1 Cap by mouth nightly. Indications: hypertension, Disp: 30 Cap, Rfl: 11    allopurinol (ZYLOPRIM) 100 mg tablet, Take 2 Tabs by mouth daily. , Disp: 120 Tab, Rfl: 2    amLODIPine (NORVASC) 10 mg tablet, TAKE 1 TABLET BY MOUTH DAILY, Disp: 30 Tab, Rfl: 2    aspirin 81 mg chewable tablet, Take 1 Tab by mouth daily. , Disp: 30 Tab, Rfl: 11    No Known Allergies    Physical Exam:   Visit Vitals  /70 (BP 1 Location: Left arm, BP Patient Position: Sitting)   Pulse 68   Resp 17   Ht 5' 7\" (1.702 m)   Wt 234 lb (106.1 kg)   BMI 36.65 kg/m²     General: Well-appearing male in no acute distress   HEENT: EOMI, no scleral icterus is noted. No carotid bruits are heard bilaterally   Cardiovascular: Regular rhythm normal S1-S2 no rubs murmurs or gallops   Pulmonary: No increased work or breathing is noted. Clear to auscultation bilaterally. No wheeze, rales or rhonchi. Abdomen: obese, soft, nondistended. Extremities: Pt has no LE edema. Has 2+ radial pulses bilaterally   Neuro: Cranial nerves II through XII are grossly intact   Integument: No ulcerations are identified visibly      Impression and Plan:   Aj Ambrose is a 63 y.o. male with ESRD recently started on HD. He is currently using a RIJ permcath for HD access and presents today to discuss more permanent access. Discussed dialysis access options at length with patient including peritoneal dialysis and HD with either a fistula or graft. Educational material was given in office today.  All questions answered. Risks vs benefits of procedures were discussed at length. Vein mapping was reviewed with him in the office today. Patient will be scheduled for a left radiocephalic fistula creation and will f/u within 1 week s/p procedure. Discussed he will need cardiac clearance prior to surgery. Plan was discussed. Patient expresses understanding and agrees.     Danielito Ornelas  [phone]

## 2018-10-18 NOTE — PROGRESS NOTES
1. Have you been to an emergency room or urgent care clinic since your last visit? no    Hospitalized since your last visit? If yes, where, when, and reason for visit? no  2. Have you seen or consulted any other health care providers outside of the [facility] since your last visit including any procedures, health maintenance items.  If yes, where, when and reason for visit? no

## 2018-10-30 ENCOUNTER — OFFICE VISIT (OUTPATIENT)
Dept: CARDIOLOGY CLINIC | Age: 58
End: 2018-10-30

## 2018-10-30 VITALS
HEIGHT: 67 IN | BODY MASS INDEX: 36.26 KG/M2 | SYSTOLIC BLOOD PRESSURE: 63 MMHG | DIASTOLIC BLOOD PRESSURE: 48 MMHG | HEART RATE: 88 BPM | WEIGHT: 231 LBS

## 2018-10-30 DIAGNOSIS — E78.5 HYPERLIPIDEMIA, UNSPECIFIED HYPERLIPIDEMIA TYPE: ICD-10-CM

## 2018-10-30 DIAGNOSIS — I50.32 DIASTOLIC CHF, CHRONIC (HCC): ICD-10-CM

## 2018-10-30 DIAGNOSIS — Z99.2 ESRD (END STAGE RENAL DISEASE) ON DIALYSIS (HCC): ICD-10-CM

## 2018-10-30 DIAGNOSIS — I10 ESSENTIAL HYPERTENSION: Primary | ICD-10-CM

## 2018-10-30 DIAGNOSIS — N18.6 ESRD (END STAGE RENAL DISEASE) ON DIALYSIS (HCC): ICD-10-CM

## 2018-10-30 RX ORDER — FUROSEMIDE 40 MG/1
TABLET ORAL DAILY
Status: ON HOLD | COMMUNITY
End: 2018-11-15

## 2018-10-30 RX ORDER — FUROSEMIDE 40 MG/1
40 TABLET ORAL DAILY
Qty: 30 TAB | Refills: 6 | Status: CANCELLED | OUTPATIENT
Start: 2018-10-30

## 2018-10-30 NOTE — PROGRESS NOTES
1. Have you been to the ER, urgent care clinic since your last visit? Hospitalized since your last visit? No    2. Have you seen or consulted any other health care providers outside of the [address] [place] [address] since your last visit? Include any pap smears or colon screening.  No

## 2018-10-30 NOTE — PROGRESS NOTES
HISTORY OF PRESENT ILLNESS  Fela Beverly is a 63 y.o. male. CHF    The history is provided by the patient. This is a chronic problem. The problem occurs every several days. The problem has been gradually improving. Pertinent negatives include no chest pain, no abdominal pain, no headaches and no shortness of breath. Hypertension    The history is provided by the patient. This is a chronic problem. The problem occurs daily. The problem has been rapidly improving. Pertinent negatives include no chest pain, no abdominal pain, no headaches and no shortness of breath. Review of Systems   Constitutional: Positive for malaise/fatigue. Negative for chills, diaphoresis, fever and weight loss. HENT: Negative for congestion, ear discharge, ear pain, hearing loss, nosebleeds and tinnitus. Eyes: Negative for blurred vision. Respiratory: Negative for cough, hemoptysis, sputum production, shortness of breath, wheezing and stridor. Cardiovascular: Negative for chest pain, palpitations, orthopnea, claudication, leg swelling and PND. Gastrointestinal: Negative for abdominal pain, heartburn, nausea and vomiting. Musculoskeletal: Negative for myalgias and neck pain. Skin: Negative for itching and rash. Neurological: Negative for dizziness, tingling, tremors, focal weakness, loss of consciousness, weakness and headaches. Psychiatric/Behavioral: Negative for depression and suicidal ideas. Family History   Problem Relation Age of Onset    Cancer Father         Lung    Heart Failure Mother     Cancer Sister        Past Medical History:   Diagnosis Date    Arthritis of left knee 09/2017    moderate to severe, with effusion on xray    Chronic kidney disease     Diastolic CHF ([address]     Dilated cardiomyopathy ([address]     History of alcohol abuse     History of echocardiogram 02/24/2014    Mod-marked LVE. EF 15%. Severe, diffuse hypk. Mild LVH. Gr 1 DDfx. Mod LAE. Mild-mod FIDELIA. Mild AoRE.   No significant change from echo of 10/23/09.  History of gout     History of myocardial perfusion scan 05/25/2006    No evidence of ischemia or scarring. Gross LVE. EF 28%. Severe global hypk. Neg EKG on submaximal EST. Ex time 8 min 25 sec.  HTN (hypertension)     Hypercholesterolemia     Hypertensive cardiovascular disease        Past Surgical History:   Procedure Laterality Date    HX HERNIA REPAIR  04/2016       Social History     Tobacco Use    Smoking status: Never Smoker    Smokeless tobacco: Never Used   Substance Use Topics    Alcohol use: No     Alcohol/week: 0.0 oz     Comment: stop 3 years ago in feb       No Known Allergies    Outpatient Medications Marked as Taking for the 10/30/18 encounter (Office Visit) with Sowmya Padilla MD   Medication Sig Dispense Refill    furosemide (LASIX) 40 mg tablet Take  by mouth daily.  carvedilol (COREG) 12.5 mg tablet Take 1 Tab by mouth every twelve (12) hours. (Patient taking differently: Take 25 mg by mouth every twelve (12) hours.) 60 Tab 1    hydrALAZINE (APRESOLINE) 50 mg tablet Take 1 Tab by mouth every eight (8) hours. (Patient taking differently: Take 100 mg by mouth three (3) times daily.) 90 Tab 1    calcium acetate (PHOSLO) 667 mg cap Take 1 Cap by mouth three (3) times daily (with meals). 90 Cap 1    tamsulosin (FLOMAX) 0.4 mg capsule Take 1 Cap by mouth nightly. 30 Cap 1    pravastatin (PRAVACHOL) 20 mg tablet Take 1 Tab by mouth nightly. Indications: hyperlipidemia 90 Tab 3    terazosin (HYTRIN) 1 mg capsule Take 1 Cap by mouth nightly. Indications: hypertension 30 Cap 11    allopurinol (ZYLOPRIM) 100 mg tablet Take 2 Tabs by mouth daily. 120 Tab 2    aspirin 81 mg chewable tablet Take 1 Tab by mouth daily. 30 Tab 11        Visit Vitals  BP (!) 63/48   Pulse 88   Ht 5' 7\" (1.702 m)   Wt 104.8 kg (231 lb)   BMI 36.18 kg/m²     Physical Exam   Constitutional: He is oriented to person, place, and time.  He appears well-developed and well-nourished. No distress. HENT:   Head: Atraumatic. Mouth/Throat: No oropharyngeal exudate. Eyes: Conjunctivae are normal. No scleral icterus. Neck: Neck supple. No JVD present. Cardiovascular: Normal rate and regular rhythm. Exam reveals no gallop. No murmur heard. Pulmonary/Chest: Effort normal and breath sounds normal. No stridor. He has no wheezes. He has no rales. Abdominal: Soft. There is no tenderness. There is no rebound. Musculoskeletal: Normal range of motion. He exhibits no edema or tenderness. Neurological: He is alert and oriented to person, place, and time. He exhibits normal muscle tone. Skin: Skin is warm. He is not diaphoretic. Psychiatric: He has a normal mood and affect. His behavior is normal.     Echo 2016:  SUMMARY:  Left ventricle: The ventricle was dilated. Ejection fraction was estimated  to be 15 %. There was severe diffuse hypokinesis. Wall thickness was  moderately increased. Features were consistent with a pseudonormal left  ventricular filling pattern, with concomitant abnormal relaxation and  increased filling pressure (grade 2 diastolic dysfunction). Left atrium: The atrium was severely dilated. Mitral valve: There was mild regurgitation, there were two jets noted in  the apical two window. Aortic valve: There was moderate regurgitation. 08/23/18   ECHO ADULT COMPLETE 08/25/2018 8/25/2018    Narrative · Left ventricular low normal systolic function. Estimated left   ventricular ejection fraction is 51 - 55%. Biplane method used to measure   ejection fraction. Left ventricular severe concentric hypertrophy. Left   ventricular global hypokinesis. · Mild aortic valve regurgitation is present. · LV strain shows relative apical sparing with reduced global strain at   discharge -9.1%  · Aortic root and ascending aorta measures 4.1 cm. · Pulmonary arterial systolic pressure is 26 mmHg.  There is no evidence of   pulmonary hypertension. · Trivial pericardial effusion. · Left atrial cavity size is mildly dilated. · Mitral annular calcification. Trace mitral valve regurgitation. Signed by: Shaniqua Galvez MD     ASSESSMENT and PLAN    ICD-10-CM ICD-9-CM    1. Essential hypertension I10 401.9 AMB POC EKG ROUTINE W/ 12 LEADS, INTER & REP   2. Hyperlipidemia, unspecified hyperlipidemia type E78.5 272.4    3. ESRD (end stage renal disease) on dialysis ([facility]) N18.6 585.6     Z99.2 V45.11    4. Diastolic CHF, chronic ([facility]) I50.32 428.32      428.0      Orders Placed This Encounter    AMB POC EKG ROUTINE W/ 12 LEADS, INTER & REP     Order Specific Question:   Reason for Exam:     Answer:   HTN     Follow-up Disposition:  Return in about 2 days (around 11/1/2018). reviewed diet, exercise and weight control  cardiovascular risk and specific lipid/LDL goals reviewed  use of aspirin to prevent MI and TIA's discussed. Patient with h/o non ischemic cardiomyopathy EF 15% seen for f/u  Was recently hospitalized and repeat echocardiogram revealed an ejection fraction of 55%. Was started on hemodialysis for CKD. CHF is significantly improved since starting on dialysis. Today in follow-up his blood pressure is in the higher 60s however he denies any dizziness or syncope. Discussed at length about sending patient to emergency room however he refused to go to ER and wants to stay home. He was advised to rest and call EMS if he was symptomatic. He is scheduled he was given an appointment for follow-up in 2 days and will readjust medications. Meanwhile he was advised to hold all his blood pressure medicines today and tomorrow.

## 2018-11-01 ENCOUNTER — OFFICE VISIT (OUTPATIENT)
Dept: CARDIOLOGY CLINIC | Age: 58
End: 2018-11-01

## 2018-11-01 VITALS
DIASTOLIC BLOOD PRESSURE: 66 MMHG | HEART RATE: 81 BPM | HEIGHT: 67 IN | SYSTOLIC BLOOD PRESSURE: 93 MMHG | BODY MASS INDEX: 36.41 KG/M2 | WEIGHT: 232 LBS

## 2018-11-01 DIAGNOSIS — Z99.2 ESRD (END STAGE RENAL DISEASE) ON DIALYSIS (HCC): Primary | ICD-10-CM

## 2018-11-01 DIAGNOSIS — N18.6 ESRD (END STAGE RENAL DISEASE) ON DIALYSIS (HCC): Primary | ICD-10-CM

## 2018-11-01 RX ORDER — SEVELAMER CARBONATE 800 MG/1
TABLET, FILM COATED ORAL 3 TIMES DAILY
COMMUNITY

## 2018-11-01 NOTE — PROGRESS NOTES
1. Have you been to the ER, urgent care clinic since your last visit? Hospitalized since your last visit? No    2. Have you seen or consulted any other health care providers outside of the [address] [place] [address] since your last visit? Include any pap smears or colon screening.  No

## 2018-11-06 RX ORDER — CARVEDILOL 3.12 MG/1
3.12 TABLET ORAL EVERY 12 HOURS
Qty: 180 TAB | Refills: 1 | Status: SHIPPED | OUTPATIENT
Start: 2018-11-06 | End: 2019-04-24 | Stop reason: SDUPTHER

## 2018-11-06 NOTE — PROGRESS NOTES
bp much better and patient feels better.   Recommend only coreg 3.125mg bid and he was advised to skip dose on HD days

## 2018-11-14 ENCOUNTER — ANESTHESIA EVENT (OUTPATIENT)
Dept: CARDIOTHORACIC SURGERY | Age: 58
End: 2018-11-14
Payer: COMMERCIAL

## 2018-11-14 NOTE — H&P
History and Physical  
  
Adma Villalobos is a 63 y.o. male with recent diagnosis of ESRD. He had a RIJ permcath placed per IR which is functioning without problems and presents today to discuss permanent dialysis access. Vein mapping was done in our office today which showed he has adequate veins for fistula creation bilaterally. He is right handed. He does have history of dilated CMO with an EF of 35% and follows with Dr Irma Arce for this. Currently he has no signs or symptoms of active heart failure. He states that he feels good overall and is without complaint in the office today. No fever/chills.  
  
    
Past Medical History:  
Diagnosis Date  Arthritis of left knee 09/2017  
  moderate to severe, with effusion on xray  Chronic kidney disease    
 Diastolic CHF ([address]    
 Dilated cardiomyopathy ([facility])    
 History of alcohol abuse    
 History of echocardiogram 02/24/2014  
  Mod-marked LVE. EF 15%. Severe, diffuse hypk. Mild LVH. Gr 1 DDfx. Mod LAE. Mild-mod FIDELIA. Mild AoRE. No significant change from echo of 10/23/09.  History of gout    
 History of myocardial perfusion scan 05/25/2006  
  No evidence of ischemia or scarring. Gross LVE. EF 28%. Severe global hypk. Neg EKG on submaximal EST. Ex time 8 min 25 sec.  HTN (hypertension)    
 Hypercholesterolemia    
 Hypertensive cardiovascular disease    
  
  
     
Past Surgical History:  
Procedure Laterality Date  HX HERNIA REPAIR   04/2016  
  
  
  
Current Outpatient Medications:  
  carvedilol (COREG) 12.5 mg tablet, Take 1 Tab by mouth every twelve (12) hours. , Disp: 60 Tab, Rfl: 1 
  hydrALAZINE (APRESOLINE) 50 mg tablet, Take 1 Tab by mouth every eight (8) hours. , Disp: 90 Tab, Rfl: 1 
  b complex-vitamin c-folic acid (NEPHROCAPS) 1 mg capsule, Take 1 Cap by mouth daily. , Disp: 30 Cap, Rfl: 1   calcium acetate (PHOSLO) 667 mg cap, Take 1 Cap by mouth three (3) times daily (with meals). , Disp: 90 Cap, Rfl: 1   tamsulosin (FLOMAX) 0.4 mg capsule, Take 1 Cap by mouth nightly., Disp: 30 Cap, Rfl: 1 
  thiamine HCL (B-1) 100 mg tablet, Take 1 Tab by mouth daily. , Disp: 30 Tab, Rfl: 1   pravastatin (PRAVACHOL) 20 mg tablet, Take 1 Tab by mouth nightly. Indications: hyperlipidemia, Disp: 90 Tab, Rfl: 3 
  colchicine 0.6 mg tablet, 2 tablets at first sign of gout flare and then 1 tablet 1 hour later  Indications: acute gouty arthritis, Disp: 3 Tab, Rfl: 5 
  terazosin (HYTRIN) 1 mg capsule, Take 1 Cap by mouth nightly. Indications: hypertension, Disp: 30 Cap, Rfl: 11 
  allopurinol (ZYLOPRIM) 100 mg tablet, Take 2 Tabs by mouth daily. , Disp: 120 Tab, Rfl: 2 
  amLODIPine (NORVASC) 10 mg tablet, TAKE 1 TABLET BY MOUTH DAILY, Disp: 30 Tab, Rfl: 2 
  aspirin 81 mg chewable tablet, Take 1 Tab by mouth daily. , Disp: 30 Tab, Rfl: 11 
  
No Known Allergies 
  
Physical Exam:  
Visit Vitals /70 (BP 1 Location: Left arm, BP Patient Position: Sitting) Pulse 68 Resp 17 Ht 5' 7\" (1.702 m) Wt 234 lb (106.1 kg) BMI 36.65 kg/m²  
  
General: Well-appearing male in no acute distress HEENT: EOMI, no scleral icterus is noted. No carotid bruits are heard bilaterally Cardiovascular: Regular rhythm normal S1-S2 no rubs murmurs or gallops Pulmonary: No increased work or breathing is noted. Clear to auscultation bilaterally. No wheeze, rales or rhonchi. Abdomen: obese, soft, nondistended. Extremities: Pt has no LE edema. Has 2+ radial pulses bilaterally Neuro: Cranial nerves II through XII are grossly intact Integument: No ulcerations are identified visibly 
  
  
Impression and Plan:  
Lucinda Cárdenas is a 63 y.o. male with ESRD recently started on HD. He is currently using a RIJ permcath for HD access and presents today to discuss more permanent access.  Discussed dialysis access options at length with patient including peritoneal dialysis and HD with either a fistula or graft. Educational material was given in office today. All questions answered. Risks vs benefits of procedures were discussed at length. Vein mapping was reviewed with him in the office today. Patient will be scheduled for a left radiocephalic fistula creation and will f/u within 1 week s/p procedure. Discussed he will need cardiac clearance prior to surgery. Plan was discussed. Patient expresses understanding and agrees. Reviewed with pt, left arm cephalic vein fistula

## 2018-11-15 ENCOUNTER — ANESTHESIA (OUTPATIENT)
Dept: CARDIOTHORACIC SURGERY | Age: 58
End: 2018-11-15
Payer: COMMERCIAL

## 2018-11-15 ENCOUNTER — HOSPITAL ENCOUNTER (OUTPATIENT)
Age: 58
Discharge: HOME | End: 2018-11-15
Attending: SURGERY | Admitting: SURGERY
Payer: COMMERCIAL

## 2018-11-15 VITALS
WEIGHT: 227 LBS | RESPIRATION RATE: 14 BRPM | BODY MASS INDEX: 35.63 KG/M2 | DIASTOLIC BLOOD PRESSURE: 83 MMHG | TEMPERATURE: 97 F | HEIGHT: 67 IN | OXYGEN SATURATION: 99 % | SYSTOLIC BLOOD PRESSURE: 115 MMHG | HEART RATE: 80 BPM

## 2018-11-15 DIAGNOSIS — N18.6 ESRD ON DIALYSIS (HCC): Primary | ICD-10-CM

## 2018-11-15 DIAGNOSIS — Z99.2 ESRD ON DIALYSIS (HCC): Primary | ICD-10-CM

## 2018-11-15 LAB
BUN BLD-MCNC: 37 MG/DL (ref 7–18)
CHLORIDE BLD-SCNC: 98 MMOL/L (ref 100–108)
GLUCOSE BLD STRIP.AUTO-MCNC: 86 MG/DL (ref 74–106)
HCT VFR BLD CALC: 42 % (ref 36–49)
HGB BLD-MCNC: 14.3 G/DL (ref 12–16)
POTASSIUM BLD-SCNC: 4.6 MMOL/L (ref 3.5–5.5)
SODIUM BLD-SCNC: 137 MMOL/L (ref 136–145)

## 2018-11-15 PROCEDURE — 74011000258 HC RX REV CODE- 258: Performed by: NURSE ANESTHETIST, CERTIFIED REGISTERED

## 2018-11-15 PROCEDURE — 74011250636 HC RX REV CODE- 250/636: Performed by: SURGERY

## 2018-11-15 PROCEDURE — 77030002996 HC SUT SLK J&J -A: Performed by: SURGERY

## 2018-11-15 PROCEDURE — 76210000020 HC REC RM PH II FIRST 0.5 HR: Performed by: SURGERY

## 2018-11-15 PROCEDURE — 77030010512 HC APPL CLP LIG J&J -C: Performed by: SURGERY

## 2018-11-15 PROCEDURE — 74011250636 HC RX REV CODE- 250/636

## 2018-11-15 PROCEDURE — 74011000272 HC RX REV CODE- 272: Performed by: SURGERY

## 2018-11-15 PROCEDURE — 77030039266 HC ADH SKN EXOFIN S2SG -A: Performed by: SURGERY

## 2018-11-15 PROCEDURE — 77030002933 HC SUT MCRYL J&J -A: Performed by: SURGERY

## 2018-11-15 PROCEDURE — 76210000063 HC OR PH I REC FIRST 0.5 HR: Performed by: SURGERY

## 2018-11-15 PROCEDURE — 76060000033 HC ANESTHESIA 1 TO 1.5 HR: Performed by: SURGERY

## 2018-11-15 PROCEDURE — 74011000258 HC RX REV CODE- 258

## 2018-11-15 PROCEDURE — 74011250637 HC RX REV CODE- 250/637: Performed by: NURSE ANESTHETIST, CERTIFIED REGISTERED

## 2018-11-15 PROCEDURE — 84295 ASSAY OF SERUM SODIUM: CPT

## 2018-11-15 PROCEDURE — 76010000113 HC CV SURG 1 TO 1.5 HR: Performed by: SURGERY

## 2018-11-15 PROCEDURE — 77030002924 HC SUT GORTX WLGO -B: Performed by: SURGERY

## 2018-11-15 RX ORDER — HEPARIN SODIUM 200 [USP'U]/100ML
INJECTION, SOLUTION INTRAVENOUS
Status: DISCONTINUED
Start: 2018-11-15 | End: 2018-11-15 | Stop reason: HOSPADM

## 2018-11-15 RX ORDER — HYDROCODONE BITARTRATE AND ACETAMINOPHEN 5; 325 MG/1; MG/1
1-2 TABLET ORAL
Qty: 40 TAB | Refills: 0 | Status: SHIPPED | OUTPATIENT
Start: 2018-11-15

## 2018-11-15 RX ORDER — FAMOTIDINE 20 MG/1
20 TABLET, FILM COATED ORAL ONCE
Status: COMPLETED | OUTPATIENT
Start: 2018-11-15 | End: 2018-11-15

## 2018-11-15 RX ORDER — CEFAZOLIN SODIUM 2 G/50ML
2 SOLUTION INTRAVENOUS EVERY 8 HOURS
Status: DISCONTINUED | OUTPATIENT
Start: 2018-11-15 | End: 2018-11-15 | Stop reason: DRUGHIGH

## 2018-11-15 RX ORDER — FENTANYL CITRATE 50 UG/ML
INJECTION, SOLUTION INTRAMUSCULAR; INTRAVENOUS AS NEEDED
Status: DISCONTINUED | OUTPATIENT
Start: 2018-11-15 | End: 2018-11-15 | Stop reason: HOSPADM

## 2018-11-15 RX ORDER — SODIUM CHLORIDE 0.9 % (FLUSH) 0.9 %
5-10 SYRINGE (ML) INJECTION EVERY 8 HOURS
Status: DISCONTINUED | OUTPATIENT
Start: 2018-11-15 | End: 2018-11-15 | Stop reason: HOSPADM

## 2018-11-15 RX ORDER — SODIUM CHLORIDE 0.9 % (FLUSH) 0.9 %
5-10 SYRINGE (ML) INJECTION AS NEEDED
Status: CANCELLED | OUTPATIENT
Start: 2018-11-15

## 2018-11-15 RX ORDER — MIDAZOLAM HYDROCHLORIDE 1 MG/ML
INJECTION, SOLUTION INTRAMUSCULAR; INTRAVENOUS AS NEEDED
Status: DISCONTINUED | OUTPATIENT
Start: 2018-11-15 | End: 2018-11-15 | Stop reason: HOSPADM

## 2018-11-15 RX ORDER — CEFAZOLIN SODIUM 1 G/3ML
INJECTION, POWDER, FOR SOLUTION INTRAMUSCULAR; INTRAVENOUS AS NEEDED
Status: DISCONTINUED | OUTPATIENT
Start: 2018-11-15 | End: 2018-11-15 | Stop reason: HOSPADM

## 2018-11-15 RX ORDER — PROPOFOL 10 MG/ML
INJECTION, EMULSION INTRAVENOUS AS NEEDED
Status: DISCONTINUED | OUTPATIENT
Start: 2018-11-15 | End: 2018-11-15 | Stop reason: HOSPADM

## 2018-11-15 RX ORDER — SODIUM CHLORIDE, SODIUM LACTATE, POTASSIUM CHLORIDE, CALCIUM CHLORIDE 600; 310; 30; 20 MG/100ML; MG/100ML; MG/100ML; MG/100ML
75 INJECTION, SOLUTION INTRAVENOUS CONTINUOUS
Status: CANCELLED | OUTPATIENT
Start: 2018-11-15

## 2018-11-15 RX ORDER — SODIUM CHLORIDE 0.9 % (FLUSH) 0.9 %
5-10 SYRINGE (ML) INJECTION AS NEEDED
Status: DISCONTINUED | OUTPATIENT
Start: 2018-11-15 | End: 2018-11-15 | Stop reason: HOSPADM

## 2018-11-15 RX ORDER — LIDOCAINE HYDROCHLORIDE 10 MG/ML
INJECTION, SOLUTION EPIDURAL; INFILTRATION; INTRACAUDAL; PERINEURAL
Status: DISCONTINUED
Start: 2018-11-15 | End: 2018-11-15 | Stop reason: HOSPADM

## 2018-11-15 RX ORDER — INSULIN LISPRO 100 [IU]/ML
INJECTION, SOLUTION INTRAVENOUS; SUBCUTANEOUS ONCE
Status: DISCONTINUED | OUTPATIENT
Start: 2018-11-15 | End: 2018-11-15 | Stop reason: HOSPADM

## 2018-11-15 RX ORDER — CEFAZOLIN SODIUM 2 G/50ML
2 SOLUTION INTRAVENOUS ONCE
Status: DISCONTINUED | OUTPATIENT
Start: 2018-11-15 | End: 2018-11-15 | Stop reason: HOSPADM

## 2018-11-15 RX ORDER — HEPARIN SODIUM 1000 [USP'U]/ML
INJECTION, SOLUTION INTRAVENOUS; SUBCUTANEOUS AS NEEDED
Status: DISCONTINUED | OUTPATIENT
Start: 2018-11-15 | End: 2018-11-15 | Stop reason: HOSPADM

## 2018-11-15 RX ORDER — NALOXONE HYDROCHLORIDE 0.4 MG/ML
0.2 INJECTION, SOLUTION INTRAMUSCULAR; INTRAVENOUS; SUBCUTANEOUS AS NEEDED
Status: CANCELLED | OUTPATIENT
Start: 2018-11-15

## 2018-11-15 RX ORDER — PROPOFOL 10 MG/ML
INJECTION, EMULSION INTRAVENOUS
Status: DISCONTINUED | OUTPATIENT
Start: 2018-11-15 | End: 2018-11-15 | Stop reason: HOSPADM

## 2018-11-15 RX ORDER — DEXTROSE MONOHYDRATE AND SODIUM CHLORIDE 5; .225 G/100ML; G/100ML
25 INJECTION, SOLUTION INTRAVENOUS CONTINUOUS
Status: DISCONTINUED | OUTPATIENT
Start: 2018-11-15 | End: 2018-11-15 | Stop reason: HOSPADM

## 2018-11-15 RX ADMIN — FENTANYL CITRATE 50 MCG: 50 INJECTION, SOLUTION INTRAMUSCULAR; INTRAVENOUS at 11:06

## 2018-11-15 RX ADMIN — FENTANYL CITRATE 25 MCG: 50 INJECTION, SOLUTION INTRAMUSCULAR; INTRAVENOUS at 11:37

## 2018-11-15 RX ADMIN — HEPARIN SODIUM 3000 UNITS: 1000 INJECTION, SOLUTION INTRAVENOUS; SUBCUTANEOUS at 11:32

## 2018-11-15 RX ADMIN — PROPOFOL 75 MCG/KG/MIN: 10 INJECTION, EMULSION INTRAVENOUS at 11:03

## 2018-11-15 RX ADMIN — MIDAZOLAM HYDROCHLORIDE 2 MG: 1 INJECTION, SOLUTION INTRAMUSCULAR; INTRAVENOUS at 11:01

## 2018-11-15 RX ADMIN — DEXTROSE MONOHYDRATE AND SODIUM CHLORIDE 25 ML/HR: 5; .225 INJECTION, SOLUTION INTRAVENOUS at 10:17

## 2018-11-15 RX ADMIN — PROPOFOL 30 MG: 10 INJECTION, EMULSION INTRAVENOUS at 11:20

## 2018-11-15 RX ADMIN — CEFAZOLIN SODIUM 2 G: 1 INJECTION, POWDER, FOR SOLUTION INTRAMUSCULAR; INTRAVENOUS at 11:05

## 2018-11-15 RX ADMIN — FAMOTIDINE 20 MG: 20 TABLET ORAL at 10:16

## 2018-11-15 NOTE — ANESTHESIA POSTPROCEDURE EVALUATION
Procedure(s): LEFT ARM RADIOCEPHALIC VEIN ARTERIO VENOUS FISTULA CREATION/C-ARM. Anesthesia Post Evaluation Multimodal analgesia: multimodal analgesia used between 6 hours prior to anesthesia start to PACU discharge Patient location during evaluation: PACU Patient participation: complete - patient participated Level of consciousness: awake Pain management: satisfactory to patient Airway patency: patent Anesthetic complications: no 
Cardiovascular status: acceptable Respiratory status: acceptable Hydration status: acceptable Visit Vitals /88 (BP 1 Location: Right arm, BP Patient Position: At rest) Pulse 77 Temp 36.7 °C (98 °F) Resp 18 Ht 5' 7\" (1.702 m) Wt 103 kg (227 lb) SpO2 100% BMI 35.55 kg/m²

## 2018-11-15 NOTE — ANESTHESIA PREPROCEDURE EVALUATION
Anesthetic History No history of anesthetic complications Review of Systems / Medical History Patient summary reviewed, nursing notes reviewed and pertinent labs reviewed Pulmonary Within defined limits Neuro/Psych Cardiovascular Hypertension: poorly controlled CHF: dyspnea on exertion GI/Hepatic/Renal 
  
 
 
Renal disease: ESRD and dialysis Endo/Other Morbid obesity and arthritis Other Findings Physical Exam 
 
Airway Mallampati: III 
TM Distance: 4 - 6 cm Neck ROM: normal range of motion Cardiovascular Regular rate and rhythm,  S1 and S2 normal,  no murmur, click, rub, or gallop Rhythm: regular Rate: normal 
 
 
 
 Dental 
No notable dental hx Pulmonary Breath sounds clear to auscultation Abdominal 
GI exam deferred Other Findings Anesthetic Plan ASA: 4 Anesthesia type: MAC Induction: Intravenous Anesthetic plan and risks discussed with: Patient

## 2018-11-15 NOTE — DISCHARGE INSTRUCTIONS
Hemodialysis Access: What to Expect at [address] [facility]  Hemodialysis is a way to remove wastes from the blood when your kidneys can no longer do the job. It is not a cure, but it can help you live longer and feel better. It is a lifesaving treatment when you have kidney failure. Hemodialysis is often called dialysis. Your doctor created a place (called an access) in your arm for your blood to flow in and out of your body during your dialysis sessions. Your arm will probably be bruised and swollen. It may hurt. The cut (incision) may bleed. The pain and bleeding will get better over several days. You will probably need only over-the-counter pain medicine. You can reduce swelling by propping your arm on 1 or 2 pillows and keeping your elbow straight. You will have stitches. These may dissolve on their own, or your doctor will tell you when to come in to have them removed. You should also be able to return to work in a few days. You may feel some coolness or numbness in your hand. These feelings usually go away in a few weeks. Your doctor may suggest squeezing a soft object. This will strengthen your access and may make hemodialysis faster and easier. You should always be able to feel blood rushing through the fistula or graft. It feels like a slight vibration when you put your fingers on the skin over the fistula or graft. This feeling is called a thrill or pulse. This care sheet gives you a general idea about how long it will take for you to recover. But each person recovers at a different pace. Follow the steps below to get better as quickly as possible. How can you care for yourself at home? Activity    · Rest when you feel tired. Getting enough sleep will help you recover.  Do not lie on or sleep on the arm with the access.     · Avoid activities such as washing windows or gardening that put stress on the arm with the access.     · You may use your arm, but do not lift anything that weighs more than about 15 pounds. This may include a child, heavy grocery bags, a heavy briefcase or backpack, cat litter or dog food bags, or a vacuum .     · You can shower, but keep the access dry for the first 2 days. Cover the area with a plastic bag to keep it dry.     · Do not soak or scrub the incision until it has healed.     · Wear an arm guard to protect the area if you play sports or work with your arms.     · You may drive when your doctor says it is okay. This is usually in 1 to 2 days.     · Most people are able to return to work about 1 or 2 days after surgery. Diet    · Follow an eating plan that is good for your kidneys. A registered dietitian can help you make a meal plan that is right for you. You may need to limit protein, salt, fluids, and certain foods. Medicines    · Your doctor will tell you if and when you can restart your medicines. He or she will also give you instructions about taking any new medicines.     · If you take blood thinners, such as warfarin (Coumadin), clopidogrel (Plavix), or aspirin, be sure to talk to your doctor. He or she will tell you if and when to start taking those medicines again. Make sure that you understand exactly what your doctor wants you to do.     · Take pain medicines exactly as directed. ? If the doctor gave you a prescription medicine for pain, take it as prescribed. ? If you are not taking a prescription pain medicine, ask your doctor if you can take acetaminophen (Tylenol). Do not take ibuprofen (Advil, Motrin) or naproxen (Aleve), or similar medicines, unless your doctor tells you to. They may make chronic kidney disease worse. ? Do not take two or more pain medicines at the same time unless the doctor told you to. Many pain medicines have acetaminophen, which is Tylenol.  Too much acetaminophen (Tylenol) can be harmful.     · If you think your pain medicine is making you sick to your stomach:  ? Take your medicine after meals (unless your doctor has told you not to). ? Ask your doctor for a different pain medicine.     · If your doctor prescribed antibiotics, take them as directed. Do not stop taking them just because you feel better. You need to take the full course of antibiotics. Incision care    · Keep the area dry for 2 days. After 2 days, wash the area with soap and water every day, and always before dialysis.     · Do not soak or scrub the incision until it has healed.     · If you have a bandage, change it every day or as your doctor recommends. Your doctor will tell you when you can remove it. Exercise    · Squeeze a soft ball or other object as your doctor tells you. This will help blood flow through the access and help prevent blood clots.    Elevation    · Prop up the sore arm on a pillow anytime you sit or lie down during the next 3 days. Try to keep it above the level of your heart. This will help reduce swelling. Other instructions    · Every day, check your access for a pulse or thrill in the fistula or graft area. A thrill is a vibration. To feel a pulse or thrill, place the first two fingers of your hand over the access.     · Do not bump your arm.     · Do not wear tight clothing, jewelry, or anything else that may squeeze the access.     · Use your other arm to have blood drawn or blood pressure taken.     · Do not put cream or lotion on or near the access.     · Make sure all doctors you deal with know you have a vascular access. Follow-up care is a key part of your treatment and safety. Be sure to make and go to all appointments, and call your doctor if you are having problems. It's also a good idea to know your test results and keep a list of the medicines you take. When should you call for help? Call 911 anytime you think you may need emergency care.  For example, call if:    · You passed out (lost consciousness).     · You have chest pain, are short of breath, or cough up blood.    Call your doctor now or seek immediate medical care if:    · Your hand or arm is cold or dark-colored.     · You have no pulse in your access.     · You have nausea or you vomit.     · You have pain that does not get better after you take pain medicine.     · You have loose stitches, or your incision comes open.     · You are bleeding from the incision.     · You have signs of infection, such as:  ? Increased pain, swelling, warmth, or redness. ? Red streaks leading from the area. ? Pus draining from the area. ? A fever.     · You have signs of a blood clot in your leg (called a deep vein thrombosis), such as:  ? Pain in your calf, back of the knee, thigh, or groin. ? Redness or swelling in your leg.    Watch closely for changes in your health, and be sure to contact your doctor if you have any problems. Where can you learn more? Go to http://rachael-lauren.info/. Enter P616 in the search box to learn more about \"Hemodialysis Access: What to Expect at Home. \"  Current as of: March 15, 2018  Content Version: 11.8  © 2290-6911 [employer]. Care instructions adapted under license by [employer] (which disclaims liability or warranty for this information). If you have questions about a medical condition or this instruction, always ask your healthcare professional. [address] any warranty or liability for your use of this information. DISCHARGE SUMMARY from Nurse    PATIENT INSTRUCTIONS:    After general anesthesia or intravenous sedation, for 24 hours or while taking prescription Narcotics:  · Limit your activities  · Do not drive and operate hazardous machinery  · Do not make important personal or business decisions  · Do  not drink alcoholic beverages  · If you have not urinated within 8 hours after discharge, please contact your surgeon on call.     Report the following to your surgeon:  · Excessive pain, swelling, redness or odor of or around the surgical area  · Temperature over 100.5  · Nausea and vomiting lasting longer than 4 hours or if unable to take medications  · Any signs of decreased circulation or nerve impairment to extremity: change in color, persistent  numbness, tingling, coldness or increase pain  · Any questions    *  Please give a list of your current medications to your Primary Care Provider. *  Please update this list whenever your medications are discontinued, doses are      changed, or new medications (including over-the-counter products) are added. *  Please carry medication information at all times in case of emergency situations. These are general instructions for a healthy lifestyle:    No smoking/ No tobacco products/ Avoid exposure to second hand smoke  Surgeon General's Warning:  Quitting smoking now greatly reduces serious risk to your health. Obesity, smoking, and sedentary lifestyle greatly increases your risk for illness    A healthy diet, regular physical exercise & weight monitoring are important for maintaining a healthy lifestyle    You may be retaining fluid if you have a history of heart failure or if you experience any of the following symptoms:  Weight gain of 3 pounds or more overnight or 5 pounds in a week, increased swelling in our hands or feet or shortness of breath while lying flat in bed. Please call your doctor as soon as you notice any of these symptoms; do not wait until your next office visit. Recognize signs and symptoms of STROKE:    F-face looks uneven    A-arms unable to move or move unevenly    S-speech slurred or non-existent    T-time-call 911 as soon as signs and symptoms begin-DO NOT go       Back to bed or wait to see if you get better-TIME IS BRAIN. Warning Signs of HEART ATTACK     Call 911 if you have these symptoms:   Chest discomfort. Most heart attacks involve discomfort in the center of the chest that lasts more than a few minutes, or that goes away and comes back.  It can feel like uncomfortable pressure, squeezing, fullness, or pain.  Discomfort in other areas of the upper body. Symptoms can include pain or discomfort in one or both arms, the back, neck, jaw, or stomach.  Shortness of breath with or without chest discomfort.  Other signs may include breaking out in a cold sweat, nausea, or lightheadedness. Don't wait more than five minutes to call 911 - MINUTES MATTER! Fast action can save your life. Calling 911 is almost always the fastest way to get lifesaving treatment. Emergency Medical Services staff can begin treatment when they arrive -- up to an hour sooner than if someone gets to the hospital by car. The discharge information has been reviewed with the patient/friend. The patient/friend verbalized understanding. Discharge medications reviewed with the patient/friend and appropriate educational materials and side effects teaching were provided.   ___________________________________________________________________________________________________________________________________

## 2018-11-16 NOTE — OP NOTES
[facility] 
OPERATIVE REPORT Geeta Portillo MR#: [id number] : 1960 ACCOUNT #: [de-identified] DATE OF SERVICE: 11/15/2018 SURGEON:  Cassi Brizuela DO 
 
PREOPERATIVE DIAGNOSIS:  End-stage renal disease. POSTOPERATIVE DIAGNOSIS:  End-stage renal disease. PROCEDURE PERFORMED:  Creation of primary dialysis access site, left brachiocephalic fistula Terald Lute fistula). COMPLICATIONS:  Zero. ESTIMATED BLOOD LOSS:  Zero. CONDITION OF PATIENT:  Stable. IMPLANTS:  None. SPECIMENS REMOVED:  None. ASSISTANT:  None. ANESTHESIA:  Moderate sedation with local. 
 
DETAILS OF PROCEDURE:  The patient is a 50-year-old who came in for outpatient creation of fistula. He has a catheter placed by Interventional Radiology and needs permanent access. He understands the risks and benefits of the procedure. He had a preop antibiotic and was transferred to the room, where he had moderate sedation. The left arm was prepped and draped in the usual standard fashion. I used 1% lidocaine for the antecubital fossa. I examined his vein mapping. His vein appears to be a better choice in the upper arm. Certainly, on physical exam, I do not see much superficial vein down at the wrist.  I exposed the brachial artery here. It was soft and patent. The antecubital vein was certainly good in size. It was white in appearance, but soft and patent. I mobilized it, ligated it distally, allowing for transposition, and anticoagulated the patient. After the appropriate waiting period, I used vessel controls to control the artery. An arteriotomy was made with an 11 blade scalpel and Tucker scissors. I spatulated the vein and sewed it end-to-side to the artery using CV6 [place] suture in a circular and standard fashion. Upon completion of this, I released all of the controls. There was a wonderful thrill in the fistula.   It is superficial in location. I could feel it all the way up the arm. There was no bleeding. I made sure there was no tension on the fistula. Irrigated and closed with interrupted 3-0 Monocryl for fascia, 4-0 Monocryl and Dermabond glue for skin. He was transferred to recovery and I asked him after he had awakened. He had no pain in his hand. DO SUGAR Floyd 
D: 11/15/2018 13:57    
T: 11/15/2018 18:43 JOB #: [id number]

## 2018-11-29 ENCOUNTER — OFFICE VISIT (OUTPATIENT)
Dept: VASCULAR SURGERY | Age: 58
End: 2018-11-29

## 2018-11-29 VITALS
BODY MASS INDEX: 35.63 KG/M2 | HEIGHT: 67 IN | RESPIRATION RATE: 18 BRPM | WEIGHT: 227 LBS | SYSTOLIC BLOOD PRESSURE: 142 MMHG | HEART RATE: 78 BPM | DIASTOLIC BLOOD PRESSURE: 70 MMHG

## 2018-11-29 DIAGNOSIS — N18.6 ESRD (END STAGE RENAL DISEASE) ON DIALYSIS (HCC): Primary | ICD-10-CM

## 2018-11-29 DIAGNOSIS — Z99.2 ESRD (END STAGE RENAL DISEASE) ON DIALYSIS (HCC): Primary | ICD-10-CM

## 2018-11-29 NOTE — PROGRESS NOTES
67-year-old male with end-stage renal disease on hemodialysis  He is currently dialyzing with a right IJ tunneled dialysis catheter without issue  He presents today in follow-up after left brachiocephalic vein fistula creation  He is doing very well is without complaint in the office today  He denies any pain. No signs or symptoms of steal syndrome. No fevers or chills. His incision is clean dry intact no signs of hematoma or infection. Fistula with palpable thrill and excellent bruit. Discussed that we will obtain a maturation ultrasound in 4 weeks and he will follow-up afterwards  If his fistula is fully matured and he continues to do well we will clear him to start cannulation at that time.   He is certainly understanding to call the office sooner as needed  Patient expresses understanding and agrees to this plan

## 2018-12-13 ENCOUNTER — OFFICE VISIT (OUTPATIENT)
Dept: CARDIOLOGY CLINIC | Age: 58
End: 2018-12-13

## 2018-12-13 VITALS
HEIGHT: 67 IN | BODY MASS INDEX: 37.04 KG/M2 | DIASTOLIC BLOOD PRESSURE: 72 MMHG | HEART RATE: 91 BPM | SYSTOLIC BLOOD PRESSURE: 100 MMHG | WEIGHT: 236 LBS

## 2018-12-13 DIAGNOSIS — E78.5 HYPERLIPIDEMIA, UNSPECIFIED HYPERLIPIDEMIA TYPE: ICD-10-CM

## 2018-12-13 DIAGNOSIS — Z99.2 ESRD (END STAGE RENAL DISEASE) ON DIALYSIS (HCC): Primary | ICD-10-CM

## 2018-12-13 DIAGNOSIS — N18.6 ESRD (END STAGE RENAL DISEASE) ON DIALYSIS (HCC): Primary | ICD-10-CM

## 2018-12-13 DIAGNOSIS — I50.32 DIASTOLIC CHF, CHRONIC (HCC): ICD-10-CM

## 2018-12-13 DIAGNOSIS — I10 ESSENTIAL HYPERTENSION: ICD-10-CM

## 2018-12-13 NOTE — PROGRESS NOTES
HISTORY OF PRESENT ILLNESS  Aj Ambrose is a 63 y.o. male. CHF   The history is provided by the patient. This is a chronic problem. The problem occurs every several days. The problem has been gradually improving. Pertinent negatives include no chest pain, no abdominal pain, no headaches and no shortness of breath. Hypertension   The history is provided by the patient. This is a chronic problem. The problem occurs daily. The problem has been rapidly improving. Pertinent negatives include no chest pain, no abdominal pain, no headaches and no shortness of breath. Review of Systems   Constitutional: Positive for malaise/fatigue. Negative for chills, diaphoresis, fever and weight loss. HENT: Negative for congestion, ear discharge, ear pain, hearing loss, nosebleeds and tinnitus. Eyes: Negative for blurred vision. Respiratory: Negative for cough, hemoptysis, sputum production, shortness of breath, wheezing and stridor. Cardiovascular: Negative for chest pain, palpitations, orthopnea, claudication, leg swelling and PND. Gastrointestinal: Negative for abdominal pain, heartburn, nausea and vomiting. Musculoskeletal: Negative for myalgias and neck pain. Skin: Negative for itching and rash. Neurological: Negative for dizziness, tingling, tremors, focal weakness, loss of consciousness, weakness and headaches. Psychiatric/Behavioral: Negative for depression and suicidal ideas. Family History   Problem Relation Age of Onset    Cancer Father         Lung    Heart Failure Mother     Cancer Sister        Past Medical History:   Diagnosis Date    Arthritis of left knee 09/2017    moderate to severe, with effusion on xray    Chronic kidney disease     ESRD  HD on MWF    Diastolic CHF ([address]     Dilated cardiomyopathy ([address]     History of alcohol abuse     History of echocardiogram 02/24/2014    Mod-marked LVE. EF 15%. Severe, diffuse hypk. Mild LVH. Gr 1 DDfx. Mod LAE. Mild-mod FIDELIA. Mild AoRE. No significant change from echo of 10/23/09.  History of gout     History of myocardial perfusion scan 05/25/2006    No evidence of ischemia or scarring. Gross LVE. EF 28%. Severe global hypk. Neg EKG on submaximal EST. Ex time 8 min 25 sec.  HTN (hypertension)     Hypercholesterolemia     Hypertensive cardiovascular disease        Past Surgical History:   Procedure Laterality Date    HX COLONOSCOPY      HX HERNIA REPAIR  04/2016    HX VASCULAR ACCESS      Right upper chest HD CATH       Social History     Tobacco Use    Smoking status: Never Smoker    Smokeless tobacco: Never Used   Substance Use Topics    Alcohol use: No     Alcohol/week: 0.0 oz     Comment: stop 3 years ago in feb, 2015       No Known Allergies    Outpatient Medications Marked as Taking for the 12/13/18 encounter (Office Visit) with Jony Elliott MD   Medication Sig Dispense Refill    HYDROcodone-acetaminophen (NORCO) 5-325 mg per tablet Take 1-2 Tabs by mouth every six (6) hours as needed for Pain. Max Daily Amount: 8 Tabs. 40 Tab 0    carvedilol (COREG) 3.125 mg tablet Take 1 Tab by mouth every twelve (12) hours. 180 Tab 1    pravastatin (PRAVACHOL) 20 mg tablet Take 1 Tab by mouth nightly. Indications: hyperlipidemia 90 Tab 3    colchicine 0.6 mg tablet 2 tablets at first sign of gout flare and then 1 tablet 1 hour later  Indications: acute gouty arthritis 3 Tab 5    allopurinol (ZYLOPRIM) 100 mg tablet Take 2 Tabs by mouth daily. 120 Tab 2    aspirin 81 mg chewable tablet Take 1 Tab by mouth daily. 30 Tab 11        Visit Vitals  /72   Pulse 91   Ht 5' 7\" (1.702 m)   Wt 107 kg (236 lb)   BMI 36.96 kg/m²     Physical Exam   Constitutional: He is oriented to person, place, and time. He appears well-developed and well-nourished. No distress. HENT:   Head: Atraumatic. Mouth/Throat: No oropharyngeal exudate. Eyes: Conjunctivae are normal. No scleral icterus. Neck: Neck supple.  No JVD present. Cardiovascular: Normal rate and regular rhythm. Exam reveals no gallop. No murmur heard. Pulmonary/Chest: Effort normal and breath sounds normal. No stridor. He has no wheezes. He has no rales. Abdominal: Soft. There is no tenderness. There is no rebound. Musculoskeletal: Normal range of motion. He exhibits no edema or tenderness. Neurological: He is alert and oriented to person, place, and time. He exhibits normal muscle tone. Skin: Skin is warm. He is not diaphoretic. Psychiatric: He has a normal mood and affect. His behavior is normal.     Echo 2016:  SUMMARY:  Left ventricle: The ventricle was dilated. Ejection fraction was estimated  to be 15 %. There was severe diffuse hypokinesis. Wall thickness was  moderately increased. Features were consistent with a pseudonormal left  ventricular filling pattern, with concomitant abnormal relaxation and  increased filling pressure (grade 2 diastolic dysfunction). Left atrium: The atrium was severely dilated. Mitral valve: There was mild regurgitation, there were two jets noted in  the apical two window. Aortic valve: There was moderate regurgitation. 08/23/18   ECHO ADULT COMPLETE 08/25/2018 8/25/2018    Narrative · Left ventricular low normal systolic function. Estimated left   ventricular ejection fraction is 51 - 55%. Biplane method used to measure   ejection fraction. Left ventricular severe concentric hypertrophy. Left   ventricular global hypokinesis. · Mild aortic valve regurgitation is present. · LV strain shows relative apical sparing with reduced global strain at   discharge -9.1%  · Aortic root and ascending aorta measures 4.1 cm. · Pulmonary arterial systolic pressure is 26 mmHg. There is no evidence of   pulmonary hypertension. · Trivial pericardial effusion. · Left atrial cavity size is mildly dilated. · Mitral annular calcification. Trace mitral valve regurgitation.         Signed by: Celsa Sanches MD ASSESSMENT and PLAN    ICD-10-CM ICD-9-CM    1. ESRD (end stage renal disease) on dialysis (HCC) N18.6 585.6     Z99.2 V45.11    2. Essential hypertension I10 401.9    3. Hyperlipidemia, unspecified hyperlipidemia type E78.5 272.4    4. Diastolic CHF, chronic (HCC) I50.32 428.32      428.0     NYHA class II     No orders of the defined types were placed in this encounter. Follow-up Disposition:  Return in about 6 months (around 6/13/2019). reviewed diet, exercise and weight control  cardiovascular risk and specific lipid/LDL goals reviewed  use of aspirin to prevent MI and TIA's discussed. Patient with h/o non ischemic cardiomyopathy EF 15% seen for f/u  Was recently hospitalized and repeat echocardiogram revealed an ejection fraction of 55%. Was started on hemodialysis for CKD. CHF is significantly improved since starting on dialysis. ET is improving.  On coreg 3.125mg bid  Continue current meds  F/u in 6 months

## 2019-01-02 ENCOUNTER — DOCUMENTATION ONLY (OUTPATIENT)
Dept: VASCULAR SURGERY | Age: 59
End: 2019-01-02

## 2019-01-02 NOTE — PROGRESS NOTES
Patient switched his insurance to a policy we do not accept, he would like you to call him with some names of doctors he can go to. Also told patient he can call the number for member services on the back of his new card. He has cancelled both the test and follow up appointments for tomorrow.   Thank you

## 2019-01-03 ENCOUNTER — OFFICE VISIT (OUTPATIENT)
Dept: VASCULAR SURGERY | Age: 59
End: 2019-01-03

## 2019-01-03 VITALS
RESPIRATION RATE: 16 BRPM | BODY MASS INDEX: 37.04 KG/M2 | HEART RATE: 64 BPM | DIASTOLIC BLOOD PRESSURE: 82 MMHG | SYSTOLIC BLOOD PRESSURE: 126 MMHG | HEIGHT: 67 IN | WEIGHT: 236 LBS

## 2019-01-03 DIAGNOSIS — N18.6 ESRD (END STAGE RENAL DISEASE) ON DIALYSIS (HCC): Primary | ICD-10-CM

## 2019-01-03 DIAGNOSIS — Z99.2 ESRD (END STAGE RENAL DISEASE) ON DIALYSIS (HCC): Primary | ICD-10-CM

## 2019-01-03 NOTE — PROGRESS NOTES
1. Have you been to an emergency room or urgent care clinic since your last visit? NO    Hospitalized since your last visit? If yes, where, when, and reason for visit? nO  2. Have you seen or consulted any other health care providers outside of the [facility] since your last visit including any procedures, health maintenance items. If yes, where, when and reason for visit?  No

## 2019-01-03 NOTE — PROGRESS NOTES
60-year-old male with end-stage renal disease on hemodialysis  He is currently dialyzing with a right IJ tunneled dialysis catheter without issue  He is s/p left brachiocephalic vein fistula creation and presents today for follow up. He is doing very well is without complaint in the office today  He denies any pain. No signs or symptoms of steal syndrome. No fevers or chills. His incision is healing nicely. Fistula with palpable thrill and excellent bruit. Discussed that we will clear him to start cannulation of his fistula starting Monday (1/7/19). Of note patient's insurance has changed and we are no longer in network for him. He will need to establish care with a new vascular surgeon in the near future. I did discuss with him that if his fistula is functioning without issue and they were able to cannulate his arm for at least 2 weeks we can remove his catheter for him at that time. Otherwise he would have to follow-up with his new vascular surgeon. Discussed that we are happy to make recommendations refer him to a new vascular surgeon once he finds out which physicians are in his new network. She expresses understanding and agrees to this plan. He will call us with any issues or concerns.

## 2019-03-01 ENCOUNTER — TELEPHONE (OUTPATIENT)
Dept: VASCULAR SURGERY | Age: 59
End: 2019-03-01

## 2019-03-01 NOTE — TELEPHONE ENCOUNTER
[facility] that [facility] needs a cath removal . Called and spoke with Carmel Jernigan and advised that the patient had an insurance change and that if the patient's cath could be removed while global then we could do it. Advised that global period ended on 02/15/19, that patient would be charged and we had told the patient that he would have to find another vascular surgeon due to the change . Carmel Jernigan said he would call [facility] and see if they will take the patient on and he will call back if there is an issue.

## 2019-04-24 RX ORDER — CARVEDILOL 3.12 MG/1
TABLET ORAL
Qty: 180 TAB | Refills: 0 | Status: SHIPPED | OUTPATIENT
Start: 2019-04-24 | End: 2019-08-22 | Stop reason: SDUPTHER

## 2019-06-13 ENCOUNTER — OFFICE VISIT (OUTPATIENT)
Dept: CARDIOLOGY CLINIC | Age: 59
End: 2019-06-13

## 2019-06-13 VITALS
BODY MASS INDEX: 38.77 KG/M2 | DIASTOLIC BLOOD PRESSURE: 66 MMHG | HEART RATE: 86 BPM | WEIGHT: 247 LBS | SYSTOLIC BLOOD PRESSURE: 97 MMHG | HEIGHT: 67 IN

## 2019-06-13 DIAGNOSIS — I50.32 DIASTOLIC CHF, CHRONIC (HCC): ICD-10-CM

## 2019-06-13 DIAGNOSIS — Z99.2 ESRD (END STAGE RENAL DISEASE) ON DIALYSIS (HCC): Primary | ICD-10-CM

## 2019-06-13 DIAGNOSIS — I10 ESSENTIAL HYPERTENSION: ICD-10-CM

## 2019-06-13 DIAGNOSIS — E78.5 HYPERLIPIDEMIA, UNSPECIFIED HYPERLIPIDEMIA TYPE: ICD-10-CM

## 2019-06-13 DIAGNOSIS — N18.6 ESRD (END STAGE RENAL DISEASE) ON DIALYSIS (HCC): Primary | ICD-10-CM

## 2019-06-13 NOTE — PROGRESS NOTES
1. Have you been to the ER, urgent care clinic since your last visit? Hospitalized since your last visit? Yes Where: [facility] Reason for visit: Fistula Creation    2. Have you seen or consulted any other health care providers outside of the [address] [place] [address] since your last visit? Include any pap smears or colon screening.  Yes Where: [facility]PCP Reason for visit: Routine Visits

## 2019-06-17 NOTE — PROGRESS NOTES
HISTORY OF PRESENT ILLNESS  Arin Lee is a 63 y.o. male. CHF   The history is provided by the patient. This is a chronic problem. The problem occurs every several days. The problem has been gradually improving. Pertinent negatives include no chest pain, no abdominal pain, no headaches and no shortness of breath. Hypertension   The history is provided by the patient. This is a chronic problem. The problem occurs daily. The problem has been rapidly improving. Pertinent negatives include no chest pain, no abdominal pain, no headaches and no shortness of breath. Review of Systems   Constitutional: Positive for malaise/fatigue. Negative for chills, diaphoresis, fever and weight loss. HENT: Negative for congestion, ear discharge, ear pain, hearing loss, nosebleeds and tinnitus. Eyes: Negative for blurred vision. Respiratory: Negative for cough, hemoptysis, sputum production, shortness of breath, wheezing and stridor. Cardiovascular: Negative for chest pain, palpitations, orthopnea, claudication, leg swelling and PND. Gastrointestinal: Negative for abdominal pain, heartburn, nausea and vomiting. Musculoskeletal: Negative for myalgias and neck pain. Skin: Negative for itching and rash. Neurological: Negative for dizziness, tingling, tremors, focal weakness, loss of consciousness, weakness and headaches. Psychiatric/Behavioral: Negative for depression and suicidal ideas. Family History   Problem Relation Age of Onset    Cancer Father         Lung    Heart Failure Mother     Cancer Sister        Past Medical History:   Diagnosis Date    Arthritis of left knee 09/2017    moderate to severe, with effusion on xray    Chronic kidney disease     ESRD  HD on MWF    Diastolic CHF ([address]     Dilated cardiomyopathy ([address]     History of alcohol abuse     History of echocardiogram 02/24/2014    Mod-marked LVE. EF 15%. Severe, diffuse hypk. Mild LVH. Gr 1 DDfx. Mod LAE. Mild-mod FIDELIA. Mild AoRE. No significant change from echo of 10/23/09.  History of gout     History of myocardial perfusion scan 05/25/2006    No evidence of ischemia or scarring. Gross LVE. EF 28%. Severe global hypk. Neg EKG on submaximal EST. Ex time 8 min 25 sec.  HTN (hypertension)     Hypercholesterolemia     Hypertensive cardiovascular disease        Past Surgical History:   Procedure Laterality Date    HX COLONOSCOPY      HX HERNIA REPAIR  04/2016    HX VASCULAR ACCESS      Right upper chest HD CATH       Social History     Tobacco Use    Smoking status: Never Smoker    Smokeless tobacco: Never Used   Substance Use Topics    Alcohol use: No     Alcohol/week: 0.0 oz     Comment: stop 3 years ago in feb, 2015       No Known Allergies    Outpatient Medications Marked as Taking for the 6/13/19 encounter (Office Visit) with Chriss Silva MD   Medication Sig Dispense Refill    sucroferric oxyhydroxide (VELPHORO) 500 mg chew chewable tablet Take  by mouth three (3) times daily (with meals).  carvedilol (COREG) 3.125 mg tablet TAKE 1 TABLET BY MOUTH EVERY 12 HOURS 180 Tab 0    pravastatin (PRAVACHOL) 20 mg tablet Take 1 Tab by mouth nightly. Indications: hyperlipidemia 90 Tab 3    allopurinol (ZYLOPRIM) 100 mg tablet Take 2 Tabs by mouth daily. 120 Tab 2    aspirin 81 mg chewable tablet Take 1 Tab by mouth daily. 30 Tab 11        Visit Vitals  BP 97/66   Pulse 86   Ht 5' 7\" (1.702 m)   Wt 112 kg (247 lb)   BMI 38.69 kg/m²     Physical Exam   Constitutional: He is oriented to person, place, and time. He appears well-developed and well-nourished. No distress. HENT:   Head: Atraumatic. Mouth/Throat: No oropharyngeal exudate. Eyes: Conjunctivae are normal. No scleral icterus. Neck: Neck supple. No JVD present. Cardiovascular: Normal rate and regular rhythm. Exam reveals no gallop. No murmur heard. Pulmonary/Chest: Effort normal and breath sounds normal. No stridor. He has no wheezes. He has no rales. Abdominal: Soft. There is no tenderness. There is no rebound. Musculoskeletal: Normal range of motion. He exhibits no edema or tenderness. Neurological: He is alert and oriented to person, place, and time. He exhibits normal muscle tone. Skin: Skin is warm. He is not diaphoretic. Psychiatric: He has a normal mood and affect. His behavior is normal.     Echo 2016:  SUMMARY:  Left ventricle: The ventricle was dilated. Ejection fraction was estimated  to be 15 %. There was severe diffuse hypokinesis. Wall thickness was  moderately increased. Features were consistent with a pseudonormal left  ventricular filling pattern, with concomitant abnormal relaxation and  increased filling pressure (grade 2 diastolic dysfunction). Left atrium: The atrium was severely dilated. Mitral valve: There was mild regurgitation, there were two jets noted in  the apical two window. Aortic valve: There was moderate regurgitation. 08/23/18   ECHO ADULT COMPLETE 08/25/2018 8/25/2018    Narrative · Left ventricular low normal systolic function. Estimated left   ventricular ejection fraction is 51 - 55%. Biplane method used to measure   ejection fraction. Left ventricular severe concentric hypertrophy. Left   ventricular global hypokinesis. · Mild aortic valve regurgitation is present. · LV strain shows relative apical sparing with reduced global strain at   discharge -9.1%  · Aortic root and ascending aorta measures 4.1 cm. · Pulmonary arterial systolic pressure is 26 mmHg. There is no evidence of   pulmonary hypertension. · Trivial pericardial effusion. · Left atrial cavity size is mildly dilated. · Mitral annular calcification. Trace mitral valve regurgitation. Signed by: Sami Judge MD     ASSESSMENT and PLAN    ICD-10-CM ICD-9-CM    1. ESRD (end stage renal disease) on dialysis ([facility]) N18.6 585.6     Z99.2 V45.11    2. Essential hypertension I10 401.9    3.  Hyperlipidemia, unspecified hyperlipidemia type E78.5 272.4    4. Diastolic CHF, chronic (HCC) I50.32 428.32      428.0     NYHA class II     No orders of the defined types were placed in this encounter. Follow-up and Dispositions    · Return in about 6 months (around 12/13/2019). reviewed diet, exercise and weight control  cardiovascular risk and specific lipid/LDL goals reviewed  use of aspirin to prevent MI and TIA's discussed. Patient with h/o non ischemic cardiomyopathy EF 15% seen for f/u  Was recently hospitalized and repeat echocardiogram revealed an ejection fraction of 55%. Was started on hemodialysis for CKD. CHF is significantly improved since starting on dialysis. ET is improving.  On coreg 3.125mg bid  bp stable on current meds  F/u in 6 months

## 2019-08-22 RX ORDER — CARVEDILOL 3.12 MG/1
TABLET ORAL
Qty: 180 TAB | Refills: 0 | Status: SHIPPED | OUTPATIENT
Start: 2019-08-22 | End: 2019-11-20 | Stop reason: SDUPTHER

## 2019-11-20 RX ORDER — CARVEDILOL 3.12 MG/1
TABLET ORAL
Qty: 180 TAB | Refills: 0 | Status: SHIPPED | OUTPATIENT
Start: 2019-11-20 | End: 2020-01-01

## 2019-12-17 ENCOUNTER — OFFICE VISIT (OUTPATIENT)
Dept: CARDIOLOGY CLINIC | Age: 59
End: 2019-12-17

## 2019-12-17 VITALS
WEIGHT: 253.2 LBS | DIASTOLIC BLOOD PRESSURE: 63 MMHG | BODY MASS INDEX: 39.74 KG/M2 | HEART RATE: 76 BPM | HEIGHT: 67 IN | SYSTOLIC BLOOD PRESSURE: 99 MMHG

## 2019-12-17 DIAGNOSIS — I50.32 DIASTOLIC CHF, CHRONIC (HCC): ICD-10-CM

## 2019-12-17 DIAGNOSIS — E78.5 HYPERLIPIDEMIA, UNSPECIFIED HYPERLIPIDEMIA TYPE: ICD-10-CM

## 2019-12-17 DIAGNOSIS — N18.6 ESRD (END STAGE RENAL DISEASE) ON DIALYSIS (HCC): ICD-10-CM

## 2019-12-17 DIAGNOSIS — Z99.2 ESRD (END STAGE RENAL DISEASE) ON DIALYSIS (HCC): ICD-10-CM

## 2019-12-17 DIAGNOSIS — I10 ESSENTIAL HYPERTENSION: Primary | ICD-10-CM

## 2019-12-17 RX ORDER — PRAVASTATIN SODIUM 20 MG/1
20 TABLET ORAL
Qty: 90 TAB | Refills: 3 | Status: SHIPPED | OUTPATIENT
Start: 2019-12-17 | End: 2020-01-01

## 2019-12-17 NOTE — PROGRESS NOTES
HISTORY OF PRESENT ILLNESS Ginny Estrella is a 63 y.o. male. CHF The history is provided by the patient. This is a chronic problem. The problem occurs every several days. The problem has been gradually improving. Hypertension The history is provided by the patient. This is a chronic problem. The problem occurs daily. The problem has been rapidly improving. Review of Systems Constitutional: Positive for malaise/fatigue. Negative for chills, diaphoresis, fever and weight loss. HENT: Negative for congestion, ear discharge, ear pain, hearing loss, nosebleeds and tinnitus. Eyes: Negative for blurred vision. Respiratory: Negative for cough, hemoptysis, sputum production, wheezing and stridor. Cardiovascular: Negative for palpitations, orthopnea, claudication, leg swelling and PND. Gastrointestinal: Negative for heartburn, nausea and vomiting. Musculoskeletal: Negative for myalgias and neck pain. Skin: Negative for itching and rash. Neurological: Negative for dizziness, tingling, tremors, focal weakness, loss of consciousness and weakness. Psychiatric/Behavioral: Negative for depression and suicidal ideas. Family History Problem Relation Age of Onset  Cancer Father Lung  Heart Failure Mother  Cancer Sister Past Medical History:  
Diagnosis Date  Arthritis of left knee 09/2017  
 moderate to severe, with effusion on xray  Chronic kidney disease ESRD  HD on MWF  
 Diastolic CHF ([address]  Dilated cardiomyopathy ([address]  History of alcohol abuse  History of echocardiogram 02/24/2014 Mod-marked LVE. EF 15%. Severe, diffuse hypk. Mild LVH. Gr 1 DDfx. Mod LAE. Mild-mod FIDELIA. Mild AoRE. No significant change from echo of 10/23/09.  History of gout  History of myocardial perfusion scan 05/25/2006 No evidence of ischemia or scarring. Gross LVE. EF 28%. Severe global hypk. Neg EKG on submaximal EST. Ex time 8 min 25 sec.  HTN (hypertension)  Hypercholesterolemia  Hypertensive cardiovascular disease Past Surgical History:  
Procedure Laterality Date  HX COLONOSCOPY    
 HX HERNIA REPAIR  04/2016  HX VASCULAR ACCESS Right upper chest HD CATH Social History Tobacco Use  Smoking status: Never Smoker  Smokeless tobacco: Never Used Substance Use Topics  Alcohol use: No  
  Alcohol/week: 0.0 standard drinks Comment: stop 3 years ago in feb, 2015 No Known Allergies Outpatient Medications Marked as Taking for the 12/17/19 encounter (Office Visit) with Yolanda Francois MD  
Medication Sig Dispense Refill  pravastatin (PRAVACHOL) 20 mg tablet Take 1 Tab by mouth nightly. Indications: excessive fat in the blood 90 Tab 3  carvedilol (COREG) 3.125 mg tablet TAKE 1 TABLET BY MOUTH EVERY 12 HOURS 180 Tab 0  
 sucroferric oxyhydroxide (VELPHORO) 500 mg chew chewable tablet Take  by mouth three (3) times daily (with meals).  allopurinol (ZYLOPRIM) 100 mg tablet Take 2 Tabs by mouth daily. 120 Tab 2  
 aspirin 81 mg chewable tablet Take 1 Tab by mouth daily. 30 Tab 11 Visit Vitals BP 99/63 Pulse 76 Ht 5' 7\" (1.702 m) Wt 114.9 kg (253 lb 3.2 oz) BMI 39.66 kg/m² Physical Exam  
Constitutional: He is oriented to person, place, and time. He appears well-developed and well-nourished. No distress. HENT:  
Head: Atraumatic. Mouth/Throat: No oropharyngeal exudate. Eyes: Conjunctivae are normal. No scleral icterus. Neck: Neck supple. No JVD present. Cardiovascular: Normal rate and regular rhythm. Exam reveals no gallop. No murmur heard. Pulmonary/Chest: Effort normal and breath sounds normal. No stridor. He has no wheezes. He has no rales. Abdominal: Soft. There is no tenderness. There is no rebound. Musculoskeletal: Normal range of motion. General: No tenderness or edema. Neurological: He is alert and oriented to person, place, and time. He exhibits normal muscle tone. Skin: Skin is warm. He is not diaphoretic. Psychiatric: He has a normal mood and affect. His behavior is normal.  
 
Echo 2016: 
SUMMARY: 
Left ventricle: The ventricle was dilated. Ejection fraction was estimated 
to be 15 %. There was severe diffuse hypokinesis. Wall thickness was 
moderately increased. Features were consistent with a pseudonormal left 
ventricular filling pattern, with concomitant abnormal relaxation and 
increased filling pressure (grade 2 diastolic dysfunction). Left atrium: The atrium was severely dilated. Mitral valve: There was mild regurgitation, there were two jets noted in 
the apical two window. Aortic valve: There was moderate regurgitation. 08/23/18 ECHO ADULT COMPLETE 08/25/2018 8/25/2018 Narrative · Left ventricular low normal systolic function. Estimated left  
ventricular ejection fraction is 51 - 55%. Biplane method used to measure  
ejection fraction. Left ventricular severe concentric hypertrophy. Left  
ventricular global hypokinesis. · Mild aortic valve regurgitation is present. · LV strain shows relative apical sparing with reduced global strain at  
discharge -9.1% · Aortic root and ascending aorta measures 4.1 cm. · Pulmonary arterial systolic pressure is 26 mmHg. There is no evidence of  
pulmonary hypertension. · Trivial pericardial effusion. · Left atrial cavity size is mildly dilated. · Mitral annular calcification. Trace mitral valve regurgitation. Signed by: Bharati Lopez MD  
 
ASSESSMENT and PLAN 
  ICD-10-CM ICD-9-CM 1. Essential hypertension I10 401.9 AMB POC EKG ROUTINE W/ 12 LEADS, INTER & REP 2. Hyperlipidemia, unspecified hyperlipidemia type E78.5 272.4 pravastatin (PRAVACHOL) 20 mg tablet 3. ESRD (end stage renal disease) on dialysis (HCC) N18.6 585.6  Z99.2 V45.11   
 4. Diastolic CHF, chronic (HCC) I50.32 428.32   
  428.0 Orders Placed This Encounter  AMB POC EKG ROUTINE W/ 12 LEADS, INTER & REP Order Specific Question:   Reason for Exam: Answer:   htn  pravastatin (PRAVACHOL) 20 mg tablet Sig: Take 1 Tab by mouth nightly. Indications: excessive fat in the blood Dispense:  90 Tab Refill:  3 Follow-up and Dispositions · Return in about 6 months (around 6/17/2020). reviewed diet, exercise and weight control 
cardiovascular risk and specific lipid/LDL goals reviewed 
use of aspirin to prevent MI and TIA's discussed. Patient with h/o non ischemic cardiomyopathy EF 15% seen for f/u Was recently hospitalized and repeat echocardiogram revealed an ejection fraction of 55%. Was started on hemodialysis for CKD. CHF is significantly improved since starting on dialysis. ET is stable. On coreg 3.125mg bid 
bp stable on current meds F/u in 6 months

## 2019-12-17 NOTE — PROGRESS NOTES
1. Have you been to the ER, urgent care clinic since your last visit? Hospitalized since your last visit? No 
 
2. Have you seen or consulted any other health care providers outside of the [address] [place] [address] since your last visit? Include any pap smears or colon screening.  No

## 2020-01-01 ENCOUNTER — VIRTUAL VISIT (OUTPATIENT)
Dept: CARDIOLOGY CLINIC | Age: 60
End: 2020-01-01

## 2020-01-01 DIAGNOSIS — E78.5 HYPERLIPIDEMIA, UNSPECIFIED HYPERLIPIDEMIA TYPE: ICD-10-CM

## 2020-01-01 RX ORDER — CARVEDILOL 3.12 MG/1
TABLET ORAL
Qty: 180 TAB | Refills: 0 | Status: SHIPPED | OUTPATIENT
Start: 2020-01-01

## 2020-01-01 RX ORDER — PRAVASTATIN SODIUM 20 MG/1
TABLET ORAL
Qty: 90 TAB | Refills: 3 | Status: SHIPPED | OUTPATIENT
Start: 2020-01-01

## 2020-01-01 RX ORDER — CARVEDILOL 3.12 MG/1
TABLET ORAL
Qty: 180 TAB | Refills: 0 | Status: SHIPPED | OUTPATIENT
Start: 2020-01-01 | End: 2020-01-01

## 2021-01-01 ENCOUNTER — APPOINTMENT (OUTPATIENT)
Dept: NON INVASIVE DIAGNOSTICS | Age: 61
DRG: 208 | End: 2021-01-01
Attending: STUDENT IN AN ORGANIZED HEALTH CARE EDUCATION/TRAINING PROGRAM
Payer: MEDICARE

## 2021-01-01 ENCOUNTER — APPOINTMENT (OUTPATIENT)
Dept: CT IMAGING | Age: 61
DRG: 208 | End: 2021-01-01
Attending: EMERGENCY MEDICINE
Payer: MEDICARE

## 2021-01-01 ENCOUNTER — APPOINTMENT (OUTPATIENT)
Dept: GENERAL RADIOLOGY | Age: 61
DRG: 208 | End: 2021-01-01
Attending: STUDENT IN AN ORGANIZED HEALTH CARE EDUCATION/TRAINING PROGRAM
Payer: MEDICARE

## 2021-01-01 ENCOUNTER — APPOINTMENT (OUTPATIENT)
Dept: GENERAL RADIOLOGY | Age: 61
End: 2021-01-01
Attending: EMERGENCY MEDICINE
Payer: MEDICARE

## 2021-01-01 ENCOUNTER — APPOINTMENT (OUTPATIENT)
Dept: GENERAL RADIOLOGY | Age: 61
DRG: 208 | End: 2021-01-01
Attending: PHYSICIAN ASSISTANT
Payer: MEDICARE

## 2021-01-01 ENCOUNTER — APPOINTMENT (OUTPATIENT)
Dept: VASCULAR SURGERY | Age: 61
DRG: 208 | End: 2021-01-01
Attending: INTERNAL MEDICINE
Payer: MEDICARE

## 2021-01-01 ENCOUNTER — HOSPITAL ENCOUNTER (EMERGENCY)
Age: 61
Discharge: HOME | End: 2021-01-15
Attending: EMERGENCY MEDICINE
Payer: MEDICARE

## 2021-01-01 ENCOUNTER — APPOINTMENT (OUTPATIENT)
Dept: GENERAL RADIOLOGY | Age: 61
DRG: 208 | End: 2021-01-01
Attending: EMERGENCY MEDICINE
Payer: MEDICARE

## 2021-01-01 ENCOUNTER — ANESTHESIA EVENT (OUTPATIENT)
Dept: ICU | Age: 61
DRG: 208 | End: 2021-01-01
Payer: MEDICARE

## 2021-01-01 ENCOUNTER — PATIENT OUTREACH (OUTPATIENT)
Dept: CASE MANAGEMENT | Age: 61
End: 2021-01-01

## 2021-01-01 ENCOUNTER — APPOINTMENT (OUTPATIENT)
Dept: GENERAL RADIOLOGY | Age: 61
DRG: 208 | End: 2021-01-01
Attending: INTERNAL MEDICINE
Payer: MEDICARE

## 2021-01-01 ENCOUNTER — ANESTHESIA (OUTPATIENT)
Dept: ICU | Age: 61
DRG: 208 | End: 2021-01-01
Payer: MEDICARE

## 2021-01-01 ENCOUNTER — HOSPITAL ENCOUNTER (INPATIENT)
Age: 61
LOS: 11 days | DRG: 208 | End: 2021-02-03
Attending: EMERGENCY MEDICINE | Admitting: FAMILY MEDICINE
Payer: MEDICARE

## 2021-01-01 VITALS
OXYGEN SATURATION: 94 % | HEIGHT: 69 IN | HEART RATE: 120 BPM | BODY MASS INDEX: 36.05 KG/M2 | DIASTOLIC BLOOD PRESSURE: 47 MMHG | TEMPERATURE: 98.1 F | SYSTOLIC BLOOD PRESSURE: 93 MMHG | RESPIRATION RATE: 25 BRPM | WEIGHT: 243.39 LBS

## 2021-01-01 VITALS
BODY MASS INDEX: 35.55 KG/M2 | OXYGEN SATURATION: 96 % | SYSTOLIC BLOOD PRESSURE: 138 MMHG | TEMPERATURE: 101.6 F | RESPIRATION RATE: 18 BRPM | WEIGHT: 240 LBS | HEART RATE: 110 BPM | DIASTOLIC BLOOD PRESSURE: 80 MMHG | HEIGHT: 69 IN

## 2021-01-01 DIAGNOSIS — Z99.2 ESRD (END STAGE RENAL DISEASE) ON DIALYSIS (HCC): ICD-10-CM

## 2021-01-01 DIAGNOSIS — E66.01 SEVERE OBESITY (BMI 35.0-39.9) WITH COMORBIDITY (HCC): ICD-10-CM

## 2021-01-01 DIAGNOSIS — U07.1 COVID-19 VIRUS INFECTION: ICD-10-CM

## 2021-01-01 DIAGNOSIS — N18.6 ESRD (END STAGE RENAL DISEASE) ON DIALYSIS (HCC): ICD-10-CM

## 2021-01-01 DIAGNOSIS — R09.02 HYPOXIA: ICD-10-CM

## 2021-01-01 DIAGNOSIS — I50.32 CHRONIC DIASTOLIC CONGESTIVE HEART FAILURE (HCC): ICD-10-CM

## 2021-01-01 DIAGNOSIS — U07.1 ACUTE RESPIRATORY FAILURE DUE TO COVID-19 (HCC): ICD-10-CM

## 2021-01-01 DIAGNOSIS — J80 ACUTE RESPIRATORY DISTRESS SYNDROME (ARDS) DUE TO COVID-19 VIRUS (HCC): ICD-10-CM

## 2021-01-01 DIAGNOSIS — N18.6 ESRD (END STAGE RENAL DISEASE) (HCC): Primary | ICD-10-CM

## 2021-01-01 DIAGNOSIS — I42.8 NONISCHEMIC CARDIOMYOPATHY (HCC): ICD-10-CM

## 2021-01-01 DIAGNOSIS — R77.8 TROPONIN LEVEL ELEVATED: ICD-10-CM

## 2021-01-01 DIAGNOSIS — J84.09 DIFFUSE ALVEOLAR DAMAGE (HCC): ICD-10-CM

## 2021-01-01 DIAGNOSIS — R06.03 RESPIRATORY DISTRESS: Primary | ICD-10-CM

## 2021-01-01 DIAGNOSIS — U07.1 ACUTE RESPIRATORY DISTRESS SYNDROME (ARDS) DUE TO COVID-19 VIRUS (HCC): ICD-10-CM

## 2021-01-01 DIAGNOSIS — N18.6 ESRD ON DIALYSIS (HCC): ICD-10-CM

## 2021-01-01 DIAGNOSIS — I50.33 ACUTE ON CHRONIC DIASTOLIC CONGESTIVE HEART FAILURE (HCC): ICD-10-CM

## 2021-01-01 DIAGNOSIS — J12.82 PNEUMONIA DUE TO COVID-19 VIRUS: ICD-10-CM

## 2021-01-01 DIAGNOSIS — J96.00 ACUTE RESPIRATORY FAILURE DUE TO COVID-19 (HCC): ICD-10-CM

## 2021-01-01 DIAGNOSIS — R41.0 DELIRIUM: ICD-10-CM

## 2021-01-01 DIAGNOSIS — U07.1 PNEUMONIA DUE TO COVID-19 VIRUS: ICD-10-CM

## 2021-01-01 DIAGNOSIS — Z99.2 ESRD ON DIALYSIS (HCC): ICD-10-CM

## 2021-01-01 LAB
1,3 BETA GLUCAN SER-MCNC: <31 PG/ML
A FLAVUS AB SER QL ID: NEGATIVE
A FUMIGATUS AB SER QL ID: NEGATIVE
A NIGER AB SER QL ID: NEGATIVE
ALBUMIN SERPL-MCNC: 2.4 G/DL (ref 3.4–5)
ALBUMIN SERPL-MCNC: 2.7 G/DL (ref 3.4–5)
ALBUMIN SERPL-MCNC: 2.9 G/DL (ref 3.4–5)
ALBUMIN SERPL-MCNC: 3 G/DL (ref 3.4–5)
ALBUMIN SERPL-MCNC: 3.1 G/DL (ref 3.4–5)
ALBUMIN SERPL-MCNC: 3.2 G/DL (ref 3.4–5)
ALBUMIN SERPL-MCNC: 3.3 G/DL (ref 3.4–5)
ALBUMIN SERPL-MCNC: 3.3 G/DL (ref 3.4–5)
ALBUMIN SERPL-MCNC: 3.5 G/DL (ref 3.4–5)
ALBUMIN SERPL-MCNC: 3.5 G/DL (ref 3.4–5)
ALBUMIN SERPL-MCNC: 3.6 G/DL (ref 3.4–5)
ALBUMIN SERPL-MCNC: 4 G/DL (ref 3.4–5)
ALBUMIN/GLOB SERPL: 0.4 {RATIO} (ref 0.8–1.7)
ALBUMIN/GLOB SERPL: 0.5 {RATIO} (ref 0.8–1.7)
ALBUMIN/GLOB SERPL: 0.6 {RATIO} (ref 0.8–1.7)
ALBUMIN/GLOB SERPL: 0.7 {RATIO} (ref 0.8–1.7)
ALBUMIN/GLOB SERPL: 0.8 {RATIO} (ref 0.8–1.7)
ALP SERPL-CCNC: 108 U/L (ref 45–117)
ALP SERPL-CCNC: 126 U/L (ref 45–117)
ALP SERPL-CCNC: 146 U/L (ref 45–117)
ALP SERPL-CCNC: 158 U/L (ref 45–117)
ALP SERPL-CCNC: 165 U/L (ref 45–117)
ALP SERPL-CCNC: 183 U/L (ref 45–117)
ALP SERPL-CCNC: 39 U/L (ref 45–117)
ALP SERPL-CCNC: 41 U/L (ref 45–117)
ALP SERPL-CCNC: 44 U/L (ref 45–117)
ALP SERPL-CCNC: 45 U/L (ref 45–117)
ALP SERPL-CCNC: 45 U/L (ref 45–117)
ALP SERPL-CCNC: 46 U/L (ref 45–117)
ALP SERPL-CCNC: 49 U/L (ref 45–117)
ALP SERPL-CCNC: 53 U/L (ref 45–117)
ALP SERPL-CCNC: 68 U/L (ref 45–117)
ALP SERPL-CCNC: 70 U/L (ref 45–117)
ALT SERPL-CCNC: 22 U/L (ref 16–61)
ALT SERPL-CCNC: 22 U/L (ref 16–61)
ALT SERPL-CCNC: 24 U/L (ref 16–61)
ALT SERPL-CCNC: 26 U/L (ref 16–61)
ALT SERPL-CCNC: 27 U/L (ref 16–61)
ALT SERPL-CCNC: 29 U/L (ref 16–61)
ALT SERPL-CCNC: 31 U/L (ref 16–61)
ALT SERPL-CCNC: 36 U/L (ref 16–61)
ALT SERPL-CCNC: 39 U/L (ref 16–61)
ALT SERPL-CCNC: 39 U/L (ref 16–61)
ALT SERPL-CCNC: 40 U/L (ref 16–61)
ALT SERPL-CCNC: 42 U/L (ref 16–61)
ALT SERPL-CCNC: 46 U/L (ref 16–61)
ALT SERPL-CCNC: 49 U/L (ref 16–61)
ALT SERPL-CCNC: 58 U/L (ref 16–61)
ALT SERPL-CCNC: 64 U/L (ref 16–61)
ANION GAP SERPL CALC-SCNC: 13 MMOL/L (ref 3–18)
ANION GAP SERPL CALC-SCNC: 13 MMOL/L (ref 3–18)
ANION GAP SERPL CALC-SCNC: 14 MMOL/L (ref 3–18)
ANION GAP SERPL CALC-SCNC: 15 MMOL/L (ref 3–18)
ANION GAP SERPL CALC-SCNC: 16 MMOL/L (ref 3–18)
ANION GAP SERPL CALC-SCNC: 17 MMOL/L (ref 3–18)
ANION GAP SERPL CALC-SCNC: 18 MMOL/L (ref 3–18)
ANION GAP SERPL CALC-SCNC: 19 MMOL/L (ref 3–18)
ANION GAP SERPL CALC-SCNC: 20 MMOL/L (ref 3–18)
ANION GAP SERPL CALC-SCNC: 20 MMOL/L (ref 3–18)
ANION GAP SERPL CALC-SCNC: 24 MMOL/L (ref 3–18)
ANION GAP SERPL CALC-SCNC: 6 MMOL/L (ref 3–18)
APTT PPP: 94.3 SEC (ref 23–36.4)
APTT PPP: >180 SEC (ref 23–36.4)
ARTERIAL PATENCY WRIST A: ABNORMAL
ARTERIAL PATENCY WRIST A: YES
AST SERPL-CCNC: 106 U/L (ref 10–38)
AST SERPL-CCNC: 18 U/L (ref 10–38)
AST SERPL-CCNC: 19 U/L (ref 10–38)
AST SERPL-CCNC: 20 U/L (ref 10–38)
AST SERPL-CCNC: 21 U/L (ref 10–38)
AST SERPL-CCNC: 23 U/L (ref 10–38)
AST SERPL-CCNC: 24 U/L (ref 10–38)
AST SERPL-CCNC: 25 U/L (ref 10–38)
AST SERPL-CCNC: 31 U/L (ref 10–38)
AST SERPL-CCNC: 32 U/L (ref 10–38)
AST SERPL-CCNC: 33 U/L (ref 10–38)
AST SERPL-CCNC: 37 U/L (ref 10–38)
AST SERPL-CCNC: 38 U/L (ref 10–38)
AST SERPL-CCNC: 48 U/L (ref 10–38)
AST SERPL-CCNC: 59 U/L (ref 10–38)
AST SERPL-CCNC: 80 U/L (ref 10–38)
ATRIAL RATE: 103 BPM
ATRIAL RATE: 78 BPM
ATRIAL RATE: 88 BPM
ATRIAL RATE: 99 BPM
AV R PG: 56.94 MMHG
BACTERIA SPEC CULT: NORMAL
BASE DEFICIT BLD-SCNC: 1 MMOL/L
BASE DEFICIT BLD-SCNC: 3 MMOL/L
BASE DEFICIT BLD-SCNC: 6 MMOL/L
BASE DEFICIT BLD-SCNC: 6 MMOL/L
BASE DEFICIT BLD-SCNC: 7 MMOL/L
BASE DEFICIT BLD-SCNC: 8 MMOL/L
BASE EXCESS BLD CALC-SCNC: 2 MMOL/L
BASOPHILS # BLD: 0 K/UL (ref 0–0.06)
BASOPHILS # BLD: 0 K/UL (ref 0–0.1)
BASOPHILS NFR BLD: 0 % (ref 0–2)
BASOPHILS NFR BLD: 0 % (ref 0–3)
BDY SITE: ABNORMAL
BILIRUB SERPL-MCNC: 0.4 MG/DL (ref 0.2–1)
BILIRUB SERPL-MCNC: 0.5 MG/DL (ref 0.2–1)
BILIRUB SERPL-MCNC: 0.6 MG/DL (ref 0.2–1)
BILIRUB SERPL-MCNC: 0.9 MG/DL (ref 0.2–1)
BNP SERPL-MCNC: 7247 PG/ML (ref 0–900)
BODY TEMPERATURE: 37.2
BODY TEMPERATURE: 97.2
BODY TEMPERATURE: 99.9
BUN SERPL-MCNC: 103 MG/DL (ref 7–18)
BUN SERPL-MCNC: 104 MG/DL (ref 7–18)
BUN SERPL-MCNC: 115 MG/DL (ref 7–18)
BUN SERPL-MCNC: 36 MG/DL (ref 7–18)
BUN SERPL-MCNC: 44 MG/DL (ref 7–18)
BUN SERPL-MCNC: 48 MG/DL (ref 7–18)
BUN SERPL-MCNC: 53 MG/DL (ref 7–18)
BUN SERPL-MCNC: 53 MG/DL (ref 7–18)
BUN SERPL-MCNC: 55 MG/DL (ref 7–18)
BUN SERPL-MCNC: 55 MG/DL (ref 7–18)
BUN SERPL-MCNC: 59 MG/DL (ref 7–18)
BUN SERPL-MCNC: 62 MG/DL (ref 7–18)
BUN SERPL-MCNC: 63 MG/DL (ref 7–18)
BUN SERPL-MCNC: 64 MG/DL (ref 7–18)
BUN SERPL-MCNC: 72 MG/DL (ref 7–18)
BUN SERPL-MCNC: 76 MG/DL (ref 7–18)
BUN SERPL-MCNC: 76 MG/DL (ref 7–18)
BUN SERPL-MCNC: 81 MG/DL (ref 7–18)
BUN SERPL-MCNC: 85 MG/DL (ref 7–18)
BUN SERPL-MCNC: 94 MG/DL (ref 7–18)
BUN SERPL-MCNC: 95 MG/DL (ref 7–18)
BUN SERPL-MCNC: 96 MG/DL (ref 7–18)
BUN/CREAT SERPL: 10 (ref 12–20)
BUN/CREAT SERPL: 11 (ref 12–20)
BUN/CREAT SERPL: 11 (ref 12–20)
BUN/CREAT SERPL: 3 (ref 12–20)
BUN/CREAT SERPL: 3 (ref 12–20)
BUN/CREAT SERPL: 4 (ref 12–20)
BUN/CREAT SERPL: 4 (ref 12–20)
BUN/CREAT SERPL: 5 (ref 12–20)
BUN/CREAT SERPL: 6 (ref 12–20)
BUN/CREAT SERPL: 6 (ref 12–20)
BUN/CREAT SERPL: 7 (ref 12–20)
BUN/CREAT SERPL: 7 (ref 12–20)
BUN/CREAT SERPL: 8 (ref 12–20)
BUN/CREAT SERPL: 9 (ref 12–20)
CA-I SERPL-SCNC: 1.14 MMOL/L (ref 1.12–1.32)
CA-I SERPL-SCNC: 1.15 MMOL/L (ref 1.12–1.32)
CA-I SERPL-SCNC: 1.18 MMOL/L (ref 1.12–1.32)
CA-I SERPL-SCNC: 1.19 MMOL/L (ref 1.12–1.32)
CA-I SERPL-SCNC: 1.2 MMOL/L (ref 1.12–1.32)
CA-I SERPL-SCNC: 1.2 MMOL/L (ref 1.12–1.32)
CA-I SERPL-SCNC: 1.3 MMOL/L (ref 1.12–1.32)
CA-I SERPL-SCNC: 1.31 MMOL/L (ref 1.12–1.32)
CALCIUM SERPL-MCNC: 10.1 MG/DL (ref 8.5–10.1)
CALCIUM SERPL-MCNC: 10.1 MG/DL (ref 8.5–10.1)
CALCIUM SERPL-MCNC: 10.2 MG/DL (ref 8.5–10.1)
CALCIUM SERPL-MCNC: 10.3 MG/DL (ref 8.5–10.1)
CALCIUM SERPL-MCNC: 10.3 MG/DL (ref 8.5–10.1)
CALCIUM SERPL-MCNC: 10.4 MG/DL (ref 8.5–10.1)
CALCIUM SERPL-MCNC: 10.5 MG/DL (ref 8.5–10.1)
CALCIUM SERPL-MCNC: 10.5 MG/DL (ref 8.5–10.1)
CALCIUM SERPL-MCNC: 10.6 MG/DL (ref 8.5–10.1)
CALCIUM SERPL-MCNC: 10.7 MG/DL (ref 8.5–10.1)
CALCIUM SERPL-MCNC: 10.8 MG/DL (ref 8.5–10.1)
CALCIUM SERPL-MCNC: 8.6 MG/DL (ref 8.5–10.1)
CALCIUM SERPL-MCNC: 9 MG/DL (ref 8.5–10.1)
CALCIUM SERPL-MCNC: 9.1 MG/DL (ref 8.5–10.1)
CALCIUM SERPL-MCNC: 9.2 MG/DL (ref 8.5–10.1)
CALCIUM SERPL-MCNC: 9.4 MG/DL (ref 8.5–10.1)
CALCIUM SERPL-MCNC: 9.4 MG/DL (ref 8.5–10.1)
CALCIUM SERPL-MCNC: 9.5 MG/DL (ref 8.5–10.1)
CALCIUM SERPL-MCNC: 9.5 MG/DL (ref 8.5–10.1)
CALCIUM SERPL-MCNC: 9.7 MG/DL (ref 8.5–10.1)
CALCULATED P AXIS, ECG09: 15 DEGREES
CALCULATED P AXIS, ECG09: 21 DEGREES
CALCULATED P AXIS, ECG09: 26 DEGREES
CALCULATED P AXIS, ECG09: 31 DEGREES
CALCULATED R AXIS, ECG10: -25 DEGREES
CALCULATED R AXIS, ECG10: -59 DEGREES
CALCULATED R AXIS, ECG10: 108 DEGREES
CALCULATED R AXIS, ECG10: 38 DEGREES
CALCULATED T AXIS, ECG11: 51 DEGREES
CALCULATED T AXIS, ECG11: 72 DEGREES
CHLORIDE SERPL-SCNC: 100 MMOL/L (ref 100–111)
CHLORIDE SERPL-SCNC: 100 MMOL/L (ref 100–111)
CHLORIDE SERPL-SCNC: 101 MMOL/L (ref 100–111)
CHLORIDE SERPL-SCNC: 101 MMOL/L (ref 100–111)
CHLORIDE SERPL-SCNC: 93 MMOL/L (ref 100–111)
CHLORIDE SERPL-SCNC: 94 MMOL/L (ref 100–111)
CHLORIDE SERPL-SCNC: 95 MMOL/L (ref 100–111)
CHLORIDE SERPL-SCNC: 96 MMOL/L (ref 100–111)
CHLORIDE SERPL-SCNC: 97 MMOL/L (ref 100–111)
CHLORIDE SERPL-SCNC: 98 MMOL/L (ref 100–111)
CHLORIDE SERPL-SCNC: 98 MMOL/L (ref 100–111)
CHLORIDE SERPL-SCNC: 99 MMOL/L (ref 100–111)
CHLORIDE SERPL-SCNC: 99 MMOL/L (ref 100–111)
CK MB CFR SERPL CALC: 0.1 % (ref 0–4)
CK MB CFR SERPL CALC: 2.4 % (ref 0–4)
CK MB CFR SERPL CALC: 2.7 % (ref 0–4)
CK MB CFR SERPL CALC: 3.2 % (ref 0–4)
CK MB SERPL-MCNC: 1.2 NG/ML (ref 5–25)
CK MB SERPL-MCNC: 2 NG/ML (ref 5–25)
CK MB SERPL-MCNC: 2.4 NG/ML (ref 5–25)
CK MB SERPL-MCNC: 3.2 NG/ML (ref 5–25)
CK SERPL-CCNC: 1277 U/L (ref 39–308)
CK SERPL-CCNC: 134 U/L (ref 39–308)
CK SERPL-CCNC: 74 U/L (ref 39–308)
CK SERPL-CCNC: 75 U/L (ref 39–308)
CO2 SERPL-SCNC: 19 MMOL/L (ref 21–32)
CO2 SERPL-SCNC: 20 MMOL/L (ref 21–32)
CO2 SERPL-SCNC: 22 MMOL/L (ref 21–32)
CO2 SERPL-SCNC: 23 MMOL/L (ref 21–32)
CO2 SERPL-SCNC: 23 MMOL/L (ref 21–32)
CO2 SERPL-SCNC: 24 MMOL/L (ref 21–32)
CO2 SERPL-SCNC: 25 MMOL/L (ref 21–32)
CO2 SERPL-SCNC: 25 MMOL/L (ref 21–32)
CO2 SERPL-SCNC: 26 MMOL/L (ref 21–32)
CO2 SERPL-SCNC: 28 MMOL/L (ref 21–32)
CO2 SERPL-SCNC: 31 MMOL/L (ref 21–32)
COVID-19 RAPID TEST, COVR: DETECTED
COVID-19 RAPID TEST, COVR: DETECTED
CREAT SERPL-MCNC: 10.1 MG/DL (ref 0.6–1.3)
CREAT SERPL-MCNC: 10.2 MG/DL (ref 0.6–1.3)
CREAT SERPL-MCNC: 10.4 MG/DL (ref 0.6–1.3)
CREAT SERPL-MCNC: 10.9 MG/DL (ref 0.6–1.3)
CREAT SERPL-MCNC: 11.1 MG/DL (ref 0.6–1.3)
CREAT SERPL-MCNC: 11.3 MG/DL (ref 0.6–1.3)
CREAT SERPL-MCNC: 11.8 MG/DL (ref 0.6–1.3)
CREAT SERPL-MCNC: 12.9 MG/DL (ref 0.6–1.3)
CREAT SERPL-MCNC: 13.6 MG/DL (ref 0.6–1.3)
CREAT SERPL-MCNC: 13.6 MG/DL (ref 0.6–1.3)
CREAT SERPL-MCNC: 13.7 MG/DL (ref 0.6–1.3)
CREAT SERPL-MCNC: 14 MG/DL (ref 0.6–1.3)
CREAT SERPL-MCNC: 15 MG/DL (ref 0.6–1.3)
CREAT SERPL-MCNC: 16 MG/DL (ref 0.6–1.3)
CREAT SERPL-MCNC: 17.5 MG/DL (ref 0.6–1.3)
CREAT SERPL-MCNC: 17.6 MG/DL (ref 0.6–1.3)
CREAT SERPL-MCNC: 4.15 MG/DL (ref 0.6–1.3)
CREAT SERPL-MCNC: 4.49 MG/DL (ref 0.6–1.3)
CREAT SERPL-MCNC: 5.16 MG/DL (ref 0.6–1.3)
CREAT SERPL-MCNC: 6.3 MG/DL (ref 0.6–1.3)
CREAT SERPL-MCNC: 7.04 MG/DL (ref 0.6–1.3)
CREAT SERPL-MCNC: 8.33 MG/DL (ref 0.6–1.3)
CRP SERPL-MCNC: 10.8 MG/DL (ref 0–0.3)
CRP SERPL-MCNC: 14.8 MG/DL (ref 0–0.3)
CRP SERPL-MCNC: 17.2 MG/DL (ref 0–0.3)
CRP SERPL-MCNC: 20.9 MG/DL (ref 0–0.3)
CRP SERPL-MCNC: 25.2 MG/DL (ref 0–0.3)
CRP SERPL-MCNC: 30.8 MG/DL (ref 0–0.3)
CRP SERPL-MCNC: 9.3 MG/DL (ref 0–0.3)
D DIMER PPP FEU-MCNC: 1.06 UG/ML(FEU)
D DIMER PPP FEU-MCNC: 2.49 UG/ML(FEU)
D DIMER PPP FEU-MCNC: 2.56 UG/ML(FEU)
D DIMER PPP FEU-MCNC: 2.74 UG/ML(FEU)
D DIMER PPP FEU-MCNC: 2.74 UG/ML(FEU)
D DIMER PPP FEU-MCNC: 2.94 UG/ML(FEU)
D DIMER PPP FEU-MCNC: 3.22 UG/ML(FEU)
DIAGNOSIS, 93000: NORMAL
DIFFERENTIAL METHOD BLD: ABNORMAL
ECHO AO ROOT DIAM: 4.1 CM
ECHO AR MAX VEL PISA: 377.31 CM/S
ECHO AV REGURGITANT PHT: 559.36 MS
ECHO LA AREA 4C: 19.13 CM2
ECHO LA VOL 2C: 38.89 ML (ref 18–58)
ECHO LA VOL 4C: 46.01 ML (ref 18–58)
ECHO LA VOL BP: 48.26 ML (ref 18–58)
ECHO LA VOL/BSA BIPLANE: 22.02 ML/M2 (ref 16–28)
ECHO LA VOLUME INDEX A2C: 17.74 ML/M2 (ref 16–28)
ECHO LA VOLUME INDEX A4C: 20.99 ML/M2 (ref 16–28)
ECHO LV INTERNAL DIMENSION DIASTOLIC: 4.49 CM (ref 4.2–5.9)
ECHO LV INTERNAL DIMENSION SYSTOLIC: 2.44 CM
ECHO LV IVSD: 1.47 CM (ref 0.6–1)
ECHO LV MASS 2D: 276.3 G (ref 88–224)
ECHO LV MASS INDEX 2D: 126 G/M2 (ref 49–115)
ECHO LV POSTERIOR WALL DIASTOLIC: 1.54 CM (ref 0.6–1)
ECHO LVOT CARDIAC OUTPUT: 7.22 LITER/MINUTE
ECHO LVOT DIAM: 2.2 CM
ECHO LVOT PEAK GRADIENT: 6.54 MMHG
ECHO LVOT PEAK VELOCITY: 127.85 CM/S
ECHO LVOT SV: 96.4 ML
ECHO LVOT VTI: 25.42 CM
ECHO RV TAPSE: 2.52 CM (ref 1.5–2)
ECHO TV REGURGITANT MAX VELOCITY: 192.8 CM/S
ECHO TV REGURGITANT PEAK GRADIENT: 14.87 MMHG
EOSINOPHIL # BLD: 0 K/UL (ref 0–0.4)
EOSINOPHIL NFR BLD: 0 % (ref 0–5)
EOSINOPHIL NFR BLD: 1 % (ref 0–5)
ERYTHROCYTE [DISTWIDTH] IN BLOOD BY AUTOMATED COUNT: 15.2 % (ref 11.6–14.5)
ERYTHROCYTE [DISTWIDTH] IN BLOOD BY AUTOMATED COUNT: 15.4 % (ref 11.6–14.5)
ERYTHROCYTE [DISTWIDTH] IN BLOOD BY AUTOMATED COUNT: 15.5 % (ref 11.6–14.5)
ERYTHROCYTE [DISTWIDTH] IN BLOOD BY AUTOMATED COUNT: 15.7 % (ref 11.6–14.5)
ERYTHROCYTE [DISTWIDTH] IN BLOOD BY AUTOMATED COUNT: 15.7 % (ref 11.6–14.5)
ERYTHROCYTE [DISTWIDTH] IN BLOOD BY AUTOMATED COUNT: 16 % (ref 11.6–14.5)
ERYTHROCYTE [DISTWIDTH] IN BLOOD BY AUTOMATED COUNT: 16.2 % (ref 11.6–14.5)
ERYTHROCYTE [DISTWIDTH] IN BLOOD BY AUTOMATED COUNT: 16.8 % (ref 11.6–14.5)
ERYTHROCYTE [DISTWIDTH] IN BLOOD BY AUTOMATED COUNT: 16.8 % (ref 11.6–14.5)
ERYTHROCYTE [DISTWIDTH] IN BLOOD BY AUTOMATED COUNT: 17.3 % (ref 11.6–14.5)
ERYTHROCYTE [DISTWIDTH] IN BLOOD BY AUTOMATED COUNT: 17.3 % (ref 11.6–14.5)
ERYTHROCYTE [DISTWIDTH] IN BLOOD BY AUTOMATED COUNT: 17.4 % (ref 11.6–14.5)
ERYTHROCYTE [SEDIMENTATION RATE] IN BLOOD: 54 MM/HR (ref 0–20)
ERYTHROCYTE [SEDIMENTATION RATE] IN BLOOD: 60 MM/HR (ref 0–20)
ERYTHROCYTE [SEDIMENTATION RATE] IN BLOOD: 65 MM/HR (ref 0–20)
ERYTHROCYTE [SEDIMENTATION RATE] IN BLOOD: 65 MM/HR (ref 0–20)
ERYTHROCYTE [SEDIMENTATION RATE] IN BLOOD: 68 MM/HR (ref 0–20)
EST. AVERAGE GLUCOSE BLD GHB EST-MCNC: 128 MG/DL
FERRITIN SERPL-MCNC: 1119 NG/ML (ref 8–388)
FIBRINOGEN PPP-MCNC: 423 MG/DL (ref 210–451)
GAS FLOW.O2 O2 DELIVERY SYS: ABNORMAL L/MIN
GAS FLOW.O2 SETTING OXYMISER: 20 BPM
GAS FLOW.O2 SETTING OXYMISER: 40 L/M
GLOBULIN SER CALC-MCNC: 4 G/DL (ref 2–4)
GLOBULIN SER CALC-MCNC: 4.4 G/DL (ref 2–4)
GLOBULIN SER CALC-MCNC: 4.4 G/DL (ref 2–4)
GLOBULIN SER CALC-MCNC: 4.6 G/DL (ref 2–4)
GLOBULIN SER CALC-MCNC: 4.8 G/DL (ref 2–4)
GLOBULIN SER CALC-MCNC: 4.9 G/DL (ref 2–4)
GLOBULIN SER CALC-MCNC: 5 G/DL (ref 2–4)
GLOBULIN SER CALC-MCNC: 5.2 G/DL (ref 2–4)
GLOBULIN SER CALC-MCNC: 5.2 G/DL (ref 2–4)
GLOBULIN SER CALC-MCNC: 5.3 G/DL (ref 2–4)
GLOBULIN SER CALC-MCNC: 5.5 G/DL (ref 2–4)
GLOBULIN SER CALC-MCNC: 6 G/DL (ref 2–4)
GLUCOSE BLD STRIP.AUTO-MCNC: 113 MG/DL (ref 70–110)
GLUCOSE BLD STRIP.AUTO-MCNC: 122 MG/DL (ref 70–110)
GLUCOSE BLD STRIP.AUTO-MCNC: 130 MG/DL (ref 70–110)
GLUCOSE BLD STRIP.AUTO-MCNC: 134 MG/DL (ref 70–110)
GLUCOSE BLD STRIP.AUTO-MCNC: 134 MG/DL (ref 70–110)
GLUCOSE BLD STRIP.AUTO-MCNC: 139 MG/DL (ref 70–110)
GLUCOSE BLD STRIP.AUTO-MCNC: 145 MG/DL (ref 70–110)
GLUCOSE BLD STRIP.AUTO-MCNC: 146 MG/DL (ref 70–110)
GLUCOSE BLD STRIP.AUTO-MCNC: 148 MG/DL (ref 70–110)
GLUCOSE BLD STRIP.AUTO-MCNC: 149 MG/DL (ref 70–110)
GLUCOSE BLD STRIP.AUTO-MCNC: 149 MG/DL (ref 70–110)
GLUCOSE BLD STRIP.AUTO-MCNC: 155 MG/DL (ref 70–110)
GLUCOSE BLD STRIP.AUTO-MCNC: 156 MG/DL (ref 70–110)
GLUCOSE BLD STRIP.AUTO-MCNC: 159 MG/DL (ref 70–110)
GLUCOSE BLD STRIP.AUTO-MCNC: 160 MG/DL (ref 70–110)
GLUCOSE BLD STRIP.AUTO-MCNC: 160 MG/DL (ref 70–110)
GLUCOSE BLD STRIP.AUTO-MCNC: 162 MG/DL (ref 70–110)
GLUCOSE BLD STRIP.AUTO-MCNC: 171 MG/DL (ref 70–110)
GLUCOSE BLD STRIP.AUTO-MCNC: 172 MG/DL (ref 70–110)
GLUCOSE BLD STRIP.AUTO-MCNC: 175 MG/DL (ref 70–110)
GLUCOSE BLD STRIP.AUTO-MCNC: 177 MG/DL (ref 70–110)
GLUCOSE BLD STRIP.AUTO-MCNC: 177 MG/DL (ref 70–110)
GLUCOSE BLD STRIP.AUTO-MCNC: 178 MG/DL (ref 70–110)
GLUCOSE BLD STRIP.AUTO-MCNC: 179 MG/DL (ref 70–110)
GLUCOSE BLD STRIP.AUTO-MCNC: 182 MG/DL (ref 70–110)
GLUCOSE BLD STRIP.AUTO-MCNC: 187 MG/DL (ref 70–110)
GLUCOSE BLD STRIP.AUTO-MCNC: 202 MG/DL (ref 70–110)
GLUCOSE BLD STRIP.AUTO-MCNC: 208 MG/DL (ref 70–110)
GLUCOSE BLD STRIP.AUTO-MCNC: 209 MG/DL (ref 70–110)
GLUCOSE BLD STRIP.AUTO-MCNC: 211 MG/DL (ref 70–110)
GLUCOSE BLD STRIP.AUTO-MCNC: 211 MG/DL (ref 70–110)
GLUCOSE BLD STRIP.AUTO-MCNC: 215 MG/DL (ref 70–110)
GLUCOSE BLD STRIP.AUTO-MCNC: 222 MG/DL (ref 70–110)
GLUCOSE BLD STRIP.AUTO-MCNC: 231 MG/DL (ref 70–110)
GLUCOSE BLD STRIP.AUTO-MCNC: 252 MG/DL (ref 70–110)
GLUCOSE BLD STRIP.AUTO-MCNC: 271 MG/DL (ref 70–110)
GLUCOSE SERPL-MCNC: 119 MG/DL (ref 74–99)
GLUCOSE SERPL-MCNC: 132 MG/DL (ref 74–99)
GLUCOSE SERPL-MCNC: 134 MG/DL (ref 74–99)
GLUCOSE SERPL-MCNC: 146 MG/DL (ref 74–99)
GLUCOSE SERPL-MCNC: 161 MG/DL (ref 74–99)
GLUCOSE SERPL-MCNC: 162 MG/DL (ref 74–99)
GLUCOSE SERPL-MCNC: 176 MG/DL (ref 74–99)
GLUCOSE SERPL-MCNC: 191 MG/DL (ref 74–99)
GLUCOSE SERPL-MCNC: 196 MG/DL (ref 74–99)
GLUCOSE SERPL-MCNC: 199 MG/DL (ref 74–99)
GLUCOSE SERPL-MCNC: 201 MG/DL (ref 74–99)
GLUCOSE SERPL-MCNC: 202 MG/DL (ref 74–99)
GLUCOSE SERPL-MCNC: 229 MG/DL (ref 74–99)
GLUCOSE SERPL-MCNC: 245 MG/DL (ref 74–99)
GLUCOSE SERPL-MCNC: 248 MG/DL (ref 74–99)
GLUCOSE SERPL-MCNC: 253 MG/DL (ref 74–99)
GLUCOSE SERPL-MCNC: 256 MG/DL (ref 74–99)
GLUCOSE SERPL-MCNC: 262 MG/DL (ref 74–99)
GLUCOSE SERPL-MCNC: 266 MG/DL (ref 74–99)
GLUCOSE SERPL-MCNC: 284 MG/DL (ref 74–99)
GLUCOSE SERPL-MCNC: 299 MG/DL (ref 74–99)
GLUCOSE SERPL-MCNC: 98 MG/DL (ref 74–99)
GRAM STN SPEC: NORMAL
HBA1C MFR BLD: 6.1 % (ref 4.2–5.6)
HBV SURFACE AB SER QL IA: POSITIVE
HBV SURFACE AB SERPL IA-ACNC: 321.49 MIU/ML
HBV SURFACE AG SER QL: <0.1 INDEX
HBV SURFACE AG SER QL: NEGATIVE
HCO3 BLD-SCNC: 17.7 MMOL/L (ref 22–26)
HCO3 BLD-SCNC: 18.1 MMOL/L (ref 22–26)
HCO3 BLD-SCNC: 18.4 MMOL/L (ref 22–26)
HCO3 BLD-SCNC: 19.3 MMOL/L (ref 22–26)
HCO3 BLD-SCNC: 19.9 MMOL/L (ref 22–26)
HCO3 BLD-SCNC: 20.5 MMOL/L (ref 22–26)
HCO3 BLD-SCNC: 21.5 MMOL/L (ref 22–26)
HCO3 BLD-SCNC: 24.3 MMOL/L (ref 22–26)
HCO3 BLD-SCNC: 25.4 MMOL/L (ref 22–26)
HCT VFR BLD AUTO: 29.5 % (ref 36–48)
HCT VFR BLD AUTO: 29.9 % (ref 36–48)
HCT VFR BLD AUTO: 31.8 % (ref 36–48)
HCT VFR BLD AUTO: 31.8 % (ref 36–48)
HCT VFR BLD AUTO: 32.4 % (ref 36–48)
HCT VFR BLD AUTO: 32.6 % (ref 36–48)
HCT VFR BLD AUTO: 32.7 % (ref 36–48)
HCT VFR BLD AUTO: 32.8 % (ref 36–48)
HCT VFR BLD AUTO: 33.3 % (ref 36–48)
HCT VFR BLD AUTO: 33.6 % (ref 36–48)
HCT VFR BLD AUTO: 34.4 % (ref 36–48)
HCT VFR BLD AUTO: 34.6 % (ref 36–48)
HCT VFR BLD AUTO: 35.5 % (ref 36–48)
HCT VFR BLD AUTO: 38.5 % (ref 36–48)
HCT VFR BLD AUTO: 39.5 % (ref 36–48)
HEP BS AB COMMENT,HBSAC: NORMAL
HGB BLD-MCNC: 10.3 G/DL (ref 13–16)
HGB BLD-MCNC: 10.7 G/DL (ref 13–16)
HGB BLD-MCNC: 10.7 G/DL (ref 13–16)
HGB BLD-MCNC: 10.8 G/DL (ref 13–16)
HGB BLD-MCNC: 10.9 G/DL (ref 13–16)
HGB BLD-MCNC: 11 G/DL (ref 13–16)
HGB BLD-MCNC: 11.1 G/DL (ref 13–16)
HGB BLD-MCNC: 11.6 G/DL (ref 13–16)
HGB BLD-MCNC: 11.7 G/DL (ref 13–16)
HGB BLD-MCNC: 12.3 G/DL (ref 13–16)
HGB BLD-MCNC: 12.5 G/DL (ref 13–16)
HGB BLD-MCNC: 13 G/DL (ref 13–16)
HGB BLD-MCNC: 9.7 G/DL (ref 13–16)
INR PPP: 1.3 (ref 0.8–1.2)
INSPIRATION.DURATION SETTING TIME VENT: 0.9 SEC
LACTATE BLD-SCNC: 1.07 MMOL/L (ref 0.4–2)
LACTATE SERPL-SCNC: 1.6 MMOL/L (ref 0.4–2)
LACTATE SERPL-SCNC: 2.1 MMOL/L (ref 0.4–2)
LACTATE SERPL-SCNC: 2.2 MMOL/L (ref 0.4–2)
LACTATE SERPL-SCNC: 2.4 MMOL/L (ref 0.4–2)
LACTATE SERPL-SCNC: 2.6 MMOL/L (ref 0.4–2)
LACTATE SERPL-SCNC: 2.9 MMOL/L (ref 0.4–2)
LACTATE SERPL-SCNC: 2.9 MMOL/L (ref 0.4–2)
LACTATE SERPL-SCNC: 3 MMOL/L (ref 0.4–2)
LACTATE SERPL-SCNC: 3.2 MMOL/L (ref 0.4–2)
LACTATE SERPL-SCNC: 3.4 MMOL/L (ref 0.4–2)
LACTATE SERPL-SCNC: 3.4 MMOL/L (ref 0.4–2)
LACTATE SERPL-SCNC: 3.7 MMOL/L (ref 0.4–2)
LACTATE SERPL-SCNC: 5.2 MMOL/L (ref 0.4–2)
LDH SERPL L TO P-CCNC: 232 U/L (ref 81–234)
LVOT MG: 3.74 MMHG
LYMPHOCYTES # BLD: 0.5 K/UL (ref 0.8–3.5)
LYMPHOCYTES # BLD: 0.5 K/UL (ref 0.9–3.6)
LYMPHOCYTES # BLD: 0.6 K/UL (ref 0.9–3.6)
LYMPHOCYTES # BLD: 0.7 K/UL (ref 0.8–3.5)
LYMPHOCYTES # BLD: 0.9 K/UL (ref 0.8–3.5)
LYMPHOCYTES # BLD: 1 K/UL (ref 0.8–3.5)
LYMPHOCYTES # BLD: 1 K/UL (ref 0.9–3.6)
LYMPHOCYTES # BLD: 1.1 K/UL (ref 0.9–3.6)
LYMPHOCYTES # BLD: 1.2 K/UL (ref 0.8–3.5)
LYMPHOCYTES # BLD: 1.2 K/UL (ref 0.8–3.5)
LYMPHOCYTES # BLD: 1.4 K/UL (ref 0.9–3.6)
LYMPHOCYTES # BLD: 1.5 K/UL (ref 0.9–3.6)
LYMPHOCYTES # BLD: 2 K/UL (ref 0.8–3.5)
LYMPHOCYTES # BLD: 2.3 K/UL (ref 0.8–3.5)
LYMPHOCYTES # BLD: 2.4 K/UL (ref 0.8–3.5)
LYMPHOCYTES NFR BLD: 10 % (ref 20–51)
LYMPHOCYTES NFR BLD: 14 % (ref 21–52)
LYMPHOCYTES NFR BLD: 17 % (ref 21–52)
LYMPHOCYTES NFR BLD: 26 % (ref 21–52)
LYMPHOCYTES NFR BLD: 4 % (ref 21–52)
LYMPHOCYTES NFR BLD: 5 % (ref 21–52)
LYMPHOCYTES NFR BLD: 6 % (ref 20–51)
LYMPHOCYTES NFR BLD: 6 % (ref 20–51)
LYMPHOCYTES NFR BLD: 7 % (ref 20–51)
LYMPHOCYTES NFR BLD: 8 % (ref 20–51)
LYMPHOCYTES NFR BLD: 8 % (ref 20–51)
LYMPHOCYTES NFR BLD: 9 % (ref 20–51)
LYMPHOCYTES NFR BLD: 9 % (ref 21–52)
MAGNESIUM SERPL-MCNC: 1.8 MG/DL (ref 1.6–2.6)
MAGNESIUM SERPL-MCNC: 1.8 MG/DL (ref 1.6–2.6)
MAGNESIUM SERPL-MCNC: 1.9 MG/DL (ref 1.6–2.6)
MAGNESIUM SERPL-MCNC: 2 MG/DL (ref 1.6–2.6)
MAGNESIUM SERPL-MCNC: 2.2 MG/DL (ref 1.6–2.6)
MAGNESIUM SERPL-MCNC: 2.3 MG/DL (ref 1.6–2.6)
MAGNESIUM SERPL-MCNC: 2.6 MG/DL (ref 1.6–2.6)
MAGNESIUM SERPL-MCNC: 2.6 MG/DL (ref 1.6–2.6)
MAGNESIUM SERPL-MCNC: 2.7 MG/DL (ref 1.6–2.6)
MAGNESIUM SERPL-MCNC: 3 MG/DL (ref 1.6–2.6)
MAGNESIUM SERPL-MCNC: 3.2 MG/DL (ref 1.6–2.6)
MCH RBC QN AUTO: 27.8 PG (ref 24–34)
MCH RBC QN AUTO: 28.1 PG (ref 24–34)
MCH RBC QN AUTO: 28.4 PG (ref 24–34)
MCH RBC QN AUTO: 28.5 PG (ref 24–34)
MCH RBC QN AUTO: 28.5 PG (ref 24–34)
MCH RBC QN AUTO: 28.6 PG (ref 24–34)
MCH RBC QN AUTO: 28.7 PG (ref 24–34)
MCH RBC QN AUTO: 28.8 PG (ref 24–34)
MCH RBC QN AUTO: 29 PG (ref 24–34)
MCH RBC QN AUTO: 29 PG (ref 24–34)
MCH RBC QN AUTO: 29.1 PG (ref 24–34)
MCHC RBC AUTO-ENTMCNC: 31.9 G/DL (ref 31–37)
MCHC RBC AUTO-ENTMCNC: 32.1 G/DL (ref 31–37)
MCHC RBC AUTO-ENTMCNC: 32.7 G/DL (ref 31–37)
MCHC RBC AUTO-ENTMCNC: 32.7 G/DL (ref 31–37)
MCHC RBC AUTO-ENTMCNC: 32.9 G/DL (ref 31–37)
MCHC RBC AUTO-ENTMCNC: 32.9 G/DL (ref 31–37)
MCHC RBC AUTO-ENTMCNC: 33 G/DL (ref 31–37)
MCHC RBC AUTO-ENTMCNC: 33.4 G/DL (ref 31–37)
MCHC RBC AUTO-ENTMCNC: 33.5 G/DL (ref 31–37)
MCHC RBC AUTO-ENTMCNC: 33.8 G/DL (ref 31–37)
MCHC RBC AUTO-ENTMCNC: 34 G/DL (ref 31–37)
MCHC RBC AUTO-ENTMCNC: 34.3 G/DL (ref 31–37)
MCHC RBC AUTO-ENTMCNC: 34.4 G/DL (ref 31–37)
MCHC RBC AUTO-ENTMCNC: 34.6 G/DL (ref 31–37)
MCHC RBC AUTO-ENTMCNC: 35.2 G/DL (ref 31–37)
MCV RBC AUTO: 81.8 FL (ref 74–97)
MCV RBC AUTO: 83.1 FL (ref 74–97)
MCV RBC AUTO: 83.9 FL (ref 74–97)
MCV RBC AUTO: 84.1 FL (ref 74–97)
MCV RBC AUTO: 84.3 FL (ref 74–97)
MCV RBC AUTO: 84.5 FL (ref 74–97)
MCV RBC AUTO: 84.9 FL (ref 74–97)
MCV RBC AUTO: 85.2 FL (ref 74–97)
MCV RBC AUTO: 85.5 FL (ref 74–97)
MCV RBC AUTO: 86 FL (ref 74–97)
MCV RBC AUTO: 86.4 FL (ref 74–97)
MCV RBC AUTO: 87.4 FL (ref 74–97)
MCV RBC AUTO: 88.4 FL (ref 74–97)
MCV RBC AUTO: 88.6 FL (ref 74–97)
MCV RBC AUTO: 90.8 FL (ref 74–97)
METAMYELOCYTES NFR BLD MANUAL: 1 %
METAMYELOCYTES NFR BLD MANUAL: 1 %
METAMYELOCYTES NFR BLD MANUAL: 2 %
MONOCYTES # BLD: 0.5 K/UL (ref 0–1)
MONOCYTES # BLD: 0.6 K/UL (ref 0.05–1.2)
MONOCYTES # BLD: 0.8 K/UL (ref 0.05–1.2)
MONOCYTES # BLD: 0.8 K/UL (ref 0.05–1.2)
MONOCYTES # BLD: 0.8 K/UL (ref 0–1)
MONOCYTES # BLD: 1 K/UL (ref 0–1)
MONOCYTES # BLD: 1 K/UL (ref 0–1)
MONOCYTES # BLD: 1.1 K/UL (ref 0.05–1.2)
MONOCYTES # BLD: 1.1 K/UL (ref 0–1)
MONOCYTES # BLD: 1.2 K/UL (ref 0.05–1.2)
MONOCYTES # BLD: 1.2 K/UL (ref 0.05–1.2)
MONOCYTES # BLD: 1.2 K/UL (ref 0–1)
MONOCYTES # BLD: 1.8 K/UL (ref 0–1)
MONOCYTES # BLD: 2.6 K/UL (ref 0–1)
MONOCYTES # BLD: 3.4 K/UL (ref 0–1)
MONOCYTES NFR BLD: 10 % (ref 2–9)
MONOCYTES NFR BLD: 10 % (ref 2–9)
MONOCYTES NFR BLD: 10 % (ref 3–10)
MONOCYTES NFR BLD: 12 % (ref 3–10)
MONOCYTES NFR BLD: 12 % (ref 3–10)
MONOCYTES NFR BLD: 13 % (ref 2–9)
MONOCYTES NFR BLD: 13 % (ref 3–10)
MONOCYTES NFR BLD: 6 % (ref 2–9)
MONOCYTES NFR BLD: 6 % (ref 2–9)
MONOCYTES NFR BLD: 6 % (ref 3–10)
MONOCYTES NFR BLD: 7 % (ref 2–9)
MONOCYTES NFR BLD: 7 % (ref 2–9)
MONOCYTES NFR BLD: 8 % (ref 3–10)
MONOCYTES NFR BLD: 9 % (ref 2–9)
MONOCYTES NFR BLD: 9 % (ref 2–9)
MYELOCYTES NFR BLD MANUAL: 1 %
MYELOCYTES NFR BLD MANUAL: 2 %
NEUTS BAND NFR BLD MANUAL: 1 % (ref 0–5)
NEUTS BAND NFR BLD MANUAL: 2 % (ref 0–5)
NEUTS BAND NFR BLD MANUAL: 3 % (ref 0–5)
NEUTS BAND NFR BLD MANUAL: 7 % (ref 0–5)
NEUTS SEG # BLD: 10 K/UL (ref 1.8–8)
NEUTS SEG # BLD: 14.3 K/UL (ref 1.8–8)
NEUTS SEG # BLD: 15.1 K/UL (ref 1.8–8)
NEUTS SEG # BLD: 17.8 K/UL (ref 1.8–8)
NEUTS SEG # BLD: 18.4 K/UL (ref 1.8–8)
NEUTS SEG # BLD: 20 K/UL (ref 1.8–8)
NEUTS SEG # BLD: 20.1 K/UL (ref 1.8–8)
NEUTS SEG # BLD: 3.4 K/UL (ref 1.8–8)
NEUTS SEG # BLD: 4.1 K/UL (ref 1.8–8)
NEUTS SEG # BLD: 6.2 K/UL (ref 1.8–8)
NEUTS SEG # BLD: 7.1 K/UL (ref 1.8–8)
NEUTS SEG # BLD: 8.1 K/UL (ref 1.8–8)
NEUTS SEG # BLD: 8.5 K/UL (ref 1.8–8)
NEUTS SEG # BLD: 9 K/UL (ref 1.8–8)
NEUTS SEG # BLD: 9 K/UL (ref 1.8–8)
NEUTS SEG NFR BLD: 61 % (ref 40–73)
NEUTS SEG NFR BLD: 68 % (ref 42–75)
NEUTS SEG NFR BLD: 70 % (ref 40–73)
NEUTS SEG NFR BLD: 75 % (ref 42–75)
NEUTS SEG NFR BLD: 76 % (ref 40–73)
NEUTS SEG NFR BLD: 77 % (ref 42–75)
NEUTS SEG NFR BLD: 77 % (ref 42–75)
NEUTS SEG NFR BLD: 80 % (ref 42–75)
NEUTS SEG NFR BLD: 81 % (ref 42–75)
NEUTS SEG NFR BLD: 81 % (ref 42–75)
NEUTS SEG NFR BLD: 82 % (ref 42–75)
NEUTS SEG NFR BLD: 83 % (ref 40–73)
NEUTS SEG NFR BLD: 85 % (ref 40–73)
NEUTS SEG NFR BLD: 85 % (ref 42–75)
NEUTS SEG NFR BLD: 88 % (ref 40–73)
NRBC BLD-RTO: 13 PER 100 WBC
NRBC BLD-RTO: 14 PER 100 WBC
NRBC BLD-RTO: 2 PER 100 WBC
NRBC BLD-RTO: 4 PER 100 WBC
NRBC BLD-RTO: 5 PER 100 WBC
NRBC BLD-RTO: 6 PER 100 WBC
O2/TOTAL GAS SETTING VFR VENT: 100 %
O2/TOTAL GAS SETTING VFR VENT: 75 %
OTHER CELLS NFR BLD MANUAL: 3 %
OTHER CELLS NFR BLD MANUAL: 4 %
OTHER CELLS NFR BLD MANUAL: 6 %
P-R INTERVAL, ECG05: 162 MS
P-R INTERVAL, ECG05: 170 MS
P-R INTERVAL, ECG05: 174 MS
P-R INTERVAL, ECG05: 186 MS
PCO2 BLD: 24.1 MMHG (ref 35–45)
PCO2 BLD: 28.5 MMHG (ref 35–45)
PCO2 BLD: 33.9 MMHG (ref 35–45)
PCO2 BLD: 35.9 MMHG (ref 35–45)
PCO2 BLD: 36.2 MMHG (ref 35–45)
PCO2 BLD: 40.5 MMHG (ref 35–45)
PCO2 BLD: 41.4 MMHG (ref 35–45)
PCO2 BLD: 45 MMHG (ref 35–45)
PCO2 BLD: 46.4 MMHG (ref 35–45)
PEEP RESPIRATORY: 12 CMH2O
PEEP RESPIRATORY: 16 CMH2O
PEEP RESPIRATORY: 20 CMH2O
PEEP RESPIRATORY: 20 CMH2O
PH BLD: 7.25 [PH] (ref 7.35–7.45)
PH BLD: 7.27 [PH] (ref 7.35–7.45)
PH BLD: 7.28 [PH] (ref 7.35–7.45)
PH BLD: 7.31 [PH] (ref 7.35–7.45)
PH BLD: 7.35 [PH] (ref 7.35–7.45)
PH BLD: 7.39 [PH] (ref 7.35–7.45)
PH BLD: 7.46 [PH] (ref 7.35–7.45)
PH BLD: 7.46 [PH] (ref 7.35–7.45)
PH BLD: 7.47 [PH] (ref 7.35–7.45)
PHOSPHATE SERPL-MCNC: 4.6 MG/DL (ref 2.5–4.9)
PHOSPHATE SERPL-MCNC: 5.1 MG/DL (ref 2.5–4.9)
PHOSPHATE SERPL-MCNC: 5.8 MG/DL (ref 2.5–4.9)
PHOSPHATE SERPL-MCNC: 5.9 MG/DL (ref 2.5–4.9)
PHOSPHATE SERPL-MCNC: 7.1 MG/DL (ref 2.5–4.9)
PHOSPHATE SERPL-MCNC: 7.5 MG/DL (ref 2.5–4.9)
PHOSPHATE SERPL-MCNC: 7.7 MG/DL (ref 2.5–4.9)
PHOSPHATE SERPL-MCNC: 8.5 MG/DL (ref 2.5–4.9)
PHOSPHATE SERPL-MCNC: >9 MG/DL (ref 2.5–4.9)
PIP ISTAT,IPIP: 21
PIP ISTAT,IPIP: 29
PLATELET # BLD AUTO: 156 K/UL (ref 135–420)
PLATELET # BLD AUTO: 227 K/UL (ref 135–420)
PLATELET # BLD AUTO: 247 K/UL (ref 135–420)
PLATELET # BLD AUTO: 259 K/UL (ref 135–420)
PLATELET # BLD AUTO: 262 K/UL (ref 135–420)
PLATELET # BLD AUTO: 272 K/UL (ref 135–420)
PLATELET # BLD AUTO: 285 K/UL (ref 135–420)
PLATELET # BLD AUTO: 314 K/UL (ref 135–420)
PLATELET # BLD AUTO: 321 K/UL (ref 135–420)
PLATELET # BLD AUTO: 325 K/UL (ref 135–420)
PLATELET # BLD AUTO: 336 K/UL (ref 135–420)
PLATELET # BLD AUTO: 336 K/UL (ref 135–420)
PLATELET # BLD AUTO: 363 K/UL (ref 135–420)
PLATELET # BLD AUTO: 379 K/UL (ref 135–420)
PLATELET # BLD AUTO: 383 K/UL (ref 135–420)
PLATELET COMMENTS,PCOM: ABNORMAL
PMV BLD AUTO: 10.1 FL (ref 9.2–11.8)
PMV BLD AUTO: 10.1 FL (ref 9.2–11.8)
PMV BLD AUTO: 10.2 FL (ref 9.2–11.8)
PMV BLD AUTO: 10.3 FL (ref 9.2–11.8)
PMV BLD AUTO: 10.4 FL (ref 9.2–11.8)
PMV BLD AUTO: 10.4 FL (ref 9.2–11.8)
PMV BLD AUTO: 10.8 FL (ref 9.2–11.8)
PMV BLD AUTO: 10.9 FL (ref 9.2–11.8)
PMV BLD AUTO: 9.6 FL (ref 9.2–11.8)
PO2 BLD: 51 MMHG (ref 80–100)
PO2 BLD: 60 MMHG (ref 80–100)
PO2 BLD: 63 MMHG (ref 80–100)
PO2 BLD: 64 MMHG (ref 80–100)
PO2 BLD: 77 MMHG (ref 80–100)
PO2 BLD: 77 MMHG (ref 80–100)
PO2 BLD: 86 MMHG (ref 80–100)
PO2 BLD: 93 MMHG (ref 80–100)
PO2 BLD: 94 MMHG (ref 80–100)
POTASSIUM SERPL-SCNC: 3.6 MMOL/L (ref 3.5–5.5)
POTASSIUM SERPL-SCNC: 3.9 MMOL/L (ref 3.5–5.5)
POTASSIUM SERPL-SCNC: 4 MMOL/L (ref 3.5–5.5)
POTASSIUM SERPL-SCNC: 4 MMOL/L (ref 3.5–5.5)
POTASSIUM SERPL-SCNC: 4.1 MMOL/L (ref 3.5–5.5)
POTASSIUM SERPL-SCNC: 4.1 MMOL/L (ref 3.5–5.5)
POTASSIUM SERPL-SCNC: 4.2 MMOL/L (ref 3.5–5.5)
POTASSIUM SERPL-SCNC: 4.2 MMOL/L (ref 3.5–5.5)
POTASSIUM SERPL-SCNC: 4.4 MMOL/L (ref 3.5–5.5)
POTASSIUM SERPL-SCNC: 4.5 MMOL/L (ref 3.5–5.5)
POTASSIUM SERPL-SCNC: 4.6 MMOL/L (ref 3.5–5.5)
POTASSIUM SERPL-SCNC: 4.9 MMOL/L (ref 3.5–5.5)
POTASSIUM SERPL-SCNC: 5 MMOL/L (ref 3.5–5.5)
POTASSIUM SERPL-SCNC: 5.1 MMOL/L (ref 3.5–5.5)
POTASSIUM SERPL-SCNC: 5.1 MMOL/L (ref 3.5–5.5)
POTASSIUM SERPL-SCNC: 5.3 MMOL/L (ref 3.5–5.5)
POTASSIUM SERPL-SCNC: 5.7 MMOL/L (ref 3.5–5.5)
PROCALCITONIN SERPL-MCNC: 0.95 NG/ML
PROCALCITONIN SERPL-MCNC: 13.29 NG/ML
PROCALCITONIN SERPL-MCNC: 14.09 NG/ML
PROCALCITONIN SERPL-MCNC: 14.35 NG/ML
PROCALCITONIN SERPL-MCNC: 19.52 NG/ML
PROCALCITONIN SERPL-MCNC: 21.48 NG/ML
PROCALCITONIN SERPL-MCNC: 27.93 NG/ML
PROCALCITONIN SERPL-MCNC: 33.27 NG/ML
PROCALCITONIN SERPL-MCNC: 36.26 NG/ML
PROCALCITONIN SERPL-MCNC: 41.6 NG/ML
PROCALCITONIN SERPL-MCNC: 8.87 NG/ML
PROT SERPL-MCNC: 7.1 G/DL (ref 6.4–8.2)
PROT SERPL-MCNC: 7.4 G/DL (ref 6.4–8.2)
PROT SERPL-MCNC: 7.6 G/DL (ref 6.4–8.2)
PROT SERPL-MCNC: 7.6 G/DL (ref 6.4–8.2)
PROT SERPL-MCNC: 7.9 G/DL (ref 6.4–8.2)
PROT SERPL-MCNC: 7.9 G/DL (ref 6.4–8.2)
PROT SERPL-MCNC: 8.1 G/DL (ref 6.4–8.2)
PROT SERPL-MCNC: 8.1 G/DL (ref 6.4–8.2)
PROT SERPL-MCNC: 8.2 G/DL (ref 6.4–8.2)
PROT SERPL-MCNC: 8.2 G/DL (ref 6.4–8.2)
PROT SERPL-MCNC: 8.3 G/DL (ref 6.4–8.2)
PROT SERPL-MCNC: 8.3 G/DL (ref 6.4–8.2)
PROT SERPL-MCNC: 8.4 G/DL (ref 6.4–8.2)
PROT SERPL-MCNC: 8.7 G/DL (ref 6.4–8.2)
PROT SERPL-MCNC: 8.7 G/DL (ref 6.4–8.2)
PROT SERPL-MCNC: 9 G/DL (ref 6.4–8.2)
PROTHROMBIN TIME: 15.7 SEC (ref 11.5–15.2)
PROTHROMBIN TIME: 15.8 SEC (ref 11.5–15.2)
PROTHROMBIN TIME: 16 SEC (ref 11.5–15.2)
Q-T INTERVAL, ECG07: 384 MS
Q-T INTERVAL, ECG07: 396 MS
Q-T INTERVAL, ECG07: 422 MS
Q-T INTERVAL, ECG07: 448 MS
QRS DURATION, ECG06: 104 MS
QRS DURATION, ECG06: 126 MS
QRS DURATION, ECG06: 90 MS
QRS DURATION, ECG06: 98 MS
QTC CALCULATION (BEZET), ECG08: 503 MS
QTC CALCULATION (BEZET), ECG08: 508 MS
QTC CALCULATION (BEZET), ECG08: 510 MS
QTC CALCULATION (BEZET), ECG08: 510 MS
RBC # BLD AUTO: 3.45 M/UL (ref 4.7–5.5)
RBC # BLD AUTO: 3.6 M/UL (ref 4.7–5.5)
RBC # BLD AUTO: 3.75 M/UL (ref 4.7–5.5)
RBC # BLD AUTO: 3.78 M/UL (ref 4.7–5.5)
RBC # BLD AUTO: 3.79 M/UL (ref 4.7–5.5)
RBC # BLD AUTO: 3.79 M/UL (ref 4.7–5.5)
RBC # BLD AUTO: 3.8 M/UL (ref 4.7–5.5)
RBC # BLD AUTO: 3.81 M/UL (ref 4.7–5.5)
RBC # BLD AUTO: 3.85 M/UL (ref 4.7–5.5)
RBC # BLD AUTO: 3.89 M/UL (ref 4.7–5.5)
RBC # BLD AUTO: 4.06 M/UL (ref 4.7–5.5)
RBC # BLD AUTO: 4.07 M/UL (ref 4.7–5.5)
RBC # BLD AUTO: 4.24 M/UL (ref 4.7–5.5)
RBC # BLD AUTO: 4.34 M/UL (ref 4.7–5.5)
RBC # BLD AUTO: 4.46 M/UL (ref 4.7–5.5)
RBC MORPH BLD: ABNORMAL
SAO2 % BLD: 85 % (ref 92–97)
SAO2 % BLD: 86 % (ref 92–97)
SAO2 % BLD: 90 % (ref 92–97)
SAO2 % BLD: 90 % (ref 92–97)
SAO2 % BLD: 93 % (ref 92–97)
SAO2 % BLD: 96 % (ref 92–97)
SAO2 % BLD: 97 % (ref 92–97)
SAO2 % BLD: 97 % (ref 92–97)
SAO2 % BLD: 98 % (ref 92–97)
SARS-COV-2, COV2: NORMAL
SERVICE CMNT-IMP: ABNORMAL
SERVICE CMNT-IMP: NORMAL
SODIUM SERPL-SCNC: 133 MMOL/L (ref 136–145)
SODIUM SERPL-SCNC: 134 MMOL/L (ref 136–145)
SODIUM SERPL-SCNC: 135 MMOL/L (ref 136–145)
SODIUM SERPL-SCNC: 136 MMOL/L (ref 136–145)
SODIUM SERPL-SCNC: 137 MMOL/L (ref 136–145)
SODIUM SERPL-SCNC: 137 MMOL/L (ref 136–145)
SODIUM SERPL-SCNC: 138 MMOL/L (ref 136–145)
SODIUM SERPL-SCNC: 138 MMOL/L (ref 136–145)
SODIUM SERPL-SCNC: 139 MMOL/L (ref 136–145)
SODIUM SERPL-SCNC: 139 MMOL/L (ref 136–145)
SODIUM SERPL-SCNC: 141 MMOL/L (ref 136–145)
SODIUM SERPL-SCNC: 142 MMOL/L (ref 136–145)
SOURCE, COVRS: ABNORMAL
SOURCE, COVRS: ABNORMAL
SPECIMEN TYPE, XMCV1T: ABNORMAL
SPECIMEN TYPE: ABNORMAL
TOTAL RESP. RATE, ITRR: 22
TOTAL RESP. RATE, ITRR: 26
TOTAL RESP. RATE, ITRR: 26
TOTAL RESP. RATE, ITRR: 27
TOTAL RESP. RATE, ITRR: 38
TOTAL RESP. RATE, ITRR: 39
TOTAL RESP. RATE, ITRR: 50
TROPONIN I SERPL-MCNC: 0.07 NG/ML (ref 0–0.04)
TROPONIN I SERPL-MCNC: 0.09 NG/ML (ref 0–0.04)
TROPONIN I SERPL-MCNC: 0.1 NG/ML (ref 0–0.04)
TROPONIN I SERPL-MCNC: 0.11 NG/ML (ref 0–0.04)
TROPONIN I SERPL-MCNC: 0.21 NG/ML (ref 0–0.04)
TROPONIN I SERPL-MCNC: 0.24 NG/ML (ref 0–0.04)
TROPONIN I SERPL-MCNC: 0.31 NG/ML (ref 0–0.04)
TROPONIN I SERPL-MCNC: 0.33 NG/ML (ref 0–0.04)
TROPONIN I SERPL-MCNC: 0.33 NG/ML (ref 0–0.04)
TROPONIN I SERPL-MCNC: 0.34 NG/ML (ref 0–0.04)
TROPONIN I SERPL-MCNC: 0.42 NG/ML (ref 0–0.04)
TROPONIN I SERPL-MCNC: 0.51 NG/ML (ref 0–0.04)
VANCOMYCIN SERPL-MCNC: 12.7 UG/ML (ref 5–40)
VANCOMYCIN SERPL-MCNC: 24.2 UG/ML (ref 5–40)
VANCOMYCIN SERPL-MCNC: 28.1 UG/ML (ref 5–40)
VENTILATION MODE VENT: ABNORMAL
VENTRICULAR RATE, ECG03: 103 BPM
VENTRICULAR RATE, ECG03: 78 BPM
VENTRICULAR RATE, ECG03: 88 BPM
VENTRICULAR RATE, ECG03: 99 BPM
VOLUME CONTROL PLUS IVLCP: YES
VT SETTING VENT: 500 ML
WBC # BLD AUTO: 10.4 K/UL (ref 4.6–13.2)
WBC # BLD AUTO: 10.9 K/UL (ref 4.6–13.2)
WBC # BLD AUTO: 11 K/UL (ref 4.6–13.2)
WBC # BLD AUTO: 12.3 K/UL (ref 4.6–13.2)
WBC # BLD AUTO: 16.2 K/UL (ref 4.6–13.2)
WBC # BLD AUTO: 17.8 K/UL (ref 4.6–13.2)
WBC # BLD AUTO: 20.2 K/UL (ref 4.6–13.2)
WBC # BLD AUTO: 25 K/UL (ref 4.6–13.2)
WBC # BLD AUTO: 25.9 K/UL (ref 4.6–13.2)
WBC # BLD AUTO: 26.5 K/UL (ref 4.6–13.2)
WBC # BLD AUTO: 5.4 K/UL (ref 4.6–13.2)
WBC # BLD AUTO: 5.9 K/UL (ref 4.6–13.2)
WBC # BLD AUTO: 8.1 K/UL (ref 4.6–13.2)
WBC # BLD AUTO: 8.5 K/UL (ref 4.6–13.2)
WBC # BLD AUTO: 9.8 K/UL (ref 4.6–13.2)

## 2021-01-01 PROCEDURE — 65610000006 HC RM INTENSIVE CARE

## 2021-01-01 PROCEDURE — 74011250637 HC RX REV CODE- 250/637: Performed by: INTERNAL MEDICINE

## 2021-01-01 PROCEDURE — 85652 RBC SED RATE AUTOMATED: CPT

## 2021-01-01 PROCEDURE — 83615 LACTATE (LD) (LDH) ENZYME: CPT

## 2021-01-01 PROCEDURE — 87635 SARS-COV-2 COVID-19 AMP PRB: CPT

## 2021-01-01 PROCEDURE — 87449 NOS EACH ORGANISM AG IA: CPT

## 2021-01-01 PROCEDURE — 82330 ASSAY OF CALCIUM: CPT

## 2021-01-01 PROCEDURE — 85025 COMPLETE CBC W/AUTO DIFF WBC: CPT

## 2021-01-01 PROCEDURE — 90945 DIALYSIS ONE EVALUATION: CPT

## 2021-01-01 PROCEDURE — 74011250637 HC RX REV CODE- 250/637: Performed by: STUDENT IN AN ORGANIZED HEALTH CARE EDUCATION/TRAINING PROGRAM

## 2021-01-01 PROCEDURE — 84145 PROCALCITONIN (PCT): CPT

## 2021-01-01 PROCEDURE — 84100 ASSAY OF PHOSPHORUS: CPT

## 2021-01-01 PROCEDURE — 36620 INSERTION CATHETER ARTERY: CPT | Performed by: INTERNAL MEDICINE

## 2021-01-01 PROCEDURE — 85730 THROMBOPLASTIN TIME PARTIAL: CPT

## 2021-01-01 PROCEDURE — 85610 PROTHROMBIN TIME: CPT

## 2021-01-01 PROCEDURE — 77010033711 HC HIGH FLOW OXYGEN

## 2021-01-01 PROCEDURE — 80053 COMPREHEN METABOLIC PANEL: CPT

## 2021-01-01 PROCEDURE — 85379 FIBRIN DEGRADATION QUANT: CPT

## 2021-01-01 PROCEDURE — 74011250636 HC RX REV CODE- 250/636: Performed by: STUDENT IN AN ORGANIZED HEALTH CARE EDUCATION/TRAINING PROGRAM

## 2021-01-01 PROCEDURE — 96366 THER/PROPH/DIAG IV INF ADDON: CPT

## 2021-01-01 PROCEDURE — 36415 COLL VENOUS BLD VENIPUNCTURE: CPT

## 2021-01-01 PROCEDURE — 93321 DOPPLER ECHO F-UP/LMTD STD: CPT | Performed by: INTERNAL MEDICINE

## 2021-01-01 PROCEDURE — 2709999900 HC NON-CHARGEABLE SUPPLY

## 2021-01-01 PROCEDURE — 94640 AIRWAY INHALATION TREATMENT: CPT

## 2021-01-01 PROCEDURE — 86140 C-REACTIVE PROTEIN: CPT

## 2021-01-01 PROCEDURE — 84484 ASSAY OF TROPONIN QUANT: CPT

## 2021-01-01 PROCEDURE — 97535 SELF CARE MNGMENT TRAINING: CPT

## 2021-01-01 PROCEDURE — 74011000258 HC RX REV CODE- 258: Performed by: STUDENT IN AN ORGANIZED HEALTH CARE EDUCATION/TRAINING PROGRAM

## 2021-01-01 PROCEDURE — 74011000250 HC RX REV CODE- 250: Performed by: PHYSICIAN ASSISTANT

## 2021-01-01 PROCEDURE — 74011250636 HC RX REV CODE- 250/636

## 2021-01-01 PROCEDURE — 77030008771 HC TU NG SALEM SUMP -A

## 2021-01-01 PROCEDURE — 87103 BLOOD FUNGUS CULTURE: CPT

## 2021-01-01 PROCEDURE — 0BH18EZ INSERTION OF ENDOTRACHEAL AIRWAY INTO TRACHEA, VIA NATURAL OR ARTIFICIAL OPENING ENDOSCOPIC: ICD-10-PCS | Performed by: INTERNAL MEDICINE

## 2021-01-01 PROCEDURE — 93325 DOPPLER ECHO COLOR FLOW MAPG: CPT | Performed by: INTERNAL MEDICINE

## 2021-01-01 PROCEDURE — 36600 WITHDRAWAL OF ARTERIAL BLOOD: CPT

## 2021-01-01 PROCEDURE — 5A09457 ASSISTANCE WITH RESPIRATORY VENTILATION, 24-96 CONSECUTIVE HOURS, CONTINUOUS POSITIVE AIRWAY PRESSURE: ICD-10-PCS | Performed by: INTERNAL MEDICINE

## 2021-01-01 PROCEDURE — 74011000258 HC RX REV CODE- 258: Performed by: PHYSICIAN ASSISTANT

## 2021-01-01 PROCEDURE — 83036 HEMOGLOBIN GLYCOSYLATED A1C: CPT

## 2021-01-01 PROCEDURE — 74011250636 HC RX REV CODE- 250/636: Performed by: INTERNAL MEDICINE

## 2021-01-01 PROCEDURE — 99291 CRITICAL CARE FIRST HOUR: CPT | Performed by: INTERNAL MEDICINE

## 2021-01-01 PROCEDURE — 94762 N-INVAS EAR/PLS OXIMTRY CONT: CPT

## 2021-01-01 PROCEDURE — 65270000029 HC RM PRIVATE

## 2021-01-01 PROCEDURE — 74011000250 HC RX REV CODE- 250: Performed by: STUDENT IN AN ORGANIZED HEALTH CARE EDUCATION/TRAINING PROGRAM

## 2021-01-01 PROCEDURE — 65660000000 HC RM CCU STEPDOWN

## 2021-01-01 PROCEDURE — 96374 THER/PROPH/DIAG INJ IV PUSH: CPT

## 2021-01-01 PROCEDURE — P9047 ALBUMIN (HUMAN), 25%, 50ML: HCPCS

## 2021-01-01 PROCEDURE — 74011250636 HC RX REV CODE- 250/636: Performed by: EMERGENCY MEDICINE

## 2021-01-01 PROCEDURE — 74011636637 HC RX REV CODE- 636/637: Performed by: INTERNAL MEDICINE

## 2021-01-01 PROCEDURE — 80202 ASSAY OF VANCOMYCIN: CPT

## 2021-01-01 PROCEDURE — 74011250636 HC RX REV CODE- 250/636: Performed by: PHYSICIAN ASSISTANT

## 2021-01-01 PROCEDURE — 74011636637 HC RX REV CODE- 636/637: Performed by: FAMILY MEDICINE

## 2021-01-01 PROCEDURE — 74011000258 HC RX REV CODE- 258: Performed by: EMERGENCY MEDICINE

## 2021-01-01 PROCEDURE — 74018 RADEX ABDOMEN 1 VIEW: CPT

## 2021-01-01 PROCEDURE — 93005 ELECTROCARDIOGRAM TRACING: CPT

## 2021-01-01 PROCEDURE — 74011000258 HC RX REV CODE- 258

## 2021-01-01 PROCEDURE — 90935 HEMODIALYSIS ONE EVALUATION: CPT

## 2021-01-01 PROCEDURE — 82803 BLOOD GASES ANY COMBINATION: CPT

## 2021-01-01 PROCEDURE — 74011000250 HC RX REV CODE- 250

## 2021-01-01 PROCEDURE — 03HY32Z INSERTION OF MONITORING DEVICE INTO UPPER ARTERY, PERCUTANEOUS APPROACH: ICD-10-PCS | Performed by: INTERNAL MEDICINE

## 2021-01-01 PROCEDURE — 99292 CRITICAL CARE ADDL 30 MIN: CPT | Performed by: INTERNAL MEDICINE

## 2021-01-01 PROCEDURE — 71045 X-RAY EXAM CHEST 1 VIEW: CPT

## 2021-01-01 PROCEDURE — 74011636637 HC RX REV CODE- 636/637: Performed by: STUDENT IN AN ORGANIZED HEALTH CARE EDUCATION/TRAINING PROGRAM

## 2021-01-01 PROCEDURE — 5A1945Z RESPIRATORY VENTILATION, 24-96 CONSECUTIVE HOURS: ICD-10-PCS | Performed by: INTERNAL MEDICINE

## 2021-01-01 PROCEDURE — 97530 THERAPEUTIC ACTIVITIES: CPT

## 2021-01-01 PROCEDURE — 83605 ASSAY OF LACTIC ACID: CPT

## 2021-01-01 PROCEDURE — 96375 TX/PRO/DX INJ NEW DRUG ADDON: CPT

## 2021-01-01 PROCEDURE — 83735 ASSAY OF MAGNESIUM: CPT

## 2021-01-01 PROCEDURE — 74011000250 HC RX REV CODE- 250: Performed by: INTERNAL MEDICINE

## 2021-01-01 PROCEDURE — 94002 VENT MGMT INPAT INIT DAY: CPT

## 2021-01-01 PROCEDURE — 36591 DRAW BLOOD OFF VENOUS DEVICE: CPT

## 2021-01-01 PROCEDURE — 93970 EXTREMITY STUDY: CPT

## 2021-01-01 PROCEDURE — P9047 ALBUMIN (HUMAN), 25%, 50ML: HCPCS | Performed by: INTERNAL MEDICINE

## 2021-01-01 PROCEDURE — 99233 SBSQ HOSP IP/OBS HIGH 50: CPT | Performed by: INTERNAL MEDICINE

## 2021-01-01 PROCEDURE — 86606 ASPERGILLUS ANTIBODY: CPT

## 2021-01-01 PROCEDURE — 94003 VENT MGMT INPAT SUBQ DAY: CPT

## 2021-01-01 PROCEDURE — 93308 TTE F-UP OR LMTD: CPT | Performed by: INTERNAL MEDICINE

## 2021-01-01 PROCEDURE — 97161 PT EVAL LOW COMPLEX 20 MIN: CPT

## 2021-01-01 PROCEDURE — 74011000250 HC RX REV CODE- 250: Performed by: EMERGENCY MEDICINE

## 2021-01-01 PROCEDURE — 82962 GLUCOSE BLOOD TEST: CPT

## 2021-01-01 PROCEDURE — 74011636637 HC RX REV CODE- 636/637: Performed by: PHYSICIAN ASSISTANT

## 2021-01-01 PROCEDURE — 74011250637 HC RX REV CODE- 250/637: Performed by: PHYSICIAN ASSISTANT

## 2021-01-01 PROCEDURE — 99223 1ST HOSP IP/OBS HIGH 75: CPT | Performed by: INTERNAL MEDICINE

## 2021-01-01 PROCEDURE — P9047 ALBUMIN (HUMAN), 25%, 50ML: HCPCS | Performed by: PHYSICIAN ASSISTANT

## 2021-01-01 PROCEDURE — 99238 HOSP IP/OBS DSCHRG MGMT 30/<: CPT | Performed by: PHYSICIAN ASSISTANT

## 2021-01-01 PROCEDURE — B548ZZA ULTRASONOGRAPHY OF SUPERIOR VENA CAVA, GUIDANCE: ICD-10-PCS | Performed by: INTERNAL MEDICINE

## 2021-01-01 PROCEDURE — 80069 RENAL FUNCTION PANEL: CPT

## 2021-01-01 PROCEDURE — 99232 SBSQ HOSP IP/OBS MODERATE 35: CPT | Performed by: INTERNAL MEDICINE

## 2021-01-01 PROCEDURE — 86706 HEP B SURFACE ANTIBODY: CPT

## 2021-01-01 PROCEDURE — XW033E5 INTRODUCTION OF REMDESIVIR ANTI-INFECTIVE INTO PERIPHERAL VEIN, PERCUTANEOUS APPROACH, NEW TECHNOLOGY GROUP 5: ICD-10-PCS | Performed by: STUDENT IN AN ORGANIZED HEALTH CARE EDUCATION/TRAINING PROGRAM

## 2021-01-01 PROCEDURE — 74011250637 HC RX REV CODE- 250/637: Performed by: EMERGENCY MEDICINE

## 2021-01-01 PROCEDURE — 82553 CREATINE MB FRACTION: CPT

## 2021-01-01 PROCEDURE — 96365 THER/PROPH/DIAG IV INF INIT: CPT

## 2021-01-01 PROCEDURE — 94660 CPAP INITIATION&MGMT: CPT

## 2021-01-01 PROCEDURE — 99285 EMERGENCY DEPT VISIT HI MDM: CPT

## 2021-01-01 PROCEDURE — 74011000258 HC RX REV CODE- 258: Performed by: INTERNAL MEDICINE

## 2021-01-01 PROCEDURE — 87040 BLOOD CULTURE FOR BACTERIA: CPT

## 2021-01-01 PROCEDURE — 74011000636 HC RX REV CODE- 636: Performed by: EMERGENCY MEDICINE

## 2021-01-01 PROCEDURE — 77030013797 HC KT TRNSDUC PRSSR EDWD -A

## 2021-01-01 PROCEDURE — 5A1D70Z PERFORMANCE OF URINARY FILTRATION, INTERMITTENT, LESS THAN 6 HOURS PER DAY: ICD-10-PCS | Performed by: INTERNAL MEDICINE

## 2021-01-01 PROCEDURE — 02HV33Z INSERTION OF INFUSION DEVICE INTO SUPERIOR VENA CAVA, PERCUTANEOUS APPROACH: ICD-10-PCS | Performed by: INTERNAL MEDICINE

## 2021-01-01 PROCEDURE — 85384 FIBRINOGEN ACTIVITY: CPT

## 2021-01-01 PROCEDURE — 36556 INSERT NON-TUNNEL CV CATH: CPT | Performed by: INTERNAL MEDICINE

## 2021-01-01 PROCEDURE — 71275 CT ANGIOGRAPHY CHEST: CPT

## 2021-01-01 PROCEDURE — 31500 INSERT EMERGENCY AIRWAY: CPT

## 2021-01-01 PROCEDURE — 99284 EMERGENCY DEPT VISIT MOD MDM: CPT

## 2021-01-01 PROCEDURE — 93308 TTE F-UP OR LMTD: CPT

## 2021-01-01 PROCEDURE — 97166 OT EVAL MOD COMPLEX 45 MIN: CPT

## 2021-01-01 PROCEDURE — 87340 HEPATITIS B SURFACE AG IA: CPT

## 2021-01-01 PROCEDURE — 82728 ASSAY OF FERRITIN: CPT

## 2021-01-01 PROCEDURE — 96368 THER/DIAG CONCURRENT INF: CPT

## 2021-01-01 PROCEDURE — 83880 ASSAY OF NATRIURETIC PEPTIDE: CPT

## 2021-01-01 PROCEDURE — 77010033678 HC OXYGEN DAILY

## 2021-01-01 PROCEDURE — 87070 CULTURE OTHR SPECIMN AEROBIC: CPT

## 2021-01-01 RX ORDER — SODIUM CHLORIDE 0.9 % (FLUSH) 0.9 %
5-10 SYRINGE (ML) INJECTION AS NEEDED
Status: DISCONTINUED | OUTPATIENT
Start: 2021-01-01 | End: 2021-01-01

## 2021-01-01 RX ORDER — FAMOTIDINE 20 MG/1
20 TABLET, FILM COATED ORAL DAILY
Status: DISCONTINUED | OUTPATIENT
Start: 2021-01-01 | End: 2021-01-01

## 2021-01-01 RX ORDER — POLYETHYLENE GLYCOL 3350 17 G/17G
17 POWDER, FOR SOLUTION ORAL DAILY
Status: DISCONTINUED | OUTPATIENT
Start: 2021-01-01 | End: 2021-01-01

## 2021-01-01 RX ORDER — HYDROCORTISONE SODIUM SUCCINATE 100 MG/2ML
50 INJECTION, POWDER, FOR SOLUTION INTRAMUSCULAR; INTRAVENOUS EVERY 6 HOURS
Status: DISCONTINUED | OUTPATIENT
Start: 2021-01-01 | End: 2021-01-01

## 2021-01-01 RX ORDER — ACETAMINOPHEN 650 MG/1
650 SUPPOSITORY RECTAL
Status: DISCONTINUED | OUTPATIENT
Start: 2021-01-01 | End: 2021-01-01

## 2021-01-01 RX ORDER — FENTANYL CITRATE 50 UG/ML
50-100 INJECTION, SOLUTION INTRAMUSCULAR; INTRAVENOUS
Status: DISCONTINUED | OUTPATIENT
Start: 2021-01-01 | End: 2021-01-01

## 2021-01-01 RX ORDER — INSULIN GLARGINE 100 [IU]/ML
10 INJECTION, SOLUTION SUBCUTANEOUS DAILY
Status: DISCONTINUED | OUTPATIENT
Start: 2021-01-01 | End: 2021-01-01

## 2021-01-01 RX ORDER — IPRATROPIUM BROMIDE AND ALBUTEROL SULFATE 2.5; .5 MG/3ML; MG/3ML
3 SOLUTION RESPIRATORY (INHALATION)
Status: DISCONTINUED | OUTPATIENT
Start: 2021-01-01 | End: 2021-01-01

## 2021-01-01 RX ORDER — DIAZEPAM 10 MG/2ML
INJECTION INTRAMUSCULAR
Status: COMPLETED
Start: 2021-01-01 | End: 2021-01-01

## 2021-01-01 RX ORDER — ALBUTEROL SULFATE 90 UG/1
2 AEROSOL, METERED RESPIRATORY (INHALATION)
Status: DISCONTINUED | OUTPATIENT
Start: 2021-01-01 | End: 2021-01-01

## 2021-01-01 RX ORDER — PRAVASTATIN SODIUM 20 MG/1
20 TABLET ORAL DAILY
Status: DISCONTINUED | OUTPATIENT
Start: 2021-01-01 | End: 2021-01-01

## 2021-01-01 RX ORDER — ZINC SULFATE 50(220)MG
2 CAPSULE ORAL DAILY
Status: DISCONTINUED | OUTPATIENT
Start: 2021-01-01 | End: 2021-01-01

## 2021-01-01 RX ORDER — ALBUMIN HUMAN 250 G/1000ML
25 SOLUTION INTRAVENOUS
Status: DISCONTINUED | OUTPATIENT
Start: 2021-01-01 | End: 2021-01-01

## 2021-01-01 RX ORDER — CHOLECALCIFEROL (VITAMIN D3) 125 MCG
5 CAPSULE ORAL
Status: DISCONTINUED | OUTPATIENT
Start: 2021-01-01 | End: 2021-01-01 | Stop reason: CLARIF

## 2021-01-01 RX ORDER — DIAZEPAM 10 MG/2ML
10 INJECTION INTRAMUSCULAR ONCE
Status: COMPLETED | OUTPATIENT
Start: 2021-01-01 | End: 2021-01-01

## 2021-01-01 RX ORDER — HEPARIN SODIUM 10000 [USP'U]/100ML
18-36 INJECTION, SOLUTION INTRAVENOUS
Status: DISCONTINUED | OUTPATIENT
Start: 2021-01-01 | End: 2021-01-01

## 2021-01-01 RX ORDER — DEXTROSE 50 % IN WATER (D50W) INTRAVENOUS SYRINGE
25-50 AS NEEDED
Status: DISCONTINUED | OUTPATIENT
Start: 2021-01-01 | End: 2021-01-01

## 2021-01-01 RX ORDER — CHLORHEXIDINE GLUCONATE 1.2 MG/ML
10 RINSE ORAL EVERY 12 HOURS
Status: DISCONTINUED | OUTPATIENT
Start: 2021-01-01 | End: 2021-01-01

## 2021-01-01 RX ORDER — INSULIN GLARGINE 100 [IU]/ML
5 INJECTION, SOLUTION SUBCUTANEOUS ONCE
Status: COMPLETED | OUTPATIENT
Start: 2021-01-01 | End: 2021-01-01

## 2021-01-01 RX ORDER — SODIUM CHLORIDE 0.9 % (FLUSH) 0.9 %
5-40 SYRINGE (ML) INJECTION EVERY 8 HOURS
Status: DISCONTINUED | OUTPATIENT
Start: 2021-01-01 | End: 2021-01-01

## 2021-01-01 RX ORDER — HEPARIN SODIUM 1000 [USP'U]/ML
INJECTION, SOLUTION INTRAVENOUS; SUBCUTANEOUS
Status: COMPLETED
Start: 2021-01-01 | End: 2021-01-01

## 2021-01-01 RX ORDER — CARVEDILOL 3.12 MG/1
3.12 TABLET ORAL 2 TIMES DAILY WITH MEALS
Status: DISCONTINUED | OUTPATIENT
Start: 2021-01-01 | End: 2021-01-01

## 2021-01-01 RX ORDER — NOREPINEPHRINE BITARTRATE/D5W 8 MG/250ML
.5-5 PLASTIC BAG, INJECTION (ML) INTRAVENOUS
Status: DISCONTINUED | OUTPATIENT
Start: 2021-01-01 | End: 2021-01-01

## 2021-01-01 RX ORDER — SODIUM CHLORIDE 0.9 % (FLUSH) 0.9 %
5-40 SYRINGE (ML) INJECTION AS NEEDED
Status: DISCONTINUED | OUTPATIENT
Start: 2021-01-01 | End: 2021-01-01

## 2021-01-01 RX ORDER — MIDODRINE HYDROCHLORIDE 5 MG/1
10 TABLET ORAL
Status: DISCONTINUED | OUTPATIENT
Start: 2021-01-01 | End: 2021-01-01

## 2021-01-01 RX ORDER — HEPARIN SODIUM 5000 [USP'U]/ML
5000 INJECTION, SOLUTION INTRAVENOUS; SUBCUTANEOUS EVERY 8 HOURS
Status: DISCONTINUED | OUTPATIENT
Start: 2021-01-01 | End: 2021-01-01

## 2021-01-01 RX ORDER — DEXAMETHASONE SODIUM PHOSPHATE 4 MG/ML
6 INJECTION, SOLUTION INTRA-ARTICULAR; INTRALESIONAL; INTRAMUSCULAR; INTRAVENOUS; SOFT TISSUE EVERY 24 HOURS
Status: DISCONTINUED | OUTPATIENT
Start: 2021-01-01 | End: 2021-01-01

## 2021-01-01 RX ORDER — ALBUMIN HUMAN 250 G/1000ML
25 SOLUTION INTRAVENOUS ONCE
Status: COMPLETED | OUTPATIENT
Start: 2021-01-01 | End: 2021-01-01

## 2021-01-01 RX ORDER — ALBUMIN HUMAN 250 G/1000ML
SOLUTION INTRAVENOUS
Status: COMPLETED
Start: 2021-01-01 | End: 2021-01-01

## 2021-01-01 RX ORDER — ASCORBIC ACID 250 MG
500 TABLET ORAL 2 TIMES DAILY
Status: DISCONTINUED | OUTPATIENT
Start: 2021-01-01 | End: 2021-01-01

## 2021-01-01 RX ORDER — MIDODRINE HYDROCHLORIDE 5 MG/1
10 TABLET ORAL EVERY 8 HOURS
Status: DISCONTINUED | OUTPATIENT
Start: 2021-01-01 | End: 2021-01-01

## 2021-01-01 RX ORDER — ASPIRIN 325 MG
325 TABLET ORAL DAILY
Status: DISCONTINUED | OUTPATIENT
Start: 2021-01-01 | End: 2021-01-01

## 2021-01-01 RX ORDER — POLYETHYLENE GLYCOL 3350 17 G/17G
17 POWDER, FOR SOLUTION ORAL 2 TIMES DAILY
Status: DISCONTINUED | OUTPATIENT
Start: 2021-01-01 | End: 2021-01-01

## 2021-01-01 RX ORDER — INSULIN GLARGINE 100 [IU]/ML
5 INJECTION, SOLUTION SUBCUTANEOUS
Status: COMPLETED | OUTPATIENT
Start: 2021-01-01 | End: 2021-01-01

## 2021-01-01 RX ORDER — MAGNESIUM SULFATE 100 %
4 CRYSTALS MISCELLANEOUS AS NEEDED
Status: DISCONTINUED | OUTPATIENT
Start: 2021-01-01 | End: 2021-01-01

## 2021-01-01 RX ORDER — VITAMIN E 1000 UNIT
1000 CAPSULE ORAL EVERY 6 HOURS
Status: DISCONTINUED | OUTPATIENT
Start: 2021-01-01 | End: 2021-01-01

## 2021-01-01 RX ORDER — POLYETHYLENE GLYCOL 3350 17 G/17G
17 POWDER, FOR SOLUTION ORAL DAILY PRN
Status: DISCONTINUED | OUTPATIENT
Start: 2021-01-01 | End: 2021-01-01

## 2021-01-01 RX ORDER — ACETAMINOPHEN 650 MG/1
650 SUPPOSITORY RECTAL
Status: DISCONTINUED | OUTPATIENT
Start: 2021-01-01 | End: 2021-01-01 | Stop reason: HOSPADM

## 2021-01-01 RX ORDER — HEPARIN SODIUM 1000 [USP'U]/ML
80 INJECTION, SOLUTION INTRAVENOUS; SUBCUTANEOUS ONCE
Status: COMPLETED | OUTPATIENT
Start: 2021-01-01 | End: 2021-01-01

## 2021-01-01 RX ORDER — MAGNESIUM SULFATE HEPTAHYDRATE 40 MG/ML
2 INJECTION, SOLUTION INTRAVENOUS ONCE
Status: COMPLETED | OUTPATIENT
Start: 2021-01-01 | End: 2021-01-01

## 2021-01-01 RX ORDER — ALBUTEROL SULFATE 0.83 MG/ML
2.5 SOLUTION RESPIRATORY (INHALATION)
Status: DISPENSED | OUTPATIENT
Start: 2021-01-01 | End: 2021-01-01

## 2021-01-01 RX ORDER — MAGNESIUM SULFATE HEPTAHYDRATE 500 MG/ML
2 INJECTION, SOLUTION INTRAMUSCULAR; INTRAVENOUS
Status: DISCONTINUED | OUTPATIENT
Start: 2021-01-01 | End: 2021-01-01

## 2021-01-01 RX ORDER — INSULIN GLARGINE 100 [IU]/ML
15 INJECTION, SOLUTION SUBCUTANEOUS EVERY 12 HOURS
Status: DISCONTINUED | OUTPATIENT
Start: 2021-01-01 | End: 2021-01-01

## 2021-01-01 RX ORDER — SEVELAMER CARBONATE 800 MG/1
800 TABLET, FILM COATED ORAL
Status: DISCONTINUED | OUTPATIENT
Start: 2021-01-01 | End: 2021-01-01

## 2021-01-01 RX ORDER — INSULIN GLARGINE 100 [IU]/ML
5 INJECTION, SOLUTION SUBCUTANEOUS DAILY
Status: DISCONTINUED | OUTPATIENT
Start: 2021-01-01 | End: 2021-01-01

## 2021-01-01 RX ORDER — ASPIRIN 325 MG
50000 TABLET, DELAYED RELEASE (ENTERIC COATED) ORAL ONCE
Status: DISCONTINUED | OUTPATIENT
Start: 2021-01-01 | End: 2021-01-01 | Stop reason: SDUPTHER

## 2021-01-01 RX ORDER — HEPARIN SODIUM 1000 [USP'U]/ML
INJECTION, SOLUTION INTRAVENOUS; SUBCUTANEOUS
Status: DISPENSED
Start: 2021-01-01 | End: 2021-01-01

## 2021-01-01 RX ORDER — NOREPINEPHRINE BITARTRATE/D5W 8 MG/250ML
PLASTIC BAG, INJECTION (ML) INTRAVENOUS
Status: COMPLETED
Start: 2021-01-01 | End: 2021-01-01

## 2021-01-01 RX ORDER — SODIUM CHLORIDE 0.9 % (FLUSH) 0.9 %
5-10 SYRINGE (ML) INJECTION AS NEEDED
Status: DISCONTINUED | OUTPATIENT
Start: 2021-01-01 | End: 2021-01-01 | Stop reason: HOSPADM

## 2021-01-01 RX ORDER — CHOLECALCIFEROL (VITAMIN D3) 125 MCG
10 CAPSULE ORAL
Status: DISCONTINUED | OUTPATIENT
Start: 2021-01-01 | End: 2021-01-01

## 2021-01-01 RX ORDER — INSULIN GLARGINE 100 [IU]/ML
10 INJECTION, SOLUTION SUBCUTANEOUS EVERY 12 HOURS
Status: DISCONTINUED | OUTPATIENT
Start: 2021-01-01 | End: 2021-01-01

## 2021-01-01 RX ORDER — ACETAMINOPHEN 500 MG
1000 TABLET ORAL
Status: COMPLETED | OUTPATIENT
Start: 2021-01-01 | End: 2021-01-01

## 2021-01-01 RX ORDER — MIDODRINE HYDROCHLORIDE 5 MG/1
10 TABLET ORAL 2 TIMES DAILY WITH MEALS
Status: DISCONTINUED | OUTPATIENT
Start: 2021-01-01 | End: 2021-01-01

## 2021-01-01 RX ORDER — METOPROLOL TARTRATE 25 MG/1
25 TABLET, FILM COATED ORAL 2 TIMES DAILY
Status: DISCONTINUED | OUTPATIENT
Start: 2021-01-01 | End: 2021-01-01

## 2021-01-01 RX ORDER — ACETAMINOPHEN 325 MG/1
650 TABLET ORAL
Status: DISCONTINUED | OUTPATIENT
Start: 2021-01-01 | End: 2021-01-01 | Stop reason: HOSPADM

## 2021-01-01 RX ORDER — FAMOTIDINE 20 MG/1
40 TABLET, FILM COATED ORAL 2 TIMES DAILY
Status: DISCONTINUED | OUTPATIENT
Start: 2021-01-01 | End: 2021-01-01 | Stop reason: DRUGHIGH

## 2021-01-01 RX ORDER — CALCIUM CARB/MAGNESIUM CARB 311-232MG
5 TABLET ORAL
Status: DISCONTINUED | OUTPATIENT
Start: 2021-01-01 | End: 2021-01-01

## 2021-01-01 RX ORDER — DEXAMETHASONE SODIUM PHOSPHATE 4 MG/ML
6 INJECTION, SOLUTION INTRA-ARTICULAR; INTRALESIONAL; INTRAMUSCULAR; INTRAVENOUS; SOFT TISSUE ONCE
Status: COMPLETED | OUTPATIENT
Start: 2021-01-01 | End: 2021-01-01

## 2021-01-01 RX ORDER — IPRATROPIUM BROMIDE AND ALBUTEROL SULFATE 2.5; .5 MG/3ML; MG/3ML
3 SOLUTION RESPIRATORY (INHALATION) ONCE
Status: COMPLETED | OUTPATIENT
Start: 2021-01-01 | End: 2021-01-01

## 2021-01-01 RX ORDER — MIDAZOLAM HYDROCHLORIDE 1 MG/ML
1-2 INJECTION, SOLUTION INTRAMUSCULAR; INTRAVENOUS
Status: DISCONTINUED | OUTPATIENT
Start: 2021-01-01 | End: 2021-01-01

## 2021-01-01 RX ORDER — ACETAMINOPHEN 325 MG/1
650 TABLET ORAL
Status: DISCONTINUED | OUTPATIENT
Start: 2021-01-01 | End: 2021-01-01

## 2021-01-01 RX ORDER — MIDODRINE HYDROCHLORIDE 5 MG/1
5 TABLET ORAL ONCE
Status: COMPLETED | OUTPATIENT
Start: 2021-01-01 | End: 2021-01-01

## 2021-01-01 RX ORDER — INSULIN LISPRO 100 [IU]/ML
INJECTION, SOLUTION INTRAVENOUS; SUBCUTANEOUS EVERY 6 HOURS
Status: DISCONTINUED | OUTPATIENT
Start: 2021-01-01 | End: 2021-01-01

## 2021-01-01 RX ORDER — LORAZEPAM 2 MG/ML
1 INJECTION INTRAMUSCULAR
Status: DISCONTINUED | OUTPATIENT
Start: 2021-01-01 | End: 2021-01-01 | Stop reason: HOSPADM

## 2021-01-01 RX ORDER — PHENYLEPHRINE HCL IN 0.9% NACL 1 MG/10 ML
SYRINGE (ML) INTRAVENOUS
Status: DISPENSED
Start: 2021-01-01 | End: 2021-01-01

## 2021-01-01 RX ORDER — INSULIN LISPRO 100 [IU]/ML
INJECTION, SOLUTION INTRAVENOUS; SUBCUTANEOUS
Status: DISCONTINUED | OUTPATIENT
Start: 2021-01-01 | End: 2021-01-01

## 2021-01-01 RX ORDER — MORPHINE SULFATE 2 MG/ML
1 INJECTION, SOLUTION INTRAMUSCULAR; INTRAVENOUS
Status: DISCONTINUED | OUTPATIENT
Start: 2021-01-01 | End: 2021-01-01 | Stop reason: HOSPADM

## 2021-01-01 RX ORDER — DEXMEDETOMIDINE HYDROCHLORIDE 4 UG/ML
.1-1.5 INJECTION, SOLUTION INTRAVENOUS
Status: DISCONTINUED | OUTPATIENT
Start: 2021-01-01 | End: 2021-01-01

## 2021-01-01 RX ORDER — MIDAZOLAM IN 0.9 % SOD.CHLORID 1 MG/ML
1-10 PLASTIC BAG, INJECTION (ML) INTRAVENOUS
Status: DISCONTINUED | OUTPATIENT
Start: 2021-01-01 | End: 2021-01-01

## 2021-01-01 RX ADMIN — MIDODRINE HYDROCHLORIDE 10 MG: 5 TABLET ORAL at 11:43

## 2021-01-01 RX ADMIN — PRAVASTATIN SODIUM 20 MG: 20 TABLET ORAL at 08:17

## 2021-01-01 RX ADMIN — REMDESIVIR 100 MG: 100 INJECTION, POWDER, LYOPHILIZED, FOR SOLUTION INTRAVENOUS at 21:52

## 2021-01-01 RX ADMIN — Medication 1000 MG: at 00:17

## 2021-01-01 RX ADMIN — Medication 1000 MG: at 06:00

## 2021-01-01 RX ADMIN — IPRATROPIUM BROMIDE AND ALBUTEROL SULFATE 3 ML: .5; 3 SOLUTION RESPIRATORY (INHALATION) at 00:42

## 2021-01-01 RX ADMIN — PIPERACILLIN AND TAZOBACTAM 3.38 G: 3; .375 INJECTION, POWDER, LYOPHILIZED, FOR SOLUTION INTRAVENOUS at 18:26

## 2021-01-01 RX ADMIN — ACETAMINOPHEN 1000 MG: 500 TABLET ORAL at 07:26

## 2021-01-01 RX ADMIN — POLYETHYLENE GLYCOL 3350 17 G: 17 POWDER, FOR SOLUTION ORAL at 12:22

## 2021-01-01 RX ADMIN — Medication 10 ML: at 09:00

## 2021-01-01 RX ADMIN — SALINE NASAL SPRAY 2 SPRAY: 1.5 SOLUTION NASAL at 00:00

## 2021-01-01 RX ADMIN — Medication 10 ML: at 21:54

## 2021-01-01 RX ADMIN — Medication 1000 MG: at 00:42

## 2021-01-01 RX ADMIN — PRAVASTATIN SODIUM 20 MG: 20 TABLET ORAL at 14:22

## 2021-01-01 RX ADMIN — Medication 10 ML: at 21:00

## 2021-01-01 RX ADMIN — MIDODRINE HYDROCHLORIDE 10 MG: 5 TABLET ORAL at 14:02

## 2021-01-01 RX ADMIN — Medication 10 ML: at 21:09

## 2021-01-01 RX ADMIN — ALBUMIN (HUMAN) 25 G: 0.25 INJECTION, SOLUTION INTRAVENOUS at 15:56

## 2021-01-01 RX ADMIN — Medication 1000 MG: at 18:00

## 2021-01-01 RX ADMIN — PIPERACILLIN AND TAZOBACTAM 3.38 G: 3; .375 INJECTION, POWDER, LYOPHILIZED, FOR SOLUTION INTRAVENOUS at 12:07

## 2021-01-01 RX ADMIN — SALINE NASAL SPRAY 2 SPRAY: 1.5 SOLUTION NASAL at 20:00

## 2021-01-01 RX ADMIN — ALBUMIN (HUMAN) 25 G: 0.25 INJECTION, SOLUTION INTRAVENOUS at 15:49

## 2021-01-01 RX ADMIN — Medication 10 ML: at 06:00

## 2021-01-01 RX ADMIN — SEVELAMER CARBONATE 800 MG: 800 TABLET, FILM COATED ORAL at 11:46

## 2021-01-01 RX ADMIN — Medication 1000 MG: at 11:46

## 2021-01-01 RX ADMIN — FAMOTIDINE 20 MG: 20 TABLET, FILM COATED ORAL at 09:34

## 2021-01-01 RX ADMIN — SALINE NASAL SPRAY 2 SPRAY: 1.5 SOLUTION NASAL at 08:00

## 2021-01-01 RX ADMIN — Medication 1000 MG: at 14:24

## 2021-01-01 RX ADMIN — SEVELAMER CARBONATE 800 MG: 800 TABLET, FILM COATED ORAL at 09:26

## 2021-01-01 RX ADMIN — Medication 1000 MG: at 11:38

## 2021-01-01 RX ADMIN — Medication 1000 MG: at 05:44

## 2021-01-01 RX ADMIN — IPRATROPIUM BROMIDE 2 PUFF: 17 AEROSOL, METERED RESPIRATORY (INHALATION) at 03:35

## 2021-01-01 RX ADMIN — Medication 200 MCG/HR: at 14:00

## 2021-01-01 RX ADMIN — MIDODRINE HYDROCHLORIDE 10 MG: 5 TABLET ORAL at 14:23

## 2021-01-01 RX ADMIN — ALBUMIN (HUMAN): 0.25 INJECTION, SOLUTION INTRAVENOUS at 21:36

## 2021-01-01 RX ADMIN — ALBUTEROL SULFATE 2 PUFF: 108 INHALANT RESPIRATORY (INHALATION) at 08:21

## 2021-01-01 RX ADMIN — Medication 200 MCG/HR: at 19:10

## 2021-01-01 RX ADMIN — Medication 10 ML: at 14:00

## 2021-01-01 RX ADMIN — DEXTROSE MONOHYDRATE 11 MCG/MIN: 50 INJECTION, SOLUTION INTRAVENOUS at 01:30

## 2021-01-01 RX ADMIN — Medication 1000 MG: at 01:52

## 2021-01-01 RX ADMIN — INSULIN LISPRO 3 UNITS: 100 INJECTION, SOLUTION INTRAVENOUS; SUBCUTANEOUS at 20:54

## 2021-01-01 RX ADMIN — DEXAMETHASONE SODIUM PHOSPHATE 6 MG: 4 INJECTION, SOLUTION INTRA-ARTICULAR; INTRALESIONAL; INTRAMUSCULAR; INTRAVENOUS; SOFT TISSUE at 16:32

## 2021-01-01 RX ADMIN — IPRATROPIUM BROMIDE AND ALBUTEROL SULFATE 3 ML: .5; 3 SOLUTION RESPIRATORY (INHALATION) at 08:00

## 2021-01-01 RX ADMIN — SALINE NASAL SPRAY 2 SPRAY: 1.5 SOLUTION NASAL at 11:44

## 2021-01-01 RX ADMIN — POLYETHYLENE GLYCOL 3350 17 G: 17 POWDER, FOR SOLUTION ORAL at 08:33

## 2021-01-01 RX ADMIN — Medication 10 ML: at 08:51

## 2021-01-01 RX ADMIN — WATER 2 G: 1 INJECTION INTRAMUSCULAR; INTRAVENOUS; SUBCUTANEOUS at 10:00

## 2021-01-01 RX ADMIN — SALINE NASAL SPRAY 2 SPRAY: 1.5 SOLUTION NASAL at 04:00

## 2021-01-01 RX ADMIN — PRAVASTATIN SODIUM 20 MG: 20 TABLET ORAL at 08:25

## 2021-01-01 RX ADMIN — DEXAMETHASONE SODIUM PHOSPHATE 6 MG: 4 INJECTION, SOLUTION INTRAMUSCULAR; INTRAVENOUS at 11:39

## 2021-01-01 RX ADMIN — PIPERACILLIN SODIUM AND TAZOBACTAM SODIUM 2.25 G: 2; .25 INJECTION, POWDER, LYOPHILIZED, FOR SOLUTION INTRAVENOUS at 13:52

## 2021-01-01 RX ADMIN — CHLORHEXIDINE GLUCONATE 0.12% ORAL RINSE 10 ML: 1.2 LIQUID ORAL at 20:00

## 2021-01-01 RX ADMIN — AZITHROMYCIN DIHYDRATE 500 MG: 500 INJECTION, POWDER, LYOPHILIZED, FOR SOLUTION INTRAVENOUS at 10:01

## 2021-01-01 RX ADMIN — ASPIRIN 325 MG ORAL TABLET 325 MG: 325 PILL ORAL at 09:34

## 2021-01-01 RX ADMIN — MIDODRINE HYDROCHLORIDE 10 MG: 5 TABLET ORAL at 08:18

## 2021-01-01 RX ADMIN — Medication 1000 MG: at 01:08

## 2021-01-01 RX ADMIN — CHLORHEXIDINE GLUCONATE 0.12% ORAL RINSE 10 ML: 1.2 LIQUID ORAL at 08:34

## 2021-01-01 RX ADMIN — MIDODRINE HYDROCHLORIDE 10 MG: 5 TABLET ORAL at 16:15

## 2021-01-01 RX ADMIN — PIPERACILLIN SODIUM AND TAZOBACTAM SODIUM 2.25 G: 2; .25 INJECTION, POWDER, LYOPHILIZED, FOR SOLUTION INTRAVENOUS at 13:19

## 2021-01-01 RX ADMIN — ANTICOAGULANT CITRATE DEXTROSE SOLUTION FORMULA A 200 ML/HR: 12.25; 11; 3.65 SOLUTION INTRAVENOUS at 12:05

## 2021-01-01 RX ADMIN — Medication 200 MCG/HR: at 10:15

## 2021-01-01 RX ADMIN — HEPARIN SODIUM 5000 UNITS: 5000 INJECTION INTRAVENOUS; SUBCUTANEOUS at 21:07

## 2021-01-01 RX ADMIN — IPRATROPIUM BROMIDE AND ALBUTEROL SULFATE 3 ML: .5; 3 SOLUTION RESPIRATORY (INHALATION) at 15:52

## 2021-01-01 RX ADMIN — ALBUMIN (HUMAN) 25 G: 0.25 INJECTION, SOLUTION INTRAVENOUS at 16:47

## 2021-01-01 RX ADMIN — MIDODRINE HYDROCHLORIDE 10 MG: 5 TABLET ORAL at 12:22

## 2021-01-01 RX ADMIN — Medication 5 MG: at 21:52

## 2021-01-01 RX ADMIN — MIDAZOLAM HYDROCHLORIDE 2 MG: 2 INJECTION, SOLUTION INTRAMUSCULAR; INTRAVENOUS at 03:45

## 2021-01-01 RX ADMIN — IPRATROPIUM BROMIDE AND ALBUTEROL SULFATE 3 ML: .5; 3 SOLUTION RESPIRATORY (INHALATION) at 12:00

## 2021-01-01 RX ADMIN — HEPARIN SODIUM 2000 UNITS: 1000 INJECTION INTRAVENOUS; SUBCUTANEOUS at 17:34

## 2021-01-01 RX ADMIN — WATER 2 G: 1 INJECTION INTRAMUSCULAR; INTRAVENOUS; SUBCUTANEOUS at 11:02

## 2021-01-01 RX ADMIN — REMDESIVIR 100 MG: 100 INJECTION, POWDER, LYOPHILIZED, FOR SOLUTION INTRAVENOUS at 20:31

## 2021-01-01 RX ADMIN — HEPARIN SODIUM 5000 UNITS: 5000 INJECTION INTRAVENOUS; SUBCUTANEOUS at 21:08

## 2021-01-01 RX ADMIN — INSULIN LISPRO 6 UNITS: 100 INJECTION, SOLUTION INTRAVENOUS; SUBCUTANEOUS at 17:55

## 2021-01-01 RX ADMIN — INSULIN LISPRO 9 UNITS: 100 INJECTION, SOLUTION INTRAVENOUS; SUBCUTANEOUS at 12:09

## 2021-01-01 RX ADMIN — SEVELAMER CARBONATE 800 MG: 800 TABLET, FILM COATED ORAL at 12:12

## 2021-01-01 RX ADMIN — SALINE NASAL SPRAY 2 SPRAY: 1.5 SOLUTION NASAL at 16:21

## 2021-01-01 RX ADMIN — CALCIUM GLUCONATE 24 G: 98 INJECTION, SOLUTION INTRAVENOUS at 18:54

## 2021-01-01 RX ADMIN — MIDODRINE HYDROCHLORIDE 10 MG: 5 TABLET ORAL at 17:14

## 2021-01-01 RX ADMIN — MIDODRINE HYDROCHLORIDE 10 MG: 5 TABLET ORAL at 12:54

## 2021-01-01 RX ADMIN — IPRATROPIUM BROMIDE AND ALBUTEROL 1 PUFF: 20; 100 SPRAY, METERED RESPIRATORY (INHALATION) at 08:00

## 2021-01-01 RX ADMIN — CHLORHEXIDINE GLUCONATE 0.12% ORAL RINSE 10 ML: 1.2 LIQUID ORAL at 05:00

## 2021-01-01 RX ADMIN — DEXAMETHASONE SODIUM PHOSPHATE 6 MG: 4 INJECTION, SOLUTION INTRA-ARTICULAR; INTRALESIONAL; INTRAMUSCULAR; INTRAVENOUS; SOFT TISSUE at 14:05

## 2021-01-01 RX ADMIN — HEPARIN SODIUM 5000 UNITS: 5000 INJECTION INTRAVENOUS; SUBCUTANEOUS at 08:11

## 2021-01-01 RX ADMIN — IPRATROPIUM BROMIDE AND ALBUTEROL SULFATE 3 ML: .5; 3 SOLUTION RESPIRATORY (INHALATION) at 19:30

## 2021-01-01 RX ADMIN — MIDODRINE HYDROCHLORIDE 10 MG: 5 TABLET ORAL at 17:00

## 2021-01-01 RX ADMIN — FENTANYL CITRATE 100 MCG: 50 INJECTION, SOLUTION INTRAMUSCULAR; INTRAVENOUS at 02:20

## 2021-01-01 RX ADMIN — HEPARIN SODIUM 5000 UNITS: 5000 INJECTION INTRAVENOUS; SUBCUTANEOUS at 22:55

## 2021-01-01 RX ADMIN — IPRATROPIUM BROMIDE AND ALBUTEROL SULFATE 3 ML: .5; 3 SOLUTION RESPIRATORY (INHALATION) at 04:54

## 2021-01-01 RX ADMIN — Medication 23 MCG/MIN: at 13:59

## 2021-01-01 RX ADMIN — IPRATROPIUM BROMIDE AND ALBUTEROL SULFATE 3 ML: .5; 3 SOLUTION RESPIRATORY (INHALATION) at 03:58

## 2021-01-01 RX ADMIN — INSULIN LISPRO 3 UNITS: 100 INJECTION, SOLUTION INTRAVENOUS; SUBCUTANEOUS at 06:00

## 2021-01-01 RX ADMIN — IPRATROPIUM BROMIDE AND ALBUTEROL SULFATE 3 ML: .5; 3 SOLUTION RESPIRATORY (INHALATION) at 12:11

## 2021-01-01 RX ADMIN — IPRATROPIUM BROMIDE AND ALBUTEROL SULFATE 3 ML: .5; 3 SOLUTION RESPIRATORY (INHALATION) at 08:12

## 2021-01-01 RX ADMIN — Medication 10 ML: at 05:16

## 2021-01-01 RX ADMIN — Medication 5 MG: at 23:12

## 2021-01-01 RX ADMIN — IPRATROPIUM BROMIDE AND ALBUTEROL 1 PUFF: 20; 100 SPRAY, METERED RESPIRATORY (INHALATION) at 01:16

## 2021-01-01 RX ADMIN — IPRATROPIUM BROMIDE AND ALBUTEROL SULFATE 3 ML: .5; 3 SOLUTION RESPIRATORY (INHALATION) at 00:20

## 2021-01-01 RX ADMIN — MIDODRINE HYDROCHLORIDE 10 MG: 5 TABLET ORAL at 17:51

## 2021-01-01 RX ADMIN — PRAVASTATIN SODIUM 20 MG: 20 TABLET ORAL at 09:24

## 2021-01-01 RX ADMIN — INSULIN GLARGINE 5 UNITS: 100 INJECTION, SOLUTION SUBCUTANEOUS at 10:55

## 2021-01-01 RX ADMIN — IPRATROPIUM BROMIDE AND ALBUTEROL SULFATE 3 ML: .5; 3 SOLUTION RESPIRATORY (INHALATION) at 12:56

## 2021-01-01 RX ADMIN — IPRATROPIUM BROMIDE AND ALBUTEROL SULFATE 3 ML: .5; 3 SOLUTION RESPIRATORY (INHALATION) at 05:33

## 2021-01-01 RX ADMIN — IPRATROPIUM BROMIDE AND ALBUTEROL 1 PUFF: 20; 100 SPRAY, METERED RESPIRATORY (INHALATION) at 03:15

## 2021-01-01 RX ADMIN — MIDAZOLAM HYDROCHLORIDE 2 MG: 2 INJECTION, SOLUTION INTRAMUSCULAR; INTRAVENOUS at 04:00

## 2021-01-01 RX ADMIN — IPRATROPIUM BROMIDE 2 PUFF: 17 AEROSOL, METERED RESPIRATORY (INHALATION) at 20:48

## 2021-01-01 RX ADMIN — IPRATROPIUM BROMIDE 2 PUFF: 17 AEROSOL, METERED RESPIRATORY (INHALATION) at 20:49

## 2021-01-01 RX ADMIN — Medication 1000 MG: at 12:54

## 2021-01-01 RX ADMIN — Medication 10 ML: at 21:24

## 2021-01-01 RX ADMIN — IPRATROPIUM BROMIDE AND ALBUTEROL SULFATE 3 ML: .5; 3 SOLUTION RESPIRATORY (INHALATION) at 12:12

## 2021-01-01 RX ADMIN — MIDODRINE HYDROCHLORIDE 10 MG: 5 TABLET ORAL at 13:30

## 2021-01-01 RX ADMIN — INSULIN GLARGINE 5 UNITS: 100 INJECTION, SOLUTION SUBCUTANEOUS at 10:02

## 2021-01-01 RX ADMIN — Medication 200 MCG/HR: at 06:00

## 2021-01-01 RX ADMIN — INSULIN GLARGINE 5 UNITS: 100 INJECTION, SOLUTION SUBCUTANEOUS at 12:02

## 2021-01-01 RX ADMIN — IPRATROPIUM BROMIDE AND ALBUTEROL 1 PUFF: 20; 100 SPRAY, METERED RESPIRATORY (INHALATION) at 03:50

## 2021-01-01 RX ADMIN — Medication 10 ML: at 16:21

## 2021-01-01 RX ADMIN — Medication 10 ML: at 10:59

## 2021-01-01 RX ADMIN — HYDROCORTISONE SODIUM SUCCINATE 50 MG: 100 INJECTION, POWDER, FOR SOLUTION INTRAMUSCULAR; INTRAVENOUS at 00:00

## 2021-01-01 RX ADMIN — PIPERACILLIN AND TAZOBACTAM 3.38 G: 3; .375 INJECTION, POWDER, LYOPHILIZED, FOR SOLUTION INTRAVENOUS at 06:00

## 2021-01-01 RX ADMIN — INSULIN LISPRO 9 UNITS: 100 INJECTION, SOLUTION INTRAVENOUS; SUBCUTANEOUS at 06:00

## 2021-01-01 RX ADMIN — ASPIRIN 325 MG ORAL TABLET 325 MG: 325 PILL ORAL at 08:11

## 2021-01-01 RX ADMIN — AZITHROMYCIN DIHYDRATE 500 MG: 500 INJECTION, POWDER, LYOPHILIZED, FOR SOLUTION INTRAVENOUS at 10:45

## 2021-01-01 RX ADMIN — INSULIN LISPRO 3 UNITS: 100 INJECTION, SOLUTION INTRAVENOUS; SUBCUTANEOUS at 16:30

## 2021-01-01 RX ADMIN — IPRATROPIUM BROMIDE AND ALBUTEROL SULFATE 3 ML: .5; 3 SOLUTION RESPIRATORY (INHALATION) at 11:25

## 2021-01-01 RX ADMIN — Medication 1000 MG: at 05:47

## 2021-01-01 RX ADMIN — INSULIN LISPRO 3 UNITS: 100 INJECTION, SOLUTION INTRAVENOUS; SUBCUTANEOUS at 22:30

## 2021-01-01 RX ADMIN — PIPERACILLIN AND TAZOBACTAM 3.38 G: 3; .375 INJECTION, POWDER, LYOPHILIZED, FOR SOLUTION INTRAVENOUS at 18:08

## 2021-01-01 RX ADMIN — Medication 10 ML: at 05:45

## 2021-01-01 RX ADMIN — INSULIN LISPRO 2 UNITS: 100 INJECTION, SOLUTION INTRAVENOUS; SUBCUTANEOUS at 09:34

## 2021-01-01 RX ADMIN — HEPARIN SODIUM 5000 UNITS: 5000 INJECTION INTRAVENOUS; SUBCUTANEOUS at 00:18

## 2021-01-01 RX ADMIN — CHLORHEXIDINE GLUCONATE 0.12% ORAL RINSE 10 ML: 1.2 LIQUID ORAL at 21:29

## 2021-01-01 RX ADMIN — SEVELAMER CARBONATE 800 MG: 800 TABLET, FILM COATED ORAL at 08:26

## 2021-01-01 RX ADMIN — POLYETHYLENE GLYCOL 3350 17 G: 17 POWDER, FOR SOLUTION ORAL at 09:24

## 2021-01-01 RX ADMIN — ASPIRIN 325 MG ORAL TABLET 325 MG: 325 PILL ORAL at 08:20

## 2021-01-01 RX ADMIN — Medication 22 MCG/MIN: at 06:00

## 2021-01-01 RX ADMIN — Medication 5 MG: at 21:29

## 2021-01-01 RX ADMIN — ALBUMIN (HUMAN) 25 G: 0.25 INJECTION, SOLUTION INTRAVENOUS at 04:00

## 2021-01-01 RX ADMIN — INSULIN LISPRO 6 UNITS: 100 INJECTION, SOLUTION INTRAVENOUS; SUBCUTANEOUS at 21:22

## 2021-01-01 RX ADMIN — Medication 27 MCG/MIN: at 16:57

## 2021-01-01 RX ADMIN — INSULIN GLARGINE 5 UNITS: 100 INJECTION, SOLUTION SUBCUTANEOUS at 09:00

## 2021-01-01 RX ADMIN — HEPARIN SODIUM 5000 UNITS: 5000 INJECTION INTRAVENOUS; SUBCUTANEOUS at 21:50

## 2021-01-01 RX ADMIN — MIDODRINE HYDROCHLORIDE 10 MG: 5 TABLET ORAL at 16:18

## 2021-01-01 RX ADMIN — Medication 1000 MG: at 17:24

## 2021-01-01 RX ADMIN — ALBUMIN (HUMAN) 25 G: 0.25 INJECTION, SOLUTION INTRAVENOUS at 17:40

## 2021-01-01 RX ADMIN — PIPERACILLIN AND TAZOBACTAM 3.38 G: 3; .375 INJECTION, POWDER, LYOPHILIZED, FOR SOLUTION INTRAVENOUS at 12:30

## 2021-01-01 RX ADMIN — IPRATROPIUM BROMIDE AND ALBUTEROL SULFATE 3 ML: .5; 3 SOLUTION RESPIRATORY (INHALATION) at 23:35

## 2021-01-01 RX ADMIN — Medication 100 MCG/HR: at 03:30

## 2021-01-01 RX ADMIN — INSULIN LISPRO 3 UNITS: 100 INJECTION, SOLUTION INTRAVENOUS; SUBCUTANEOUS at 11:30

## 2021-01-01 RX ADMIN — HYDROCORTISONE SODIUM SUCCINATE 50 MG: 100 INJECTION, POWDER, FOR SOLUTION INTRAMUSCULAR; INTRAVENOUS at 17:55

## 2021-01-01 RX ADMIN — INSULIN LISPRO 6 UNITS: 100 INJECTION, SOLUTION INTRAVENOUS; SUBCUTANEOUS at 00:00

## 2021-01-01 RX ADMIN — Medication 1000 MG: at 18:59

## 2021-01-01 RX ADMIN — Medication 25 MCG/MIN: at 00:29

## 2021-01-01 RX ADMIN — MIDODRINE HYDROCHLORIDE 10 MG: 5 TABLET ORAL at 09:34

## 2021-01-01 RX ADMIN — ASPIRIN 325 MG ORAL TABLET 325 MG: 325 PILL ORAL at 08:16

## 2021-01-01 RX ADMIN — MIDODRINE HYDROCHLORIDE 10 MG: 5 TABLET ORAL at 18:59

## 2021-01-01 RX ADMIN — SALINE NASAL SPRAY 2 SPRAY: 1.5 SOLUTION NASAL at 16:00

## 2021-01-01 RX ADMIN — Medication 10 ML: at 19:00

## 2021-01-01 RX ADMIN — Medication 1000 MG: at 07:08

## 2021-01-01 RX ADMIN — Medication 22.5 MCG/MIN: at 07:00

## 2021-01-01 RX ADMIN — ASPIRIN 325 MG ORAL TABLET 325 MG: 325 PILL ORAL at 08:25

## 2021-01-01 RX ADMIN — SODIUM CHLORIDE 0.75 MCG/KG/HR: 9 INJECTION, SOLUTION INTRAVENOUS at 04:55

## 2021-01-01 RX ADMIN — FENTANYL CITRATE 100 MCG: 50 INJECTION, SOLUTION INTRAMUSCULAR; INTRAVENOUS at 02:25

## 2021-01-01 RX ADMIN — HEPARIN SODIUM 5000 UNITS: 5000 INJECTION INTRAVENOUS; SUBCUTANEOUS at 21:23

## 2021-01-01 RX ADMIN — INSULIN GLARGINE 5 UNITS: 100 INJECTION, SOLUTION SUBCUTANEOUS at 08:28

## 2021-01-01 RX ADMIN — ANTICOAGULANT CITRATE DEXTROSE SOLUTION FORMULA A 200 ML/HR: 12.25; 11; 3.65 SOLUTION INTRAVENOUS at 17:00

## 2021-01-01 RX ADMIN — HEPARIN SODIUM 15 UNITS/KG/HR: 10000 INJECTION, SOLUTION INTRAVENOUS at 14:00

## 2021-01-01 RX ADMIN — Medication 10 ML: at 14:02

## 2021-01-01 RX ADMIN — Medication 5 MG: at 21:00

## 2021-01-01 RX ADMIN — IOPAMIDOL 100 ML: 755 INJECTION, SOLUTION INTRAVENOUS at 14:24

## 2021-01-01 RX ADMIN — SEVELAMER CARBONATE 800 MG: 800 TABLET, FILM COATED ORAL at 12:09

## 2021-01-01 RX ADMIN — INSULIN GLARGINE 5 UNITS: 100 INJECTION, SOLUTION SUBCUTANEOUS at 08:19

## 2021-01-01 RX ADMIN — IPRATROPIUM BROMIDE AND ALBUTEROL SULFATE 3 ML: .5; 3 SOLUTION RESPIRATORY (INHALATION) at 09:02

## 2021-01-01 RX ADMIN — Medication 25 MCG/MIN: at 19:40

## 2021-01-01 RX ADMIN — MIDODRINE HYDROCHLORIDE 10 MG: 5 TABLET ORAL at 10:02

## 2021-01-01 RX ADMIN — SEVELAMER CARBONATE 800 MG: 800 TABLET, FILM COATED ORAL at 12:30

## 2021-01-01 RX ADMIN — Medication 1000 MG: at 11:02

## 2021-01-01 RX ADMIN — HYDROCORTISONE SODIUM SUCCINATE 50 MG: 100 INJECTION, POWDER, FOR SOLUTION INTRAMUSCULAR; INTRAVENOUS at 18:26

## 2021-01-01 RX ADMIN — Medication 200 MCG/HR: at 20:06

## 2021-01-01 RX ADMIN — MIDAZOLAM HYDROCHLORIDE 4 MG/HR: 5 INJECTION, SOLUTION INTRAMUSCULAR; INTRAVENOUS at 06:30

## 2021-01-01 RX ADMIN — Medication 1000 MG: at 18:26

## 2021-01-01 RX ADMIN — HEPARIN SODIUM 5000 UNITS: 5000 INJECTION INTRAVENOUS; SUBCUTANEOUS at 21:00

## 2021-01-01 RX ADMIN — PRAVASTATIN SODIUM 20 MG: 20 TABLET ORAL at 08:20

## 2021-01-01 RX ADMIN — Medication 5 MG: at 21:46

## 2021-01-01 RX ADMIN — INSULIN LISPRO 3 UNITS: 100 INJECTION, SOLUTION INTRAVENOUS; SUBCUTANEOUS at 17:44

## 2021-01-01 RX ADMIN — IPRATROPIUM BROMIDE AND ALBUTEROL SULFATE 3 ML: .5; 3 SOLUTION RESPIRATORY (INHALATION) at 09:14

## 2021-01-01 RX ADMIN — IPRATROPIUM BROMIDE AND ALBUTEROL 1 PUFF: 20; 100 SPRAY, METERED RESPIRATORY (INHALATION) at 12:00

## 2021-01-01 RX ADMIN — HYDROCORTISONE SODIUM SUCCINATE 50 MG: 100 INJECTION, POWDER, FOR SOLUTION INTRAMUSCULAR; INTRAVENOUS at 12:30

## 2021-01-01 RX ADMIN — ASPIRIN 325 MG ORAL TABLET 325 MG: 325 PILL ORAL at 09:25

## 2021-01-01 RX ADMIN — FAMOTIDINE 20 MG: 20 TABLET, FILM COATED ORAL at 08:47

## 2021-01-01 RX ADMIN — FAMOTIDINE 20 MG: 20 TABLET, FILM COATED ORAL at 08:33

## 2021-01-01 RX ADMIN — ANTICOAGULANT CITRATE DEXTROSE SOLUTION FORMULA A 200 ML/HR: 12.25; 11; 3.65 SOLUTION INTRAVENOUS at 06:00

## 2021-01-01 RX ADMIN — HEPARIN SODIUM 5000 UNITS: 5000 INJECTION INTRAVENOUS; SUBCUTANEOUS at 15:24

## 2021-01-01 RX ADMIN — FAMOTIDINE 20 MG: 20 TABLET, FILM COATED ORAL at 09:24

## 2021-01-01 RX ADMIN — Medication 10 ML: at 00:27

## 2021-01-01 RX ADMIN — SEVELAMER CARBONATE 800 MG: 800 TABLET, FILM COATED ORAL at 18:59

## 2021-01-01 RX ADMIN — Medication 1000 MG: at 23:59

## 2021-01-01 RX ADMIN — INSULIN LISPRO 3 UNITS: 100 INJECTION, SOLUTION INTRAVENOUS; SUBCUTANEOUS at 21:00

## 2021-01-01 RX ADMIN — Medication 10 ML: at 15:34

## 2021-01-01 RX ADMIN — HYDROCORTISONE SODIUM SUCCINATE 50 MG: 100 INJECTION, POWDER, FOR SOLUTION INTRAMUSCULAR; INTRAVENOUS at 06:00

## 2021-01-01 RX ADMIN — Medication 10 ML: at 09:26

## 2021-01-01 RX ADMIN — MIDAZOLAM HYDROCHLORIDE 2 MG: 2 INJECTION, SOLUTION INTRAMUSCULAR; INTRAVENOUS at 02:28

## 2021-01-01 RX ADMIN — HEPARIN SODIUM 5000 UNITS: 5000 INJECTION INTRAVENOUS; SUBCUTANEOUS at 14:05

## 2021-01-01 RX ADMIN — Medication 125 MCG/HR: at 20:10

## 2021-01-01 RX ADMIN — MIDODRINE HYDROCHLORIDE 10 MG: 5 TABLET ORAL at 13:22

## 2021-01-01 RX ADMIN — SALINE NASAL SPRAY 2 SPRAY: 1.5 SOLUTION NASAL at 09:26

## 2021-01-01 RX ADMIN — HEPARIN SODIUM 5000 UNITS: 5000 INJECTION INTRAVENOUS; SUBCUTANEOUS at 21:29

## 2021-01-01 RX ADMIN — HEPARIN SODIUM 5000 UNITS: 5000 INJECTION INTRAVENOUS; SUBCUTANEOUS at 21:46

## 2021-01-01 RX ADMIN — IPRATROPIUM BROMIDE AND ALBUTEROL SULFATE 3 ML: .5; 3 SOLUTION RESPIRATORY (INHALATION) at 16:00

## 2021-01-01 RX ADMIN — Medication 5 MG: at 21:05

## 2021-01-01 RX ADMIN — DEXAMETHASONE SODIUM PHOSPHATE 6 MG: 4 INJECTION, SOLUTION INTRA-ARTICULAR; INTRALESIONAL; INTRAMUSCULAR; INTRAVENOUS; SOFT TISSUE at 15:29

## 2021-01-01 RX ADMIN — ALBUMIN (HUMAN): 0.25 INJECTION, SOLUTION INTRAVENOUS at 21:45

## 2021-01-01 RX ADMIN — SALINE NASAL SPRAY 2 SPRAY: 1.5 SOLUTION NASAL at 10:04

## 2021-01-01 RX ADMIN — HEPARIN SODIUM 5000 UNITS: 5000 INJECTION INTRAVENOUS; SUBCUTANEOUS at 14:11

## 2021-01-01 RX ADMIN — INSULIN GLARGINE 5 UNITS: 100 INJECTION, SOLUTION SUBCUTANEOUS at 18:00

## 2021-01-01 RX ADMIN — CHLORHEXIDINE GLUCONATE 0.12% ORAL RINSE 10 ML: 1.2 LIQUID ORAL at 10:53

## 2021-01-01 RX ADMIN — FAMOTIDINE 20 MG: 20 TABLET, FILM COATED ORAL at 10:03

## 2021-01-01 RX ADMIN — SALINE NASAL SPRAY 2 SPRAY: 1.5 SOLUTION NASAL at 12:09

## 2021-01-01 RX ADMIN — Medication 14 MCG/MIN: at 18:58

## 2021-01-01 RX ADMIN — PIPERACILLIN AND TAZOBACTAM 3.38 G: 3; .375 INJECTION, POWDER, LYOPHILIZED, FOR SOLUTION INTRAVENOUS at 00:00

## 2021-01-01 RX ADMIN — DIAZEPAM 10 MG: 10 INJECTION INTRAMUSCULAR at 03:05

## 2021-01-01 RX ADMIN — Medication 10 ML: at 15:00

## 2021-01-01 RX ADMIN — AZITHROMYCIN DIHYDRATE 500 MG: 500 INJECTION, POWDER, LYOPHILIZED, FOR SOLUTION INTRAVENOUS at 11:02

## 2021-01-01 RX ADMIN — IPRATROPIUM BROMIDE AND ALBUTEROL 1 PUFF: 20; 100 SPRAY, METERED RESPIRATORY (INHALATION) at 20:00

## 2021-01-01 RX ADMIN — SALINE NASAL SPRAY 2 SPRAY: 1.5 SOLUTION NASAL at 12:00

## 2021-01-01 RX ADMIN — SEVELAMER CARBONATE 800 MG: 800 TABLET, FILM COATED ORAL at 11:36

## 2021-01-01 RX ADMIN — IPRATROPIUM BROMIDE AND ALBUTEROL 1 PUFF: 20; 100 SPRAY, METERED RESPIRATORY (INHALATION) at 00:37

## 2021-01-01 RX ADMIN — IPRATROPIUM BROMIDE 2 PUFF: 17 AEROSOL, METERED RESPIRATORY (INHALATION) at 01:25

## 2021-01-01 RX ADMIN — Medication 5 MG: at 01:52

## 2021-01-01 RX ADMIN — HYDROCORTISONE SODIUM SUCCINATE 50 MG: 100 INJECTION, POWDER, FOR SOLUTION INTRAMUSCULAR; INTRAVENOUS at 00:23

## 2021-01-01 RX ADMIN — SEVELAMER CARBONATE 800 MG: 800 TABLET, FILM COATED ORAL at 16:53

## 2021-01-01 RX ADMIN — MIDODRINE HYDROCHLORIDE 10 MG: 5 TABLET ORAL at 08:33

## 2021-01-01 RX ADMIN — INSULIN GLARGINE 5 UNITS: 100 INJECTION, SOLUTION SUBCUTANEOUS at 08:48

## 2021-01-01 RX ADMIN — ZINC SULFATE 220 MG (50 MG) CAPSULE 2 CAPSULE: CAPSULE at 08:19

## 2021-01-01 RX ADMIN — Medication 25 MCG/MIN: at 14:24

## 2021-01-01 RX ADMIN — Medication 10 ML: at 15:42

## 2021-01-01 RX ADMIN — PRAVASTATIN SODIUM 20 MG: 20 TABLET ORAL at 10:03

## 2021-01-01 RX ADMIN — SEVELAMER CARBONATE 800 MG: 800 TABLET, FILM COATED ORAL at 17:14

## 2021-01-01 RX ADMIN — INSULIN LISPRO 6 UNITS: 100 INJECTION, SOLUTION INTRAVENOUS; SUBCUTANEOUS at 21:00

## 2021-01-01 RX ADMIN — SALINE NASAL SPRAY 2 SPRAY: 1.5 SOLUTION NASAL at 00:32

## 2021-01-01 RX ADMIN — REMDESIVIR 100 MG: 100 INJECTION, POWDER, LYOPHILIZED, FOR SOLUTION INTRAVENOUS at 21:43

## 2021-01-01 RX ADMIN — PRAVASTATIN SODIUM 20 MG: 20 TABLET ORAL at 08:11

## 2021-01-01 RX ADMIN — DIAZEPAM 10 MG: 5 INJECTION, SOLUTION INTRAMUSCULAR; INTRAVENOUS at 03:05

## 2021-01-01 RX ADMIN — HEPARIN SODIUM 5000 UNITS: 5000 INJECTION INTRAVENOUS; SUBCUTANEOUS at 10:53

## 2021-01-01 RX ADMIN — Medication 150 MCG/HR: at 07:29

## 2021-01-01 RX ADMIN — Medication 1000 MG: at 05:16

## 2021-01-01 RX ADMIN — Medication 1000 MG: at 12:12

## 2021-01-01 RX ADMIN — HEPARIN SODIUM 5000 UNITS: 5000 INJECTION INTRAVENOUS; SUBCUTANEOUS at 06:00

## 2021-01-01 RX ADMIN — Medication 22 MCG/MIN: at 22:00

## 2021-01-01 RX ADMIN — IPRATROPIUM BROMIDE 2 PUFF: 17 AEROSOL, METERED RESPIRATORY (INHALATION) at 04:30

## 2021-01-01 RX ADMIN — SEVELAMER CARBONATE 800 MG: 800 TABLET, FILM COATED ORAL at 18:10

## 2021-01-01 RX ADMIN — HEPARIN SODIUM 5000 UNITS: 5000 INJECTION INTRAVENOUS; SUBCUTANEOUS at 05:15

## 2021-01-01 RX ADMIN — FAMOTIDINE 20 MG: 20 TABLET, FILM COATED ORAL at 15:43

## 2021-01-01 RX ADMIN — Medication 1000 MG: at 23:12

## 2021-01-01 RX ADMIN — SALINE NASAL SPRAY 2 SPRAY: 1.5 SOLUTION NASAL at 21:30

## 2021-01-01 RX ADMIN — MIDAZOLAM HYDROCHLORIDE 6 MG/HR: 5 INJECTION, SOLUTION INTRAMUSCULAR; INTRAVENOUS at 00:29

## 2021-01-01 RX ADMIN — MIDODRINE HYDROCHLORIDE 10 MG: 5 TABLET ORAL at 08:20

## 2021-01-01 RX ADMIN — ALBUTEROL SULFATE 2 PUFF: 108 INHALANT RESPIRATORY (INHALATION) at 16:00

## 2021-01-01 RX ADMIN — Medication 23 MCG/MIN: at 18:54

## 2021-01-01 RX ADMIN — FAMOTIDINE 20 MG: 20 TABLET, FILM COATED ORAL at 14:23

## 2021-01-01 RX ADMIN — Medication 1000 MG: at 11:43

## 2021-01-01 RX ADMIN — IPRATROPIUM BROMIDE AND ALBUTEROL 1 PUFF: 20; 100 SPRAY, METERED RESPIRATORY (INHALATION) at 16:00

## 2021-01-01 RX ADMIN — ZINC SULFATE 220 MG (50 MG) CAPSULE 2 CAPSULE: CAPSULE at 08:45

## 2021-01-01 RX ADMIN — ASPIRIN 325 MG ORAL TABLET 325 MG: 325 PILL ORAL at 08:45

## 2021-01-01 RX ADMIN — SEVELAMER CARBONATE 800 MG: 800 TABLET, FILM COATED ORAL at 08:16

## 2021-01-01 RX ADMIN — HEPARIN SODIUM 5000 UNITS: 5000 INJECTION INTRAVENOUS; SUBCUTANEOUS at 16:31

## 2021-01-01 RX ADMIN — ALBUMIN (HUMAN) 25 G: 0.25 INJECTION, SOLUTION INTRAVENOUS at 05:00

## 2021-01-01 RX ADMIN — Medication 150 MCG/HR: at 01:56

## 2021-01-01 RX ADMIN — INSULIN LISPRO 4 UNITS: 100 INJECTION, SOLUTION INTRAVENOUS; SUBCUTANEOUS at 11:46

## 2021-01-01 RX ADMIN — PIPERACILLIN SODIUM AND TAZOBACTAM SODIUM 2.25 G: 2; .25 INJECTION, POWDER, LYOPHILIZED, FOR SOLUTION INTRAVENOUS at 22:56

## 2021-01-01 RX ADMIN — DEXAMETHASONE SODIUM PHOSPHATE 6 MG: 4 INJECTION, SOLUTION INTRA-ARTICULAR; INTRALESIONAL; INTRAMUSCULAR; INTRAVENOUS; SOFT TISSUE at 15:24

## 2021-01-01 RX ADMIN — PIPERACILLIN SODIUM AND TAZOBACTAM SODIUM 2.25 G: 2; .25 INJECTION, POWDER, LYOPHILIZED, FOR SOLUTION INTRAVENOUS at 05:00

## 2021-01-01 RX ADMIN — Medication 175 MCG/HR: at 01:00

## 2021-01-01 RX ADMIN — PIPERACILLIN SODIUM AND TAZOBACTAM SODIUM 2.25 G: 2; .25 INJECTION, POWDER, LYOPHILIZED, FOR SOLUTION INTRAVENOUS at 05:42

## 2021-01-01 RX ADMIN — ANTICOAGULANT CITRATE DEXTROSE SOLUTION FORMULA A 200 ML/HR: 12.25; 11; 3.65 SOLUTION INTRAVENOUS at 22:00

## 2021-01-01 RX ADMIN — HEPARIN SODIUM 5000 UNITS: 5000 INJECTION INTRAVENOUS; SUBCUTANEOUS at 15:28

## 2021-01-01 RX ADMIN — IPRATROPIUM BROMIDE AND ALBUTEROL SULFATE 3 ML: .5; 3 SOLUTION RESPIRATORY (INHALATION) at 07:47

## 2021-01-01 RX ADMIN — Medication 1000 MG: at 17:13

## 2021-01-01 RX ADMIN — Medication 11 MCG/MIN: at 01:30

## 2021-01-01 RX ADMIN — Medication 1000 MG: at 05:56

## 2021-01-01 RX ADMIN — HEPARIN SODIUM 18 UNITS/KG/HR: 10000 INJECTION, SOLUTION INTRAVENOUS at 01:00

## 2021-01-01 RX ADMIN — ASPIRIN 325 MG ORAL TABLET 325 MG: 325 PILL ORAL at 08:33

## 2021-01-01 RX ADMIN — IPRATROPIUM BROMIDE AND ALBUTEROL 1 PUFF: 20; 100 SPRAY, METERED RESPIRATORY (INHALATION) at 19:40

## 2021-01-01 RX ADMIN — MIDODRINE HYDROCHLORIDE 10 MG: 5 TABLET ORAL at 17:31

## 2021-01-01 RX ADMIN — IPRATROPIUM BROMIDE AND ALBUTEROL 1 PUFF: 20; 100 SPRAY, METERED RESPIRATORY (INHALATION) at 23:43

## 2021-01-01 RX ADMIN — IPRATROPIUM BROMIDE AND ALBUTEROL SULFATE 3 ML: .5; 3 SOLUTION RESPIRATORY (INHALATION) at 03:07

## 2021-01-01 RX ADMIN — SALINE NASAL SPRAY 2 SPRAY: 1.5 SOLUTION NASAL at 08:34

## 2021-01-01 RX ADMIN — AZITHROMYCIN DIHYDRATE 500 MG: 500 INJECTION, POWDER, LYOPHILIZED, FOR SOLUTION INTRAVENOUS at 08:17

## 2021-01-01 RX ADMIN — PRAVASTATIN SODIUM 20 MG: 20 TABLET ORAL at 08:33

## 2021-01-01 RX ADMIN — SEVELAMER CARBONATE 800 MG: 800 TABLET, FILM COATED ORAL at 14:22

## 2021-01-01 RX ADMIN — Medication 1000 MG: at 17:51

## 2021-01-01 RX ADMIN — PIPERACILLIN SODIUM AND TAZOBACTAM SODIUM 2.25 G: 2; .25 INJECTION, POWDER, LYOPHILIZED, FOR SOLUTION INTRAVENOUS at 12:22

## 2021-01-01 RX ADMIN — DEXAMETHASONE SODIUM PHOSPHATE 6 MG: 4 INJECTION, SOLUTION INTRA-ARTICULAR; INTRALESIONAL; INTRAMUSCULAR; INTRAVENOUS; SOFT TISSUE at 15:00

## 2021-01-01 RX ADMIN — INSULIN LISPRO 6 UNITS: 100 INJECTION, SOLUTION INTRAVENOUS; SUBCUTANEOUS at 21:51

## 2021-01-01 RX ADMIN — VANCOMYCIN HYDROCHLORIDE 1000 MG: 1 INJECTION, POWDER, LYOPHILIZED, FOR SOLUTION INTRAVENOUS at 09:20

## 2021-01-01 RX ADMIN — SEVELAMER CARBONATE 800 MG: 800 TABLET, FILM COATED ORAL at 16:15

## 2021-01-01 RX ADMIN — MIDAZOLAM HYDROCHLORIDE 6 MG/HR: 5 INJECTION, SOLUTION INTRAMUSCULAR; INTRAVENOUS at 15:45

## 2021-01-01 RX ADMIN — INSULIN LISPRO 3 UNITS: 100 INJECTION, SOLUTION INTRAVENOUS; SUBCUTANEOUS at 12:21

## 2021-01-01 RX ADMIN — SEVELAMER CARBONATE 800 MG: 800 TABLET, FILM COATED ORAL at 09:34

## 2021-01-01 RX ADMIN — VANCOMYCIN HYDROCHLORIDE 2500 MG: 1 INJECTION, POWDER, LYOPHILIZED, FOR SOLUTION INTRAVENOUS at 13:00

## 2021-01-01 RX ADMIN — AZITHROMYCIN DIHYDRATE 500 MG: 500 INJECTION, POWDER, LYOPHILIZED, FOR SOLUTION INTRAVENOUS at 11:46

## 2021-01-01 RX ADMIN — Medication 30 ML: at 12:30

## 2021-01-01 RX ADMIN — FENTANYL CITRATE 100 MCG: 50 INJECTION, SOLUTION INTRAMUSCULAR; INTRAVENOUS at 03:30

## 2021-01-01 RX ADMIN — Medication 1000 MG: at 18:12

## 2021-01-01 RX ADMIN — ALBUMIN (HUMAN) 25 G: 0.25 INJECTION, SOLUTION INTRAVENOUS at 14:27

## 2021-01-01 RX ADMIN — HYDROCORTISONE SODIUM SUCCINATE 50 MG: 100 INJECTION, POWDER, FOR SOLUTION INTRAMUSCULAR; INTRAVENOUS at 12:09

## 2021-01-01 RX ADMIN — SALINE NASAL SPRAY 2 SPRAY: 1.5 SOLUTION NASAL at 13:11

## 2021-01-01 RX ADMIN — INSULIN GLARGINE 10 UNITS: 100 INJECTION, SOLUTION SUBCUTANEOUS at 21:00

## 2021-01-01 RX ADMIN — Medication 200 MCG/HR: at 11:52

## 2021-01-01 RX ADMIN — AZITHROMYCIN DIHYDRATE 500 MG: 500 INJECTION, POWDER, LYOPHILIZED, FOR SOLUTION INTRAVENOUS at 08:26

## 2021-01-01 RX ADMIN — SEVELAMER CARBONATE 800 MG: 800 TABLET, FILM COATED ORAL at 16:31

## 2021-01-01 RX ADMIN — Medication 10 ML: at 15:29

## 2021-01-01 RX ADMIN — IPRATROPIUM BROMIDE AND ALBUTEROL 1 PUFF: 20; 100 SPRAY, METERED RESPIRATORY (INHALATION) at 19:20

## 2021-01-01 RX ADMIN — SEVELAMER CARBONATE 800 MG: 800 TABLET, FILM COATED ORAL at 08:33

## 2021-01-01 RX ADMIN — CHLORHEXIDINE GLUCONATE 0.12% ORAL RINSE 10 ML: 1.2 LIQUID ORAL at 09:24

## 2021-01-01 RX ADMIN — MIDODRINE HYDROCHLORIDE 10 MG: 5 TABLET ORAL at 06:00

## 2021-01-01 RX ADMIN — CALCIUM GLUCONATE 24 G: 98 INJECTION, SOLUTION INTRAVENOUS at 17:25

## 2021-01-01 RX ADMIN — SEVELAMER CARBONATE 800 MG: 800 TABLET, FILM COATED ORAL at 12:22

## 2021-01-01 RX ADMIN — Medication 10 ML: at 21:07

## 2021-01-01 RX ADMIN — ZINC SULFATE 220 MG (50 MG) CAPSULE 2 CAPSULE: CAPSULE at 10:53

## 2021-01-01 RX ADMIN — INSULIN GLARGINE 5 UNITS: 100 INJECTION, SOLUTION SUBCUTANEOUS at 10:00

## 2021-01-01 RX ADMIN — MIDAZOLAM HYDROCHLORIDE 2 MG: 2 INJECTION, SOLUTION INTRAMUSCULAR; INTRAVENOUS at 02:35

## 2021-01-01 RX ADMIN — SALINE NASAL SPRAY 2 SPRAY: 1.5 SOLUTION NASAL at 05:42

## 2021-01-01 RX ADMIN — INSULIN LISPRO 9 UNITS: 100 INJECTION, SOLUTION INTRAVENOUS; SUBCUTANEOUS at 12:26

## 2021-01-01 RX ADMIN — Medication 5 MG: at 00:17

## 2021-01-01 RX ADMIN — HEPARIN SODIUM 5000 UNITS: 5000 INJECTION INTRAVENOUS; SUBCUTANEOUS at 05:45

## 2021-01-01 RX ADMIN — VANCOMYCIN HYDROCHLORIDE 2750 MG: 1 INJECTION, POWDER, LYOPHILIZED, FOR SOLUTION INTRAVENOUS at 10:29

## 2021-01-01 RX ADMIN — ZINC SULFATE 220 MG (50 MG) CAPSULE 2 CAPSULE: CAPSULE at 10:02

## 2021-01-01 RX ADMIN — CHLORHEXIDINE GLUCONATE 0.12% ORAL RINSE 10 ML: 1.2 LIQUID ORAL at 10:03

## 2021-01-01 RX ADMIN — MIDODRINE HYDROCHLORIDE 5 MG: 5 TABLET ORAL at 22:05

## 2021-01-01 RX ADMIN — ANTICOAGULANT CITRATE DEXTROSE SOLUTION FORMULA A 200 ML/HR: 12.25; 11; 3.65 SOLUTION INTRAVENOUS at 04:00

## 2021-01-01 RX ADMIN — ANTICOAGULANT CITRATE DEXTROSE SOLUTION FORMULA A 100 ML/HR: 12.25; 11; 3.65 SOLUTION INTRAVENOUS at 00:36

## 2021-01-01 RX ADMIN — MIDODRINE HYDROCHLORIDE 10 MG: 5 TABLET ORAL at 11:36

## 2021-01-01 RX ADMIN — MIDODRINE HYDROCHLORIDE 10 MG: 5 TABLET ORAL at 12:12

## 2021-01-01 RX ADMIN — DEXAMETHASONE SODIUM PHOSPHATE 6 MG: 4 INJECTION, SOLUTION INTRA-ARTICULAR; INTRALESIONAL; INTRAMUSCULAR; INTRAVENOUS; SOFT TISSUE at 15:32

## 2021-01-01 RX ADMIN — Medication 1000 MG: at 00:23

## 2021-01-01 RX ADMIN — INSULIN LISPRO 6 UNITS: 100 INJECTION, SOLUTION INTRAVENOUS; SUBCUTANEOUS at 18:36

## 2021-01-01 RX ADMIN — MIDODRINE HYDROCHLORIDE 10 MG: 5 TABLET ORAL at 08:11

## 2021-01-01 RX ADMIN — FAMOTIDINE 20 MG: 20 TABLET, FILM COATED ORAL at 08:16

## 2021-01-01 RX ADMIN — MIDODRINE HYDROCHLORIDE 10 MG: 5 TABLET ORAL at 11:38

## 2021-01-01 RX ADMIN — MIDAZOLAM HYDROCHLORIDE 3 MG/HR: 5 INJECTION, SOLUTION INTRAMUSCULAR; INTRAVENOUS at 04:00

## 2021-01-01 RX ADMIN — ZINC SULFATE 220 MG (50 MG) CAPSULE 2 CAPSULE: CAPSULE at 08:17

## 2021-01-01 RX ADMIN — IPRATROPIUM BROMIDE AND ALBUTEROL SULFATE 3 ML: .5; 3 SOLUTION RESPIRATORY (INHALATION) at 15:13

## 2021-01-01 RX ADMIN — INSULIN LISPRO 4 UNITS: 100 INJECTION, SOLUTION INTRAVENOUS; SUBCUTANEOUS at 11:17

## 2021-01-01 RX ADMIN — WATER 2 G: 1 INJECTION INTRAMUSCULAR; INTRAVENOUS; SUBCUTANEOUS at 11:46

## 2021-01-01 RX ADMIN — SALINE NASAL SPRAY 2 SPRAY: 1.5 SOLUTION NASAL at 15:42

## 2021-01-01 RX ADMIN — SEVELAMER CARBONATE 800 MG: 800 TABLET, FILM COATED ORAL at 08:11

## 2021-01-01 RX ADMIN — Medication 1000 MG: at 00:36

## 2021-01-01 RX ADMIN — FAMOTIDINE 20 MG: 20 TABLET, FILM COATED ORAL at 08:25

## 2021-01-01 RX ADMIN — Medication 10 ML: at 08:28

## 2021-01-01 RX ADMIN — HEPARIN SODIUM 5000 UNITS: 5000 INJECTION INTRAVENOUS; SUBCUTANEOUS at 08:17

## 2021-01-01 RX ADMIN — SEVELAMER CARBONATE 800 MG: 800 TABLET, FILM COATED ORAL at 08:20

## 2021-01-01 RX ADMIN — ZINC SULFATE 220 MG (50 MG) CAPSULE 2 CAPSULE: CAPSULE at 08:47

## 2021-01-01 RX ADMIN — DEXAMETHASONE SODIUM PHOSPHATE 6 MG: 4 INJECTION, SOLUTION INTRA-ARTICULAR; INTRALESIONAL; INTRAMUSCULAR; INTRAVENOUS; SOFT TISSUE at 15:27

## 2021-01-01 RX ADMIN — ZINC SULFATE 220 MG (50 MG) CAPSULE 2 CAPSULE: CAPSULE at 09:26

## 2021-01-01 RX ADMIN — IPRATROPIUM BROMIDE AND ALBUTEROL SULFATE 3 ML: .5; 3 SOLUTION RESPIRATORY (INHALATION) at 16:38

## 2021-01-01 RX ADMIN — ANTICOAGULANT CITRATE DEXTROSE SOLUTION FORMULA A 200 ML/HR: 12.25; 11; 3.65 SOLUTION INTRAVENOUS at 09:18

## 2021-01-01 RX ADMIN — MIDODRINE HYDROCHLORIDE 10 MG: 5 TABLET ORAL at 22:00

## 2021-01-01 RX ADMIN — ZINC SULFATE 220 MG (50 MG) CAPSULE 2 CAPSULE: CAPSULE at 08:11

## 2021-01-01 RX ADMIN — SEVELAMER CARBONATE 800 MG: 800 TABLET, FILM COATED ORAL at 17:51

## 2021-01-01 RX ADMIN — Medication 25 MCG/MIN: at 07:00

## 2021-01-01 RX ADMIN — HEPARIN SODIUM 5000 UNITS: 5000 INJECTION INTRAVENOUS; SUBCUTANEOUS at 16:19

## 2021-01-01 RX ADMIN — Medication 1000 MG: at 21:52

## 2021-01-01 RX ADMIN — FAMOTIDINE 20 MG: 20 TABLET, FILM COATED ORAL at 08:20

## 2021-01-01 RX ADMIN — Medication 25 MCG/MIN: at 01:00

## 2021-01-01 RX ADMIN — ZINC SULFATE 220 MG (50 MG) CAPSULE 2 CAPSULE: CAPSULE at 09:34

## 2021-01-01 RX ADMIN — HEPARIN SODIUM 5000 UNITS: 5000 INJECTION INTRAVENOUS; SUBCUTANEOUS at 08:47

## 2021-01-01 RX ADMIN — MIDODRINE HYDROCHLORIDE 10 MG: 5 TABLET ORAL at 11:46

## 2021-01-01 RX ADMIN — FAMOTIDINE 20 MG: 20 TABLET, FILM COATED ORAL at 08:45

## 2021-01-01 RX ADMIN — ANTICOAGULANT CITRATE DEXTROSE SOLUTION FORMULA A 150 ML/HR: 12.25; 11; 3.65 SOLUTION INTRAVENOUS at 17:06

## 2021-01-01 RX ADMIN — HEPARIN SODIUM 8830 UNITS: 1000 INJECTION, SOLUTION INTRAVENOUS; SUBCUTANEOUS at 01:00

## 2021-01-01 RX ADMIN — SEVELAMER CARBONATE 800 MG: 800 TABLET, FILM COATED ORAL at 08:47

## 2021-01-01 RX ADMIN — Medication 200 MCG/HR: at 09:39

## 2021-01-01 RX ADMIN — ZINC SULFATE 220 MG (50 MG) CAPSULE 2 CAPSULE: CAPSULE at 08:33

## 2021-01-01 RX ADMIN — CALCIUM GLUCONATE 24 G: 98 INJECTION, SOLUTION INTRAVENOUS at 00:34

## 2021-01-01 RX ADMIN — PRAVASTATIN SODIUM 20 MG: 20 TABLET ORAL at 08:47

## 2021-01-01 RX ADMIN — Medication 1000 MG: at 11:36

## 2021-01-01 RX ADMIN — MIDODRINE HYDROCHLORIDE 10 MG: 5 TABLET ORAL at 08:48

## 2021-01-01 RX ADMIN — HEPARIN SODIUM 5000 UNITS: 5000 INJECTION INTRAVENOUS; SUBCUTANEOUS at 21:40

## 2021-01-01 RX ADMIN — Medication 1000 MG: at 12:09

## 2021-01-01 RX ADMIN — ZINC SULFATE 220 MG (50 MG) CAPSULE 2 CAPSULE: CAPSULE at 08:25

## 2021-01-01 RX ADMIN — WATER 2 G: 1 INJECTION INTRAMUSCULAR; INTRAVENOUS; SUBCUTANEOUS at 10:45

## 2021-01-01 RX ADMIN — INSULIN GLARGINE 10 UNITS: 100 INJECTION, SOLUTION SUBCUTANEOUS at 09:25

## 2021-01-01 RX ADMIN — DEXAMETHASONE SODIUM PHOSPHATE 6 MG: 4 INJECTION, SOLUTION INTRA-ARTICULAR; INTRALESIONAL; INTRAMUSCULAR; INTRAVENOUS; SOFT TISSUE at 14:11

## 2021-01-01 RX ADMIN — HEPARIN SODIUM 5000 UNITS: 5000 INJECTION INTRAVENOUS; SUBCUTANEOUS at 15:35

## 2021-01-01 RX ADMIN — PRAVASTATIN SODIUM 20 MG: 20 TABLET ORAL at 09:34

## 2021-01-01 RX ADMIN — SEVELAMER CARBONATE 800 MG: 800 TABLET, FILM COATED ORAL at 10:02

## 2021-01-01 RX ADMIN — Medication 1000 MG: at 12:22

## 2021-01-01 RX ADMIN — SEVELAMER CARBONATE 800 MG: 800 TABLET, FILM COATED ORAL at 16:18

## 2021-01-01 RX ADMIN — HEPARIN SODIUM 5000 UNITS: 5000 INJECTION INTRAVENOUS; SUBCUTANEOUS at 14:02

## 2021-01-01 RX ADMIN — Medication 10 ML: at 21:50

## 2021-01-01 RX ADMIN — ALBUTEROL SULFATE 2.5 MG: 2.5 SOLUTION RESPIRATORY (INHALATION) at 11:25

## 2021-01-01 RX ADMIN — HEPARIN SODIUM 5000 UNITS: 5000 INJECTION INTRAVENOUS; SUBCUTANEOUS at 15:27

## 2021-01-01 RX ADMIN — MAGNESIUM SULFATE HEPTAHYDRATE 2 G: 40 INJECTION, SOLUTION INTRAVENOUS at 11:39

## 2021-01-01 RX ADMIN — SEVELAMER CARBONATE 800 MG: 800 TABLET, FILM COATED ORAL at 17:15

## 2021-01-01 RX ADMIN — HEPARIN SODIUM 5000 UNITS: 5000 INJECTION INTRAVENOUS; SUBCUTANEOUS at 08:26

## 2021-01-01 RX ADMIN — INSULIN GLARGINE 10 UNITS: 100 INJECTION, SOLUTION SUBCUTANEOUS at 09:47

## 2021-01-01 RX ADMIN — SODIUM CHLORIDE 0.4 MCG/KG/HR: 9 INJECTION, SOLUTION INTRAVENOUS at 02:30

## 2021-01-01 RX ADMIN — IPRATROPIUM BROMIDE AND ALBUTEROL SULFATE 3 ML: .5; 3 SOLUTION RESPIRATORY (INHALATION) at 20:12

## 2021-01-01 RX ADMIN — IPRATROPIUM BROMIDE 2 PUFF: 17 AEROSOL, METERED RESPIRATORY (INHALATION) at 08:00

## 2021-01-01 RX ADMIN — HEPARIN SODIUM 5000 UNITS: 5000 INJECTION INTRAVENOUS; SUBCUTANEOUS at 15:32

## 2021-01-01 RX ADMIN — MIDODRINE HYDROCHLORIDE 10 MG: 5 TABLET ORAL at 08:25

## 2021-01-01 RX ADMIN — INSULIN LISPRO 9 UNITS: 100 INJECTION, SOLUTION INTRAVENOUS; SUBCUTANEOUS at 00:00

## 2021-01-01 RX ADMIN — IPRATROPIUM BROMIDE 2 PUFF: 17 AEROSOL, METERED RESPIRATORY (INHALATION) at 15:32

## 2021-01-01 RX ADMIN — PIPERACILLIN SODIUM AND TAZOBACTAM SODIUM 2.25 G: 2; .25 INJECTION, POWDER, LYOPHILIZED, FOR SOLUTION INTRAVENOUS at 20:45

## 2021-01-01 RX ADMIN — INSULIN LISPRO 6 UNITS: 100 INJECTION, SOLUTION INTRAVENOUS; SUBCUTANEOUS at 12:02

## 2021-01-01 RX ADMIN — PRAVASTATIN SODIUM 20 MG: 20 TABLET ORAL at 08:45

## 2021-01-01 RX ADMIN — Medication 200 MCG/HR: at 16:08

## 2021-01-01 RX ADMIN — ASPIRIN 325 MG ORAL TABLET 325 MG: 325 PILL ORAL at 08:46

## 2021-01-01 RX ADMIN — Medication 10 ML: at 10:04

## 2021-01-01 RX ADMIN — Medication 5 MG: at 21:07

## 2021-01-01 RX ADMIN — FAMOTIDINE 20 MG: 20 TABLET, FILM COATED ORAL at 08:12

## 2021-01-01 RX ADMIN — MIDODRINE HYDROCHLORIDE 10 MG: 5 TABLET ORAL at 14:11

## 2021-01-01 RX ADMIN — Medication 10 ML: at 08:57

## 2021-01-01 RX ADMIN — SEVELAMER CARBONATE 800 MG: 800 TABLET, FILM COATED ORAL at 11:43

## 2021-01-01 RX ADMIN — Medication 200 MCG/HR: at 14:45

## 2021-01-01 RX ADMIN — Medication 10 ML: at 20:00

## 2021-01-01 RX ADMIN — IPRATROPIUM BROMIDE 2 PUFF: 17 AEROSOL, METERED RESPIRATORY (INHALATION) at 16:00

## 2021-01-01 RX ADMIN — INSULIN LISPRO 2 UNITS: 100 INJECTION, SOLUTION INTRAVENOUS; SUBCUTANEOUS at 21:07

## 2021-01-01 RX ADMIN — HEPARIN SODIUM 8830 UNITS: 1000 INJECTION INTRAVENOUS; SUBCUTANEOUS at 01:00

## 2021-01-01 RX ADMIN — Medication 200 MCG/HR: at 01:00

## 2021-01-01 RX ADMIN — SEVELAMER CARBONATE 800 MG: 800 TABLET, FILM COATED ORAL at 11:38

## 2021-01-01 RX ADMIN — IPRATROPIUM BROMIDE AND ALBUTEROL SULFATE 3 ML: .5; 3 SOLUTION RESPIRATORY (INHALATION) at 12:58

## 2021-01-01 RX ADMIN — ASPIRIN 325 MG ORAL TABLET 325 MG: 325 PILL ORAL at 14:24

## 2021-01-01 RX ADMIN — IPRATROPIUM BROMIDE AND ALBUTEROL SULFATE 3 ML: .5; 3 SOLUTION RESPIRATORY (INHALATION) at 20:39

## 2021-01-01 RX ADMIN — Medication 50000 UNITS: at 00:18

## 2021-01-01 RX ADMIN — Medication 10 ML: at 14:03

## 2021-01-01 RX ADMIN — Medication 1000 MG: at 00:00

## 2021-01-01 RX ADMIN — INSULIN LISPRO 6 UNITS: 100 INJECTION, SOLUTION INTRAVENOUS; SUBCUTANEOUS at 01:38

## 2021-01-01 RX ADMIN — Medication 100 MCG/HR: at 11:30

## 2021-01-01 RX ADMIN — MIDODRINE HYDROCHLORIDE 10 MG: 5 TABLET ORAL at 18:26

## 2021-01-01 RX ADMIN — SEVELAMER CARBONATE 800 MG: 800 TABLET, FILM COATED ORAL at 12:54

## 2021-01-01 RX ADMIN — Medication 10 ML: at 17:44

## 2021-01-01 RX ADMIN — INSULIN LISPRO 6 UNITS: 100 INJECTION, SOLUTION INTRAVENOUS; SUBCUTANEOUS at 08:45

## 2021-01-01 RX ADMIN — REMDESIVIR 100 MG: 100 INJECTION, POWDER, LYOPHILIZED, FOR SOLUTION INTRAVENOUS at 21:23

## 2021-01-01 RX ADMIN — SEVELAMER CARBONATE 800 MG: 800 TABLET, FILM COATED ORAL at 18:26

## 2021-01-01 RX ADMIN — ALBUTEROL SULFATE 2 PUFF: 108 INHALANT RESPIRATORY (INHALATION) at 12:00

## 2021-01-01 RX ADMIN — REMDESIVIR 200 MG: 100 INJECTION, POWDER, LYOPHILIZED, FOR SOLUTION INTRAVENOUS at 00:19

## 2021-01-01 RX ADMIN — PIPERACILLIN AND TAZOBACTAM 2.25 G: 2; .25 INJECTION, POWDER, LYOPHILIZED, FOR SOLUTION INTRAVENOUS at 08:21

## 2021-01-01 RX ADMIN — METOPROLOL TARTRATE 25 MG: 25 TABLET, FILM COATED ORAL at 16:31

## 2021-01-01 RX ADMIN — HYDROCORTISONE SODIUM SUCCINATE 50 MG: 100 INJECTION, POWDER, FOR SOLUTION INTRAMUSCULAR; INTRAVENOUS at 12:22

## 2021-01-01 RX ADMIN — PIPERACILLIN AND TAZOBACTAM 3.38 G: 3; .375 INJECTION, POWDER, LYOPHILIZED, FOR SOLUTION INTRAVENOUS at 00:32

## 2021-01-15 NOTE — ED PROVIDER NOTES
EMERGENCY DEPARTMENT HISTORY AND PHYSICAL EXAM 
This was created with voice recognition software and transcription errors may be present. 8:06 AM 
Date: 1/15/2021 Patient Name: Steph Slater History of Presenting Illness Chief Complaint: 
 
History Provided By:  
 
HPI: Steph Slater is a 62 y.o. male past medical history of arthritis chronic kidney disease on dialysis Monday Wednesday Friday gout hypertension high cholesterol who presents with fever. Went to dialysis center and was noted to have a fever 100.6 here. He has had a headache eye pain and chills since Wednesday. No known Covid exposure. Patient's dialysis access is a left arm fistula. Nominal pain diarrhea no chest pain or shortness of breath PCP: Don Bello MD 
 
 
Past History Past Medical History: 
Past Medical History:  
Diagnosis Date  Arthritis of left knee 09/2017  
 moderate to severe, with effusion on xray  Chronic kidney disease ESRD  HD on MWF  
 Diastolic CHF ([address]  Dilated cardiomyopathy ([address]  History of alcohol abuse  History of echocardiogram 02/24/2014 Mod-marked LVE. EF 15%. Severe, diffuse hypk. Mild LVH. Gr 1 DDfx. Mod LAE. Mild-mod FIDELIA. Mild AoRE. No significant change from echo of 10/23/09.  History of gout  History of myocardial perfusion scan 05/25/2006 No evidence of ischemia or scarring. Gross LVE. EF 28%. Severe global hypk. Neg EKG on submaximal EST. Ex time 8 min 25 sec.  HTN (hypertension)  Hypercholesterolemia  Hypertensive cardiovascular disease Past Surgical History: 
Past Surgical History:  
Procedure Laterality Date  HX COLONOSCOPY    
 HX HERNIA REPAIR  04/2016  HX VASCULAR ACCESS Right upper chest HD CATH Family History: 
Family History Problem Relation Age of Onset  Cancer Father Lung  Heart Failure Mother  Cancer Sister Social History: 
Social History Tobacco Use  Smoking status: Never Smoker  Smokeless tobacco: Never Used Substance Use Topics  Alcohol use: No  
  Alcohol/week: 0.0 standard drinks Comment: stop 3 years ago in feb, 2015  Drug use: Not Currently Types: Marijuana Comment: 1980's marijuana Allergies: 
No Known Allergies Review of Systems Review of Systems All other systems reviewed and are negative. 10 point review of systems otherwise negative unless noted in HPI. Physical Exam  
 
 
Physical Exam 
Constitutional:   
   Appearance: He is well-developed. HENT:  
   Head: Normocephalic and atraumatic. Eyes:  
   Pupils: Pupils are equal, round, and reactive to light. Neck: Musculoskeletal: Normal range of motion and neck supple. Cardiovascular:  
   Rate and Rhythm: Normal rate and regular rhythm. Heart sounds: Normal heart sounds. No murmur. No friction rub. Comments: Left arm fistula with a thrill Pulmonary:  
   Effort: Pulmonary effort is normal. No respiratory distress. Breath sounds: Normal breath sounds. No wheezing. Abdominal:  
   General: There is no distension. Palpations: Abdomen is soft. Tenderness: There is no abdominal tenderness. There is no guarding or rebound. Musculoskeletal: Normal range of motion. Skin: 
   General: Skin is warm and dry. Neurological:  
   Mental Status: He is alert and oriented to person, place, and time. Psychiatric:     
   Behavior: Behavior normal.     
   Thought Content: Thought content normal.  
 
 
 
Diagnostic Study Results Vital Signs EKG: EKG shows sinus at 103 with a left axis and normal intervals except long QTC at 503 there is no ST elevation or depression no hypertrophy Labs: CBC unremarkable chemistry unremarkable dimer is elevated that was sent not for concern of DVT but because of standard Covid labs I do not think he needs a CTA  at this time he is not having chest pain he is not having shortness of breath I do not has a think he has a significant clot. Imaging: IMPRESSION Impression: 1. No acute infiltrate or effusion. Medical Decision Making ED Course: Progress Notes, Reevaluation, and Consults: 
 
 
Provider Notes (Medical Decision Making): This is a 60-year-old gentleman presents with fever from dialysis his access is a left arm fistula he is clearly febrile 100.6. Certainly could be Covid will obtain a swab with a rapid antigen. Additionally will cover with antibiotics given the high risk of bacteremia in a dialysis patient Case was discussed with Swetha Cardona who states patient is okay to go home as long as he is not short of breath and can return his neck scheduled dialysis I discussed this with the patient he thinks that will be fine. Will discharge for Covid and Dr. Nena Ortega will arrange dialysis I did advise the patient that he does have Covid and needs to quarantine appropriately. He expressed understanding and states he lives alone Diagnosis Clinical Impression: No diagnosis found. Disposition: 
 
Patient's Medications Start Taking No medications on file Continue Taking ALLOPURINOL (ZYLOPRIM) 100 MG TABLET    Take 2 Tabs by mouth daily. ASPIRIN 81 MG CHEWABLE TABLET    Take 1 Tab by mouth daily. CARVEDILOL (COREG) 3.125 MG TABLET    TAKE 1 TABLET BY MOUTH EVERY 12 HOURS  
 COLCHICINE 0.6 MG TABLET    2 tablets at first sign of gout flare and then 1 tablet 1 hour later  Indications: acute gouty arthritis HYDROCODONE-ACETAMINOPHEN (NORCO) 5-325 MG PER TABLET    Take 1-2 Tabs by mouth every six (6) hours as needed for Pain. Max Daily Amount: 8 Tabs. PRAVASTATIN (PRAVACHOL) 20 MG TABLET    TAKE 1 TABLET BY MOUTH NIGHTLY SEVELAMER CARBONATE (RENVELA) 800 MG TAB TAB    Take  by mouth three (3) times daily. SUCROFERRIC OXYHYDROXIDE (VELPHORO) 500 MG CHEW CHEWABLE TABLET    Take  by mouth three (3) times daily (with meals). These Medications have changed No medications on file Stop Taking No medications on file

## 2021-01-15 NOTE — ED TRIAGE NOTES
Pt here to be seen for possible COVID, sent from dialysis center after having elevated temperature screening, pt states he has had headache, eye pain and chills since Wednesday. Dialysis on Monday Wednesday and Friday.

## 2021-01-15 NOTE — Clinical Note
[facility] EMERGENCY DEPT 
[address] [facility] [address] [phone] Work/School Note Date: 1/15/2021 To Whom It May concern: 
 
Fiona Fulton was evaulated by the following provider(s): 
Attending Provider: Rosa Maria Bourne, [address] [place] [address] virus is suspected. Per the CDC guidelines we recommend home isolation until the following conditions are all met: 1. At least 10 days have passed since symptoms first appeared and 2. At least 24 hours have passed since last fever without the use of fever-reducing medications and 
3. Symptoms (e.g., cough, shortness of breath) have improved Sincerely, 
 
 
 
 
Jono Loya MD

## 2021-01-16 NOTE — PROGRESS NOTES
Patient contacted regarding [id number] diagnosis\"Discussed COVID-19 related testing which was available at this time. Test results were positive. Patient informed of results, if available?  
- Patient related that test results were positive. Care Transition Nurse/ Ambulatory Care Manager contacted the patient by telephone to perform post discharge assessment. Call within 2 business days of discharge: Yes Verified name and  with patient as identifiers. Provided introduction to self, and explanation of the CTN/ACM role, and reason for call due to risk factors for infection and/or exposure to COVID-19. Symptoms reviewed with patient who verbalized the following symptoms:  
- Headache 
- eye pain  
- sometimes hot and sometimes chilly  
- going to the bathroom Patient reports that overall he feels better. Due to no new or worsening symptoms encounter was not routed to provider for escalation. Discussed follow-up appointments. If no appointment was previously scheduled, appointment scheduling offered:  no , as PCP does  Not appear to be  a [facility]  Provider. Patient was encouraged to call PCP to try and schedule a virtual follow up visit. [facility] follow up appointment(s): No future appointments. Non-[facility] follow up appointment(s):  
- none noted at this time. Advance Care Planning:  
Does patient have an Advance Directive:  not on file. Patient has following risk factors of: chronic kidney disease. CTN/ACM reviewed discharge instructions, medical action plan and red flags such as increased shortness of breath, increasing fever and signs of decompensation with patient who verbalized understanding. Discussed exposure protocols and quarantine with CDC Guidelines What to do if you are sick with coronavirus disease 2019.  Patient was given an opportunity for questions and concerns. The patient agrees to contact the [facility] exposure line [phone], local [facility] department [address]  [phone] and PCP office for questions related to their healthcare. CTN/ACM provided contact information for future needs. Patient related that he is going to a different dialysis center. Reviewed with  patient on any new and changed medications related to discharge diagnosis No new medications noted. Patient/family/caregiver given information for [employer] and agrees to enroll yes Patient's preferred e-mail: n/a Patient's preferred phone number:  [phone] Based on Loop alert triggers, patient will be contacted by nurse care manager for worsening symptoms. Pt will be further monitored by COVID Loop Team based on severity of symptoms and risk factors. Return call to patient: CTN discussed quarantine with patient. Patient referred to after visit summary and physicians regarding when he can safely be around others.

## 2021-01-23 PROBLEM — J96.00 ACUTE RESPIRATORY FAILURE DUE TO COVID-19 (HCC): Status: ACTIVE | Noted: 2021-01-01

## 2021-01-23 PROBLEM — U07.1 ACUTE RESPIRATORY FAILURE DUE TO COVID-19 (HCC): Status: ACTIVE | Noted: 2021-01-01

## 2021-01-23 NOTE — CONSULTS
Consult Note Consult requested by: Giselle Higgins MD  
Odilon Dozier is a 62 y.o. male [address] who is being seen on consult for ESRD. Chief Complaint Patient presents with  Shortness of Breath Admission diagnosis: Acute respiratory failure due to COVID-19 [facility]) HPI:  62 yr old with hx of ESRD,hypertension,positive COVID was found slumped over in chair and hypoxic when dialysis transport came to pick him up. [facility] on [address] on TTS schedule. His original schedule is MWF at [facility]. He feels sob. Started on HFNC with plans made for remdisivir administration. Past Medical History:  
Diagnosis Date  Arthritis of left knee 09/2017  
 moderate to severe, with effusion on xray  Chronic kidney disease ESRD  HD on MWF  
 Diastolic CHF ([facility] [address]  Dilated cardiomyopathy ([facility] [address]  History of alcohol abuse  History of echocardiogram 02/24/2014 Mod-marked LVE. EF 15%. Severe, diffuse hypk. Mild LVH. Gr 1 DDfx. Mod LAE. Mild-mod FIDELIA. Mild AoRE. No significant change from echo of 10/23/09.  History of gout  History of myocardial perfusion scan 05/25/2006 No evidence of ischemia or scarring. Gross LVE. EF 28%. Severe global hypk. Neg EKG on submaximal EST. Ex time 8 min 25 sec.  HTN (hypertension)  Hypercholesterolemia  Hypertensive cardiovascular disease Past Surgical History:  
Procedure Laterality Date  HX COLONOSCOPY    
 HX HERNIA REPAIR  04/2016  HX VASCULAR ACCESS Right upper chest HD CATH Social History Socioeconomic History  Marital status: SINGLE Spouse name: Not on file  Number of children: Not on file  Years of education: Not on file  Highest education level: Not on file Occupational History  Not on file Social Needs  Financial resource strain: Not on file  Food insecurity Worry: Not on file Inability: Not on file  Transportation needs Medical: Not on file Non-medical: Not on file Tobacco Use  Smoking status: Never Smoker  Smokeless tobacco: Never Used Substance and Sexual Activity  Alcohol use: No  
  Alcohol/week: 0.0 standard drinks Comment: stop 3 years ago in feb, 2015  Drug use: Not Currently Types: Marijuana Comment: 1980's marijuana  Sexual activity: Never Lifestyle  Physical activity Days per week: Not on file Minutes per session: Not on file  Stress: Not on file Relationships  Social connections Talks on phone: Not on file Gets together: Not on file Attends [employer] service: Not on file Active member of club or organization: Not on file Attends meetings of clubs or organizations: Not on file Relationship status: Not on file  Intimate partner violence Fear of current or ex partner: Not on file Emotionally abused: Not on file Physically abused: Not on file Forced sexual activity: Not on file Other Topics Concern  Not on file Social History Narrative  Not on file Family History Problem Relation Age of Onset  Cancer Father Lung  Heart Failure Mother  Cancer Sister No Known Allergies Home Medications:  
 
Prior to Admission Medications Prescriptions Last Dose Informant Patient Reported? Taking? HYDROcodone-acetaminophen (NORCO) 5-325 mg per tablet Not Taking at Unknown time  No No  
Sig: Take 1-2 Tabs by mouth every six (6) hours as needed for Pain. Max Daily Amount: 8 Tabs. allopurinol (ZYLOPRIM) 100 mg tablet Not Taking at Unknown time  No No  
Sig: Take 2 Tabs by mouth daily. aspirin 81 mg chewable tablet 1/23/2021 at Unknown time  No Yes Sig: Take 1 Tab by mouth daily. carvediloL (COREG) 3.125 mg tablet 1/23/2021 at Unknown time  No Yes Sig: TAKE 1 TABLET BY MOUTH EVERY 12 HOURS  
colchicine 0.6 mg tablet Not Taking at Unknown time  No No  
 Si tablets at first sign of gout flare and then 1 tablet 1 hour later  Indications: acute gouty arthritis  
pravastatin (PRAVACHOL) 20 mg tablet 2021 at Unknown time  No Yes Sig: TAKE 1 TABLET BY MOUTH NIGHTLY  
sevelamer carbonate (RENVELA) 800 mg tab tab Not Taking at Unknown time  Yes No  
Sig: Take  by mouth three (3) times daily. sucroferric oxyhydroxide (VELPHORO) 500 mg chew chewable tablet 2021 at Unknown time  Yes Yes Sig: Take  by mouth three (3) times daily (with meals). Facility-Administered Medications: None Current Facility-Administered Medications Medication Dose Route Frequency  sodium chloride (NS) flush 5-10 mL  5-10 mL IntraVENous PRN  
 cefTRIAXone (ROCEPHIN) 2 g in sterile water (preservative free) 20 mL IV syringe  2 g IntraVENous Q24H  
 azithromycin (ZITHROMAX) 500 mg in 0.9% sodium chloride 250 mL (VIAL-MATE)  500 mg IntraVENous Q24H  
 sodium chloride (NS) flush 5-40 mL  5-40 mL IntraVENous Q8H  
 sodium chloride (NS) flush 5-40 mL  5-40 mL IntraVENous PRN  
 acetaminophen (TYLENOL) tablet 650 mg  650 mg Oral Q6H PRN Or  
 acetaminophen (TYLENOL) suppository 650 mg  650 mg Rectal Q6H PRN  polyethylene glycol (MIRALAX) packet 17 g  17 g Oral DAILY PRN  
 heparin (porcine) injection 5,000 Units  5,000 Units SubCUTAneous Q8H  
 ascorbic acid (vitamin C) (VITAMIN C) tablet 1,000 mg  1,000 mg Oral Q6H  
 [START ON 2021] zinc sulfate (ZINCATE) 220 (50) mg capsule 2 Cap  2 Cap Oral DAILY  [START ON 2021] aspirin tablet 325 mg  325 mg Oral DAILY  famotidine (PEPCID) tablet 20 mg  20 mg Oral DAILY  [Held by provider] carvediloL (COREG) tablet 3.125 mg  3.125 mg Oral BID WITH MEALS  
 [START ON 2021] pravastatin (PRAVACHOL) tablet 20 mg  20 mg Oral DAILY  sucroferric oxyhydroxide (VELPHORO) chewable tablet 1,000 mg  1,000 mg Oral TID WITH MEALS  cholecalciferol (VITAMIN D3) wafer 50,000 Units  50,000 Units Oral ONCE  
  melatonin tablet 5 mg  5 mg Oral QHS  [START ON 1/24/2021] dexamethasone (DECADRON) 4 mg/mL injection 6 mg  6 mg IntraVENous Q24H Current Outpatient Medications Medication Sig  pravastatin (PRAVACHOL) 20 mg tablet TAKE 1 TABLET BY MOUTH NIGHTLY  carvediloL (COREG) 3.125 mg tablet TAKE 1 TABLET BY MOUTH EVERY 12 HOURS  sucroferric oxyhydroxide (VELPHORO) 500 mg chew chewable tablet Take  by mouth three (3) times daily (with meals).  aspirin 81 mg chewable tablet Take 1 Tab by mouth daily.  HYDROcodone-acetaminophen (NORCO) 5-325 mg per tablet Take 1-2 Tabs by mouth every six (6) hours as needed for Pain. Max Daily Amount: 8 Tabs.  sevelamer carbonate (RENVELA) 800 mg tab tab Take  by mouth three (3) times daily.  colchicine 0.6 mg tablet 2 tablets at first sign of gout flare and then 1 tablet 1 hour later  Indications: acute gouty arthritis  allopurinol (ZYLOPRIM) 100 mg tablet Take 2 Tabs by mouth daily. Review of Systems:  
 
Complete 10-point review of systems were obtained and discussed in length 
with the patient. Complete review of systems was negative/unremarkable 
except as mentioned in HPI section. Data Review: 
 
Labs: Results:  
   
Chemistry Recent Labs  
  01/23/21 
1046 *   
K 4.9 CL 97* CO2 26 BUN 55* CREA 17.60* CA 9.4 AGAP 13 BUCR 3* AP 45  
TP 8.7* ALB 2.7*  
GLOB 6.0* AGRAT 0.5* CBC w/Diff Recent Labs  
  01/23/21 
1046 WBC 8.1  
RBC 4.46* HGB 13.0 HCT 39.5  GRANS 76* LYMPH 14* EOS 0 Coagulation No results for input(s): PTP, INR, APTT, INREXT in the last 72 hours. Iron/Ferritin No results for input(s): IRON in the last 72 hours. No lab exists for component: TIBCCALC BNP No results for input(s): BNPP in the last 72 hours. Cardiac Enzymes Recent Labs  
  01/23/21 
1046 CPK 1,277* CKND1 0.1 Liver Enzymes Recent Labs  
  01/23/21 
1046  
TP 8.7* ALB 2.7* AP 45 Thyroid Studies Lab Results Component Value Date/Time TSH 1.17 08/23/2018 03:50 PM  
    
 
 
Physical Assessment:  
 
Visit Vitals /63 (BP Patient Position: At rest) Pulse 89 Temp 98.9 °F (37.2 °C) Resp 20 Ht 5' 9\" (1.753 m) Wt 104.3 kg (230 lb) SpO2 97% BMI 33.97 kg/m² Weight change: No intake or output data in the 24 hours ending 01/23/21 1720 Physical Exam:  
General:in distress HEENT sclera anicteric, supple neck, no thyromegaly CVS: S1S2 heard,  no rub RS: rales at bases Abd: Soft, Non tender Neuro: non focal, awake, alert Skin: no visible  Rash Musculoskeletal: No gross joints or bone deformities Procedures/imaging: see electronic medical records for all procedures, Xrays and details which were not copied into this note but were reviewed prior to creation of Plan Impression & Plan:  
IMPRESSION:  
ESRD on TTS at [address] unit Recent Covid Hypoxic resp failure Hypertension Hx Diastolic CHF Hyperlipidemia Secondary hyperparathyroidism PLAN:  
 Resume phos binders Dialysis today No gadolinium Daily labs for now Watch resp status and UF as tolerated 2k/2cal bath Further care per primary and ID Melina Sweeney MD 
January 23, 2021 [facility] [phone]

## 2021-01-23 NOTE — H&P
Admission History and Physical 
[address] [facility] Patient: Odilon Dozier MRN: [id number]  [facility]: [id number] YOB: 1960  Age: 62 y.o. Sex: male Admission Date: 1/23/2021 HPI:  
 
Odilon Dozier is a 62 y.o. male with PMH of osteoarthritis, CKD on dialysis MWF, gout with no recent flares, HTN,and hyperlipidemia  who presents with shortness of breath for 1 week. As per patient, he went to dialysis 1 week ago in [place], on 1/15, and was found to be febrile with a temperature of 101 F. Since returning home, he has been experiencing shortness of breath that has worsened in severity over the course of 1 week. Patient denies orthopnea or PND. No change in shortness of breath at rest or on ambulation. Denies history of asthma or COPD. He is not on home oxygen. Patient reports non-productive cough for 1 week and intermittent episodes of fever along w/ SOB. He has no known COVID-19 exposures, except attending his dialysis session 3x/week. Patient denies chest pain, nausea/vomiting, diarrhea or weakness. Reports diffuse tension-like headache for the last week, since the onset of his other symptoms. In the ED, a septic work up was completed including labs (CBC, CMP, blood culture, UA) and imaging (CXR and CTA). ABG ordered due to increased oxygen requirement on admission. Review of symptoms General ROS: negative for  chills, night sweats, weight gain and weight loss. Positive for fever. Psychological ROS: negative for anxiety and depression Ophthalmic ROS: negative for blurry vision, decreased vision or loss of vision ENT ROS: negative for  hearing change or visual changes Hematological and Lymphatic ROS: negative forbruising, jaundice Respiratory ROS: negative for  hemoptysis,  orthopnea, paroxysmal dyspnea, or wheezing. Positive for cough and shortness of breath. Cardiovascular ROS: negative for chest pain, dyspnea on exertion, edema, loss of consciousness, or palpitations Gastrointestinal ROS: negative for abdominal pain, blood in stools, change in stools, constipation, diarrhea, hematemesis, melena, nausea/vomiting or swallowing difficulty/pain Genito-Urinary ROS: negative for dysuria, hematuria or urinary frequency/urgency Musculoskeletal ROS: negative for joint pain, joint swelling or muscle pain Neurological ROS: negative for dizziness, headaches, numbness/tingling or weakness Dermatological ROS: negative for rash or skin lesion changes Past Medical History:  
Diagnosis Date  Arthritis of left knee 09/2017  
 moderate to severe, with effusion on xray  Chronic kidney disease ESRD  HD on MWF  
 Diastolic CHF ([facility] [address]  Dilated cardiomyopathy ([facility] [address]  History of alcohol abuse  History of echocardiogram 02/24/2014 Mod-marked LVE. EF 15%. Severe, diffuse hypk. Mild LVH. Gr 1 DDfx. Mod LAE. Mild-mod FIDELIA. Mild AoRE. No significant change from echo of 10/23/09.  History of gout  History of myocardial perfusion scan 05/25/2006 No evidence of ischemia or scarring. Gross LVE. EF 28%. Severe global hypk. Neg EKG on submaximal EST. Ex time 8 min 25 sec.  HTN (hypertension)  Hypercholesterolemia  Hypertensive cardiovascular disease Past Surgical History:  
Procedure Laterality Date  HX COLONOSCOPY    
 HX HERNIA REPAIR  04/2016  HX VASCULAR ACCESS Right upper chest HD CATH Family History Problem Relation Age of Onset  Cancer Father Lung  Heart Failure Mother  Cancer Sister Social History Socioeconomic History  Marital status: SINGLE Spouse name: Not on file  Number of children: Not on file  Years of education: Not on file  Highest education level: Not on file Tobacco Use  Smoking status: Never Smoker  Smokeless tobacco: Never Used Substance and Sexual Activity  Alcohol use: No  
  Alcohol/week: 0.0 standard drinks Comment: stop 3 years ago in 2015  Drug use: Not Currently Types: Marijuana Comment:  marijuana  Sexual activity: Never No Known Allergies Prior to Admission Medications Prescriptions Last Dose Informant Patient Reported? Taking? HYDROcodone-acetaminophen (NORCO) 5-325 mg per tablet   No No  
Sig: Take 1-2 Tabs by mouth every six (6) hours as needed for Pain. Max Daily Amount: 8 Tabs. allopurinol (ZYLOPRIM) 100 mg tablet   No No  
Sig: Take 2 Tabs by mouth daily. aspirin 81 mg chewable tablet   No No  
Sig: Take 1 Tab by mouth daily. carvediloL (COREG) 3.125 mg tablet   No No  
Sig: TAKE 1 TABLET BY MOUTH EVERY 12 HOURS  
colchicine 0.6 mg tablet   No No  
Si tablets at first sign of gout flare and then 1 tablet 1 hour later  Indications: acute gouty arthritis  
pravastatin (PRAVACHOL) 20 mg tablet   No No  
Sig: TAKE 1 TABLET BY MOUTH NIGHTLY  
sevelamer carbonate (RENVELA) 800 mg tab tab   Yes No  
Sig: Take  by mouth three (3) times daily. sucroferric oxyhydroxide (VELPHORO) 500 mg chew chewable tablet   Yes No  
Sig: Take  by mouth three (3) times daily (with meals). Facility-Administered Medications: None Physical Exam:  
 
Patient Vitals for the past 24 hrs: 
 Temp Pulse Resp BP SpO2  
21 1137     97 % 21 1120     97 % 21 1020 98.9 °F (37.2 °C) (!) 108 (!) 40 93/62 90 % Physical Exam:  
General:  AAOx3, NAD HEENT: Conjunctiva pink, sclera anicteric. PERRL. EOMI. Pharynx moist, nonerythematous. Moist mucous membranes. Thyroid not enlarged, no nodules. No cervical, supraclavicular, occipital or submandibular lymphadenopathy. No other gross abnormalities present. CV:  RRR, no murmurs. No visible pulsations or thrills. RESP:  Bilateral rales in all lung fields. Patient on 40L hi flow 100% FiO2 ABD:  Soft, nontender, nondistended. BS (+). No hepatosplenomegaly. No suprapubic tenderness. MS:  No joint deformity or instability. No atrophy. Neuro:  CN II-XII grossly intact. 5/5 strength bilateral upper extremities and lower extremities. Ext:  No edema. 2+ radial and dp pulses bilaterally. Skin:  No rashes, lesions, or ulcers. Good turgor. Chemistry Recent Labs  
  01/23/21 
1046 *   
K 4.9 CL 97* CO2 26 BUN 55* CREA 17.60* CA 9.4 AGAP 13 BUCR 3* AP 45  
TP 8.7* ALB 2.7*  
GLOB 6.0* AGRAT 0.5* CBC w/Diff Recent Labs  
  01/23/21 
1046 WBC 8.1  
RBC 4.46* HGB 13.0 HCT 39.5  GRANS 76* LYMPH 14* EOS 0 Liver Enzymes Protein, total  
Date Value Ref Range Status 01/23/2021 8.7 (H) 6.4 - 8.2 g/dL Final  
 
Albumin Date Value Ref Range Status 01/23/2021 2.7 (L) 3.4 - 5.0 g/dL Final  
 
Globulin Date Value Ref Range Status 01/23/2021 6.0 (H) 2.0 - 4.0 g/dL Final  
 
A-G Ratio Date Value Ref Range Status 01/23/2021 0.5 (L) 0.8 - 1.7   Final  
 
Alk. phosphatase Date Value Ref Range Status 01/23/2021 45 45 - 117 U/L Final  
 
Recent Labs  
  01/23/21 
1046  
TP 8.7* ALB 2.7*  
GLOB 6.0* AGRAT 0.5* AP 45 Lactic Acid No results found for: LAC No results for input(s): LAC in the last 72 hours. BNP No results found for: BNP, BNPP, XBNPT Cardiac Enzymes Lab Results Component Value Date/Time CPK 1,277 (H) 01/23/2021 10:46 AM  
 CKMB 1.2 01/23/2021 10:46 AM  
 CKND1 0.1 01/23/2021 10:46 AM  
 TROIQ 0.51 (H) 01/23/2021 10:46 AM  
  
 
Coagulation No results for input(s): PTP, INR, APTT, INREXT in the last 72 hours. Thyroid  Lab Results Component Value Date/Time TSH 1.17 08/23/2018 03:50 PM  
    
 
Lipid Panel Lab Results Component Value Date/Time  Cholesterol, total 155 08/23/2018 03:50 PM  
 HDL Cholesterol 72 (H) 08/23/2018 03:50 PM  
 LDL, calculated 71 08/23/2018 03:50 PM  
 VLDL, calculated 12 08/23/2018 03:50 PM  
 Triglyceride 60 08/23/2018 03:50 PM  
 CHOL/HDL Ratio 2.2 08/23/2018 03:50 PM  
  
 
ABG Recent Labs  
  01/23/21 
1113 PHI 7.46* PCO2I 35.9 PO2I 86 HCO3I 25.4 FIO2I 100 Urinalysis Lab Results Component Value Date/Time Color YELLOW 08/23/2018 12:31 PM  
 Appearance HAZY 08/23/2018 12:31 PM  
 Specific gravity 1.020 08/23/2018 12:31 PM  
 pH (UA) 5.5 08/23/2018 12:31 PM  
 Protein >300 (A) 08/23/2018 12:31 PM  
 Glucose 100 (A) 08/23/2018 12:31 PM  
 Ketone NEGATIVE  08/23/2018 12:31 PM  
 Bilirubin NEGATIVE  08/23/2018 12:31 PM  
 Urobilinogen 0.2 08/23/2018 12:31 PM  
 Nitrites NEGATIVE  08/23/2018 12:31 PM  
 Leukocyte Esterase NEGATIVE  08/23/2018 12:31 PM  
 Epithelial cells NEGATIVE  08/23/2018 12:31 PM  
 Bacteria 1+ (A) 08/23/2018 12:31 PM  
 WBC 0 to 1 08/23/2018 12:31 PM  
 RBC NEGATIVE  08/23/2018 12:31 PM  
  
 
Micro No results for input(s): SDES, CULT in the last 72 hours. No results for input(s): CULT in the last 72 hours. Imaging: 
XR (Most Recent). [place] [facility] [place] XR CHEST PORT Narrative Chest AP single view HISTORY: Hypoxia COMPARISON: 1/15/21. FINDINGS: The cardiac silhouette remains mildly enlarged. The lungs are 
hypoinflated with interval development of patchy infiltrations/consolidations in 
bilateral mid and lower lungs, greater on the left. No pneumothorax. .  Mild 
vascular congestion. Unremarkable mediastinum. Clenton Reas Impression Patchy infiltrations/consolidations in bilateral mid/lower lungs, greater on the 
left. Acute airspace disease such as colon with Covid pneumonia concerned. Thank you for your referral.  
  
 
CT (Most Recent) [place] [facility] [place] 04/24/16 CT ABD PELV WO CONT Narrative CT abdomen and pelvis CPT code: 15411 and 98931. INDICATION: Left upper abdominal pain. Left scrotal swelling TECHNIQUE: 5 mm collimation axial images obtained from the diaphragm to the 
level of the pubic symphysis without oral or nonionic intravenous contrast. 
 
COMPARISON: None Abdomen Findings:  
Heart size is top normal, without pericardial effusion. There is small amount of 
patchy peripheral airspace opacity at the right costophrenic angle, unclear if 
this could be inflammatory or atelectasis. No pleural effusion. Lack of intravenous contrast renders this study suboptimal for evaluating solid 
abdominal organs. Given this, liver appears normal size. Gallbladder not 
distended. No calcified intraluminal stones. Spleen normal size. No adrenal 
mass. Pancreas poorly  from adjacent unopacified small bowel and colon 
for evaluation. Normal caliber abdominal aorta. Symmetric renal size. No 
hydronephrosis. No renal stones. No free fluid in the upper abdomen. Pelvis Findings: There is a left-sided inguinal hernia containing a loop of small bowel which 
appears to be ileum. In the hemiscrotum there is diffuse wall thickening of this 
loop of bowel measuring up to 7 mm diameter. Upon its exit proximal and distal 
there is wall thickening and edema, with some internal fecal material on image 60. This appears to be the proximal loop of bowel. The distal terminal ileum 
demonstrates wall thickening on images 53 through 46 to the level of the cecum. No pneumatosis identified. Cannot well evaluate for wall edema or viable 
enhancement given lack of intravenous contrast. There is a sizable left-sided 
hydrocele. Bladder under distended and not optimally evaluated. Amorphous calcifications 
within nonenlarged prostate. Dense calcification suggested within nonenlarged 
appendix on image 51. Impression IMPRESSION: 
1.  Left-sided inguinal hernia containing a length of ileum which demonstrates 
diffuse wall thickening/edema and some proximal distention with internal fecal 
 material proximal to the hernia. Distal ileum to terminal ileum demonstrates 
mild diffuse wall thickening/edema as well. - Concern for mechanical obstruction given the bowel wall thickening. Findings discussed with Dr. Eduardo Elizondo at 1520 hours. 2. Left-sided scrotal hydrocele is likely reactive. 3. No more proximal bowel or gastric distention. No colon distention. ECHO No results found for this or any previous visit. EKG Results for orders placed or performed in visit on 04/11/14 AMB POC EKG ROUTINE W/ 12 LEADS, INTER & REP     Status: None Narrative Sinus bradycardia rate 59. Left atrial enlargement. There is a 
borderline complete left bundle branch block. Left ventricular 
hypertrophy by precordial voltage criteria with some asymmetric T-wave inversions anterolaterally in the high lateral leads 
consistent with LV strain. Recent Results (from the past 12 hour(s)) CBC WITH AUTOMATED DIFF Collection Time: 01/23/21 10:46 AM  
Result Value Ref Range WBC 8.1 4.6 - 13.2 K/uL  
 RBC 4.46 (L) 4.70 - 5.50 M/uL  
 HGB 13.0 13.0 - 16.0 g/dL HCT 39.5 36.0 - 48.0 % MCV 88.6 74.0 - 97.0 FL  
 MCH 29.1 24.0 - 34.0 PG  
 MCHC 32.9 31.0 - 37.0 g/dL  
 RDW 15.4 (H) 11.6 - 14.5 % PLATELET 109 414 - 046 K/uL MPV 10.2 9.2 - 11.8 FL  
 NEUTROPHILS 76 (H) 40 - 73 % LYMPHOCYTES 14 (L) 21 - 52 % MONOCYTES 10 3 - 10 % EOSINOPHILS 0 0 - 5 % BASOPHILS 0 0 - 2 %  
 ABS. NEUTROPHILS 6.2 1.8 - 8.0 K/UL  
 ABS. LYMPHOCYTES 1.1 0.9 - 3.6 K/UL  
 ABS. MONOCYTES 0.8 0.05 - 1.2 K/UL  
 ABS. EOSINOPHILS 0.0 0.0 - 0.4 K/UL  
 ABS. BASOPHILS 0.0 0.0 - 0.1 K/UL  
 DF AUTOMATED CARDIAC PANEL,(CK, CKMB & TROPONIN) Collection Time: 01/23/21 10:46 AM  
Result Value Ref Range CK - MB 1.2 <3.6 ng/ml CK-MB Index 0.1 0.0 - 4.0 % CK 1,277 (H) 39 - 308 U/L Troponin-I, QT 0.51 (H) 0.0 - 3.512 NG/ML  
METABOLIC PANEL, COMPREHENSIVE  Collection Time: 01/23/21 10:46 AM  
 Result Value Ref Range Sodium 136 136 - 145 mmol/L Potassium 4.9 3.5 - 5.5 mmol/L Chloride 97 (L) 100 - 111 mmol/L  
 CO2 26 21 - 32 mmol/L Anion gap 13 3.0 - 18 mmol/L Glucose 119 (H) 74 - 99 mg/dL BUN 55 (H) 7.0 - 18 MG/DL Creatinine 17.60 (H) 0.6 - 1.3 MG/DL  
 BUN/Creatinine ratio 3 (L) 12 - 20 GFR est AA 3 (L) >60 ml/min/1.73m2 GFR est non-AA 3 (L) >60 ml/min/1.73m2 Calcium 9.4 8.5 - 10.1 MG/DL Bilirubin, total 0.6 0.2 - 1.0 MG/DL  
 ALT (SGPT) 64 (H) 16 - 61 U/L  
 AST (SGOT) 106 (H) 10 - 38 U/L Alk. phosphatase 45 45 - 117 U/L Protein, total 8.7 (H) 6.4 - 8.2 g/dL Albumin 2.7 (L) 3.4 - 5.0 g/dL Globulin 6.0 (H) 2.0 - 4.0 g/dL A-G Ratio 0.5 (L) 0.8 - 1.7 POC G3 Collection Time: 01/23/21 11:13 AM  
Result Value Ref Range Device: High Flow Nasal Cannula Flow rate (POC) 40 L/M  
 FIO2 (POC) 100 % pH (POC) 7.46 (H) 7.35 - 7.45    
 pCO2 (POC) 35.9 35.0 - 45.0 MMHG  
 pO2 (POC) 86 80 - 100 MMHG  
 HCO3 (POC) 25.4 22 - 26 MMOL/L  
 sO2 (POC) 97 92 - 97 % Base excess (POC) 2 mmol/L Allens test (POC) YES Total resp. rate 38 Site RIGHT RADIAL Patient temp. 37.2 Specimen type (POC) ARTERIAL Performed by Adis Parks Assessment/Plan:  
Bhargav Stoddard is a 62 y.o. male with PMH of osteoarthritis, CKD on dialysis MWF, gout with no recent flares, HTN, hyperlipidemia  who is admitted for acute hypoxic respiratory failure, likely due to COVID-19 pna. 1. Acute hypoxic respiratory failure - DDX to include COVID-19 pna vs CAP vs ?CHF. Patient with 1 week history of SOB, worsening in severity, since his dialysis 1 week ago. Patient reports associated dry cough, intermittent fevers and headache. CXR significant for patchy infiltrations/consolidations in bilateral mid/lower lungs, greater on the left, with concerns for COVID pna. CTA with confirmed extensive patchy consolidation in bilateral lungs, and questionable non-occlusive intraluminal filling defects in right upper and middle lobe segmental arteries. Cannot rule out PE as a cause of SOB. Follow up CTA warranted as per radiology; will schedule for 1/25 No leukocytosis. ABG within normal limits -  mild alkalosis with  pH 7.46, pCO2 35.9, pO2 86. VSS. Rapid COVID-19 (+) on 1/15/2021. S/p duonebs, albuterol nebulizer and decadron 6 mg once in ED.  
- Admit to tele w/ cardiac monitoring 
- Continue oxygen supplementation 40 L/min hi flow % FiO2; wean oxygen as tolerated to maintain SpO2 >92%. NPO if transitioning to BIPAP is warranted. - Continue Azithromycin 500 mg IV daily and Rocephin 2g IV daily 
- Continue Decadron 6 mg daily - Aspiration precautions - ID consulted; Remdesivir OK as per ID (200 mg IV day 1, 100 mg IV once daily for 4 days) -  Consider convalescent plasma. - pulmonology consulted; appreciate recs 
- Continue Vitamin C 1000 mg q6h, Aspirin 325 mg, Vitamin D3 50, 000 units once, famotidine 20 mg daily, melatonin 10 mg qhs, Zinc sulfate 100 mg  
- COVID-19 labs: CRP, ESR, D-dimer, pro calcitonin daily - FU blood culture - CBC, CMP daily - Repeat CTA on 1/25 
- Vital signs per unit routine 
- CM/PT/OT 
  
 2. Troponinemia - Troponin elevated to 0.51. Likely ischemic demand from hypoxia vs R heart strain from possible PE. Patient denies chest pain. EKG pending results. ECHO from 2018 significant for EF 51-55%, low normal systolic function, left ventricular severe concentric hypertrophy. FU pro-BNP. - trend trops q6h 
- cardiology consulted; heparin gtt not advised. Continue  mg daily 
- ECHO and repeat EKG in AM 
- FU pro-BNP 3. ESRD on dialysis MWF 
- continue dialysis in-patient 
- Continue Velphoro (phosphate binder) 500 mg (2 tablets) TID with meals  
- nephro consulted; appreciate recs 4. Hyperlipidemia 
- Continue Pravastatin 20 mg daily 5. HTN - Blood pressure on admission 93/62 mmhg. 
- hold coreg 3.125 mg daily in light of low blood pressures 6. Transaminitis  (, ALT 64) 
- monitor LFT's 
 
7. GOUT, no recent flares. Patient is no longer on Allopurinol.  
- monitor for flares Diet Renal Diet DVT Prophylaxis SQH  
GI Prophylaxis Famotidine Code status FULL Disposition 2 MNs Point of Contact [employer] Relationship: Sister 
[phone] Abe Fothergill, MD , PGY-1  
[facility] Medicine Senior Pager: [phone] January 23, 2021, 3:03 PM  
 
 
Admission History and Physical 
[facility] 
  
  
Patient: Starla Carpenter MRN: [id number]  CSN: [id number] YOB: 1960  Age: 62 y.o. Sex: male   
  
DOA: 1/23/2021 HPI:  
  
 Bette Marcelo is a 62 y.o. male with PMH arthritis, CKD on HD MWF, gout HTN, HLD, now presenting with complaint of SOB. Pt is known positive COVID for over 1wk. Was seen in ER 1wk ago with fever, but wnl vitals, d/c home with CDC recommendations. Over the past few days he has felt progressively worse and was found tripoding today when he was picked up for dialysis. He currently states he feels fine and has no complaints. He denies sick contacts, CP, [place]. He did not get dialysis today. 
  
ED Course: ABG, Vapotherm, CT, CXR, CBC, CMP, COVID panel, Nephro Consulted 
  
Review of Systems Constitutional: Positive for fever, chills Negative for diaphoresis. HENT: Negative for hearing loss and sore throat. Eyes: Negative for blurred vision and double vision. Respiratory: Negative for cough and hemoptysis. Cardiovascular: Negative for chest pain and palpitations. Gastrointestinal: Negative for nausea and vomiting. Genitourinary: Negative for dysuria and hematuria. Musculoskeletal: Negative for myalgias and joint pain. Skin: Negative for itching and rash. Neurological: Negative for dizziness and headaches. 
 
  
    
Past Medical History:  
Diagnosis Date  Arthritis of left knee 09/2017  
  moderate to severe, with effusion on xray  Chronic kidney disease    
  ESRD  HD on MWF  
 Diastolic CHF ([address]    
 Dilated cardiomyopathy ([facility] [address]    
 History of alcohol abuse    
 History of echocardiogram 02/24/2014  
  Mod-marked LVE. EF 15%. Severe, diffuse hypk. Mild LVH. Gr 1 DDfx. Mod LAE. Mild-mod FIDELIA. Mild AoRE. No significant change from echo of 10/23/09.  History of gout    
 History of myocardial perfusion scan 05/25/2006  
  No evidence of ischemia or scarring. Gross LVE. EF 28%. Severe global hypk. Neg EKG on submaximal EST. Ex time 8 min 25 sec.  HTN (hypertension)    
 Hypercholesterolemia    
 Hypertensive cardiovascular disease    
  
  
     
Past Surgical History: Procedure Laterality Date  HX COLONOSCOPY      
 HX HERNIA REPAIR   2016  HX VASCULAR ACCESS      
  Right upper chest HD CATH  
  
  
     
Family History Problem Relation Age of Onset  Cancer Father    
      Lung  Heart Failure Mother    
 Cancer Sister    
  
  
Social History  
  
  
     
Socioeconomic History  Marital status: SINGLE  
    Spouse name: Not on file  Number of children: Not on file  Years of education: Not on file  Highest education level: Not on file Tobacco Use  Smoking status: Never Smoker  Smokeless tobacco: Never Used Substance and Sexual Activity  Alcohol use: No  
    Alcohol/week: 0.0 standard drinks  
    Comment: stop 3 years ago in 2015  Drug use: Not Currently  
    Types: Marijuana  
    Comment:  marijuana  Sexual activity: Never  
  
  
  
No Known Allergies 
  
Prior to Admission Medications Prescriptions Last Dose Informant Patient Reported? Taking? HYDROcodone-acetaminophen (NORCO) 5-325 mg per tablet     No No  
Sig: Take 1-2 Tabs by mouth every six (6) hours as needed for Pain. Max Daily Amount: 8 Tabs. allopurinol (ZYLOPRIM) 100 mg tablet     No No  
Sig: Take 2 Tabs by mouth daily. aspirin 81 mg chewable tablet     No No  
Sig: Take 1 Tab by mouth daily. carvediloL (COREG) 3.125 mg tablet     No No  
Sig: TAKE 1 TABLET BY MOUTH EVERY 12 HOURS  
colchicine 0.6 mg tablet     No No  
Si tablets at first sign of gout flare and then 1 tablet 1 hour later  Indications: acute gouty arthritis  
pravastatin (PRAVACHOL) 20 mg tablet     No No  
Sig: TAKE 1 TABLET BY MOUTH NIGHTLY  
sevelamer carbonate (RENVELA) 800 mg tab tab     Yes No  
Sig: Take  by mouth three (3) times daily. sucroferric oxyhydroxide (VELPHORO) 500 mg chew chewable tablet     Yes No  
Sig: Take  by mouth three (3) times daily (with meals).   
  
Facility-Administered Medications: None  
  
  
Physical Exam:  
  
 Patient Vitals for the past 24 hrs: 
  Temp Pulse Resp BP SpO2  
01/23/21 1137     97 % 01/23/21 1120     97 % 01/23/21 1020 98.9 °F (37.2 °C) (!) 108 (!) 40 93/62 90 %   
  
Physical Exam:  
General:  Alert and Responsive and in No acute distress. HEENT: Conjunctiva pink, sclera anicteric. EOMI. Pharynx moist, nonerythematous. No other gross abnormalities appreciated. CV:  RRR. No murmurs, rubs, or gallops appreciated. No visible pulsations or thrills. RESP:  Unlabored breathing. Lungs clear to auscultation. No wheeze, rales, or rhonchi. Equal expansion bilaterally. ABD:  Soft, nontender, nondistended. Normoactive bowel sounds. No hepatosplenomegaly. No suprapubic tenderness. MS:  No joint deformity or instability. No atrophy. Neuro:  5/5 strength bilateral upper extremities and lower extremities. A+Ox3. Ext:  No edema. 2+ radial and dp pulses bilaterally. Skin:  No rashes, lesions, or ulcers. Good turgor. 
  
IMAGING: Cta Chest W Or W Wo Cont 
  
Result Date: 1/23/2021 1. No convincing CT evidence of pulmonary embolism in mainstem and lobar arteries. Questionable nonocclusive intraluminal filling defects identified in right upper and middle lobe segmental subsegmental images, artifact versus tiny nonocclusive PE. 2.  Extensive patchy consolidation pneumonia in bilateral lungs, Covid infection is more concerned than pulmonary infarctions. Clinical correlation and short-term follow-up CT advised. Thank you for your referral.  
  
Xr Chest Port 
  
Result Date: 1/23/2021 Patchy infiltrations/consolidations in bilateral mid/lower lungs, greater on the left. Acute airspace disease such as colon with Covid pneumonia concerned.  Thank you for your referral. 
  
  
Assessment/Plan:  
62 y.o. male with PMH arthritis, CKD on HD MWF, gout HTN, HLD, now admitted with Acute Hypoxic Respiratory Failure 2/2 COVID PNA. 
  
 Acute Hypoxic Respiratory Failure 2/2 COVID PNA - CT, CXR suggestive. COVID specific labs pending. ABG wnl on Vapotherm 100% 40L. Comfortable. CT inconclusive for PE. 
-admit tele -ID/Pulm consult 
-remdesevir contraindicated with ESRD 
-azithromycin, rocephin every day 
-decadron 6 every day 
-CBC, BMP, mg, phos, procal, esr, procal, ddimer every day 
-tylenol PRN 
-Heparin q8h 
-pepcid every day 
-Vitamin D, melatonin, zinc, Vit C as ordered 
-IS, Aspiration precs 
  
Troponenia - likely strain, however with EKG changes concern for ACS. Discussed with cardiology, okay with ASA, no heparin ggt. 
-Consult cardiology 
-Tn q6 
-ASA 
  
Diet: renal 
DVT Prophylaxis: Hep Code Status: FULL Point of ContactKarla Samuel Carrillo [phone]  
  
Disposition and anticipated LOS: 2 midnights 
  
Linette Bear DO, PGY III 
[facility] 
01/23/21 
3:39 PM

## 2021-01-23 NOTE — ED NOTES
Pt provided with dinner tray, continues to rest on high flow nasal cannula. Pending bed placement. No further request or needs. Continuing to monitor frequently.

## 2021-01-23 NOTE — ED NOTES
Noted changed in pt EKG on monitor when reassessing. Completed repeated EKG and handed to attending.

## 2021-01-23 NOTE — PROGRESS NOTES
Brief progress note Dx: Covid-19 pneumonia with acute hypoxic respiratory failure Spoke with Dr. Cyndi Riggins, ID, about patient's clinical course and whether he is a candidate for Remdesivir given history of ESKD. As per ID, appropriate to start Remdesivir. Will place orders. Signed By: Fartun Rosa MD   
 January 23, 2021

## 2021-01-23 NOTE — ED PROVIDER NOTES
EMERGENCY DEPARTMENT HISTORY AND PHYSICAL EXAM 
 
Date: 1/23/2021 Patient Name: Bhargav Stoddard History of Presenting Illness Chief Complaint Patient presents with  Shortness of Breath History Provided By: Patient Additional History (Context): Bhargav Stoddard is a 62 y.o. male with hypertension, obesity and ESRD on dialysis followed by Cassy Wright who presents with shortness of breath. He tested positive for Covid last week. Not on oxygen at home and denies a history of asthma or COPD but is currently wheezing and struggling to breathe. He is hypoxic on 8 L. Call 911 as he could not make it to his dialysis session today. He went to [place] this week for dialysis. Denies nausea vomiting or diarrhea. PCP: Didi Ramírez MD 
 
Current Facility-Administered Medications Medication Dose Route Frequency Provider Last Rate Last Admin  sodium chloride (NS) flush 5-10 mL  5-10 mL IntraVENous PRN Dannielle Nelson PA      
 cefTRIAXone (ROCEPHIN) 2 g in sterile water (preservative free) 20 mL IV syringe  2 g IntraVENous Q24H Dannielle Nelson PA   2 g at 01/23/21 1045  
 azithromycin (ZITHROMAX) 500 mg in 0.9% sodium chloride 250 mL (VIAL-MATE)  500 mg IntraVENous Q24H Dannielle Nelson PA   500 mg at 01/23/21 1045  sodium chloride (NS) flush 5-40 mL  5-40 mL IntraVENous Q8H Arely Tyler MD      
 sodium chloride (NS) flush 5-40 mL  5-40 mL IntraVENous PRN Arely Tyler MD      
 acetaminophen (TYLENOL) tablet 650 mg  650 mg Oral Q6H PRN Arely Tyler MD      
 Or  
[place] Loser acetaminophen (TYLENOL) suppository 650 mg  650 mg Rectal Q6H PRN Arely Tyler MD      
 polyethylene glycol (MIRALAX) packet 17 g  17 g Oral DAILY PRN Arely Tyler MD      
 heparin (porcine) injection 5,000 Units  5,000 Units SubCUTAneous Q8H Arely Tyler MD      
 dexamethasone (DECADRON) 4 mg/mL injection 6 mg  6 mg IntraVENous Q24H Sommer Ballard MD      
 
 Current Outpatient Medications Medication Sig Dispense Refill  pravastatin (PRAVACHOL) 20 mg tablet TAKE 1 TABLET BY MOUTH NIGHTLY 90 Tab 3  carvediloL (COREG) 3.125 mg tablet TAKE 1 TABLET BY MOUTH EVERY 12 HOURS 180 Tab 0  
 sucroferric oxyhydroxide (VELPHORO) 500 mg chew chewable tablet Take  by mouth three (3) times daily (with meals).  HYDROcodone-acetaminophen (NORCO) 5-325 mg per tablet Take 1-2 Tabs by mouth every six (6) hours as needed for Pain. Max Daily Amount: 8 Tabs. 40 Tab 0  
 sevelamer carbonate (RENVELA) 800 mg tab tab Take  by mouth three (3) times daily.  colchicine 0.6 mg tablet 2 tablets at first sign of gout flare and then 1 tablet 1 hour later  Indications: acute gouty arthritis 3 Tab 5  
 allopurinol (ZYLOPRIM) 100 mg tablet Take 2 Tabs by mouth daily. 120 Tab 2  
 aspirin 81 mg chewable tablet Take 1 Tab by mouth daily. 30 Tab 11 Past History Past Medical History: 
Past Medical History:  
Diagnosis Date  Arthritis of left knee 09/2017  
 moderate to severe, with effusion on xray  Chronic kidney disease ESRD  HD on MW  
 Diastolic CHF ([facility] [address]  Dilated cardiomyopathy ([facility] [address]  History of alcohol abuse  History of echocardiogram 02/24/2014 Mod-marked LVE. EF 15%. Severe, diffuse hypk. Mild LVH. Gr 1 DDfx. Mod LAE. Mild-mod FIDELIA. Mild AoRE. No significant change from echo of 10/23/09.  History of gout  History of myocardial perfusion scan 05/25/2006 No evidence of ischemia or scarring. Gross LVE. EF 28%. Severe global hypk. Neg EKG on submaximal EST. Ex time 8 min 25 sec.  HTN (hypertension)  Hypercholesterolemia  Hypertensive cardiovascular disease Past Surgical History: 
Past Surgical History:  
Procedure Laterality Date  HX COLONOSCOPY    
 HX HERNIA REPAIR  04/2016  HX VASCULAR ACCESS Right upper chest HD CATH Family History: 
Family History Problem Relation Age of Onset  Cancer Father Lung  Heart Failure Mother  Cancer Sister Social History: 
Social History Tobacco Use  Smoking status: Never Smoker  Smokeless tobacco: Never Used Substance Use Topics  Alcohol use: No  
  Alcohol/week: 0.0 standard drinks Comment: stop 3 years ago in feb, 2015  Drug use: Not Currently Types: Marijuana Comment: 1980's marijuana Allergies: 
No Known Allergies Review of Systems Review of Systems Constitutional: Negative for fever. Respiratory: Positive for shortness of breath and wheezing. Cardiovascular: Negative for chest pain. Gastrointestinal: Negative for abdominal pain, diarrhea, nausea and vomiting. All Other Systems Negative Physical Exam  
 
Vitals:  
 01/23/21 1020 01/23/21 1120 01/23/21 1137 BP: 93/62 Pulse: (!) 108 Resp: (!) 40 Temp: 98.9 °F (37.2 °C) SpO2: 90% 97% 97% Weight: 104.3 kg (230 lb) Height: 5' 9\" (1.753 m) Physical Exam 
Vitals signs and nursing note reviewed. Constitutional:   
   General: He is in acute distress. Appearance: He is well-developed. He is obese. He is ill-appearing. He is not toxic-appearing or diaphoretic. HENT:  
   Head: Normocephalic and atraumatic. Neck: Musculoskeletal: Normal range of motion and neck supple. Thyroid: No thyromegaly. Vascular: No carotid bruit. Trachea: No tracheal deviation. Cardiovascular:  
   Rate and Rhythm: Normal rate and regular rhythm. Heart sounds: Normal heart sounds. No murmur. No friction rub. No gallop. Pulmonary:  
   Effort: Respiratory distress present. Breath sounds: No stridor. Examination of the right-upper field reveals wheezing. Examination of the left-upper field reveals wheezing. Examination of the right-middle field reveals wheezing. Examination of the left-middle field reveals wheezing. Examination of the right-lower field reveals wheezing. Examination of the left-lower field reveals wheezing. Wheezing present. No rales. Chest:  
   Chest wall: No tenderness. Abdominal:  
   General: There is no distension. Palpations: Abdomen is soft. There is no mass. Tenderness: There is no abdominal tenderness. There is no guarding or rebound. Musculoskeletal: Normal range of motion. Skin: 
   General: Skin is warm and dry. Coloration: Skin is not pale. Neurological:  
   Mental Status: He is alert. Psychiatric:     
   Speech: Speech normal.     
   Behavior: Behavior normal.     
   Thought Content: Thought content normal.     
   Judgment: Judgment normal.  
 
  
. 
 
Diagnostic Study Results Labs - Recent Results (from the past 12 hour(s)) CBC WITH AUTOMATED DIFF Collection Time: 01/23/21 10:46 AM  
Result Value Ref Range WBC 8.1 4.6 - 13.2 K/uL  
 RBC 4.46 (L) 4.70 - 5.50 M/uL  
 HGB 13.0 13.0 - 16.0 g/dL HCT 39.5 36.0 - 48.0 % MCV 88.6 74.0 - 97.0 FL  
 MCH 29.1 24.0 - 34.0 PG  
 MCHC 32.9 31.0 - 37.0 g/dL  
 RDW 15.4 (H) 11.6 - 14.5 % PLATELET 031 753 - 845 K/uL MPV 10.2 9.2 - 11.8 FL  
 NEUTROPHILS 76 (H) 40 - 73 % LYMPHOCYTES 14 (L) 21 - 52 % MONOCYTES 10 3 - 10 % EOSINOPHILS 0 0 - 5 % BASOPHILS 0 0 - 2 %  
 ABS. NEUTROPHILS 6.2 1.8 - 8.0 K/UL  
 ABS. LYMPHOCYTES 1.1 0.9 - 3.6 K/UL  
 ABS. MONOCYTES 0.8 0.05 - 1.2 K/UL  
 ABS. EOSINOPHILS 0.0 0.0 - 0.4 K/UL  
 ABS. BASOPHILS 0.0 0.0 - 0.1 K/UL  
 DF AUTOMATED CARDIAC PANEL,(CK, CKMB & TROPONIN) Collection Time: 01/23/21 10:46 AM  
Result Value Ref Range CK - MB 1.2 <3.6 ng/ml  CK-MB Index 0.1 0.0 - 4.0 %  
 CK 1,277 (H) 39 - 308 U/L Troponin-I, QT 0.51 (H) 0.0 - 5.862 NG/ML  
METABOLIC PANEL, COMPREHENSIVE Collection Time: 01/23/21 10:46 AM  
Result Value Ref Range Sodium 136 136 - 145 mmol/L Potassium 4.9 3.5 - 5.5 mmol/L Chloride 97 (L) 100 - 111 mmol/L  
 CO2 26 21 - 32 mmol/L Anion gap 13 3.0 - 18 mmol/L Glucose 119 (H) 74 - 99 mg/dL BUN 55 (H) 7.0 - 18 MG/DL Creatinine 17.60 (H) 0.6 - 1.3 MG/DL  
 BUN/Creatinine ratio 3 (L) 12 - 20 GFR est AA 3 (L) >60 ml/min/1.73m2 GFR est non-AA 3 (L) >60 ml/min/1.73m2 Calcium 9.4 8.5 - 10.1 MG/DL Bilirubin, total 0.6 0.2 - 1.0 MG/DL  
 ALT (SGPT) 64 (H) 16 - 61 U/L  
 AST (SGOT) 106 (H) 10 - 38 U/L Alk. phosphatase 45 45 - 117 U/L Protein, total 8.7 (H) 6.4 - 8.2 g/dL Albumin 2.7 (L) 3.4 - 5.0 g/dL Globulin 6.0 (H) 2.0 - 4.0 g/dL A-G Ratio 0.5 (L) 0.8 - 1.7 POC G3 Collection Time: 01/23/21 11:13 AM  
Result Value Ref Range Device: High Flow Nasal Cannula Flow rate (POC) 40 L/M  
 FIO2 (POC) 100 % pH (POC) 7.46 (H) 7.35 - 7.45    
 pCO2 (POC) 35.9 35.0 - 45.0 MMHG  
 pO2 (POC) 86 80 - 100 MMHG  
 HCO3 (POC) 25.4 22 - 26 MMOL/L  
 sO2 (POC) 97 92 - 97 % Base excess (POC) 2 mmol/L Allens test (POC) YES Total resp. rate 38 Site RIGHT RADIAL Patient temp. 37.2 Specimen type (POC) ARTERIAL Performed by Hailey Zuniga Radiologic Studies -  
CTA CHEST W OR W WO CONT Final Result 1. No convincing CT evidence of pulmonary embolism in mainstem and lobar  
arteries. Questionable nonocclusive intraluminal filling defects identified in  
right upper and middle lobe segmental subsegmental images, artifact versus tiny  
nonocclusive PE.  
  
2.  Extensive patchy consolidation pneumonia in bilateral lungs, Covid infection  
is more concerned than pulmonary infarctions. Clinical correlation and  
short-term follow-up CT advised.   
  
Thank you for your referral.  
  
  
 XR CHEST PORT Final Result Patchy infiltrations/consolidations in bilateral mid/lower lungs, greater on the  
left. Acute airspace disease such as colon with Covid pneumonia concerned. Thank you for your referral.  
  
 
CT Results  (Last 48 hours) 01/23/21 1439  CTA Kat Barnard 105 Final result Impression: 1. No convincing CT evidence of pulmonary embolism in mainstem and lobar  
arteries. Questionable nonocclusive intraluminal filling defects identified in  
right upper and middle lobe segmental subsegmental images, artifact versus tiny  
nonocclusive PE.  
   
2.  Extensive patchy consolidation pneumonia in bilateral lungs, Covid infection  
is more concerned than pulmonary infarctions. Clinical correlation and  
short-term follow-up CT advised. Thank you for your referral.  
   
  
 Narrative:  CT chest with contrast for PE HISTORY: Dyspnea. COMPARISON: None TECHNIQUE: Dynamic spiral scan through the chest is obtained from the thoracic  
inlet to the diaphragm after dynamic nonionic IV contrast administration  per PE  
protocol. Coronal and sagittal MIP computer reconstructions are also obtained  
for better visualization of the integrity of pulmonary arteries in 3D dimension,  
particularly for lobar/interlobar arterial branches and to minimize radiation  
dose. All CT scans at this facility performed using dose optimization techniques as  
appreciated to a performed exam, to include automated exposure control,  
adjustment of the mA and or KU according to patient size (including appropriate  
matching for site specific examination), or use of iterative reconstruction  
technique. FINDINGS:  
   
PULMONARY ARTERIES: The contrast bolus is adequate. Although, moderate motion  
artifact noted which significantly compromises the evaluation of the small  
arterial branches. The right and left mainstem pulmonary arteries and their lobar branches appear patent without convincing evidence of intraluminal filling  
defect identified to suggest pulmonary embolism. There are questionable  
nonocclusive intraluminal linear filling defects in the segmental/subsegmental  
branches at anterior and lateral right upper lobe and right middle lobe,  
uncertain due to artifact or potential tiny PE.  
   
AORTA AND OTHER CARDIOVASCULAR STRUCTURES: Mild ectasia of thoracic aorta. No  
aortic aneurysm or dissection. Mild burden of  coronary artery calcifications. Heart Strain assessment:  
-  RV/LV ratio (normal <0.9): Normal  
-  Dysfunction or bowing of interventricular septum: None -  Main pulmonary artery enlargement (>30mm): Not present -  There is not visualization of contrast reflux from the right heart into the  
IVC/hepatic veins. LUNG PARENCHYMA: There are multiple patchy areas of airspace consolidations  
identified throughout both lungs, more pronounced in bilateral lower lobes and  
inferior left upper lobe. Patent airway. IMAGED THYROID: Unremarkable. MEDIASTINUM: Borderline adenopathy in bilateral chu and subcarinal  
mediastinum. Benedetta Ou PLEURAL SPACES AND CHEST WALL: No significant pleural effusion. Mild pleural  
thickening. VISUALIZED UPPER ABDOMEN: Very atrophic right kidney is partially seen. OSSEOUS STRUCTURES: Unremarkable. CXR Results  (Last 48 hours) 01/23/21 1106  XR CHEST PORT Final result Impression:     
   
Patchy infiltrations/consolidations in bilateral mid/lower lungs, greater on the  
left. Acute airspace disease such as colon with Covid pneumonia concerned. Thank you for your referral.  
  
 Narrative:  Chest AP single view HISTORY: Hypoxia COMPARISON: 1/15/21. FINDINGS: The cardiac silhouette remains mildly enlarged.   The lungs are  
hypoinflated with interval development of patchy infiltrations/consolidations in  
 bilateral mid and lower lungs, greater on the left. No pneumothorax. .  Mild  
vascular congestion. Unremarkable mediastinum. Emerson León Medical Decision Making I am the first provider for this patient. I reviewed the vital signs, available nursing notes, past medical history, past surgical history, family history and social history. Vital Signs-Reviewed the patient's vital signs. Records Reviewed: Nursing Notes, Old Medical Records and Previous Laboratory Studies Procedures: 
EKG Date/Time: 1/23/2021 11:24 AM 
Performed by: ELHAM Caicedo Authorized by: ELHAM Caicedo  
 
ECG reviewed by ED Physician in the absence of a cardiologist: yes Previous ECG:  
  Previous ECG:  Compared to current Interpretation: Interpretation: abnormal   
Rate:  
  ECG rate:  99 ECG rate assessment: normal   
Rhythm:  
  Rhythm: sinus rhythm Ectopy:  
  Ectopy: none QRS:  
  QRS axis:  Normal 
Conduction:  
  Conduction: normal   
ST segments: ST segments:  Normal 
T waves:  
  T waves: normal   
Other findings:  
  Other findings: prolonged qTc interval   
 
 
 
Provider Notes (Medical Decision Making): Placed on high flow oxygen with his oxygen sats only 87% on 5 L upon presentation. He has bilateral Covid pneumonia. No convincing PE on CTA study. Admitting to [facility] team.  Nephrology has been consulted and they will make arrangements for dialysis today. MED RECONCILIATION: 
Current Facility-Administered Medications Medication Dose Route Frequency  sodium chloride (NS) flush 5-10 mL  5-10 mL IntraVENous PRN  
 cefTRIAXone (ROCEPHIN) 2 g in sterile water (preservative free) 20 mL IV syringe  2 g IntraVENous Q24H  
 azithromycin (ZITHROMAX) 500 mg in 0.9% sodium chloride 250 mL (VIAL-MATE)  500 mg IntraVENous Q24H  
 sodium chloride (NS) flush 5-40 mL  5-40 mL IntraVENous Q8H  
 sodium chloride (NS) flush 5-40 mL  5-40 mL IntraVENous PRN  
  acetaminophen (TYLENOL) tablet 650 mg  650 mg Oral Q6H PRN Or  
 acetaminophen (TYLENOL) suppository 650 mg  650 mg Rectal Q6H PRN  polyethylene glycol (MIRALAX) packet 17 g  17 g Oral DAILY PRN  
 heparin (porcine) injection 5,000 Units  5,000 Units SubCUTAneous Q8H  
 dexamethasone (DECADRON) 4 mg/mL injection 6 mg  6 mg IntraVENous Q24H Current Outpatient Medications Medication Sig  pravastatin (PRAVACHOL) 20 mg tablet TAKE 1 TABLET BY MOUTH NIGHTLY  carvediloL (COREG) 3.125 mg tablet TAKE 1 TABLET BY MOUTH EVERY 12 HOURS  sucroferric oxyhydroxide (VELPHORO) 500 mg chew chewable tablet Take  by mouth three (3) times daily (with meals).  HYDROcodone-acetaminophen (NORCO) 5-325 mg per tablet Take 1-2 Tabs by mouth every six (6) hours as needed for Pain. Max Daily Amount: 8 Tabs.  sevelamer carbonate (RENVELA) 800 mg tab tab Take  by mouth three (3) times daily.  colchicine 0.6 mg tablet 2 tablets at first sign of gout flare and then 1 tablet 1 hour later  Indications: acute gouty arthritis  allopurinol (ZYLOPRIM) 100 mg tablet Take 2 Tabs by mouth daily.  aspirin 81 mg chewable tablet Take 1 Tab by mouth daily. Disposition: 
admit Follow-up Information None Current Discharge Medication List  
  
 
 
 
Core Measures: 
 
Critical Care Time:  
Critical Care Time:  
I have spent 35 minutes of critical care time involved in lab review, consultations with specialist, family decision-making, and documentation.   During this entire length of time I was immediately available to the patient. 
  
 Critical Care: The reason for providing this level of medical care for this critically ill patient was due a critical illness that impaired one or more vital organ systems such that there was a high probability of imminent or life threatening deterioration in the patients condition. This care involved high complexity decision making to assess, manipulate, and support vital system functions, to treat this degreee vital organ system failure and to prevent further life threatening deterioration of the patients condition. Diagnosis Clinical Impression: 1. Respiratory distress 2. Hypoxia 3. Pneumonia due to COVID-19 virus 4. ESRD on dialysis [facility])

## 2021-01-23 NOTE — PROGRESS NOTES
Requested by Dr. Rachele Hager to review if appropriate for remdesivir 63 y/o [facility] [employer] male with HTN, HLD, ESRD on HD tested positive for Covid 1/15 but presents today 1/23 with shortness of breath apparently missing his dialysis session today. Initial SpO2 90% on 5lpm NC so placed on HFC. CXR showed patchy infiltrations/consolidations in bilateral mid/lower lungs Covid pneumonia with acute hypoxic respiratory failure- appropriate to start remdesivir (only very recently started on HFNC). Gomez Elizondo MD, [facility]

## 2021-01-23 NOTE — PROGRESS NOTES
Initiated HFNV Vapotherm, RR 38, SOB 
 
 01/23/21 1120 Oxygen Therapy O2 Sat (%) 97 % Pulse via Oximetry 108 beats per minute O2 Device Hi flow nasal cannula 
(Vapotherm) O2 Flow Rate (L/min) 40 l/min O2 Temperature 91.4 °F (33 °C) FIO2 (%) 100 %

## 2021-01-23 NOTE — ED TRIAGE NOTES
Per transport patient was suppose to be transported to dialysis (Tue, [place], Sat) but was slumped over in the charge with a O2 saturation of 70%. Patient is suppose to have oxygen at home but does not hae a tank. Patient is on 5L of O2. AXOx4.

## 2021-01-23 NOTE — Clinical Note
Status[de-identified] INPATIENT [101]   Type of Bed: Medical [8]   Inpatient Hospitalization Certified Necessary for the Following Reasons: 3.  Patient receiving treatment that can only be provided in an inpatient setting (further clarification in H&P documentation)   Admitting Diagnosis: Acute respiratory failure due to COVID-19 [facility]) [[id number]]   Admitting Physician: Keren Augustin   Attending Physician: Teri Lynn [[id number]]   Estimated Length of Stay: 2 Midnights   Discharge Plan[de-identified] Home with Office Follow-up

## 2021-01-24 NOTE — ED NOTES
Introduced self to patient Pt sitting up in bed Alert and oriented x 4 Denied pain Pt handling secretions No trouble swallowing Able to handle secretions Verified pt using 2 identifiers Explained use and side effects Medicated as ordered Gave water. Pt on high flow O2 handling well Pt positioned in bed for comfort Updated about plan of care Will continue to monitor and assess

## 2021-01-24 NOTE — CONSULTS
[facility] - Consult Note Date of  Admission: 1/23/2021 10:01 AM 
  
Primary Care Physician:  Julianna Castillo MD 
 
 Plan: 1. Acute Hypoxic respiratory failure -related to Covid pneumonia currently on high flow oxygen - continue supportive care - PCCM following. 2. COVID - 19 pneumonia with consolidations trevon lower lobes - antibiotics per ID recs. 3. Detectable troponin - 0.51, 0.42, 0.33- EKG SR, non-specific ST changes. Elevation likely due to demand in setting of respiratory failure. Continue Aspirin 325 mg, Echo today. Resume Coreg if able. 4. Hypertension - b/p stable. 5. History of dilated cardiomyopathy - (EF 15% 11/2016) - had improved to EF of 55% 9/2019. Repeat echo today,  
6. Chronic diastolic CHF - NR pro BNP 7,247 - monitor for volume overload 7. Hyperlipidemia - continue statin 8. ESRD on dialysis - nephrology following. 9. Dilated ascending aorta measuring 4.5 cm. - monitor for progression of disease with repeat echo. Assessment:  
 
Hospital Problems  Date Reviewed: 1/3/2019 Codes Class Noted POA * (Principal) Acute respiratory failure due to COVID-19 [facility]) ICD-10-CM: U07.1, J96.00 
ICD-9-CM: 518.81, 079.89  1/23/2021 Unknown History of Present Illness: This patient has been seen and evaluated at the request of Dr. Ese King for history of dilated cardiomyopathy. Mr. Adamaris Sal is a 62 y.o. male with PMH of osteoarthritis, CKD on dialysis MWF, gout with no recent flares, HTN,and hyperlipidemia  who presents with shortness of breath and cough for 1 week. Mr. Adamaris Sal noted a fever 1 week ago while at dialysis and has had SOB worsening over the past week. He reports dyspnea on exertion, fevers, wheezing and fatigue. He denies chest pain, palpitations, edema, nausea / vomiting or diarrhea.   
  
 Past Medical History:  
 
Past Medical History:  
Diagnosis Date  Arthritis of left knee 09/2017  
 moderate to severe, with effusion on xray  Chronic kidney disease ESRD  HD on MWF  
 Diastolic CHF ([facility] [address]  Dilated cardiomyopathy ([facility] [address]  History of alcohol abuse  History of echocardiogram 02/24/2014 Mod-marked LVE. EF 15%. Severe, diffuse hypk. Mild LVH. Gr 1 DDfx. Mod LAE. Mild-mod FIDELIA. Mild AoRE. No significant change from echo of 10/23/09.  History of gout  History of myocardial perfusion scan 05/25/2006 No evidence of ischemia or scarring. Gross LVE. EF 28%. Severe global hypk. Neg EKG on submaximal EST. Ex time 8 min 25 sec.  HTN (hypertension)  Hypercholesterolemia  Hypertensive cardiovascular disease Social History:  
 
Social History Socioeconomic History  Marital status: SINGLE Spouse name: Not on file  Number of children: Not on file  Years of education: Not on file  Highest education level: Not on file Tobacco Use  Smoking status: Never Smoker  Smokeless tobacco: Never Used Substance and Sexual Activity  Alcohol use: No  
  Alcohol/week: 0.0 standard drinks Comment: stop 3 years ago in feb, 2015  Drug use: Not Currently Types: Marijuana Comment: 1980's marijuana  Sexual activity: Never Family History:  
 
Family History Problem Relation Age of Onset  Cancer Father Lung  Heart Failure Mother  Cancer Sister Medications:  
No Known Allergies Current Facility-Administered Medications Medication Dose Route Frequency  sodium chloride (NS) flush 5-10 mL  5-10 mL IntraVENous PRN  
 cefTRIAXone (ROCEPHIN) 2 g in sterile water (preservative free) 20 mL IV syringe  2 g IntraVENous Q24H  
 azithromycin (ZITHROMAX) 500 mg in 0.9% sodium chloride 250 mL (VIAL-MATE)  500 mg IntraVENous Q24H  
 sodium chloride (NS) flush 5-40 mL  5-40 mL IntraVENous Q8H  
 sodium chloride (NS) flush 5-40 mL  5-40 mL IntraVENous PRN  
 acetaminophen (TYLENOL) tablet 650 mg  650 mg Oral Q6H PRN  Or  
  acetaminophen (TYLENOL) suppository 650 mg  650 mg Rectal Q6H PRN  polyethylene glycol (MIRALAX) packet 17 g  17 g Oral DAILY PRN  
 heparin (porcine) injection 5,000 Units  5,000 Units SubCUTAneous Q8H  
 ascorbic acid (vitamin C) (VITAMIN C) tablet 1,000 mg  1,000 mg Oral Q6H  
 zinc sulfate (ZINCATE) 220 (50) mg capsule 2 Cap  2 Cap Oral DAILY  aspirin tablet 325 mg  325 mg Oral DAILY  famotidine (PEPCID) tablet 20 mg  20 mg Oral DAILY  [Held by provider] carvediloL (COREG) tablet 3.125 mg  3.125 mg Oral BID WITH MEALS  pravastatin (PRAVACHOL) tablet 20 mg  20 mg Oral DAILY  sucroferric oxyhydroxide (VELPHORO) chewable tablet 1,000 mg  1,000 mg Oral TID WITH MEALS  melatonin tablet 5 mg  5 mg Oral QHS  dexamethasone (DECADRON) 4 mg/mL injection 6 mg  6 mg IntraVENous Q24H  
 remdesivir 100 mg in 0.9% sodium chloride 250 mL IVPB  100 mg IntraVENous Q24H  
 midodrine (PROAMATINE) tablet 10 mg  10 mg Oral DIALYSIS PRN Review Of Systems:  
 
 
Constitutional: + fever, no chills, no weight loss, no night sweats HEENT: No epistaxis, no nasal drainage, no difficulty in swallowing, no redness in eyes Respiratory: + shortness of breath + cough,  + wheezing. Negative for  sputum, hemoptysis, pleurisy/chest pain,  
Cardiovascular: + chest pain (with deep inspiration), no chest pressure, no dyspnea, no pnd, no claudication no palpitations, no chronic leg edema, no syncope Gastrointestinal: no abd pain, no vomiting, no diarrhea, no bleeding symptoms Genitourinary: No urinary symptoms or hematuria Integument/breast: No ulcers or rashes Musculoskeletal: no muscle pain, no weakness Neurological: No focal weakness, no seizures, no headaches Behvioral/Psych: No anxiety, no depression Physical Exam:  
 
Visit Vitals /81 Pulse 81 Temp 97 °F (36.1 °C) Resp 24 Ht 5' 9\" (1.753 m) Wt 111.1 kg (244 lb 14.4 oz) SpO2 99% BMI 36.17 kg/m² BP Readings from Last 3 Encounters:  
01/24/21 116/81  
01/15/21 138/80  
12/17/19 99/63 Pulse Readings from Last 3 Encounters:  
01/24/21 81  
01/15/21 (!) 110  
12/17/19 76 Wt Readings from Last 3 Encounters:  
01/24/21 111.1 kg (244 lb 14.4 oz) 01/15/21 108.9 kg (240 lb) 12/17/19 114.9 kg (253 lb 3.2 oz) Limited physical exam due to COVID + - in effort to limit exposure and conserve PPE. Discussed with patient on phone and nursing. 
 
 
  
General: alert, well developed, pleasant and in no apparent distress. dyspneic   
Cardiac: Regular on monitor Lungs:  on supplemetal O2 by NC, audible wheezing. Abdomen: Soft, nontender, no masses Extremities:  No edema per patient Data Review:  
 
Recent Results (from the past 48 hour(s)) CBC WITH AUTOMATED DIFF Collection Time: 01/23/21 10:46 AM  
Result Value Ref Range WBC 8.1 4.6 - 13.2 K/uL  
 RBC 4.46 (L) 4.70 - 5.50 M/uL  
 HGB 13.0 13.0 - 16.0 g/dL HCT 39.5 36.0 - 48.0 % MCV 88.6 74.0 - 97.0 FL  
 MCH 29.1 24.0 - 34.0 PG  
 MCHC 32.9 31.0 - 37.0 g/dL  
 RDW 15.4 (H) 11.6 - 14.5 % PLATELET 211 362 - 447 K/uL MPV 10.2 9.2 - 11.8 FL  
 NEUTROPHILS 76 (H) 40 - 73 % LYMPHOCYTES 14 (L) 21 - 52 % MONOCYTES 10 3 - 10 % EOSINOPHILS 0 0 - 5 % BASOPHILS 0 0 - 2 %  
 ABS. NEUTROPHILS 6.2 1.8 - 8.0 K/UL  
 ABS. LYMPHOCYTES 1.1 0.9 - 3.6 K/UL  
 ABS. MONOCYTES 0.8 0.05 - 1.2 K/UL  
 ABS. EOSINOPHILS 0.0 0.0 - 0.4 K/UL  
 ABS. BASOPHILS 0.0 0.0 - 0.1 K/UL  
 DF AUTOMATED CARDIAC PANEL,(CK, CKMB & TROPONIN) Collection Time: 01/23/21 10:46 AM  
Result Value Ref Range CK - MB 1.2 <3.6 ng/ml CK-MB Index 0.1 0.0 - 4.0 % CK 1,277 (H) 39 - 308 U/L Troponin-I, QT 0.51 (H) 0.0 - 2.435 NG/ML  
METABOLIC PANEL, COMPREHENSIVE Collection Time: 01/23/21 10:46 AM  
Result Value Ref Range Sodium 136 136 - 145 mmol/L Potassium 4.9 3.5 - 5.5 mmol/L  Chloride 97 (L) 100 - 111 mmol/L  
 CO2 26 21 - 32 mmol/L  
 Anion gap 13 3.0 - 18 mmol/L Glucose 119 (H) 74 - 99 mg/dL BUN 55 (H) 7.0 - 18 MG/DL Creatinine 17.60 (H) 0.6 - 1.3 MG/DL  
 BUN/Creatinine ratio 3 (L) 12 - 20 GFR est AA 3 (L) >60 ml/min/1.73m2 GFR est non-AA 3 (L) >60 ml/min/1.73m2 Calcium 9.4 8.5 - 10.1 MG/DL Bilirubin, total 0.6 0.2 - 1.0 MG/DL  
 ALT (SGPT) 64 (H) 16 - 61 U/L  
 AST (SGOT) 106 (H) 10 - 38 U/L Alk. phosphatase 45 45 - 117 U/L Protein, total 8.7 (H) 6.4 - 8.2 g/dL Albumin 2.7 (L) 3.4 - 5.0 g/dL Globulin 6.0 (H) 2.0 - 4.0 g/dL A-G Ratio 0.5 (L) 0.8 - 1.7 POC G3 Collection Time: 01/23/21 11:13 AM  
Result Value Ref Range Device: High Flow Nasal Cannula Flow rate (POC) 40 L/M  
 FIO2 (POC) 100 % pH (POC) 7.46 (H) 7.35 - 7.45    
 pCO2 (POC) 35.9 35.0 - 45.0 MMHG  
 pO2 (POC) 86 80 - 100 MMHG  
 HCO3 (POC) 25.4 22 - 26 MMOL/L  
 sO2 (POC) 97 92 - 97 % Base excess (POC) 2 mmol/L Allens test (POC) YES Total resp. rate 38 Site RIGHT RADIAL Patient temp. 37.2 Specimen type (POC) ARTERIAL Performed by Melquiades Pelaez CULTURE, BLOOD Collection Time: 01/23/21 11:15 AM  
 Specimen: Blood Result Value Ref Range Special Requests: NO SPECIAL REQUESTS Culture result: NO GROWTH AFTER 17 HOURS    
C REACTIVE PROTEIN, QT Collection Time: 01/23/21 11:15 AM  
Result Value Ref Range C-Reactive protein 30.8 (H) 0 - 0.3 mg/dL MAGNESIUM Collection Time: 01/23/21 11:15 AM  
Result Value Ref Range Magnesium 2.6 1.6 - 2.6 mg/dL PHOSPHORUS Collection Time: 01/23/21 11:15 AM  
Result Value Ref Range Phosphorus 4.6 2.5 - 4.9 MG/DL  
NT-PRO BNP Collection Time: 01/23/21 11:15 AM  
Result Value Ref Range NT pro-BNP 7,247 (H) 0 - 900 PG/ML  
HEP B SURFACE AG Collection Time: 01/23/21 11:15 AM  
Result Value Ref Range Hepatitis B surface Ag <0.10 <1.00 Index Hep B surface Ag Interp. Negative NEG    
CULTURE, BLOOD Collection Time: 01/23/21 11:16 AM  
 Specimen: Blood Result Value Ref Range Special Requests: NO SPECIAL REQUESTS Culture result: NO GROWTH AFTER 17 HOURS    
EKG, 12 LEAD, INITIAL Collection Time: 01/23/21 11:20 AM  
Result Value Ref Range Ventricular Rate 99 BPM  
 Atrial Rate 99 BPM  
 P-R Interval 162 ms QRS Duration 90 ms Q-T Interval 396 ms QTC Calculation (Bezet) 508 ms Calculated P Axis 21 degrees Calculated R Axis 38 degrees Diagnosis Normal sinus rhythm Cannot rule out Anterior infarct (cited on or before 15-JANICE-2021) Prolonged QT Abnormal ECG When compared with ECG of 15-JANICE-2021 07:49, Left anterior fascicular block is no longer present T wave inversion now evident in Inferior leads T wave inversion no longer evident in Lateral leads EKG, 12 LEAD, SUBSEQUENT Collection Time: 01/23/21  3:51 PM  
Result Value Ref Range Ventricular Rate 88 BPM  
 Atrial Rate 88 BPM  
 P-R Interval 170 ms QRS Duration 98 ms Q-T Interval 422 ms QTC Calculation (Bezet) 510 ms Calculated P Axis 15 degrees Calculated R Axis 108 degrees Diagnosis Normal sinus rhythm Rightward axis Cannot rule out Anterior infarct (cited on or before 15-JANICE-2021) Prolonged QT Abnormal ECG When compared with ECG of 23-JAN-2021 11:20, 
QRS axis shifted right METABOLIC PANEL, COMPREHENSIVE Collection Time: 01/23/21  6:41 PM  
Result Value Ref Range Sodium 133 (L) 136 - 145 mmol/L Potassium 4.9 3.5 - 5.5 mmol/L Chloride 95 (L) 100 - 111 mmol/L  
 CO2 24 21 - 32 mmol/L Anion gap 14 3.0 - 18 mmol/L Glucose 202 (H) 74 - 99 mg/dL BUN 62 (H) 7.0 - 18 MG/DL Creatinine 17.50 (H) 0.6 - 1.3 MG/DL  
 BUN/Creatinine ratio 4 (L) 12 - 20 GFR est AA 3 (L) >60 ml/min/1.73m2 GFR est non-AA 3 (L) >60 ml/min/1.73m2 Calcium 8.6 8.5 - 10.1 MG/DL  Bilirubin, total 0.4 0.2 - 1.0 MG/DL  
 ALT (SGPT) 58 16 - 61 U/L  
 AST (SGOT) 80 (H) 10 - 38 U/L Alk. phosphatase 41 (L) 45 - 117 U/L Protein, total 7.9 6.4 - 8.2 g/dL Albumin 2.4 (L) 3.4 - 5.0 g/dL Globulin 5.5 (H) 2.0 - 4.0 g/dL A-G Ratio 0.4 (L) 0.8 - 1.7 D DIMER Collection Time: 01/23/21  6:41 PM  
Result Value Ref Range D DIMER 2.94 (H) <0.46 ug/ml(FEU) TROPONIN I Collection Time: 01/23/21  6:41 PM  
Result Value Ref Range Troponin-I, QT 0.42 (H) 0.0 - 0.045 NG/ML  
EKG, 12 LEAD, SUBSEQUENT Collection Time: 01/24/21  8:18 AM  
Result Value Ref Range Ventricular Rate 78 BPM  
 Atrial Rate 78 BPM  
 P-R Interval 186 ms QRS Duration 126 ms  
 Q-T Interval 448 ms QTC Calculation (Bezet) 510 ms Calculated P Axis 26 degrees Calculated R Axis -25 degrees Calculated T Axis 51 degrees Diagnosis Normal sinus rhythm Nonspecific intraventricular block Cannot rule out Anterior infarct (cited on or before 15-JANICE-2021) Abnormal ECG When compared with ECG of 23-JAN-2021 15:51, 
QRS axis shifted left T wave inversion no longer evident in Inferior leads Nonspecific T wave abnormality now evident in Lateral leads Intake/Output Summary (Last 24 hours) at 1/24/2021 0830 Last data filed at 1/24/2021 0030 Gross per 24 hour Intake 0 ml Output  Net 0 ml Cardiographics:  
 
ECG:  
Normal sinus rhythm Nonspecific intraventricular block Cannot rule out Anterior infarct (cited on or before 15-JANICE-2021) Abnormal ECG When compared with ECG of 23-JAN-2021 15:51,  
QRS axis shifted left T wave inversion no longer evident in Inferior leads Nonspecific T wave abnormality now evident in Lateral leads Echocardiogram: 9/2019 NORMAL LEFT VENTRICULAR CAVITY SIZE WITH  MODERATE LEFT VENTRICULAR HYPERTROPHY PRESENT. NORMAL LEFT VENTRICULAR SYSTOLIC FUNCTION WITH A VISUALLY ESTIMATED EJECTION FRACTION OF 
 55%. STAGE I DIASTOLIC DYSFUNCTION. NORMAL RIGHT VENTRICULAR SIZE AND SYSTOLIC FUNCTION. TRACE TO MILD ECCENTRIC AORTIC REGURGITATION. POSSIBLE BICUSPID VALVE TRACE TO MILD PULMONIC REGURGITATION. MITRAL CHORDAL REDUNDANCY WITH SLIGHT SYSTOLIC ANTERIOR MOTION NOTED. DILATED RIGHT VENTRICULAR OUTFLOW TRACT AT 3.9 CM. NORMAL PULMONARY ARTERY PRESSURE OF 22 MMHG. NORMAL AORTIC ROOT DIAMETER WITH A DILATED ASCENDING AORTA MEASURING 4.5 CM. TRIVIAL PERICARDIAL EFFUSION. COMPARED TO PREVIOUS ECHO PERFORMED ON 08/24/2018; 
 EF OF 51-55% NOW ASSESSED AT 50%. ASCENDING AORTIC MEASUREMENT OF 4.1 CM NOW MEASURING 4.5 CM. OTHER FINDINGS AS NOTED BELOW. Signed By: Juventino Courtney NP   
 January 24, 2021 I have independently evaluated  the patient. All relevant labs and testing data's are reviewed. Care plan discussed and updated after review.  
Zoila Kaye MD

## 2021-01-24 NOTE — ED NOTES
Assumed care of patient from [facility] Introduced self to pt Pt alert and oriented x 4 Connected to monitor SR Pt getting dialysis Positioned in bed for comfort Will continue to monitor and assess

## 2021-01-24 NOTE — CONSULTS
[facility] Pulmonary, Critical Care, and Sleep Medicine Initial Patient Consult Name: Starla Carpenter MRN: [id number] : 1960 Hospital: [address] [place] [address] Dr Date: 2021 IMPRESSION:  
· Acute hypoxic Respiratory failure-requiring high flow 100% FiO2 at 40 L flow to maintain saturation. Underlying progressive COVID-19 pneumonia with predominant bilateral lower lobe consolidative pneumonia. Appears compensating-' happy hypoxic\" · COVID-19 pneumonia with significant consolidations trevon lower lobes · ESRD on dialysis · History of dilated cardiomyopathy · Hypertension RECOMMENDATIONS:  
· Oxygen-titrate to SaO2 goal more than 92%. Currently appearing comfortable on high flow. Will wean as tolerated · BIPAP/CPAP-can consider adding as next step if difficulty oxygenating · Bronchial hygiene protocol-encourage use of incentive spirometer, coughing · Bronchodilators-Combivent Respimat 4 times daily scheduled · Steroids-agree with Decadron · Antibiotics-per ID · COVID-19 pneumonia treatment per ID recommendations-has been started on remdesivir · Strict aspiration precautions dialysis per nephrology · Anticoagulation-prophylactic dose to continue · Will need imaging follow-up to complete clearing. Concerns for developing sequelae long-term given the extent of pneumonia · Out patient testing- PFT, 6 min walk, Polysomnogram as outpatient · Assess home Oxygen needs at discharge · OT, PT, OOB and ambulate · Healthy weight · Will Follow · DVT, PUD prophylaxis Subjective: This patient has been seen and evaluated at the request of Dr. Isiah Severin for Acute hypoxic Respiratory failure, Covid pneumonia. Starla Carpenter is a 62 y.o. male with PMH arthritis, CKD on HD MWF, gout HTN, HLD, now presenting with complaint of SOB. Pt is known positive COVID for over 1wk. Went to dialysis center and was noted to have a fever 100.6 here. He had a headache, eye pain and chills . No known Covid exposure. Nominal pain diarrhea no chest pain or shortness of breath Was seen in ER 1wk ago with above complaints and d/c home with CDC recommendations. Over the past few days he has felt progressively worse and was found tripoding today when he was picked up for dialysis. He currently states he feels fine and has no complaints. He denies sick contacts, CP, [place]. He missed dialysis on day of admission Initial SpO2 90% on 5lpm NC so placed on HFC. CXR showed patchy infiltrations/consolidations in bilateral mid/lower lungs I have reviewed all of the available data including the patient's previous history external records and radiological imaging available for review. In addition applicable cardiology and other lab data were also reviewed. Past Medical History:  
Diagnosis Date  Arthritis of left knee 09/2017  
 moderate to severe, with effusion on xray  Chronic kidney disease ESRD  HD on MWF  
 Diastolic CHF ([address]  Dilated cardiomyopathy ([address]  History of alcohol abuse  History of echocardiogram 02/24/2014 Mod-marked LVE. EF 15%. Severe, diffuse hypk. Mild LVH. Gr 1 DDfx. Mod LAE. Mild-mod FIDELIA. Mild AoRE. No significant change from echo of 10/23/09.  History of gout  History of myocardial perfusion scan 05/25/2006 No evidence of ischemia or scarring. Gross LVE. EF 28%. Severe global hypk. Neg EKG on submaximal EST. Ex time 8 min 25 sec.  HTN (hypertension)  Hypercholesterolemia  Hypertensive cardiovascular disease Past Surgical History:  
Procedure Laterality Date  HX COLONOSCOPY    
 HX HERNIA REPAIR  04/2016  HX VASCULAR ACCESS Right upper chest HD CATH Prior to Admission medications Medication Sig Start Date End Date Taking? Authorizing Provider pravastatin (PRAVACHOL) 20 mg tablet TAKE 1 TABLET BY MOUTH NIGHTLY 12/29/20  Yes Roselia Olvera NP  
carvediloL (COREG) 3.125 mg tablet TAKE 1 TABLET BY MOUTH EVERY 12 HOURS 9/29/20  Yes Denilson Kathleen NP  
sucroferric oxyhydroxide (VELPHORO) 500 mg chew chewable tablet Take  by mouth three (3) times daily (with meals). Yes Provider, Historical  
aspirin 81 mg chewable tablet Take 1 Tab by mouth daily. 2/21/17  Yes Melisa ORNELAS NP  
HYDROcodone-acetaminophen (NORCO) 5-325 mg per tablet Take 1-2 Tabs by mouth every six (6) hours as needed for Pain. Max Daily Amount: 8 Tabs. 11/15/18   Julieta Zaragoza MD  
sevelamer carbonate (RENVELA) 800 mg tab tab Take  by mouth three (3) times daily. Provider, Historical  
colchicine 0.6 mg tablet 2 tablets at first sign of gout flare and then 1 tablet 1 hour later  Indications: acute gouty arthritis 6/5/18   Davion See MD  
allopurinol (ZYLOPRIM) 100 mg tablet Take 2 Tabs by mouth daily. 5/11/18   STEFFANIE Gray No Known Allergies Social History Tobacco Use  Smoking status: Never Smoker  Smokeless tobacco: Never Used Substance Use Topics  Alcohol use: No  
  Alcohol/week: 0.0 standard drinks Comment: stop 3 years ago in feb, 2015 Family History Problem Relation Age of Onset  Cancer Father Lung  Heart Failure Mother  Cancer Sister Current Facility-Administered Medications Medication Dose Route Frequency  cefTRIAXone (ROCEPHIN) 2 g in sterile water (preservative free) 20 mL IV syringe  2 g IntraVENous Q24H  
 azithromycin (ZITHROMAX) 500 mg in 0.9% sodium chloride 250 mL (VIAL-MATE)  500 mg IntraVENous Q24H  
 sodium chloride (NS) flush 5-40 mL  5-40 mL IntraVENous Q8H  
 heparin (porcine) injection 5,000 Units  5,000 Units SubCUTAneous Q8H  
 ascorbic acid (vitamin C) (VITAMIN C) tablet 1,000 mg  1,000 mg Oral Q6H  
 zinc sulfate (ZINCATE) 220 (50) mg capsule 2 Cap  2 Cap Oral DAILY  aspirin tablet 325 mg  325 mg Oral DAILY  famotidine (PEPCID) tablet 20 mg  20 mg Oral DAILY  [Held by provider] carvediloL (COREG) tablet 3.125 mg  3.125 mg Oral BID WITH MEALS  pravastatin (PRAVACHOL) tablet 20 mg  20 mg Oral DAILY  sucroferric oxyhydroxide (VELPHORO) chewable tablet 1,000 mg  1,000 mg Oral TID WITH MEALS  melatonin tablet 5 mg  5 mg Oral QHS  dexamethasone (DECADRON) 4 mg/mL injection 6 mg  6 mg IntraVENous Q24H  
 remdesivir 100 mg in 0.9% sodium chloride 250 mL IVPB  100 mg IntraVENous Q24H Review of Systems: A comprehensive review of systems was negative except for that written in the HPI. Objective:  
Vital Signs:   
Visit Vitals /81 Pulse 81 Temp 97 °F (36.1 °C) Resp 24 Ht 5' 9\" (1.753 m) Wt 111.1 kg (244 lb 14.4 oz) SpO2 99% Comment: NRB  
BMI 36.17 kg/m² O2 Device: Non-rebreather mask(NRB mask at this time) O2 Flow Rate (L/min): 15 l/min Temp (24hrs), Av °F (36.7 °C), Min:97 °F (36.1 °C), Max:98.9 °F (37.2 °C) Intake/Output:  
Last shift:      No intake/output data recorded. Last 3 shifts: No intake/output data recorded. Intake/Output Summary (Last 24 hours) at 2021 7733 Last data filed at 2021 0030 Gross per 24 hour Intake 0 ml Output  Net 0 ml Physical Exam:  
General:  Alert, cooperative, no distress, appears stated age. Head:  Normocephalic, without obvious abnormality, atraumatic. Eyes:  Conjunctivae/corneas clear. PERRL, EOMs intact. Nose: Nares normal. Septum midline. Mucosa normal. No drainage or sinus tenderness. Throat: Lips, mucosa, and tongue normal. Teeth and gums normal.  
Neck: Supple, symmetrical, trachea midline, no adenopathy, thyroid: no enlargment/tenderness/nodules, no carotid bruit and no JVD. Back:   Symmetric, no curvature. ROM normal.  
Lungs:   Bibasilar decreased with rales Chest wall:  No tenderness or deformity. Heart:  Regular rate and rhythm, S1, S2 normal, no murmur, click, rub or gallop. Abdomen:   Soft, non-tender. Bowel with crepitations normal. No masses,  No organomegaly. Extremities: Extremities normal, atraumatic, no cyanosis or edema. Left upper extremity AV shunt Pulses: 2+ and symmetric all extremities. Skin: Skin color, texture, turgor normal. No rashes or lesions Lymph nodes: Cervical, supraclavicular, and axillary nodes normal.  
Neurologic: Grossly nonfocal  
 
Data review:  
 
Recent Results (from the past 24 hour(s)) CBC WITH AUTOMATED DIFF Collection Time: 01/23/21 10:46 AM  
Result Value Ref Range WBC 8.1 4.6 - 13.2 K/uL  
 RBC 4.46 (L) 4.70 - 5.50 M/uL  
 HGB 13.0 13.0 - 16.0 g/dL HCT 39.5 36.0 - 48.0 % MCV 88.6 74.0 - 97.0 FL  
 MCH 29.1 24.0 - 34.0 PG  
 MCHC 32.9 31.0 - 37.0 g/dL  
 RDW 15.4 (H) 11.6 - 14.5 % PLATELET 448 980 - 686 K/uL MPV 10.2 9.2 - 11.8 FL  
 NEUTROPHILS 76 (H) 40 - 73 % LYMPHOCYTES 14 (L) 21 - 52 % MONOCYTES 10 3 - 10 % EOSINOPHILS 0 0 - 5 % BASOPHILS 0 0 - 2 %  
 ABS. NEUTROPHILS 6.2 1.8 - 8.0 K/UL  
 ABS. LYMPHOCYTES 1.1 0.9 - 3.6 K/UL  
 ABS. MONOCYTES 0.8 0.05 - 1.2 K/UL  
 ABS. EOSINOPHILS 0.0 0.0 - 0.4 K/UL  
 ABS. BASOPHILS 0.0 0.0 - 0.1 K/UL  
 DF AUTOMATED CARDIAC PANEL,(CK, CKMB & TROPONIN) Collection Time: 01/23/21 10:46 AM  
Result Value Ref Range CK - MB 1.2 <3.6 ng/ml CK-MB Index 0.1 0.0 - 4.0 % CK 1,277 (H) 39 - 308 U/L Troponin-I, QT 0.51 (H) 0.0 - 1.609 NG/ML  
METABOLIC PANEL, COMPREHENSIVE Collection Time: 01/23/21 10:46 AM  
Result Value Ref Range Sodium 136 136 - 145 mmol/L Potassium 4.9 3.5 - 5.5 mmol/L Chloride 97 (L) 100 - 111 mmol/L  
 CO2 26 21 - 32 mmol/L Anion gap 13 3.0 - 18 mmol/L Glucose 119 (H) 74 - 99 mg/dL BUN 55 (H) 7.0 - 18 MG/DL Creatinine 17.60 (H) 0.6 - 1.3 MG/DL  
 BUN/Creatinine ratio 3 (L) 12 - 20 GFR est AA 3 (L) >60 ml/min/1.73m2 GFR est non-AA 3 (L) >60 ml/min/1.73m2 Calcium 9.4 8.5 - 10.1 MG/DL Bilirubin, total 0.6 0.2 - 1.0 MG/DL  
 ALT (SGPT) 64 (H) 16 - 61 U/L  
 AST (SGOT) 106 (H) 10 - 38 U/L Alk. phosphatase 45 45 - 117 U/L Protein, total 8.7 (H) 6.4 - 8.2 g/dL Albumin 2.7 (L) 3.4 - 5.0 g/dL Globulin 6.0 (H) 2.0 - 4.0 g/dL A-G Ratio 0.5 (L) 0.8 - 1.7 POC G3 Collection Time: 01/23/21 11:13 AM  
Result Value Ref Range Device: High Flow Nasal Cannula Flow rate (POC) 40 L/M  
 FIO2 (POC) 100 % pH (POC) 7.46 (H) 7.35 - 7.45    
 pCO2 (POC) 35.9 35.0 - 45.0 MMHG  
 pO2 (POC) 86 80 - 100 MMHG  
 HCO3 (POC) 25.4 22 - 26 MMOL/L  
 sO2 (POC) 97 92 - 97 % Base excess (POC) 2 mmol/L Allens test (POC) YES Total resp. rate 38 Site RIGHT RADIAL Patient temp. 37.2 Specimen type (POC) ARTERIAL Performed by Hanane Perdomo C REACTIVE PROTEIN, QT Collection Time: 01/23/21 11:15 AM  
Result Value Ref Range C-Reactive protein 30.8 (H) 0 - 0.3 mg/dL MAGNESIUM Collection Time: 01/23/21 11:15 AM  
Result Value Ref Range Magnesium 2.6 1.6 - 2.6 mg/dL PHOSPHORUS Collection Time: 01/23/21 11:15 AM  
Result Value Ref Range Phosphorus 4.6 2.5 - 4.9 MG/DL  
NT-PRO BNP Collection Time: 01/23/21 11:15 AM  
Result Value Ref Range NT pro-BNP 7,247 (H) 0 - 900 PG/ML  
HEP B SURFACE AG Collection Time: 01/23/21 11:15 AM  
Result Value Ref Range Hepatitis B surface Ag <0.10 <1.00 Index Hep B surface Ag Interp. Negative NEG METABOLIC PANEL, COMPREHENSIVE Collection Time: 01/23/21  6:41 PM  
Result Value Ref Range Sodium 133 (L) 136 - 145 mmol/L Potassium 4.9 3.5 - 5.5 mmol/L Chloride 95 (L) 100 - 111 mmol/L  
 CO2 24 21 - 32 mmol/L Anion gap 14 3.0 - 18 mmol/L Glucose 202 (H) 74 - 99 mg/dL BUN 62 (H) 7.0 - 18 MG/DL Creatinine 17.50 (H) 0.6 - 1.3 MG/DL  
 BUN/Creatinine ratio 4 (L) 12 - 20 GFR est AA 3 (L) >60 ml/min/1.73m2 GFR est non-AA 3 (L) >60 ml/min/1.73m2 Calcium 8.6 8.5 - 10.1 MG/DL Bilirubin, total 0.4 0.2 - 1.0 MG/DL  
 ALT (SGPT) 58 16 - 61 U/L  
 AST (SGOT) 80 (H) 10 - 38 U/L Alk. phosphatase 41 (L) 45 - 117 U/L Protein, total 7.9 6.4 - 8.2 g/dL Albumin 2.4 (L) 3.4 - 5.0 g/dL Globulin 5.5 (H) 2.0 - 4.0 g/dL A-G Ratio 0.4 (L) 0.8 - 1.7 D DIMER Collection Time: 01/23/21  6:41 PM  
Result Value Ref Range D DIMER 2.94 (H) <0.46 ug/ml(FEU) TROPONIN I Collection Time: 01/23/21  6:41 PM  
Result Value Ref Range Troponin-I, QT 0.42 (H) 0.0 - 0.045 NG/ML Imaging: 
I have personally reviewed the patients radiographs and have reviewed the reports: XR Results (most recent): 
Results from [facility] Encounter encounter on 01/23/21 XR CHEST PORT Narrative Chest AP single view HISTORY: Hypoxia COMPARISON: 1/15/21. FINDINGS: The cardiac silhouette remains mildly enlarged. The lungs are 
hypoinflated with interval development of patchy infiltrations/consolidations in 
bilateral mid and lower lungs, greater on the left. No pneumothorax. .  Mild 
vascular congestion. Unremarkable mediastinum. Cherl Spinner Impression Patchy infiltrations/consolidations in bilateral mid/lower lungs, greater on the 
left. Acute airspace disease such as colon with Covid pneumonia concerned. Thank you for your referral.  
 
 
CT Results (most recent): 
Results from [facility] Encounter encounter on 01/23/21 CTA CHEST W OR W WO CONT Narrative CT chest with contrast for PE HISTORY: Dyspnea. COMPARISON: None TECHNIQUE: Dynamic spiral scan through the chest is obtained from the thoracic 
inlet to the diaphragm after dynamic nonionic IV contrast administration  per PE 
protocol.  Coronal and sagittal MIP computer reconstructions are also obtained 
for better visualization of the integrity of pulmonary arteries in 3D dimension, 
 particularly for lobar/interlobar arterial branches and to minimize radiation 
dose. All CT scans at this facility performed using dose optimization techniques as 
appreciated to a performed exam, to include automated exposure control, 
adjustment of the mA and or KU according to patient size (including appropriate 
matching for site specific examination), or use of iterative reconstruction 
technique. FINDINGS: 
 
PULMONARY ARTERIES: The contrast bolus is adequate. Although, moderate motion 
artifact noted which significantly compromises the evaluation of the small 
arterial branches. The right and left mainstem pulmonary arteries and their 
lobar branches appear patent without convincing evidence of intraluminal filling 
defect identified to suggest pulmonary embolism. There are questionable 
nonocclusive intraluminal linear filling defects in the segmental/subsegmental 
branches at anterior and lateral right upper lobe and right middle lobe, 
uncertain due to artifact or potential tiny PE. 
  
AORTA AND OTHER CARDIOVASCULAR STRUCTURES: Mild ectasia of thoracic aorta. No 
aortic aneurysm or dissection. Mild burden of  coronary artery calcifications. Heart Strain assessment: 
-  RV/LV ratio (normal <0.9): Normal 
-  Dysfunction or bowing of interventricular septum: None -  Main pulmonary artery enlargement (>30mm): Not present -  There is not visualization of contrast reflux from the right heart into the 
IVC/hepatic veins. LUNG PARENCHYMA: There are multiple patchy areas of airspace consolidations 
identified throughout both lungs, more pronounced in bilateral lower lobes and 
inferior left upper lobe. Patent airway. IMAGED THYROID: Unremarkable. MEDIASTINUM: Borderline adenopathy in bilateral chu and subcarinal 
mediastinum. Bayron Rathke PLEURAL SPACES AND CHEST WALL: No significant pleural effusion. Mild pleural 
thickening. VISUALIZED UPPER ABDOMEN: Very atrophic right kidney is partially seen. OSSEOUS STRUCTURES: Unremarkable. Impression 1. No convincing CT evidence of pulmonary embolism in mainstem and lobar 
arteries. Questionable nonocclusive intraluminal filling defects identified in 
right upper and middle lobe segmental subsegmental images, artifact versus tiny 
nonocclusive PE. 
 
2.  Extensive patchy consolidation pneumonia in bilateral lungs, Covid infection 
is more concerned than pulmonary infarctions. Clinical correlation and 
short-term follow-up CT advised. Thank you for your referral. 
  
 
  
 
08/23/18 ECHO ADULT COMPLETE 08/25/2018 8/25/2018 Narrative · Left ventricular low normal systolic function. Estimated left  
ventricular ejection fraction is 51 - 55%. Biplane method used to measure  
ejection fraction. Left ventricular severe concentric hypertrophy. Left  
ventricular global hypokinesis. · Mild aortic valve regurgitation is present. · LV strain shows relative apical sparing with reduced global strain at  
discharge -9.1% · Aortic root and ascending aorta measures 4.1 cm. · Pulmonary arterial systolic pressure is 26 mmHg. There is no evidence of  
pulmonary hypertension. · Trivial pericardial effusion. · Left atrial cavity size is mildly dilated. · Mitral annular calcification. Trace mitral valve regurgitation. Signed by: Saul Hercules MD  
 
 
 
Complex decision making was made in the evaluation and management plans during this consultation. More than 50% of time was spent in counseling and coordination of care including review of data and discussion with other team members. Lauren Green MD 
Pulmonary & Critical Care

## 2021-01-24 NOTE — PROGRESS NOTES
RENAL DAILY PROGRESS NOTE Subjective:  
 
 
Complaint: sob but feels little better. Feels wheezy Overnight events noted 
no nausea, vomiting, chest pain, IMPRESSION:  
IMPRESSION:  
· ESRD on TTS at [address] unit · COVID Pneumonia · Hypoxic resp failure · Hypertension · Hx Diastolic CHF · Hyperlipidemia · Secondary hyperparathyroidism PLAN:  
· C/w  phos binders · Dialysis yesterday was cut short due to instability of BP inspite of albumin infusions. Not much UF done. Will try again tomorrow · No gadolinium · Daily labs for now · Watch resp status · Further care per primary and ID 
   
  
 
 
 
Current Facility-Administered Medications Medication Dose Route Frequency  insulin lispro (HUMALOG) injection   SubCUTAneous AC&HS  
 glucose chewable tablet 16 g  4 Tab Oral PRN  
 glucagon (GLUCAGEN) injection 1 mg  1 mg IntraMUSCular PRN  
 dextrose (D50W) injection syrg 12.5-25 g  25-50 mL IntraVENous PRN  
 ipratropium-albuterol (COMBIVENT RESPIMAT) 20 mcg-100 mcg inhalation spray  1 Puff Inhalation QID RT  
 sodium chloride (NS) flush 5-10 mL  5-10 mL IntraVENous PRN  
 cefTRIAXone (ROCEPHIN) 2 g in sterile water (preservative free) 20 mL IV syringe  2 g IntraVENous Q24H  
 azithromycin (ZITHROMAX) 500 mg in 0.9% sodium chloride 250 mL (VIAL-MATE)  500 mg IntraVENous Q24H  
 sodium chloride (NS) flush 5-40 mL  5-40 mL IntraVENous Q8H  
 sodium chloride (NS) flush 5-40 mL  5-40 mL IntraVENous PRN  
 acetaminophen (TYLENOL) tablet 650 mg  650 mg Oral Q6H PRN Or  
 acetaminophen (TYLENOL) suppository 650 mg  650 mg Rectal Q6H PRN  polyethylene glycol (MIRALAX) packet 17 g  17 g Oral DAILY PRN  
 heparin (porcine) injection 5,000 Units  5,000 Units SubCUTAneous Q8H  
 ascorbic acid (vitamin C) (VITAMIN C) tablet 1,000 mg  1,000 mg Oral Q6H  
 zinc sulfate (ZINCATE) 220 (50) mg capsule 2 Cap  2 Cap Oral DAILY  aspirin tablet 325 mg  325 mg Oral DAILY  famotidine (PEPCID) tablet 20 mg  20 mg Oral DAILY  [Held by provider] carvediloL (COREG) tablet 3.125 mg  3.125 mg Oral BID WITH MEALS  pravastatin (PRAVACHOL) tablet 20 mg  20 mg Oral DAILY  sucroferric oxyhydroxide (VELPHORO) chewable tablet 1,000 mg  1,000 mg Oral TID WITH MEALS  melatonin tablet 5 mg  5 mg Oral QHS  dexamethasone (DECADRON) 4 mg/mL injection 6 mg  6 mg IntraVENous Q24H  
 remdesivir 100 mg in 0.9% sodium chloride 250 mL IVPB  100 mg IntraVENous Q24H  
 midodrine (PROAMATINE) tablet 10 mg  10 mg Oral DIALYSIS PRN Review of Symptoms: comprehensive ROS negative except above. Objective:  
 
Patient Vitals for the past 24 hrs: 
 Temp Pulse Resp BP SpO2  
01/24/21 0828 96.9 °F (36.1 °C) 80 23 100/73 98 % 01/24/21 0700     96 % 01/24/21 0541 97 °F (36.1 °C) 81 24 116/81 99 % 01/23/21 2350  86 21 97/70 90 % 01/23/21 2230  76 24 102/70 100 % 01/23/21 2220  78 23 92/61 99 % 01/23/21 2214  77 27 (!) 91/56 98 % 01/23/21 2200  82 20 (!) 70/42 93 % 01/23/21 2151  81 18 (!) 77/44 95 % 01/23/21 1844  87 22 (!) 149/97 98 % 01/23/21 1554  89 20 111/63 97 % 01/23/21 1400  88 28 106/68 97 % 01/23/21 1137     97 % Weight change: No intake/output data recorded. Intake/Output Summary (Last 24 hours) at 1/24/2021 1128 Last data filed at 1/24/2021 9322 Gross per 24 hour Intake 350 ml Output 0 ml Net 350 ml Patient was seen during the [place] pandemic. Patient with COVID infection . Patient is in isolation room due to COVID status and PPE is in short supply hence seen through glass window and d/w patients RN. Full contact physical exam was not possible due to patient's clinical condition, key findings seen by me are documented. In addition, I have also used findings documented by physicians who have recently examined thee patient as they are relevant to the patient's renal care. Alert awake and oriented x3 On inspection mild distress,on exertion No lower extremity edema on inspection Data Review:  
 
LABS:  
Hematology:  
Recent Labs  
  01/24/21 
0842 01/23/21 
1046 WBC 5.9 8.1 HGB 10.8* 13.0 HCT 33.6* 39.5 Chemistry:  
Recent Labs  
  01/24/21 
0842 01/23/21 
1841 01/23/21 
1115 01/23/21 
1046 BUN 55* 62*  --  55* CREA 15.00* 17.50*  --  17.60* CA 9.0 8.6  --  9.4 ALB 3.0* 2.4*  --  2.7*  
K 4.9 4.9  --  4.9  133*  --  136  95*  --  97* CO2 24 24  --  26 PHOS  --   --  4.6  --   
* 202*  --  119* Procedures/imaging: see electronic medical records for all procedures, Xrays and details which were not copied into this note but were reviewed prior to creation of Plan [place] MD 
1/24/2021 
11:28 AM

## 2021-01-24 NOTE — PROGRESS NOTES
Pt moved from 205 to 214 so he can be hooked up to vapotherm. Respiratory paged x2. Pt currently O2 sat 95-97% on 15l/min NRB. Oncoming nurse aware.

## 2021-01-24 NOTE — ED NOTES
Rounded on pt Spoke to dialysis nurse Pt b/p low Pt has pulses Pt is awake and alert On dialysis Paged [facility]

## 2021-01-24 NOTE — ED NOTES
TRANSFER - OUT REPORT: 
 
Verbal report given to Jazmyne Salvatore  on Elana Lines  being transferred to  (unit) for routine progression of care Report consisted of patients Situation, Background, Assessment and  
Recommendations(SBAR). Information from the following report(s) SBAR, ED Summary, Intake/Output and MAR was reviewed with the receiving nurse. Lines:  
Peripheral IV 01/23/21 Right Antecubital (Active) Site Assessment Clean, dry, & intact 01/23/21 1103 Phlebitis Assessment 0 01/23/21 1103 Infiltration Assessment 0 01/23/21 1103 Dressing Status Clean, dry, & intact 01/23/21 1103 Dressing Type Transparent 01/23/21 1103 Peripheral IV 01/23/21 Posterior;Right Hand (Active) Site Assessment Clean, dry, & intact 01/23/21 1205 Dressing Status Clean, dry, & intact 01/23/21 1205 Opportunity for questions and clarification was provided. Patient transported with: 
Pt jesus

## 2021-01-24 NOTE — PROGRESS NOTES
Verbal shift change report given to Leila Dakins (oncoming nurse) by Francisco Javier edwards (offgoing nurse). Report included the following information SBAR, Kardex, ED Summary, Procedure Summary, Intake/Output, MAR, Recent Results and Cardiac Rhythm NSR.

## 2021-01-24 NOTE — PROGRESS NOTES
[address] [facility] Progress Note Patient: Bhargav Stoddard MRN: [id number] SSN: [id number]  YOB: 1960 Age: 62 y.o. Sex: male Admit Date: 1/23/2021 LOS: 1 day Chief Complaint Patient presents with  Shortness of Breath Subjective:  
 
BP 70/40 prior to HD; received albumin and midodrine. Tolerating PO. Denies F/C, NVD, HA, lightheadedness, dizziness, cough, CP, palpitations, ABD pain, bowel complaints, urinary complaints, edema, numbness, or tingling. ROS Objective:  
 
Visit Vitals /66 (BP 1 Location: Right arm, BP Patient Position: At rest) Pulse 81 Temp 96.8 °F (36 °C) Resp 24 Ht 5' 9\" (1.753 m) Wt 111.1 kg (244 lb 14.4 oz) SpO2 94% BMI 36.17 kg/m² Physical Exam: 
General:  Alert and Responsive and in No acute distress. HEENT: Conjunctiva pink, sclera anicteric. EOMI.  MMM. CV:  RRR. No murmurs, rubs, or gallops appreciated. No visible pulsations or thrills.   
RESP:  Unlabored breathing.  Bilateral moderate wheeze R>L, no rales or rhonchi.  Equal expansion bilaterally.   
ABD:  Soft, nontender, nondistended. Normoactive bowel sounds. No hepatosplenomegaly. No suprapubic tenderness. MS:  No joint deformity or instability.  No atrophy. Neuro:  5/5 strength bilateral upper extremities and lower extremities.  A+Ox3. Ext:  No edema.  2+ radial and dp pulses bilaterally. Skin:  No rashes, lesions, or ulcers.  Good turgor. 
  
  
 
Intake and Output: 
Current Shift: 01/24 0701 - 01/24 1900 In: 350 [P.O.:350] Out: 0 Last three shifts: No intake/output data recorded. Labs, results, and imaging reviewed Assessment and Plan:  
 
Bhargav Stoddard is a 62 y.o. male with PMH of osteoarthritis, CKD on dialysis MWF, gout with no recent flares, HTN, hyperlipidemia  who is admitted for acute hypoxic respiratory failure, likely due to COVID-19 pna.  
  
 1. Acute hypoxic respiratory failure - DDX to include COVID-19 pna vs CAP vs ?CHF. CXR significant for patchy infiltrations/consolidations in bilateral mid/lower lungs, greater on the left, with concerns for COVID pna. CTA with confirmed extensive patchy consolidation in bilateral lungs, and questionable non-occlusive intraluminal filling defects in right upper and middle lobe segmental arteries; NO PE. Procal 33; c/w ABX. - appreciate ID recs 
- O2 support; wean as tolerated 
- Azithromycin(1/23-), Rocephin (1/23-) - Decadron 6 mg daily,  Remdesivir OK as per ID (200 mg IV day 1, 100 mg IV once daily for 4 days),  Vitamin C 1000 mg q6h, Aspirin 325 mg, Vitamin D3 50, 000 units once, famotidine 20 mg daily, melatonin 10 mg qhs, Zinc sulfate 100 mg  
- CRP, ESR, D-dimer, pro calcitonin daily - pulmonology consulted; appreciate recs - FU blood culture - CBC, CMP daily - Vital signs per unit routine 
  
2. Troponinemia - Troponin elevated to 0.51>0.42>0. 33. Likely ischemic demand from hypoxia vs R heart strain from possible PE. Patient denies chest pain. EKG NSR. ECHO from 2018 significant for EF 51-55%, low normal systolic function, left ventricular severe concentric hypertrophy. BNP 7K. - trend trops q6h until [facility] 
- cardiology following; heparin gtt not advised. Continue  mg daily - FU ECHO 
- FU pro-BNP 
  
3. ESRD on dialysis MWF 
- continue dialysis in-patient 
- Continue Velphoro (phosphate binder) 500 mg (2 tablets) TID with meals  
- nephro consulted; appreciate recs 
  
4. Hyperlipidemia 
- Continue Pravastatin 20 mg daily 
  
5. HTN - Blood pressure on admission 93/62 mmhg. 
- hold coreg 3.125 mg daily in light of low blood pressures 
  
6. Transaminitis  (, ALT 64) 
- monitor LFT's 
  
7. GOUT, no recent flares. Patient is no longer on Allopurinol.  
- monitor for flares Diet renal  
DVT Prophylaxis SQH  
GI Prophylaxis famotidine Code status full Disposition >2MN Point of Contact Elmer Controls Relationship: sister 
[phone] Radha White MD, PGY1 [address] [facility] Intern Pager: [phone] January 24, 2021, 11:36 AM

## 2021-01-24 NOTE — ROUTINE PROCESS
TRANSFER - IN REPORT: 
 
Verbal report received from Subha(name) on Ebenezer Ashton  being received from ED(unit) for routine progression of care Report consisted of patients Situation, Background, Assessment and  
Recommendations(SBAR). Information from the following report(s) SBAR, Kardex, MAR and Accordion was reviewed with the receiving nurse. Opportunity for questions and clarification was provided. Assessment completed upon patients arrival to unit and care assumed. Telemonitor in place.

## 2021-01-25 NOTE — PROGRESS NOTES
Problem: Self Care Deficits Care Plan (Adult) Goal: *Acute Goals and Plan of Care (Insert Text) Description: Occupational Therapy Goals Initiated 1/25/2021 within 7 day(s). 1.  Patient will perform grooming task standing at sink for 4-7 minutes with modified independence, O2 remaining > 94% and G balance. 2.  Patient will perform upper body dressing with modified independence standing. 3.  Patient will perform lower body dressing with modified independence seated and standing, O2 remaining > 94%. 4.  Patient will perform toilet transfers with modified independence. 5.  Patient will perform all aspects of toileting with modified independence. 6.  Patient will participate in upper extremity therapeutic exercise/activities with independence for 8 minutes. 7.  Patient will utilize energy conservation techniques during functional activities with verbal cues. Prior Level of Function: Patient was independent with self-care and functional mobility PTA. Outcome: Progressing Towards Goal 
  
OCCUPATIONAL THERAPY EVALUATION Patient: Román Burr (12 y.o. male) Date: 1/25/2021 Primary Diagnosis: Acute respiratory failure due to COVID-19 ([facility]) [U07.1, J96.00] Precautions:  Fall, Aspiration ASSESSMENT : 
 Upon entering the room, patient was supine in bed, alert, and agreeable to participate in OT evaluation on 35l/min hi flow NC/15l NRB. O2 assessed at bed level and sats 94%. Patient performed supine to sit with stand by assist and desat to 89%, patient educated on energy conservation techniques, pursed lip breathing, and self pacing during daily activities. Patient able to immediately recover to 92% within 10 seconds. While seated EOB, patient given energy conservation handout and verbalized understanding. Patient stand by assist for all upper body self-care tasks seated this session. Patient performed sit <> stand with stand by assist and able to take 3 lateral steps to get higher towards [facility] for maximal comfort. Supine O2 taken post session and patient 96%. Patient left in chair position in bed with all 4 rails up per request and all needs met. Based on the objective data described below, the patient presented with decreased independence, decreased endurance, decreased functional balance, and decreased functional mobility, which impede pt's function with basic self-care/ADL tasks. Patient will benefit from skilled intervention to address the above impairments. Patient's rehabilitation potential is considered to be Fair Factors which may influence rehabilitation potential include:  
[]             None noted []             Mental ability/status [x]             Medical condition []             Home/family situation and support systems []             Safety awareness []             Pain tolerance/management 
[]             Other: PLAN : 
Recommendations and Planned Interventions: 
[x]               Self Care Training                  [x]      Therapeutic Activities [x]               Functional Mobility Training   []      Cognitive Retraining 
[x]               Therapeutic Exercises           [x]      Endurance Activities [x]               Balance Training                    [x]      Neuromuscular Re-Education []               Visual/Perceptual Training     [x]      Home Safety Training 
[x]               Patient Education                   [x]      Family Training/Education []               Other (comment): Frequency/Duration: Patient will be followed by occupational therapy 1-2 times per day/4-7 days per week to address goals. Discharge Recommendations: Home Health Further Equipment Recommendations for Discharge: shower chair to conserve energy SUBJECTIVE:  
Patient stated this feels much better sitting up OBJECTIVE DATA SUMMARY:  
 
Past Medical History:  
Diagnosis Date  Arthritis of left knee 09/2017  
 moderate to severe, with effusion on xray  Chronic kidney disease ESRD  HD on MWF  
 Diastolic CHF ([facility] [address]  Dilated cardiomyopathy ([facility] [address]  History of alcohol abuse  History of echocardiogram 02/24/2014 Mod-marked LVE. EF 15%. Severe, diffuse hypk. Mild LVH. Gr 1 DDfx. Mod LAE. Mild-mod FIDELIA. Mild AoRE. No significant change from echo of 10/23/09.  History of gout  History of myocardial perfusion scan 05/25/2006 No evidence of ischemia or scarring. Gross LVE. EF 28%. Severe global hypk. Neg EKG on submaximal EST. Ex time 8 min 25 sec.  HTN (hypertension)  Hypercholesterolemia  Hypertensive cardiovascular disease Past Surgical History:  
Procedure Laterality Date  HX COLONOSCOPY    
 HX HERNIA REPAIR  04/2016  HX VASCULAR ACCESS Right upper chest HD CATH Barriers to Learning/Limitations: None Compensate with: visual, verbal, tactile, kinesthetic cues/model Home Situation:  
Home Situation Home Environment: Apartment # Steps to Enter: 16 Living Alone: Yes Tub or Shower Type: Tub/Shower combination 
[x]  Right hand dominant   []  Left hand dominant Cognitive/Behavioral Status: 
Neurologic State: Alert Orientation Level: Oriented to person;Oriented to place;Oriented to situation Cognition: Follows commands Safety/Judgement: Fall prevention Skin: Intact Edema: None noted Vision/Perceptual:   
Acuity: Within Defined Limits Coordination: BUE Fine Motor Skills-Upper: Left Intact; Right Intact Gross Motor Skills-Upper: Left Intact; Right Intact Balance: 
Sitting: Intact Standing: Impaired; Without support Standing - Static: Good Standing - Dynamic : Fair Strength: BUE Strength: Generally decreased, functional(MMT NT on L shoulder 2/2 HD port) Tone & Sensation: BUE Tone: Normal 
Sensation: Intact Range of Motion: BUE 
 
AROM: Within functional limits Functional Mobility and Transfers for ADLs: 
Bed Mobility: 
  
Supine to Sit: Stand-by assistance; Additional time Sit to Supine: Stand-by assistance Scooting: Stand-by assistance Transfers: 
Sit to Stand: Stand-by assistance Stand to Sit: Stand-by assistance ADL Assessment:  
Feeding: Modified independent Oral Facial Hygiene/Grooming: Modified Independent Bathing: Stand-by assistance Upper Body Dressing: Stand-by assistance Lower Body Dressing: Stand-by assistance Toileting: Stand by assistance ADL Intervention: 
  
 
Grooming Grooming Assistance: Modified independent Brushing/Combing Hair: Modified independent Upper Body Dressing Assistance Dressing Assistance: Stand-by assistance Hospital Gown: Stand-by assistance Cognitive Retraining Safety/Judgement: Fall prevention Pain: 
Pain level pre-treatment: 0/10 Pain level post-treatment: 0/10 Pain Intervention(s): Medication (see MAR); Response to intervention: Nurse notified, See doc flow Activity Tolerance:  
Fair Please refer to the flowsheet for vital signs taken during this treatment. After treatment: [] Patient left in no apparent distress sitting up in chair 
[x] Patient left in no apparent distress in bed 
[x] Call bell left within reach [x] Nursing notified 
[] Caregiver present [x] Bed alarm activated COMMUNICATION/EDUCATION:  
[x] Role of Occupational Therapy in the acute care setting 
[x] Home safety education was provided and the patient/caregiver indicated understanding. [x] Patient/family have participated as able in goal setting and plan of care. [x] Patient/family agree to work toward stated goals and plan of care. [] Patient understands intent and goals of therapy, but is neutral about his/her participation. [] Patient is unable to participate in goal setting and plan of care. Thank you for this referral. 
Clay Ervin OTR/L Time Calculation: 23 mins Eval Complexity: History: MEDIUM Complexity : Expanded review of history including physical, cognitive and psychosocial  history ; Examination: MEDIUM Complexity : 3-5 performance deficits relating to physical, cognitive , or psychosocial skils that result in activity limitations and / or participation restrictions; Decision Making:MEDIUM Complexity : Patient may present with comorbidities that affect occupational performnce. Miniml to moderate modification of tasks or assistance (eg, physical or verbal ) with assesment(s) is necessary to enable patient to complete evaluation

## 2021-01-25 NOTE — ROUTINE PROCESS
Bedside and Verbal shift change report given to [address] and Seamus Carcamo RN (oncoming nurse) by Marianne Melendez (offgoing nurse). Report included the following information SBAR, Kardex, MAR and Recent Results. SITUATION:  
? Code Status: Full Code 
? Reason for Admission: Acute respiratory failure due to COVID-19 (HCC) [U07.1, J96.00] ? Hospital day: 2 
? Problem List:  
   
Hospital Problems  Date Reviewed: 1/3/2019 Codes Class Noted POA * (Principal) Acute respiratory failure due to COVID-19 [facility]) ICD-10-CM: U07.1, J96.00 
ICD-9-CM: 518.81, 079.89  1/23/2021 Unknown BACKGROUND:  
 Past Medical History:  
Past Medical History:  
Diagnosis Date  Arthritis of left knee 09/2017  
 moderate to severe, with effusion on xray  Chronic kidney disease ESRD  HD on MWF  
 Diastolic CHF ([facility] [address]  Dilated cardiomyopathy ([facility] [address]  History of alcohol abuse  History of echocardiogram 02/24/2014 Mod-marked LVE. EF 15%. Severe, diffuse hypk. Mild LVH. Gr 1 DDfx. Mod LAE. Mild-mod FIDELIA. Mild AoRE. No significant change from echo of 10/23/09.  History of gout  History of myocardial perfusion scan 05/25/2006 No evidence of ischemia or scarring. Gross LVE. EF 28%. Severe global hypk. Neg EKG on submaximal EST. Ex time 8 min 25 sec.  HTN (hypertension)  Hypercholesterolemia  Hypertensive cardiovascular disease Patient taking anticoagulants yes; heparin subcut ASSESSMENT:  
? Changes in Assessment Throughout Shift: No changes noted ? Patient has Central Line: no Reasons if yes: n/a 
? Patient has Vitale Cath: no Reasons if yes: n/a  
 
? Last Vitals: 
  
Vitals:  
 01/25/21 1730 01/25/21 1745 01/25/21 1800 01/25/21 1815 BP: 102/64 112/72 108/72 104/70 Pulse: 78 76 80 77 Resp:      
Temp:      
TempSrc:      
SpO2:      
Weight:      
Height:      
 
 
? IV and DRAINS (will only show if present) Peripheral IV 01/23/21 Right Antecubital-Site Assessment: Clean, dry, & intact Hemodialysis Access-Site Assessment: Clean, dry, & intact Peripheral IV 01/23/21 Posterior;Right Hand-Site Assessment: Clean, dry, & intact ? WOUND (if present) Wound Type:  none Dressing present Dressing Present : No 
 Wound Concerns/Notes:  none ? PAIN Pain Assessment Pain Intensity 1: 0 (01/25/21 1600) Patient Stated Pain Goal: 0 
o Interventions for Pain:  none 
o Intervention effective: n/a 
o Time of last intervention: n/a  
o Reassessment Completed: yes ? Last 3 Weights: 
Last 3 Recorded Weights in this Encounter 01/23/21 1020 01/24/21 0550 01/25/21 1230 Weight: 104.3 kg (230 lb) 111.1 kg (244 lb 14.4 oz) 104.3 kg (230 lb) Weight change: ? INTAKE/OUPUT Current Shift: No intake/output data recorded. Last three shifts: 01/24 0701 - 01/25 1900 In: 350 [P.O.:350] Out: 1800  
 
? LAB RESULTS Recent Labs  
  01/25/21 
0056 01/24/21 
0842 01/23/21 
1046 WBC 8.5 5.9 8.1 HGB 10.9* 10.8* 13.0 HCT 33.3* 33.6* 39.5  272 285 Recent Labs  
  01/25/21 
0056 01/24/21 
0842 01/23/21 
1841 01/23/21 
1115  139 133*  --   
K 4.2 4.9 4.9  --   
* 132* 202*  --   
BUN 76* 55* 62*  --   
CREA 16.00* 15.00* 17.50*  --   
CA 9.7 9.0 8.6  --   
MG  --   --   --  2.6 RECOMMENDATIONS AND DISCHARGE PLANNING 1. Pending tests/procedures/ Plan of Care or Other Needs: Continue to monitor oxygen therapy, wean from nonrebreather if possible, Reinforce safety 2. Discharge plan for patient and Needs/Barriers: To be determined 3. Estimated Discharge Date: TBD Posted on Whiteboard in Patients Room: yes 4. The patient's care plan was reviewed with the oncoming nurse. \"HEALS\" SAFETY CHECK Fall Risk Total Score: 3 Safety Measures: Safety Measures: Bed/Chair alarm on, Bed/Chair-Wheels locked, Bed in low position, Call light within reach, Fall prevention (comment), Gripper socks, Side rails X 3 A safety check occurred in the patient's room between off going nurse and oncoming nurse listed above. The safety check included the below items Area Items H High Alert Medications ? Verify all high alert medication drips (heparin, PCA, etc.) E Equipment ? Suction is set up for ALL patients (with arabella) ? Red plugs utilized for all equipment (IV pumps, etc.) ? WOWs wiped down at end of shift. ? Room stocked with oxygen, suction, and other unit-specific supplies A Alarms ? Bed alarm is set for fall risk patients ? Ensure chair alarm is in place and activated if patient is up in a chair L Lines ? Check IV for any infiltration ? Vitale bag is empty if patient has a Vitale ? Tubing and IV bags are labeled Bernadette Brittle Safety ? Room is clean, patient is clean, and equipment is clean. ? Hallways are clear from equipment besides carts. ? Fall bracelet on for fall risk patients ? Ensure room is clear and free of clutter ? Suction is set up for ALL patients (with arabella) ? Hallways are clear from equipment besides carts. ? Isolation precautions followed, supplies available outside room, sign posted Jeanine Parks

## 2021-01-25 NOTE — PROGRESS NOTES
Cardiopulmonary Navigator Recommendations Pt room not entered as Droplet+ precautions were in effect. The following recommendations are based on chart review and patient interview. They are intended to help patient transition home when appropriate, and to help reduce risk of re-admission. Pt may be a good candidate for outpatient evaluation. Please consider referring patient for evaluation at OP Cardiac Rehabilitation after recovery from acute COVID-19. Criteria: 
1. Acute Hypoxic respiratory failure -related to Covid pneumonia currently on high flow oxygen 2. COVID - 19 pneumonia with consolidations trevon lower lobes . 3. Detectable troponin - 0.51, 0.42, 0.33- EKG SR, non-specific ST changes. 4. History of dilated cardiomyopathy - (EF 15% 11/2016) - had improved to EF of 55% 9/2019.  
5. Chronic diastolic CHF - 
6. ESRD on dialysis -  
7. Dilated ascending aorta measuring 4.5 cm.  
  
 
Rehabilitation: 
Cardiac Rehabilitation Or 
Acute Inpatient rehabilitation followed by Outpatient Cardiac Rehabilitation Home/Stationary Oxygen: 
Criteria:  SpO2 89% w/qualifying secondary diagnosis. SpO2 88% or less at rest on room air or PaO2 55 or less Home Portable Oxygen:  
Criteria: SpO2 89% w/qualifying secondary diagnosis. SpO2 88% or less at rest on room air or PaO2 55 or less High Flow Oxygen Delivery System: 
Criteria: 
Available Devices: 
[employer] [id number] 
 
 
CPAP: 
Criteria:CPAP/BIPAP Criteria: BMI> 30 and co-morbidities. Recommend sleep study to determine if PAP therapy is appropriate for pt. Available Devices: 
[employer] [facility] [employer]mart Respro Will continue to follow patient during this admission, and update recommendations as pt progresses.

## 2021-01-25 NOTE — PROGRESS NOTES
Bedside handoff report received from Kim Campa (offgoing RN) by Francisco Zarate (oncoming RN) to include SBAR, Kardex, MAR, Results Review,I/O's, and Summary of Care. Cardiac Monitoring NSR.  
 
0935: Patient rounds, meds given as per MAR, head to toe assessment completed. Patient given set up assistance with breakfast tray. Patient sitting up in bed eating. 1005: RT at bedside. 1147: Patient rounds, meds given as per [facility], patient resting in bed watching tv.  
 
1405: Meds given as per MAR, rapid covid swab collected per order and sent to lab, patient transferred to 3N with RT on 15L non-rebreather.

## 2021-01-25 NOTE — PROGRESS NOTES
RENAL DAILY PROGRESS NOTE Subjective:  
 
 
Complaint: sob but feels little better. Feels wheezy Overnight events noted 
no nausea, vomiting, chest pain, IMPRESSION:  
IMPRESSION:  
· ESRD on TTS at [address] · COVID Pneumonia · Hypoxic resp failure · Hypertension · Hx Diastolic CHF · Hyperlipidemia · Secondary hyperparathyroidism PLAN:  
· C/w  phos binders · Last dialysis was short. HD today with some UF. On midodrine for hypotension. If stable after today then will keep him on MWF schedule while in hospital. 
· No gadolinium · Daily labs for now · Watch resp status · Further care per primary and ID 
   
  
 
 
 
Current Facility-Administered Medications Medication Dose Route Frequency  midodrine (PROAMATINE) tablet 10 mg  10 mg Oral TID WITH MEALS  insulin lispro (HUMALOG) injection   SubCUTAneous AC&HS  
 glucose chewable tablet 16 g  4 Tab Oral PRN  
 glucagon (GLUCAGEN) injection 1 mg  1 mg IntraMUSCular PRN  
 dextrose (D50W) injection syrg 12.5-25 g  25-50 mL IntraVENous PRN  
 ipratropium (ATROVENT HFA) 17 mcg inhaler  2 Puff Inhalation Q4H RT  
 Or  
 albuterol (PROVENTIL HFA, VENTOLIN HFA, PROAIR HFA) inhaler 2 Puff  2 Puff Inhalation Q4H RT  
 sevelamer carbonate (RENVELA) tab 800 mg  800 mg Oral TID WITH MEALS  sodium chloride (NS) flush 5-10 mL  5-10 mL IntraVENous PRN  
 cefTRIAXone (ROCEPHIN) 2 g in sterile water (preservative free) 20 mL IV syringe  2 g IntraVENous Q24H  
 azithromycin (ZITHROMAX) 500 mg in 0.9% sodium chloride 250 mL (VIAL-MATE)  500 mg IntraVENous Q24H  
 sodium chloride (NS) flush 5-40 mL  5-40 mL IntraVENous Q8H  
 sodium chloride (NS) flush 5-40 mL  5-40 mL IntraVENous PRN  
 acetaminophen (TYLENOL) tablet 650 mg  650 mg Oral Q6H PRN Or  
 acetaminophen (TYLENOL) suppository 650 mg  650 mg Rectal Q6H PRN  polyethylene glycol (MIRALAX) packet 17 g  17 g Oral DAILY PRN  
  heparin (porcine) injection 5,000 Units  5,000 Units SubCUTAneous Q8H  
 ascorbic acid (vitamin C) (VITAMIN C) tablet 1,000 mg  1,000 mg Oral Q6H  
 zinc sulfate (ZINCATE) 220 (50) mg capsule 2 Cap  2 Cap Oral DAILY  aspirin tablet 325 mg  325 mg Oral DAILY  famotidine (PEPCID) tablet 20 mg  20 mg Oral DAILY  [Held by provider] carvediloL (COREG) tablet 3.125 mg  3.125 mg Oral BID WITH MEALS  pravastatin (PRAVACHOL) tablet 20 mg  20 mg Oral DAILY  melatonin tablet 5 mg  5 mg Oral QHS  dexamethasone (DECADRON) 4 mg/mL injection 6 mg  6 mg IntraVENous Q24H  
 remdesivir 100 mg in 0.9% sodium chloride 250 mL IVPB  100 mg IntraVENous Q24H  
 midodrine (PROAMATINE) tablet 10 mg  10 mg Oral DIALYSIS PRN Review of Symptoms: comprehensive ROS negative except above. Objective:  
 
Patient Vitals for the past 24 hrs: 
 Temp Pulse Resp BP SpO2  
01/25/21 0824     91 % 01/25/21 0820 97.3 °F (36.3 °C) 80 20 114/80 (!) 83 % 01/25/21 0431     94 % 01/25/21 0425 96.9 °F (36.1 °C) 73 20 108/73 91 % 01/25/21 0125     96 % 01/25/21 0102 97 °F (36.1 °C) 73 20 102/65 94 % 01/24/21 2039 98.4 °F (36.9 °C) 69 20 98/63 92 % 01/24/21 1642     97 % 01/24/21 1555 96.9 °F (36.1 °C) 70 20 107/73 98 % Weight change:  
 
 01/23 1901 - 01/25 0700 In: 350 [P.O.:350] Out: 0 No intake or output data in the 24 hours ending 01/25/21 1146Patient was seen during the [place] pandemic. Patient with COVID infection . Patient is in isolation room due to [facility] status and PPE is in short supply hence seen through door and d/w patients RN. Full contact physical exam was not possible due to patient's clinical condition, key findings seen by me are documented. In addition, I have also used findings documented by physicians who have recently examined thee patient as they are relevant to the patient's renal care. Alert awake and oriented x3 On inspection mild distress,on exertion No lower extremity edema on inspection Data Review:  
 
LABS:  
Hematology:  
Recent Labs  
  01/25/21 
0056 01/24/21 
0842 01/23/21 
1046 WBC 8.5 5.9 8.1 HGB 10.9* 10.8* 13.0 HCT 33.3* 33.6* 39.5 Chemistry:  
Recent Labs  
  01/25/21 
0056 01/24/21 
0842 01/23/21 
1841 01/23/21 
1115 01/23/21 
1046 BUN 76* 55* 62*  --  55* CREA 16.00* 15.00* 17.50*  --  17.60* CA 9.7 9.0 8.6  --  9.4 ALB 2.9* 3.0* 2.4*  --  2.7*  
K 4.2 4.9 4.9  --  4.9  139 133*  --  136  101 95*  --  97* CO2 24 24 24  --  26 PHOS  --   --   --  4.6  --   
* 132* 202*  --  119* Procedures/imaging: see electronic medical records for all procedures, Xrays and details which were not copied into this note but were reviewed prior to creation of Plan Janessa Salguero MD 
1/25/2021

## 2021-01-25 NOTE — PROGRESS NOTES
[address] Post-rounding Note OBJECTIVE Visit Vitals /78 Pulse 85 Temp 97.8 °F (36.6 °C) (Axillary) Resp 24 Ht 5' 9\" (1.753 m) Wt 104.3 kg (230 lb) SpO2 93% BMI 33.97 kg/m² PERTINENT LABS/IMAGING: 
 
COVID-19 rapid (+) on 1/25/2021 ECHO - pending results TREATMENT PLAN UPDATES: 
 
Acute hypoxic respiratory failure 
- transfer to 3N stepdown, q4h vital checks 
- continue [place] NC 40 L FiO2 100%, wean oxygen as tolerated to maintain SpO2 >92% - Bronchial hygiene protocol - Bronchodilators- Atrovent 2 puffs q4h or Albuterol 2 puffs q4h 
- Continue Rocephin 2 g qD and Zithromax 500 mg qD (2 more days on 1/26 and 1/27) - Continue Decadron 6 mg IV qD (day 3 of 10) - Continue Remdesivir (day 3 of 5) Hyperglycemia - POC BG 1/24: 208. Likely in the setting of IV decadron  
-  correctional lispro insulin modified to very resistant dose 
-  Start low dose basal lantus insulin 5 units daily Hypotension, in the setting of dialysis MWF 
- continue midodrine 10 mg TID 
- midodrine 10 mg prn during dialysis - Resume Coreg 3.125 mg BID once blood pressures stabilize ESRD on dialysis MWF 
- continue dialysis in-patient 
- Held Velphoro (phosphate binder). Started Renvela 800 mg TID  
- nephro consulted; appreciate recs See daily progress note for full assessment/plan. Hector Boss MD, PGY-1  
Jazlyn Guy  [address] [place] Pager: [phone] January 25, 2021, 4:29 PM

## 2021-01-25 NOTE — PROGRESS NOTES
[facility] Pulmonary, Critical Care, and Sleep Medicine F/U Patient Consult Name: Soha Bonner MRN: [id number] : 1960 Hospital: [facility] Date: 2021 This patient has been seen and evaluated at the request of Dr. Gaviota Urban for Acute hypoxic Respiratory failure, Covid pneumonia. IMPRESSION:  
· Acute hypoxic Respiratory failure-requiring high flow 100% FiO2 at 40 L flow to maintain saturation -- now added nonrebreather. Underlying progressive COVID-19 pneumonia with predominant bilateral lower lobe consolidative pneumonia. Appears compensating · COVID-19 pneumonia with significant consolidations trevon lower lobes · ARDS, severe: SpO2 91% with HFNC 100% and NRB · ESRD on dialysis · History of dilated cardiomyopathy · Hypertension RECOMMENDATIONS:  
· Discussed with ICU and nursing, no beds available and none per nursing supervisor due to surge in setting of COVID-19 pandemic. Pt currently able to maintain SpO2 in low 90s on HFNC with NRB -- will move pt and attempt Bipap QHS. Discussed with primary service to move pt to stepdown with continuous pulse oximetry since pt is compensating -- will transfer to ICU if oxygenation worsens · Oxygen-titrate to SaO2 goal more than 88%. Currently appearing comfortable on high flow+NRB. Reevaluated this afternoon in stepdown, will remove NRB and maintain HFNC and use bipap PRN 
· BIPAP/CPAP-can consider adding as next step if difficulty oxygenating · Bronchial hygiene protocol-encourage use of incentive spirometer, coughing · Bronchodilators-Combivent Respimat 4 times daily scheduled · Steroids-agree with Decadron · Antibiotics-per ID · COVID-19 pneumonia treatment per ID recommendations-has been started on remdesivir · Strict aspiration precautions dialysis per nephrology · Anticoagulation-prophylactic dose to continue · Will need imaging follow-up to complete clearing. Concerns for developing sequelae long-term given the extent of pneumonia · Assess home Oxygen needs at discharge · OT, PT, OOB to chair, and ambulate as tolerated when possible · Healthy weight · Will Follow · DVT ppx + PUD ppx (while on steroids) Prognosis is guarded. Patient has high risk for decompensation Subjective:  
01/25/21 Patient seen and examined at bedside. No acute events overnight. Patient on nonrebreather plus high flow nasal cannula. Patient reports he is breathing better with the set up. Denies any nausea, chest pain. Reports that cough remained stable, only intermittently productive of brown sputum, no nausea or diarrhea currently. HPI per Dr. Jason Us Abhilash is a 62 y.o. male with PMH arthritis, CKD on HD MWF, gout HTN, HLD, now presenting with complaint of SOB. Pt is known positive COVID for over 1wk. Went to dialysis center and was noted to have a fever 100.6 here. He had a headache, eye pain and chills . No known Covid exposure. Nominal pain diarrhea no chest pain or shortness of breath Was seen in ER 1wk ago with above complaints and d/c home with CDC recommendations. Over the past few days he has felt progressively worse and was found tripoding today when he was picked up for dialysis. He currently states he feels fine and has no complaints. He denies sick contacts, CP, [place]. He missed dialysis on day of admission Initial SpO2 90% on 5lpm NC so placed on HFC. CXR showed patchy infiltrations/consolidations in bilateral mid/lower lungs I have reviewed all of the available data including the patient's previous history external records and radiological imaging available for review. In addition applicable cardiology and other lab data were also reviewed. Past Medical History:  
Diagnosis Date  Arthritis of left knee 09/2017  
 moderate to severe, with effusion on xray  Chronic kidney disease ESRD  HD on MWF  
 Diastolic CHF ([facility]  Dilated cardiomyopathy ([facility]  History of alcohol abuse  History of echocardiogram 02/24/2014 Mod-marked LVE. EF 15%. Severe, diffuse hypk. Mild LVH. Gr 1 DDfx. Mod LAE. Mild-mod FIDELIA. Mild AoRE. No significant change from echo of 10/23/09.  History of gout  History of myocardial perfusion scan 05/25/2006 No evidence of ischemia or scarring. Gross LVE. EF 28%. Severe global hypk. Neg EKG on submaximal EST. Ex time 8 min 25 sec.  HTN (hypertension)  Hypercholesterolemia  Hypertensive cardiovascular disease Past Surgical History:  
Procedure Laterality Date  HX COLONOSCOPY    
 HX HERNIA REPAIR  04/2016  HX VASCULAR ACCESS Right upper chest HD CATH Prior to Admission medications Medication Sig Start Date End Date Taking? Authorizing Provider  
pravastatin (PRAVACHOL) 20 mg tablet TAKE 1 TABLET BY MOUTH NIGHTLY 12/29/20  Yes Roselia Olvera NP  
carvediloL (COREG) 3.125 mg tablet TAKE 1 TABLET BY MOUTH EVERY 12 HOURS 9/29/20  Yes Denilson Kathleen NP  
sucroferric oxyhydroxide (VELPHORO) 500 mg chew chewable tablet Take  by mouth three (3) times daily (with meals). Yes Provider, Historical  
aspirin 81 mg chewable tablet Take 1 Tab by mouth daily. 2/21/17  Yes Guillaume ORNELAS NP  
HYDROcodone-acetaminophen (NORCO) 5-325 mg per tablet Take 1-2 Tabs by mouth every six (6) hours as needed for Pain. Max Daily Amount: 8 Tabs. 11/15/18   Sylvia Ohara MD  
sevelamer carbonate (RENVELA) 800 mg tab tab Take  by mouth three (3) times daily. Provider, Historical  
colchicine 0.6 mg tablet 2 tablets at first sign of gout flare and then 1 tablet 1 hour later  Indications: acute gouty arthritis 6/5/18   Rosalio Portillo MD  
allopurinol (ZYLOPRIM) 100 mg tablet Take 2 Tabs by mouth daily. 5/11/18   STEFFANIE Cancino No Known Allergies Social History Tobacco Use  
  Smoking status: Never Smoker  Smokeless tobacco: Never Used Substance Use Topics  Alcohol use: No  
  Alcohol/week: 0.0 standard drinks Comment: stop 3 years ago in feb, 2015 Family History Problem Relation Age of Onset  Cancer Father Lung  Heart Failure Mother  Cancer Sister Current Facility-Administered Medications Medication Dose Route Frequency  midodrine (PROAMATINE) tablet 10 mg  10 mg Oral TID WITH MEALS  insulin lispro (HUMALOG) injection   SubCUTAneous AC&HS  ipratropium (ATROVENT HFA) 17 mcg inhaler  2 Puff Inhalation Q4H RT  
 Or  
 albuterol (PROVENTIL HFA, VENTOLIN HFA, PROAIR HFA) inhaler 2 Puff  2 Puff Inhalation Q4H RT  
 sevelamer carbonate (RENVELA) tab 800 mg  800 mg Oral TID WITH MEALS  cefTRIAXone (ROCEPHIN) 2 g in sterile water (preservative free) 20 mL IV syringe  2 g IntraVENous Q24H  
 azithromycin (ZITHROMAX) 500 mg in 0.9% sodium chloride 250 mL (VIAL-MATE)  500 mg IntraVENous Q24H  
 sodium chloride (NS) flush 5-40 mL  5-40 mL IntraVENous Q8H  
 heparin (porcine) injection 5,000 Units  5,000 Units SubCUTAneous Q8H  
 ascorbic acid (vitamin C) (VITAMIN C) tablet 1,000 mg  1,000 mg Oral Q6H  
 zinc sulfate (ZINCATE) 220 (50) mg capsule 2 Cap  2 Cap Oral DAILY  aspirin tablet 325 mg  325 mg Oral DAILY  famotidine (PEPCID) tablet 20 mg  20 mg Oral DAILY  [Held by provider] carvediloL (COREG) tablet 3.125 mg  3.125 mg Oral BID WITH MEALS  pravastatin (PRAVACHOL) tablet 20 mg  20 mg Oral DAILY  melatonin tablet 5 mg  5 mg Oral QHS  dexamethasone (DECADRON) 4 mg/mL injection 6 mg  6 mg IntraVENous Q24H  
 remdesivir 100 mg in 0.9% sodium chloride 250 mL IVPB  100 mg IntraVENous Q24H Review of Systems: A comprehensive review of systems was negative except for that written in the HPI. Objective:  
Vital Signs:   
Visit Vitals /73 Pulse 80 Temp 98 °F (36.7 °C) Resp 20 Ht 5' 9\" (1.753 m) Wt 104.3 kg (230 lb) SpO2 92% BMI 33.97 kg/m² O2 Device: Hi flow nasal cannula(Vapotherm) O2 Flow Rate (L/min): 40 l/min Temp (24hrs), Av.4 °F (36.3 °C), Min:96.9 °F (36.1 °C), Max:98.4 °F (36.9 °C) Intake/Output:  
Last shift:      No intake/output data recorded. Last 3 shifts:  190 -  0700 In: 350 [P.O.:350] Out: 0 No intake or output data in the 24 hours ending 21 1237 Physical Exam:  
General:  Alert, cooperative, no distress, appears stated age, laying in bed wearing HFNC (FiO2 100% 40L/m + NRB) Head:  Normocephalic, without obvious abnormality, atraumatic. Eyes:  Conjunctivae/corneas clear. PERRL, EOMs intact. Nose: Nares normal. Septum midline, wearing high flow nasal cannula. No drainage, unable to assess further due to patient wearing nonrebreather mask Throat:  Unable to assess due to patient wearing nonrebreather mask Neck: Supple, symmetrical, trachea midline, no adenopathy,no carotid bruit and no JVD. Back:   Symmetric, no curvature. ROM normal.  
Lungs:    Decreased breath sounds in bilateral bases, bilateral rales in lower lobes, no wheezes or rhonchi Chest wall:  No tenderness or deformity. Heart:  Regular rate and rhythm, S1, S2 normal, no murmur, click, rub or gallop. Abdomen:    Obese, soft, non-tender. Bowel with crepitations normal. No masses,  No organomegaly. Extremities: Extremities normal, atraumatic, no cyanosis or edema. Left upper extremity AV shunt. No clubbing Pulses: 2+ and symmetric all extremities. Skin: Skin color, texture, turgor normal. No rashes or lesions Lymph nodes: Cervical, supraclavicular, and axillary nodes normal.  
Neurologic: Grossly nonfocal, strength, coordination, sensation grossly intact throughout all extremities, patient alert and oriented x3 Data review:  
 
Recent Results (from the past 24 hour(s)) TROPONIN I Collection Time: 21  3:15 PM  
Result Value Ref Range Troponin-I, QT 0.34 (H) 0.0 - 0.045 NG/ML  
GLUCOSE, POC Collection Time: 01/24/21  3:50 PM  
Result Value Ref Range Glucose (POC) 134 (H) 70 - 110 mg/dL GLUCOSE, POC Collection Time: 01/24/21  8:35 PM  
Result Value Ref Range Glucose (POC) 178 (H) 70 - 110 mg/dL TROPONIN I Collection Time: 01/24/21 10:52 PM  
Result Value Ref Range Troponin-I, QT 0.33 (H) 0.0 - 2.431 NG/ML  
METABOLIC PANEL, COMPREHENSIVE Collection Time: 01/25/21 12:56 AM  
Result Value Ref Range Sodium 138 136 - 145 mmol/L Potassium 4.2 3.5 - 5.5 mmol/L Chloride 100 100 - 111 mmol/L  
 CO2 24 21 - 32 mmol/L Anion gap 14 3.0 - 18 mmol/L Glucose 162 (H) 74 - 99 mg/dL BUN 76 (H) 7.0 - 18 MG/DL Creatinine 16.00 (H) 0.6 - 1.3 MG/DL  
 BUN/Creatinine ratio 5 (L) 12 - 20 GFR est AA 4 (L) >60 ml/min/1.73m2 GFR est non-AA 3 (L) >60 ml/min/1.73m2 Calcium 9.7 8.5 - 10.1 MG/DL Bilirubin, total 0.4 0.2 - 1.0 MG/DL  
 ALT (SGPT) 46 16 - 61 U/L  
 AST (SGOT) 48 (H) 10 - 38 U/L Alk. phosphatase 45 45 - 117 U/L Protein, total 8.1 6.4 - 8.2 g/dL Albumin 2.9 (L) 3.4 - 5.0 g/dL Globulin 5.2 (H) 2.0 - 4.0 g/dL A-G Ratio 0.6 (L) 0.8 - 1.7 SED RATE (ESR) Collection Time: 01/25/21 12:56 AM  
Result Value Ref Range Sed rate, automated 65 (H) 0 - 20 mm/hr PROCALCITONIN Collection Time: 01/25/21 12:56 AM  
Result Value Ref Range Procalcitonin 27.93 ng/mL D DIMER Collection Time: 01/25/21 12:56 AM  
Result Value Ref Range D DIMER 2.56 (H) <0.46 ug/ml(FEU) C REACTIVE PROTEIN, QT Collection Time: 01/25/21 12:56 AM  
Result Value Ref Range C-Reactive protein 14.8 (H) 0 - 0.3 mg/dL CBC WITH AUTOMATED DIFF Collection Time: 01/25/21 12:56 AM  
Result Value Ref Range WBC 8.5 4.6 - 13.2 K/uL  
 RBC 3.81 (L) 4.70 - 5.50 M/uL  
 HGB 10.9 (L) 13.0 - 16.0 g/dL HCT 33.3 (L) 36.0 - 48.0 %  MCV 87.4 74.0 - 97.0 FL  
 MCH 28.6 24.0 - 34.0 PG  
 MCHC 32.7 31.0 - 37.0 g/dL  
 RDW 15.4 (H) 11.6 - 14.5 % PLATELET 272 164 - 998 K/uL MPV 10.9 9.2 - 11.8 FL  
 NEUTROPHILS 77 (H) 42 - 75 % BAND NEUTROPHILS 7 (H) 0 - 5 % LYMPHOCYTES 6 (L) 20 - 51 % MONOCYTES 6 2 - 9 % EOSINOPHILS 0 0 - 5 % BASOPHILS 0 0 - 3 % OTHER CELL 4 (H) 0    
 NRBC 5.0 (H) 0  WBC  
 ABS. NEUTROPHILS 7.1 1.8 - 8.0 K/UL  
 ABS. LYMPHOCYTES 0.5 (L) 0.8 - 3.5 K/UL  
 ABS. MONOCYTES 0.5 0 - 1.0 K/UL  
 ABS. EOSINOPHILS 0.0 0.0 - 0.4 K/UL  
 ABS. BASOPHILS 0.0 0.0 - 0.06 K/UL  
 DF MANUAL PLATELET COMMENTS ADEQUATE PLATELETS    
 RBC COMMENTS ANISOCYTOSIS 1+ 
    
 RBC COMMENTS POIKILOCYTOSIS 1+ 
    
 RBC COMMENTS OVALOCYTES 1+ RBC COMMENTS TEARDROP CELLS 1+ GLUCOSE, POC Collection Time: 01/25/21  7:46 AM  
Result Value Ref Range Glucose (POC) 156 (H) 70 - 110 mg/dL GLUCOSE, POC Collection Time: 01/25/21 11:40 AM  
Result Value Ref Range Glucose (POC) 202 (H) 70 - 110 mg/dL Imaging: 
I have personally reviewed the patients radiographs and have reviewed the reports: XR Results (most recent): 
[place] [facility] [place]R CHEST PORT Narrative Chest AP single view HISTORY: Hypoxia COMPARISON: 1/15/21. FINDINGS: The cardiac silhouette remains mildly enlarged. The lungs are 
hypoinflated with interval development of patchy infiltrations/consolidations in 
bilateral mid and lower lungs, greater on the left. No pneumothorax. .  Mild 
vascular congestion. Unremarkable mediastinum. Umm Money Impression Patchy infiltrations/consolidations in bilateral mid/lower lungs, greater on the 
left. Acute airspace disease such as colon with Covid pneumonia concerned. Thank you for your referral.  
 
 
CT Results (most recent): 
[place] [facility] [place] CTA CHEST W OR W WO CONT Narrative CT chest with contrast for PE HISTORY: Dyspnea. COMPARISON: None TECHNIQUE: Dynamic spiral scan through the chest is obtained from the thoracic 
inlet to the diaphragm after dynamic nonionic IV contrast administration  per PE 
protocol. Coronal and sagittal MIP computer reconstructions are also obtained 
for better visualization of the integrity of pulmonary arteries in 3D dimension, 
particularly for lobar/interlobar arterial branches and to minimize radiation 
dose. All CT scans at this facility performed using dose optimization techniques as 
appreciated to a performed exam, to include automated exposure control, 
adjustment of the mA and or KU according to patient size (including appropriate 
matching for site specific examination), or use of iterative reconstruction 
technique. FINDINGS: 
 
PULMONARY ARTERIES: The contrast bolus is adequate. Although, moderate motion 
artifact noted which significantly compromises the evaluation of the small 
arterial branches. The right and left mainstem pulmonary arteries and their 
lobar branches appear patent without convincing evidence of intraluminal filling 
defect identified to suggest pulmonary embolism. There are questionable 
nonocclusive intraluminal linear filling defects in the segmental/subsegmental 
branches at anterior and lateral right upper lobe and right middle lobe, 
uncertain due to artifact or potential tiny PE. 
  
AORTA AND OTHER CARDIOVASCULAR STRUCTURES: Mild ectasia of thoracic aorta. No 
aortic aneurysm or dissection. Mild burden of  coronary artery calcifications. Heart Strain assessment: 
-  RV/LV ratio (normal <0.9): Normal 
-  Dysfunction or bowing of interventricular septum: None -  Main pulmonary artery enlargement (>30mm): Not present -  There is not visualization of contrast reflux from the right heart into the 
IVC/hepatic veins.  
 
LUNG PARENCHYMA: There are multiple patchy areas of airspace consolidations 
identified throughout both lungs, more pronounced in bilateral lower lobes and 
 inferior left upper lobe. Patent airway. IMAGED THYROID: Unremarkable. MEDIASTINUM: Borderline adenopathy in bilateral chu and subcarinal 
mediastinum. Laney Salen PLEURAL SPACES AND CHEST WALL: No significant pleural effusion. Mild pleural 
thickening. VISUALIZED UPPER ABDOMEN: Very atrophic right kidney is partially seen. OSSEOUS STRUCTURES: Unremarkable. Impression 1. No convincing CT evidence of pulmonary embolism in mainstem and lobar 
arteries. Questionable nonocclusive intraluminal filling defects identified in 
right upper and middle lobe segmental subsegmental images, artifact versus tiny 
nonocclusive PE. 
 
2.  Extensive patchy consolidation pneumonia in bilateral lungs, Covid infection 
is more concerned than pulmonary infarctions. Clinical correlation and 
short-term follow-up CT advised. Thank you for your referral. 
  
 
  
 
08/23/18 ECHO ADULT COMPLETE 08/25/2018 8/25/2018 Narrative · Left ventricular low normal systolic function. Estimated left  
ventricular ejection fraction is 51 - 55%. Biplane method used to measure  
ejection fraction. Left ventricular severe concentric hypertrophy. Left  
ventricular global hypokinesis. · Mild aortic valve regurgitation is present. · LV strain shows relative apical sparing with reduced global strain at  
discharge -9.1% · Aortic root and ascending aorta measures 4.1 cm. · Pulmonary arterial systolic pressure is 26 mmHg. There is no evidence of  
pulmonary hypertension. · Trivial pericardial effusion. · Left atrial cavity size is mildly dilated. · Mitral annular calcification. Trace mitral valve regurgitation. Signed by: Zoe Garcias MD  
 
 
Total of 41min critical care time spent at bedside (personally) during the course of care providing evaluation,management and care decisions and ordering appropriate treatment related to critical care problems exclusive of procedures. The reason for providing this level of medical care for this critically ill patient was due a critical illness that impaired one or more vital organ systems such that there was a high probability of imminent or life threatening deterioration in the patients condition. This care involved high complexity decision making to assess, manipulate, and support vital system functions, to treat this degree vital organ system failure and to prevent further life threatening deterioration of the patients condition. This time was independent of other practitioners. Complex decision making was made in the evaluation and management plans during this consultation. More than 50% of time was spent in counseling and coordination of care including review of data and discussion with other team members. Katrin Emery MD/MPH Pulmonary, Critical Care Medicine [facility]

## 2021-01-25 NOTE — DIALYSIS
[facility] ACUTE HEMODIALYSIS FLOW SHEET 
 
 
HEMODIALYSIS ORDERS: Physician: Glenny Redding Dialyzer: revaclear   Duration: 3 hr  BFR: 400   DFR: 800 Dialysate:  Temp 36-37*C  K+   2    Ca+  2 Na 138 Bicarb 30 Weight:  104.3 kg    Patient Chart [x]     Unable to Obtain []   Dry weight/UF Goal: 2000 Access: LUE AVF Needle Gauge: 15   
Heparin []  Bolus      Units    [] Hourly       Units    [x]None Pre BP:   128/81    Pulse:     84       Respirations: 24   Temperature:   97.8 ax Labs: Pre        Post:         [x] N/A Additional Orders(medications, blood products, hypotension management):       [x] N/A [x] Titiita Consent Verified CATHETER ACCESS: [x]N/A  
 
GRAFT/FISTULA ACCESS:  []N/A     []Right     [x]Left     [x]UE     []LE []AVG   [x]AVF        []Buttonhole    [x]Medical Aseptic Prep Utilized [x]No S/S infection  []Redness  []Drainage []Cultured []Swelling []Pain Bruit:   [x] Strong    [] Weak       Thrill :   [x] Strong    [] Weak Needle Gauge: 15   Length: 1 If access problem,  notified: []Yes     [x]N/A Please describe access if present and not used:  
            
            GENERAL ASSESSMENT:  
  
LUNGS:  Rate 18 SaO2 100%        [] N/A    [x] Clear  [] Coarse  [] Crackles  [] Wheezing 
      [] Diminished     Location : []RLL   []LLL    []RUL  []DIEGO Cough: []Productive  []Dry  [x]N/A   Respirations:  [x]Easy  []Labored Therapy:   []RA  [x]NC 40 l/min    Mask: [x]NRB 15 l/min []Venti       O2% []Ventilator  []Intubated  [] Trach  [] BiPaP CARDIAC: []Regular      [x] Irregular   [] Pericardial Rub  [] JVD [x]  Monitored  [x] Bedside  [] Remotely monitored [x] N/A  Rhythm: sinus rhythm EDEMA: [] None   [x]Generalized  [] Pitting [] 1    [] 2    [] 3    [] 4 [] Facial  [] Pedal  []  UE  [] LE  
 
SKIN:   [x] Warm   [] Hot     [] Cold   [x] Dry     [] Pale   [] Diaphoretic [] Flushed  [] Jaundiced  [] Cyanotic  [] Rash  [] Weeping LOC:    [x] Alert      [x]Oriented:    [x] Person     [x] Place  [x]Time 
             [] Confused  [] Lethargic  [] Medicated  [] Non-responsive GI / ABDOMEN:  [] Flat    [] Distended    [x] Soft    [] Firm   []  Obese 
                           [] Diarrhea  [x] Bowel Sounds  [] Nausea  [] Vomiting  / URINE ASSESSMENT:[] Voiding   [x] Oliguria  [] Anuria   []  Vitale [] Incontinent    []  Incontinent Brief      []  Bathroom Privileges PAIN: [x] 0 []1  []2   []3   []4   []5   []6   []7   []8   []9   []10 Scale 0-10  Action/Follow Up: MOBILITY:  [] Amb    [] Amb/Assist    [x] Bed    [] Wheelchair  [] Stretcher All Vitals and Treatment Details on Attached Flowsheet Hospital: [facility] Room # 368/01 [] 1st Time Acute  [] Stat  [x] Routine  [] Urgent    
[] Acute Room  [x]  Bedside  [] ICU/CCU  [] ER Isolation Precautions:  Droplet Plus Special Considerations:         [] Blood Consent Verified [x]N/A ALLERGIES:  
No Known Allergies Code Status:Full Code Hepatitis Status:    2nd RN check: [facility] Lab Results Component Value Date/Time Hepatitis B surface Ag <0.10 01/23/2021 11:15 AM  
 Hepatitis B surface Ab 321.49 01/23/2021 11:15 AM  
 Hep B Core Ab, total NEGATIVE  08/26/2018 04:12 PM  
 Hep C Virus Ab <0.1 04/11/2017 04:35 PM  
   
 
            Current Labs:  
Lab Results Component Value Date/Time  Sodium 138 01/25/2021 12:56 AM  
 Potassium 4.2 01/25/2021 12:56 AM  
 Chloride 100 01/25/2021 12:56 AM  
 CO2 24 01/25/2021 12:56 AM  
 Anion gap 14 01/25/2021 12:56 AM  
 Glucose 162 (H) 01/25/2021 12:56 AM  
 BUN 76 (H) 01/25/2021 12:56 AM  
 Creatinine 16.00 (H) 01/25/2021 12:56 AM  
 BUN/Creatinine ratio 5 (L) 01/25/2021 12:56 AM  
 GFR est AA 4 (L) 01/25/2021 12:56 AM  
 GFR est non-AA 3 (L) 01/25/2021 12:56 AM  
 Calcium 9.7 01/25/2021 12:56 AM  
  
Lab Results Component Value Date/Time WBC 8.5 01/25/2021 12:56 AM  
 Hemoglobin, POC 14.3 11/15/2018 10:13 AM  
 HGB 10.9 (L) 01/25/2021 12:56 AM  
 Hematocrit, POC 42 11/15/2018 10:13 AM  
 HCT 33.3 (L) 01/25/2021 12:56 AM  
 PLATELET 644 29/39/3185 12:56 AM  
 MCV 87.4 01/25/2021 12:56 AM  
  
  
 
                                                                         
DIET:  
DIET RENAL    
 
PRIMARY NURSE REPORT: First initial/Last name/Title Pre Dialysis: SILAS Carmona RN     Time: 0994 EDUCATION:   
[x] Patient [] Other         Knowledge Basis: []None [x]Minimal [] Substantial  
Barriers to learning  [x]N/A  
[] Access Care     [] S&S of infection     [] Fluid Management     []K+     [x]Procedural   
[]Albumin     [] Medications     [] Tx Options     [] Transplant     [] Diet     [] Other Teaching Tools:  [x] Explain  [x] Demo  [] Handouts [] Video Patient response:  [x] Verbalized understanding  [] Teach back  [] Return demonstration [] Requires follow up Inappropriate due to:         
 
[x]Time Out/Safety Check  [x]Extracorporeal Circuit Tested for integrity RO/HEMODIALYSIS MACHINE SAFETY CHECKS  Before each treatment:    
Machine Number:                   [address] [facility]  [x] Portable Machine #4/RO serial # [id number] Alarm Test:  Pass time 8465 [x] RO/Machine Log Complete Temp   36 Dialysate: pH  7.6 Conductivity: Meter   14.0     HD Machine   14.0                  TCD: 13.8 Dialyzer Lot # [id number]            Blood Tubing Lot # [id number]          Saline Lot #  [id number] CHLORINE TESTING-Before each treatment and every 4 hours Total Chlorine: [x] less than 0.1 ppm  Time: 1500  
(if greater than 0.1 ppm from Primary then every 30 minutes from Secondary) TREATMENT INITIATION  with Dialysis Precautions:  
[x] All Connections Secured                 [x] Saline Line Double Clamped  
[x] Venous Parameters Set                  [x] Arterial Parameters Set [x] Prime Given 250ml                          [x]Air Foam Detector Engaged Treatment Initiation Note:     RN arrived to pt bedside due to isolation precautions. Pt is A&Ox4. VSS and in no distress with high flow O2 (40 LPM NC) + 15 LPM via NRB. Pt took off his O2 briefly and O2 sat dropped to 80%. O2 reapplied and sat increased mid-90s. LUE AVF assessed with no s/s complications. HD initiated without difficulty with 15g x2. During Treatment Notes: 
1600 Pt awake and alert. Face and access in view with connections secure. 1630 Pt awake and alert. Face and access in view with connections secure. 1700 Pt awake and alert. Face and access in view with connections secure. 1730 Pt awake and alert. Face and access in view with connections secure. 1800 Pt awake and alert. Face and access in view with connections secure. 1125 Sarah clotting off. Blood returned slowly. 15 mins left of treatment. Dr. Howard Rosenbaum made aware. Medication Dose Volume Route Time [facility] name Title Albumin x2 25g 100 ml dialysis [address] Post Assessment:  
Dialyzer Cleared: [] Good [x] Fair  [] Poor Blood processed:  59.1 L 
UF Removed  2300 Ml POst BP:   109/64       Pulse: 83 Respirations: 24  Temperature: 97.8 ax Lungs: 
 
 [] Clear      [x] Course         [] Crackles  
 [] Wheezing         [] Diminished Post Tx Vascular Access: AVF/AVG: Bleeding stopped Art 10 min. Terrell. 10 Min    Cardiac: 
[x] Regular   [x] Irregular   [x] Monitor  [] N/A Rhythm:    Sinus rhythm Catheter: N/A Skin:   Pain:  
[x] Warm  [x] Dry [] Diaphoretic    [] Flushed   
[] Pale [] Cyanotic [x]0  []1  []2   []3  []4   []5   []6   []7   []8   []9   []10 Post Treatment Note: Pt tolerated treatment well. Net 1.8 L UF removed. POST TREATMENT PRIMARY NURSE HANDOFF REPORT:  
 
First initial/Last name/Title Post Dialysis: SILAS Mclean RN   Time:  9676 Abbreviations: AVG-arterial venous graft, AVF-arterial venous fistula, IJ-Internal Jugular, Subcl-Subclavian, Fem-Femoral, Tx-treatment, AP/HR-apical heart rate, DFR-dialysate flow rate, BFR-blood flow rate, AP-arterial pressure, -venous pressure, UF-ultrafiltrate, TMP-transmembrane pressure, Terrell-Venous, Art-Arterial, RO-Reverse Osmosis

## 2021-01-25 NOTE — DIABETES MGMT
Glycemic Control Plan of Care No history of diabetes mellitus. A1c level of 6.1% (1/24/2021) Home diabetes medications: None. Patient was admitted on 1/23/2021 with report of shortness of breath. Noted the following assessment: 
Acute respiratory failure ESRD on hemodialysis COVID-19 pneumonia Hyperglycemia Glycemic recommendation(s):  
1.) modify correctional lispro insulin to very resistant dose, done 
2.) consider adding low dose basal lantus insulin 5 units daily Glycemic assessment: 
 
Currently on IV Decadron 6 mg every 24 hours. POC BG 1/24: 208 POC BG 1/25 at time of review: 156, 202 Lab FBG 1/25: 162 Current A1C: 6.1% (1/24/2021) which is equivalent to estimated average blood glucose of 128 mg/dL during the past 2-3 months Current hospital diabetes medications: 
Correctional lispro insulin ACHS. Modified to very resistant dose Total daily dose insulin requirement previous day: 
Lispro: 6 units Goals:  Blood glucose will be within target range of  mg/dL by 1/258/2021 Education:  
 ___  Refer to Diabetes Education Record _X__  Education not indicated at this time Merced Rubi RN [facility] Pager: [phone]

## 2021-01-25 NOTE — ACP (ADVANCE CARE PLANNING)
Advance Care Planning General Advance Care Planning (ACP) Conversation Date of Conversation: 1/23/2021 Conducted with: Patient with Decision Making Capacity Healthcare Decision Maker:  
 
Click here to complete [employer] including selection of the Healthcare Decision Maker Relationship (ie \"Primary\") Today we documented Decision Maker(s) consistent with Legal Next of Kin hierarchy. Content/Action Overview: Has ACP document(s) NOT on file - requested patient to provide Reviewed DNR/DNI and patient elects Full Code (Attempt Resuscitation) Topics discussed: hospitalization preferences Length of Voluntary ACP Conversation in minutes:  <16 minutes (Non-Billable) Shawn Carter RN

## 2021-01-25 NOTE — PROGRESS NOTES
Reason for Admission:  Acute respiratory failure due to COVID-19 ([facility]) [U07.1, J96.00] RUR Score:    19 Plan for utilizing home health:    May need Likelihood of Readmission:   Moderate Do you (patient/family) have any concerns for transition/discharge?  no 
 
Transition of Care Plan:    
 
Initial assessment completed with patient. Cognitive status of patient: oriented to time, place, person and situation. Face sheet information confirmed:  yes. The patient designates sister, Chandra Sever at [phone] to participate in his discharge plan and to receive any needed information. This patient lives in a apartment   Patient is able to navigate steps as needed. Prior to hospitalization, patient was considered to be independent with ADLs/IADLS : yes . Patient has a current ACP document on file: no The patient and other:  NEEDS transport home with Medicaid will be available to transport patient home upon discharge. The patient already has none reportedavailable in the home. Patient is not currently active with home health. Patient has not stayed in a skilled nursing facility or rehab. Was  stay within last 60 days : no. This patient is on dialysis :yes If yes, hemodialysis patient and receives treatment on Monday/Wednesday/Friday at [facility] [phone]  Chair time is 5 am. Pt is transported to/from dialysis by [employer] through [facility] Pt [facility] [place] [address] while COVID positive List of available Home Health agencies were provided and reviewed with the patient prior to discharge. Freedom of choice signed: yes, for [facility] ([address] does not take [employer]). Currently, the discharge plan is Home with [address] [place] [address] Sunil Lema. The patient states that he can obtain his medications from the pharmacy, and take his medications as directed. Patient's current insurance is [employer] and [facility] Medicaid Care Management Interventions PCP Verified by CM: Yes(Dr Baker) Mode of Transport at Discharge: Other (see comment)(medicaid cab) Transition of Care Consult (CM Consult): Home Health [address] [place] [address] Road: No 
Reason Outside [place]: Managed care specific requirement Current Support Network: Lives Alone Confirm Follow Up Transport: Other (see comment)(van/cab) The Plan for Transition of Care is Related to the Following Treatment Goals : home health if needed The Patient and/or Patient Representative was Provided with a Choice of Provider and Agrees with the Discharge Plan?: Yes Freedom of Choice List was Provided with Basic Dialogue that Supports the Patient's Individualized Plan of Care/Goals, Treatment Preferences and Shares the Quality Data Associated with the Providers?: Yes Discharge Location Discharge Placement: Home with home health Mallory Redding RN BSN Outcomes Manager Pager # [phone]

## 2021-01-25 NOTE — PROGRESS NOTES
Mariano Machuca (unit secretary) informed Janette (RN) that patients O2 saturation was 83%. Patient on High Flow 35L at 100% FiO2. Respiratory Therapist called and requested to come eval patient. Patient placed on 15L non-rebreather until RT is able to come to bedside, O2 saturation increased to 91% with addition of non-rebreather. 1005: RT arrived at bedside to eval patient. , delayed arrival due to multiple emergencies on other units. RT recommending ABGs, patient refused. Patient continues to be on non-rebreather at 15L and Vasotherm increased to 40L at 100% FiO2. O2 saturation 90-92%. RT recommends re-evaluating post dialysis and a possible transfer to higher level of care. 1300: MD recommending patient transfer to  for higher level of care due to oxygen demands and requiring more continuous monitoring of O2 sats. Will call 3N and give report. Patient to received dialysis this afternoon, dialysis requesting midodrine be given for hypotension. Midodrine to be given and dialysis to be informed of patient transfer to .

## 2021-01-25 NOTE — PROGRESS NOTES
[facility] Progress Note Patient: Catrachito Schmitt MRN: [id number] SSN: [id number]  YOB: 1960 Age: 62 y.o. Sex: male Admit Date: 1/23/2021 LOS: 2 days Chief Complaint Patient presents with  Shortness of Breath Subjective:  
 
Patient seen at bedside. SOB is improving. Currently on 35l/min hi flow NC SpO2 94%. Tolerating PO intake. Review of Systems Constitutional: Negative for chills and fever. Respiratory: Negative for cough and hemoptysis. Cardiovascular: Negative for chest pain and palpitations. Gastrointestinal: Negative for diarrhea, nausea and vomiting. Musculoskeletal: Negative for myalgias. Neurological: Negative for dizziness and headaches. Objective:  
 
Visit Vitals /73 (BP 1 Location: Right arm, BP Patient Position: At rest) Pulse 73 Temp 96.9 °F (36.1 °C) Resp 20 Ht 5' 9\" (1.753 m) Wt 111.1 kg (244 lb 14.4 oz) SpO2 94% BMI 36.17 kg/m² Physical Exam: 
General:  Alert and Responsive and in No acute distress. 35l/min hi flow NC SpO2 94%. HEENT: Conjunctiva pink, sclera anicteric. EOMI.  MMM. CV:  RRR. No murmurs, rubs, or gallops appreciated. No visible pulsations or thrills.   
RESP:  Unlabored breathing.  Mild crackles in lung bases. Equal expansion bilaterally.   
ABD:  Soft, nontender, nondistended. Normoactive bowel sounds. No hepatosplenomegaly. No suprapubic tenderness. MS:  No joint deformity or instability.  No atrophy. Neuro:  5/5 strength bilateral upper extremities and lower extremities.  A+Ox3. Ext:  No edema.  2+ radial and dp pulses bilaterally. Skin:  No rashes, lesions, or ulcers.  Good turgor. 
  
 
Intake and Output: 
Current Shift: No intake/output data recorded. Last three shifts: 01/23 1901 - 01/25 0700 In: 350 [P.O.:350] Out: 0 Labs, results, and imaging reviewed Assessment and Plan: Elizabeth Lorenzo is a 62 y.o. male with PMH of osteoarthritis, CKD on dialysis MWF, gout with no recent flares, HTN, hyperlipidemia  who is admitted for acute hypoxic respiratory failure, likely due to COVID-19 pna.  
  
Acute hypoxic respiratory failure - DDX to include COVID-19 pna vs CAP vs ?CHF. CXR significant for patchy infiltrations/consolidations in bilateral mid/lower lungs, greater on the left, with concerns for COVID pna. CTA with confirmed extensive patchy consolidation in bilateral lungs, and questionable non-occlusive intraluminal filling defects in right upper and middle lobe segmental arteries; NO PE. Procal 27.93. CRP 14.8. BCx NG2D. 
- appreciate ID and pulm recs 
- O2 support; wean as tolerated 
- Azithromycin(1/23-), Rocephin (1/23-). Consider switching Azithromycin to Doxycycline in light of prolonged Qtc to 510. 
- Decadron 6 mg daily,  Remdesivir OK as per ID (200 mg IV day 1, 100 mg IV once daily for 4 days),  Vitamin C 1000 mg q6h, Aspirin 325 mg, Vitamin D3 50, 000 units once, famotidine 20 mg daily, melatonin 5 mg qhs, Zinc sulfate 100 mg  
- CRP, ESR, D-dimer, pro calcitonin daily 
- CBC, CMP daily - Vital signs per unit routine 
  
Troponinemia - Troponin elevated to 0.51>0.42>0. 33. Likely ischemic demand from hypoxia vs R heart strain from possible PE. Patient denies chest pain. EKG NSR Qtc 510. ECHO from 2018 significant for EF 51-55%, low normal systolic function, left ventricular severe concentric hypertrophy. BNP 7K. - trend trops q6h until [facility] 
- cardiology following; heparin gtt not advised. Continue  mg daily - Avoid Qtc prolonging drugs - FU ECHO Hyperglycemia - likely in the setting of recent high dose steroid use. A1c 6.1 (pre-diabetic range) - Continue SSI in-patient 
- AC&HS 
  
ESRD on dialysis MWF 
- continue dialysis in-patient 
- Continue Velphoro (phosphate binder) 500 mg (2 tablets) TID with meals  
- nephro consulted; appreciate recs 
  
 Hyperlipidemia 
- Continue Pravastatin 20 mg daily 
  
HTN - Blood pressure on admission 93/62 mmhg. 
- hold coreg 3.125 mg daily in light of low blood pressures 
  
GOUT, no recent flares. Patient is no longer on Allopurinol.  
- monitor for flares Diet renal  
DVT Prophylaxis SQH  
GI Prophylaxis famotidine Code status full Disposition >2MN Point of Contact Elmer Garcia Relationship: sister 
[phone] Leticia Heller MD, PGY1 [address] Intern Pager: [phone] January 25, 2021, 11:36 AM

## 2021-01-25 NOTE — PROGRESS NOTES
[facility] 
 
 
CARDIOLOGY PROGRESS NOTE 
RECS: 
 
 
 
 
1. Acute Hypoxic respiratory failure -related to Covid pneumonia currently on high flow oxygen - continue supportive care treatment per medical team and [facility] [place] 2. COVID - 19 pneumonia with consolidations trevon lower lobes - antibiotics per ID recs. 3. Detectable troponin - 0.51, 0.42, 0.33- EKG SR, non-specific ST changes. Elevation likely due to demand in setting of respiratory failure. Continue Aspirin 325 mg. Follow up  Echo to assess lvf,  
4. Hypertension per history- now with low BP. Resume coreg when BP stabilizes/improves. May consider midodrine if needed 5. History of dilated cardiomyopathy -  (EF 15% 11/2016) - had improved to EF of 55% 9/2019. NST 2018 intermediate risk NUC stress test Fixed defect consistent with prior myocardial infarction. Repeat echo. 6. Chronic diastolic CHF - NT pro BNP 7,247 -no significant peripheral edema. Volume status per renal  
7. Hyperlipidemia - continue statin 8. ESRD on dialysis - 1/23/21 HD  Cut shorted due to low BP  
9. Dilated ascending aorta measuring 4.5 cm. - monitor for progression of disease with repeat echo. 10. Hx of alcohol abuse. 
  
Echo report reviewed-- normal LV function. Elevated troponin likely from demand ischemia. Will f/u ASSESSMENT: 
Hospital Problems  Date Reviewed: 1/3/2019 Codes Class Noted POA * (Principal) Acute respiratory failure due to COVID-19 [facility]) ICD-10-CM: U07.1, J96.00 
ICD-9-CM: 518.81, 079.89  1/23/2021 Unknown SUBJECTIVE: 
Spoke with patient resting in bed finished breakfast 
No chest pain SOB same on high flow O2 OBJECTIVE: 
 
VS:  
Visit Vitals /80 (BP 1 Location: Right arm, BP Patient Position: At rest) Pulse 80 Temp 97.3 °F (36.3 °C) Resp 20 Ht 5' 9\" (1.753 m) Wt 111.1 kg (244 lb 14.4 oz) SpO2 91% BMI 36.17 kg/m² No intake or output data in the 24 hours ending 01/25/21 1002 TELE: normal sinus rhythm Limited physical exam due to COV-19 General: alert, well developed, pleasant and in no apparent distress HENT: Normocephalic, atraumatic. Normal external eye. Cardiac:  regular rate and rhythm on telemetry Extremities:  No edema Labs: Results:  
   
Chemistry Recent Labs  
  01/25/21 
0056 01/24/21 
8376 01/23/21 
1841 * 132* 202*  139 133* K 4.2 4.9 4.9  
 101 95* CO2 24 24 24 BUN 76* 55* 62* CREA 16.00* 15.00* 17.50* CA 9.7 9.0 8.6 AGAP 14 14 14 BUCR 5* 4* 4* AP 45 39* 41*  
TP 8.1 7.4 7.9 ALB 2.9* 3.0* 2.4*  
GLOB 5.2* 4.4* 5.5* AGRAT 0.6* 0.7* 0.4* CBC w/Diff Recent Labs  
  01/25/21 
0056 01/24/21 
0842 01/23/21 
1046 WBC 8.5 5.9 8.1  
RBC 3.81* 3.80* 4.46* HGB 10.9* 10.8* 13.0 HCT 33.3* 33.6* 39.5  272 285 GRANS 77* 70 76* LYMPH 6* 17* 14* EOS 0 0 0 Cardiac Enzymes Recent Labs  
  01/23/21 
1046 CPK 1,277* CKND1 0.1 Coagulation No results for input(s): PTP, INR, APTT, INREXT in the last 72 hours. Lipid Panel Lab Results Component Value Date/Time Cholesterol, total 155 08/23/2018 03:50 PM  
 HDL Cholesterol 72 (H) 08/23/2018 03:50 PM  
 LDL, calculated 71 08/23/2018 03:50 PM  
 VLDL, calculated 12 08/23/2018 03:50 PM  
 Triglyceride 60 08/23/2018 03:50 PM  
 CHOL/HDL Ratio 2.2 08/23/2018 03:50 PM  
  
BNP No results for input(s): BNPP in the last 72 hours. Liver Enzymes Recent Labs  
  01/25/21 
0056  
TP 8.1 ALB 2.9* AP 45 Thyroid Studies Lab Results Component Value Date/Time TSH 1.17 08/23/2018 03:50 PM  
    
 
 
 
Roselia Franklin NP-C Tat[place]:[phone] supervised I have independently evaluated  the patient. All relevant labs and testing data's are reviewed. Care plan discussed and updated after review.  
 
Eva Pettit MD

## 2021-01-25 NOTE — PROGRESS NOTES
Called and notified patient contact, Gurpreet Mckenzie (sister), of patient's transfer to  and provided an update on patient condition. Opportunity for questions and clarification was provided.

## 2021-01-25 NOTE — CONSULTS
[facility] 
(A Division of [address] [place] [address]) Consultation Note Date of Admission: 1/23/2021    Date of Consultation: 1/25/2021 Referred by: Dr. Kashif White Reason for Referral: covid / [facility] Current Antimicrobials:    Prior Antimicrobials: 
Ceftriaxone 1/23 - 2 Azithromycin 1/23 - 3 Remdesivir 1/23 - #3 Assessment: Rec / Plan:  
Covid Pneumonia 
- fever, chills, headache, shortness of breath. Onset ~ 1/13 
- SARS Cov2 + 1/15 
- Chest CT 1/23 extensive patchy consolidation pneumonia in bilateral lungs -> complete 5 days Remdesivir 
-> continue dexamethasone 10 days 
-> continue azithromycin 2 more days 
-> continue ceftriaxone ESRD-HD -> per Renal  
Dilated CMO CHF   
HTN   
HLD   
H/o ETOH abuse Microbiology: 
 
 
Lines / Catheters: HPI: 
62year-old -American male with HTN, HLD, ESRD-HD, Dilated CMO, CHF admitted to [facility] 1/23/2021 due to shortness of breath. He had headache, fever and chills for 2 days and tested positive for Covid 8 days PTA at [facility] ED (1/15). He was sent home but had worsening shortness of breath for which he presented to the [facility] ED on 1/22. Initial O2 Sat on 5 lpm NC was 90% and he was quickly placed on 40 lpm HFNC. CXR 1/23 showed patchy infiltrations/consolidations in both mid/lower lungs greater on left and  Chest CT showed extensive patchy consolidation pneumonia in bilateral lungs. He was admitted 1/23 and started on Ceftriaxone and Azithromycin. Remdesivir and dexamethasone were started just past midnight 12/23. He was been afebrile and with normal WBC count since admission. Active Hospital Problems Diagnosis Date Noted  Acute respiratory failure due to COVID-19 [facility]) 01/23/2021 Past Medical History:  
Diagnosis Date  Arthritis of left knee 09/2017  
 moderate to severe, with effusion on xray  Chronic kidney disease ESRD  HD on MWF  
 Diastolic CHF ([address]  Dilated cardiomyopathy ([facility]  History of alcohol abuse  History of echocardiogram 02/24/2014 Mod-marked LVE. EF 15%. Severe, diffuse hypk. Mild LVH. Gr 1 DDfx. Mod LAE. Mild-mod FIDELIA. Mild AoRE. No significant change from echo of 10/23/09.  History of gout  History of myocardial perfusion scan 05/25/2006 No evidence of ischemia or scarring. Gross LVE. EF 28%. Severe global hypk. Neg EKG on submaximal EST. Ex time 8 min 25 sec.  HTN (hypertension)  Hypercholesterolemia  Hypertensive cardiovascular disease Past Surgical History:  
Procedure Laterality Date  HX COLONOSCOPY    
 HX HERNIA REPAIR  04/2016  HX VASCULAR ACCESS Right upper chest HD CATH Family History Problem Relation Age of Onset  Cancer Father Lung  Heart Failure Mother  Cancer Sister Social History Socioeconomic History  Marital status: SINGLE Spouse name: Not on file  Number of children: Not on file  Years of education: Not on file  Highest education level: Not on file Occupational History  Not on file Social Needs  Financial resource strain: Not on file  Food insecurity Worry: Not on file Inability: Not on file  Transportation needs Medical: Not on file Non-medical: Not on file Tobacco Use  Smoking status: Never Smoker  Smokeless tobacco: Never Used Substance and Sexual Activity  Alcohol use: No  
  Alcohol/week: 0.0 standard drinks Comment: stop 3 years ago in feb, 2015  Drug use: Not Currently Types: Marijuana Comment: 1980's marijuana  Sexual activity: Never Lifestyle  Physical activity Days per week: Not on file Minutes per session: Not on file  Stress: Not on file Relationships  Social connections Talks on phone: Not on file Gets together: Not on file Attends Faith service: Not on file Active member of club or organization: Not on file Attends meetings of clubs or organizations: Not on file Relationship status: Not on file  Intimate partner violence Fear of current or ex partner: Not on file Emotionally abused: Not on file Physically abused: Not on file Forced sexual activity: Not on file Other Topics Concern  Not on file Social History Narrative  Not on file Allergies: 
Patient has no known allergies. Medications: 
Current Facility-Administered Medications Medication Dose Route Frequency  midodrine (PROAMATINE) tablet 10 mg  10 mg Oral TID WITH MEALS  insulin lispro (HUMALOG) injection   SubCUTAneous AC&HS  
 glucose chewable tablet 16 g  4 Tab Oral PRN  
 glucagon (GLUCAGEN) injection 1 mg  1 mg IntraMUSCular PRN  
 dextrose (D50W) injection syrg 12.5-25 g  25-50 mL IntraVENous PRN  
 ipratropium (ATROVENT HFA) 17 mcg inhaler  2 Puff Inhalation Q4H RT  
 Or  
 albuterol (PROVENTIL HFA, VENTOLIN HFA, PROAIR HFA) inhaler 2 Puff  2 Puff Inhalation Q4H RT  
 sevelamer carbonate (RENVELA) tab 800 mg  800 mg Oral TID WITH MEALS  sodium chloride (NS) flush 5-10 mL  5-10 mL IntraVENous PRN  
 cefTRIAXone (ROCEPHIN) 2 g in sterile water (preservative free) 20 mL IV syringe  2 g IntraVENous Q24H  
 azithromycin (ZITHROMAX) 500 mg in 0.9% sodium chloride 250 mL (VIAL-MATE)  500 mg IntraVENous Q24H  
 sodium chloride (NS) flush 5-40 mL  5-40 mL IntraVENous Q8H  
 sodium chloride (NS) flush 5-40 mL  5-40 mL IntraVENous PRN  
 acetaminophen (TYLENOL) tablet 650 mg  650 mg Oral Q6H PRN Or  
 acetaminophen (TYLENOL) suppository 650 mg  650 mg Rectal Q6H PRN  polyethylene glycol (MIRALAX) packet 17 g  17 g Oral DAILY PRN  
 heparin (porcine) injection 5,000 Units  5,000 Units SubCUTAneous Q8H  
 ascorbic acid (vitamin C) (VITAMIN C) tablet 1,000 mg  1,000 mg Oral Q6H  
  zinc sulfate (ZINCATE) 220 (50) mg capsule 2 Cap  2 Cap Oral DAILY  aspirin tablet 325 mg  325 mg Oral DAILY  famotidine (PEPCID) tablet 20 mg  20 mg Oral DAILY  [Held by provider] carvediloL (COREG) tablet 3.125 mg  3.125 mg Oral BID WITH MEALS  pravastatin (PRAVACHOL) tablet 20 mg  20 mg Oral DAILY  melatonin tablet 5 mg  5 mg Oral QHS  dexamethasone (DECADRON) 4 mg/mL injection 6 mg  6 mg IntraVENous Q24H  
 remdesivir 100 mg in 0.9% sodium chloride 250 mL IVPB  100 mg IntraVENous Q24H  
 midodrine (PROAMATINE) tablet 10 mg  10 mg Oral DIALYSIS PRN  
  
 
ROS: 
A comprehensive review of systems was negative except for that written in the History of Present Illness. Physical Exam: HPI: 
Temp (24hrs), Av.2 °F (36.2 °C), Min:96.8 °F (36 °C), Max:98.4 °F (36.9 °C) Visit Vitals /80 (BP 1 Location: Right arm, BP Patient Position: At rest) Pulse 80 Temp 97.3 °F (36.3 °C) Resp 20 Ht 5' 9\" (1.753 m) Wt 111.1 kg (244 lb 14.4 oz) SpO2 91% Comment: added non rebreather at 15 LPM  
BMI 36.17 kg/m² General: Well developed, obese 62 y.o. BLACK OR  male in no acute distress on HFNC and NRBM. ENT: ENT exam normal, no neck nodes or sinus tenderness Head: normocephalic, without obvious abnormality Mouth:  not examined Neck: supple, symmetrical, trachea midline Cardio:  regular rate and rhythm, S1, S2 normal, no murmur, click, rub or gallop Chest: inspection normal - no chest wall deformities or tenderness, respiratory effort normal 
Lungs: mild bibasilar rales, no wheezing Abdomen: soft, non-tender. Bowel sounds normal. No masses, no organomegaly. , decreased bowel sounds Extremities:  no redness or tenderness in the calves or thighs, no edema Neuro: Grossly normal  
 
 
Lab results: 
 
Chemistry Recent Labs  
  21 
0056 21 
0842 21 
1841 * 132* 202*  139 133* K 4.2 4.9 4.9  
 101 95* CO2 24 24 24 BUN 76* 55* 62* CREA 16.00* 15.00* 17.50* CA 9.7 9.0 8.6 AGAP 14 14 14 BUCR 5* 4* 4* AP 45 39* 41*  
TP 8.1 7.4 7.9 ALB 2.9* 3.0* 2.4*  
GLOB 5.2* 4.4* 5.5* AGRAT 0.6* 0.7* 0.4* CBC w/ Diff Recent Labs  
  01/25/21 
0056 01/24/21 
0842 01/23/21 
1046 WBC 8.5 5.9 8.1  
RBC 3.81* 3.80* 4.46* HGB 10.9* 10.8* 13.0 HCT 33.3* 33.6* 39.5  272 285 GRANS 77* 70 76* LYMPH 6* 17* 14* EOS 0 0 0 Microbiology All Micro Results Procedure Component Value Units Date/Time CULTURE, BLOOD [[id number]] Collected: 01/23/21 1116 Order Status: Completed Specimen: Blood Updated: 01/25/21 0756 Special Requests: NO SPECIAL REQUESTS Culture result: NO GROWTH 2 DAYS     
 CULTURE, BLOOD [[id number]] Collected: 01/23/21 1115 Order Status: Completed Specimen: Blood Updated: 01/25/21 0756 Special Requests: NO SPECIAL REQUESTS Culture result: NO GROWTH 2 DAYS Mick Kahn MD, [facility] 1/25/2021  
10:56 AM

## 2021-01-25 NOTE — PROGRESS NOTES
Problem: Pressure Injury - Risk of 
Goal: *Prevention of pressure injury Description: Document Sotero Scale and appropriate interventions in the flowsheet. Outcome: Progressing Towards Goal 
Note: Pressure Injury Interventions: 
  
 
Moisture Interventions: Absorbent underpads, Apply protective barrier, creams and emollients, Check for incontinence Q2 hours and as needed, Limit adult briefs, Minimize layers, Moisture barrier Activity Interventions: Increase time out of bed, Pressure redistribution bed/mattress(bed type), PT/OT evaluation Mobility Interventions: HOB 30 degrees or less, Pressure redistribution bed/mattress (bed type), PT/OT evaluation Nutrition Interventions: Document food/fluid/supplement intake Problem: Patient Education: Go to Patient Education Activity Goal: Patient/Family Education Outcome: Progressing Towards Goal 
  
Problem: Falls - Risk of 
Goal: *Absence of Falls Description: Document Breannajama Humphries Fall Risk and appropriate interventions in the flowsheet. Outcome: Progressing Towards Goal 
Note: Fall Risk Interventions: 
Mobility Interventions: Bed/chair exit alarm, Communicate number of staff needed for ambulation/transfer, Patient to call before getting OOB, PT Consult for mobility concerns Medication Interventions: Bed/chair exit alarm, Evaluate medications/consider consulting pharmacy, Patient to call before getting OOB, Teach patient to arise slowly Elimination Interventions: Bed/chair exit alarm, Call light in reach, Patient to call for help with toileting needs, Toileting schedule/hourly rounds Problem: Patient Education: Go to Patient Education Activity Goal: Patient/Family Education Outcome: Progressing Towards Goal 
  
Problem: Breathing Pattern - Ineffective Goal: *Absence of hypoxia Outcome: Progressing Towards Goal 
Goal: *Use of effective breathing techniques Outcome: Progressing Towards Goal 
  
 Problem: Patient Education: Go to Patient Education Activity Goal: Patient/Family Education Outcome: Progressing Towards Goal 
  
Problem: Risk for Spread of Infection Goal: Prevent transmission of infectious organism to others Description: Prevent the transmission of infectious organisms to other patients, staff members, and visitors. Outcome: Progressing Towards Goal 
  
Problem: Patient Education: Go to Patient Education Activity Goal: Patient/Family Education Outcome: Progressing Towards Goal

## 2021-01-25 NOTE — PROGRESS NOTES
TRANSFER - OUT REPORT: 
 
Verbal report given to Betty Roach RN (name) on Deandre Yeung  being transferred to  (unit) for change in patient condition(increased oxygen demands requiring continuous monitoring) Report consisted of patients Situation, Background, Assessment and  
Recommendations(SBAR). Information from the following report(s) SBAR, Kardex, Intake/Output, MAR, Recent Results and Cardiac Rhythm NSR was reviewed with the receiving nurse. Lines:  
Peripheral IV 01/23/21 Right Antecubital (Active) Site Assessment Clean, dry, & intact 01/25/21 8021 Phlebitis Assessment 0 01/25/21 1762 Infiltration Assessment 0 01/25/21 2724 Dressing Status Clean, dry, & intact 01/23/21 1103 Dressing Type Transparent 01/23/21 1103 Hub Color/Line Status Flushed;Patent 01/25/21 8723 Opportunity for questions and clarification was provided. Patient transported with: 
15L non-rebreather mask RN, RT Patient reconnected to vasotherm 40L at 100% FiO2 and 15L non-rebreather mask when patient arrived to  via RT. Bedside patient handoff given to 3N RN.

## 2021-01-25 NOTE — PROGRESS NOTES
PT orders received and chart was reviewed. Nursing requests that evaluation be held for the morning as patient is desatting and respiratory therapy is currently in increasing his oxygen. Will continue to follow.   Samantha Landers, PT

## 2021-01-26 NOTE — ROUTINE PROCESS
Bedside and Verbal shift change report given to [address] (oncoming nurse) by Dionna Camarillo (offgoing nurse). Report included the following information SBAR, Kardex, MAR and Recent Results. SITUATION:  
? Code Status: Full Code 
? Reason for Admission: Acute respiratory failure due to COVID-19 ([facility]) [U07.1, J96.00] ? Hospital day: 3 
? Problem List:  
   
Hospital Problems  Date Reviewed: 1/3/2019 Codes Class Noted POA * (Principal) Acute respiratory failure due to COVID-19 [facility]) ICD-10-CM: U07.1, J96.00 
ICD-9-CM: 518.81, 079.89  1/23/2021 Unknown BACKGROUND:  
 Past Medical History:  
Past Medical History:  
Diagnosis Date  Arthritis of left knee 09/2017  
 moderate to severe, with effusion on xray  Chronic kidney disease ESRD  HD on MWF  
 Di[facility] [address]  Dilated cardiomyopathy ([facility] [address]  History of alcohol abuse  History of echocardiogram 02/24/2014 Mod-marked LVE. EF 15%. Severe, diffuse hypk. Mild LVH. Gr 1 DDfx. Mod LAE. Mild-mod FIDELIA. Mild AoRE. No significant change from echo of 10/23/09.  History of gout  History of myocardial perfusion scan 05/25/2006 No evidence of ischemia or scarring. Gross LVE. EF 28%. Severe global hypk. Neg EKG on submaximal EST. Ex time 8 min 25 sec.  HTN (hypertension)  Hypercholesterolemia  Hypertensive cardiovascular disease Patient taking anticoagulants yes; heparin injection ASSESSMENT:  
? Changes in Assessment Throughout Shift: No changes noted ? Patient has Central Line: no Reasons if yes: n/a 
? Patient has Vitale Cath: no Reasons if yes: n/a  
 
? Last Vitals: 
  
Vitals:  
 01/26/21 0404 01/26/21 8140 01/26/21 4488 01/26/21 1210 BP:   (!) 96/58 124/85 Pulse:   92 90 Resp:   18 19 Temp:   97.6 °F (36.4 °C) 97.7 °F (36.5 °C) TempSrc:      
SpO2: 91% 96% 95% 100% Weight:      
Height:      
 
 
? IV and DRAINS (will only show if present) Peripheral IV 01/23/21 Right Antecubital-Site Assessment: Clean, dry, & intact Hemodialysis Access-Site Assessment: Clean, dry, & intact Peripheral IV 01/23/21 Posterior;Right Hand-Site Assessment: Clean, dry, & intact ? WOUND (if present) Wound Type:  none Dressing present Dressing Present : No 
 Wound Concerns/Notes:  none ? PAIN Pain Assessment Pain Intensity 1: 0 (01/26/21 1200) Patient Stated Pain Goal: 0 
o Interventions for Pain:  none 
o Intervention effective: n/a 
o Time of last intervention: n/a  
o Reassessment Completed: yes ? Last 3 Weights: 
Last 3 Recorded Weights in this Encounter 01/23/21 1020 01/24/21 0550 01/25/21 1230 Weight: 104.3 kg (230 lb) 111.1 kg (244 lb 14.4 oz) 104.3 kg (230 lb) Weight change: ? INTAKE/OUPUT Current Shift: 01/26 0701 - 01/26 1900 In: 500 [P.O.:500] Out: - Last three shifts: 01/24 1901 - 01/26 0700 In: 290 [I.V.:290] Out: 1800  
 
? LAB RESULTS Recent Labs  
  01/26/21 
0100 01/25/21 
0056 01/24/21 
9246 WBC 10.4 8.5 5.9 HGB 10.9* 10.9* 10.8* HCT 32.6* 33.3* 33.6*  
 314 272 Recent Labs  
  01/26/21 
0100 01/25/21 
0056 01/24/21 
8324  138 139  
K 3.6 4.2 4.9  
* 162* 132* BUN 53* 76* 55* CREA 11.10* 16.00* 15.00* CA 9.1 9.7 9.0  
 
 
RECOMMENDATIONS AND DISCHARGE PLANNING 1. Pending tests/procedures/ Plan of Care or Other Needs: Continue to wean oxygen, abx therapy, continue dialysis 2. Discharge plan for patient and Needs/Barriers: To be determined 3. Estimated Discharge Date: 1/30/21 Posted on Whiteboard in [place] Room: yes 4. The patient's care plan was reviewed with the oncoming nurse. \"HEALS\" SAFETY CHECK Fall Risk Total Score: 3  Safety Measures: Safety Measures: Bed/Chair alarm on, Bed/Chair-Wheels locked, Bed in low position, Call light within reach, Extra trach at bedside, Gripper socks, Side rails X 3 
 
 A safety check occurred in the patient's room between off going nurse and oncoming nurse listed above. The safety check included the below items Area Items H High Alert Medications ? Verify all high alert medication drips (heparin, PCA, etc.) E Equipment ? Suction is set up for ALL patients (with arabella) ? Red plugs utilized for all equipment (IV pumps, etc.) ? WOWs wiped down at end of shift. ? Room stocked with oxygen, suction, and other unit-specific supplies A Alarms ? Bed alarm is set for fall risk patients ? Ensure chair alarm is in place and activated if patient is up in a chair L Lines ? Check IV for any infiltration ? Vitale bag is empty if patient has a Vitale ? Tubing and IV bags are labeled [place] Safety ? Room is clean, patient is clean, and equipment is clean. ? Hallways are clear from equipment besides carts. ? Fall bracelet on for fall risk patients ? Ensure room is clear and free of clutter ? Suction is set up for ALL patients (with arabella) ? Hallways are clear from equipment besides carts. ? Isolation precautions followed, supplies available outside room, sign posted Alex Enamorado

## 2021-01-26 NOTE — PROGRESS NOTES
[facility] Pulmonary, Critical Care, and Sleep Medicine F/U Patient Consult Name: Bird Gill MRN: [id number] : 1960 Hospital: [facility] Date: 2021 This patient has been seen and evaluated at the request of Dr. Toribio Paez for Acute hypoxic Respiratory failure, Covid pneumonia. IMPRESSION:  
· Acute hypoxic Respiratory failure-requiring high flow 100% FiO2 at 40 L flow to maintain saturation -- continues addition of nonrebreather with HFNC. Underlying progressive COVID-19 pneumonia with predominant bilateral lower lobe consolidative pneumonia. Appears compensating · COVID-19 pneumonia with significant consolidations trevon lower lobes · ARDS, severe: SpO2 91% with HFNC 100% and NRB · ESRD on dialysis · History of dilated cardiomyopathy · Hypertension RECOMMENDATIONS:  
· Again, discussed with ICU and nursing, still no beds available and none per nursing supervisor due to surge in setting of COVID-19 pandemic. Pt currently able to maintain SpO2 in low 90s on HFNC with NRB 
· Oxygen-titrate to SaO2 goal more than 88%. Currently appearing comfortable on high flow+NRB. Use bipap QHS and PRN 
· BIPAP/CPAP-can consider adding as next step if difficulty oxygenating · Bronchial hygiene protocol-encourage use of incentive spirometer, coughing · Bronchodilators-Combivent Respimat 4 times daily scheduled · Steroids-agree with Decadron 6mg daily x 10 days · Antibiotics-per ID · COVID-19 pneumonia treatment per ID recommendations-has been started on remdesivir · Strict aspiration precautions dialysis per nephrology · Will need imaging follow-up to complete clearing. Concerns for developing sequelae long-term given the extent of pneumonia · Assess home Oxygen needs at discharge · OT, PT, OOB to chair, and ambulate as tolerated when possible · Healthy weight · Will Follow · DVT ppx + PUD ppx (while on steroids) Prognosis is guarded. Patient has high risk for decompensation Subjective:  
01/26/21 Patient seen and examined at bedside. No acute events overnight. Remains on nonrebreather and high flow cannula. Patient has no complaints, reports the cough and breathing remained stable, denies any sputum, hemoptysis, nausea, vomiting, diarrhea. HPI per Dr. Noni Soulier Keerthi Hayden is a 62 y.o. male with PMH arthritis, CKD on HD MWF, gout HTN, HLD, now presenting with complaint of SOB. Pt is known positive COVID for over 1wk. Went to dialysis center and was noted to have a fever 100.6 here. He had a headache, eye pain and chills . No known Covid exposure. Nominal pain diarrhea no chest pain or shortness of breath Was seen in ER 1wk ago with above complaints and d/c home with CDC recommendations. Over the past few days he has felt progressively worse and was found tripoding today when he was picked up for dialysis. He currently states he feels fine and has no complaints. He denies sick contacts, CP, [place]. He missed dialysis on day of admission Initial SpO2 90% on 5lpm NC so placed on HFC. CXR showed patchy infiltrations/consolidations in bilateral mid/lower lungs I have reviewed all of the available data including the patient's previous history external records and radiological imaging available for review. In addition applicable cardiology and other lab data were also reviewed. Past Medical History:  
Diagnosis Date  Arthritis of left knee 09/2017  
 moderate to severe, with effusion on xray  Chronic kidney disease ESRD  HD on MWF  
 Diastolic CHF ([address]  Dilated cardiomyopathy ([address]  History of alcohol abuse  History of echocardiogram 02/24/2014 Mod-marked LVE. EF 15%. Severe, diffuse hypk. Mild LVH. Gr 1 DDfx. Mod LAE. Mild-mod FIDELIA. Mild AoRE. No significant change from echo of 10/23/09.  History of gout  History of myocardial perfusion scan 05/25/2006 No evidence of ischemia or scarring. Gross LVE. EF 28%. Severe global hypk. Neg EKG on submaximal EST. Ex time 8 min 25 sec.  HTN (hypertension)  Hypercholesterolemia  Hypertensive cardiovascular disease Past Surgical History:  
Procedure Laterality Date  HX COLONOSCOPY    
 HX HERNIA REPAIR  04/2016  HX VASCULAR ACCESS Right upper chest HD CATH Prior to Admission medications Medication Sig Start Date End Date Taking? Authorizing Provider  
pravastatin (PRAVACHOL) 20 mg tablet TAKE 1 TABLET BY MOUTH NIGHTLY 12/29/20  Yes Roselia Olvera NP  
carvediloL (COREG) 3.125 mg tablet TAKE 1 TABLET BY MOUTH EVERY 12 HOURS 9/29/20  Yes Denilson Kathleen NP  
sucroferric oxyhydroxide (VELPHORO) 500 mg chew chewable tablet Take  by mouth three (3) times daily (with meals). Yes Provider, Historical  
aspirin 81 mg chewable tablet Take 1 Tab by mouth daily. 2/21/17  Yes Nas ORNELAS NP  
HYDROcodone-acetaminophen (NORCO) 5-325 mg per tablet Take 1-2 Tabs by mouth every six (6) hours as needed for Pain. Max Daily Amount: 8 Tabs. 11/15/18   Marcie Luong MD  
sevelamer carbonate (RENVELA) 800 mg tab tab Take  by mouth three (3) times daily. Provider, Historical  
colchicine 0.6 mg tablet 2 tablets at first sign of gout flare and then 1 tablet 1 hour later  Indications: acute gouty arthritis 6/5/18   Rocio Russell MD  
allopurinol (ZYLOPRIM) 100 mg tablet Take 2 Tabs by mouth daily. 5/11/18   STEFFANIE Luceor No Known Allergies Social History Tobacco Use  Smoking status: Never Smoker  Smokeless tobacco: Never Used Substance Use Topics  Alcohol use: No  
  Alcohol/week: 0.0 standard drinks Comment: stop 3 years ago in feb, 2015 Family History Problem Relation Age of Onset  Cancer Father Lung  Heart Failure Mother  Cancer Sister Current Facility-Administered Medications Medication Dose Route Frequency  midodrine (PROAMATINE) tablet 10 mg  10 mg Oral TID WITH MEALS  insulin glargine (LANTUS) injection 5 Units  5 Units SubCUTAneous DAILY  melatonin (rapid dissolve) tablet 5 mg  5 mg Oral QHS  insulin lispro (HUMALOG) injection   SubCUTAneous AC&HS  ipratropium (ATROVENT HFA) 17 mcg inhaler  2 Puff Inhalation Q4H RT  
 Or  
 albuterol (PROVENTIL HFA, VENTOLIN HFA, PROAIR HFA) inhaler 2 Puff  2 Puff Inhalation Q4H RT  
 sevelamer carbonate (RENVELA) tab 800 mg  800 mg Oral TID WITH MEALS  cefTRIAXone (ROCEPHIN) 2 g in sterile water (preservative free) 20 mL IV syringe  2 g IntraVENous Q24H  
 azithromycin (ZITHROMAX) 500 mg in 0.9% sodium chloride 250 mL (VIAL-MATE)  500 mg IntraVENous Q24H  
 sodium chloride (NS) flush 5-40 mL  5-40 mL IntraVENous Q8H  
 heparin (porcine) injection 5,000 Units  5,000 Units SubCUTAneous Q8H  
 ascorbic acid (vitamin C) (VITAMIN C) tablet 1,000 mg  1,000 mg Oral Q6H  
 zinc sulfate (ZINCATE) 220 (50) mg capsule 2 Cap  2 Cap Oral DAILY  aspirin tablet 325 mg  325 mg Oral DAILY  famotidine (PEPCID) tablet 20 mg  20 mg Oral DAILY  [Held by provider] carvediloL (COREG) tablet 3.125 mg  3.125 mg Oral BID WITH MEALS  pravastatin (PRAVACHOL) tablet 20 mg  20 mg Oral DAILY  dexamethasone (DECADRON) 4 mg/mL injection 6 mg  6 mg IntraVENous Q24H  
 remdesivir 100 mg in 0.9% sodium chloride 250 mL IVPB  100 mg IntraVENous Q24H Review of Systems: A comprehensive review of systems was negative except for that written in the HPI. Objective:  
Vital Signs:   
Visit Vitals BP (!) 96/58 (BP 1 Location: Right arm, BP Patient Position: At rest) Pulse 92 Temp 97.6 °F (36.4 °C) Resp 18 Ht 5' 9\" (1.753 m) Wt 104.3 kg (230 lb) SpO2 95% BMI 33.97 kg/m² O2 Device: Heated, Hi flow nasal cannula(Vapotherm) O2 Flow Rate (L/min): 40 l/min Temp (24hrs), Av.6 °F (36.4 °C), Min:97.2 °F (36.2 °C), Max:98 °F (36.7 °C) Intake/Output:  
Last shift:      No intake/output data recorded. Last 3 shifts:  190 -  0700 In: 290 [I.V.:290] Out: 1800 Intake/Output Summary (Last 24 hours) at 2021 1007 Last data filed at 2021 2200 Gross per 24 hour Intake 290 ml Output 1800 ml Net -1510 ml Physical Exam:  
General:  Alert, cooperative, no distress, appears stated age, laying in bed wearing HFNC (FiO2 100% 40L/m + NRB) Head:  Normocephalic, without obvious abnormality, atraumatic. Eyes:  Conjunctivae/corneas clear. PERRL, EOMs intact. Nose: Nares normal. Septum midline, wearing high flow nasal cannula. No drainage, unable to assess further due to patient wearing nonrebreather mask Throat:  Unable to assess due to patient wearing nonrebreather mask Neck: Supple, symmetrical, trachea midline, no adenopathy,no carotid bruit and no JVD. Lungs:    Unchanged, decreased breath sounds in bilateral bases, bilateral rales in lower lobes, no wheezes or rhonchi Chest wall:  No tenderness or deformity. Heart:  Regular rate and rhythm, S1, S2 normal, no murmur, click, rub or gallop. Abdomen:    Obese, soft, non-tender. Bowel with crepitations normal. No masses,  No organomegaly. Extremities: Extremities normal, atraumatic, no cyanosis or edema. Left upper extremity AV shunt. No clubbing Pulses: 2+ and symmetric all extremities. Skin: Skin color, texture, turgor normal. No rashes or lesions Lymph nodes: Cervical, supraclavicular, and axillary nodes normal.  
Neurologic: Grossly nonfocal, strength, coordination, sensation grossly intact throughout all extremities, patient alert and oriented x3 Data review:  
 
Recent Results (from the past 24 hour(s)) GLUCOSE, POC Collection Time: 21 11:40 AM  
Result Value Ref Range Glucose (POC) 202 (H) 70 - 110 mg/dL SARS-COV-2  
 Collection Time: 01/25/21  2:15 PM  
Result Value Ref Range SARS-CoV-2 Please find results under separate order COVID-19 RAPID TEST Collection Time: 01/25/21  2:15 PM  
Result Value Ref Range Specimen source Nasopharyngeal    
 COVID-19 rapid test Detected (AA) NOTD    
ECHO ADULT FOLLOW-UP OR LIMITED Collection Time: 01/25/21  3:19 PM  
Result Value Ref Range LV Mass .3 88 - 224 g LV Mass AL Index 126.0 49 - 115 g/m2 IVSd 1.47 (A) 0.6 - 1.0 cm LVIDd 4.49 4.2 - 5.9 cm LVIDs 2.44 cm  
 LVOT d 2.20 cm  
 LVPWd 1.54 (A) 0.6 - 1.0 cm  
 LVOT Cardiac Output 7.22 liter/minute LVOT Peak Gradient 6.54 mmHg Left Ventricular Outflow Tract Mean Gradient 3.74 mmHg LVOT SV 96.4 mL  
 LVOT Peak Velocity 127.85 cm/s LVOT VTI 25.42 cm  
 LA Volume 48.26 18 - 58 mL  
 LA Area 4C 19.13 cm2 LA Vol 2C 38.89 18 - 58 mL  
 LA Vol 4C 46.01 18 - 58 mL  
 AV R PG 56.94 mmHg Aortic Regurgitant Pressure Half-time 559.36 ms  
 AR Max Nando 377.31 cm/s Tapse 2.52 (A) 1.5 - 2.0 cm Triscuspid Valve Regurgitation Peak Gradient 14.87 mmHg  
 TR Max Velocity 192.80 cm/s Ao Root D 4.10 cm LA Vol Index 22.02 16 - 28 ml/m2 LA Vol Index 17.74 16 - 28 ml/m2 LA Vol Index 20.99 16 - 28 ml/m2 GLUCOSE, POC Collection Time: 01/25/21  5:37 PM  
Result Value Ref Range Glucose (POC) 175 (H) 70 - 110 mg/dL GLUCOSE, POC Collection Time: 01/25/21  9:10 PM  
Result Value Ref Range Glucose (POC) 209 (H) 70 - 110 mg/dL METABOLIC PANEL, COMPREHENSIVE Collection Time: 01/26/21  1:00 AM  
Result Value Ref Range Sodium 138 136 - 145 mmol/L Potassium 3.6 3.5 - 5.5 mmol/L Chloride 101 100 - 111 mmol/L  
 CO2 24 21 - 32 mmol/L Anion gap 13 3.0 - 18 mmol/L Glucose 201 (H) 74 - 99 mg/dL BUN 53 (H) 7.0 - 18 MG/DL Creatinine 11.10 (H) 0.6 - 1.3 MG/DL  
 BUN/Creatinine ratio 5 (L) 12 - 20 GFR est AA 6 (L) >60 ml/min/1.73m2 GFR est non-AA 5 (L) >60 ml/min/1.73m2 Calcium 9.1 8.5 - 10.1 MG/DL Bilirubin, total 0.4 0.2 - 1.0 MG/DL  
 ALT (SGPT) 39 16 - 61 U/L  
 AST (SGOT) 37 10 - 38 U/L Alk. phosphatase 44 (L) 45 - 117 U/L Protein, total 8.3 (H) 6.4 - 8.2 g/dL Albumin 3.5 3.4 - 5.0 g/dL Globulin 4.8 (H) 2.0 - 4.0 g/dL A-G Ratio 0.7 (L) 0.8 - 1.7 SED RATE (ESR) Collection Time: 01/26/21  1:00 AM  
Result Value Ref Range Sed rate, automated 68 (H) 0 - 20 mm/hr PROCALCITONIN Collection Time: 01/26/21  1:00 AM  
Result Value Ref Range Procalcitonin 19.52 ng/mL D DIMER Collection Time: 01/26/21  1:00 AM  
Result Value Ref Range D DIMER 2.74 (H) <0.46 ug/ml(FEU) C REACTIVE PROTEIN, QT Collection Time: 01/26/21  1:00 AM  
Result Value Ref Range C-Reactive protein 9.3 (H) 0 - 0.3 mg/dL CBC WITH AUTOMATED DIFF Collection Time: 01/26/21  1:00 AM  
Result Value Ref Range WBC 10.4 4.6 - 13.2 K/uL  
 RBC 3.79 (L) 4.70 - 5.50 M/uL  
 HGB 10.9 (L) 13.0 - 16.0 g/dL HCT 32.6 (L) 36.0 - 48.0 % MCV 86.0 74.0 - 97.0 FL  
 MCH 28.8 24.0 - 34.0 PG  
 MCHC 33.4 31.0 - 37.0 g/dL  
 RDW 15.4 (H) 11.6 - 14.5 % PLATELET 264 237 - 970 K/uL MPV 10.2 9.2 - 11.8 FL  
 NEUTROPHILS 80 (H) 42 - 75 % BAND NEUTROPHILS 2 0 - 5 % LYMPHOCYTES 7 (L) 20 - 51 % MONOCYTES 10 (H) 2 - 9 % EOSINOPHILS 0 0 - 5 % BASOPHILS 0 0 - 3 % METAMYELOCYTES 1 (H) 0 % NRBC 5.0 (H) 0  WBC  
 ABS. NEUTROPHILS 8.5 (H) 1.8 - 8.0 K/UL  
 ABS. LYMPHOCYTES 0.7 (L) 0.8 - 3.5 K/UL  
 ABS. MONOCYTES 1.0 0 - 1.0 K/UL  
 ABS. EOSINOPHILS 0.0 0.0 - 0.4 K/UL  
 ABS. BASOPHILS 0.0 0.0 - 0.06 K/UL  
 DF MANUAL PLATELET COMMENTS ADEQUATE PLATELETS    
 RBC COMMENTS ANISOCYTOSIS 1+ 
    
 RBC COMMENTS OVALOCYTES 1+ TROPONIN I Collection Time: 01/26/21  1:00 AM  
Result Value Ref Range Troponin-I, QT 0.31 (H) 0.0 - 0.045 NG/ML  
GLUCOSE, POC  Collection Time: 01/26/21  8:46 AM  
 Result Value Ref Range Glucose (POC) 145 (H) 70 - 110 mg/dL Imaging: 
I have personally reviewed the patients radiographs and have reviewed the reports: No new imaging in the interval 
XR Results (most recent): 
Results from Hospital Encounter encounter on 01/23/21 XR CHEST PORT Narrative ONE VIEW CHEST RADIOGRAPH INDICATION: increased oxygen requirement. Covid19. COMPARISON: CTA chest and chest radiograph 1/23/2021. TECHNIQUE: AP view of the chest 
 
FINDINGS: 
  
LINES/TUBES: None. MEDIASTINUM: Stable cardiomegaly. LUNGS: Low lung volumes. Similar appearance of bilateral patchy consolidations. No definite pleural effusion or pneumothorax. BONES/OTHER: No acute osseous abnormality. Impression Similar appearance of bilateral patchy consolidations since 1/23/2021, likely 
reflecting patient's known covid 19 infection. CT Results (most recent): 
Results from [facility] Encounter encounter on 01/23/21 CTA CHEST W OR W WO CONT Narrative CT chest with contrast for PE HISTORY: Dyspnea. COMPARISON: None TECHNIQUE: Dynamic spiral scan through the chest is obtained from the thoracic 
inlet to the diaphragm after dynamic nonionic IV contrast administration  per PE 
protocol. Coronal and sagittal MIP computer reconstructions are also obtained 
for better visualization of the integrity of pulmonary arteries in 3D dimension, 
particularly for lobar/interlobar arterial branches and to minimize radiation 
dose. All CT scans at this facility performed using dose optimization techniques as 
appreciated to a performed exam, to include automated exposure control, 
adjustment of the mA and or KU according to patient size (including appropriate 
matching for site specific examination), or use of iterative reconstruction 
technique. FINDINGS: 
 
PULMONARY ARTERIES: The contrast bolus is adequate. Although, moderate motion artifact noted which significantly compromises the evaluation of the small 
arterial branches. The right and left mainstem pulmonary arteries and their 
lobar branches appear patent without convincing evidence of intraluminal filling 
defect identified to suggest pulmonary embolism. There are questionable 
nonocclusive intraluminal linear filling defects in the segmental/subsegmental 
branches at anterior and lateral right upper lobe and right middle lobe, 
uncertain due to artifact or potential tiny PE. 
  
AORTA AND OTHER CARDIOVASCULAR STRUCTURES: Mild ectasia of thoracic aorta. No 
aortic aneurysm or dissection. Mild burden of  coronary artery calcifications. Heart Strain assessment: 
-  RV/LV ratio (normal <0.9): Normal 
-  Dysfunction or bowing of interventricular septum: None -  Main pulmonary artery enlargement (>30mm): Not present -  There is not visualization of contrast reflux from the right heart into the 
IVC/hepatic veins. LUNG PARENCHYMA: There are multiple patchy areas of airspace consolidations 
identified throughout both lungs, more pronounced in bilateral lower lobes and 
inferior left upper lobe. Patent airway. IMAGED THYROID: Unremarkable. MEDIASTINUM: Borderline adenopathy in bilateral chu and subcarinal 
mediastinum. Heddy  PLEURAL SPACES AND CHEST WALL: No significant pleural effusion. Mild pleural 
thickening. VISUALIZED UPPER ABDOMEN: Very atrophic right kidney is partially seen. OSSEOUS STRUCTURES: Unremarkable. Impression 1. No convincing CT evidence of pulmonary embolism in mainstem and lobar 
arteries. Questionable nonocclusive intraluminal filling defects identified in 
right upper and middle lobe segmental subsegmental images, artifact versus tiny 
nonocclusive PE. 
 
2.  Extensive patchy consolidation pneumonia in bilateral lungs, Covid infection 
is more concerned than pulmonary infarctions. Clinical correlation and 
short-term follow-up CT advised. Thank you for your referral. 
  
 
  
 
08/23/18 ECHO ADULT COMPLETE 08/25/2018 8/25/2018 Narrative · Left ventricular low normal systolic function. Estimated left  
ventricular ejection fraction is 51 - 55%. Biplane method used to measure  
ejection fraction. Left ventricular severe concentric hypertrophy. Left  
ventricular global hypokinesis. · Mild aortic valve regurgitation is present. · LV strain shows relative apical sparing with reduced global strain at  
discharge -9.1% · Aortic root and ascending aorta measures 4.1 cm. · Pulmonary arterial systolic pressure is 26 mmHg. There is no evidence of  
pulmonary hypertension. · Trivial pericardial effusion. · Left atrial cavity size is mildly dilated. · Mitral annular calcification. Trace mitral valve regurgitation. Signed by: Ann Marie Nagel MD  
 
 
Total of 33min critical care time spent at bedside (personally) during the course of care providing evaluation,management and care decisions and ordering appropriate treatment related to critical care problems exclusive of procedures. The reason for providing this level of medical care for this critically ill patient was due a critical illness that impaired one or more vital organ systems such that there was a high probability of imminent or life threatening deterioration in the patients condition. This care involved high complexity decision making to assess, manipulate, and support vital system functions, to treat this degree vital organ system failure and to prevent further life threatening deterioration of the patients condition. This time was independent of other practitioners. Complex decision making was made in the evaluation and management plans during this consultation. More than 50% of time was spent in counseling and coordination of care including review of data and discussion with other team members. Lilly Wolf MD/MPH Pulmonary, Critical Care Medicine [facility] [employer] [facility]

## 2021-01-26 NOTE — PROGRESS NOTES
[facility] Progress Note Patient: Ellie Pressley MRN: [id number] SSN: [id number]  YOB: 1960 Age: 62 y.o. Sex: male Admit Date: 1/23/2021 LOS: 3 days Chief Complaint Patient presents with  Shortness of Breath Subjective:  
 
Patient seen at bedside. SOB is improving. Currently on 40l/min hi flow % FiO2. SpO2 96%. Tolerating PO intake. Review of Systems Constitutional: Negative for chills and fever. Respiratory: Negative for cough and hemoptysis. Cardiovascular: Negative for chest pain and palpitations. Gastrointestinal: Negative for diarrhea, nausea and vomiting. Musculoskeletal: Negative for myalgias. Neurological: Negative for dizziness and headaches. Objective:  
 
Visit Vitals /76 Pulse 86 Temp 97.2 °F (36.2 °C) Resp 25 Ht 5' 9\" (1.753 m) Wt 104.3 kg (230 lb) SpO2 96% BMI 33.97 kg/m² Physical Exam: 
General:  Alert and Responsive and in No acute distress. 40l/min hi flow NC SpO2 96%. HEENT: Conjunctiva pink, sclera anicteric. EOMI.  MMM. CV:  RRR. No murmurs, rubs, or gallops appreciated. No visible pulsations or thrills.   
RESP:  Unlabored breathing.  Mild crackles in lung bases. Equal expansion bilaterally.   
ABD:  Soft, nontender, nondistended. Normoactive bowel sounds. No hepatosplenomegaly. No suprapubic tenderness. MS:  No joint deformity or instability.  No atrophy. Neuro:  5/5 strength bilateral upper extremities and lower extremities.  A+Ox3. Ext:  No edema.  2+ radial and dp pulses bilaterally. Skin:  No rashes, lesions, or ulcers.  Good turgor. 
  
 
Intake and Output: 
Current Shift: No intake/output data recorded. Last three shifts: 01/24 1901 - 01/26 0700 In: 290 [I.V.:290] Out: 1800 Labs, results, and imaging reviewed Assessment and Plan: Bird Gill is a 62 y.o. male with PMH of osteoarthritis, CKD on dialysis MWF, gout with no recent flares, HTN, hyperlipidemia  who is admitted for acute hypoxic respiratory failure, likely due to COVID-19 pna.  
  
Acute hypoxic respiratory failure - DDX to include COVID-19 pna vs CAP vs ?CHF. CXR significant for patchy infiltrations/consolidations in bilateral mid/lower lungs, greater on the left, with concerns for COVID pna. CTA with confirmed extensive patchy consolidation in bilateral lungs, and questionable non-occlusive intraluminal filling defects in right upper and middle lobe segmental arteries; NO PE. Procal and CRP down-trending. D-dimer 2.74. BCx NG2D. 
- transfer to 3N stepdown, q4h vital checks 
- continue [place] NC 40 L FiO2 100%, wean oxygen as tolerated to maintain SpO2 >92% - Bronchial hygiene protocol - Bronchodilators- Atrovent 2 puffs q4h or Albuterol 2 puffs q4h 
- Continue Rocephin 2 g qD and Zithromax 500 mg qD (2 more days on 1/26 and 1/27) - Continue Decadron 6 mg IV qD (day 4 of 10) - Continue Remdesivir(200 mg IV day 1, 100 mg IV once daily for 4 days) -  day 4 of 5 
- appreciate ID and pulm recs 
- O2 support; wean as tolerated - Continue Vitamin C 1000 mg q6h, Aspirin 325 mg, Vitamin D3 50, 000 units once, famotidine 20 mg daily, melatonin 5 mg qhs, Zinc sulfate 100 mg  
- CRP, ESR, D-dimer, pro calcitonin daily 
- CBC, CMP daily - Vital signs per unit routine 
  
Troponinemia - Troponin elevated to 0.51>0.42>0. 33. Likely ischemic demand from hypoxia vs R heart strain from possible PE. Patient denies chest pain. EKG NSR Qtc 510. ECHO from 2018 significant for EF 51-55%, low normal systolic function, left ventricular severe concentric hypertrophy. ECHO 1/25 - Estimated LVEF is 55 - 60%. Moderate aortic root dilatation. (4.1 cm). BNP 7K. - trend trops q6h until [facility] 
- cardiology following; heparin gtt not advised. Continue  mg daily - Avoid Qtc prolonging drugs Hyperglycemia - likely in the setting of recent high dose steroid use. A1c 6.1 (pre-diabetic range) -  correctional lispro insulin modified to very resistant dose 
-  Start low dose basal lantus insulin 5 units daily  
- AC&HS 
  
ESRD on dialysis MWF 
- continue dialysis in-patient 
- Held Velphoro (phosphate binder). Started Renvela 800 mg TID  
- nephro consulted; appreciate recs Hypotension, in the setting of dialysis MWF 
- continue midodrine 10 mg TID 
- midodrine 10 mg prn during dialysis - Resume Coreg 3.125 mg BID once blood pressures stabilize Hyperlipidemia 
- Continue Pravastatin 20 mg daily GOUT, no recent flares. Patient is no longer on Allopurinol.  
- monitor for flares Diet renal  
DVT Prophylaxis SQH  
GI Prophylaxis famotidine Code status full Disposition >2MN Point of Contact [employer] Relationship: sister 
[phone] Ally Ge MD, PGY1 [address] Intern Pager: [phone] January 26, 2021, 11:36 AM

## 2021-01-26 NOTE — PROGRESS NOTES
Chart reviewed. Pt covid positive and remains on hi flow oxygen. He is dialysis and before hospitalization was going to outpatient covid center at [facility]. Will need Medicaid Van transportation set up at discharge. Pt used [employer]. PT recommending home health. Case Management will continue to follow and assist with discharge needs. SENTHIL Cueto RN Care Management Pager: [phone]

## 2021-01-26 NOTE — PROGRESS NOTES
Problem: Mobility Impaired (Adult and Pediatric) Goal: *Acute Goals and Plan of Care (Insert Text) Description: Physical Therapy Goals Initiated 1/26/2021 and to be accomplished within 7 day(s) 1. Patient will move from supine to sit and sit to supine  in bed with modified independence. 2.  Patient will transfer from bed to chair and chair to bed with modified independence using the least restrictive device. 3.  Patient will perform sit to stand with modified independence. 4.  Patient will ambulate with modified independence for 300 feet with the least restrictive device. 5.  Patient will ascend/descend 16 stairs with B handrail(s) with modified independence. PLOF: Pt lives alone in an apartment with 16 YASMIN. He was indep with all mobility PTA without use of an Ad. He states his niece is an RN and will be able to stop by after d/c to check on him. Outcome: Progressing Towards Goal 
  
PHYSICAL THERAPY EVALUATION Patient: Elizabeth Lorenzo (89 y.o. male) Date: 1/26/2021 Primary Diagnosis: Acute respiratory failure due to COVID-19 ([facility]) [U07.1, J96.00] Precautions:  Fall ASSESSMENT : 
 Based on the objective data described below, the patient presents with weakness, decreased endurance, and SOB. RN cleared for PT to work with pt. Pt found supine in bed, on 40L HFNC, in NAD, willing to work with PT. He reports no SOB, was edu on PLB technique. SPO2 at 97% at rest. Pt performed supine-sit with SBA. He then stood with RW and Nadir, stood for a few seconds and quickly sat back down, stating he feels weak. Spo2 still up around 95%, no reports of SOB. Pt back up to standing with Nadir and RW, able to stand for ~2 minutes this time, requesting to sit back down for a rest. Pt sat on EOB for ~5 minutes, Spo2 down to lowest 75%, but quickly back up to mid-90's, unsure if accurate low reading. Pt reports no SOB. Pt stood with RW again and CGA this time and took 2 side steps to L on EOB. Once back sitting on EOB, pt reported slight SOB, at 91% with PLB and pat assisted back supine in bed. Pt was left sidelying on left side in bed, 91% SPO2 in NAD, on 40L HFNC, call bell nearby and all needs met. Pt would benefit from continued acute PT. Recommend d/c home with [facility] and increased supervision from lauri. Patient will benefit from skilled intervention to address the above impairments. Patient's rehabilitation potential is considered to be Fair Factors which may influence rehabilitation potential include:  
[]         None noted 
[]         Mental ability/status [x]         Medical condition 
[x]         Home/family situation and support systems 
[x]         Safety awareness 
[]         Pain tolerance/management 
[]         Other: PLAN : 
Recommendations and Planned Interventions:  
[x]           Bed Mobility Training             [x]    Neuromuscular Re-Education 
[x]           Transfer Training                   []    Orthotic/Prosthetic Training 
[x]           Gait Training                          []    Modalities [x]           Therapeutic Exercises           []    Edema Management/Control [x]           Therapeutic Activities            [x]    Family Training/Education 
[x]           Patient Education 
[]           Other (comment): Frequency/Duration: Patient will be followed by physical therapy 1-2 times per day/4-7 days per week to address goals. Discharge Recommendations: Home Health with increased supervision Further Equipment Recommendations for Discharge: N/A  
 
SUBJECTIVE:  
Patient stated do I have to get up now? They yelled at me last time I tried.  OBJECTIVE DATA SUMMARY:  
 
Past Medical History:  
Diagnosis Date Arthritis of left knee 09/2017  
 moderate to severe, with effusion on xray Chronic kidney disease ESRD  HD on MWF Diastolic CHF ([facility] Dilated cardiomyopathy ([facility] History of alcohol abuse History of echocardiogram 02/24/2014 Mod-marked LVE. EF 15%. Severe, diffuse hypk. Mild LVH. Gr 1 DDfx. Mod LAE. Mild-mod FIDELIA. Mild AoRE. No significant change from echo of 10/23/09. History of gout History of myocardial perfusion scan 05/25/2006 No evidence of ischemia or scarring. Gross LVE. EF 28%. Severe global hypk. Neg EKG on submaximal EST. Ex time 8 min 25 sec. HTN (hypertension) Hypercholesterolemia Hypertensive cardiovascular disease Past Surgical History:  
Procedure Laterality Date HX COLONOSCOPY    
 HX HERNIA REPAIR  04/2016 HX VASCULAR ACCESS Right upper chest HD CATH Barriers to Learning/Limitations: None Compensate with: N/A Home Situation: 
Home Situation Home Environment: Apartment # Steps to Enter: 16 One/Two Story Residence: One story Living Alone: Yes Support Systems: Family member(s) Patient [facility] Used/Available at Home: None Tub or Shower Type: Tub/Shower combination Critical Behavior: 
Neurologic State: Alert Orientation Level: Oriented X4 Cognition: Follows commands Safety/Judgement: Fall prevention Psychosocial 
 Patient Behaviors: Calm Strength:   
Strength: Generally decreased, functional 
 
Tone & Sensation:  
Tone: Normal 
 
Sensation: Intact Range Of Motion: 
AROM: Within functional limits Functional Mobility: 
Bed Mobility: 
  
Supine to Sit: Stand-by assistance Sit to Supine: Stand-by assistance Scooting: Stand-by assistance Transfers: 
Sit to Stand: Minimum assistance Stand to Sit: Contact guard assistance Balance:  
Sitting: Intact Standing: Impaired; With support Standing - Static: Good Standing - Dynamic : Fair Ambulation/Gait Training: 
Distance (ft): 2 Feet (ft) Assistive Device: Walker, rolling Ambulation - Level of Assistance: Contact guard assistance Gait Abnormalities: Decreased step clearance Base of Support: Narrowed Speed/Katja: Slow;Shuffled Step Length: Right shortened;Left shortened Therapeutic Exercises: Pt performed exercises per flow sheet sitting on EOB EXERCISE Sets Reps Active Active Assist  
Passive Self ROM Comments Ankle Pumps 1 20  [x] [] [] [] Quad Sets/Glut Sets   [] [] [] [] Hamstring Sets   [] [] [] [] Short Arc Quads   [] [] [] [] Heel Slides   [] [] [] [] Straight Leg Raises   [] [] [] [] Hip Abd/Add   [] [] [] [] Long Arc Quads 1 15 [x] [] [] [] Seated Marching 1 20 [x] [] [] []   
Standing Marching   [] [] [] []   
   [] [] [] []   
  
Pain: 
Pain level pre-treatment: 0/10 Pain level post-treatment: 0/10 Pain Intervention(s) : Medication (see MAR); Rest, Repositioning Response to intervention: Nurse notified, See doc flow Activity Tolerance:  
Pt tolerated mobility Please refer to the flowsheet for vital signs taken during this treatment. After treatment:  
[]         Patient left in no apparent distress sitting up in chair 
[x]         Patient left in no apparent distress in bed 
[x]         Call bell left within reach [x]         Nursing notified 
[]         Caregiver present []         Bed alarm activated 
[]         SCDs applied COMMUNICATION/EDUCATION:  
[x]         Role of Physical Therapy in the acute care setting. [x]         Fall prevention education was provided and the patient/caregiver indicated understanding. [x]         Patient/family have participated as able in goal setting and plan of care. []         Patient/family agree to work toward stated goals and plan of care. []         Patient understands intent and goals of therapy, but is neutral about his/her participation. []         Patient is unable to participate in goal setting/plan of care: ongoing with therapy staff. 
[]         Other: Thank you for this referral. 
Urvashi Ceja Time Calculation: 23 mins Eval Complexity: History: LOW Complexity : Zero comorbidities / personal factors that will impact the outcome / POCExam:LOW Complexity : 1-2 Standardized tests and measures addressing body structure, function, activity limitation and / or participation in recreation  Presentation: LOW Complexity : Stable, uncomplicated  Clinical Decision Making:Low Complexity    Overall Complexity:LOW

## 2021-01-26 NOTE — PROGRESS NOTES
[facility] Post-rounding Note OBJECTIVE Visit Vitals /74 (BP 1 Location: Right arm, BP Patient Position: At rest) Pulse 95 Temp 97.5 °F (36.4 °C) Resp 13 Ht 5' 9\" (1.753 m) Wt 104.3 kg (230 lb) SpO2 98% BMI 33.97 kg/m² PERTINENT LABS/IMAGING: 
 
COVID-19 rapid (+) on 1/25/2021 ECHO - Estimated LVEF is 55 - 60%. TREATMENT PLAN UPDATES: 
 
Acute hypoxic respiratory failure 
- continue Hi flow NC 40 L FiO2 100% and NRB, wean oxygen as tolerated to maintain SpO2 >92%. Bipap QHS. - Bronchial hygiene protocol - Bronchodilators- Atrovent 2 puffs q4h or Albuterol 2 puffs q4h - Completed Rocephin 2 g qD (1/23-1/26). Continue Zithromax 500 mg qD (for 1 more day 1/27) - Continue Decadron 6 mg IV qD (day 4 of 10) - Continue Remdesivir (day 4 of 5) Hypotension, in the setting of dialysis MWF. Blood pressures continued to be stable today. - continue midodrine 10 mg TID 
- midodrine 10 mg prn during dialysis - Resume Coreg 3.125 mg BID once blood pressures stabilize See daily progress note for full assessment/plan. Perla Chavez MD, PGY-1  
[facility] Bucky Pager: [phone] January 26, 2021, 4:29 PM

## 2021-01-26 NOTE — PROGRESS NOTES
[facility] 
(A Division of [address] [place] [address]) Follow-up Note Date of Admission: 1/23/2021       Date of Note:  1/26/2021 Summary:   
61 y/o AAM w/ HTN, HLD, ESRD-HD, Dilated CMO, CHF adm 1/23 due to shortness of breath. He had headache, fever and chills for 2 days and tested positive for Covid 8 days PTA at [facility] ED (1/15). Sent home but had worsening shortness of breath for which he presented to the [facility] ED on 1/22. Initial O2 Sat on 5 lpm NC was 90% and he was quickly placed on 40 lpm HFNC. CXR 1/23 patchy infiltrations/consolidations both mid/lower lungs greater on left. Chest CT extensive patchy consolidation pneumonia in bilateral lungs. Admitted 1/23, started on Ceftriaxone and Azithromycin. Remdesivir and dexamethasone started just past midnight 12/23. Has been afebrile and with normal WBC count since admission. Interval History: 
Sitting up in bed. Says his breathing is better but still on HFNC 40 lpm.  Complains about multiple needle sticks for blood draw attempts. Current Antimicrobials:    Prior Antimicrobials: 
Azithromycin 1/23 - 4 Remdesivir 1/23 - #4 Ceftriaxone 1/23 - 3 Assessment: Rec / Plan:  
Covid Pneumonia 
- fever, chills, headache, shortness of breath. Onset ~ 1/13 
- SARS Cov2 + 1/15 
- Chest CT 1/23 extensive patchy consolidation pneumonia in bilateral lungs (likely all COVID, doubt secondary bacterial pneumonia) -> two more doses remdesivir 
-> Continue dexamethasone 
-> azithromycin 1 more day (1/27) 
-> dc Ceftriaxone ESRD-HD -> per Renal  
Dilated CMO -> per Cardiology CHF   
HTN   
HLD   
H/o ETOH abuse Microbiology: 
 
 
Lines / Catheters: 
 
 
 
Patient Active Problem List  
Diagnosis Code  ESRD (end stage renal disease) on dialysis (HCC) N18.6, Z99.2  Nonischemic cardiomyopathy (HCC) I42.8  Diastolic CHF (HCC) J33.21  Hyperlipidemia E78.5  Essential hypertension I10  
 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 90U6  Systolic CHF, chronic (HCC) I50.22  Severe obesity (BMI 35.0-39. 9) with comorbidity ([facility] [address] E66.01  
 ESRD on dialysis ([facility] N18.6, Z99.2  Acute respiratory failure due to COVID-19 (HCC) U07.1, J96.00 Current Facility-Administered Medications Medication Dose Route Frequency  midodrine (PROAMATINE) tablet 10 mg  10 mg Oral TID WITH MEALS  
 albumin human 25% (BUMINATE) solution 25 g  25 g IntraVENous DIALYSIS PRN  
 insulin glargine (LANTUS) injection 5 Units  5 Units SubCUTAneous DAILY  melatonin (rapid dissolve) tablet 5 mg  5 mg Oral QHS  insulin lispro (HUMALOG) injection   SubCUTAneous AC&HS  
 glucose chewable tablet 16 g  4 Tab Oral PRN  
 glucagon (GLUCAGEN) injection 1 mg  1 mg IntraMUSCular PRN  
 dextrose (D50W) injection syrg 12.5-25 g  25-50 mL IntraVENous PRN  
 ipratropium (ATROVENT HFA) 17 mcg inhaler  2 Puff Inhalation Q4H RT  
 Or  
 albuterol (PROVENTIL HFA, VENTOLIN HFA, PROAIR HFA) inhaler 2 Puff  2 Puff Inhalation Q4H RT  
 sevelamer carbonate (RENVELA) tab 800 mg  800 mg Oral TID WITH MEALS  sodium chloride (NS) flush 5-10 mL  5-10 mL IntraVENous PRN  
 cefTRIAXone (ROCEPHIN) 2 g in sterile water (preservative free) 20 mL IV syringe  2 g IntraVENous Q24H  
 azithromycin (ZITHROMAX) 500 mg in 0.9% sodium chloride 250 mL (VIAL-MATE)  500 mg IntraVENous Q24H  
 sodium chloride (NS) flush 5-40 mL  5-40 mL IntraVENous Q8H  
 sodium chloride (NS) flush 5-40 mL  5-40 mL IntraVENous PRN  
 acetaminophen (TYLENOL) tablet 650 mg  650 mg Oral Q6H PRN Or  
 acetaminophen (TYLENOL) suppository 650 mg  650 mg Rectal Q6H PRN  polyethylene glycol (MIRALAX) packet 17 g  17 g Oral DAILY PRN  
 heparin (porcine) injection 5,000 Units  5,000 Units SubCUTAneous Q8H  
 ascorbic acid (vitamin C) (VITAMIN C) tablet 1,000 mg  1,000 mg Oral Q6H  
  zinc sulfate (ZINCATE) 220 (50) mg capsule 2 Cap  2 Cap Oral DAILY  aspirin tablet 325 mg  325 mg Oral DAILY  famotidine (PEPCID) tablet 20 mg  20 mg Oral DAILY  [Held by provider] carvediloL (COREG) tablet 3.125 mg  3.125 mg Oral BID WITH MEALS  pravastatin (PRAVACHOL) tablet 20 mg  20 mg Oral DAILY  dexamethasone (DECADRON) 4 mg/mL injection 6 mg  6 mg IntraVENous Q24H  
 remdesivir 100 mg in 0.9% sodium chloride 250 mL IVPB  100 mg IntraVENous Q24H  
 midodrine (PROAMATINE) tablet 10 mg  10 mg Oral DIALYSIS PRN Review of Systems - Negative except as in interval history Objective: 
 
Visit Vitals /85 (BP 1 Location: Right arm, BP Patient Position: At rest) Pulse 90 Temp 97.7 °F (36.5 °C) Resp 19 Ht 5' 9\" (1.753 m) Wt 104.3 kg (230 lb) SpO2 100% BMI 33.97 kg/m² Temp (24hrs), Av.6 °F (36.4 °C), Min:97.2 °F (36.2 °C), Max:97.9 °F (36.6 °C) General: Well developed, obese 62 y.o. BLACK male in no acute distress on HFNC  
ENT: ENT exam normal, no neck nodes or sinus tenderness Head: normocephalic, without obvious abnormality Mouth:  not examined Neck: supple, symmetrical, trachea midline Cardio:  regular rate and rhythm, S1, S2 normal, no murmur, click, rub or gallop Chest: inspection normal - no chest wall deformities or tenderness, respiratory effort normal 
Lungs: mild bibasilar rales, no wheezing Abdomen: soft, non-tender. Bowel sounds normal. No masses, no organomegaly. , decreased bowel sounds Extremities:  no redness or tenderness in the calves or thighs, no edema Lab results: 
 
Chemistry Recent Labs  
  21 
0100 21 
0056 21 
7661 * 162* 132*  138 139  
K 3.6 4.2 4.9  
 100 101 CO2 24 24 24 BUN 53* 76* 55* CREA 11.10* 16.00* 15.00* CA 9.1 9.7 9.0 AGAP 13 14 14 BUCR 5* 5* 4* AP 44* 45 39*  
TP 8.3* 8.1 7.4 ALB 3.5 2.9* 3.0*  
GLOB 4.8* 5.2* 4.4* AGRAT 0.7* 0.6* 0.7*  
 
 
 CBC w/ Diff Recent Labs  
  01/26/21 
0100 01/25/21 
0056 01/24/21 
9804 WBC 10.4 8.5 5.9  
RBC 3.79* 3.81* 3.80* HGB 10.9* 10.9* 10.8* HCT 32.6* 33.3* 33.6*  
 314 272 GRANS 80* 77* 70  
LYMPH 7* 6* 17* EOS 0 0 0 Microbiology All Micro Results Procedure Component Value Units Date/Time CULTURE, BLOOD [[id number]] Collected: 01/23/21 1116 Order Status: Completed Specimen: Blood Updated: 01/26/21 0753 Special Requests: NO SPECIAL REQUESTS Culture result: NO GROWTH 3 DAYS     
 CULTURE, BLOOD [[id number]] Collected: 01/23/21 1115 Order Status: Completed Specimen: Blood Updated: 01/26/21 0753 Special Requests: NO SPECIAL REQUESTS Culture result: NO GROWTH 3 DAYS     
 COVID-19 RAPID TEST [[id number]]  (Abnormal) Collected: 01/25/21 1415 Order Status: Completed Specimen: Nasopharyngeal Updated: 01/25/21 1518 Specimen source Nasopharyngeal     
  COVID-19 rapid test Detected Comment: CALLED TO AND CORRECTLY REPEATED BY: 
FADIA LANTIGUA [facility] 3N AT 5144 BY KDA 1/25/21 The specimen is POSITIVE for SARS-CoV-2, the novel coronavirus associated with COVID-19. This test has been authorized by the FDA under an Emergency Use Authorization (EUA) for use by authorized laboratories. Fact sheet for Healthcare Providers: ConventionUpdate.co.nz Fact sheet for Patients: ConventionUpdate.co.nz Methodology: Isothermal Nucleic Acid Amplification Ko Nur MD, [facility] [place] [facility] 1/26/2021  
3:45 PM

## 2021-01-26 NOTE — PROGRESS NOTES
Problem: Pressure Injury - Risk of 
Goal: *Prevention of pressure injury Description: Document Sotero Scale and appropriate interventions in the flowsheet. Outcome: Progressing Towards Goal 
Note: Pressure Injury Interventions: 
  
 
Moisture Interventions: Absorbent underpads, Apply protective barrier, creams and emollients, Assess need for specialty bed, Minimize layers, Moisture barrier Activity Interventions: Chair cushion, Increase time out of bed, Pressure redistribution bed/mattress(bed type), PT/OT evaluation Mobility Interventions: Chair cushion, HOB 30 degrees or less, Pressure redistribution bed/mattress (bed type), PT/OT evaluation Nutrition Interventions: Document food/fluid/supplement intake Problem: Patient Education: Go to Patient Education Activity Goal: Patient/Family Education Outcome: Progressing Towards Goal 
  
Problem: Falls - Risk of 
Goal: *Absence of Falls Description: Document Heaven Antunez Fall Risk and appropriate interventions in the flowsheet. Outcome: Progressing Towards Goal 
Note: Fall Risk Interventions: 
Mobility Interventions: Bed/chair exit alarm, Patient to call before getting OOB, PT Consult for mobility concerns, Strengthening exercises (ROM-active/passive) Medication Interventions: Assess postural VS orthostatic hypotension, Bed/chair exit alarm, Evaluate medications/consider consulting pharmacy, Patient to call before getting OOB, Teach patient to arise slowly Elimination Interventions: Bed/chair exit alarm, Call light in reach, Urinal in reach Problem: Patient Education: Go to Patient Education Activity Goal: Patient/Family Education Outcome: Progressing Towards Goal 
  
Problem: Breathing Pattern - Ineffective Goal: *Absence of hypoxia Outcome: Progressing Towards Goal 
  
Problem: Patient Education: Go to Patient Education Activity Goal: Patient/Family Education Outcome: Progressing Towards Goal 
  
 Problem: Risk for Spread of Infection Goal: Prevent transmission of infectious organism to others Description: Prevent the transmission of infectious organisms to other patients, staff members, and visitors. Outcome: Progressing Towards Goal 
  
Problem: Patient Education: Go to Patient Education Activity Goal: Patient/Family Education Outcome: Progressing Towards Goal

## 2021-01-26 NOTE — PROGRESS NOTES
[facility] 
 
 
CARDIOLOGY PROGRESS NOTE 
RECS: 
 
 
 
 
1. Acute Hypoxic respiratory failure -related to Covid pneumonia currently on high flow oxygen - continue supportive care treatment per medical team and [facility] [place] 2. COVID - 19 pneumonia with consolidations trevon lower lobes - antibiotics per ID recs. 3. Detectable troponin - 0.51, 0.42, 0.33- EKG SR, non-specific ST changes. Elevation likely due to demand in setting of respiratory failure. Continue Aspirin 325 mg. Echo shows preserve EF% 4. Hypertension per history- now with low BP. Resume coreg when BP stabilizes/improves. On midodrine 5. History of dilated cardiomyopathy -  (EF 15% 11/2016) - had improved to EF of 55% 9/2019. NST 2018 intermediate risk NUC stress test Fixed defect consistent with prior myocardial infarction. Repeat echo. 6. Chronic diastolic CHF - NT pro BNP 7,247 -no significant peripheral edema. Volume status per renal  
7. Hyperlipidemia - continue statin 8. ESRD on dialysis - 1/23/21 HD  Cut shorted due to low BP  
9. Dilated ascending aorta measuring 4.5 cm on echo 2019 -stable recent echo shows Moderate aortic root dilatation. (4.1 cm) 10. Hx of alcohol abuse. Given normal wall motion and normal ejection fraction in the recent echo, would treat medically and consider CAD work-up if clinically indicated after patient recovers from acute Covid pneumonia. This may also be related to Covid related myocarditis that troponins are elevated. Echo 1/21 · LV: Estimated LVEF is 55 - 60%. Visually measured ejection fraction. Normal cavity size and systolic function (ejection fraction normal). Moderate concentric hypertrophy. Wall motion: normal. 
· AV: Mild aortic valve regurgitation is present. · PA: Pulmonary arterial systolic pressure is 23 mmHg. · AO: Moderate aortic root dilatation. (4.1 cm) · COVID (+) ASSESSMENT: 
Hospital Problems  Date Reviewed: 1/3/2019 Codes Class Noted POA * (Principal) Acute respiratory failure due to COVID-19 [facility]) ICD-10-CM: U07.1, J96.00 
ICD-9-CM: 518.81, 079.89  1/23/2021 Unknown SUBJECTIVE: 
Spoke with patient working with PT this am.  
No chest pain SOB same on high flow O2 OBJECTIVE: 
 
VS:  
Visit Vitals BP (!) 96/58 (BP 1 Location: Right arm, BP Patient Position: At rest) Pulse 92 Temp 97.6 °F (36.4 °C) Resp 18 Ht 5' 9\" (1.753 m) Wt 104.3 kg (230 lb) SpO2 95% BMI 33.97 kg/m² Intake/Output Summary (Last 24 hours) at 1/26/2021 1134 Last data filed at 1/25/2021 2200 Gross per 24 hour Intake 290 ml Output 1800 ml Net -1510 ml  
 
TELE: normal sinus rhythm Limited physical exam due to COV-19 General: alert, well developed, pleasant and in no apparent distress HENT: Normocephalic, atraumatic. Normal external eye. Cardiac:  regular rate and rhythm on telemetry Extremities:  No edema Labs: Results:  
   
Chemistry Recent Labs  
  01/26/21 0100 01/25/21 
0056 01/24/21 
2080 * 162* 132*  138 139  
K 3.6 4.2 4.9  
 100 101 CO2 24 24 24 BUN 53* 76* 55* CREA 11.10* 16.00* 15.00* CA 9.1 9.7 9.0 AGAP 13 14 14 BUCR 5* 5* 4* AP 44* 45 39*  
TP 8.3* 8.1 7.4 ALB 3.5 2.9* 3.0*  
GLOB 4.8* 5.2* 4.4* AGRAT 0.7* 0.6* 0.7* CBC w/Diff Recent Labs  
  01/26/21 0100 01/25/21 
0056 01/24/21 
3015 WBC 10.4 8.5 5.9  
RBC 3.79* 3.81* 3.80* HGB 10.9* 10.9* 10.8* HCT 32.6* 33.3* 33.6*  
 314 272 GRANS 80* 77* 70  
LYMPH 7* 6* 17* EOS 0 0 0 Cardiac Enzymes No results for input(s): CPK, CKND1, SHRAVAN in the last 72 hours. No lab exists for component: Carlotta Homans Coagulation No results for input(s): PTP, INR, APTT, INREXT, INREXT in the last 72 hours. Lipid Panel Lab Results Component Value Date/Time  Cholesterol, total 155 08/23/2018 03:50 PM  
 HDL Cholesterol 72 (H) 08/23/2018 03:50 PM  
 LDL, calculated 71 08/23/2018 03:50 PM  
 VLDL, calculated 12 08/23/2018 03:50 PM  
 Triglyceride 60 08/23/2018 03:50 PM  
 CHOL/HDL Ratio 2.2 08/23/2018 03:50 PM  
  
BNP No results for input(s): BNPP in the last 72 hours. Liver Enzymes Recent Labs  
  01/26/21 
0100 TP 8.3* ALB 3.5 AP 44* Thyroid Studies Lab Results Component Value Date/Time TSH 1.17 08/23/2018 03:50 PM  
    
 
 
 
[address] NP-C Tatiana:[phone] I have independently evaluated the patient. All relevant labs and testing data are reviewed. Care plan discussed and updated after review.  
Reyes Payne MD

## 2021-01-26 NOTE — PROGRESS NOTES
Problem: Pressure Injury - Risk of 
Goal: *Prevention of pressure injury Description: Document Sotero Scale and appropriate interventions in the flowsheet. Outcome: Progressing Towards Goal 
Note: Pressure Injury Interventions: 
  
 
Moisture Interventions: Absorbent underpads, Apply protective barrier, creams and emollients, Check for incontinence Q2 hours and as needed, Internal/External urinary devices, Minimize layers, Moisture barrier Activity Interventions: Assess need for specialty bed, Increase time out of bed, Pressure redistribution bed/mattress(bed type) Mobility Interventions: Assess need for specialty bed, HOB 30 degrees or less, Pressure redistribution bed/mattress (bed type), PT/OT evaluation Nutrition Interventions: Document food/fluid/supplement intake Problem: Patient Education: Go to Patient Education Activity Goal: Patient/Family Education Outcome: Progressing Towards Goal 
  
Problem: Falls - Risk of 
Goal: *Absence of Falls Description: Document Heaven Antunez Fall Risk and appropriate interventions in the flowsheet. Outcome: Progressing Towards Goal 
Note: Fall Risk Interventions: 
Mobility Interventions: Bed/chair exit alarm, Communicate number of staff needed for ambulation/transfer, Patient to call before getting OOB, PT Consult for mobility concerns Medication Interventions: Bed/chair exit alarm, Patient to call before getting OOB, Evaluate medications/consider consulting pharmacy, Teach patient to arise slowly Elimination Interventions: Bed/chair exit alarm, Call light in reach, Patient to call for help with toileting needs, Toilet paper/wipes in reach, Toileting schedule/hourly rounds Problem: Patient Education: Go to Patient Education Activity Goal: Patient/Family Education Outcome: Progressing Towards Goal 
  
Problem: Breathing Pattern - Ineffective Goal: *Absence of hypoxia Outcome: Progressing Towards Goal 
 Goal: *Use of effective breathing techniques Outcome: Progressing Towards Goal 
  
Problem: Patient Education: Go to Patient Education Activity Goal: Patient/Family Education Outcome: Progressing Towards Goal 
  
Problem: Risk for Spread of Infection Goal: Prevent transmission of infectious organism to others Description: Prevent the transmission of infectious organisms to other patients, staff members, and visitors. Outcome: Progressing Towards Goal 
  
Problem: Patient Education:  Go to Education Activity Goal: Patient/Family Education Outcome: Progressing Towards Goal 
  
Problem: Patient Education: Go to Patient Education Activity Goal: Patient/Family Education Outcome: Progressing Towards Goal 
  
Problem: Patient Education: Go to Patient Education Activity Goal: Patient/Family Education Outcome: Progressing Towards Goal

## 2021-01-26 NOTE — ROUTINE PROCESS
Bedside and Verbal shift change report given to [address] [place] [address] (oncoming nurse) by George Nagel RN (offgoing nurse). Report included the following information SBAR, Kardex, MAR and Recent Results. SITUATION:  
? Code Status: Full Code 
? Reason for Admission: Acute respiratory failure due to COVID-19 (HCC) [U07.1, J96.00] ? Hospital day: 3 
? Problem List:  
   
Hospital Problems  Date Reviewed: 1/3/2019 Codes Class Noted POA * (Principal) Acute respiratory failure due to COVID-19 [facility]) ICD-10-CM: U07.1, J96.00 
ICD-9-CM: 518.81, 079.89  1/23/2021 Unknown BACKGROUND:  
 Past Medical History:  
Past Medical History:  
Diagnosis Date  Arthritis of left knee 09/2017  
 moderate to severe, with effusion on xray  Chronic kidney disease ESRD  HD on MWF  
 Diastolic [facility]F ([facility] [address]  Dilated cardiomyopathy ([facility] [address]  History of alcohol abuse  History of echocardiogram 02/24/2014 Mod-marked LVE. EF 15%. Severe, diffuse hypk. Mild LVH. Gr 1 DDfx. Mod LAE. Mild-mod FIDELIA. Mild AoRE. No significant change from echo of 10/23/09.  History of gout  History of myocardial perfusion scan 05/25/2006 No evidence of ischemia or scarring. Gross LVE. EF 28%. Severe global hypk. Neg EKG on submaximal EST. Ex time 8 min 25 sec.  HTN (hypertension)  Hypercholesterolemia  Hypertensive cardiovascular disease Patient taking anticoagulants yes ASSESSMENT:  
? Changes in Assessment Throughout Shift: No 
 
? Patient has Central Line: no Reasons if yes: N/A 
? Patient has Vitale Cath: no Reasons if yes:N/A   
 
? Last Vitals: 
  
Vitals:  
 01/25/21 2101 01/26/21 6389 01/26/21 0403 01/26/21 0404 BP:  111/89 119/76 Pulse:  83 86 Resp:  19 25 Temp:  97.2 °F (36.2 °C) 97.2 °F (36.2 °C) TempSrc:      
SpO2: 98% 93% 92% 91% Weight:      
Height:      
 
 
? IV and DRAINS (will only show if present) Peripheral IV 01/23/21 Right Antecubital-Site Assessment: Clean, dry, & intact Hemodialysis Access-Site Assessment: Clean, dry, & intact Peripheral IV 01/23/21 Posterior;Right Hand-Site Assessment: Clean, dry, & intact ? WOUND (if present) Wound Type:  none Dressing present Dressing Present : No 
 Wound Concerns/Notes:  none ? PAIN Pain Assessment Pain Intensity 1: 0 (01/25/21 1941) Patient Stated Pain Goal: 0 
o Interventions for Pain:  none 
o Intervention effective: no 
o Time of last intervention:  N/A 
o Reassessment Completed: no  
 
? Last 3 Weights: 
Last 3 Recorded Weights in this Encounter 01/23/21 1020 01/24/21 0550 01/25/21 1230 Weight: 104.3 kg (230 lb) 111.1 kg (244 lb 14.4 oz) 104.3 kg (230 lb) Weight change: ? INTAKE/OUPUT Current Shift: No intake/output data recorded. Last three shifts: 01/24 1901 - 01/26 0700 In: 290 [I.V.:290] Out: 1800  
 
? LAB RESULTS Recent Labs  
  01/26/21 
0100 01/25/21 
0056 01/24/21 
8651 WBC 10.4 8.5 5.9 HGB 10.9* 10.9* 10.8* HCT 32.6* 33.3* 33.6*  
 314 272 Recent Labs  
  01/26/21 
0100 01/25/21 
0056 01/24/21 
0842 01/23/21 
1115 01/23/21 
1115  138 139   < >  --   
K 3.6 4.2 4.9   < >  --   
* 162* 132*   < >  --   
BUN 53* 76* 55*   < >  --   
CREA 11.10* 16.00* 15.00*   < >  --   
CA 9.1 9.7 9.0   < >  --   
MG  --   --   --   --  2.6  
 < > = values in this interval not displayed. RECOMMENDATIONS AND DISCHARGE PLANNING 1. Pending tests/procedures/ Plan of Care or Other NeedsN/A:   
 
2. Discharge plan for patient and Needs/Barriers: N/A 
 
3. Estimated Discharge Date:N/A  Posted on Whiteboard in Patients Room: no   
 
4. The patient's care plan was reviewed with the oncoming nurse. \"HEALS\" SAFETY CHECK Fall Risk Total Score: 3 Safety Measures: Safety Measures: Bed/Chair alarm on, Bed/Chair-Wheels locked, Bed in low position, Call light within reach, Gripper socks, Side rails X 3 A safety check occurred in the patient's room between off going nurse and oncoming nurse listed above. The safety check included the below items Area Items H High Alert Medications ? Verify all high alert medication drips (heparin, PCA, etc.) E Equipment ? Suction is set up for ALL patients (with arabella) ? Red plugs utilized for all equipment (IV pumps, etc.) ? WOWs wiped down at end of shift. ? Room stocked with oxygen, suction, and other unit-specific supplies A Alarms ? Bed alarm is set for fall risk patients ? Ensure chair alarm is in place and activated if patient is up in a chair L Lines ? Check IV for any infiltration ? Vitale bag is empty if patient has a Vitale ? Tubing and IV bags are labeled Rosalita Redmond Safety ? Room is clean, patient is clean, and equipment is clean. ? Hallways are clear from equipment besides carts. ? Fall bracelet on for fall risk patients ? Ensure room is clear and free of clutter ? Suction is set up for ALL patients (with arabella) ? Hallways are clear from equipment besides carts. ? Isolation precautions followed, supplies available outside room, sign posted Chance Perez RN

## 2021-01-26 NOTE — PROGRESS NOTES
RENAL DAILY PROGRESS NOTE Subjective:  
 
 
Complaint: feels ok,resting.seen via glass window Overnight events noted D/w RN On HFNC IMPRESSION:  
IMPRESSION:  
· ESRD on TTS at [address] · COVID Pneumonia · Hypoxic resp failure · Hypertension · Hx Diastolic CHF · Hyperlipidemia · Secondary hyperparathyroidism PLAN:  
· C/w  phos binders · Yesterday's dialysis with 1.8 L out. Required 2 doses of albumin to keep BP up. Continue with midodrine for now. · No gadolinium · Daily labs for now · Watch resp status · Further care per primary and ID 
   
  
 
 
 
Current Facility-Administered Medications Medication Dose Route Frequency  midodrine (PROAMATINE) tablet 10 mg  10 mg Oral TID WITH MEALS  
 albumin human 25% (BUMINATE) solution 25 g  25 g IntraVENous DIALYSIS PRN  
 insulin glargine (LANTUS) injection 5 Units  5 Units SubCUTAneous DAILY  melatonin (rapid dissolve) tablet 5 mg  5 mg Oral QHS  insulin lispro (HUMALOG) injection   SubCUTAneous AC&HS  
 glucose chewable tablet 16 g  4 Tab Oral PRN  
 glucagon (GLUCAGEN) injection 1 mg  1 mg IntraMUSCular PRN  
 dextrose (D50W) injection syrg 12.5-25 g  25-50 mL IntraVENous PRN  
 ipratropium (ATROVENT HFA) 17 mcg inhaler  2 Puff Inhalation Q4H RT  
 Or  
 albuterol (PROVENTIL HFA, VENTOLIN HFA, PROAIR HFA) inhaler 2 Puff  2 Puff Inhalation Q4H RT  
 sevelamer carbonate (RENVELA) tab 800 mg  800 mg Oral TID WITH MEALS  sodium chloride (NS) flush 5-10 mL  5-10 mL IntraVENous PRN  
 cefTRIAXone (ROCEPHIN) 2 g in sterile water (preservative free) 20 mL IV syringe  2 g IntraVENous Q24H  
 azithromycin (ZITHROMAX) 500 mg in 0.9% sodium chloride 250 mL (VIAL-MATE)  500 mg IntraVENous Q24H  
 sodium chloride (NS) flush 5-40 mL  5-40 mL IntraVENous Q8H  
 sodium chloride (NS) flush 5-40 mL  5-40 mL IntraVENous PRN  
 acetaminophen (TYLENOL) tablet 650 mg  650 mg Oral Q6H PRN  Or  
  acetaminophen (TYLENOL) suppository 650 mg  650 mg Rectal Q6H PRN  polyethylene glycol (MIRALAX) packet 17 g  17 g Oral DAILY PRN  
 heparin (porcine) injection 5,000 Units  5,000 Units SubCUTAneous Q8H  
 ascorbic acid (vitamin C) (VITAMIN C) tablet 1,000 mg  1,000 mg Oral Q6H  
 zinc sulfate (ZINCATE) 220 (50) mg capsule 2 Cap  2 Cap Oral DAILY  aspirin tablet 325 mg  325 mg Oral DAILY  famotidine (PEPCID) tablet 20 mg  20 mg Oral DAILY  [Held by provider] carvediloL (COREG) tablet 3.125 mg  3.125 mg Oral BID WITH MEALS  pravastatin (PRAVACHOL) tablet 20 mg  20 mg Oral DAILY  dexamethasone (DECADRON) 4 mg/mL injection 6 mg  6 mg IntraVENous Q24H  
 remdesivir 100 mg in 0.9% sodium chloride 250 mL IVPB  100 mg IntraVENous Q24H  
 midodrine (PROAMATINE) tablet 10 mg  10 mg Oral DIALYSIS PRN Objective:  
 
Patient Vitals for the past 24 hrs: 
 Temp Pulse Resp BP SpO2  
01/26/21 1210 97.7 °F (36.5 °C) 90 19 124/85 100 % 01/26/21 0842 97.6 °F (36.4 °C) 92 18 (!) 96/58 95 % 01/26/21 0758     96 % 01/26/21 0404     91 % 01/26/21 0403 97.2 °F (36.2 °C) 86 25 119/76 92 % 01/26/21 0026 97.2 °F (36.2 °C) 83 19 111/89 93 % 01/25/21 2101     98 % 01/25/21 1941 97.9 °F (36.6 °C) 79 21 110/70 98 % 01/25/21 1900 97.8 °F (36.6 °C) 83 24 109/64   
01/25/21 1815  77  104/70   
01/25/21 1800  80  108/72   
01/25/21 1745  76  112/72   
01/25/21 1730  78  102/64   
01/25/21 1715  79  117/69   
01/25/21 1700  77  122/77   
01/25/21 1645  81  (!) 98/57   
01/25/21 1630  81  111/73   
01/25/21 1615  85  124/78   
01/25/21 1600  84  113/72   
01/25/21 1545  77  103/76   
01/25/21 1530  81  124/72   
01/25/21 1515 97.8 °F (36.6 °C) 84 24 128/81   
01/25/21 1430 97.5 °F (36.4 °C) 80 23 129/71 93 % Weight change:  
 
 01/24 1901 - 01/26 0700 In: 290 [I.V.:290] Out: 1800 Intake/Output Summary (Last 24 hours) at 1/26/2021 1416 Last data filed at 1/26/2021 1200 Gross per 24 hour Intake 790 ml Output 1800 ml Net -1010 ml Patient was seen during the [place] pandemic. Patient with COVID infection . Patient is in isolation room due to COVID status and PPE is in short supply hence seen through glass window and d/w patients RN. Full contact physical exam was not possible due to patient's clinical condition, key findings seen by me are documented. In addition, I have also used findings documented by physicians who have recently examined thee patient as they are relevant to the patient's renal care. No distress on exam 
No lower extremity edema on inspection Data Review:  
 
LABS:  
Hematology:  
Recent Labs  
  01/26/21 
0100 01/25/21 
0056 01/24/21 
3011 WBC 10.4 8.5 5.9 HGB 10.9* 10.9* 10.8* HCT 32.6* 33.3* 33.6* Chemistry:  
Recent Labs  
  01/26/21 
0100 01/25/21 
0056 01/24/21 
0842 01/23/21 
1841 BUN 53* 76* 55* 62* CREA 11.10* 16.00* 15.00* 17.50* CA 9.1 9.7 9.0 8.6 ALB 3.5 2.9* 3.0* 2.4*  
K 3.6 4.2 4.9 4.9  138 139 133*  100 101 95* CO2 24 24 24 24 * 162* 132* 202* Procedures/imaging: see electronic medical records for all procedures, Xrays and details which were not copied into this note but were reviewed prior to creation of Plan Erick Thompson MD 
1/26/2021

## 2021-01-27 NOTE — PROGRESS NOTES
[facility]. Post-rounding Note OBJECTIVE Visit Vitals BP (!) 101/53 Pulse (!) 122 Temp 97.6 °F (36.4 °C) (Oral) Resp 28 Ht 5' 9\" (1.753 m) Wt 111.9 kg (246 lb 12.8 oz) SpO2 96% BMI 36.45 kg/m² TREATMENT PLAN UPDATES: 
 
 
Tachycardia and Hypotension - Increased anxiety during dialysis today which is the likely cause of his tachycardia. Hypotension unchanged since admission.   
- Continue 40 L/min hi flow FiO2 100% (increased from 75% FiO2 during dialysis) - Continue to monitor HR and BP overnight. 
- Continue Midodrine 10 mg TID with meals See daily progress note for full assessment/plan. Lyn Koroma MD, PGY-1  
[facility]. Bucky Pager: [phone] January 27, 2021, 6:31 PM

## 2021-01-27 NOTE — ROUTINE PROCESS
Bedside and Verbal shift change report given to [address] [place] [address] (oncoming nurse) by Frandy Cordon RN (offgoing nurse). Report included the following information SBAR, Kardex, MAR and Recent Results. SITUATION:  
? Code Status: Full Code 
? Reason for Admission: Acute respiratory failure due to COVID-19 (HCC) [U07.1, J96.00] ? Hospital day: 4 
? Problem List:  
   
Hospital Problems  Date Reviewed: 1/3/2019 Codes Class Noted POA * (Principal) Acute respiratory failure due to COVID-19 [facility]) ICD-10-CM: U07.1, J96.00 
ICD-9-CM: 518.81, 079.89  1/23/2021 Unknown BACKGROUND:  
 Past Medical History:  
Past Medical History:  
Diagnosis Date  Arthritis of left knee 09/2017  
 moderate to severe, with effusion on xray  Chronic kidney disease ESRD  HD on MWF  
 Diastolic CHF ([facility] [address]  Dilated cardiomyopathy ([facility] [address]  History of alcohol abuse  History of echocardiogram 02/24/2014 Mod-marked LVE. EF 15%. Severe, diffuse hypk. Mild LVH. Gr 1 DDfx. Mod LAE. Mild-mod FIDELIA. Mild AoRE. No significant change from echo of 10/23/09.  History of gout  History of myocardial perfusion scan 05/25/2006 No evidence of ischemia or scarring. Gross LVE. EF 28%. Severe global hypk. Neg EKG on submaximal EST. Ex time 8 min 25 sec.  HTN (hypertension)  Hypercholesterolemia  Hypertensive cardiovascular disease Patient taking anticoagulants no ASSESSMENT:  
? Changes in Assessment Throughout Shift: N/A 
 
? Patient has Central Line: no Reasons if yes: N/A 
? Patient has Vitale Cath: no Reasons if yes: N/A  
 
? Last Vitals: 
  
Vitals:  
 01/26/21 2020 01/26/21 2335 01/27/21 4521 01/27/21 3962 BP: 113/74 119/72 (!) 93/53 Pulse: 84 89 84 Resp: 28 21 22 Temp: 98.2 °F (36.8 °C) 97.9 °F (36.6 °C) 98.3 °F (36.8 °C) TempSrc:      
SpO2: 94% 97% 98% Weight:    111.9 kg (246 lb 12.8 oz) Height: ? IV and DRAINS (will only show if present) 
 [REMOVED] Peripheral IV 01/23/21 Right Antecubital-Site Assessment: Clean, dry, & intact 
Hemodialysis Access-Site Assessment: Clean, dry, & intact 
Peripheral IV 01/26/21 Anterior;Right Wrist-Site Assessment: Clean, dry, & intact 
[REMOVED] Peripheral IV 01/23/21 Posterior;Right Hand-Site Assessment: Clean, dry, & intact 
 
? WOUND (if present) 
 Wound Type:  none 
 Dressing present Dressing Present : No 
 Wound Concerns/Notes:  none 
 
? PAIN 
  Pain Assessment 
  Pain Intensity 1: 0 (01/27/21 0438) 
    
    
  Patient Stated Pain Goal: 0 
o Interventions for Pain:  none 
o Intervention effective: no 
o Time of last intervention: N/A  
o Reassessment Completed: no  
 
? Last 3 Weights: 
Last 3 Recorded Weights in this Encounter  
 01/24/21 0550 01/25/21 1230 01/27/21 0554  
Weight: 111.1 kg (244 lb 14.4 oz) 104.3 kg (230 lb) 111.9 kg (246 lb 12.8 oz)  
 
Weight change: 7.62 kg (16 lb 12.8 oz) 
 
? INTAKE/OUPUT 
  Current Shift: No intake/output data recorded. 
  Last three shifts: 01/25 1901 - 01/27 0700 
In: 1080 [P.O.:500; I.V.:580] 
Out: -  
 
? LAB RESULTS 
  
Recent Labs  
  01/27/21 
0055 01/26/21 
0100 01/25/21 
0056  
WBC 10.9 10.4 8.5  
HGB 10.7* 10.9* 10.9*  
HCT 32.4* 32.6* 33.3*  
 325 314  
 
  
Recent Labs  
  01/27/21 
0055 01/26/21 
0100 01/25/21 
0056  
 138 138  
K 4.2 3.6 4.2  
* 201* 162*  
BUN 76* 53* 76*  
CREA 13.60* 11.10* 16.00*  
CA 9.5 9.1 9.7  
 
 
RECOMMENDATIONS AND DISCHARGE PLANNING  
 
1. Pending tests/procedures/ Plan of Care or Other Needs:Dialysis  
 
2. Discharge plan for patient and Needs/Barriers:N/A  
 
3. Estimated Discharge Date:N/A  Posted on Whiteboard in Patient’s Room: no   
 
4. The patient's care plan was reviewed with the oncoming nurse.  
   
\"HEALS\" SAFETY CHECK  
 
 Fall Risk  
 Total Score: 3  
 Safety Measures: Safety Measures: Bed/Chair alarm on, Bed/Chair-Wheels locked, Bed in low position, Call light within reach, Side rails X 3 A safety check occurred in the patient's room between off going nurse and oncoming nurse listed above. The safety check included the below items Area Items H High Alert Medications ? Verify all high alert medication drips (heparin, PCA, etc.) E Equipment ? Suction is set up for ALL patients (with arabella) ? Red plugs utilized for all equipment (IV pumps, etc.) ? WOWs wiped down at end of shift. ? Room stocked with oxygen, suction, and other unit-specific supplies A Alarms ? Bed alarm is set for fall risk patients ? Ensure chair alarm is in place and activated if patient is up in a chair L Lines ? Check IV for any infiltration ? Vitale bag is empty if patient has a Vitale ? Tubing and IV bags are labeled Fabricio Salinas Safety ? Room is clean, patient is clean, and equipment is clean. ? Hallways are clear from equipment besides carts. ? Fall bracelet on for fall risk patients ? Ensure room is clear and free of clutter ? Suction is set up for ALL patients (with arabella) ? Hallways are clear from equipment besides carts. ? Isolation precautions followed, supplies available outside room, sign posted Nicholas Reynolds RN

## 2021-01-27 NOTE — MED STUDENT NOTES
*ATTENTION:  This note has been created by a medical student for educational purposes only. Please do not refer to the content of this note for clinical decision-making, billing, or other purposes. Please see attending physicians note to obtain clinical information on this patient. * 120 [facility] Brief Progress Note SUBJECTIVE Refer to resident note. Patient COVID positive. OBJECTIVE Visit Vitals BP (!) 93/53 Pulse 84 Temp 98.3 °F (36.8 °C) Resp 22 Ht 5' 9\" (1.753 m) Wt 111.9 kg (246 lb 12.8 oz) SpO2 98% BMI 36.45 kg/m² Physical Exam 
Refer to resident note. Patient COVID positive Recent Results (from the past 12 hour(s)) GLUCOSE, POC Collection Time: 01/26/21  9:46 PM  
Result Value Ref Range Glucose (POC) 211 (H) 70 - 110 mg/dL METABOLIC PANEL, COMPREHENSIVE Collection Time: 01/27/21 12:55 AM  
Result Value Ref Range Sodium 142 136 - 145 mmol/L Potassium 4.2 3.5 - 5.5 mmol/L Chloride 100 100 - 111 mmol/L  
 CO2 22 21 - 32 mmol/L Anion gap 20 (H) 3.0 - 18 mmol/L Glucose 146 (H) 74 - 99 mg/dL BUN 76 (H) 7.0 - 18 MG/DL Creatinine 13.60 (H) 0.6 - 1.3 MG/DL  
 BUN/Creatinine ratio 6 (L) 12 - 20 GFR est AA 5 (L) >60 ml/min/1.73m2 GFR est non-AA 4 (L) >60 ml/min/1.73m2 Calcium 9.5 8.5 - 10.1 MG/DL Bilirubin, total 0.4 0.2 - 1.0 MG/DL  
 ALT (SGPT) 39 16 - 61 U/L  
 AST (SGOT) 33 10 - 38 U/L Alk. phosphatase 46 45 - 117 U/L Protein, total 7.9 6.4 - 8.2 g/dL Albumin 3.3 (L) 3.4 - 5.0 g/dL Globulin 4.6 (H) 2.0 - 4.0 g/dL A-G Ratio 0.7 (L) 0.8 - 1.7 SED RATE (ESR) Collection Time: 01/27/21 12:55 AM  
Result Value Ref Range Sed rate, automated 65 (H) 0 - 20 mm/hr PROCALCITONIN Collection Time: 01/27/21 12:55 AM  
Result Value Ref Range Procalcitonin 13.29 ng/mL D DIMER Collection Time: 01/27/21 12:55 AM  
Result Value Ref Range D DIMER 2.49 (H) <0.46 ug/ml(FEU) C REACTIVE PROTEIN, QT Collection Time: 01/27/21 12:55 AM  
Result Value Ref Range C-Reactive protein 10.8 (H) 0 - 0.3 mg/dL CBC WITH AUTOMATED DIFF Collection Time: 01/27/21 12:55 AM  
Result Value Ref Range WBC 10.9 4.6 - 13.2 K/uL  
 RBC 3.75 (L) 4.70 - 5.50 M/uL  
 HGB 10.7 (L) 13.0 - 16.0 g/dL HCT 32.4 (L) 36.0 - 48.0 % MCV 86.4 74.0 - 97.0 FL  
 MCH 28.5 24.0 - 34.0 PG  
 MCHC 33.0 31.0 - 37.0 g/dL  
 RDW 15.5 (H) 11.6 - 14.5 % PLATELET 202 985 - 310 K/uL MPV 10.4 9.2 - 11.8 FL  
 NEUTROPHILS 82 (H) 42 - 75 % BAND NEUTROPHILS 1 0 - 5 % LYMPHOCYTES 8 (L) 20 - 51 % MONOCYTES 7 2 - 9 % EOSINOPHILS 0 0 - 5 % BASOPHILS 0 0 - 3 % MYELOCYTES 2 (H) 0 % NRBC 6.0 (H) 0  WBC  
 ABS. NEUTROPHILS 9.0 (H) 1.8 - 8.0 K/UL  
 ABS. LYMPHOCYTES 0.9 0.8 - 3.5 K/UL  
 ABS. MONOCYTES 0.8 0 - 1.0 K/UL  
 ABS. EOSINOPHILS 0.0 0.0 - 0.4 K/UL  
 ABS. BASOPHILS 0.0 0.0 - 0.06 K/UL  
 DF MANUAL PLATELET COMMENTS ADEQUATE PLATELETS    
 RBC COMMENTS ANISOCYTOSIS 1+ 
    
 RBC COMMENTS POIKILOCYTOSIS 1+ 
    
 RBC COMMENTS OVALOCYTES 1+ RBC COMMENTS TEARDROP CELLS 1+ TROPONIN I Collection Time: 01/27/21 12:55 AM  
Result Value Ref Range Troponin-I, QT 0.21 (H) 0.0 - 0.045 NG/ML  
 
 
 
ASSESSMENT/PLAN Viv Galdamez a 62 y. o. male with PMH of osteoarthritis, CKD on dialysis MWF, gout with no recent flares, HTN, hyperlipidemia  who is admitted for acute hypoxic respiratory failure, likely due to COVID-19 pna.  
  
 Acute hypoxic respiratory failure - DDX to include COVID-19 pna vs CAP vs ?CHF. CXR significant for patchy infiltrations/consolidations in bilateral mid/lower lungs, greater on the left, with concerns for COVID pna. CTA with confirmed extensive patchy consolidation in bilateral lungs, and questionable non-occlusive intraluminal filling defects in right upper and middle lobe segmental arteries; NO PE. Procal and CRP down-trending. D-dimer 2.49. BCx NG3D. 
- transfer to 3N stepdown, q4h vital checks 
- continue Hiflow NC 40 L FiO2 100%, wean oxygen as tolerated to maintain SpO2 >92% - Bronchial hygiene protocol - Bronchodilators- Atrovent 2 puffs q4h or Albuterol 2 puffs q4h 
- Continue Zithromax 500 mg qD (1/27) - Continue Decadron 6 mg IV qD (day 5 of 10) - Continue Remdesivir(200 mg IV day 1, 100 mg IV once daily for 4 days) -  day 5 of 5 
- appreciate ID and pulm recs 
- O2 support; wean as tolerated - Continue Vitamin C 1000 mg q6h, Aspirin 325 mg, Vitamin D3 50, 000 units once, famotidine 20 mg daily, melatonin 5 mg qhs, Zinc sulfate 100 mg  
- CRP, ESR, D-dimer, pro calcitonin daily 
- CBC, CMP daily - Vital signs per unit routine 
  
Troponinemia - Troponin elevated to 0.51>0.42>0.33>>0.23. Likely ischemic demand from hypoxia vs R heart strain from possible PE. Patient denies chest pain. EKG NSR Qtc 510. ECHO from 2018 significant for EF 51-55%, low normal systolic function, left ventricular severe concentric hypertrophy. ECHO 1/25 - Estimated LVEF is 55 - 60%. Moderate aortic root dilatation. (4.1 cm). BNP 7K. - trend trops q6h until [facility] 
- cardiology following; heparin gtt not advised. Continue  mg daily - Avoid Qtc prolonging drugs 
  
Hyperglycemia - likely in the setting of recent high dose steroid use. A1c 6.1 (pre-diabetic range) -  correctional lispro insulin modified to very resistant dose -  Start low dose basal lantus insulin 5 units daily  
- AC&HS 
  
ESRD on dialysis MWF 
 - continue dialysis in-patient 
- Held Velphoro (phosphate binder). Started Renvela 800 mg TID  
- nephro consulted; appreciate recs 
  
Hypotension, in the setting of dialysis MWF 
- continue midodrine 10 mg TID 
- midodrine 10 mg prn during dialysis - Resume Coreg 3.125 mg BID once blood pressures stabilize 
  
Hyperlipidemia 
- Continue Pravastatin 20 mg daily 
  
GOUT, no recent flares. Patient is no longer on Allopurinol.  
- monitor for flares

## 2021-01-27 NOTE — PROGRESS NOTES
RENAL DAILY PROGRESS NOTE Subjective:  
 
 
Complaint: feels ok,resting.seen via glass window Overnight events noted D/w RN On HFNC IMPRESSION:  
IMPRESSION:  
· ESRD on TTS at [address] · COVID Pneumonia · Hypoxic resp failure · Hypertension · Hx Diastolic CHF · Hyperlipidemia · Secondary hyperparathyroidism PLAN:  
· C/w  phos binders · Dialysis today · No gadolinium · Daily labs for now · Watch resp status · Further care per primary and ID 
   
  
 
 
 
Current Facility-Administered Medications Medication Dose Route Frequency  sodium chloride (OCEAN) 0.65 % nasal squeeze bottle 2 Spray  2 Spray Both Nostrils Q4H  
 ipratropium-albuterol (COMBIVENT RESPIMAT) 20 mcg-100 mcg inhalation spray  1 Puff Inhalation Q4H RT  
 midodrine (PROAMATINE) tablet 10 mg  10 mg Oral TID WITH MEALS  
 albumin human 25% (BUMINATE) solution 25 g  25 g IntraVENous DIALYSIS PRN  
 insulin glargine (LANTUS) injection 5 Units  5 Units SubCUTAneous DAILY  melatonin (rapid dissolve) tablet 5 mg  5 mg Oral QHS  insulin lispro (HUMALOG) injection   SubCUTAneous AC&HS  
 glucose chewable tablet 16 g  4 Tab Oral PRN  
 glucagon (GLUCAGEN) injection 1 mg  1 mg IntraMUSCular PRN  
 dextrose (D50W) injection syrg 12.5-25 g  25-50 mL IntraVENous PRN  
 sevelamer carbonate (RENVELA) tab 800 mg  800 mg Oral TID WITH MEALS  sodium chloride (NS) flush 5-10 mL  5-10 mL IntraVENous PRN  
 azithromycin (ZITHROMAX) 500 mg in 0.9% sodium chloride 250 mL (VIAL-MATE)  500 mg IntraVENous Q24H  
 sodium chloride (NS) flush 5-40 mL  5-40 mL IntraVENous Q8H  
 sodium chloride (NS) flush 5-40 mL  5-40 mL IntraVENous PRN  
 acetaminophen (TYLENOL) tablet 650 mg  650 mg Oral Q6H PRN Or  
 acetaminophen (TYLENOL) suppository 650 mg  650 mg Rectal Q6H PRN  polyethylene glycol (MIRALAX) packet 17 g  17 g Oral DAILY PRN  
  heparin (porcine) injection 5,000 Units  5,000 Units SubCUTAneous Q8H  
 ascorbic acid (vitamin C) (VITAMIN C) tablet 1,000 mg  1,000 mg Oral Q6H  
 zinc sulfate (ZINCATE) 220 (50) mg capsule 2 Cap  2 Cap Oral DAILY  aspirin tablet 325 mg  325 mg Oral DAILY  famotidine (PEPCID) tablet 20 mg  20 mg Oral DAILY  [Held by provider] carvediloL (COREG) tablet 3.125 mg  3.125 mg Oral BID WITH MEALS  pravastatin (PRAVACHOL) tablet 20 mg  20 mg Oral DAILY  dexamethasone (DECADRON) 4 mg/mL injection 6 mg  6 mg IntraVENous Q24H  
 remdesivir 100 mg in 0.9% sodium chloride 250 mL IVPB  100 mg IntraVENous Q24H  
 midodrine (PROAMATINE) tablet 10 mg  10 mg Oral DIALYSIS PRN Objective:  
 
Patient Vitals for the past 24 hrs: 
 Temp Pulse Resp BP SpO2  
01/27/21 1143  75  122/80   
01/27/21 0844     97 % 01/27/21 0438 98.3 °F (36.8 °C) 84 22 (!) 93/53 98 % 01/26/21 2335 97.9 °F (36.6 °C) 89 21 119/72 97 % 01/26/21 2020 98.2 °F (36.8 °C) 84 28 113/74 94 % 01/26/21 1733     98 % 01/26/21 1630 97.5 °F (36.4 °C) 95 13 125/74 98 % Weight change: 7.62 kg (16 lb 12.8 oz) 01/25 1901 - 01/27 0700 In: 1080 [P.O.:500; I.V.:580] Out: - Intake/Output Summary (Last 24 hours) at 1/27/2021 1218 Last data filed at 1/26/2021 2205 Gross per 24 hour Intake 290 ml Output  Net 290 ml Patient was seen during the [place] pandemic. Patient with COVID infection . Patient is in isolation room due to COVID status and PPE is in short supply hence seen through glass window and d/w patients RN. Full contact physical exam was not possible due to patient's clinical condition, key findings seen by me are documented. In addition, I have also used findings documented by physicians who have recently examined thee patient as they are relevant to the patient's renal care. No distress on exam 
No lower extremity edema on inspection On HFNC Data Review:  
 
LABS:  
Hematology: Recent Labs  
  01/27/21 
0055 01/26/21 
0100 01/25/21 
0056 WBC 10.9 10.4 8.5 HGB 10.7* 10.9* 10.9* HCT 32.4* 32.6* 33.3* Chemistry:  
Recent Labs  
  01/27/21 
0055 01/26/21 
0100 01/25/21 
0056 BUN 76* 53* 76* CREA 13.60* 11.10* 16.00* CA 9.5 9.1 9.7 ALB 3.3* 3.5 2.9*  
K 4.2 3.6 4.2  138 138  101 100 CO2 22 24 24 * 201* 162* Procedures/imaging: see electronic medical records for all procedures, Xrays and details which were not copied into this note but were reviewed prior to creation of Plan Mercedes Earl MD 
1/27/2021

## 2021-01-27 NOTE — PROGRESS NOTES
Problem: Self Care Deficits Care Plan (Adult) Goal: *Acute Goals and Plan of Care (Insert Text) Description: Occupational Therapy Goals Initiated 1/25/2021 within 7 day(s). 1.  Patient will perform grooming task standing at sink for 4-7 minutes with modified independence, O2 remaining > 94% and G balance. 2.  Patient will perform upper body dressing with modified independence standing. 3.  Patient will perform lower body dressing with modified independence seated and standing, O2 remaining > 94%. 4.  Patient will perform toilet transfers with modified independence. 5.  Patient will perform all aspects of toileting with modified independence. 6.  Patient will participate in upper extremity therapeutic exercise/activities with independence for 8 minutes. 7.  Patient will utilize energy conservation techniques during functional activities with verbal cues. Prior Level of Function: Patient was independent with self-care and functional mobility PTA. Outcome: Progressing Towards Goal 
 OCCUPATIONAL THERAPY TREATMENT Patient: Elana Weinberg (53 y.o. male) Date: 1/27/2021 Diagnosis: Acute respiratory failure due to COVID-19 ([facility]) [U07.1, J96.00] Acute respiratory failure due to COVID-19 [facility]) Precautions: Fall Chart, occupational therapy assessment, plan of care, and goals were reviewed. ASSESSMENT: 
Pt seated EOB upon entry. Pt on 40L HFNC @ 69%. Pt seen for ADL retraining w/set-up. (see functional levels below) Pt educated on energy conservation techniques w/ADLs. Pt requires vc's for rest breaks and PLB for recovery s/p activity. RR 44 w/standing activity, SpO2% 81 s/p standing activity. Pt recovers w/PLB and seated rest break ~ 2 minutes. Pt initially resistant to energy conservation techniques retraining. Pt very appreciative at end of session. Pt perseverating on being home and in \"queen size bed\" throughout session. No c/o pain. Progression toward goals: []          Improving appropriately and progressing toward goals [x]          Improving slowly and progressing toward goals 
[]          Not making progress toward goals and plan of care will be adjusted PLAN: 
Patient continues to benefit from skilled intervention to address the above impairments. Continue treatment per established plan of care. Discharge Recommendations: To Be Determined as pt progresses Further Equipment Recommendations for Discharge:  TBD as pt progresses SUBJECTIVE:  
Patient stated Mitch Torres you really going to stay in here with me while I do this? Marlin Henri OBJECTIVE DATA SUMMARY:  
Cognitive/Behavioral Status: 
Neurologic State: Alert Orientation Level: Oriented X4 Cognition: Follows commands Safety/Judgement: Fall prevention Functional Mobility and Transfers for ADLs: 
 Transfers: 
Sit to Stand: Contact guard assistance Balance: 
Sitting: Intact Standing: Impaired; Without support ADL Intervention: 
Grooming Position Performed: Seated edge of bed Washing Face: Stand-by assistance Washing Hands: Stand-by assistance Brushing Teeth: Stand-by assistance Upper Body Bathing Bathing Assistance: Stand-by assistance Position Performed: Seated edge of bed Lower Body Bathing Perineal  : Stand-by assistance Position Performed: Standing Pain: 
Pain level pre-treatment: 0/10 Pain level post-treatment: 0/10 Activity Tolerance:   
Fair Please refer to the flowsheet for vital signs taken during this treatment. After treatment:  
[]  Patient left in no apparent distress sitting up in chair 
[x]  Patient left in no apparent distress seated EOB [x]  Call bell left within reach [x]  Nursing notified 
[]  Caregiver present 
[]  Bed alarm activated COMMUNICATION/EDUCATION:  
[] Role of Occupational Therapy in the acute care setting 
[] Home safety education was provided and the patient/caregiver indicated understanding. [] Patient/family have participated as able in working towards goals and plan of care. [] Patient/family agree to work toward stated goals and plan of care. [x] Patient understands intent and goals of therapy, but is neutral about his/her participation. [] Patient is unable to participate in goal setting and plan of care. Thank you for this referral. 
BARTOLO Shabazz Time Calculation: 53 mins

## 2021-01-27 NOTE — PROGRESS NOTES
[facility] 
 
 
CARDIOLOGY PROGRESS NOTE 
RECS: 
 
 
 
 
1. Acute Hypoxic respiratory failure -related to Covid pneumonia currently on high flow oxygen - continue supportive care treatment per medical team and [facility] [place] 2. COVID - 19 pneumonia with consolidations trevon lower lobes - antibiotics per ID recs. 3. Detectable troponin - 0.51, 0.42, 0.33- EKG SR, non-specific ST changes. Elevation likely due to demand in setting of respiratory failure. Continue aspirin 325 mg. Recent Echo shows preserve EF% 4. Hypertension per history- now with low BP. Resume coreg when BP stabilizes/improves. On midodrine 5. History of dilated cardiomyopathy -  (EF 15% 11/2016) -  EF improved on echo  9/2019, recent echo EF 55%-60%. NST 2018 intermediate risk NUC stress test Fixed defect consistent with prior myocardial infarction. 6. Chronic diastolic CHF - NT pro BNP 7,247 -no significant peripheral edema. Volume status per renal  
7. Hyperlipidemia - continue statin 8. ESRD on dialysis 9. Dilated ascending aorta measuring 4.5 cm on echo 2019 -stable recent echo shows Moderate aortic root dilatation. (4.1 cm) 10. Hx of alcohol abuse. HPI significant for improving/stable shortness of breath still requiring high flow oxygen. Physical exam revealed few right basal rales per NP. Trace edema noted. I did not enter the room to save PPE Treatment included continuing the same treatment as prior Echo 1/21 · LV: Estimated LVEF is 55 - 60%. Visually measured ejection fraction. Normal cavity size and systolic function (ejection fraction normal). Moderate concentric hypertrophy. Wall motion: normal. 
· AV: Mild aortic valve regurgitation is present. · PA: Pulmonary arterial systolic pressure is 23 mmHg. · AO: Moderate aortic root dilatation. (4.1 cm) · COVID (+) ASSESSMENT: 
Hospital Problems  Date Reviewed: 1/3/2019 Codes Class Noted POA * (Principal) Acute respiratory failure due to COVID-19 [facility]) ICD-10-CM: U07.1, J96.00 
ICD-9-CM: 518.81, 079.89  1/23/2021 Unknown SUBJECTIVE: 
 
No chest pain SOB same on high flow O2  
C/o back pain very uncomfortable on the hospital bed OBJECTIVE: 
 
VS:  
Visit Vitals BP (!) 93/53 Pulse 84 Temp 98.3 °F (36.8 °C) Resp 22 Ht 5' 9\" (1.753 m) Wt 111.9 kg (246 lb 12.8 oz) SpO2 98% BMI 36.45 kg/m² Intake/Output Summary (Last 24 hours) at 1/27/2021 1111 Last data filed at 1/26/2021 2205 Gross per 24 hour Intake 540 ml Output  Net 540 ml  
 
TELE: normal sinus rhythm General:no distress noted on High flow O2. 40l/min hi flow 75% FiO2 NC SpO2 96%. HENT: Normocephalic, atraumatic.  Normal external eye. Neck :  no JVD Cardiac:  irregularly irregular rhythm, S1, S2 normal, no click, no rub Lungs:  diminished breath sounds R base, few expiratory rales. Abdomen: Soft, nontender, no masses Extremities:  trace edema BLE  
  
 
 
 
Labs: Results:  
   
Chemistry Recent Labs  
  01/27/21 0055 01/26/21 0100 01/25/21 0056 * 201* 162*  138 138  
K 4.2 3.6 4.2  101 100 CO2 22 24 24 BUN 76* 53* 76* CREA 13.60* 11.10* 16.00* CA 9.5 9.1 9.7 AGAP 20* 13 14 BUCR 6* 5* 5* AP 46 44* 45  
TP 7.9 8.3* 8.1 ALB 3.3* 3.5 2.9*  
GLOB 4.6* 4.8* 5.2* AGRAT 0.7* 0.7* 0.6* CBC w/Diff Recent Labs  
  01/27/21 0055 01/26/21 0100 01/25/21 0056 WBC 10.9 10.4 8.5  
RBC 3.75* 3.79* 3.81* HGB 10.7* 10.9* 10.9* HCT 32.4* 32.6* 33.3*  
 325 314 GRANS 82* 80* 77* LYMPH 8* 7* 6*  
EOS 0 0 0 Cardiac Enzymes No results for input(s): CPK, CKND1, SHRAVAN in the last 72 hours. No lab exists for component: [employer] Coagulation No results for input(s): PTP, INR, APTT, INREXT, INREXT in the last 72 hours. Lipid Panel Lab Results Component Value Date/Time  Cholesterol, total 155 08/23/2018 03:50 PM  
 HDL Cholesterol 72 (H) 08/23/2018 03:50 PM  
 LDL, calculated 71 08/23/2018 03:50 PM  
 VLDL, calculated 12 08/23/2018 03:50 PM  
 Triglyceride 60 08/23/2018 03:50 PM  
 CHOL/HDL Ratio 2.2 08/23/2018 03:50 PM  
  
BNP No results for input(s): BNPP in the last 72 hours. Liver Enzymes Recent Labs  
  01/27/21 
0055 TP 7.9 ALB 3.3* AP 46 Thyroid Studies Lab Results Component Value Date/Time TSH 1.17 08/23/2018 03:50 PM  
    
 
 
 
[address] NP-C Tatiana:[phone] I have personally and independently evaluated the patient. HPI significant for improving/stable shortness of breath still requiring high flow oxygen. Physical exam revealed few right basal rales per NP. Trace edema noted. I did not enter the room to save PPE Treatment included continuing the same treatment as prior I am referencing APC's note. I participated in medical decision making. All relevant labs and testing data are reviewed. Care plan discussed and updated after review.  
Erasto Aguilar MD

## 2021-01-27 NOTE — PROGRESS NOTES
120 [facility] Progress Note Patient: Minda Julio MRN: [id number] SSN: [id number]  YOB: 1960 Age: 62 y.o. Sex: male Admit Date: 1/23/2021 LOS: 4 days Chief Complaint Patient presents with  Shortness of Breath Subjective:  
 
Patient seen at bedside. SOB is improving. Currently on 40l/min hi flow NC FiO2 75% . SpO2 98%. Patient wanting to go home today; patient is uncomfortable with hospital bed and complains of \"itchy dry nose\" from NC. Tolerating PO intake. Review of Systems Constitutional: Negative for chills and fever. Respiratory: Negative for cough and hemoptysis. Cardiovascular: Negative for chest pain and palpitations. Gastrointestinal: Negative for diarrhea, nausea and vomiting. Musculoskeletal: Negative for myalgias. Neurological: Negative for dizziness and headaches. Objective:  
 
Visit Vitals BP (!) 93/53 Pulse 84 Temp 98.3 °F (36.8 °C) Resp 22 Ht 5' 9\" (1.753 m) Wt 111.9 kg (246 lb 12.8 oz) SpO2 98% BMI 36.45 kg/m² Physical Exam: 
General:  Alert and Responsive and in No acute distress. 40l/min hi flow 75% FiO2 NC SpO2 96%. HEENT: Conjunctiva pink, sclera anicteric. EOMI.  MMM. CV:  RRR. No murmurs, rubs, or gallops appreciated. No visible pulsations or thrills.   
RESP:  Unlabored breathing.  Mild crackles in lung bases. Equal expansion bilaterally.   
ABD:  Soft, nontender, nondistended. Normoactive bowel sounds. No hepatosplenomegaly. No suprapubic tenderness. MS:  No joint deformity or instability.  No atrophy. Neuro:  5/5 strength bilateral upper extremities and lower extremities.  A+Ox3. Ext:  No edema.  2+ radial and dp pulses bilaterally. Skin:  No rashes, lesions, or ulcers.  Good turgor. 
  
 
Intake and Output: 
Current Shift: No intake/output data recorded. Last three shifts: 01/25 1901 - 01/27 0700 In: 1080 [P.O.:500; I.V.:580] Out: - Labs, results, and imaging reviewed Assessment and Plan:  
 
Fiona Fulton is a 62 y.o. male with PMH of osteoarthritis, CKD on dialysis MWF, gout with no recent flares, HTN, hyperlipidemia  who is admitted for acute hypoxic respiratory failure, likely due to COVID-19 pna.  
  
Acute hypoxic respiratory failure - DDX to include COVID-19 pna vs CAP vs ?CHF. CXR significant for patchy infiltrations/consolidations in bilateral mid/lower lungs, greater on the left, with concerns for COVID pna. CTA with confirmed extensive patchy consolidation in bilateral lungs, and questionable non-occlusive intraluminal filling defects in right upper and middle lobe segmental arteries; NO PE. Procal and CRP down-trending. D-dimer 2.74>2. 49. BCx NG2D. 
- transfer to 3N stepdown, q4h vital checks 
- continue Hiflow NC 40 L FiO2 75%, wean oxygen as tolerated to maintain SpO2 >92% - Bronchial hygiene protocol - Bronchodilators- Atrovent 2 puffs q4h or Albuterol 2 puffs q4h - Completed Rocephin 2 g qD (1/23-1/26). Continue Zithromax 500 mg qD (for 1 more day 1/27) - Continue Decadron 6 mg IV qD (day 5 of 10) - Continue Remdesivir (200 mg IV day 1, 100 mg IV once daily for 4 days) -  day 5 of 5 
- appreciate ID and pulm recs 
- O2 support; wean as tolerated - Continue Vitamin C 1000 mg q6h, Aspirin 325 mg, Vitamin D3 50, 000 units once, famotidine 20 mg daily, melatonin 5 mg qhs, Zinc sulfate 100 mg  
- CRP, ESR, D-dimer, pro calcitonin daily 
- CBC, CMP daily - Vital signs per unit routine 
  
Troponinemia - Troponin elevated to 0.51>0.42>0.33>0. 21. Likely ischemic demand from hypoxia vs R heart strain from possible PE. Patient denies chest pain. EKG NSR Qtc 510. ECHO from 2018 significant for EF 51-55%, low normal systolic function, left ventricular severe concentric hypertrophy. ECHO 1/25 - Estimated LVEF is 55 - 60%. Moderate aortic root dilatation. (4.1 cm). BNP 7K. - trend trops q6h until WNL - cardiology following; heparin gtt not advised. Continue  mg daily - Avoid Qtc prolonging drugs Hyperglycemia - likely in the setting of recent high dose steroid use. A1c 6.1 (pre-diabetic range) -  correctional lispro insulin modified to very resistant dose 
-  Start low dose basal lantus insulin 5 units daily  
-  AC&HS 
  
ESRD on dialysis MWF 
- continue dialysis in-patient 
- Held Velphoro (phosphate binder). Started Renvela 800 mg TID  
- nephro consulted; appreciate recs Hypotension, in the setting of dialysis MWF 
- continue midodrine 10 mg TID 
- midodrine 10 mg prn during dialysis - Resume Coreg 3.125 mg BID once blood pressures stabilize Hyperlipidemia 
- Continue Pravastatin 20 mg daily GOUT, no recent flares. Patient is no longer on Allopurinol.  
- monitor for flares Diet renal  
DVT Prophylaxis SQH  
GI Prophylaxis famotidine Code status full Disposition >2MN Point of Contact Elmer Garcia Relationship: sister 
[phone] Emre Stahl MD, PGY1 001 [address] Gall[facility] Intern Pager: [phone] January 27, 2021, 11:36 AM

## 2021-01-27 NOTE — DIALYSIS
Gerri Fair ACUTE HEMODIALYSIS FLOW SHEET 
 
 
HEMODIALYSIS ORDERS: Physician: Teri Lawler Dialyzer: revaclear   Duration: 3 hr  BFR: 400   DFR: 800 Dialysate:  Temp 36-37*C  K+   2    Ca+  2 Na 138 Bicarb 30 Weight:  111.9 kg    Patient Chart [x]     Unable to Obtain []   Dry weight/UF Goal: 2000 Access: LUE AVF Needle Gauge: 15   
Heparin [x]  Bolus      2000 Units Pre BP:   130/89    Pulse:     104       Respirations: 28  Temperature:   97.6 Labs: Pre        Post:         [x] N/A Additional Orders(medications, blood products, hypotension management):       [x] N/A [x] [facility] Consent Verified CATHETER ACCESS: [x]N/A   
 
GRAFT/FISTULA ACCESS:  []N/A     []Right     [x]Left     [x]UE     []LE []AVG   [x]AVF        []Buttonhole    [x]Medical Aseptic Prep Utilized [x]No S/S infection  []Redness  []Drainage []Cultured []Swelling []Pain Bruit:   [x] Strong    [] Weak       Thrill :   [x] Strong    [] Weak Needle Gauge: 15   Length: 1 If access problem,  notified: []Yes     [x]N/A Please describe access if present and not used:  
            
            GENERAL ASSESSMENT:  
  
LUNGS:  Rate 28 SaO2 100%       [] N/A    [] Clear  [x] Coarse  [] Crackles  [x] Wheezing 
      [x] Diminished     Location : [x]RLL   [x]LLL    []RUL  []DIEGO Cough: []Productive  [x]Dry  []N/A   Respirations:  [x]Easy  []Labored Therapy:   []RA  [x]NC 40 l/min    Mask: []NRB []Venti       O2% []Ventilator  []Intubated  [] Trach  [] BiPaP CARDIAC: [x]Regular      [] Irregular   [] Pericardial Rub  [] JVD [x]  Monitored  [x] Bedside  [] Remotely monitored [] N/A  Rhythm: sinus tach EDEMA: [] None   [x]Generalized  [] Pitting [] 1    [] 2    [] 3    [] 4 [] Facial  [] Pedal  []  UE  [] LE  
 
SKIN:   [x] Warm   [] Hot     [] Cold   [x] Dry     [] Pale   [] Diaphoretic [] Flushed  [] Jaundiced  [] Cyanotic  [] Rash  [] Weeping LOC:    [x] Alert      [x]Oriented:    [x] Person     [x] Place  [x]Time 
             [] Confused  [] Lethargic  [] Medicated  [] Non-responsive GI / ABDOMEN:  [] Flat    [] Distended    [x] Soft    [] Firm   []  Obese 
                           [] Diarrhea  [x] Bowel Sounds  [] Nausea  [] Vomiting  / URINE ASSESSMENT:[] Voiding   [x] Oliguria  [] Anuria   []  Vitale [] Incontinent    []  Incontinent Brief      []  Bathroom Privileges PAIN: [x] 0 []1  []2   []3   []4   []5   []6   []7   []8   []9   []10 Scale 0-10  Action/Follow Up: MOBILITY:  [] Amb    [] Amb/Assist    [x] Bed    [] Wheelchair  [] Stretcher All Vitals and Treatment Details on Attached Flowsheet Hospital: [facility] Room # 368/01 [] 1st Time Acute  [] Stat  [x] Routine  [] Urgent    
[] Acute Room  [x]  Bedside  [] ICU/CCU  [] ER Isolation Precautions:  Droplet Plus Special Considerations:         [] Blood Consent Verified [x]N/A ALLERGIES:  
No Known Allergies Code Status:Full Code Hepatitis Status:    2nd RN check: TLO Lab Results Component Value Date/Time Hepatitis B surface Ag <0.10 01/23/2021 11:15 AM  
 Hepatitis B surface Ab 321.49 01/23/2021 11:15 AM  
 Hep B Core Ab, total NEGATIVE  08/26/2018 04:12 PM  
 Hep C Virus Ab <0.1 04/11/2017 04:35 PM  
   
 
            Current Labs:  
Lab Results Component Value Date/Time  Sodium 142 01/27/2021 12:55 AM  
 Potassium 4.2 01/27/2021 12:55 AM  
 Chloride 100 01/27/2021 12:55 AM  
 CO2 22 01/27/2021 12:55 AM  
 Anion gap 20 (H) 01/27/2021 12:55 AM  
 Glucose 146 (H) 01/27/2021 12:55 AM  
 BUN 76 (H) 01/27/2021 12:55 AM  
 Creatinine 13.60 (H) 01/27/2021 12:55 AM  
 BUN/Creatinine ratio 6 (L) 01/27/2021 12:55 AM  
 GFR est AA 5 (L) 01/27/2021 12:55 AM  
 GFR est non-AA 4 (L) 01/27/2021 12:55 AM  
 Calcium 9.5 01/27/2021 12:55 AM  
  
Lab Results Component Value Date/Time WBC 10.9 01/27/2021 12:55 AM  
 Hemoglobin, POC 14.3 11/15/2018 10:13 AM  
 HGB 10.7 (L) 01/27/2021 12:55 AM  
 Hematocrit, POC 42 11/15/2018 10:13 AM  
 HCT 32.4 (L) 01/27/2021 12:55 AM  
 PLATELET 868 84/20/5955 12:55 AM  
 MCV 86.4 01/27/2021 12:55 AM  
  
  
 
                                                                         
DIET:  
DIET RENAL    
 
PRIMARY NURSE REPORT: First initial/Last name/Title Pre Dialysis: MARLYS Ferrara RN       Time: 9660 EDUCATION:   
[x] Patient [] Other         Knowledge Basis: []None [x]Minimal [] Substantial  
Barriers to learning  [x]N/A  
[] Access Care     [] S&S of infection     [] Fluid Management     []K+     [x]Procedural   
[]Albumin     [] Medications     [] Tx Options     [] Transplant     [] Diet     [] Other Teaching Tools:  [x] Explain  [x] Demo  [] Handouts [] Video Patient response:  [x] Verbalized understanding  [] Teach back  [] Return demonstration [] Requires follow up Inappropriate due to:         
 
[x]Time Out/Safety Check  [x]Extracorporeal Circuit Tested for integrity RO/HEMODIALYSIS MACHINE SAFETY CHECKS  Before each treatment:    
Machine Number:                   [address] [facility]  [x] Portable Machine #1/RO serial # [id number] Alarm Test:  Pass time 3768 [x] RO/Machine Log Complete Temp   36 Dialysate: pH  7.6 Conductivity: Meter   13.7     HD Machine   13.7                  TCD: 13.8 Dialyzer Lot # [id number]            Blood Tubing Lot # [id number]          Saline Lot #  [id number] CHLORINE TESTING-Before each treatment and every 4 hours Total Chlorine: [x] less than 0.1 ppm  Time: 1430   
(if greater than 0.1 ppm from Primary then every 30 minutes from Secondary) TREATMENT INITIATION  with Dialysis Precautions:  
[x] All Connections Secured                 [x] Saline Line Double Clamped  
[x] Venous Parameters Set                  [x] Arterial Parameters Set [x] Prime Given 250ml                          [x]Air Foam Detector Engaged Treatment Initiation Note:    RN arrived to pt bedside due to isolation precautions. Pt is A&Ox4 with periods of confusion. VSS and in no distress with O2 @ 40 LPM NC.  LUE AVF assessed with no s/s complications. HD initiated without difficulty with 15g x2. Heparin bolus administered per order. During Treatment Notes: 
7080 Pt awake and alert. Face and access in view with connections secure. 1545 Pt repositioned self to get more comfortable. O2 sat quickly dropped to low 70s. Primary nurse called - came to room and adjusted high flow O2 settings. RT notified by primary nurse. 1600 Pt O2 sat slowly came up - now at 100% and resting on his side with HOB elevated. Face and access in view with connections secure. 1630 Pt resting with eyes closed. O2 sat 95%. Face and access in view with connections secure. 1700 Pt very anxious. Had BM. Called nurse to assist with cleaning pt up. Face and access in view with connections secure. 1730 O2 sat dropped to 80% when repositioning pt to change pants. Primary RN at bedside to assist.  NRB applied on top of high flow NC. O2 sat increased to %. Face and access in view with connections secure. 200 Pt requesting to end treatment at this time. 2.5 hours completed. Dr. Ashia Humphreys made aware. Order received to rinse blood back. Medication Dose Volume Route Time [facility] name Title Heparin  2000 units 2 ml dialysis [address] Albumin x2 25 g 100 ml dialysis [address] Post Assessment:  
Dialyzer Cleared: [] Good [x] Fair  [] Poor Blood processed:  54.0 L 
UF Removed  2500 Ml POst BP:   92/30       Pulse: 118       
 Respirations: 35  Temperature: 97.8 Lungs: 
 
 [] Clear      [x] Course         [] Crackles [x] Wheezing         [x] Diminished Post Tx Vascular Access: AVF/AVG: Bleeding stopped Art 10 min. Terrell. 10 Min    Cardiac:  
[x] Regular   [] Irregular   [x] Monitor  [] N/A Rhythm: sinus tach Catheter: N/A Skin:   Pain:  
[x] Warm  [x] Dry [] Diaphoretic    [] Flushed   
[] Pale [] Cyanotic [x]0  []1  []2   []3  []4   []5   []6   []7   []8   []9   []10 Post Treatment Note: Both needles pulled without complication. Sterile gauze applied. Net 2 L UF removed. POST TREATMENT PRIMARY NURSE HANDOFF REPORT:  
 
First initial/Last name/Title Post Dialysis: MARLYS Ponce RN   Time:  2729 Abbreviations: AVG-arterial venous graft, AVF-arterial venous fistula, IJ-Internal Jugular, Subcl-Subclavian, Fem-Femoral, Tx-treatment, AP/HR-apical heart rate, DFR-dialysate flow rate, BFR-blood flow rate, AP-arterial pressure, -venous pressure, UF-ultrafiltrate, TMP-transmembrane pressure, Terrell-Venous, Art-Arterial, RO-Reverse Osmosis

## 2021-01-27 NOTE — PROGRESS NOTES
Problem: Pressure Injury - Risk of 
Goal: *Prevention of pressure injury Description: Document Soteor Scale and appropriate interventions in the flowsheet. Outcome: Progressing Towards Goal 
Note: Pressure Injury Interventions: 
  
 
Moisture Interventions: Absorbent underpads, Apply protective barrier, creams and emollients, Assess need for specialty bed, Minimize layers, Moisture barrier Activity Interventions: Increase time out of bed, Pressure redistribution bed/mattress(bed type), PT/OT evaluation Mobility Interventions: Chair cushion, HOB 30 degrees or less, PT/OT evaluation Nutrition Interventions: Document food/fluid/supplement intake Problem: Patient Education: Go to Patient Education Activity Goal: Patient/Family Education Outcome: Progressing Towards Goal 
  
Problem: Falls - Risk of 
Goal: *Absence of Falls Description: Document Clydene Bone Fall Risk and appropriate interventions in the flowsheet. Outcome: Progressing Towards Goal 
Note: Fall Risk Interventions: 
Mobility Interventions: Bed/chair exit alarm, Communicate number of staff needed for ambulation/transfer, Patient to call before getting OOB, PT Consult for mobility concerns Medication Interventions: Assess postural VS orthostatic hypotension, Bed/chair exit alarm, Patient to call before getting OOB, Teach patient to arise slowly Elimination Interventions: Bed/chair exit alarm, Call light in reach, Patient to call for help with toileting needs Problem: Patient Education: Go to Patient Education Activity Goal: Patient/Family Education Outcome: Progressing Towards Goal 
  
Problem: Breathing Pattern - Ineffective Goal: *Absence of hypoxia Outcome: Progressing Towards Goal 
Goal: *Use of effective breathing techniques Outcome: Progressing Towards Goal 
  
Problem: Patient Education: Go to Patient Education Activity Goal: Patient/Family Education Outcome: Progressing Towards Goal 
  
 Problem: Risk for Spread of Infection Goal: Prevent transmission of infectious organism to others Description: Prevent the transmission of infectious organisms to other patients, staff members, and visitors. Outcome: Progressing Towards Goal 
  
Problem: Patient Education:  Go to Education Activity Goal: Patient/Family Education Outcome: Progressing Towards Goal 
  
Problem: Patient Education: Go to Patient Education Activity Goal: Patient/Family Education Outcome: Progressing Towards Goal 
  
Problem: Patient Education: Go to Patient Education Activity Goal: Patient/Family Education Outcome: Progressing Towards Goal

## 2021-01-28 NOTE — PROGRESS NOTES
01/27/21 2000 Oxygen Therapy O2 Sat (%) 98 % Pulse via Oximetry 121 beats per minute O2 Device Hi flow nasal cannula 
(Vapotherm) O2 Flow Rate (L/min) 40 l/min O2 Temperature 91.4 °F (33 °C) FIO2 (%) 100 % Pre-Treatment Breathing Pattern Regular Breath Sounds Bilateral Diminished Post-Treatment Breathing Pattern Regular Breath Sounds Bilateral Diminished Pulse 121 SpO2 98 % Respirations 30 Treatment Tolerance Patient tolerated Procedures  
$$ Subsequent Procedure MDI Delivery Source MDI Aerosolized Medications  
(Combivent) Pt instructed to call if help is needed, call bell within reach. Patient confirms understanding

## 2021-01-28 NOTE — PROGRESS NOTES
Patient is comfortable. Pt instructed to call if help is needed, call bell within reach. Patient confirms understanding 01/28/21 0037 Oxygen Therapy O2 Sat (%) 94 % Pulse via Oximetry 114 beats per minute O2 Device Hi flow nasal cannula 
(Vapotherm) O2 Flow Rate (L/min) 40 l/min O2 Temperature 91.4 °F (33 °C) FIO2 (%) 100 % Pre-Treatment Breathing Pattern Regular Breath Sounds Bilateral Diminished Post-Treatment Breathing Pattern Regular Breath Sounds Bilateral Diminished Pulse 114 SpO2 94 % Respirations 14 Treatment Tolerance Patient tolerated Procedures  
$$ Subsequent Procedure MDI Delivery Source MDI Aerosolized Medications  
(Combivent)

## 2021-01-28 NOTE — PROGRESS NOTES
Evaluated pt at 1930. HD had just completed. BP was 110/70, . On exam he was in NAD, A&Ox3, although slightly confused; pulses 2+ in all extremities; NL strength and sensation. Pt was left comfortably lying in bed.  
 
Toy Babinski, MD

## 2021-01-28 NOTE — ROUTINE PROCESS
1920: Verbal shift change report given to Gus Portillo RN (oncoming nurse) by Rhonda Silver RN (offgoing nurse). Report included the following information SBAR, Kardex, Intake/Output and Cardiac Rhythm NSR to Sinus Tachy. 0720: Bedside and Verbal shift change report given to Rhonda Silver RN (oncoming nurse) by Rody Landin (offgoing nurse). Report included the following information SBAR, Kardex, MAR and Recent Results. SITUATION:  
? Code Status: Full Code 
? Reason for Admission: Acute respiratory failure due to COVID-19 (HCC) [U07.1, J96.00] ? Hospital day: 5 
? Problem List:  
   
Hospital Problems  Date Reviewed: 1/3/2019 Codes Class Noted POA * (Principal) Acute respiratory failure due to COVID-19 [facility]) ICD-10-CM: U07.1, J96.00 
ICD-9-CM: 518.81, 079.89  1/23/2021 Unknown BACKGROUND:  
 Past Medical History:  
Past Medical History:  
Diagnosis Date  Arthritis of left knee 09/2017  
 moderate to severe, with effusion on xray  Chronic kidney disease ESRD  HD on MWF  
 Diastolic CHF ([facility] [address]  Dilated cardiomyopathy ([facility] [address]  History of alcohol abuse  History of echocardiogram 02/24/2014 Mod-marked LVE. EF 15%. Severe, diffuse hypk. Mild LVH. Gr 1 DDfx. Mod LAE. Mild-mod FIDELIA. Mild AoRE. No significant change from echo of 10/23/09.  History of gout  History of myocardial perfusion scan 05/25/2006 No evidence of ischemia or scarring. Gross LVE. EF 28%. Severe global hypk. Neg EKG on submaximal EST. Ex time 8 min 25 sec.  HTN (hypertension)  Hypercholesterolemia  Hypertensive cardiovascular disease Patient taking anticoagulants yes ASSESSMENT:  
? Changes in Assessment Throughout Shift: no 
 
? Patient has Central Line: no Reasons if yes:  
? Patient has Vitale Cath: no Reasons if yes:   
 
? Last Vitals: 
  
Vitals:  
 01/28/21 0000 01/28/21 0037 01/28/21 0315 01/28/21 0400 BP: 109/70 Pulse: (!) 113   (!) 109 Resp: 18   26 Temp: 97.8 °F (36.6 °C)   98.1 °F (36.7 °C) TempSrc:      
SpO2: 96% 94% 93% 99% Weight:    110 kg (242 lb 6.4 oz) Height:      
 
 
? IV and DRAINS (will only show if present) [REMOVED] Peripheral IV 01/23/21 Right Antecubital-Site Assessment: Clean, dry, & intact Hemodialysis Access-Site Assessment: Clean, dry, & intact Peripheral IV 01/26/21 Anterior;Right Wrist-Site Assessment: Clean, dry, & intact [REMOVED] Peripheral IV 01/23/21 Posterior;Right Hand-Site Assessment: Clean, dry, & intact ? WOUND (if present) Wound Type:  none Dressing present Dressing Present : No 
 Wound Concerns/Notes:  none ? PAIN Pain Assessment Pain Intensity 1: 0 (01/28/21 0400) Patient Stated Pain Goal: 0 
o Interventions for Pain:  none 
o Intervention effective: N/A 
o Time of last intervention: N/A  
o Reassessment Completed: N/A  
 
? Last 3 Weights: 
Last 3 Recorded Weights in this Encounter 01/25/21 1230 01/27/21 0554 01/28/21 0400 Weight: 104.3 kg (230 lb) 111.9 kg (246 lb 12.8 oz) 110 kg (242 lb 6.4 oz) Weight change: -1.996 kg (-4 lb 6.4 oz) ? INTAKE/OUPUT Current Shift: No intake/output data recorded. Last three shifts: 01/26 0701 - 01/27 1900 In: 8230 [P.O.:750; I.V.:290] Out: 2000  
 
? LAB RESULTS Recent Labs  
  01/28/21 0311 01/27/21 
0055 01/26/21 
0100 WBC 9.8 10.9 10.4 HGB 10.7* 10.7* 10.9* HCT 32.7* 32.4* 32.6*  
 363 325 Recent Labs  
  01/28/21 0311 01/27/21 
0055 01/26/21 
0100  142 138  
K 4.4 4.2 3.6 * 146* 201* BUN 59* 76* 53* CREA 10.90* 13.60* 11.10* CA 9.4 9.5 9.1 RECOMMENDATIONS AND DISCHARGE PLANNING 1. Pending tests/procedures/ Plan of Care or Other Needs: Dialysis (Monday, Wednesday, Friday) 2. Discharge plan for patient and Needs/Barriers: TBD 3.  Estimated Discharge Date: TBD Posted on Whiteboard in Patients Room: no   
 
 4. The patient's care plan was reviewed with the oncoming nurse. \"HEALS\" SAFETY CHECK Fall Risk Total Score: 3 Safety Measures: Safety Measures: Bed/Chair alarm on, Bed/Chair-Wheels locked, Bed in low position, Call light within reach, Gripper socks, Side rails X 3, Video Monitoring for Safety A safety check occurred in the patient's room between off going nurse and oncoming nurse listed above. The safety check included the below items Area Items H High Alert Medications ? Verify all high alert medication drips (heparin, PCA, etc.) E Equipment ? Suction is set up for ALL patients (with arabella) ? Red plugs utilized for all equipment (IV pumps, etc.) ? WOWs wiped down at end of shift. ? Room stocked with oxygen, suction, and other unit-specific supplies A Alarms ? Bed alarm is set for fall risk patients ? Ensure chair alarm is in place and activated if patient is up in a chair L Lines ? Check IV for any infiltration ? Vitale bag is empty if patient has a Vitale ? Tubing and IV bags are labeled Lelia Vang Safety ? Room is clean, patient is clean, and equipment is clean. ? Hallways are clear from equipment besides carts. ? Fall bracelet on for fall risk patients ? Ensure room is clear and free of clutter ? Suction is set up for ALL patients (with arabella) ? Hallways are clear from equipment besides carts. ? Isolation precautions followed, supplies available outside room, sign posted [place]

## 2021-01-28 NOTE — PROGRESS NOTES
[facility] Pulmonary, Critical Care, and Sleep Medicine F/U Patient Consult Name: Judith Sunshine MRN: [id number] : 1960 Hospital: [facility] Date: 2021 This patient has been seen and evaluated at the request of Dr. Crystal Wheeler for Acute hypoxic Respiratory failure, Covid pneumonia. IMPRESSION:  
· Acute hypoxic Respiratory failure-requiring high flow 75% FiO2 at 40 L flow to maintain saturation. Underlying progressive COVID-19 pneumonia with predominant bilateral lower lobe consolidative pneumonia. Appears compensating · COVID-19 pneumonia with significant consolidations trevon lower lobes · ARDS, severe · ESRD on dialysis · History of dilated cardiomyopathy · Hypertension · Patient possibly delirious: Patient fully alert and oriented x3, but may have poor insight regarding severe medical condition, patient more worried about comfort of his bed then high oxygen demand RECOMMENDATIONS:  
· Pt currently able to maintain SpO2 in low 90s on HFNC, will wean as tolerated · Oxygen-titrate to SaO2 goal more than 88%. Currently appearing comfortable on high flow. Use bipap QHS and PRN 
· BIPAP/CPAP-can consider adding as next step if difficulty oxygenating · Spent extensive time at bedside counseling pt that he is having a high oxygen demand, and he will likely not get discharged in the next few days. He remains at high risk for decompensation requiring mechanical ventilation. · Bronchial hygiene protocol-encourage use of incentive spirometer, coughing · Bronchodilators-Combivent Respimat 4 times daily scheduled · Steroids- Decadron 6mg daily x 10 days · Antibiotics-per ID · COVID-19 pneumonia treatment per ID recommendations-has been started on remdesivir · Strict aspiration precautions dialysis per nephrology --maintain net negative fluid balance given severe hypoxia · Will need imaging follow-up to complete clearing. Concerns for developing sequelae long-term given the extent of pneumonia · Assess home Oxygen needs at discharge · OT, PT, OOB to chair, and ambulate as tolerated when possible · Healthy weight · Will Follow · DVT ppx + PUD ppx (while on steroids) Prognosis is guarded. Patient has high risk for decompensation Subjective:  
01/27/21 Patient seen and examined at bedside. No acute events overnight. Patient on high flow nasal cannula. Patient has multiple complaints, mainly wanting to go home, complaining of back pain, laying in bed, does not want to stay in the hospital.  Patient denies any fevers, chills, worsening sputum, hemoptysis, nausea, vomiting, diarrhea. HPI per Dr. Renaldo Miner Steph Slater is a 62 y.o. male with PMH arthritis, CKD on HD MWF, gout HTN, HLD, now presenting with complaint of SOB. Pt is known positive COVID for over 1wk. Went to dialysis center and was noted to have a fever 100.6 here. He had a headache, eye pain and chills . No known Covid exposure. Nominal pain diarrhea no chest pain or shortness of breath Was seen in ER 1wk ago with above complaints and d/c home with CDC recommendations. Over the past few days he has felt progressively worse and was found tripoding today when he was picked up for dialysis. He currently states he feels fine and has no complaints. He denies sick contacts, CP, [place]. He missed dialysis on day of admission Initial SpO2 90% on 5lpm NC so placed on HFC. CXR showed patchy infiltrations/consolidations in bilateral mid/lower lungs I have reviewed all of the available data including the patient's previous history external records and radiological imaging available for review. In addition applicable cardiology and other lab data were also reviewed. Past Medical History:  
Diagnosis Date  Arthritis of left knee 09/2017  
 moderate to severe, with effusion on xray  Chronic kidney disease ESRD  HD on MWF  
 Diastolic CHF ([facility]  Dilated cardiomyopathy ([facility]  History of alcohol abuse  History of echocardiogram 02/24/2014 Mod-marked LVE. EF 15%. Severe, diffuse hypk. Mild LVH. Gr 1 DDfx. Mod LAE. Mild-mod FIDELIA. Mild AoRE. No significant change from echo of 10/23/09.  History of gout  History of myocardial perfusion scan 05/25/2006 No evidence of ischemia or scarring. Gross LVE. EF 28%. Severe global hypk. Neg EKG on submaximal EST. Ex time 8 min 25 sec.  HTN (hypertension)  Hypercholesterolemia  Hypertensive cardiovascular disease Past Surgical History:  
Procedure Laterality Date  HX COLONOSCOPY    
 HX HERNIA REPAIR  04/2016  HX VASCULAR ACCESS Right upper chest HD CATH Prior to Admission medications Medication Sig Start Date End Date Taking? Authorizing Provider  
pravastatin (PRAVACHOL) 20 mg tablet TAKE 1 TABLET BY MOUTH NIGHTLY 12/29/20  Yes Roselia Olvera NP  
carvediloL (COREG) 3.125 mg tablet TAKE 1 TABLET BY MOUTH EVERY 12 HOURS 9/29/20  Yes Denilson Kathleen NP  
sucroferric oxyhydroxide (VELPHORO) 500 mg chew chewable tablet Take  by mouth three (3) times daily (with meals). Yes Provider, Historical  
aspirin 81 mg chewable tablet Take 1 Tab by mouth daily. 2/21/17  Yes Иван ORNELAS NP  
HYDROcodone-acetaminophen (NORCO) 5-325 mg per tablet Take 1-2 Tabs by mouth every six (6) hours as needed for Pain. Max Daily Amount: 8 Tabs. 11/15/18   Andreia Crockett MD  
sevelamer carbonate (RENVELA) 800 mg tab tab Take  by mouth three (3) times daily. Provider, Historical  
colchicine 0.6 mg tablet 2 tablets at first sign of gout flare and then 1 tablet 1 hour later  Indications: acute gouty arthritis 6/5/18   Shirley Tran MD  
allopurinol (ZYLOPRIM) 100 mg tablet Take 2 Tabs by mouth daily. 5/11/18   STEFFANIE Westbrook No Known Allergies Social History Tobacco Use  
  Smoking status: Never Smoker  Smokeless tobacco: Never Used Substance Use Topics  Alcohol use: No  
  Alcohol/week: 0.0 standard drinks Comment: stop 3 years ago in feb, 2015 Family History Problem Relation Age of Onset  Cancer Father Lung  Heart Failure Mother  Cancer Sister Current Facility-Administered Medications Medication Dose Route Frequency  sodium chloride (OCEAN) 0.65 % nasal squeeze bottle 2 Spray  2 Spray Both Nostrils Q4H  
 ipratropium-albuterol (COMBIVENT RESPIMAT) 20 mcg-100 mcg inhalation spray  1 Puff Inhalation Q4H RT  
 midodrine (PROAMATINE) tablet 10 mg  10 mg Oral TID WITH MEALS  insulin glargine (LANTUS) injection 5 Units  5 Units SubCUTAneous DAILY  melatonin (rapid dissolve) tablet 5 mg  5 mg Oral QHS  insulin lispro (HUMALOG) injection   SubCUTAneous AC&HS  sevelamer carbonate (RENVELA) tab 800 mg  800 mg Oral TID WITH MEALS  
 azithromycin (ZITHROMAX) 500 mg in 0.9% sodium chloride 250 mL (VIAL-MATE)  500 mg IntraVENous Q24H  
 sodium chloride (NS) flush 5-40 mL  5-40 mL IntraVENous Q8H  
 heparin (porcine) injection 5,000 Units  5,000 Units SubCUTAneous Q8H  
 ascorbic acid (vitamin C) (VITAMIN C) tablet 1,000 mg  1,000 mg Oral Q6H  
 zinc sulfate (ZINCATE) 220 (50) mg capsule 2 Cap  2 Cap Oral DAILY  aspirin tablet 325 mg  325 mg Oral DAILY  famotidine (PEPCID) tablet 20 mg  20 mg Oral DAILY  [Held by provider] carvediloL (COREG) tablet 3.125 mg  3.125 mg Oral BID WITH MEALS  pravastatin (PRAVACHOL) tablet 20 mg  20 mg Oral DAILY  dexamethasone (DECADRON) 4 mg/mL injection 6 mg  6 mg IntraVENous Q24H  
 remdesivir 100 mg in 0.9% sodium chloride 250 mL IVPB  100 mg IntraVENous Q24H Review of Systems: A comprehensive review of systems was negative except for that written in the HPI. Objective:  
Vital Signs:   
Visit Vitals BP (!) 92/30 Pulse (!) 118 Temp 97.8 °F (36.6 °C) (Oral) Resp (!) 35 Ht 5' 9\" (1.753 m) Wt 111.9 kg (246 lb 12.8 oz) SpO2 96% BMI 36.45 kg/m² O2 Device: Heated, Hi flow nasal cannula O2 Flow Rate (L/min): 40 l/min Temp (24hrs), Av.9 °F (36.6 °C), Min:97.6 °F (36.4 °C), Max:98.3 °F (36.8 °C) Intake/Output:  
Last shift:      No intake/output data recorded. Last 3 shifts:  07 - 1900 In: 0108 [P.O.:750; I.V.:290] Out:  Intake/Output Summary (Last 24 hours) at 2021 Last data filed at 2021 0974 Gross per 24 hour Intake 540 ml Output 2000 ml Net -1460 ml Physical Exam:  
General:  Alert, cooperative, no distress, appears stated age, laying in bed wearing HFNC, unpleasant during today's visit Head:  Normocephalic, without obvious abnormality, atraumatic. Eyes:  Conjunctivae/corneas clear. PERRL, EOMs intact. Nose: Nares normal. Septum midline, wearing high flow nasal cannula. No drainage Throat:  Mucosa moist, fair dentition, no oral lesions, Mallamapti IV Neck: Supple, symmetrical, trachea midline, no adenopathy,no carotid bruit and no JVD. Lungs:    Unchanged, decreased breath sounds in bilateral bases, bilateral rales in lower lobes, no wheezes or rhonchi Chest wall:  No tenderness or deformity. Heart:  Regular rate and rhythm, S1, S2 normal, no murmur, click, rub or gallop. Abdomen:    Obese, soft, non-tender. Bowel with crepitations normal. No masses,  No organomegaly. Extremities: Extremities normal, atraumatic, no cyanosis or edema. Left upper extremity AV shunt. No clubbing Pulses: 2+ and symmetric all extremities. Skin: Skin color, texture, turgor normal. No rashes or lesions Lymph nodes: Cervical, supraclavicular, and axillary nodes normal.  
Neurologic: Grossly nonfocal, strength, coordination, sensation grossly intact throughout all extremities, patient alert and oriented x3 Data review:  
 
Recent Results (from the past 24 hour(s)) GLUCOSE, POC  
 Collection Time: 01/26/21  9:46 PM  
Result Value Ref Range Glucose (POC) 211 (H) 70 - 110 mg/dL METABOLIC PANEL, COMPREHENSIVE Collection Time: 01/27/21 12:55 AM  
Result Value Ref Range Sodium 142 136 - 145 mmol/L Potassium 4.2 3.5 - 5.5 mmol/L Chloride 100 100 - 111 mmol/L  
 CO2 22 21 - 32 mmol/L Anion gap 20 (H) 3.0 - 18 mmol/L Glucose 146 (H) 74 - 99 mg/dL BUN 76 (H) 7.0 - 18 MG/DL Creatinine 13.60 (H) 0.6 - 1.3 MG/DL  
 BUN/Creatinine ratio 6 (L) 12 - 20 GFR est AA 5 (L) >60 ml/min/1.73m2 GFR est non-AA 4 (L) >60 ml/min/1.73m2 Calcium 9.5 8.5 - 10.1 MG/DL Bilirubin, total 0.4 0.2 - 1.0 MG/DL  
 ALT (SGPT) 39 16 - 61 U/L  
 AST (SGOT) 33 10 - 38 U/L Alk. phosphatase 46 45 - 117 U/L Protein, total 7.9 6.4 - 8.2 g/dL Albumin 3.3 (L) 3.4 - 5.0 g/dL Globulin 4.6 (H) 2.0 - 4.0 g/dL A-G Ratio 0.7 (L) 0.8 - 1.7 SED RATE (ESR) Collection Time: 01/27/21 12:55 AM  
Result Value Ref Range Sed rate, automated 65 (H) 0 - 20 mm/hr PROCALCITONIN Collection Time: 01/27/21 12:55 AM  
Result Value Ref Range Procalcitonin 13.29 ng/mL D DIMER Collection Time: 01/27/21 12:55 AM  
Result Value Ref Range D DIMER 2.49 (H) <0.46 ug/ml(FEU) C REACTIVE PROTEIN, QT Collection Time: 01/27/21 12:55 AM  
Result Value Ref Range C-Reactive protein 10.8 (H) 0 - 0.3 mg/dL CBC WITH AUTOMATED DIFF Collection Time: 01/27/21 12:55 AM  
Result Value Ref Range WBC 10.9 4.6 - 13.2 K/uL  
 RBC 3.75 (L) 4.70 - 5.50 M/uL  
 HGB 10.7 (L) 13.0 - 16.0 g/dL HCT 32.4 (L) 36.0 - 48.0 % MCV 86.4 74.0 - 97.0 FL  
 MCH 28.5 24.0 - 34.0 PG  
 MCHC 33.0 31.0 - 37.0 g/dL  
 RDW 15.5 (H) 11.6 - 14.5 % PLATELET 233 296 - 862 K/uL MPV 10.4 9.2 - 11.8 FL  
 NEUTROPHILS 82 (H) 42 - 75 % BAND NEUTROPHILS 1 0 - 5 % LYMPHOCYTES 8 (L) 20 - 51 % MONOCYTES 7 2 - 9 % EOSINOPHILS 0 0 - 5 % BASOPHILS 0 0 - 3 % MYELOCYTES 2 (H) 0 % NRBC 6.0 (H) 0  WBC  
 ABS. NEUTROPHILS 9.0 (H) 1.8 - 8.0 K/UL  
 ABS. LYMPHOCYTES 0.9 0.8 - 3.5 K/UL  
 ABS. MONOCYTES 0.8 0 - 1.0 K/UL  
 ABS. EOSINOPHILS 0.0 0.0 - 0.4 K/UL  
 ABS. BASOPHILS 0.0 0.0 - 0.06 K/UL  
 DF MANUAL PLATELET COMMENTS ADEQUATE PLATELETS    
 RBC COMMENTS ANISOCYTOSIS 1+ 
    
 RBC COMMENTS POIKILOCYTOSIS 1+ 
    
 RBC COMMENTS OVALOCYTES 1+ RBC COMMENTS TEARDROP CELLS 1+ TROPONIN I Collection Time: 01/27/21 12:55 AM  
Result Value Ref Range Troponin-I, QT 0.21 (H) 0.0 - 0.045 NG/ML  
GLUCOSE, POC Collection Time: 01/27/21  8:39 AM  
Result Value Ref Range Glucose (POC) 113 (H) 70 - 110 mg/dL GLUCOSE, POC Collection Time: 01/27/21 12:11 PM  
Result Value Ref Range Glucose (POC) 122 (H) 70 - 110 mg/dL GLUCOSE, POC Collection Time: 01/27/21  6:16 PM  
Result Value Ref Range Glucose (POC) 130 (H) 70 - 110 mg/dL Imaging: 
I have personally reviewed the patients radiographs and have reviewed the reports: No new imaging in the interval 
XR Results (most recent): 
Results from Hospital Encounter encounter on 01/23/21 XR CHEST PORT Narrative ONE VIEW CHEST RADIOGRAPH INDICATION: increased oxygen requirement. Covid19. COMPARISON: CTA chest and chest radiograph 1/23/2021. TECHNIQUE: AP view of the chest 
 
FINDINGS: 
  
LINES/TUBES: None. MEDIASTINUM: Stable cardiomegaly. LUNGS: Low lung volumes. Similar appearance of bilateral patchy consolidations. No definite pleural effusion or pneumothorax. BONES/OTHER: No acute osseous abnormality. Impression Similar appearance of bilateral patchy consolidations since 1/23/2021, likely 
reflecting patient's known covid 19 infection. CT Results (most recent): 
Results from [facility] Encounter encounter on 01/23/21 CTA CHEST W OR W WO CONT Narrative CT chest with contrast for PE HISTORY: Dyspnea. COMPARISON: None TECHNIQUE: Dynamic spiral scan through the chest is obtained from the thoracic 
inlet to the diaphragm after dynamic nonionic IV contrast administration  per PE 
protocol. Coronal and sagittal MIP computer reconstructions are also obtained 
for better visualization of the integrity of pulmonary arteries in 3D dimension, 
particularly for lobar/interlobar arterial branches and to minimize radiation 
dose. All CT scans at this facility performed using dose optimization techniques as 
appreciated to a performed exam, to include automated exposure control, 
adjustment of the mA and or KU according to patient size (including appropriate 
matching for site specific examination), or use of iterative reconstruction 
technique. FINDINGS: 
 
PULMONARY ARTERIES: The contrast bolus is adequate. Although, moderate motion 
artifact noted which significantly compromises the evaluation of the small 
arterial branches. The right and left mainstem pulmonary arteries and their 
lobar branches appear patent without convincing evidence of intraluminal filling 
defect identified to suggest pulmonary embolism. There are questionable 
nonocclusive intraluminal linear filling defects in the segmental/subsegmental 
branches at anterior and lateral right upper lobe and right middle lobe, 
uncertain due to artifact or potential tiny PE. 
  
AORTA AND OTHER CARDIOVASCULAR STRUCTURES: Mild ectasia of thoracic aorta. No 
aortic aneurysm or dissection. Mild burden of  coronary artery calcifications. Heart Strain assessment: 
-  RV/LV ratio (normal <0.9): Normal 
-  Dysfunction or bowing of interventricular septum: None -  Main pulmonary artery enlargement (>30mm): Not present -  There is not visualization of contrast reflux from the right heart into the 
IVC/hepatic veins.  
 
LUNG PARENCHYMA: There are multiple patchy areas of airspace consolidations 
identified throughout both lungs, more pronounced in bilateral lower lobes and 
 inferior left upper lobe. Patent airway. IMAGED THYROID: Unremarkable. MEDIASTINUM: Borderline adenopathy in bilateral chu and subcarinal 
mediastinum. Clenton Reas PLEURAL SPACES AND CHEST WALL: No significant pleural effusion. Mild pleural 
thickening. VISUALIZED UPPER ABDOMEN: Very atrophic right kidney is partially seen. OSSEOUS STRUCTURES: Unremarkable. Impression 1. No convincing CT evidence of pulmonary embolism in mainstem and lobar 
arteries. Questionable nonocclusive intraluminal filling defects identified in 
right upper and middle lobe segmental subsegmental images, artifact versus tiny 
nonocclusive PE. 
 
2.  Extensive patchy consolidation pneumonia in bilateral lungs, Covid infection 
is more concerned than pulmonary infarctions. Clinical correlation and 
short-term follow-up CT advised. Thank you for your referral. 
  
 
  
 
08/23/18 ECHO ADULT COMPLETE 08/25/2018 8/25/2018 Narrative · Left ventricular low normal systolic function. Estimated left  
ventricular ejection fraction is 51 - 55%. Biplane method used to measure  
ejection fraction. Left ventricular severe concentric hypertrophy. Left  
ventricular global hypokinesis. · Mild aortic valve regurgitation is present. · LV strain shows relative apical sparing with reduced global strain at  
discharge -9.1% · Aortic root and ascending aorta measures 4.1 cm. · Pulmonary arterial systolic pressure is 26 mmHg. There is no evidence of  
pulmonary hypertension. · Trivial pericardial effusion. · Left atrial cavity size is mildly dilated. · Mitral annular calcification. Trace mitral valve regurgitation. Signed by: Eve Contreras MD  
 
 
Total of 30 min critical care time spent at bedside (personally) during the course of care providing evaluation,management and care decisions and ordering appropriate treatment related to critical care problems exclusive of procedures. The reason for providing this level of medical care for this critically ill patient was due a critical illness that impaired one or more vital organ systems such that there was a high probability of imminent or life threatening deterioration in the patients condition. This care involved high complexity decision making to assess, manipulate, and support vital system functions, to treat this degree vital organ system failure and to prevent further life threatening deterioration of the patients condition. This time was independent of other practitioners. Complex decision making was made in the evaluation and management plans during this consultation. More than 50% of time was spent in counseling and coordination of care including review of data and discussion with other team members. Cindy Reyna MD/MPH Pulmonary, Critical Care Medicine [facility]

## 2021-01-28 NOTE — PROGRESS NOTES
PT recommending HH at discharge. Pt has medicaid and will likely benefit from personal care. LTSS/UAI submitted to [facility] for review. Will fax LTSS to personal care agency of pt's choice. Pt currently going to [facility] HD unit at [address] [place] [address] in [place]. [facility] FOC obtained for Hermin[facility]. Tessa Esparza, MSW, [facility]- [phone]

## 2021-01-28 NOTE — ROUTINE PROCESS
Spoke to patients mom, updated her that he is doing fine and that he was dialyzed yesterday. She was concerned about his oxygenation and when he will be dialyzed again.

## 2021-01-28 NOTE — PROGRESS NOTES
[facility] Progress Note Patient: Viv Wise MRN: [id number] SSN: [id number]  YOB: 1960 Age: 62 y.o. Sex: male Admit Date: 1/23/2021 LOS: 5 days Chief Complaint Patient presents with  Shortness of Breath Subjective:  
 
Patient seen at bedside. SOB is improving. Currently on 40l/min hi flow NC FiO2 100% (increased from FiO2 .75 yesterday due to increased agitation and subsequent increased oxygen requirement during dialysis yesterday) . SpO2 98%. Patient voiced understanding that it is medically necessary for him to remain within the hospital considering his increased oxygen requirements. Tolerating PO intake. Review of Systems Constitutional: Negative for chills and fever. Respiratory: Negative for cough and hemoptysis. Cardiovascular: Negative for chest pain and palpitations. Gastrointestinal: Negative for diarrhea, nausea and vomiting. Musculoskeletal: Negative for myalgias. Neurological: Negative for dizziness and headaches. Objective:  
 
Visit Vitals /70 (BP 1 Location: Right arm, BP Patient Position: At rest) Pulse (!) 109 Temp 98.1 °F (36.7 °C) Resp 26 Ht 5' 9\" (1.753 m) Wt 110 kg (242 lb 6.4 oz) SpO2 99% BMI 35.80 kg/m² Physical Exam: 
General:  Alert and Responsive and in No acute distress. 40l/min hi flow 100% FiO2 NC SpO2 96%. HEENT: Conjunctiva pink, sclera anicteric. EOMI.  MMM. CV:  RRR. No murmurs, rubs, or gallops appreciated. No visible pulsations or thrills.   
RESP:  Unlabored breathing.  Mild crackles in lung bases. Equal expansion bilaterally.   
ABD:  Soft, nontender, nondistended. Normoactive bowel sounds. No hepatosplenomegaly. No suprapubic tenderness. MS:  No joint deformity or instability.  No atrophy. Neuro:  5/5 strength bilateral upper extremities and lower extremities.  A+Ox3. Ext:  No edema.  2+ radial and dp pulses bilaterally. Skin:  No rashes, lesions, or ulcers.  Good turgor. 
  
 
Intake and Output: 
Current Shift: No intake/output data recorded. Last three shifts: 01/26 1901 - 01/28 0700 In: 540 [P.O.:250; I.V.:290] Out: 2000 Labs, results, and imaging reviewed Assessment and Plan:  
 
Román Burr is a 62 y.o. male with PMH of osteoarthritis, CKD on dialysis MWF, gout with no recent flares, HTN, hyperlipidemia  who is admitted for acute hypoxic respiratory failure, likely due to COVID-19 pna.  
  
Acute hypoxic respiratory failure - DDX to include COVID-19 pna vs CAP vs ?CHF. CXR significant for patchy infiltrations/consolidations in bilateral mid/lower lungs, greater on the left, with concerns for COVID pna. CTA with confirmed extensive patchy consolidation in bilateral lungs, and questionable non-occlusive intraluminal filling defects in right upper and middle lobe segmental arteries; NO PE. Procal and CRP down-trending. D-dimer 2.74>2. 49. BCx NG2D. Completed Rocephin 2 g qD (1/23-1/26) and Zithromax 500 mg qD. S/p Remdesivir (completed 5 day course). - Continue in 3N stepdown, q4h vital checks 
- continue Hiflow NC 40 L FiO2 100%, wean oxygen as tolerated to maintain SpO2 >92% - Bronchial hygiene protocol - Bronchodilators- Atrovent 2 puffs q4h or Albuterol 2 puffs q4h 
- Continue Decadron 6 mg IV qD (day 6 of 10) - appreciate ID and pulm recs 
- Continue Vitamin C 1000 mg q6h, Aspirin 325 mg, Vitamin D3 50, 000 units once, famotidine 20 mg daily, melatonin 5 mg qhs, Zinc sulfate 100 mg  
- CBC, CMP daily - Vital signs per unit routine 
  
 Troponinemia - Troponin elevated to 0.51>0.42>0.33>0. 21. Likely ischemic demand from hypoxia vs R heart strain from possible PE. Patient denies chest pain. EKG NSR Qtc 510. ECHO from 2018 significant for EF 51-55%, low normal systolic function, left ventricular severe concentric hypertrophy. ECHO 1/25 - Estimated LVEF is 55 - 60%. Moderate aortic root dilatation. (4.1 cm). BNP 7K. - trend trops q6h until [facility] 
- cardiology following; heparin gtt not advised. Continue  mg daily - Avoid Qtc prolonging drugs Hyperglycemia - likely in the setting of recent high dose steroid use. A1c 6.1 (pre-diabetic range) -  correctional lispro insulin modified to very resistant dose 
-  Start low dose basal lantus insulin 5 units daily  
-  AC&HS 
  
ESRD on dialysis MWF 
- continue dialysis in-patient 
- Held Velphoro (phosphate binder). Started Renvela 800 mg TID  
- nephro consulted; appreciate recs Hypotension, in the setting of dialysis MWF 
- continue midodrine 10 mg TID 
- midodrine 10 mg prn during dialysis - Resume Coreg 3.125 mg BID once blood pressures stabilize Hyperlipidemia 
- Continue Pravastatin 20 mg daily GOUT, no recent flares. Patient is no longer on Allopurinol.  
- monitor for flares Diet renal  
DVT Prophylaxis SQH  
GI Prophylaxis famotidine Code status full Disposition >2MN Point of Contact El[facility] [employer] Relationship: sister 
[phone] Perla Chavez MD, PGY1 [address] Intern Pager: [phone] January 28, 2021, 11:36 AM

## 2021-01-28 NOTE — PROGRESS NOTES
[facility] 
(A Division of [address] [place] [address]) Follow-up Note Date of Admission: 1/23/2021       Date of Note:  1/28/2021 Summary:   
61 y/o AAM w/ HTN, HLD, ESRD-HD, Dilated CMO, CHF adm 1/23 due to shortness of breath. He had headache, fever and chills for 2 days and tested positive for Covid 8 days PTA at [facility] ED (1/15). Sent home but had worsening shortness of breath for which he presented to the [facility] ED on 1/22. Initial O2 Sat on 5 lpm NC was 90% and he was quickly placed on 40 lpm HFNC. CXR 1/23 patchy infiltrations/consolidations both mid/lower lungs greater on left. Chest CT extensive patchy consolidation pneumonia in bilateral lungs. Admitted 1/23, started on Ceftriaxone and Azithromycin. Remdesivir and dexamethasone started just past midnight 12/23. Has been afebrile and with normal WBC count since admission. Interval History: 
Sleeping soundly but easily awakened. Still on 40 lpm HFNC but denies shortness of breath. Afebrile Current Antimicrobials:    Prior Antimicrobials: 
Remdesivir 1/23 - #4 Ceftriaxone 1/23 - 3 Azithromycin 1/23 - 5 Assessment: Rec / Plan:  
Covid Pneumonia 
- fever, chills, headache, shortness of breath. Onset ~ 1/13 
- SARS Cov2 + 1/15 
- Chest CT 1/23 extensive patchy consolidation pneumonia in bilateral lungs (likely all COVID, doubt secondary bacterial pneumonia) - completed remdesivir 1/27 
- still on azithromycin -> Continue dexamethasone to complete ten days 
-> dc azithromycin  
-> Will be available as needed. Acute Respiratory Failure with Hypoxia 
- on 40 lpm HFNC SpO2 98% -> per PCCM  
ESRD-HD -> per Renal  
Dilated CMO -> per Cardiology CHF   
HTN   
HLD   
H/o ETOH abuse Microbiology: 
 
 
Lines / Catheters: 
 
 
 
Patient Active Problem List  
Diagnosis Code  ESRD (end stage renal disease) on dialysis (HCC) N18.6, Z99.2  Nonischemic cardiomyopathy (HCC) I42.8  Diastolic CHF ([facility]) H63.19  Hyperlipidemia E78.5  Essential hypertension I10  
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 02E0  Systolic CHF, chronic ([facility]) I50.22  Severe obesity (BMI 35.0-39. 9) with comorbidity ([facility] E66.01  
 ESRD on dialysis ([facility] N18.6, Z99.2  Acute respiratory failure due to COVID-19 ([facility]) U07.1, J96.00 Current Facility-Administered Medications Medication Dose Route Frequency  sodium chloride (OCEAN) 0.65 % nasal squeeze bottle 2 Spray  2 Spray Both Nostrils Q4H  
 ipratropium-albuterol (COMBIVENT RESPIMAT) 20 mcg-100 mcg inhalation spray  1 Puff Inhalation Q4H RT  
 midodrine (PROAMATINE) tablet 10 mg  10 mg Oral TID WITH MEALS  
 albumin human 25% (BUMINATE) solution 25 g  25 g IntraVENous DIALYSIS PRN  
 insulin glargine (LANTUS) injection 5 Units  5 Units SubCUTAneous DAILY  melatonin (rapid dissolve) tablet 5 mg  5 mg Oral QHS  insulin lispro (HUMALOG) injection   SubCUTAneous AC&HS  
 glucose chewable tablet 16 g  4 Tab Oral PRN  
 glucagon (GLUCAGEN) injection 1 mg  1 mg IntraMUSCular PRN  
 dextrose (D50W) injection syrg 12.5-25 g  25-50 mL IntraVENous PRN  
 sevelamer carbonate (RENVELA) tab 800 mg  800 mg Oral TID WITH MEALS  sodium chloride (NS) flush 5-10 mL  5-10 mL IntraVENous PRN  
 sodium chloride (NS) flush 5-40 mL  5-40 mL IntraVENous Q8H  
 sodium chloride (NS) flush 5-40 mL  5-40 mL IntraVENous PRN  
 acetaminophen (TYLENOL) tablet 650 mg  650 mg Oral Q6H PRN Or  
 acetaminophen (TYLENOL) suppository 650 mg  650 mg Rectal Q6H PRN  polyethylene glycol (MIRALAX) packet 17 g  17 g Oral DAILY PRN  
 heparin (porcine) injection 5,000 Units  5,000 Units SubCUTAneous Q8H  
 ascorbic acid (vitamin C) (VITAMIN C) tablet 1,000 mg  1,000 mg Oral Q6H  
 zinc sulfate (ZINCATE) 220 (50) mg capsule 2 Cap  2 Cap Oral DAILY  aspirin tablet 325 mg  325 mg Oral DAILY  famotidine (PEPCID) tablet 20 mg  20 mg Oral DAILY  [Held by provider] carvediloL (COREG) tablet 3.125 mg  3.125 mg Oral BID WITH MEALS  pravastatin (PRAVACHOL) tablet 20 mg  20 mg Oral DAILY  dexamethasone (DECADRON) 4 mg/mL injection 6 mg  6 mg IntraVENous Q24H  
 midodrine (PROAMATINE) tablet 10 mg  10 mg Oral DIALYSIS PRN Review of Systems - Negative except as in interval history Objective: 
 
Visit Vitals /65 (BP 1 Location: Right arm, BP Patient Position: At rest) Pulse 97 Temp 97.9 °F (36.6 °C) Resp 24 Ht 5' 9\" (1.753 m) Wt 110 kg (242 lb 6.4 oz) SpO2 98% BMI 35.80 kg/m² Temp (24hrs), Av.9 °F (36.6 °C), Min:97.6 °F (36.4 °C), Max:98.1 °F (36.7 °C) General: Well developed, obese 62 y.o. BLACK male in no acute distress on HFNC  
ENT: ENT exam normal, no neck nodes or sinus tenderness Head: normocephalic, without obvious abnormality Mouth:  not examined Neck: supple, symmetrical, trachea midline Cardio:  regular rate and rhythm, S1, S2 normal, no murmur, click, rub or gallop Chest: inspection normal - no chest wall deformities or tenderness, respiratory effort normal 
Lungs: mild bibasilar rales, no wheezing Abdomen: soft, non-tender. Bowel sounds normal. No masses, no organomegaly. , decreased bowel sounds Extremities:  no redness or tenderness in the calves or thighs, no edema Lab results: 
 
Chemistry Recent Labs  
  21 
0311 21 
0055 21 
0100 * 146* 201*  142 138  
K 4.4 4.2 3.6 CL 98* 100 101 CO2 23 22 24 BUN 59* 76* 53* CREA 10.90* 13.60* 11.10* CA 9.4 9.5 9.1 AGAP 16 20* 13 BUCR 5* 6* 5* AP 49 46 44*  
TP 8.2 7.9 8.3* ALB 3.6 3.3* 3.5 GLOB 4.6* 4.6* 4.8* AGRAT 0.8 0.7* 0.7* CBC w/ Diff Recent Labs  
  21 
0311 21 
0055 21 
0100 WBC 9.8 10.9 10.4 RBC 3.85* 3.75* 3.79* HGB 10.7* 10.7* 10.9* HCT 32.7* 32.4* 32.6*  
 363 325 GRANS 83* 82* 80* LYMPH 5* 8* 7* EOS 0 0 0 Microbiology All Micro Results Procedure Component Value Units Date/Time CULTURE, BLOOD [[id number]] Collected: 01/23/21 1116 Order Status: Completed Specimen: Blood Updated: 01/28/21 8298 Special Requests: NO SPECIAL REQUESTS Culture result: NO GROWTH 5 DAYS     
 CULTURE, BLOOD [[id number]] Collected: 01/23/21 1115 Order Status: Completed Specimen: Blood Updated: 01/28/21 3568 Special Requests: NO SPECIAL REQUESTS Culture result: NO GROWTH 5 DAYS     
 COVID-19 RAPID TEST [[id number]]  (Abnormal) Collected: 01/25/21 1415 Order Status: Completed Specimen: Nasopharyngeal Updated: 01/25/21 1518 Specimen source Nasopharyngeal     
  COVID-19 rapid test Detected Comment: CALLED TO AND CORRECTLY REPEATED BY: 
[facility]ADIA LANTIGUA [facility] 3N AT 9625 BY KDA 1/25/21 The specimen is POSITIVE for SARS-CoV-2, the novel coronavirus associated with COVID-19. This test has been authorized by the FDA under an Emergency Use Authorization (EUA) for use by authorized laboratories. Fact sheet for Healthcare Providers: ConventionUpdate.co.nz Fact sheet for Patients: ConventionUpdate.co.nz Methodology: Isothermal Nucleic Acid Amplification Sada Linares MD, [facility] Infectious Disease Physicians 1/28/2021  
3:45 PM

## 2021-01-28 NOTE — ROUTINE PROCESS
Bedside and Verbal shift change report given to Tariq Maldonado (oncoming nurse) by Rosalee Recio (offgoing nurse). Report included the following information SBAR, Kardex, Procedure Summary, Intake/Output, MAR, Recent Results, Med Rec Status and Cardiac Rhythm NSR-Stach.

## 2021-01-28 NOTE — PROGRESS NOTES
RENAL DAILY PROGRESS NOTE Subjective:  
 
 
Complaint: sitting comfortably on side of bed.seen via glass window Overnight events noted Still on HFNC IMPRESSION:  
IMPRESSION:  
· ESRD on TTS at [address], on MWF here · COVID Pneumonia · Hypoxic resp failure · Hypertension · Hx Diastolic CHF · Hyperlipidemia · Secondary hyperparathyroidism PLAN:  
· C/w  phos binders · Dialysis tomorrow- on MWF. Yesterday had 2.5 L out with dialysis · No gadolinium · Daily labs for now · Watch resp status · Further care per primary and ID 
   
  
 
 
 
Current Facility-Administered Medications Medication Dose Route Frequency  sodium chloride (OCEAN) 0.65 % nasal squeeze bottle 2 Spray  2 Spray Both Nostrils Q4H  
 ipratropium-albuterol (COMBIVENT RESPIMAT) 20 mcg-100 mcg inhalation spray  1 Puff Inhalation Q4H RT  
 midodrine (PROAMATINE) tablet 10 mg  10 mg Oral TID WITH MEALS  
 albumin human 25% (BUMINATE) solution 25 g  25 g IntraVENous DIALYSIS PRN  
 insulin glargine (LANTUS) injection 5 Units  5 Units SubCUTAneous DAILY  melatonin (rapid dissolve) tablet 5 mg  5 mg Oral QHS  insulin lispro (HUMALOG) injection   SubCUTAneous AC&HS  
 glucose chewable tablet 16 g  4 Tab Oral PRN  
 glucagon (GLUCAGEN) injection 1 mg  1 mg IntraMUSCular PRN  
 dextrose (D50W) injection syrg 12.5-25 g  25-50 mL IntraVENous PRN  
 sevelamer carbonate (RENVELA) tab 800 mg  800 mg Oral TID WITH MEALS  sodium chloride (NS) flush 5-10 mL  5-10 mL IntraVENous PRN  
 sodium chloride (NS) flush 5-40 mL  5-40 mL IntraVENous Q8H  
 sodium chloride (NS) flush 5-40 mL  5-40 mL IntraVENous PRN  
 acetaminophen (TYLENOL) tablet 650 mg  650 mg Oral Q6H PRN Or  
 acetaminophen (TYLENOL) suppository 650 mg  650 mg Rectal Q6H PRN  polyethylene glycol (MIRALAX) packet 17 g  17 g Oral DAILY PRN  
 heparin (porcine) injection 5,000 Units  5,000 Units SubCUTAneous Q8H  
  ascorbic acid (vitamin C) (VITAMIN C) tablet 1,000 mg  1,000 mg Oral Q6H  
 zinc sulfate (ZINCATE) 220 (50) mg capsule 2 Cap  2 Cap Oral DAILY  aspirin tablet 325 mg  325 mg Oral DAILY  famotidine (PEPCID) tablet 20 mg  20 mg Oral DAILY  [Held by provider] carvediloL (COREG) tablet 3.125 mg  3.125 mg Oral BID WITH MEALS  pravastatin (PRAVACHOL) tablet 20 mg  20 mg Oral DAILY  dexamethasone (DECADRON) 4 mg/mL injection 6 mg  6 mg IntraVENous Q24H  
 midodrine (PROAMATINE) tablet 10 mg  10 mg Oral DIALYSIS PRN Objective:  
 
Patient Vitals for the past 24 hrs: 
 Temp Pulse Resp BP SpO2  
01/28/21 0949     98 % 01/28/21 0800 98 °F (36.7 °C) 97 24 110/65 96 % 01/28/21 0400 98.1 °F (36.7 °C) (!) 109 26  99 % 01/28/21 0315     93 % 01/28/21 0037     94 % 01/28/21 0000 97.8 °F (36.6 °C) (!) 113 18 109/70 96 % 01/27/21 2000 98.1 °F (36.7 °C) (!) 120 28 (!) 94/57 98 % 01/27/21 1830 97.8 °F (36.6 °C) (!) 118 (!) 35 (!) 92/30   
01/27/21 1745  (!) 122  (!) 101/53   
01/27/21 1730  (!) 121  (!) 117/41   
01/27/21 1715  (!) 124  (!) 96/50   
01/27/21 1700  (!) 119  (!) 115/54   
01/27/21 1645  (!) 122  (!) 97/37   
01/27/21 1630  (!) 105  115/66   
01/27/21 1615  (!) 118  (!) 127/34   
01/27/21 1600  (!) 109  (!) 159/102   
01/27/21 1545  (!) 115  (!) 165/76   
01/27/21 1530  (!) 103  132/63   
01/27/21 1515  (!) 101  112/69   
01/27/21 1500 97.6 °F (36.4 °C) (!) 104 28 130/89   
01/27/21 1143  75  122/80   
01/27/21 1140 98 °F (36.7 °C) 84 19 107/63 96 % Weight change: -1.996 kg (-4 lb 6.4 oz) 01/26 1901 - 01/28 0700 In: 540 [P.O.:250; I.V.:290] Out: 2000 Intake/Output Summary (Last 24 hours) at 1/28/2021 1024 Last data filed at 1/28/2021 7968 Gross per 24 hour Intake  Output 2000 ml Net -2000 ml [facility]. Patient with COVID infection . Patient is in isolation room due to COVID status and PPE is in short supply hence seen through glass window and d/w patients RN. Full contact physical exam was not possible due to patient's clinical condition, key findings seen by me are documented. In addition, I have also used findings documented by physicians who have recently examined thee patient as they are relevant to the patient's renal care. No distress on exam 
No lower extremity edema on inspection On HFNC Data Review:  
 
LABS:  
Hematology:  
Recent Labs  
  01/28/21 0311 01/27/21 0055 01/26/21 
0100 WBC 9.8 10.9 10.4 HGB 10.7* 10.7* 10.9* HCT 32.7* 32.4* 32.6* Chemistry:  
Recent Labs  
  01/28/21 0311 01/27/21 0055 01/26/21 
0100 BUN 59* 76* 53* CREA 10.90* 13.60* 11.10* CA 9.4 9.5 9.1 ALB 3.6 3.3* 3.5  
K 4.4 4.2 3.6  142 138 CL 98* 100 101 CO2 23 22 24 * 146* 201* Procedures/imaging: see electronic medical records for all procedures, Xrays and details which were not copied into this note but were reviewed prior to creation of Plan Thelma Kruse MD 
1/28/2021

## 2021-01-28 NOTE — PROGRESS NOTES
RR 22, maintain settings 01/28/21 0225 Oxygen Therapy O2 Sat (%) 98 % Pulse via Oximetry 105 beats per minute O2 Device Hi flow nasal cannula 
(Vapotherm) O2 Flow Rate (L/min) 40 l/min O2 Temperature 91.4 °F (33 °C) FIO2 (%) 100 %

## 2021-01-28 NOTE — PROGRESS NOTES
[facility] 
 
 
CARDIOLOGY PROGRESS NOTE 
RECS: 
 
 
 
1. Acute Hypoxic respiratory failure -related to Covid pneumonia currently on high flow oxygen - continue supportive care treatment per medical team and [facility] [place] 2. COVID - 19 pneumonia with consolidations trevon lower lobes - antibiotics per ID recs. 3. Detectable troponin - 0.51, 0.42, 0.33- EKG SR, non-specific ST changes. Elevation likely due to demand in setting of respiratory failure. Continue aspirin 325 mg. Recent Echo shows preserve EF% 4. Hypertension per history- now with low BP. Resume coreg when BP stabilizes/improves. On midodrine 5. History of dilated cardiomyopathy -  (EF 15% 11/2016) -  EF improved on echo  9/2019, recent echo EF 55%-60%. NST 2018 intermediate risk NUC stress test Fixed defect consistent with prior myocardial infarction. 6. Chronic diastolic CHF - NT pro BNP 7,247 -no significant peripheral edema. Volume status per renal  
7. Hyperlipidemia - continue statin 8. ESRD on dialysis 9. Dilated ascending aorta measuring 4.5 cm on echo 2019 -stable recent echo shows Moderate aortic root dilatation. (4.1 cm) 10. Hx of alcohol abuse. Continue supportive care Echo 1/21 · LV: Estimated LVEF is 55 - 60%. Visually measured ejection fraction. Normal cavity size and systolic function (ejection fraction normal). Moderate concentric hypertrophy. Wall motion: normal. 
· AV: Mild aortic valve regurgitation is present. · PA: Pulmonary arterial systolic pressure is 23 mmHg. · AO: Moderate aortic root dilatation. (4.1 cm) · COVID (+) ASSESSMENT: 
Hospital Problems  Date Reviewed: 1/3/2019 Codes Class Noted POA * (Principal) Acute respiratory failure due to COVID-19 [facility]) ICD-10-CM: U07.1, J96.00 
ICD-9-CM: 518.81, 079.89  1/23/2021 Unknown SUBJECTIVE: 
 
No chest pain SOB same on high flow O2 Spoke with patient on the phone, seen patient though the window OBJECTIVE: 
 
VS:  
Visit Vitals /65 (BP 1 Location: Right arm, BP Patient Position: At rest) Pulse 97 Temp 97.9 °F (36.6 °C) Resp 24 Ht 5' 9\" (1.753 m) Wt 110 kg (242 lb 6.4 oz) SpO2 98% BMI 35.80 kg/m² Intake/Output Summary (Last 24 hours) at 1/28/2021 1400 Last data filed at 1/28/2021 1895 Gross per 24 hour Intake  Output 2000 ml Net -2000 ml TELE: normal sinus rhythm Limited physical exam due to COV-19 General:no distress noted on High flow O2. 40l/min hi flow 75% FiO2 NC SpO2 96%. HENT: Normocephalic, atraumatic.  Normal external eye. Neck :  no JVD Cardiac:  irregularly irregular rhythm on monitor Extremities:  trace edema BLE  
  
 
 
 
Labs: Results:  
   
Chemistry Recent Labs  
  01/28/21 0311 01/27/21 
0055 01/26/21 
0100 * 146* 201*  142 138  
K 4.4 4.2 3.6 CL 98* 100 101 CO2 23 22 24 BUN 59* 76* 53* CREA 10.90* 13.60* 11.10* CA 9.4 9.5 9.1 AGAP 16 20* 13 BUCR 5* 6* 5* AP 49 46 44*  
TP 8.2 7.9 8.3* ALB 3.6 3.3* 3.5 GLOB 4.6* 4.6* 4.8* AGRAT 0.8 0.7* 0.7* CBC w/Diff Recent Labs  
  01/28/21 0311 01/27/21 
0055 01/26/21 
0100 WBC 9.8 10.9 10.4 RBC 3.85* 3.75* 3.79* HGB 10.7* 10.7* 10.9* HCT 32.7* 32.4* 32.6*  
 363 325 GRANS 83* 82* 80* LYMPH 5* 8* 7* EOS 0 0 0 Cardiac Enzymes No results for input(s): CPK, CKND1, SHRAVAN in the last 72 hours. No lab exists for component: Scott Mckeon Coagulation No results for input(s): PTP, INR, APTT, INREXT, INREXT in the last 72 hours. Lipid Panel Lab Results Component Value Date/Time Cholesterol, total 155 08/23/2018 03:50 PM  
 HDL Cholesterol 72 (H) 08/23/2018 03:50 PM  
 LDL, calculated 71 08/23/2018 03:50 PM  
 VLDL, calculated 12 08/23/2018 03:50 PM  
 Triglyceride 60 08/23/2018 03:50 PM  
 CHOL/HDL Ratio 2.2 08/23/2018 03:50 PM  
  
BNP No results for input(s): BNPP in the last 72 hours. Liver Enzymes Recent Labs  
  01/28/21 0311  
TP 8.2 ALB 3.6 AP 49 Thyroid Studies Lab Results Component Value Date/Time TSH 1.17 08/23/2018 03:50 PM  
    
 
 
 
Don CHRIS Tatiana:[phone]

## 2021-01-29 NOTE — PROGRESS NOTES
conducted an initial consultation and Spiritual Assessment for Ellie Pressley, who is a 62 y.o.,male. Patient's Primary Language is: [place]. According to the patient's EMR Confucianist Affiliation is: [facility].  
 
The reason the Patient came to the hospital is:  
Patient Active Problem List  
 Diagnosis Date Noted  Acute respiratory failure due to COVID-19 [facility]) 01/23/2021  ESRD on dialysis ([address] 11/15/2018  Severe obesity (BMI 35.0-39. 9) with comorbidity ([address] 04/09/2018  Systolic CHF, chronic ([address] 04/06/2018  Idiopathic chronic gout of multiple sites without tophus 02/21/2017  Essential hypertension 11/17/2016  Hyperlipidemia 05/27/2014  Diastolic CHF ([address]  ESRD (end stage renal disease) on dialysis ([facility] 02/24/2014  Nonischemic cardiomyopathy ([facility] 02/24/2014 The  provided the following Interventions: 
Initiated a relationship of care and support. Explored issues of con, belief, spirituality and Orthodoxy/ritual needs while hospitalized. Listened empathically. Provided chaplaincy education. Provided information about Spiritual Care Services. Offered prayer and assurance of continued prayers on patient's behalf. Chart reviewed. The following outcomes where achieved: 
Patient shared limited information about both their medical narrative and spiritual journey/beliefs.  confirmed Patient's Confucianist Affiliation. Patient processed feeling about current hospitalization. Patient expressed gratitude for 's visit. Assessment: 
Patient does not have any Orthodoxy/cultural needs that will affect patient's preferences in health care. There are no spiritual or Orthodoxy issues which require intervention at this time. Plan: 
Chaplains will continue to follow and will provide pastoral care on an as needed/requested basis.  recommends bedside caregivers page  on duty if patient shows signs of acute spiritual or emotional distress. Via Johny Patel [facility] Spiritual Care  
[phone]

## 2021-01-29 NOTE — PROGRESS NOTES
9395 - Received verbal report from [facility], report included SBAR, MAR, and Kardex. Problem: Falls - Risk of 
Goal: *Absence of Falls Description: Document Kristian Webber Fall Risk and appropriate interventions in the flowsheet. 1/28/2021 2345 by Eber Closs Outcome: Progressing Towards Goal 
Note: Fall Risk Interventions: 
Mobility Interventions: Assess mobility with egress test 
 
  
 
Medication Interventions: Assess postural VS orthostatic hypotension Elimination Interventions: Bed/chair exit alarm 1/28/2021 2334 by Eber Closs Outcome: Progressing Towards Goal 
Note: Fall Risk Interventions: 
Mobility Interventions: Assess mobility with egress test 
 
  
 
Medication Interventions: Assess postural VS orthostatic hypotension Elimination Interventions: Bed/chair exit alarm Gave verbal report to Mercedes Mooney, report included SBAR, MAR, and Kardex.

## 2021-01-29 NOTE — PROGRESS NOTES
Jazlyn Guy  93. Post-rounding Note OBJECTIVE Visit Vitals /80 Pulse (!) 112 Temp 98.6 °F (37 °C) Resp (!) 35 Ht 5' 9\" (1.753 m) Wt 110 kg (242 lb 6.4 oz) SpO2 95% BMI 35.80 kg/m² Current oxygen requirement: 40L/min hi flow NC FiO2 90% PERTINENT LABS/IMAGING: 
 
CXR 1/29 -   
Significant interval progression of patchy airspace disease in bilateral lungs, 
including Covid pneumonia. TREATMENT PLAN UPDATES: 
 
Acute hypoxic respiratory failure - Considering worsening CXR results, and continued high oxygen requirement, ordered fungitell and aspergillosis ab to assess for superimposed fungal infection - ID signed off, but consider re-consulting ID in AM if fungitell positive - Continue to wean off oxygen as tolerated; appreciate recs from pulm See daily progress note for full assessment/plan. Martha Paredes MD, PGY-1  
Jazlyn Guy  93. Bucky Pager: [phone] January 29, 2021, 6:55 PM

## 2021-01-29 NOTE — MED STUDENT NOTES
*ATTENTION:  This note has been created by a medical student for educational purposes only. Please do not refer to the content of this note for clinical decision-making, billing, or other purposes. Please see attending physicians note to obtain clinical information on this patient. * [address] [place] [facility] Brief Progress Note SUBJECTIVE Refer to resident note. Patient is COVID-19 positive. OBJECTIVE Visit Vitals BP 95/71 Pulse 92 Temp 97.7 °F (36.5 °C) Resp 21 Ht 5' 9\" (1.753 m) Wt 110 kg (242 lb 6.4 oz) SpO2 91% BMI 35.80 kg/m² Physical Exam 
Refer to resident note. Patient is COVID 19 positive. Recent Results (from the past 12 hour(s)) GLUCOSE, POC Collection Time: 01/28/21  8:48 PM  
Result Value Ref Range Glucose (POC) 177 (H) 70 - 110 mg/dL METABOLIC PANEL, COMPREHENSIVE Collection Time: 01/29/21 12:37 AM  
Result Value Ref Range Sodium 141 136 - 145 mmol/L Potassium 4.5 3.5 - 5.5 mmol/L Chloride 97 (L) 100 - 111 mmol/L  
 CO2 20 (L) 21 - 32 mmol/L Anion gap 24 (H) 3.0 - 18 mmol/L Glucose 161 (H) 74 - 99 mg/dL BUN 85 (H) 7.0 - 18 MG/DL Creatinine 12.90 (H) 0.6 - 1.3 MG/DL  
 BUN/Creatinine ratio 7 (L) 12 - 20 GFR est AA 5 (L) >60 ml/min/1.73m2 GFR est non-AA 4 (L) >60 ml/min/1.73m2 Calcium 10.2 (H) 8.5 - 10.1 MG/DL Bilirubin, total 0.4 0.2 - 1.0 MG/DL  
 ALT (SGPT) 40 16 - 61 U/L  
 AST (SGOT) 32 10 - 38 U/L Alk. phosphatase 53 45 - 117 U/L Protein, total 8.3 (H) 6.4 - 8.2 g/dL Albumin 3.3 (L) 3.4 - 5.0 g/dL Globulin 5.0 (H) 2.0 - 4.0 g/dL A-G Ratio 0.7 (L) 0.8 - 1.7    
CBC WITH AUTOMATED DIFF Collection Time: 01/29/21 12:37 AM  
Result Value Ref Range WBC 11.0 4.6 - 13.2 K/uL  
 RBC 4.06 (L) 4.70 - 5.50 M/uL  
 HGB 11.6 (L) 13.0 - 16.0 g/dL HCT 34.6 (L) 36.0 - 48.0 % MCV 85.2 74.0 - 97.0 FL  
 MCH 28.6 24.0 - 34.0 PG  
 MCHC 33.5 31.0 - 37.0 g/dL  
 RDW 15.7 (H) 11.6 - 14.5 % PLATELET 187 282 - 081 K/uL MPV 10.2 9.2 - 11.8 FL  
 NEUTROPHILS 81 (H) 42 - 75 % BAND NEUTROPHILS 1 0 - 5 % LYMPHOCYTES 9 (L) 20 - 51 % MONOCYTES 9 2 - 9 % EOSINOPHILS 0 0 - 5 % BASOPHILS 0 0 - 3 %  
 ABS. NEUTROPHILS 9.0 (H) 1.8 - 8.0 K/UL  
 ABS. LYMPHOCYTES 1.0 0.8 - 3.5 K/UL  
 ABS. MONOCYTES 1.0 0 - 1.0 K/UL  
 ABS. EOSINOPHILS 0.0 0.0 - 0.4 K/UL  
 ABS. BASOPHILS 0.0 0.0 - 0.06 K/UL  
 DF MANUAL PLATELET COMMENTS ADEQUATE PLATELETS    
 RBC COMMENTS ANISOCYTOSIS 1+ 
    
 RBC COMMENTS OVALOCYTES 1+ ASSESSMENT/PLAN Fady Sotomayor a 62 y. o. male with PMH of osteoarthritis, CKD on dialysis MWF, gout with no recent flares, HTN, hyperlipidemia  who is admitted for acute hypoxic respiratory failure, likely due to COVID-19 pna.  
  
Acute hypoxic respiratory failure - DDX to include COVID-19 pna vs CAP vs ?CHF. CXR significant for patchy infiltrations/consolidations in bilateral mid/lower lungs, greater on the left, with concerns for COVID pna. CTA with confirmed extensive patchy consolidation in bilateral lungs, and questionable non-occlusive intraluminal filling defects in right upper and middle lobe segmental arteries; NO PE. Procal and CRP down-trending. D-dimer 3.22. BCx NG3D. Completed Rocephin 2 g qD (1/23-1/26) and Zithromax 500 mg qD. S/p Remdesivir (completed 5 day course). - transfer to 3N stepdown, q4h vital checks 
- continue [place] NC 35L FiO2 90%, wean oxygen as tolerated to maintain SpO2 >92% - Bronchial hygiene protocol - Bronchodilators- Atrovent 2 puffs q4h or Albuterol 2 puffs q4h 
- Continue Decadron 6 mg IV qD (day 7 of 10) - appreciate ID and pulm recs 
- O2 support; wean as tolerated - Continue Vitamin C 1000 mg q6h, Aspirin 325 mg, Vitamin D3 50, 000 units once, famotidine 20 mg daily, melatonin 5 mg qhs, Zinc sulfate 100 mg  
- CBC, CMP daily - Vital signs per unit routine 
  
 Troponinemia - Troponin elevated to 0.51>0.42>0.33>>0.23. Likely ischemic demand from hypoxia vs R heart strain from possible PE. Patient denies chest pain. EKG NSR Qtc 510. ECHO from 2018 significant for EF 51-55%, low normal systolic function, left ventricular severe concentric hypertrophy. ECHO 1/25 - Estimated LVEF is 55 - 60%.  Moderate aortic root dilatation. (4.1 cm). BNP 7K. - trend trops q6h until [facility] 
- cardiology following; heparin gtt not advised. Continue  mg daily - Avoid Qtc prolonging drugs 
  
Hyperglycemia - likely in the setting of recent high dose steroid use. A1c 6.1 (pre-diabetic range) -  correctional lispro insulin modified to very resistant dose -  Start low dose basal lantus insulin 5 units daily  
- AC&HS 
  
ESRD on dialysis MWF 
- continue dialysis in-patient 
- Held Velphoro (phosphate binder). Started Renvela 800 mg TID  
- nephro consulted; appreciate recs 
  
Hypotension, in the setting of dialysis MWF 
- continue midodrine 10 mg TID 
- midodrine 10 mg prn during dialysis - Resume Coreg 3.125 mg BID once blood pressures stabilize 
  
Hyperlipidemia 
- Continue Pravastatin 20 mg daily 
  
GOUT, no recent flares.  Patient is no longer on Allopurinol.  
- monitor for flares 
  
Jennifer Johnson, MS-3

## 2021-01-29 NOTE — PROGRESS NOTES
Attempted to see patient for PT treatment however he is currently having dialysis. Will continue to follow.  Kalani Ellison, PT

## 2021-01-29 NOTE — PROGRESS NOTES
Comprehensive Nutrition Assessment Type and Reason for Visit: Initial, RD nutrition re-screen/LOS Nutrition Recommendations/Plan: - Add supplements: Nepro TID. Nutrition Assessment:  Fair appetite, variable meal intake. Malnutrition Assessment: 
Malnutrition Status: At risk for malnutrition (specify)(COVID-19, HD) Nutrition History and Allergies: PMH of osteoarthritis, CKD on dialysis MWF, gout with no recent flares, HTN and hyperlipidemia who presents with shortness of breath for 1 week. NKFA. Estimated Daily Nutrient Needs: 
Energy (kcal): 4387-7490; Weight Used for Energy Requirements: Other (specify)(SBW 77 kg) Protein (g): ; Weight Used for Protein Requirements: (SBW x 1.2-1.5) Fluid (ml/day): 750-1500 mL; Method Used for Fluid Requirements: Standard renal 
 
Nutrition Related Findings:  BM  1/27. Anuric s/p HD 1/27 (2 L UF) Wounds:  None Current Nutrition Therapies: DIET RENAL Regular; Consistent Carb 1800kcal 
 
Anthropometric Measures: 
· Height:  5' 9\" (175.3 cm) · Current Body Wt:  110 kg (242 lb 8.1 oz) · Admission Body Wt:  229 lb 4.5 oz · Ideal Body Wt:  160 lbs:  151.6 % · BMI Category:  Obese class 2 (BMI 35.0-39. 9) Nutrition Diagnosis:  
· Increased nutrient needs related to renal dysfunction as evidenced by dialysis Nutrition Interventions:  
Food and/or Nutrient Delivery: Continue current diet, Start oral nutrition supplement Nutrition Education and Counseling: Education not indicated Coordination of Nutrition Care: Continue to monitor while inpatient Goals: 
PO nutrition intake will meet >75% of patient estimated nutritional needs within the next 7 days Nutrition Monitoring and Evaluation:  
Behavioral-Environmental Outcomes: None identified Food/Nutrient Intake Outcomes: Diet advancement/tolerance, Supplement intake, Food and nutrient intake Physical Signs/Symptoms Outcomes: GI status, Meal time behavior, Nutrition focused physical findings Discharge Planning:   
Continue oral nutrition supplement, Continue current diet Electronically signed by Noreen Maciel RD, [address]  on 1/29/2021 at 3:27 PM 
 
Contact: [phone]

## 2021-01-29 NOTE — ROUTINE PROCESS
Bedside and Verbal shift change report given to Ayala Jules (oncoming nurse) by Fariba Lombardi (offgoing nurse). Report included the following information SBAR, Kardex, Intake/Output, MAR, Recent Results, Med Rec Status and Cardiac Rhythm Stach.

## 2021-01-29 NOTE — PROGRESS NOTES
[facility] 
 
 
CARDIOLOGY PROGRESS NOTE 
RECS: 
 
 
 
1. Acute Hypoxic respiratory failure -related to Covid pneumonia currently on high flow oxygen - continue supportive care treatment per medical team and [facility] [place] 2. COVID - 19 pneumonia with consolidations trevon lower lobes - antibiotics per ID recs. 3. Detectable troponin - 0.51, 0.42, 0.33- EKG SR, non-specific ST changes. Elevation likely due to demand in setting of respiratory failure. Continue aspirin 325 mg. Recent Echo shows preserve EF% 4. Hypertension per history- now with low BP. Resume coreg when BP stabilizes/improves. On midodrine 5. History of dilated cardiomyopathy -  (EF 15% 11/2016) -  EF improved on echo  9/2019, recent echo EF 55%-60%. NST 2018 intermediate risk NUC stress test Fixed defect consistent with prior myocardial infarction. 6. Chronic diastolic CHF - NT pro BNP 7,247 -no significant peripheral edema. Volume status per renal  
7. Hyperlipidemia - continue statin 8. ESRD on dialysis 9. Dilated ascending aorta measuring 4.5 cm on echo 2019 -stable recent echo shows Moderate aortic root dilatation. (4.1 cm) 10. Hx of alcohol abuse. Patient currently being managed for COVID related pneumonia. Continue midodrine for low normal BP. Resume coreg prior to discharge if bp stable. Elevated troponin from demand ischemia. No indication for cardiac w/u at this time. Echo 1/21 · LV: Estimated LVEF is 55 - 60%. Visually measured ejection fraction. Normal cavity size and systolic function (ejection fraction normal). Moderate concentric hypertrophy. Wall motion: normal. 
· AV: Mild aortic valve regurgitation is present. · PA: Pulmonary arterial systolic pressure is 23 mmHg. · AO: Moderate aortic root dilatation. (4.1 cm) · COVID (+) ASSESSMENT: 
Hospital Problems  Date Reviewed: 1/3/2019 Codes Class Noted POA * (Principal) Acute respiratory failure due to COVID-19 [facility]) ICD-10-CM: U07.1, J96.00 
ICD-9-CM: 518.81, 079.89  1/23/2021 Unknown SUBJECTIVE: 
 
No chest pain SOB improving. on high flow O2 Spoke with patient OBJECTIVE: 
 
VS:  
Visit Vitals BP 99/86 Pulse (!) 103 Temp 97.7 °F (36.5 °C) Resp 21 Ht 5' 9\" (1.753 m) Wt 110 kg (242 lb 6.4 oz) SpO2 91% BMI 35.80 kg/m² No intake or output data in the 24 hours ending 01/29/21 1051 TELE: normal sinus rhythm Limited physical exam due to COV-19 General:no distress noted on High flow O2. 40l/min hi flow 75% FiO2 NC SpO2 96%. HENT: Normocephalic, atraumatic.  Normal external eye. Neck :  no JVD Cardiac:  irregularly irregular rhythm on monitor Extremities:  trace edema BLE  
  
 
 
 
Labs: Results:  
   
Chemistry Recent Labs  
  01/29/21 
0037 01/28/21 
2192 01/27/21 
4862 * 176* 146*  137 142  
K 4.5 4.4 4.2 CL 97* 98* 100 CO2 20* 23 22 BUN 85* 59* 76* CREA 12.90* 10.90* 13.60* CA 10.2* 9.4 9.5 AGAP 24* 16 20* BUCR 7* 5* 6* AP 53 49 46  
TP 8.3* 8.2 7.9 ALB 3.3* 3.6 3.3*  
GLOB 5.0* 4.6* 4.6* AGRAT 0.7* 0.8 0.7* CBC w/Diff Recent Labs  
  01/29/21 
0037 01/28/21 
0311 01/27/21 
0055 WBC 11.0 9.8 10.9 RBC 4.06* 3.85* 3.75* HGB 11.6* 10.7* 10.7* HCT 34.6* 32.7* 32.4*  
 336 363 GRANS 81* 83* 82* LYMPH 9* 5* 8*  
EOS 0 0 0 Cardiac Enzymes No results for input(s): CPK, CKND1, SHRAVAN in the last 72 hours. No lab exists for component: [facility] Coagulation No results for input(s): PTP, INR, APTT, INREXT, INREXT in the last 72 hours. Lipid Panel Lab Results Component Value Date/Time  Cholesterol, total 155 08/23/2018 03:50 PM  
 HDL Cholesterol 72 (H) 08/23/2018 03:50 PM  
 LDL, calculated 71 08/23/2018 03:50 PM  
 VLDL, calculated 12 08/23/2018 03:50 PM  
 Triglyceride 60 08/23/2018 03:50 PM  
 CHOL/HDL Ratio 2.2 08/23/2018 03:50 PM  
  
 BNP No results for input(s): BNPP in the last 72 hours. Liver Enzymes Recent Labs  
  01/29/21 
0037  
TP 8.3* ALB 3.3* AP 53 Thyroid Studies Lab Results Component Value Date/Time TSH 1.17 08/23/2018 03:50 PM  
    
 
 
 
Roselia Santana NP-C Tatiana:[phone] supervised I have independently evaluated the patient. All relevant labs and testing data's are reviewed. Care plan discussed and updated after review.  
 
Teetee Rivas MD

## 2021-01-29 NOTE — PROGRESS NOTES
ACUTE HEMODIALYSIS FLOW SHEET 
 
HEMODIALYSIS ORDERS: Physician: Dr. Daniel Castillo Dialyzer: Revaclear   Duration: 3 hr  BFR: 400   DFR: 800 Dialysate:  Temp 36.0   K+   2    Ca+  2.0   Na 138   Bicarb 30 Wt Readings from Last 1 Encounters:  
01/28/21 110 kg (242 lb 6.4 oz) Patient Chart [x]   Unable to Obtain []  Dry weight/UF Goal: 3000 ml Access LUE AVF Heparin []  Bolus    Units    [] Hourly    Units    [x]None Catheter locking solution Pre BP:   124/84    Pulse:  103   Respirations: 26    Temperature:  98.1 Tx: NSS   ml/Bolus   [x] N/A Labs: []  Pre  []  Post:   [x] N/A Additional Orders(medications, blood products, hypotension management): [x] Yes   [] No  
 
[x]  [facility] Consent Verified GRAFT/FISTULA ACCESS:   []N/A     []Right     [x]Left     [x]UE     []LE []AVG   [x]AVF     []Buttonhole    [x]Medical Aseptic Prep Utilized [x]No S/S infection  []Redness  []Drainage []Cultured  [] Swelling  [] Pain Bruit:   [x] Strong    [] Weak       Thrill :   [x] Strong    [] Weak Needle Gauge: 15   Length: 1 inch If access problem,  notified: []Yes     [x]N/A Please describe access if present and not used: N/A  
 
 
GENERAL ASSESSMENT:   
LUNGS:   SaO2%  92    [] Clear  [] Coarse  [x] Crackles  [] Wheezing 
                                              [] Diminished     Location : [x]RLL   [x]LLL    [x]RUL  [x]DIEGO Cough: []Productive  [x]Dry  []N/A   Respirations:  []Easy  [x]Labored Therapy:  []RA  [x]NC 40/min high flow    Mask: []NRB  [] Venti    O2% []Ventilator  []Intubated  [] Trach  [] BiPaP CARDIAC: []Regular      [x] Irregular  ST [] Pericardial Rub  [] JVD []  Monitored  [] Bedside  [] Remotely monitored EDEMA: [x] None  []Generalized  [] Pitting [] 1    [] 2    [] 3    [] 4 [] Facial  [] Pedal  []  UE  [] LE  
SKIN:   [x] Warm  [] Hot     [] Cold   [x] Dry     [] Pale   [] Diaphoretic [] Flushed  [] Jaundiced  [] Cyanotic  [] Rash  [] Weeping LOC:    [x] Alert      [x]Oriented:    [x] Person     [x] Place  [x]Time 
             [] Confused  [] Lethargic  [] Medicated  [] Non-responsive  [] Non-Verbal  
GI / ABDOMEN:   
                 [] Flat    [] Distended    [x] Soft    [] Firm   []  Obese 
                 [] Diarrhea  [x] Bowel Sounds  [] Nausea  [] Vomiting  / URINE ASSESSMENT:  
                [] Voiding   [] Oliguria  [x] Anuria   []  Vitale [] Incontinent  []  Incontinent Brief []  Fecal Management System PAIN:  [x] 0 []1  []2   []3   []4   []5   []6   []7   []8   []9   []10 Scale 0-10  Action/Follow Up: MOBILITY:  [x] Bed    [] Stretcher All Vitals and Treatment Details on Attached Flowsheet Hospital: [facility] Room # 368/01 [] 1st Time Acute      [] Stat       [x] Routine      [] Urgent [x] Acute Room  []  Bedside  [] ICU/CCU  [] ER Isolation Precautions:  [x] Dialysis Droplet Plus ALLERGIES:    
No Known Allergies Code Status:  Full Code Hepatitis Status Lab Results Component Value Date/Time Hepatitis B surface Ag <0.10 01/23/2021 11:15 AM  
 Hepatitis B surface Ab 321.49 01/23/2021 11:15 AM  
 Hep B Core Ab, total NEGATIVE  08/26/2018 04:12 PM  
 Hep C Virus Ab <0.1 04/11/2017 04:35 PM  
  
 
Current Labs:     
Lab Results Component Value Date/Time WBC 11.0 01/29/2021 12:37 AM  
 Hemoglobin, POC 14.3 11/15/2018 10:13 AM  
 HGB 11.6 (L) 01/29/2021 12:37 AM  
 Hematocrit, POC 42 11/15/2018 10:13 AM  
 HCT 34.6 (L) 01/29/2021 12:37 AM  
 PLATELET 446 80/45/1751 12:37 AM  
 MCV 85.2 01/29/2021 12:37 AM  
 
Lab Results Component Value Date/Time  Sodium 141 01/29/2021 12:37 AM  
 Potassium 4.5 01/29/2021 12:37 AM  
 Chloride 97 (L) 01/29/2021 12:37 AM  
 CO2 20 (L) 01/29/2021 12:37 AM  
 Anion gap 24 (H) 01/29/2021 12:37 AM  
 Glucose 161 (H) 01/29/2021 12:37 AM  
 BUN 85 (H) 01/29/2021 12:37 AM  
 Creatinine 12.90 (H) 01/29/2021 12:37 AM  
 BUN/Creatinine ratio 7 (L) 01/29/2021 12:37 AM  
 GFR est AA 5 (L) 01/29/2021 12:37 AM  
 GFR est non-AA 4 (L) 01/29/2021 12:37 AM  
 Calcium 10.2 (H) 01/29/2021 12:37 AM  
 
  
 
DIET: 
DIET RENAL 
 
 
PRIMARY NURSE REPORT:  
Pre Dialysis: MARLYS Mejia     Time: 3902 EDUCATION:   
[x] Patient [] Other Knowledge Basis: []None [x]Minimal [] Substantial  
Barriers to learning  [x]N/A  
[] Access Care     [] S&S of infection  [] Fluid Management  [] K+   [x] Procedural   
[]Albumin   [] Medications   [] Tx Options   [] Transplant   [] Diet   [] Other Teaching Tools:  [x] Explain  [] Demo  [] Handouts [] Video Patient response: [x] Verbalized understanding  [] Teach back  [] Return demonstration 
 [] Requires follow up [x]Time Out/Safety Check  [x] Extracorporeal Circuit Tested for integrity RO/HEMODIALYSIS MACHINE SAFETY CHECKS  Before each treatment:    
Machine Number:                   [facility] [x] Portable Machine #1/RO serial # [id number] Alarm Test:  Pass time 3669 [x] RO/Machine Log Complete Machine Temp    36.0 Dialysate: pH  7.4    Conductivity: Meter 13.8     HD Machine  14.1      TCD: 13.7 Dialyzer Lot # [id number]     Blood Tubing Lot # [id number]    Saline Lot # [id number] CHLORINE TESTING-Before each treatment and every 4 hours Total Chlorine: [x] less than 0.1 ppm  Initial Time Check: 1340       4 Hr/2nd Check Time:   
(if greater than 0.1 ppm from Primary then every 30 minutes from Secondary) TREATMENT INITIATION  with Dialysis Precautions:  
[x] All Connections Secured              [x] Saline Line Double Clamped  
[x] Venous Parameters Set               [x] Arterial Parameters Set [x] Prime Given 250ml NSS              [x]Air Foam Detector Engaged Treatment Initiation Note: 
1340 Arrived at pt's room, pt on 40L high flow oxygen laying on his right side in bed. Pt denies any SOB and states he is comfortable. Pt A & O  X 3, follows commands, no distress noted at this time. VSS. Will continue to monitor pt's status. During Treatment Notes: 
1400  LUE AVF assessed no abnormalities noted, bruit and thrill strong. AVG/AVF accessed without any difficulty, pt tolerated well. Vascular access visible with arterial and venous line connections intact. 1425  Telephone order from Dr. Donna Pride to increase target fluid removal from 2L to 3L. Heddy  1500  Pt asked to be positioned more onto his stomach, Pt respirations increased to 45 while moving around in bed. Vascular access visible with arterial and venous line connections intact. 1515  Pt respirations are 31-38 at this time. 1530  Pt repositioned in bed to sit up in bed. 
1600  Vascular access visible with arterial and venous line connections intact. Medication Dose Volume Route Time Sharron Nurse, Title  
albumin 25g 50ml HD [facility], RN  
albumin 25g 50ml  [address], RN Post Assessment Dialyzer Cleared:[] Good [x] Fair  [] Poor Blood processed:  63.6 L 
UF Removed:  2300 Ml Post /71  Pulse  117 Resp  21 Temp 97.9 Post Tx Vascular Access: [] N/A 
AVF/AVG: Bleeding stopped with Arterial Pressure for 5 min Venous Pressure for 10 min Skin:[x] Warm  [x] Dry [] Diaphoretic     
         [] Flushed  [] Pale [] Cyanotic Pain: 
[x]0  []1 []2  []3 []4  []5  []6 []7 []8  []9  []10 Post Treatment Note: 
 8427  HD completed at this time, pt tolerated well. Dressing clean, dry and intact. POST TREATMENT PRIMARY NURSE HANDOFF REPORT:  
Post Dialysis: MARLYS Mejia                Time:  9387 Abbreviations: AVG-arterial venous graft, AVF-arterial venous fistula, IJ-Internal Jugular, Subcl-Subclavian, Fem-Femoral, Tx-treatment, AP/HR-apical heart rate, DFR-dialysate flow rate, BFR-blood flow rate, AP-arterial pressure, -venous pressure, UF-ultrafiltrate, TMP-transmembrane pressure, Terrell-Venous, Art-Arterial, RO-Reverse Osmosis

## 2021-01-29 NOTE — PROGRESS NOTES
RENAL DAILY PROGRESS NOTE Subjective:  
 
 
Complaint: sitting comfortably and walking comfortably. seen via glass window Overnight events noted Still on HFNC IMPRESSION:  
· ESRD on TTS at [address], on MWF here · COVID Pneumonia · Hypoxic resp failure · Hypertension · Hx Diastolic CHF · Hyperlipidemia · Secondary hyperparathyroidism PLAN:  
· C/w  phos binders · Dialysis today- on MWF. · No gadolinium · Daily labs for now · Watch resp status · Further care per primary and ID 
   
  
 
 
 
Current Facility-Administered Medications Medication Dose Route Frequency  sodium chloride (OCEAN) 0.65 % nasal squeeze bottle 2 Spray  2 Spray Both Nostrils Q4H  
 ipratropium-albuterol (COMBIVENT RESPIMAT) 20 mcg-100 mcg inhalation spray  1 Puff Inhalation Q4H RT  
 midodrine (PROAMATINE) tablet 10 mg  10 mg Oral TID WITH MEALS  
 albumin human 25% (BUMINATE) solution 25 g  25 g IntraVENous DIALYSIS PRN  
 insulin glargine (LANTUS) injection 5 Units  5 Units SubCUTAneous DAILY  melatonin (rapid dissolve) tablet 5 mg  5 mg Oral QHS  insulin lispro (HUMALOG) injection   SubCUTAneous AC&HS  
 glucose chewable tablet 16 g  4 Tab Oral PRN  
 glucagon (GLUCAGEN) injection 1 mg  1 mg IntraMUSCular PRN  
 dextrose (D50W) injection syrg 12.5-25 g  25-50 mL IntraVENous PRN  
 sevelamer carbonate (RENVELA) tab 800 mg  800 mg Oral TID WITH MEALS  sodium chloride (NS) flush 5-10 mL  5-10 mL IntraVENous PRN  
 sodium chloride (NS) flush 5-40 mL  5-40 mL IntraVENous Q8H  
 sodium chloride (NS) flush 5-40 mL  5-40 mL IntraVENous PRN  
 acetaminophen (TYLENOL) tablet 650 mg  650 mg Oral Q6H PRN Or  
 acetaminophen (TYLENOL) suppository 650 mg  650 mg Rectal Q6H PRN  polyethylene glycol (MIRALAX) packet 17 g  17 g Oral DAILY PRN  
 heparin (porcine) injection 5,000 Units  5,000 Units SubCUTAneous Q8H  
  ascorbic acid (vitamin C) (VITAMIN C) tablet 1,000 mg  1,000 mg Oral Q6H  
 zinc sulfate (ZINCATE) 220 (50) mg capsule 2 Cap  2 Cap Oral DAILY  aspirin tablet 325 mg  325 mg Oral DAILY  famotidine (PEPCID) tablet 20 mg  20 mg Oral DAILY  [Held by provider] carvediloL (COREG) tablet 3.125 mg  3.125 mg Oral BID WITH MEALS  pravastatin (PRAVACHOL) tablet 20 mg  20 mg Oral DAILY  dexamethasone (DECADRON) 4 mg/mL injection 6 mg  6 mg IntraVENous Q24H  
 midodrine (PROAMATINE) tablet 10 mg  10 mg Oral DIALYSIS PRN Objective:  
 
Patient Vitals for the past 24 hrs: 
 Temp Pulse Resp BP SpO2  
01/29/21 0846  (!) 103  99/86   
01/29/21 0445 97.7 °F (36.5 °C) 92 21 95/71 91 % 01/29/21 0350     90 % 01/28/21 2115 98.5 °F (36.9 °C) 99 19 (!) 90/57 91 % 01/28/21 1920     93 % 01/28/21 1600 98 °F (36.7 °C) 98 26 135/85 93 % 01/28/21 1200 97.9 °F (36.6 °C) 100 23 113/81   
01/28/21 0949     98 % Weight change: No intake/output data recorded. No intake or output data in the 24 hours ending 01/29/21 0941Patient was seen during the [place] pandemic. Patient with COVID infection . Patient is in isolation room due to COVID status and PPE is in short supply hence seen through glass window and d/w patients RN. Full contact physical exam was not possible due to patient's clinical condition, key findings seen by me are documented. In addition, I have also used findings documented by physicians who have recently examined thee patient as they are relevant to the patient's renal care. No distress No lower extremity edema on inspection On [facility] Data Review:  
 
LABS:  
Hematology:  
Recent Labs  
  01/29/21 
0037 01/28/21 
1088 01/27/21 
0055 WBC 11.0 9.8 10.9 HGB 11.6* 10.7* 10.7* HCT 34.6* 32.7* 32.4* Chemistry:  
Recent Labs  
  01/29/21 
0037 01/28/21 
9886 01/27/21 
4475 BUN 85* 59* 76* CREA 12.90* 10.90* 13.60* CA 10.2* 9.4 9.5 ALB 3.3* 3.6 3.3*  
K 4.5 4.4 4.2  137 142 CL 97* 98* 100 CO2 20* 23 22 * 176* 146* Procedures/imaging: see electronic medical records for all procedures, Xrays and details which were not copied into this note but were reviewed prior to creation of Plan Theresa Perez MD 
1/29/2021

## 2021-01-29 NOTE — PROGRESS NOTES
RR 26, Maintain Settings 01/29/21 0957 Oxygen Therapy O2 Sat (%) 91 % Pulse via Oximetry 106 beats per minute O2 Device Hi flow nasal cannula 
(Vapotherm) O2 Flow Rate (L/min) 35 l/min O2 Temperature 91.4 °F (33 °C) FIO2 (%) 100 %

## 2021-01-29 NOTE — PROGRESS NOTES
[facility] Pulmonary, Critical Care, and Sleep Medicine F/U Patient Consult Name: Ellie Pressley MRN: [id number] : 1960 Hospital: [address] [place] [address] Dr Date: 2021 This patient has been seen and evaluated at the request of Dr. Marlen Cristina for Acute hypoxic Respiratory failure, Covid pneumonia. IMPRESSION:  
· Acute hypoxic Respiratory failure-requiring high flow 100% FiO2 at 40 L flow to maintain saturation. Underlying progressive COVID-19 pneumonia with predominant bilateral lower lobe consolidative pneumonia. Appears compensating · COVID-19 pneumonia with significant consolidations trevon lower lobes · ARDS, severe · ESRD on dialysis · History of dilated cardiomyopathy · Hypertension · Patient possibly delirious: Patient fully alert and oriented x3, but may have poor insight regarding severe medical condition, patient more worried about comfort of his bed then high oxygen demand RECOMMENDATIONS:  
· Pt currently able to maintain SpO2 in low 90s on HFNC, however requiring 100% FiO2, will wean as tolerated · Oxygen-titrate to SaO2 goal more than 88%. Currently appearing comfortable on high flow. Use bipap QHS and PRN 
· BIPAP/CPAP-can consider adding as next step if difficulty oxygenating · Bronchial hygiene protocol-encourage use of incentive spirometer, coughing · Bronchodilators-Combivent Respimat 4 times daily scheduled · Steroids- Decadron 6mg daily x 10 days · Antibiotics-per ID · COVID-19 pneumonia treatment per ID recommendations-has been started on remdesivir, however I advise against use due to no known benefit in this setting · Strict aspiration precautions dialysis per nephrology --maintain net negative fluid balance given severe hypoxia · Will need imaging follow-up to complete clearing. Concerns for developing sequelae long-term given the extent of pneumonia · Assess home Oxygen needs at discharge · OT, PT, OOB to chair, and ambulate as tolerated when possible · Healthy weight · Will Follow · DVT ppx + PUD ppx (while on steroids) Prognosis is guarded. Patient has high risk for decompensation require mechanical ventilation Subjective:  
01/28/21 Patient seen and examined at bedside. No acute events overnight. Patient on high flow nasal cannula, requirement went up to 100% FiO2. Patient has no complaints, reports he is breathing relatively well, denies any worsening cough, sputum, hemoptysis, nausea, vomiting, diarrhea, chest pain, angina. HPI per Dr. Flaco [employer] is a 62 y.o. male with PMH arthritis, CKD on HD MWF, gout HTN, HLD, now presenting with complaint of SOB. Pt is known positive COVID for over 1wk. Went to dialysis center and was noted to have a fever 100.6 here. He had a headache, eye pain and chills . No known Covid exposure. Nominal pain diarrhea no chest pain or shortness of breath Was seen in ER 1wk ago with above complaints and d/c home with CDC recommendations. Over the past few days he has felt progressively worse and was found tripoding today when he was picked up for dialysis. He currently states he feels fine and has no complaints. He denies sick contacts, CP, [place]. He missed dialysis on day of admission Initial SpO2 90% on 5lpm NC so placed on HFC. CXR showed patchy infiltrations/consolidations in bilateral mid/lower lungs I have reviewed all of the available data including the patient's previous history external records and radiological imaging available for review. In addition applicable cardiology and other lab data were also reviewed. Past Medical History:  
Diagnosis Date  Arthritis of left knee 09/2017  
 moderate to severe, with effusion on xray  Chronic kidney disease ESRD  HD on MWF  
 Diastolic CHF ([address]  Dilated cardiomyopathy ([address]  History of alcohol abuse  History of echocardiogram 02/24/2014 Mod-marked LVE. EF 15%. Severe, diffuse hypk. Mild LVH. Gr 1 DDfx. Mod LAE. Mild-mod FIDELIA. Mild AoRE. No significant change from echo of 10/23/09.  History of gout  History of myocardial perfusion scan 05/25/2006 No evidence of ischemia or scarring. Gross LVE. EF 28%. Severe global hypk. Neg EKG on submaximal EST. Ex time 8 min 25 sec.  HTN (hypertension)  Hypercholesterolemia  Hypertensive cardiovascular disease Past Surgical History:  
Procedure Laterality Date  HX COLONOSCOPY    
 HX HERNIA REPAIR  04/2016  HX VASCULAR ACCESS Right upper chest HD CATH Prior to Admission medications Medication Sig Start Date End Date Taking? Authorizing Provider  
pravastatin (PRAVACHOL) 20 mg tablet TAKE 1 TABLET BY MOUTH NIGHTLY 12/29/20  Yes Roselia Olvera NP  
carvediloL (COREG) 3.125 mg tablet TAKE 1 TABLET BY MOUTH EVERY 12 HOURS 9/29/20  Yes Denilson Kathleen NP  
sucroferric oxyhydroxide (VELPHORO) 500 mg chew chewable tablet Take  by mouth three (3) times daily (with meals). Yes Provider, Historical  
aspirin 81 mg chewable tablet Take 1 Tab by mouth daily. 2/21/17  Yes Skyler ORNELAS NP  
HYDROcodone-acetaminophen (NORCO) 5-325 mg per tablet Take 1-2 Tabs by mouth every six (6) hours as needed for Pain. Max Daily Amount: 8 Tabs. 11/15/18   Sam Cui MD  
sevelamer carbonate (RENVELA) 800 mg tab tab Take  by mouth three (3) times daily. Provider, Historical  
colchicine 0.6 mg tablet 2 tablets at first sign of gout flare and then 1 tablet 1 hour later  Indications: acute gouty arthritis 6/5/18   Kye Pagan MD  
allopurinol (ZYLOPRIM) 100 mg tablet Take 2 Tabs by mouth daily. 5/11/18   STEFFANIE Castelan No Known Allergies Social History Tobacco Use  Smoking status: Never Smoker  Smokeless tobacco: Never Used Substance Use Topics  Alcohol use:  No  
 Alcohol/week: 0.0 standard drinks Comment: stop 3 years ago in 2015 Family History Problem Relation Age of Onset  Cancer Father Lung  Heart Failure Mother  Cancer Sister Current Facility-Administered Medications Medication Dose Route Frequency  sodium chloride (OCEAN) 0.65 % nasal squeeze bottle 2 Spray  2 Spray Both Nostrils Q4H  
 ipratropium-albuterol (COMBIVENT RESPIMAT) 20 mcg-100 mcg inhalation spray  1 Puff Inhalation Q4H RT  
 midodrine (PROAMATINE) tablet 10 mg  10 mg Oral TID WITH MEALS  insulin glargine (LANTUS) injection 5 Units  5 Units SubCUTAneous DAILY  melatonin (rapid dissolve) tablet 5 mg  5 mg Oral QHS  insulin lispro (HUMALOG) injection   SubCUTAneous AC&HS  sevelamer carbonate (RENVELA) tab 800 mg  800 mg Oral TID WITH MEALS  sodium chloride (NS) flush 5-40 mL  5-40 mL IntraVENous Q8H  
 heparin (porcine) injection 5,000 Units  5,000 Units SubCUTAneous Q8H  
 ascorbic acid (vitamin C) (VITAMIN C) tablet 1,000 mg  1,000 mg Oral Q6H  
 zinc sulfate (ZINCATE) 220 (50) mg capsule 2 Cap  2 Cap Oral DAILY  aspirin tablet 325 mg  325 mg Oral DAILY  famotidine (PEPCID) tablet 20 mg  20 mg Oral DAILY  [Held by provider] carvediloL (COREG) tablet 3.125 mg  3.125 mg Oral BID WITH MEALS  pravastatin (PRAVACHOL) tablet 20 mg  20 mg Oral DAILY  dexamethasone (DECADRON) 4 mg/mL injection 6 mg  6 mg IntraVENous Q24H Review of Systems: A comprehensive review of systems was negative except for that written in the HPI. Objective:  
Vital Signs:   
Visit Vitals /85 (BP 1 Location: Left lower arm, BP Patient Position: Sitting) Pulse 98 Temp 98 °F (36.7 °C) Resp 26 Ht 5' 9\" (1.753 m) Wt 110 kg (242 lb 6.4 oz) SpO2 93% BMI 35.80 kg/m² O2 Device: Hi flow nasal cannula(Vapotherm) O2 Flow Rate (L/min): 40 l/min Temp (24hrs), Av °F (36.7 °C), Min:97.8 °F (36.6 °C), Max:98.1 °F (36.7 °C) Intake/Output:  
Last shift:      No intake/output data recorded. Last 3 shifts: 01/27 0701 - 01/28 1900 In: 250 [P.O.:250] Out: 2000 Intake/Output Summary (Last 24 hours) at 1/28/2021 2142 Last data filed at 1/28/2021 2964 Gross per 24 hour Intake  Output 0 ml Net 0 ml Physical Exam:  
General:  Alert, cooperative, no distress, appears stated age, laying in bed wearing HFNC, pleasant Head:  Normocephalic, without obvious abnormality, atraumatic. Eyes:  Conjunctivae/corneas clear. PERRL, EOMs intact. Nose: Nares normal. Septum midline, wearing high flow nasal cannula. No drainage Throat:  Mucosa moist, fair dentition, no oral lesions, Mallamapti IV Neck: Supple, symmetrical, trachea midline, no adenopathy,no carotid bruit and no JVD. Lungs:   Unchanged, distant breath sounds bilaterally with decreased breath sounds in bases, CTA BL, no wheezes/rales/rhonchi throughout all lung fields Chest wall:  No tenderness or deformity. Heart:  Regular rate and rhythm, S1, S2 normal, no murmur, click, rub or gallop. Abdomen:    Obese, soft, non-tender. Bowel with crepitations normal. No masses,  No organomegaly. Extremities: Extremities normal, atraumatic, no cyanosis or edema. Left upper extremity AV shunt. No clubbing Pulses: 2+ and symmetric all extremities. Skin: Skin color, texture, turgor normal. No rashes or lesions Lymph nodes: Cervical, supraclavicular, and axillary nodes normal.  
Neurologic: Grossly nonfocal, strength, coordination, sensation grossly intact throughout all extremities, patient alert and oriented x3 Data review:  
 
Recent Results (from the past 24 hour(s)) METABOLIC PANEL, COMPREHENSIVE Collection Time: 01/28/21  3:11 AM  
Result Value Ref Range Sodium 137 136 - 145 mmol/L Potassium 4.4 3.5 - 5.5 mmol/L Chloride 98 (L) 100 - 111 mmol/L  
 CO2 23 21 - 32 mmol/L Anion gap 16 3.0 - 18 mmol/L Glucose 176 (H) 74 - 99 mg/dL BUN 59 (H) 7.0 - 18 MG/DL Creatinine 10.90 (H) 0.6 - 1.3 MG/DL  
 BUN/Creatinine ratio 5 (L) 12 - 20 GFR est AA 6 (L) >60 ml/min/1.73m2 GFR est non-AA 5 (L) >60 ml/min/1.73m2 Calcium 9.4 8.5 - 10.1 MG/DL Bilirubin, total 0.5 0.2 - 1.0 MG/DL  
 ALT (SGPT) 42 16 - 61 U/L  
 AST (SGOT) 38 10 - 38 U/L Alk. phosphatase 49 45 - 117 U/L Protein, total 8.2 6.4 - 8.2 g/dL Albumin 3.6 3.4 - 5.0 g/dL Globulin 4.6 (H) 2.0 - 4.0 g/dL A-G Ratio 0.8 0.8 - 1.7 SED RATE (ESR) Collection Time: 01/28/21  3:11 AM  
Result Value Ref Range Sed rate, automated 60 (H) 0 - 20 mm/hr PROCALCITONIN Collection Time: 01/28/21  3:11 AM  
Result Value Ref Range Procalcitonin 14.35 ng/mL D DIMER Collection Time: 01/28/21  3:11 AM  
Result Value Ref Range D DIMER 3.22 (H) <0.46 ug/ml(FEU) C REACTIVE PROTEIN, QT Collection Time: 01/28/21  3:11 AM  
Result Value Ref Range C-Reactive protein 17.2 (H) 0 - 0.3 mg/dL CBC WITH AUTOMATED DIFF Collection Time: 01/28/21  3:11 AM  
Result Value Ref Range WBC 9.8 4.6 - 13.2 K/uL  
 RBC 3.85 (L) 4.70 - 5.50 M/uL  
 HGB 10.7 (L) 13.0 - 16.0 g/dL HCT 32.7 (L) 36.0 - 48.0 % MCV 84.9 74.0 - 97.0 FL  
 MCH 27.8 24.0 - 34.0 PG  
 MCHC 32.7 31.0 - 37.0 g/dL  
 RDW 15.7 (H) 11.6 - 14.5 % PLATELET 033 920 - 221 K/uL MPV 10.2 9.2 - 11.8 FL  
 NEUTROPHILS 83 (H) 40 - 73 % LYMPHOCYTES 5 (L) 21 - 52 % MONOCYTES 12 (H) 3 - 10 % EOSINOPHILS 0 0 - 5 % BASOPHILS 0 0 - 2 %  
 ABS. NEUTROPHILS 8.1 (H) 1.8 - 8.0 K/UL  
 ABS. LYMPHOCYTES 0.5 (L) 0.9 - 3.6 K/UL  
 ABS. MONOCYTES 1.2 0.05 - 1.2 K/UL  
 ABS. EOSINOPHILS 0.0 0.0 - 0.4 K/UL  
 ABS. BASOPHILS 0.0 0.0 - 0.1 K/UL  
 DF AUTOMATED    
GLUCOSE, POC Collection Time: 01/28/21  8:34 AM  
Result Value Ref Range Glucose (POC) 160 (H) 70 - 110 mg/dL GLUCOSE, POC Collection Time: 01/28/21 12:07 PM  
Result Value Ref Range Glucose (POC) 146 (H) 70 - 110 mg/dL GLUCOSE, POC Collection Time: 01/28/21  5:18 PM  
Result Value Ref Range Glucose (POC) 177 (H) 70 - 110 mg/dL GLUCOSE, POC Collection Time: 01/28/21  8:48 PM  
Result Value Ref Range Glucose (POC) 177 (H) 70 - 110 mg/dL Imaging: 
I have personally reviewed the patients radiographs and have reviewed the reports: No new imaging in the interval 
XR Results (most recent): 
Results from Hospital Encounter encounter on 01/23/21 XR CHEST PORT Narrative ONE VIEW CHEST RADIOGRAPH INDICATION: increased oxygen requirement. Covid19. COMPARISON: CTA chest and chest radiograph 1/23/2021. TECHNIQUE: AP view of the chest 
 
FINDINGS: 
  
LINES/TUBES: None. MEDIASTINUM: Stable cardiomegaly. LUNGS: Low lung volumes. Similar appearance of bilateral patchy consolidations. No definite pleural effusion or pneumothorax. BONES/OTHER: No acute osseous abnormality. Impression Similar appearance of bilateral patchy consolidations since 1/23/2021, likely 
reflecting patient's known covid 19 infection. CT Results (most recent): 
Results from [facility] Encounter encounter on 01/23/21 CTA CHEST W OR W WO CONT Narrative CT chest with contrast for PE HISTORY: Dyspnea. COMPARISON: None TECHNIQUE: Dynamic spiral scan through the chest is obtained from the thoracic 
inlet to the diaphragm after dynamic nonionic IV contrast administration  per PE 
protocol. Coronal and sagittal MIP computer reconstructions are also obtained 
for better visualization of the integrity of pulmonary arteries in 3D dimension, 
particularly for lobar/interlobar arterial branches and to minimize radiation 
dose. All CT scans at this facility performed using dose optimization techniques as 
appreciated to a performed exam, to include automated exposure control, 
adjustment of the mA and or KU according to patient size (including appropriate matching for site specific examination), or use of iterative reconstruction 
technique. FINDINGS: 
 
PULMONARY ARTERIES: The contrast bolus is adequate. Although, moderate motion 
artifact noted which significantly compromises the evaluation of the small 
arterial branches. The right and left mainstem pulmonary arteries and their 
lobar branches appear patent without convincing evidence of intraluminal filling 
defect identified to suggest pulmonary embolism. There are questionable 
nonocclusive intraluminal linear filling defects in the segmental/subsegmental 
branches at anterior and lateral right upper lobe and right middle lobe, 
uncertain due to artifact or potential tiny PE. 
  
AORTA AND OTHER CARDIOVASCULAR STRUCTURES: Mild ectasia of thoracic aorta. No 
aortic aneurysm or dissection. Mild burden of  coronary artery calcifications. Heart Strain assessment: 
-  RV/LV ratio (normal <0.9): Normal 
-  Dysfunction or bowing of interventricular septum: None -  Main pulmonary artery enlargement (>30mm): Not present -  There is not visualization of contrast reflux from the right heart into the 
IVC/hepatic veins. LUNG PARENCHYMA: There are multiple patchy areas of airspace consolidations 
identified throughout both lungs, more pronounced in bilateral lower lobes and 
inferior left upper lobe. Patent airway. IMAGED THYROID: Unremarkable. MEDIASTINUM: Borderline adenopathy in bilateral chu and subcarinal 
mediastinum. Oseas Ra PLEURAL SPACES AND CHEST WALL: No significant pleural effusion. Mild pleural 
thickening. VISUALIZED UPPER ABDOMEN: Very atrophic right kidney is partially seen. OSSEOUS STRUCTURES: Unremarkable. Impression 1. No convincing CT evidence of pulmonary embolism in mainstem and lobar 
arteries. Questionable nonocclusive intraluminal filling defects identified in 
right upper and middle lobe segmental subsegmental images, artifact versus tiny 
nonocclusive PE. 2.  Extensive patchy consolidation pneumonia in bilateral lungs, Covid infection 
is more concerned than pulmonary infarctions. Clinical correlation and 
short-term follow-up CT advised. Thank you for your referral. 
  
 
  
 
08/23/18 ECHO ADULT COMPLETE 08/25/2018 8/25/2018 Narrative · Left ventricular low normal systolic function. Estimated left  
ventricular ejection fraction is 51 - 55%. Biplane method used to measure  
ejection fraction. Left ventricular severe concentric hypertrophy. Left  
ventricular global hypokinesis. · Mild aortic valve regurgitation is present. · LV strain shows relative apical sparing with reduced global strain at  
discharge -9.1% · Aortic root and ascending aorta measures 4.1 cm. · Pulmonary arterial systolic pressure is 26 mmHg. There is no evidence of  
pulmonary hypertension. · Trivial pericardial effusion. · Left atrial cavity size is mildly dilated. · Mitral annular calcification. Trace mitral valve regurgitation. Signed by: Jennifer Dean MD  
 
 
Total of 32 min critical care time spent at bedside (personally) during the course of care providing evaluation,management and care decisions and ordering appropriate treatment related to critical care problems exclusive of procedures. The reason for providing this level of medical care for this critically ill patient was due a critical illness that impaired one or more vital organ systems such that there was a high probability of imminent or life threatening deterioration in the patients condition. This care involved high complexity decision making to assess, manipulate, and support vital system functions, to treat this degree vital organ system failure and to prevent further life threatening deterioration of the patients condition. This time was independent of other practitioners. Complex decision making was made in the evaluation and management plans during this consultation. More than 50% of time was spent in counseling and coordination of care including review of data and discussion with other team members. Carl Allen MD/MPH Pulmonary, Critical Care Medicine [facility]

## 2021-01-29 NOTE — PROGRESS NOTES
Problem: Falls - Risk of 
Goal: *Absence of Falls Description: Document [place] Flow Fall Risk and appropriate interventions in the flowsheet. Outcome: Progressing Towards Goal 
Note: Fall Risk Interventions: 
Mobility Interventions: Assess mobility with egress test 
 
  
 
Medication Interventions: Assess postural VS orthostatic hypotension Elimination Interventions: Bed/chair exit alarm

## 2021-01-29 NOTE — PROGRESS NOTES
[facility] Progress Note Patient: Aubree Franks MRN: [id number] SSN: [id number]  YOB: 1960 Age: 62 y.o. Sex: male Admit Date: 1/23/2021 LOS: 6 days Chief Complaint Patient presents with  Shortness of Breath Subjective:  
 
Patient seen at bedside. SOB is improving. Currently on 35l/min hi flow NC FiO2 90%. SpO2 98%. Patient continues to complain about uncomfortable hospital bed today. Requesting to go home. Tolerating PO intake. Review of Systems Constitutional: Negative for chills and fever. Respiratory: Negative for cough and hemoptysis. Cardiovascular: Negative for chest pain and palpitations. Gastrointestinal: Negative for diarrhea, nausea and vomiting. Musculoskeletal: Negative for myalgias. Neurological: Negative for dizziness and headaches. Objective:  
 
Visit Vitals BP 95/71 Pulse 92 Temp 97.7 °F (36.5 °C) Resp 21 Ht 5' 9\" (1.753 m) Wt 110 kg (242 lb 6.4 oz) SpO2 91% BMI 35.80 kg/m² Physical Exam: 
General:  Alert and Responsive and in No acute distress. 35l/min hi flow 90% FiO2 NC SpO2 96%. HEENT: Conjunctiva pink, sclera anicteric. EOMI.  MMM. CV:  RRR. No murmurs, rubs, or gallops appreciated. No visible pulsations or thrills.   
RESP:  Unlabored breathing.  Mild rales in lung bases. Equal expansion bilaterally.   
ABD:  Soft, nontender, nondistended. Normoactive bowel sounds. No hepatosplenomegaly. No suprapubic tenderness. MS:  No joint deformity or instability.  No atrophy. Neuro:  5/5 strength bilateral upper extremities and lower extremities.  A+Ox3. Ext:  No edema.  2+ radial and dp pulses bilaterally. Skin:  No rashes, lesions, or ulcers.  Good turgor. 
  
 
Intake and Output: 
Current Shift: No intake/output data recorded. Last three shifts: No intake/output data recorded. Labs, results, and imaging reviewed Assessment and Plan: Ellie Pressley is a 62 y.o. male with PMH of osteoarthritis, CKD on dialysis MWF, gout with no recent flares, HTN, hyperlipidemia  who is admitted for acute hypoxic respiratory failure, likely due to COVID-19 pna.  
  
Acute hypoxic respiratory failure - DDX to include COVID-19 pna vs CAP vs ?CHF. CXR significant for patchy infiltrations/consolidations in bilateral mid/lower lungs, greater on the left, with concerns for COVID pna. CTA with confirmed extensive patchy consolidation in bilateral lungs, and questionable non-occlusive intraluminal filling defects in right upper and middle lobe segmental arteries; NO PE. Procal and CRP down-trending. D-dimer 2.74>2. 49. BCx NG2D. Completed Rocephin 2 g qD (1/23-1/26) and Zithromax 500 mg qD. S/p Remdesivir (completed 5 day course). - Continue in 3N stepdown, q4h vital checks 
- continue [place] NC 35 L FiO2 90%, wean oxygen as tolerated to maintain SpO2 >92% - Bronchial hygiene protocol - Bronchodilators- Atrovent 2 puffs q4h or Albuterol 2 puffs q4h 
- Continue Decadron 6 mg IV qD (day 7of 10) - appreciate ID and pulm recs 
- Continue Vitamin C 1000 mg q6h, Aspirin 325 mg, Vitamin D3 50, 000 units once, famotidine 20 mg daily, melatonin 5 mg qhs, Zinc sulfate 100 mg  
- Follow up on Fungitell, fungal culture, aspergillus ab 
- CBC, CMP daily - Vital signs per unit routine 
  
Troponinemia - Troponin elevated to 0.51>0.42>0.33>0. 21. Likely ischemic demand from hypoxia vs R heart strain from possible PE. Patient denies chest pain. EKG NSR Qtc 510. ECHO from 2018 significant for EF 51-55%, low normal systolic function, left ventricular severe concentric hypertrophy. ECHO 1/25 - Estimated LVEF is 55 - 60%. Moderate aortic root dilatation. (4.1 cm). BNP 7K. - trend trops q6h until [facility] 
- cardiology following; heparin gtt not advised. Continue  mg daily - Avoid Qtc prolonging drugs Hyperglycemia - likely in the setting of recent high dose steroid use. A1c 6.1 (pre-diabetic range) -  correctional lispro insulin modified to very resistant dose 
-  Start low dose basal lantus insulin 5 units daily  
-  AC&HS 
  
ESRD on dialysis MWF 
- continue dialysis in-patient 
- Held Velphoro (phosphate binder). Started Renvela 800 mg TID  
- nephro consulted; appreciate recs Hypotension, in the setting of dialysis MWF 
- continue midodrine 10 mg TID 
- midodrine 10 mg prn during dialysis - Resume Coreg 3.125 mg BID once blood pressures stabilize Hyperlipidemia 
- Continue Pravastatin 20 mg daily GOUT, no recent flares. Patient is no longer on Allopurinol.  
- monitor for flares Diet renal  
DVT Prophylaxis SQH  
GI Prophylaxis famotidine Code status full Disposition >2MN Point of Contact [employer] Relationship: sister 
[phone] Murray Rubio MD, PGY1 [address] Pager: [phone] January 29, 2021, 11:36 AM

## 2021-01-30 NOTE — PROGRESS NOTES
Attempted to get an EKG.  Would not or could not roll on to his back.  Breathing made EKG unreadable.

## 2021-01-30 NOTE — PROGRESS NOTES
RENAL DAILY PROGRESS NOTE 62y M with ESRD, admitted with covid 19 pneumonia Subjective:  
 
 
Complaint:  
Had HD yesterday , UF about 2.3L Appears confused, Wants to be discharged, explain need for ongoing supportive care Persistent tachycardia HR > 120 His oxygen requirement remain on higher side. IMPRESSION:  
· ESRD on TTS at [address] unit,  
· COVID Pneumonia · Hypoxic resp failure · Hypertension · Hx Diastolic CHF · Hyperlipidemia · Secondary hyperparathyroidism PLAN:  
· Mr. Linette Price will need going aggressive HD support for better volume management. He seems very confused, not able to keep oxygen support continues. His blood pressure is acceptable but I doubt he can tolerate regualr HD with such tachycardia. Plan to re evaluate for HD tomorrow. · Discussed with pulmonary colleague Dr. Michael Hardy and primary team Dr. Javier Sweeney.  
   
  
 
 
 
Current Facility-Administered Medications Medication Dose Route Frequency  piperacillin-tazobactam (ZOSYN) 2.25 g in 0.9% sodium chloride (MBP/ADV) 50 mL MBP  2.25 g IntraVENous Q8H  
 Piperacillin-tazobactam (ZOSYN) 0.75 gm Supplemental Dosing by Pharmacy   Other Rx Dosing/Monitoring  vancomycin (VANCOCIN) 2,500 mg in 0.9% sodium chloride 500 mL IVPB  2,500 mg IntraVENous ONCE  
 VANCOMYCIN INFORMATION NOTE   Other Rx Dosing/Monitoring  metoprolol tartrate (LOPRESSOR) tablet 25 mg  25 mg Oral BID  [START ON 2/1/2021] Vancomycin random level due 2/1/2021 at 0400  1 Each Other ONCE  
 albuterol-ipratropium (DUO-NEB) 2.5 MG-0.5 MG/3 ML  3 mL Nebulization Q4H RT  
 sodium chloride (OCEAN) 0.65 % nasal squeeze bottle 2 Spray  2 Spray Both Nostrils Q4H  
 midodrine (PROAMATINE) tablet 10 mg  10 mg Oral TID WITH MEALS  
 albumin human 25% (BUMINATE) solution 25 g  25 g IntraVENous DIALYSIS PRN  
 insulin glargine (LANTUS) injection 5 Units  5 Units SubCUTAneous DAILY  melatonin (rapid dissolve) tablet 5 mg  5 mg Oral QHS  insulin lispro (HUMALOG) injection   SubCUTAneous AC&HS  
 glucose chewable tablet 16 g  4 Tab Oral PRN  
 glucagon (GLUCAGEN) injection 1 mg  1 mg IntraMUSCular PRN  
 dextrose (D50W) injection syrg 12.5-25 g  25-50 mL IntraVENous PRN  
 sevelamer carbonate (RENVELA) tab 800 mg  800 mg Oral TID WITH MEALS  sodium chloride (NS) flush 5-10 mL  5-10 mL IntraVENous PRN  
 sodium chloride (NS) flush 5-40 mL  5-40 mL IntraVENous Q8H  
 sodium chloride (NS) flush 5-40 mL  5-40 mL IntraVENous PRN  
 acetaminophen (TYLENOL) tablet 650 mg  650 mg Oral Q6H PRN Or  
 acetaminophen (TYLENOL) suppository 650 mg  650 mg Rectal Q6H PRN  polyethylene glycol (MIRALAX) packet 17 g  17 g Oral DAILY PRN  
 heparin (porcine) injection 5,000 Units  5,000 Units SubCUTAneous Q8H  
 ascorbic acid (vitamin C) (VITAMIN C) tablet 1,000 mg  1,000 mg Oral Q6H  
 zinc sulfate (ZINCATE) 220 (50) mg capsule 2 Cap  2 Cap Oral DAILY  aspirin tablet 325 mg  325 mg Oral DAILY  famotidine (PEPCID) tablet 20 mg  20 mg Oral DAILY  [Held by provider] carvediloL (COREG) tablet 3.125 mg  3.125 mg Oral BID WITH MEALS  pravastatin (PRAVACHOL) tablet 20 mg  20 mg Oral DAILY  dexamethasone (DECADRON) 4 mg/mL injection 6 mg  6 mg IntraVENous Q24H  
 midodrine (PROAMATINE) tablet 10 mg  10 mg Oral DIALYSIS PRN Objective:  
 
Patient Vitals for the past 24 hrs: 
 Temp Pulse Resp BP SpO2  
01/30/21 1025 (!) 101.3 °F (38.5 °C) (!) 127 (!) 32 107/67 92 % 01/30/21 0509  (!) 134 (!) 56 (!) 138/94 99 % 01/30/21 0437 98 °F (36.7 °C) (!) 124 (!) 44 128/82 99 % 01/29/21 2355 99.1 °F (37.3 °C) (!) 123 (!) 44 117/79 94 % 01/29/21 2008 98.5 °F (36.9 °C) (!) 128 (!) 55 (!) 138/94 97 % 01/29/21 1935     96 % 01/29/21 1749 98.6 °F (37 °C)      
01/29/21 1730 97.9 °F (36.6 °C) (!) 117 21 111/71 94 % 01/29/21 1700  (!) 116  102/82  01/29/21 1645  (!) 112  107/80   
01/29/21 1630  (!) 116 (!) 35 95/73 95 % 01/29/21 1615  (!) 116  (!) 81/61   
01/29/21 1600  (!) 114  99/70   
01/29/21 1545  (!) 113  (!) 111/94   
01/29/21 1530  (!) 109  111/77   
01/29/21 1515  (!) 109  102/65  Weight change:  
 
 01/28 1901 - 01/30 0700 In: -  
Out: 2300 Intake/Output Summary (Last 24 hours) at 1/30/2021 1513 Last data filed at 1/29/2021 1700 Gross per 24 hour Intake  Output 2300 ml Net -2300 ml Patient was seen during the [place] pandemic. Patient with COVID infection . Patient is in isolation room due to COVID status and PPE is in short supply hence seen through glass window and d/w patients RN. Full contact physical exam was not possible due to patient's clinical condition, key findings seen by me are documented. In addition, I have also used findings documented by physicians who have recently examined thee patient as they are relevant to the patient's renal care. No distress No lower extremity edema On HFNC Data Review:  
 
LABS:  
Hematology:  
Recent Labs  
  01/30/21 
0249 01/29/21 
0037 01/28/21 
9271 WBC 12.3 11.0 9.8 HGB 12.5* 11.6* 10.7* HCT 35.5* 34.6* 32.7* Chemistry:  
Recent Labs  
  01/30/21 
0249 01/29/21 
0037 01/28/21 
0804 BUN 63* 85* 59* CREA 10.20* 12.90* 10.90* CA 10.2* 10.2* 9.4 ALB 4.0 3.3* 3.6 K 4.1 4.5 4.4 * 141 137 CL 95* 97* 98* CO2 22 20* 23 * 161* 176* Procedures/imaging: see electronic medical records for all procedures, Xrays and details which were not copied into this note but were reviewed prior to creation of Susanne Barakat MD 
1/30/2021

## 2021-01-30 NOTE — PROGRESS NOTES
responded to [facility] for  Soha Bonner, who is a 62 y.o.,male, The  provided the following Interventions: 
Provided crisis spiritual care and support. Offered prayers on behalf for the patient. Chart reviewed. Assessment: 
There are no further spiritual or Yazidism issues which require intervention at this time. Plan: 
Chaplains will continue to follow and will provide spiritual care as needed.  recommends bedside caregivers page  on duty if patient or family shows signs of acute spiritual or emotional distress. [facility].  Spiritual Care  
[phone]

## 2021-01-30 NOTE — PROGRESS NOTES
[facility] Pulmonary, Critical Care, and Sleep Medicine F/U Patient Consult Name: Yoana Vega MRN: [id number] : 1960 Hospital: [address] [place] [address] Dr Date: 2021 This patient has been seen and evaluated at the request of Dr. Ekaterina Butcher for Acute hypoxic Respiratory failure, Covid pneumonia. IMPRESSION:  
· Acute hypoxic Respiratory failure - now on continuous bipap 18/12 FiO2 100% to maintain saturation, however pt remains very tachypneic. Underlying progressive COVID-19 pneumonia with predominant bilateral lower lobe consolidative pneumonia. Worsening x-ray, patient is at higher risk for intubation -- likely due to worsening ARDS as well as fluid overload due to pt having ESRD and unable to have fluid removal 
· COVID-19 pneumonia with significant consolidations trevon lower lobes · ARDS, severe --likely worsened by fluid overload in the setting of COVID-19 pneumonia/sepsis · CHFpEF/hypertrophic cardiomyopathy:  TTE shows concentric hypertrophy · ESRD on dialysis · Respiratory alkalosis from tachypnea/respiratory distress · History of dilated cardiomyopathy · Hypertension · Acute delirium:  Due to above RECOMMENDATIONS:  
· Pt on Bipap continuous -- very tachypneic. Discussed with ICU, bed not available but making bed available -- pt will be transferred as soon as possible - discussed with nursing admin. Will most likely be intubated in the next few hours, will have a low threshold to intubate prior to transfer if needed. Discussed with primary service as well. · Oxygen-titrate to SaO2 goal more than 88%. Currently on BiPAP continuously · BIPAP/CPAP-can consider adding as next step if difficulty oxygenating · Reviewed chest x-ray, would advise more aggressive fluid removal by nephrology on HD  -- discussed with nephrologist.  Please consider serial dialysis · Bronchial hygiene protocol-encourage use of incentive spirometer, coughing · Bronchodilators-Combivent Respimat · Steroids- Decadron 6mg daily x 10 days · Antibiotics-per ID · Strict aspiration precautions · Will need imaging follow-up to complete clearing. Concerns for developing sequelae long-term given the extent of pneumonia · Assess home Oxygen needs at discharge · Healthy weight · Will Follow · DVT ppx + PUD ppx (while on steroids) Prognosis is poor. Patient has very high risk for decompensation require mechanical ventilation Subjective:  
01/30/21 Patient seen and examined at bedside. Overnight patient was placed on BiPAP. Patient remains on continuous BiPAP today, very tachypneic, breathing in the 30s-40s. Pt appears delirious and takes off mask at times with nursing. No complaints for me at bedside. HPI per Dr. Giles Merchant Cuate Stephenson is a 62 y.o. male with PMH arthritis, CKD on HD MWF, gout HTN, HLD, now presenting with complaint of SOB. Pt is known positive COVID for over 1wk. Went to dialysis center and was noted to have a fever 100.6 here. He had a headache, eye pain and chills . No known Covid exposure. Nominal pain diarrhea no chest pain or shortness of breath Was seen in ER 1wk ago with above complaints and d/c home with CDC recommendations. Over the past few days he has felt progressively worse and was found tripoding today when he was picked up for dialysis. He currently states he feels fine and has no complaints. He denies sick contacts, CP, [place]. He missed dialysis on day of admission Initial SpO2 90% on 5lpm NC so placed on HFC. CXR showed patchy infiltrations/consolidations in bilateral mid/lower lungs I have reviewed all of the available data including the patient's previous history external records and radiological imaging available for review. In addition applicable cardiology and other lab data were also reviewed. Past Medical History:  
Diagnosis Date  Arthritis of left knee 09/2017 moderate to severe, with effusion on xray  Chronic kidney disease ESRD  HD on MWF  
 Diastolic CHF ([facility]  Dilated cardiomyopathy ([facility]  History of alcohol abuse  History of echocardiogram 02/24/2014 Mod-marked LVE. EF 15%. Severe, diffuse hypk. Mild LVH. Gr 1 DDfx. Mod LAE. Mild-mod FIDELIA. Mild AoRE. No significant change from echo of 10/23/09.  History of gout  History of myocardial perfusion scan 05/25/2006 No evidence of ischemia or scarring. Gross LVE. EF 28%. Severe global hypk. Neg EKG on submaximal EST. Ex time 8 min 25 sec.  HTN (hypertension)  Hypercholesterolemia  Hypertensive cardiovascular disease Past Surgical History:  
Procedure Laterality Date  HX COLONOSCOPY    
 HX HERNIA REPAIR  04/2016  HX VASCULAR ACCESS Right upper chest HD CATH Prior to Admission medications Medication Sig Start Date End Date Taking? Authorizing Provider  
pravastatin (PRAVACHOL) 20 mg tablet TAKE 1 TABLET BY MOUTH NIGHTLY 12/29/20  Yes Roselia Olvera NP  
carvediloL (COREG) 3.125 mg tablet TAKE 1 TABLET BY MOUTH EVERY 12 HOURS 9/29/20  Yes Denilson Kathleen NP  
sucroferric oxyhydroxide (VELPHORO) 500 mg chew chewable tablet Take  by mouth three (3) times daily (with meals). Yes Provider, Historical  
aspirin 81 mg chewable tablet Take 1 Tab by mouth daily. 2/21/17  Yes Emiliana ORNELAS NP  
HYDROcodone-acetaminophen (NORCO) 5-325 mg per tablet Take 1-2 Tabs by mouth every six (6) hours as needed for Pain. Max Daily Amount: 8 Tabs. 11/15/18   Anthony Wei MD  
sevelamer carbonate (RENVELA) 800 mg tab tab Take  by mouth three (3) times daily. Provider, Historical  
colchicine 0.6 mg tablet 2 tablets at first sign of gout flare and then 1 tablet 1 hour later  Indications: acute gouty arthritis 6/5/18   Yany Coto MD  
allopurinol (ZYLOPRIM) 100 mg tablet Take 2 Tabs by mouth daily.  5/11/18   STEFFANIE Guadalupe  
 
 No Known Allergies Social History Tobacco Use  Smoking status: Never Smoker  Smokeless tobacco: Never Used Substance Use Topics  Alcohol use: No  
  Alcohol/week: 0.0 standard drinks Comment: stop 3 years ago in feb, 2015 Family History Problem Relation Age of Onset  Cancer Father Lung  Heart Failure Mother  Cancer Sister Current Facility-Administered Medications Medication Dose Route Frequency  piperacillin-tazobactam (ZOSYN) 2.25 g in 0.9% sodium chloride (MBP/ADV) 50 mL MBP  2.25 g IntraVENous Q8H  
 Piperacillin-tazobactam (ZOSYN) 0.75 gm Supplemental Dosing by Pharmacy   Other Rx Dosing/Monitoring  vancomycin (VANCOCIN) 2,500 mg in 0.9% sodium chloride 500 mL IVPB  2,500 mg IntraVENous ONCE  
 VANCOMYCIN INFORMATION NOTE   Other Rx Dosing/Monitoring  metoprolol tartrate (LOPRESSOR) tablet 25 mg  25 mg Oral BID  [START ON 2/1/2021] Vancomycin random level due 2/1/2021 at 0400  1 Each Other ONCE  
 albuterol-ipratropium (DUO-NEB) 2.5 MG-0.5 MG/3 ML  3 mL Nebulization Q4H RT  
 sodium chloride (OCEAN) 0.65 % nasal squeeze bottle 2 Spray  2 Spray Both Nostrils Q4H  
 midodrine (PROAMATINE) tablet 10 mg  10 mg Oral TID WITH MEALS  insulin glargine (LANTUS) injection 5 Units  5 Units SubCUTAneous DAILY  melatonin (rapid dissolve) tablet 5 mg  5 mg Oral QHS  insulin lispro (HUMALOG) injection   SubCUTAneous AC&HS  sevelamer carbonate (RENVELA) tab 800 mg  800 mg Oral TID WITH MEALS  sodium chloride (NS) flush 5-40 mL  5-40 mL IntraVENous Q8H  
 heparin (porcine) injection 5,000 Units  5,000 Units SubCUTAneous Q8H  
 ascorbic acid (vitamin C) (VITAMIN C) tablet 1,000 mg  1,000 mg Oral Q6H  
 zinc sulfate (ZINCATE) 220 (50) mg capsule 2 Cap  2 Cap Oral DAILY  aspirin tablet 325 mg  325 mg Oral DAILY  famotidine (PEPCID) tablet 20 mg  20 mg Oral DAILY  [Held by provider] carvediloL (COREG) tablet 3.125 mg  3.125 mg Oral BID WITH MEALS  pravastatin (PRAVACHOL) tablet 20 mg  20 mg Oral DAILY  dexamethasone (DECADRON) 4 mg/mL injection 6 mg  6 mg IntraVENous Q24H Review of Systems: A comprehensive review of systems was negative except for that written in the HPI. Objective:  
Vital Signs:   
Visit Vitals /67 Pulse (!) 127 Temp (!) 101.3 °F (38.5 °C) Resp (!) 32 Ht 5' 9\" (1.753 m) Wt 110 kg (242 lb 6.4 oz) SpO2 92% BMI 35.80 kg/m² O2 Device: Hi flow nasal cannula, Non-rebreather mask O2 Flow Rate (L/min): 40 l/min Temp (24hrs), Av.9 °F (37.2 °C), Min:97.9 °F (36.6 °C), Max:101.3 °F (38.5 °C) Intake/Output:  
Last shift:      No intake/output data recorded. Last 3 shifts:  1901 -  0700 In: -  
Out: 2300 Intake/Output Summary (Last 24 hours) at 2021 1410 Last data filed at 2021 1700 Gross per 24 hour Intake  Output 2300 ml Net -2300 ml Physical Exam:  
General:  Alert, cooperative, mild distress, tachypneic on BiPAP, laying in bed in right lateral decubitus position Head:  Normocephalic, without obvious abnormality, atraumatic. Eyes:  Conjunctivae/corneas clear. PERRL, EOMs intact. Nose: Nares normal. Septum midline, unable to assess further due to bearing BiPAP Throat:  Unable to assess due to patient wearing BiPAP Neck: Supple, symmetrical, trachea midline, no adenopathy,no carotid bruit and no JVD. Lungs:   Distant breath sounds bilaterally with decreased breath sounds in bases, CTA BL, no wheezes/rales/rhonchi throughout all lung fields Chest wall:  No tenderness or deformity. Heart:  Regular rate and rhythm, S1, S2 normal, no murmur, click, rub or gallop. Abdomen:    Obese, soft, non-tender. Bowel with crepitations normal. No masses,  No organomegaly. Extremities: Extremities normal, atraumatic, no cyanosis or edema. Left upper extremity AV shunt. No clubbing Pulses: 2+ and symmetric all extremities. Skin: Skin color, texture, turgor normal. No rashes or lesions Lymph nodes: Cervical, supraclavicular, and axillary nodes normal.  
Neurologic: Grossly nonfocal, strength, coordination, sensation grossly intact throughout all extremities, patient delirious Data review:  
 
Recent Results (from the past 24 hour(s)) TROPONIN I Collection Time: 01/29/21  3:12 PM  
Result Value Ref Range Troponin-I, QT 0.11 (H) 0.0 - 0.045 NG/ML  
GLUCOSE, POC Collection Time: 01/29/21  8:01 PM  
Result Value Ref Range Glucose (POC) 159 (H) 70 - 110 mg/dL TROPONIN I Collection Time: 01/29/21  8:29 PM  
Result Value Ref Range Troponin-I, QT 0.09 (H) 0.0 - 0.045 NG/ML  
POC G3 Collection Time: 01/29/21  8:34 PM  
Result Value Ref Range Device: High Flow Nasal Cannula Flow rate (POC) 40 L/M  
 FIO2 (POC) 100 % pH (POC) 7.47 (H) 7.35 - 7.45    
 pCO2 (POC) 24.1 (L) 35.0 - 45.0 MMHG  
 pO2 (POC) 94 80 - 100 MMHG  
 HCO3 (POC) 17.7 (L) 22 - 26 MMOL/L  
 sO2 (POC) 98 (H) 92 - 97 % Base deficit (POC) 6 mmol/L Allens test (POC) YES Total resp. rate 50 Site RIGHT RADIAL Specimen type (POC) ARTERIAL Performed by Dwain Keys GLUCOSE, POC Collection Time: 01/29/21 10:03 PM  
Result Value Ref Range Glucose (POC) 155 (H) 70 - 110 mg/dL METABOLIC PANEL, COMPREHENSIVE Collection Time: 01/30/21  2:49 AM  
Result Value Ref Range Sodium 135 (L) 136 - 145 mmol/L Potassium 4.1 3.5 - 5.5 mmol/L Chloride 95 (L) 100 - 111 mmol/L  
 CO2 22 21 - 32 mmol/L Anion gap 18 3.0 - 18 mmol/L Glucose 134 (H) 74 - 99 mg/dL BUN 63 (H) 7.0 - 18 MG/DL Creatinine 10.20 (H) 0.6 - 1.3 MG/DL  
 BUN/Creatinine ratio 6 (L) 12 - 20 GFR est AA 6 (L) >60 ml/min/1.73m2 GFR est non-AA 5 (L) >60 ml/min/1.73m2 Calcium 10.2 (H) 8.5 - 10.1 MG/DL Bilirubin, total 0.6 0.2 - 1.0 MG/DL  
 ALT (SGPT) 31 16 - 61 U/L  
 AST (SGOT) 21 10 - 38 U/L Alk. phosphatase 68 45 - 117 U/L Protein, total 9.0 (H) 6.4 - 8.2 g/dL Albumin 4.0 3.4 - 5.0 g/dL Globulin 5.0 (H) 2.0 - 4.0 g/dL A-G Ratio 0.8 0.8 - 1.7    
CBC WITH AUTOMATED DIFF Collection Time: 01/30/21  2:49 AM  
Result Value Ref Range WBC 12.3 4.6 - 13.2 K/uL  
 RBC 4.34 (L) 4.70 - 5.50 M/uL  
 HGB 12.5 (L) 13.0 - 16.0 g/dL HCT 35.5 (L) 36.0 - 48.0 % MCV 81.8 74.0 - 97.0 FL  
 MCH 28.8 24.0 - 34.0 PG  
 MCHC 35.2 31.0 - 37.0 g/dL  
 RDW 16.0 (H) 11.6 - 14.5 % PLATELET 337 869 - 749 K/uL MPV 10.1 9.2 - 11.8 FL  
 NEUTROPHILS 81 (H) 42 - 75 % LYMPHOCYTES 10 (L) 20 - 51 % MONOCYTES 9 2 - 9 % EOSINOPHILS 0 0 - 5 % BASOPHILS 0 0 - 3 % NRBC 2.0 (H) 0  WBC  
 ABS. NEUTROPHILS 10.0 (H) 1.8 - 8.0 K/UL  
 ABS. LYMPHOCYTES 1.2 0.8 - 3.5 K/UL  
 ABS. MONOCYTES 1.1 (H) 0 - 1.0 K/UL  
 ABS. EOSINOPHILS 0.0 0.0 - 0.4 K/UL  
 ABS. BASOPHILS 0.0 0.0 - 0.06 K/UL  
 DF MANUAL PLATELET COMMENTS ADEQUATE PLATELETS    
 RBC COMMENTS ANISOCYTOSIS 1+ 
    
 RBC COMMENTS POLYCHROMASIA 1+ 
    
 RBC COMMENTS HYPOCHROMIA 1+ 
    
 RBC COMMENTS POIKILOCYTOSIS 1+ 
    
 RBC COMMENTS TARGET CELLS 1+ GLUCOSE, POC Collection Time: 01/30/21 10:23 AM  
Result Value Ref Range Glucose (POC) 139 (H) 70 - 110 mg/dL POC G3 Collection Time: 01/30/21 12:25 PM  
Result Value Ref Range Device: High Flow Nasal Cannula Flow rate (POC) 40 L/M  
 FIO2 (POC) 100 % pH (POC) 7.46 (H) 7.35 - 7.45    
 pCO2 (POC) 28.5 (L) 35.0 - 45.0 MMHG  
 pO2 (POC) 77 (L) 80 - 100 MMHG  
 HCO3 (POC) 20.5 (L) 22 - 26 MMOL/L  
 sO2 (POC) 96 92 - 97 % Base deficit (POC) 3 mmol/L Allens test (POC) YES Site RIGHT RADIAL Specimen type (POC) ARTERIAL Performed by [facility]: I have personally reviewed the patients radiographs and have reviewed the reports:  
Person review of CXR shows multifocal pulmonary opacities, increased in intensity, suspecting component of fluid overload along with progressing ARDS. No masses seen. Official report per radiology: XR Results (most recent): 
Results from [facility] [place] Encounter encounter on 01/23/21 XR CHEST PORT Narrative EXAM: XR CHEST PORT 
 
CLINICAL INDICATION/HISTORY : Worsening hypoxia in setting of COVID-19 ARDS, 
also with ESRD, concerns for worsening fluid overload/pulm edema. COMPARISON: 12/29/2021 TECHNIQUE: 1 VIEWS 
 
FINDINGS:  
 
Increasing extensive bilateral pulmonary opacities with central predominance. EKG leads and wires overlie the chest. 
Mild cardiomegaly. No large pleural effusion. Hypoinflation. Impression 1. Increasing extensive bilateral pulmonary opacities with relative central 
prominence which may represent edema and/or pneumonia. CT Results (most recent): 
Results from [facility] [place] Encounter encounter on 01/23/21 CTA CHEST W OR W WO CONT Narrative CT chest with contrast for PE HISTORY: Dyspnea. COMPARISON: None TECHNIQUE: Dynamic spiral scan through the chest is obtained from the thoracic 
inlet to the diaphragm after dynamic nonionic IV contrast administration  per PE 
protocol. Coronal and sagittal MIP computer reconstructions are also obtained 
for better visualization of the integrity of pulmonary arteries in 3D dimension, 
particularly for lobar/interlobar arterial branches and to minimize radiation 
dose. All CT scans at this facility performed using dose optimization techniques as 
appreciated to a performed exam, to include automated exposure control, 
adjustment of the mA and or KU according to patient size (including appropriate 
matching for site specific examination), or use of iterative reconstruction 
technique.  
 
FINDINGS: 
 
 PULMONARY ARTERIES: The contrast bolus is adequate. Although, moderate motion 
artifact noted which significantly compromises the evaluation of the small 
arterial branches. The right and left mainstem pulmonary arteries and their 
lobar branches appear patent without convincing evidence of intraluminal filling 
defect identified to suggest pulmonary embolism. There are questionable 
nonocclusive intraluminal linear filling defects in the segmental/subsegmental 
branches at anterior and lateral right upper lobe and right middle lobe, 
uncertain due to artifact or potential tiny PE. 
  
AORTA AND OTHER CARDIOVASCULAR STRUCTURES: Mild ectasia of thoracic aorta. No 
aortic aneurysm or dissection. Mild burden of  coronary artery calcifications. Heart Strain assessment: 
-  RV/LV ratio (normal <0.9): Normal 
-  Dysfunction or bowing of interventricular septum: None -  Main pulmonary artery enlargement (>30mm): Not present -  There is not visualization of contrast reflux from the right heart into the 
IVC/hepatic veins. LUNG PARENCHYMA: There are multiple patchy areas of airspace consolidations 
identified throughout both lungs, more pronounced in bilateral lower lobes and 
inferior left upper lobe. Patent airway. IMAGED THYROID: Unremarkable. MEDIASTINUM: Borderline adenopathy in bilateral chu and subcarinal 
mediastinum. [place] PLEURAL SPACES AND CHEST WALL: No significant pleural effusion. Mild pleural 
thickening. VISUALIZED UPPER ABDOMEN: Very atrophic right kidney is partially seen. OSSEOUS STRUCTURES: Unremarkable. Impression 1. No convincing CT evidence of pulmonary embolism in mainstem and lobar 
arteries. Questionable nonocclusive intraluminal filling defects identified in 
right upper and middle lobe segmental subsegmental images, artifact versus tiny 
nonocclusive PE. 
 
2.  Extensive patchy consolidation pneumonia in bilateral lungs, Covid infection is more concerned than pulmonary infarctions. Clinical correlation and 
short-term follow-up CT advised. Thank you for your referral. 
  
 
  
 
08/23/18 ECHO ADULT COMPLETE 08/25/2018 8/25/2018 Narrative · Left ventricular low normal systolic function. Estimated left  
ventricular ejection fraction is 51 - 55%. Biplane method used to measure  
ejection fraction. Left ventricular severe concentric hypertrophy. Left  
ventricular global hypokinesis. · Mild aortic valve regurgitation is present. · LV strain shows relative apical sparing with reduced global strain at  
discharge -9.1% · Aortic root and ascending aorta measures 4.1 cm. · Pulmonary arterial systolic pressure is 26 mmHg. There is no evidence of  
pulmonary hypertension. · Trivial pericardial effusion. · Left atrial cavity size is mildly dilated. · Mitral annular calcification. Trace mitral valve regurgitation. Signed by: Claudia Meadows MD  
 
 
Total of 84 min critical care time spent at bedside (personally) during the course of care providing evaluation,management and care decisions and ordering appropriate treatment related to critical care problems exclusive of procedures. The reason for providing this level of medical care for this critically ill patient was due a critical illness that impaired one or more vital organ systems such that there was a high probability of imminent or life threatening deterioration in the patients condition. This care involved high complexity decision making to assess, manipulate, and support vital system functions, to treat this degree vital organ system failure and to prevent further life threatening deterioration of the patients condition. This time was independent of other practitioners. Complex decision making was made in the evaluation and management plans during this consultation. More than 50% of time was spent in counseling and coordination of care including review of data and discussion with other team members. Ana Buchanan MD/MPH Pulmonary, Critical Care Medicine [facility]

## 2021-01-30 NOTE — PROGRESS NOTES
[address] Post-rounding Note OBJECTIVE Visit Vitals /67 Pulse (!) 127 Temp (!) 101.3 °F (38.5 °C) Resp (!) 32 Ht 5' 9\" (1.753 m) Wt 110 kg (242 lb 6.4 oz) SpO2 92% BMI 35.80 kg/m² PERTINENT LABS/IMAGING: 
- Vitals unstable as above - CXR- Performed: 01/30/21 0219- Impression: 1. Increasing extensive bilateral pulmonary opacities with relative central prominence which may represent edema and/or pneumonia. - Ill appearing on exam this am 
 
 
TREATMENT PLAN UPDATES: 
- new sepsis work up with UA, Ucx, Bcx, procal, la 
- stat ABG 
- start bipap 
- start metoprolol tartrate 25mg BID 
- will continue to monitor closely, consider ICU consult if continues to worsen. See daily progress note for full assessment/plan.  
 
 
, MD, PGY-1  
Jazlyn Guy  [address] [place] Pager: [phone] January 30, 2021, 11:33 AM

## 2021-01-30 NOTE — PROGRESS NOTES
Remote Note. Will see patient tomorrow. Contacted by Dr. Donita Gutierrez regarding worsening pneumonia and new fever 101.3 today. CXR 1/29, 1/30 showed significantly worse infiltrates c/w 1/23. Blood cultures have been ordered. Zosyn has been started and vancomycin has been ordered. I agree with this empiric regimen for now and will order sputum culture Hattie Mayorga MD, [facility]

## 2021-01-30 NOTE — MED STUDENT NOTES
*ATTENTION:  This note has been created by a medical student for educational purposes only. Please do not refer to the content of this note for clinical decision-making, billing, or other purposes. Please see attending physicians note to obtain clinical information on this patient. * [address] Brief Progress Note SUBJECTIVE Refer to resident note. Patient COVID positive. Patient had a rapid response called last night for O2 stats in the 70s after taking mask of. Patient also tachypnic and tachycardic through episode. OBJECTIVE Visit Vitals /67 Pulse (!) 127 Temp (!) 101.3 °F (38.5 °C) Resp (!) 32 Ht 5' 9\" (1.753 m) Wt 110 kg (242 lb 6.4 oz) SpO2 92% BMI 35.80 kg/m² Physical Exam 
Refer to resident note. Patient COVID positive. Recent Results (from the past 12 hour(s)) METABOLIC PANEL, COMPREHENSIVE Collection Time: 01/30/21  2:49 AM  
Result Value Ref Range Sodium 135 (L) 136 - 145 mmol/L Potassium 4.1 3.5 - 5.5 mmol/L Chloride 95 (L) 100 - 111 mmol/L  
 CO2 22 21 - 32 mmol/L Anion gap 18 3.0 - 18 mmol/L Glucose 134 (H) 74 - 99 mg/dL BUN 63 (H) 7.0 - 18 MG/DL Creatinine 10.20 (H) 0.6 - 1.3 MG/DL  
 BUN/Creatinine ratio 6 (L) 12 - 20 GFR est AA 6 (L) >60 ml/min/1.73m2 GFR est non-AA 5 (L) >60 ml/min/1.73m2 Calcium 10.2 (H) 8.5 - 10.1 MG/DL Bilirubin, total 0.6 0.2 - 1.0 MG/DL  
 ALT (SGPT) 31 16 - 61 U/L  
 AST (SGOT) 21 10 - 38 U/L Alk. phosphatase 68 45 - 117 U/L Protein, total 9.0 (H) 6.4 - 8.2 g/dL Albumin 4.0 3.4 - 5.0 g/dL Globulin 5.0 (H) 2.0 - 4.0 g/dL A-G Ratio 0.8 0.8 - 1.7    
CBC WITH AUTOMATED DIFF Collection Time: 01/30/21  2:49 AM  
Result Value Ref Range WBC 12.3 4.6 - 13.2 K/uL  
 RBC 4.34 (L) 4.70 - 5.50 M/uL  
 HGB 12.5 (L) 13.0 - 16.0 g/dL HCT 35.5 (L) 36.0 - 48.0 % MCV 81.8 74.0 - 97.0 FL  
 MCH 28.8 24.0 - 34.0 PG  
 MCHC 35.2 31.0 - 37.0 g/dL RDW 16.0 (H) 11.6 - 14.5 % PLATELET 653 730 - 800 K/uL MPV 10.1 9.2 - 11.8 FL  
 NEUTROPHILS 81 (H) 42 - 75 % LYMPHOCYTES 10 (L) 20 - 51 % MONOCYTES 9 2 - 9 % EOSINOPHILS 0 0 - 5 % BASOPHILS 0 0 - 3 % NRBC 2.0 (H) 0  WBC  
 ABS. NEUTROPHILS 10.0 (H) 1.8 - 8.0 K/UL  
 ABS. LYMPHOCYTES 1.2 0.8 - 3.5 K/UL  
 ABS. MONOCYTES 1.1 (H) 0 - 1.0 K/UL  
 ABS. EOSINOPHILS 0.0 0.0 - 0.4 K/UL  
 ABS. BASOPHILS 0.0 0.0 - 0.06 K/UL  
 DF MANUAL PLATELET COMMENTS ADEQUATE PLATELETS    
 RBC COMMENTS ANISOCYTOSIS 1+ 
    
 RBC COMMENTS POLYCHROMASIA 1+ 
    
 RBC COMMENTS HYPOCHROMIA 1+ 
    
 RBC COMMENTS POIKILOCYTOSIS 1+ 
    
 RBC COMMENTS TARGET CELLS 1+ GLUCOSE, POC Collection Time: 01/30/21 10:23 AM  
Result Value Ref Range Glucose (POC) 139 (H) 70 - 110 mg/dL ASSESSMENT/PLAN Trace Kincaid a 62 y. o. male with PMH of osteoarthritis, CKD on dialysis MWF, gout with no recent flares, HTN, hyperlipidemia  who is admitted for acute hypoxic respiratory failure, likely due to COVID-19 pna.  
  
Acute hypoxic respiratory failure - DDX to include COVID-19 pna vs CAP vs ?CHF. CXR significant for patchy infiltrations/consolidations in bilateral mid/lower lungs, greater on the left, with concerns for COVID pna. CTA with confirmed extensive patchy consolidation in bilateral lungs, and questionable non-occlusive intraluminal filling defects in right upper and middle lobe segmental arteries; NO PE. Procal and CRP down-trending. D-dimer 3.22. BCx NG3D.  Completed Rocephin 2 g qD (1/23-1/26) and Zithromax 500 mg qD. S/p Remdesivir (completed 5 day course). - transfer to 3N stepdown, q4h vital checks 
- continue Hiflow NC 35L FiO2 90%, wean oxygen as tolerated to maintain SpO2 >92% - Bronchial hygiene protocol - Bronchodilators- Atrovent 2 puffs q4h or Albuterol 2 puffs q4h 
- Continue Decadron 6 mg IV qD (day 8 of 10) - appreciate ID and pulm recs - O2 support; wean as tolerated - Continue Vitamin C 1000 mg q6h, Aspirin 325 mg, Vitamin D3 50, 000 units once, famotidine 20 mg daily, melatonin 5 mg qhs, Zinc sulfate 100 mg  
- CBC, CMP daily 
-Consider CRP, and pro sharmila based on worsening CXR, tachycardia, tachypnea and febrile - Vital signs per unit routine 
  
Troponinemia - Troponin elevated to 0.51>0.42>0.33>>0.23>0.11>0. 09. Likely ischemic demand from hypoxia vs R heart strain from possible PE. Patient denies chest pain. EKG NSR Qtc 510. ECHO from 2018 significant for EF 51-55%, low normal systolic function, left ventricular severe concentric hypertrophy. ECHO 1/25 - Estimated LVEF is 55 - 60%.  Moderate aortic root dilatation. (4.1 cm). BNP 7K. - trend trops q6h until [facility] 
- cardiology following; heparin gtt not advised. Continue  mg daily - Avoid Qtc prolonging drugs 
  
Hyperglycemia - likely in the setting of recent high dose steroid use. A1c 6.1 (pre-diabetic range) -  correctional lispro insulin modified to very resistant dose -  Start low dose basal lantus insulin 5 units daily  
- AC&HS 
  
ESRD on dialysis MWF 
- continue dialysis in-patient 
- Held Velphoro (phosphate binder). Started Renvela 800 mg TID  
- nephro consulted; appreciate recs 
  
Hypotension, in the setting of dialysis MWF 
- continue midodrine 10 mg TID 
- midodrine 10 mg prn during dialysis - Resume Coreg 3.125 mg BID once blood pressures stabilize 
  
Hyperlipidemia 
- Continue Pravastatin 20 mg daily 
  
GOUT, no recent flares. Patient is no longer on Allopurinol.  
- monitor for flares Debbie Oliveros, MS-3 
1/30/21 @4210

## 2021-01-30 NOTE — PROGRESS NOTES
[address] [facility] Progress Note Patient: Vijaya Farnsworth MRN: [id number] SSN: [id number]  YOB: 1960 Age: 62 y.o. Sex: male Admit Date: 1/23/2021 LOS: 7 days Chief Complaint Patient presents with  Shortness of Breath Subjective:  
Overnight: Rapid called for oxygen saturation 70% after patient took off mask, put mask back on, no major change in management. Patient resting in bed comfortably, reporting improvement in breathing. RR 27. no new symptoms reported Review of Systems Constitutional: Negative for chills and fever. Respiratory: Negative for cough and hemoptysis. Cardiovascular: Negative for chest pain and palpitations. Gastrointestinal: Negative for diarrhea, nausea and vomiting. Musculoskeletal: Negative for myalgias. Neurological: Negative for dizziness and headaches. Objective:  
 
Visit Vitals /79 Pulse (!) 123 Temp 99.1 °F (37.3 °C) Resp (!) 44 Ht 5' 9\" (1.753 m) Wt 110 kg (242 lb 6.4 oz) SpO2 94% BMI 35.80 kg/m² Physical Exam: 
General:  Alert and Responsive and in No acute distress. 35l/min hi flow 90% FiO2 NC SpO2 96%. HEENT: Conjunctiva pink, sclera anicteric. EOMI.  MMM. CV:  RRR. No murmurs, rubs, or gallops appreciated. No visible pulsations or thrills.   
RESP:  Unlabored breathing.  Mild rales in lung bases. Equal expansion bilaterally.   
ABD:  Soft, nontender, nondistended. Normoactive bowel sounds. No hepatosplenomegaly. No suprapubic tenderness. MS:  No joint deformity or instability.  No atrophy. Neuro:  5/5 strength bilateral upper extremities and lower extremities.  A+Ox3. Ext:  No edema.  2+ radial and dp pulses bilaterally. Skin:  No rashes, lesions, or ulcers.  Good turgor. 
  
 
Intake and Output: 
Current Shift: No intake/output data recorded. Last three shifts: 01/28 0701 - 01/29 1900 In: -  
Out: 2300 Labs, results, and imaging reviewed Assessment and Plan:  
 
Karen Jaime is a 62 y.o. male with PMH of osteoarthritis, CKD on dialysis MWF, gout with no recent flares, HTN, hyperlipidemia  who is admitted for acute hypoxic respiratory failure, likely due to COVID-19 pna.  
  
Acute hypoxic respiratory failure - DDX to include COVID-19 pna vs CAP vs ?CHF. CXR significant for patchy infiltrations/consolidations in bilateral mid/lower lungs, greater on the left, with concerns for COVID pna. CTA with confirmed extensive patchy consolidation in bilateral lungs, and questionable non-occlusive intraluminal filling defects in right upper and middle lobe segmental arteries; NO PE. Procal and CRP down-trending. D-dimer 2.74>2. 49. BCx NG2D. Completed Rocephin 2 g qD (1/23-1/26) and Zithromax 500 mg qD. S/p Remdesivir (completed 5 day course). - Considering worsening CXR results, and continued high oxygen requirement, ordered fungitell and aspergillosis ab to assess for superimposed fungal infection - ID signed off, but consider re-consulting ID if fungitell positive - Continue to wean off oxygen as tolerated; appreciate recs from pulm 
- Continue in 3N stepdown, q4h vital checks 
- continue Hiflow NC 35 L FiO2 90%, wean oxygen as tolerated to maintain SpO2 >92% - Bronchial hygiene protocol - Bronchodilators- Atrovent 2 puffs q4h or Albuterol 2 puffs q4h 
- Continue Decadron 6 mg IV qD (day 7of 10) - appreciate ID and pulm recs 
- Continue Vitamin C 1000 mg q6h, Aspirin 325 mg, Vitamin D3 50, 000 units once, famotidine 20 mg daily, melatonin 5 mg qhs, Zinc sulfate 100 mg  
- Follow up on Fungitell, fungal culture, aspergillus ab 
- CBC, CMP daily - Vital signs per unit routine 
  
 Troponinemia - Troponin elevated to 0.51>0.42>0.33>0. 21. Likely ischemic demand from hypoxia vs R heart strain from possible PE. Patient denies chest pain. EKG NSR Qtc 510. ECHO from 2018 significant for EF 51-55%, low normal systolic function, left ventricular severe concentric hypertrophy. ECHO 1/25 - Estimated LVEF is 55 - 60%. Moderate aortic root dilatation. (4.1 cm). BNP 7K. - trend trops q6h until [facility] 
- cardiology following; heparin gtt not advised. Continue  mg daily - Avoid Qtc prolonging drugs Hyperglycemia - likely in the setting of recent high dose steroid use. A1c 6.1 (pre-diabetic range) -  correctional lispro insulin modified to very resistant dose 
-  Start low dose basal lantus insulin 5 units daily  
-  AC&HS 
  
ESRD on dialysis MWF 
- continue dialysis in-patient 
- Held Velphoro (phosphate binder). Started Renvela 800 mg TID  
- nephro consulted; appreciate recs Hypotension, in the setting of dialysis MWF 
- continue midodrine 10 mg TID 
- midodrine 10 mg prn during dialysis - Resume Coreg 3.125 mg BID once blood pressures stabilize Hyperlipidemia 
- Continue Pravastatin 20 mg daily GOUT, no recent flares. Patient is no longer on Allopurinol.  
- monitor for flares Diet renal  
DVT Prophylaxis SQH  
GI Prophylaxis famotidine Code status full Disposition >2MN Point of Contact Elmer Garcia Relationship: sister 
[phone] Sherwin Billy DO PGY-1  
Jazlyn Guy  89. Intern Pager: [phone] January 30, 2021, 3:07 AM

## 2021-01-30 NOTE — PROGRESS NOTES
[facility] Pulmonary, Critical Care, and Sleep Medicine F/U Patient Consult Name: Ivy Webster MRN: [id number] : 1960 Hospital: [facility] Date: 2021 This patient has been seen and evaluated at the request of Dr. Fawad Bassett for Acute hypoxic Respiratory failure, Covid pneumonia. IMPRESSION:  
· Acute hypoxic Respiratory failure-requiring high flow 100% FiO2 at 40 L flow to maintain saturation. Underlying progressive COVID-19 pneumonia with predominant bilateral lower lobe consolidative pneumonia. Appears compensating for now, however with worsening x-ray, patient is at higher risk for intubation · COVID-19 pneumonia with significant consolidations trevon lower lobes · ARDS, severe --likely worsened by fluid overload in the setting of COVID-19 pneumonia/sepsis · CHFpEF/hypertrophic cardiomyopathy:  TTE shows concentric hypertrophy · ESRD on dialysis · History of dilated cardiomyopathy · Hypertension · Patient possibly delirious: Patient fully alert and oriented x3, but may have poor insight regarding severe medical condition, patient more worried about comfort of his bed then high oxygen demand RECOMMENDATIONS:  
· Pt currently able to maintain SpO2 in low 90s on HFNC, however requiring 100% FiO2, will wean as tolerated · Oxygen-titrate to SaO2 goal more than 88%. Currently appearing comfortable on high flow. Use bipap QHS and PRN 
· BIPAP/CPAP-can consider adding as next step if difficulty oxygenating · Reviewed chest x-ray, would advise more aggressive fluid removal by nephrology on HD  -- discussed with dialysis nurse, will attempt but limited by pt's pressure already on midodrine. Please consider serial dialysis with possible smaller session tomorrow · Bronchial hygiene protocol-encourage use of incentive spirometer, coughing · Bronchodilators-Combivent Respimat · Steroids- Decadron 6mg daily x 10 days · Antibiotics-per ID · Strict aspiration precautions · Will need imaging follow-up to complete clearing. Concerns for developing sequelae long-term given the extent of pneumonia · Assess home Oxygen needs at discharge · OT, PT, OOB to chair, and ambulate as tolerated when possible · Healthy weight · Will Follow · DVT ppx + PUD ppx (while on steroids) Prognosis is guarded. Patient has very high risk for decompensation require mechanical ventilation Subjective:  
01/29/21 Patient seen and examined at bedside. No acute events overnight. Seen while on dialysis. Patient has worsening hypoxia, FiO2 remains at 100%, patient more labored breathing, however has no new complaints. Chest x-ray was performed by primary service which shows increased bilateral infiltrates, which I suspect is overlying pulmonary edema. Denies any N/V/D, hemoptysis, sputum. HPI per Dr. Paul Hudson Aubree Franks is a 62 y.o. male with PMH arthritis, CKD on HD MWF, gout HTN, HLD, now presenting with complaint of SOB. Pt is known positive COVID for over 1wk. Went to dialysis center and was noted to have a fever 100.6 here. He had a headache, eye pain and chills . No known Covid exposure. Nominal pain diarrhea no chest pain or shortness of breath Was seen in ER 1wk ago with above complaints and d/c home with CDC recommendations. Over the past few days he has felt progressively worse and was found tripoding today when he was picked up for dialysis. He currently states he feels fine and has no complaints. He denies sick contacts, CP, [place]. He missed dialysis on day of admission Initial SpO2 90% on 5lpm NC so placed on HFC. CXR showed patchy infiltrations/consolidations in bilateral mid/lower lungs I have reviewed all of the available data including the patient's previous history external records and radiological imaging available for review. In addition applicable cardiology and other lab data were also reviewed. Past Medical History:  
Diagnosis Date  Arthritis of left knee 09/2017  
 moderate to severe, with effusion on xray  Chronic kidney disease ESRD  HD on MWF  
 Diastolic CHF ([facility] [address]  Dilated cardiomyopathy ([facility] [address]  History of alcohol abuse  History of echocardiogram 02/24/2014 Mod-marked LVE. EF 15%. Severe, diffuse hypk. Mild LVH. Gr 1 DDfx. Mod LAE. Mild-mod FIDELIA. Mild AoRE. No significant change from echo of 10/23/09.  History of gout  History of myocardial perfusion scan 05/25/2006 No evidence of ischemia or scarring. Gross LVE. EF 28%. Severe global hypk. Neg EKG on submaximal EST. Ex time 8 min 25 sec.  HTN (hypertension)  Hypercholesterolemia  Hypertensive cardiovascular disease Past Surgical History:  
Procedure Laterality Date  HX COLONOSCOPY    
 HX HERNIA REPAIR  04/2016  HX VASCULAR ACCESS Right upper chest HD CATH Prior to Admission medications Medication Sig Start Date End Date Taking? Authorizing Provider  
pravastatin (PRAVACHOL) 20 mg tablet TAKE 1 TABLET BY MOUTH NIGHTLY 12/29/20  Yes Roselia Olvera NP  
carvediloL (COREG) 3.125 mg tablet TAKE 1 TABLET BY MOUTH EVERY 12 HOURS 9/29/20  Yes Denilson Kathleen NP  
sucroferric oxyhydroxide (VELPHORO) 500 mg chew chewable tablet Take  by mouth three (3) times daily (with meals). Yes Provider, Historical  
aspirin 81 mg chewable tablet Take 1 Tab by mouth daily. 2/21/17  Yes Benito ORNELAS NP  
HYDROcodone-acetaminophen (NORCO) 5-325 mg per tablet Take 1-2 Tabs by mouth every six (6) hours as needed for Pain. Max Daily Amount: 8 Tabs. 11/15/18   Cheri Pyle MD  
sevelamer carbonate (RENVELA) 800 mg tab tab Take  by mouth three (3) times daily.     Provider, Historical  
 colchicine 0.6 mg tablet 2 tablets at first sign of gout flare and then 1 tablet 1 hour later  Indications: acute gouty arthritis 6/5/18   Tahir Peter MD  
allopurinol (ZYLOPRIM) 100 mg tablet Take 2 Tabs by mouth daily. 5/11/18   STEFFANIE [place] No Known Allergies Social History Tobacco Use  Smoking status: Never Smoker  Smokeless tobacco: Never Used Substance Use Topics  Alcohol use: No  
  Alcohol/week: 0.0 standard drinks Comment: stop 3 years ago in feb, 2015 Family History Problem Relation Age of Onset  Cancer Father Lung  Heart Failure Mother  Cancer Sister Current Facility-Administered Medications Medication Dose Route Frequency  heparin (porcine) 1,000 unit/mL injection  sodium chloride (OCEAN) 0.65 % nasal squeeze bottle 2 Spray  2 Spray Both Nostrils Q4H  
 ipratropium-albuterol (COMBIVENT RESPIMAT) 20 mcg-100 mcg inhalation spray  1 Puff Inhalation Q4H RT  
 midodrine (PROAMATINE) tablet 10 mg  10 mg Oral TID WITH MEALS  insulin glargine (LANTUS) injection 5 Units  5 Units SubCUTAneous DAILY  melatonin (rapid dissolve) tablet 5 mg  5 mg Oral QHS  insulin lispro (HUMALOG) injection   SubCUTAneous AC&HS  sevelamer carbonate (RENVELA) tab 800 mg  800 mg Oral TID WITH MEALS  sodium chloride (NS) flush 5-40 mL  5-40 mL IntraVENous Q8H  
 heparin (porcine) injection 5,000 Units  5,000 Units SubCUTAneous Q8H  
 ascorbic acid (vitamin C) (VITAMIN C) tablet 1,000 mg  1,000 mg Oral Q6H  
 zinc sulfate (ZINCATE) 220 (50) mg capsule 2 Cap  2 Cap Oral DAILY  aspirin tablet 325 mg  325 mg Oral DAILY  famotidine (PEPCID) tablet 20 mg  20 mg Oral DAILY  [Held by provider] carvediloL (COREG) tablet 3.125 mg  3.125 mg Oral BID WITH MEALS  pravastatin (PRAVACHOL) tablet 20 mg  20 mg Oral DAILY  dexamethasone (DECADRON) 4 mg/mL injection 6 mg  6 mg IntraVENous Q24H Review of Systems: A comprehensive review of systems was negative except for that written in the HPI. Objective:  
Vital Signs:   
Visit Vitals /80 Pulse (!) 112 Temp 98.6 °F (37 °C) Resp (!) 35 Ht 5' 9\" (1.753 m) Wt 110 kg (242 lb 6.4 oz) SpO2 95% BMI 35.80 kg/m² O2 Device: Hi flow nasal cannula(Vapotherm) O2 Flow Rate (L/min): 35 l/min Temp (24hrs), Av °F (36.7 °C), Min:97.6 °F (36.4 °C), Max:98.6 °F (37 °C) Intake/Output:  
Last shift:      No intake/output data recorded. Last 3 shifts: No intake/output data recorded. No intake or output data in the 24 hours ending 21 Physical Exam:  
General:  Alert, cooperative, no distress, appears stated age, laying in bed wearing HFNC, pleasant, currently on dialysis Head:  Normocephalic, without obvious abnormality, atraumatic. Eyes:  Conjunctivae/corneas clear. PERRL, EOMs intact. Nose: Nares normal. Septum midline, wearing high flow nasal cannula. No drainage Throat:  Mucosa moist, fair dentition, no oral lesions, Mallamapti IV Neck: Supple, symmetrical, trachea midline, no adenopathy,no carotid bruit and no JVD. Lungs:   Unchanged, distant breath sounds bilaterally with decreased breath sounds in bases, CTA BL, no wheezes/rales/rhonchi throughout all lung fields Chest wall:  No tenderness or deformity. Heart:  Regular rate and rhythm, S1, S2 normal, no murmur, click, rub or gallop. Abdomen:    Obese, soft, non-tender. Bowel with crepitations normal. No masses,  No organomegaly. Extremities: Extremities normal, atraumatic, no cyanosis or edema. Left upper extremity AV shunt. No clubbing Pulses: 2+ and symmetric all extremities. Skin: Skin color, texture, turgor normal. No rashes or lesions Lymph nodes: Cervical, supraclavicular, and axillary nodes normal.  
Neurologic: Grossly nonfocal, strength, coordination, sensation grossly intact throughout all extremities, patient alert and oriented x3  
 
 Data review:  
 
Recent Results (from the past 24 hour(s)) GLUCOSE, POC Collection Time: 01/28/21  8:48 PM  
Result Value Ref Range Glucose (POC) 177 (H) 70 - 110 mg/dL METABOLIC PANEL, COMPREHENSIVE Collection Time: 01/29/21 12:37 AM  
Result Value Ref Range Sodium 141 136 - 145 mmol/L Potassium 4.5 3.5 - 5.5 mmol/L Chloride 97 (L) 100 - 111 mmol/L  
 CO2 20 (L) 21 - 32 mmol/L Anion gap 24 (H) 3.0 - 18 mmol/L Glucose 161 (H) 74 - 99 mg/dL BUN 85 (H) 7.0 - 18 MG/DL Creatinine 12.90 (H) 0.6 - 1.3 MG/DL  
 BUN/Creatinine ratio 7 (L) 12 - 20 GFR est AA 5 (L) >60 ml/min/1.73m2 GFR est non-AA 4 (L) >60 ml/min/1.73m2 Calcium 10.2 (H) 8.5 - 10.1 MG/DL Bilirubin, total 0.4 0.2 - 1.0 MG/DL  
 ALT (SGPT) 40 16 - 61 U/L  
 AST (SGOT) 32 10 - 38 U/L Alk. phosphatase 53 45 - 117 U/L Protein, total 8.3 (H) 6.4 - 8.2 g/dL Albumin 3.3 (L) 3.4 - 5.0 g/dL Globulin 5.0 (H) 2.0 - 4.0 g/dL A-G Ratio 0.7 (L) 0.8 - 1.7    
CBC WITH AUTOMATED DIFF Collection Time: 01/29/21 12:37 AM  
Result Value Ref Range WBC 11.0 4.6 - 13.2 K/uL  
 RBC 4.06 (L) 4.70 - 5.50 M/uL  
 HGB 11.6 (L) 13.0 - 16.0 g/dL HCT 34.6 (L) 36.0 - 48.0 % MCV 85.2 74.0 - 97.0 FL  
 MCH 28.6 24.0 - 34.0 PG  
 MCHC 33.5 31.0 - 37.0 g/dL  
 RDW 15.7 (H) 11.6 - 14.5 % PLATELET 874 029 - 458 K/uL MPV 10.2 9.2 - 11.8 FL  
 NEUTROPHILS 81 (H) 42 - 75 % BAND NEUTROPHILS 1 0 - 5 % LYMPHOCYTES 9 (L) 20 - 51 % MONOCYTES 9 2 - 9 % EOSINOPHILS 0 0 - 5 % BASOPHILS 0 0 - 3 %  
 ABS. NEUTROPHILS 9.0 (H) 1.8 - 8.0 K/UL  
 ABS. LYMPHOCYTES 1.0 0.8 - 3.5 K/UL  
 ABS. MONOCYTES 1.0 0 - 1.0 K/UL  
 ABS. EOSINOPHILS 0.0 0.0 - 0.4 K/UL  
 ABS. BASOPHILS 0.0 0.0 - 0.06 K/UL  
 DF MANUAL PLATELET COMMENTS ADEQUATE PLATELETS    
 RBC COMMENTS ANISOCYTOSIS 1+ 
    
 RBC COMMENTS OVALOCYTES 1+ GLUCOSE, POC Collection Time: 01/29/21  8:48 AM  
Result Value Ref Range Glucose (POC) 148 (H) 70 - 110 mg/dL TROPONIN I Collection Time: 01/29/21 10:35 AM  
Result Value Ref Range Troponin-I, QT 0.10 (H) 0.0 - 0.045 NG/ML  
GLUCOSE, POC Collection Time: 01/29/21 11:43 AM  
Result Value Ref Range Glucose (POC) 160 (H) 70 - 110 mg/dL TROPONIN I Collection Time: 01/29/21  3:12 PM  
Result Value Ref Range Troponin-I, QT 0.11 (H) 0.0 - 0.045 NG/ML Imaging: 
I have personally reviewed the patients radiographs and have reviewed the reports:  
Person review of CXR shows multifocal pulmonary opacities, increased in intensity, suspecting component of fluid overload along with progressing ARDS. No masses seen. Official report per radiology: XR Results (most recent): 
Results from [facility] Encounter encounter on 01/23/21 XR CHEST PORT Narrative Chest AP single view HISTORY: Hypoxia COMPARISON: 1/25/21 FINDINGS: The cardiac enlargement is no longer evident. The lungs are 
hypoinflated with significant progression of patchy pulmonary opacities in 
bilateral lungs with sparing of apices. The central vascular engorgement 
persists. No pneumothorax. . 
  
 Impression Significant interval progression of patchy airspace disease in bilateral lungs, 
including Covid pneumonia. The cardiac enlargement is interval resolved. Thank you for your referral.  
 
 
CT Results (most recent): 
Results from [facility] Encounter encounter on 01/23/21 CTA CHEST W OR W WO CONT Narrative CT chest with contrast for PE HISTORY: Dyspnea. COMPARISON: None TECHNIQUE: Dynamic spiral scan through the chest is obtained from the thoracic 
inlet to the diaphragm after dynamic nonionic IV contrast administration  per PE 
protocol. Coronal and sagittal MIP computer reconstructions are also obtained 
for better visualization of the integrity of pulmonary arteries in 3D dimension, 
particularly for lobar/interlobar arterial branches and to minimize radiation 
dose. All CT scans at this facility performed using dose optimization techniques as 
appreciated to a performed exam, to include automated exposure control, 
adjustment of the mA and or KU according to patient size (including appropriate 
matching for site specific examination), or use of iterative reconstruction 
technique. FINDINGS: 
 
PULMONARY ARTERIES: The contrast bolus is adequate. Although, moderate motion 
artifact noted which significantly compromises the evaluation of the small 
arterial branches. The right and left mainstem pulmonary arteries and their 
lobar branches appear patent without convincing evidence of intraluminal filling 
defect identified to suggest pulmonary embolism. There are questionable 
nonocclusive intraluminal linear filling defects in the segmental/subsegmental 
branches at anterior and lateral right upper lobe and right middle lobe, 
uncertain due to artifact or potential tiny PE. 
  
AORTA AND OTHER CARDIOVASCULAR STRUCTURES: Mild ectasia of thoracic aorta. No 
aortic aneurysm or dissection. Mild burden of  coronary artery calcifications. Heart Strain assessment: 
-  RV/LV ratio (normal <0.9): Normal 
-  Dysfunction or bowing of interventricular septum: None -  Main pulmonary artery enlargement (>30mm): Not present -  There is not visualization of contrast reflux from the right heart into the 
IVC/hepatic veins. LUNG PARENCHYMA: There are multiple patchy areas of airspace consolidations 
identified throughout both lungs, more pronounced in bilateral lower lobes and 
inferior left upper lobe. Patent airway. IMAGED THYROID: Unremarkable. MEDIASTINUM: Borderline adenopathy in bilateral chu and subcarinal 
mediastinum. Shaaron Money PLEURAL SPACES AND CHEST WALL: No significant pleural effusion. Mild pleural 
thickening. VISUALIZED UPPER ABDOMEN: Very atrophic right kidney is partially seen. OSSEOUS STRUCTURES: Unremarkable. Impression 1. No convincing CT evidence of pulmonary embolism in mainstem and lobar 
arteries. Questionable nonocclusive intraluminal filling defects identified in 
right upper and middle lobe segmental subsegmental images, artifact versus tiny 
nonocclusive PE. 
 
2.  Extensive patchy consolidation pneumonia in bilateral lungs, Covid infection 
is more concerned than pulmonary infarctions. Clinical correlation and 
short-term follow-up CT advised. Thank you for your referral. 
  
 
  
 
08/23/18 ECHO ADULT COMPLETE 08/25/2018 8/25/2018 Narrative · Left ventricular low normal systolic function. Estimated left  
ventricular ejection fraction is 51 - 55%. Biplane method used to measure  
ejection fraction. Left ventricular severe concentric hypertrophy. Left  
ventricular global hypokinesis. · Mild aortic valve regurgitation is present. · LV strain shows relative apical sparing with reduced global strain at  
discharge -9.1% · Aortic root and ascending aorta measures 4.1 cm. · Pulmonary arterial systolic pressure is 26 mmHg. There is no evidence of  
pulmonary hypertension. · Trivial pericardial effusion. · Left atrial cavity size is mildly dilated. · Mitral annular calcification. Trace mitral valve regurgitation. Signed by: Jesus Westfall MD  
 
 
Total of 34 min critical care time spent at bedside (personally) during the course of care providing evaluation,management and care decisions and ordering appropriate treatment related to critical care problems exclusive of procedures. The reason for providing this level of medical care for this critically ill patient was due a critical illness that impaired one or more vital organ systems such that there was a high probability of imminent or life threatening deterioration in the patients condition. This care involved high complexity decision making to assess, manipulate, and support vital system functions, to treat this degree vital organ system failure and to prevent further life threatening deterioration of the patients condition. This time was independent of other practitioners. Complex decision making was made in the evaluation and management plans during this consultation. More than 50% of time was spent in counseling and coordination of care including review of data and discussion with other team members. Uzair Wilhelm MD/MPH Pulmonary, Critical Care Medicine [facility]

## 2021-01-30 NOTE — ROUTINE PROCESS
Bedside and Verbal shift change report given to Ayala Jules (oncoming nurse) by Maria E Brown (offgoing nurse). Report included the following information SBAR, Kardex, Intake/Output, MAR, Recent Results, Med Rec Status and Cardiac Rhythm Stach.

## 2021-01-30 NOTE — REMOTE MONITORING
Attempted to call unit regarding need for BC/LA to be drawn, ABX to be given per sepsis guidelines, no answer on unit Neha Shipman RN,BSN [facility] [phone]

## 2021-01-30 NOTE — ROUTINE PROCESS
Patient required a Rapid Response at 2010 this shift. Patient was exhibiting Rapid respirations of 45-60 per minute with HR of 120-135 sustained for approximately 10-15 minutes with High flow NC and non-rebreather mask. Patient was using abdominal muscles to assist in the breathing effort. Twp providers responded and assessed the patient. Orders were placed and monitoring of the patients condition continued. At the 0000 VS the MEW was still a 5. Provider notified and will review chart. Awaiting new orders and will continue to monitor. At the 0400 VS the MEW was still 5 due to the elevated RR and HR. Patient has not rested during this shift. Continuing to monitor. Bedside and Verbal shift change report given to [address] [place] [address] (oncoming nurse) by Kasie Garner RN (offgoing nurse). Report included the following information SBAR, Kardex, MAR and Recent Results. SITUATION:  
? Code Status: Full Code 
? Reason for Admission: Acute respiratory failure due to COVID-19 ([facility]) [U07.1, J96.00] ? Hospital day: 7 
? Problem List:  
   
Hospital Problems  Date Reviewed: 1/3/2019 Codes Class Noted POA * (Principal) Acute respiratory failure due to COVID-19 [facility]) ICD-10-CM: U07.1, J96.00 
ICD-9-CM: 518.81, 079.89  1/23/2021 Unknown BACKGROUND:  
 Past Medical History:  
Past Medical History:  
Diagnosis Date  Arthritis of left knee 09/2017  
 moderate to severe, with effusion on xray  Chronic kidney disease ESRD  HD on MWF  
 Diastolic [facility] ([facility] [address]  Dilated cardiomyopathy ([facility] [address]  History of alcohol abuse  History of echocardiogram 02/24/2014 Mod-marked LVE. EF 15%. Severe, diffuse hypk. Mild LVH. Gr 1 DDfx. Mod LAE. Mild-mod FIDELIA. Mild AoRE. No significant change from echo of 10/23/09.  History of gout  History of myocardial perfusion scan 05/25/2006  No evidence of ischemia or scarring.  Gross LVE.  EF 28%.  Severe global hypk.  Neg EKG on submaximal EST.  Ex time 8 min 25 sec.  
• HTN (hypertension)   
• Hypercholesterolemia   
• Hypertensive cardiovascular disease   
 
  
 Patient taking anticoagulants yes  
 
ASSESSMENT:  
? Changes in Assessment Throughout Shift: YES and Rapid see notes 
 
? Patient has Central Line: no Reasons if yes: N/A 
? Patient has Vitale Cath: no Reasons if yes:N/A   
 
? Last Vitals: 
  
Vitals:  
 01/29/21 2008 01/29/21 2355 01/30/21 0437 01/30/21 0509  
BP: (!) 138/94 117/79 128/82 (!) 138/94  
Pulse: (!) 128 (!) 123 (!) 124 (!) 134  
Resp: (!) 55 (!) 44 (!) 44 (!) 56  
Temp: 98.5 °F (36.9 °C) 99.1 °F (37.3 °C) 98 °F (36.7 °C)   
TempSrc:      
SpO2: 97% 94% 99% 99%  
Weight:      
Height:      
 
 
? IV and DRAINS (will only show if present) 
 [REMOVED] Peripheral IV 01/23/21 Right Antecubital-Site Assessment: Clean, dry, & intact 
Hemodialysis Access-Site Assessment: Clean, dry, & intact 
[REMOVED] Peripheral IV 01/26/21 Anterior;Right Wrist-Site Assessment: Clean, dry, & intact 
[REMOVED] Peripheral IV 01/23/21 Posterior;Right Hand-Site Assessment: Clean, dry, & intact 
 
? WOUND (if present) 
 Wound Type:  none 
 Dressing present Dressing Present : No 
 Wound Concerns/Notes:  none 
 
? PAIN 
  Pain Assessment 
  Pain Intensity 1: 0 (01/30/21 0609) 
    
    
  Patient Stated Pain Goal: 0 
o Interventions for Pain:  none 
o Intervention effective: no 
o Time of last intervention: N/A  
o Reassessment Completed: no  
 
? Last 3 Weights: 
Last 3 Recorded Weights in this Encounter  
 01/25/21 1230 01/27/21 0554 01/28/21 0400  
Weight: 104.3 kg (230 lb) 111.9 kg (246 lb 12.8 oz) 110 kg (242 lb 6.4 oz)  
 
Weight change:  
 
? INTAKE/OUPUT 
  Current Shift: No intake/output data recorded. 
  Last three shifts: 01/28 1901 - 01/30 0700 
In: -  
Out: 2300  
 
? LAB RESULTS 
  
Recent Labs  
  01/30/21 
0249 01/29/21 
0037 01/28/21 
 3623 WBC 12.3 11.0 9.8 HGB 12.5* 11.6* 10.7* HCT 35.5* 34.6* 32.7*  
 383 336 Recent Labs  
  01/30/21 
0249 01/29/21 
0037 01/28/21 
5733 * 141 137  
K 4.1 4.5 4.4 * 161* 176* BUN 63* 85* 59* CREA 10.20* 12.90* 10.90* CA 10.2* 10.2* 9.4 RECOMMENDATIONS AND DISCHARGE PLANNING 1. Pending tests/procedures/ Plan of Care or Other Needs: Test were done after the Rapid 2. Discharge plan for patient and Needs/Barriers: N/A 
 
3. Estimated Discharge Date:N/A  Posted on Whiteboard in Patients Room: no   
 
4. The patient's care plan was reviewed with the oncoming nurse. \"HEALS\" SAFETY CHECK Fall Risk Total Score: 4 Safety Measures: Safety Measures: Bed/Chair alarm on, Bed/Chair-Wheels locked, Bed in low position, Call light within reach, Side rails X 3, Video Monitoring for Safety, Gripper socks A safety check occurred in the patient's room between off going nurse and oncoming nurse listed above. The safety check included the below items Area Items H High Alert Medications ? Verify all high alert medication drips (heparin, PCA, etc.) E Equipment ? Suction is set up for ALL patients (with yanker) ? Red plugs utilized for all equipment (IV pumps, etc.) ? WOWs wiped down at end of shift. ? Room stocked with oxygen, suction, and other unit-specific supplies A Alarms ? Bed alarm is set for fall risk patients ? Ensure chair alarm is in place and activated if patient is up in a chair L Lines ? Check IV for any infiltration ? Vitale bag is empty if patient has a Vitale ? Tubing and IV bags are labeled Tacy Craw Safety ? Room is clean, patient is clean, and equipment is clean. ? Hallways are clear from equipment besides carts. ? Fall bracelet on for fall risk patients ? Ensure room is clear and free of clutter ? Suction is set up for ALL patients (with yanker) ? Hallways are clear from equipment besides carts. ? Isolation precautions followed, supplies available outside room, sign posted Jasper Ingram RN

## 2021-01-30 NOTE — PROGRESS NOTES
Rapid Response Note Family Medicine Patient: Cl Hearn 62 y.o. male 
[id number] Admit Date: 1/23/2021 Admission Diagnosis: Acute respiratory failure due to COVID-19 ([facility]) [U07.1, J96.00] RAPID RESPONSE Rapid response called for O2 saturation down to 70%. Nurse reports patient took off his nasal cannula in order to use the restroom. He became short of breath and oxygen saturation dropped down to 70%. Respiratory therapist was in the room upon responding to rapid. Nasal cannula was again put on patient. He reported feeling better however he was tachypneic to 60 breaths/min. Patient reported no chest pain, nausea, vomiting, cough, no other symptoms reported. 10 minutes later patient started to breathe more normally with respiratory rate down to 30s. ABG was ordered. Cardiac panel was ordered. Rapid was ended. Medications Reviewed OBJECTIVE Patient Vitals for the past 12 hrs: 
 Temp Pulse Resp BP SpO2  
01/29/21 1935     96 % 01/29/21 1749 98.6 °F (37 °C)      
01/29/21 1645  (!) 112  107/80   
01/29/21 1630  (!) 116 (!) 35 95/73 95 % 01/29/21 1615  (!) 116  (!) 81/61   
01/29/21 1600  (!) 114  99/70   
01/29/21 1545  (!) 113  (!) 111/94   
01/29/21 1530  (!) 109  111/77   
01/29/21 1515  (!) 109  102/65   
01/29/21 1500  (!) 107  114/74   
01/29/21 1445  (!) 108  111/76   
01/29/21 1430  (!) 108  107/75 94 % 01/29/21 1415  (!) 104  118/81 95 % 01/29/21 1400  (!) 105  119/75 95 % 01/29/21 1345  (!) 103 26 124/84 92 % 01/29/21 1136 97.6 °F (36.4 °C) 100  101/78   
01/29/21 0957     91 % 01/29/21 0846  (!) 103  99/86  PHYSICAL: 
General:  Alert and Responsive and in moderate distress CV:  RRR, no murmurs, rubs, or gallops. PMI not displaced. No visible pulsations or thrills. No carotid bruits. RESP: Moderate rhonchi noted diffusely. Tachypnea. ABD:  Soft, nontender, nondistended. Normoactive bowel sounds. No hepatosplenomegaly. No suprapubic tenderness. No CVA tenderness. Neuro:  5/5 strength bilateral upper extremities and lower extremities. CN II-XII intact. Sensation grossly intact. A+Ox3. ASSESSMENT, PLAN & DISPOSITION Viv Wise is a 62y.o. year old male admitted for Acute respiratory failure due to COVID-19 ([facility] [address] [U07.1, J96.00]. Rapid response called for oxygen saturation down to 70%. Patient condition currently: Stable. Medications Administered: Combivent 1 dose Labs ordered/Pending: ABG, cardiac panel ABG 1/29/2021-pH 7.47 PCO2 24.1 HCO3 17.7 SaO2 98 Disposition: Patient room Attending Dr Maria Luisa Beasley notified of rapid response. In agreement with plan. Primary team resuming care. Senior resident Ritu Ballard present during RRT and evaluation Fartun Thomas DO PGY-1 
[facility] Family Medicine PGY-1 
8:41 PM 01/29/21

## 2021-01-30 NOTE — PROGRESS NOTES
Problem: Pressure Injury - Risk of 
Goal: *Prevention of pressure injury Description: Document Sotero Scale and appropriate interventions in the flowsheet. Outcome: Progressing Towards Goal 
Note: Pressure Injury Interventions: 
Sensory Interventions: Assess changes in LOC, Assess need for specialty bed, Chair cushion, Discuss PT/OT consult with provider, Keep linens dry and wrinkle-free, Maintain/enhance activity level, Minimize linen layers, Monitor skin under medical devices, Pad between skin to skin Moisture Interventions: Absorbent underpads, Apply protective barrier, creams and emollients, Maintain skin hydration (lotion/cream), Minimize layers Activity Interventions: Assess need for specialty bed, Increase time out of bed, PT/OT evaluation Mobility Interventions: Chair cushion, Float heels, Pressure redistribution bed/mattress (bed type), PT/OT evaluation Nutrition Interventions: Document food/fluid/supplement intake, Discuss nutritional consult with provider Problem: Patient Education: Go to Patient Education Activity Goal: Patient/Family Education Outcome: Progressing Towards Goal 
  
Problem: Falls - Risk of 
Goal: *Absence of Falls Description: Document Earma Liang Fall Risk and appropriate interventions in the flowsheet. Outcome: Progressing Towards Goal 
Note: Fall Risk Interventions: 
Mobility Interventions: Patient to call before getting OOB, PT Consult for mobility concerns, PT Consult for assist device competence, Utilize walker, cane, or other assistive device Mentation Interventions: Adequate sleep, hydration, pain control, Evaluate medications/consider consulting pharmacy, More frequent rounding, Reorient patient Medication Interventions: Assess postural VS orthostatic hypotension, Bed/chair exit alarm, Evaluate medications/consider consulting pharmacy, Patient to call before getting OOB Elimination Interventions: Bed/chair exit alarm, Call light in reach, Elevated toilet seat, Toilet paper/wipes in reach Problem: Patient Education: Go to Patient Education Activity Goal: Patient/Family Education Outcome: Progressing Towards Goal 
  
Problem: Risk for Spread of Infection Goal: Prevent transmission of infectious organism to others Description: Prevent the transmission of infectious organisms to other patients, staff members, and visitors. Outcome: Progressing Towards Goal 
  
Problem: Patient Education:  Go to Education Activity Goal: Patient/Family Education Outcome: Progressing Towards Goal 
  
Problem: Patient Education: Go to Patient Education Activity Goal: Patient/Family Education Outcome: Progressing Towards Goal 
  
Problem: Patient Education: Go to Patient Education Activity Goal: Patient/Family Education Outcome: Progressing Towards Goal 
  
Problem: Nutrition Deficit Goal: *Optimize nutritional status Outcome: Progressing Towards Goal

## 2021-01-31 NOTE — PROGRESS NOTES
01/31/21 0205 Patient Observations Pulse (Heart Rate) (!) 136 Resp Rate (!) 56  
O2 Sat (%) 95 % Airway - Endotracheal Tube 01/31/21 Placement Date/Time: 01/31/21 (c) 0200   Number of Attempts: 1  Placement Verified: Auscultation;EtCO2  Airway Types: Endotracheal, cuffed  Airway Tube Size: 8 mm  Technique: Direct Laryngoscopy  Blade Type: Mac  Anesthesia ETT Insertion Depth: 24 cm . .. Insertion Depth (cm) 25 cm Line [place] Lips Side Secured Left Cuff Pressure  
(inflated 10 ml) Site Assessment Clean, dry, & intact Respiratory Respiratory (WDL) X Respiratory Pattern Dyspnea at rest;Labored; Tachypneic Chest/Tracheal Assessment Chest expansion, symmetrical  
Breath Sounds Bilateral Coarse Airway Clearance Suction ET Tube;Oral  
Sputum Method Obtained Endotracheal  
Sputum Amount Moderate Sputum Color/Odor Bloody;Tan;Brown Sputum Consistency Thick; Tenacious Pulmonary Toilet Cough and deep breath;H. O.B elevated;Suction Ventilator Initiate/Discontinue Ventilator Initiate Yes Vent Settings FIO2 (%) 100 % SpO2/FIO2 Ratio 95 CMV Rate Set 20 Back-Up Rate 20 Vt Set (ml) 500 ml PEEP/VENT (cm H2O) 12 cm H20 Insp Time (sec) 0.9 sec Insp Rise Time % 60 % Flow Trigger 2 Ventilator Measurements Resp Rate Observed 56 Vt Spont (ml) 480 ml Ve Observed (l/min) 15.3 l/min PIP Observed (cm H2O) 20 cm H2O  
MAP (cm H2O) 15 I:E Ratio Actual 1:1.1 Safety & Alarms Circuit Temperature  
(hme) Backup Mode Checked/Apnea Yes Pressure Max 45 cm H2O Ve Min 2 Ve Max 25 Vt Min 200 ml Vt Max 1000 ml  
RR Max 45 Ambu Bag Yes Ambu Mask Yes ETCO2/TCM Min 20 mmHg ETCO2/TCM Max 50 mmHg Age Specific Ventilator Associated Pneumonia Bundle Patient Age Group Adult Adult Ventilator Associated Pneumonia Bundle Elevation of Head to 30-45 Degrees (Unless Contraindicated) Yes Assessment of Readiness to Extubate Yes Vent Method/Mode Ventilation Method Conventional  
Ventilator Mode VC+; Assist control  
$$ Ventilator Initial Initial Vent Day

## 2021-01-31 NOTE — ROUTINE PROCESS
1900: Verbal shift change report given to Ari and Mariaelena Mckeon RN (oncoming nurse) by Glenny Arrington RN (offgoing nurse). Report included the following information SBAR, Kardex, Recent Results and Cardiac Rhythm Sinus Tachy. 2045: Charge nurse spoke with Yue Hampton and nursing supervisor, Hailey Roblero. No ICU bed available at this time. Will keep staff posted regarding patient's status. 2100: ICU PA assessed patient at bedside. 0015: Patient transferred to ICU on transport monitor with RN and RT. 
 
0045: Notified patient's sister Danis Browne, [phone] of transfer to ICU.

## 2021-01-31 NOTE — ROUTINE PROCESS
0000: TRANSFER - OUT REPORT: 
 
Verbal report given to Wes Allen RN (name) on Connie Elizondo  being transferred to ICU (unit) for change in patient condition(respiratory status) Report consisted of patients Situation, Background, Assessment and  
Recommendations(SBAR). Information from the following report(s) SBAR, Kardex, [facility] and Cardiac Rhythm Sinus Tachy was reviewed with the receiving nurse. Lines:  
Peripheral IV 01/30/21 Right Antecubital (Active) Site Assessment Clean, dry, & intact 01/30/21 0800 Phlebitis Assessment 0 01/30/21 0800 Infiltration Assessment 0 01/30/21 0800 Dressing Status Clean, dry, & intact 01/30/21 0800 Dressing Type Transparent 01/30/21 0800 Hub Color/Line Status Blue;Flushed 01/30/21 0800 Action Taken Open ports on tubing capped 01/30/21 0800 Alcohol Cap Used Yes 01/30/21 0800 Opportunity for questions and clarification was provided. Patient transported with: 
 Monitor Registered Nurse

## 2021-01-31 NOTE — PROGRESS NOTES
[facility] ICU Progress Note Name: Cl Hearn : 1960 MRN: [id number] Date: 2021 9:10 AM 
  
[x]I have reviewed the flowsheet and previous days notes. Events overnight reviewed and discussed with nursing staff. Vital signs and records reviewed. HPI per Dr. Jennifer Guzman Hector Hernandez a 62 y. o. male with PMH arthritis, CKD on HD MWF, gout HTN, HLD, now presenting with complaint of SOB.    
Pt is known positive COVID for over 1wk.   
Went to dialysis center and was noted to have a fever 100.6 here. [facility] had a headache, eye pain and chills .  No known Covid exposure.  Nominal pain diarrhea no chest pain or shortness of breath Was seen in ER 1wk ago with above complaints and d/c home with CDC recommendations.   
Over the past few days he has felt progressively worse and was found tripoding today when he was picked up for dialysis.   
He currently states he feels fine and has no complaints. He denies sick contacts, CP, [place].   
He missed dialysis on day of admission Initial SpO2 90% on 5lpm NC so placed on HFC.   
CXR showed patchy infiltrations/consolidations in bilateral mid/lower lungs Subjective: 
21: - Events noted from overnight -Transferred to ICU --> intubated for airway protection 2/2 persistent tacypnea --> Interval hypotension requiring Levophed gtt, suspect 2/2 sedation effect. BP now improving since D/C of Precedex gtt - Remains on levophed at 8mg. BPs stable - LA improving, 2.1 this AM 
- Plan for HD today () [x]The patient is unable to give any meaningful history or review of systems because the patient is: 
[]Intubated [x]Sedated  
[]Unresponsive []The patient is critically ill on     
[x]Mechanical ventilation [x]Pressors []BiPAP [] ROS:Review of systems not obtained due to patient factors. COVID Tx Review: ? Remdesivir - (-) ? Convalascent Plasma - Need to obtain consent, not ordered yet, defer to ID ? Decadron - Day 7 of  10 (-) ? Vitamins - Vit C, Zinc 
? SQH Medication Review · Pressors - Levophed gtt (weaning down) · Sedation - Versed & Fentanyl gtt with PRNs as well · Antibiotics - Vanc & Zosyn · Pain - PRN Fentanyl · GI/ DVT - Pepcid; SQH 
· Others (other gtts) Vital Signs:   
Visit Vitals /78 (BP 1 Location: Right lower arm, BP Patient Position: Supine) Pulse 92 Temp 98.7 °F (37.1 °C) Resp 20 Ht 5' 9\" (1.753 m) Wt 109.9 kg (242 lb 4.6 oz) SpO2 98% BMI 35.78 kg/m² O2 Device: Endotracheal tube, Ventilator O2 Flow Rate (L/min): 40 l/min Temp (24hrs), Av.7 °F (37.6 °C), Min:98.1 °F (36.7 °C), Max:101.3 °F (38.5 °C) Intake/Output:  
Last shift:      No intake/output data recorded. Last 3 shifts:  1901 -  0700 In: 634.8 [P.O.:250; I.V.:384.8] Out: 0 Intake/Output Summary (Last 24 hours) at 2021 5518 Last data filed at 2021 0700 Gross per 24 hour Intake 384.8 ml Output 0 ml Net 384.8 ml Ventilator Settings: 
Mode Rate Tidal Volume Pressure FiO2 PEEP Assist control, VC+   500 ml    100 % 12 cm H20 Peak airway pressure: 17 cm H2O Minute ventilation: 11.05 l/min ARDS network Guidelines:  
Lung protective strategy and Plateau  Pressure goal < 30 cm H2O goals Oxygenation Goals PaO2 55-80 mm Hg or SaO2 88-95% PH goal 7.30-7.45 VAP bundle: 
Reviewed. Miriam tube to suction at 20-30 cm Hg. Maintain Armstrong tube with 5-10ml air every 4 hours Routine oral care every 4 hours Elevation of head > 45 degree Daily sedation holiday and SBT evaluation starting at 6.00am. 
 
Physical Exam: 
 
General: Intubated/sedated; NAD HEENT:  Anicteric sclerae; pink palpebral conjunctivae; mucosa moist 
Resp:  Symmetrical chest rise, no accessory muscle use; auscultation deferred  COVID-19 (+) CV:  Sinus Tachycardia on Tele monitor; auscultation deferred 2/2 COVID-19 (+) GI:  Abdomen soft, non-tender Extremities:  +2 pulses on all extremities; no edema/ cyanosis/ clubbing noted Skin:  Warm; no rashes/ lesions noted Neurologic:  Non-focal 
Devices:  ETT, OGT, Vitale DATA:  
 
Current Facility-Administered Medications Medication Dose Route Frequency  chlorhexidine (PERIDEX) 0.12 % mouthwash 10 mL  10 mL Oral Q12H  
 midazolam (VERSED) injection 1-2 mg  1-2 mg IntraVENous Q30MIN PRN  
 fentaNYL citrate (PF) injection  mcg   mcg IntraVENous Q1H PRN  
 dexmedeTOMidine in 0.9 % NaCl (PRECEDEX) 400 mcg/100 mL (4 mcg/mL) infusion soln  0.1-1.5 mcg/kg/hr IntraVENous TITRATE  midazolam in normal saline (VERSED) 1 mg/mL infusion  1-10 mg/hr IntraVENous TITRATE  fentaNYL (PF) 900 mcg/30 ml infusion soln  0-200 mcg/hr IntraVENous TITRATE  
 NOREPINephrine (LEVOPHED) 8 mg in 5% dextrose 250mL (32 mcg/mL) infusion  0.5-50 mcg/min IntraVENous TITRATE  piperacillin-tazobactam (ZOSYN) 2.25 g in 0.9% sodium chloride (MBP/ADV) 50 mL MBP  2.25 g IntraVENous Q8H  
 Piperacillin-tazobactam (ZOSYN) 0.75 gm Supplemental Dosing by Pharmacy   Other Rx Dosing/Monitoring  VANCOMYCIN INFORMATION NOTE   Other Rx Dosing/Monitoring  metoprolol tartrate (LOPRESSOR) tablet 25 mg  25 mg Oral BID  [START ON 2/1/2021] Vancomycin random level due 2/1/2021 at 0400  1 Each Other ONCE  
 albuterol-ipratropium (DUO-NEB) 2.5 MG-0.5 MG/3 ML  3 mL Nebulization Q4H RT  
 sodium chloride (OCEAN) 0.65 % nasal squeeze bottle 2 Spray  2 Spray Both Nostrils Q4H  
 midodrine (PROAMATINE) tablet 10 mg  10 mg Oral TID WITH MEALS  
 albumin human 25% (BUMINATE) solution 25 g  25 g IntraVENous DIALYSIS PRN  
 insulin glargine (LANTUS) injection 5 Units  5 Units SubCUTAneous DAILY  melatonin (rapid dissolve) tablet 5 mg  5 mg Oral QHS  insulin lispro (HUMALOG) injection   SubCUTAneous AC&HS  
  glucose chewable tablet 16 g  4 Tab Oral PRN  
 glucagon (GLUCAGEN) injection 1 mg  1 mg IntraMUSCular PRN  
 dextrose (D50W) injection syrg 12.5-25 g  25-50 mL IntraVENous PRN  
 sevelamer carbonate (RENVELA) tab 800 mg  800 mg Oral TID WITH MEALS  sodium chloride (NS) flush 5-10 mL  5-10 mL IntraVENous PRN  
 sodium chloride (NS) flush 5-40 mL  5-40 mL IntraVENous Q8H  
 sodium chloride (NS) flush 5-40 mL  5-40 mL IntraVENous PRN  
 acetaminophen (TYLENOL) tablet 650 mg  650 mg Oral Q6H PRN Or  
 acetaminophen (TYLENOL) suppository 650 mg  650 mg Rectal Q6H PRN  polyethylene glycol (MIRALAX) packet 17 g  17 g Oral DAILY PRN  
 heparin (porcine) injection 5,000 Units  5,000 Units SubCUTAneous Q8H  
 ascorbic acid (vitamin C) (VITAMIN C) tablet 1,000 mg  1,000 mg Oral Q6H  
 zinc sulfate (ZINCATE) 220 (50) mg capsule 2 Cap  2 Cap Oral DAILY  aspirin tablet 325 mg  325 mg Oral DAILY  famotidine (PEPCID) tablet 20 mg  20 mg Oral DAILY  [Held by provider] carvediloL (COREG) tablet 3.125 mg  3.125 mg Oral BID WITH MEALS  pravastatin (PRAVACHOL) tablet 20 mg  20 mg Oral DAILY  dexamethasone (DECADRON) 4 mg/mL injection 6 mg  6 mg IntraVENous Q24H  
 midodrine (PROAMATINE) tablet 10 mg  10 mg Oral DIALYSIS PRN Labs: Results:  
   
Chemistry Recent Labs  
  01/31/21 
0745 01/30/21 
0249 01/29/21 
0037 * 134* 161*  135* 141  
K 5.3 4.1 4.5  
CL 99* 95* 97* CO2 22 22 20* BUN 95* 63* 85* CREA 13.60* 10.20* 12.90* CA 10.8* 10.2* 10.2* AGAP 18 18 24* BUCR 7* 6* 7*  68 53  
TP 8.7* 9.0* 8.3* ALB 3.5 4.0 3.3*  
GLOB 5.2* 5.0* 5.0* AGRAT 0.7* 0.8 0.7* CBC w/Diff Recent Labs  
  01/31/21 
0745 01/30/21 
0249 01/29/21 
0037 WBC 16.2* 12.3 11.0  
RBC 4.07* 4.34* 4.06* HGB 11.7* 12.5* 11.6* HCT 34.4* 35.5* 34.6*  
 379 383 GRANS 88* 81* 81* LYMPH 4* 10* 9* EOS 0 0 0 Coagulation No results for input(s): PTP, INR, APTT, INREXT in the last 72 hours. Liver Enzymes Recent Labs  
  01/31/21 
0745 TP 8.7* ALB 3.5  ABG Lab Results Component Value Date/Time PHI 7.28 (L) 01/31/2021 07:11 AM  
 PCO2I 41.4 01/31/2021 07:11 AM  
 PO2I 77 (L) 01/31/2021 07:11 AM  
 HCO3I 19.3 (L) 01/31/2021 07:11 AM  
 FIO2I 100 01/31/2021 07:11 AM  
  
Microbiology Recent Labs  
  01/30/21 0499 52 06 34 01/30/21 
1506 01/29/21 
1208 CULT NO GROWTH AFTER 16 HOURS NO GROWTH AFTER 16 HOURS NO FUNGUS ISOLATED 2 DAYS Telemetry: [x]Sinus []A-flutter []Paced []A-fib []Multiple PVCs Imaging: CXR [01/31/21]: FINDINGS: 
  
LINES/TUBES: Interval placement of endotracheal tube with tip approximately 4 to 
have centimeters from the maricarmen. 
  
MEDIASTINUM: Cardiac silhouette is within normal limits. 
  
LUNGS: Redemonstration of extensive bilateral pulmonary opacities. 
  
BONES/OTHER: No acute osseous abnormality. 
  
  
IMPRESSION Satisfactory placement of endotracheal tube. Otherwise, no significant change from prior. CTA CHEST W OR W WO CONT [01/23/21]: FINDINGS: 
  
PULMONARY ARTERIES: The contrast bolus is adequate. Although, moderate motion 
artifact noted which significantly compromises the evaluation of the small 
arterial branches. The right and left mainstem pulmonary arteries and their 
lobar branches appear patent without convincing evidence of intraluminal filling 
defect identified to suggest pulmonary embolism. There are questionable 
nonocclusive intraluminal linear filling defects in the segmental/subsegmental 
branches at anterior and lateral right upper lobe and right middle lobe, 
uncertain due to artifact or potential tiny PE. 
  
AORTA AND OTHER CARDIOVASCULAR STRUCTURES: Mild ectasia of thoracic aorta. No 
aortic aneurysm or dissection. Mild burden of  coronary artery calcifications. Heart Strain assessment: -  RV/LV ratio (normal <0.9): Normal 
-  Dysfunction or bowing of interventricular septum: None 
-  Main pulmonary artery enlargement (>30mm): Not present 
-  There is not visualization of contrast reflux from the right heart into the 
IVC/hepatic veins. 
  
LUNG PARENCHYMA: There are multiple patchy areas of airspace consolidations 
identified throughout both lungs, more pronounced in bilateral lower lobes and 
inferior left upper lobe. Patent airway. 
  
IMAGED THYROID: Unremarkable. 
  
MEDIASTINUM: Borderline adenopathy in bilateral chu and subcarinal 
mediastinum..  
  
PLEURAL SPACES AND CHEST WALL: No significant pleural effusion. Mild pleural 
thickening. 
  
VISUALIZED UPPER ABDOMEN: Very atrophic right kidney is partially seen. 
  
OSSEOUS STRUCTURES: Unremarkable.  
  
IMPRESSION: 
  
1.  No convincing CT evidence of pulmonary embolism in mainstem and lobar 
arteries. Questionable nonocclusive intraluminal filling defects identified in 
right upper and middle lobe segmental subsegmental images, artifact versus tiny 
nonocclusive PE. 
  
2.  Extensive patchy consolidation pneumonia in bilateral lungs, Covid infection 
is more concerned than pulmonary infarctions. Clinical correlation and 
short-term follow-up CT advised. 
  
Thank you for your referral 
 
[x]I have personally reviewed the patient’s radiographs 
[]Radiographs reviewed with radiologist 
 []No change from prior, tubes and lines in adequate position 
[]Improved   []Worsening 
 
 
 
IMPRESSION:  
· Acute Hypoxic Respiratory failure - 2/2 COVID-19 PNA with worsening ARDS. Intubated on 01/31 2/2 persistent tachypnea.  
· COVID-19 PNA - with significant consolidations trevon lower lobes 
· ARDS, severe --likely worsened by fluid overload in the setting of COVID-19 pneumonia/sepsis.  
· CHFpEF/hypertrophic cardiomyopathy:  TTE shows concentric hypertrophy 
· ESRD on dialysis 
· Respiratory alkalosis from tachypnea/respiratory distress 
 · History of dilated cardiomyopathy · Hypertension · Acute delirium:  Due to above RECOMMENDATIONS:  
· Resp: Titrate FiO2 O2 for SpO2 >90%; optimize bronchial hygiene. Daily CXR and ABG while intubated. Con't duo-nebs q4. · I/D: f/u BxCx/UC, Sputum Cx's. F/U Fungitell. ID following. Trend LA q4 until normal. Trend Pro-Severiano PRN. Con't ABX - Vanc & Zosyn. Deescalate ABX once Cx's finalize. Trend temp & WBC curve. · Hem/Onc: Daily CBC; H/H, and plts are stable. · CVS: Currently weaning Levophed gtt --> suspect this hypotension is sedation related, D/C'd Precedex which is helping to improve BP. Actively titrate vasopressors aim MAP >65mmHg. May require CVL if unable to titrate off Levophed gtt, currently running through 18g PIV. Hold BP meds. · Metabolic: Daily BMP, Mag & Phos; monitor e-lytes; replace PRN 
· Renal: Trend Renal indices; +Vitale. Strict I/Os. Nephrology following --> Plan for HD again today--> serial HD for more aggressive volume removal.  
· Endocrine: POC Glucose q6; SSI. Con't Decadron 6mg q24 x10 day course. Monitor BS --> may have to add Lantus while on steroids. Con't vitamins. · GI: SUP, Trend LFTs, Zofran PRN for N/V. NPO for now. Consider starting trickle feeds in next 24 hrs. · Musc/Skin: No acute issues, wound care · Neuro: D/C'd Precedex gtt 2/2 hypotension. Con't Versed and Fentanyl gtt for sedation. S/p 10mg Valium IVP for initial sedation. PRN Versed/ Fentanyl for breakthrough sedation needs. RASS 0 to -1. Bilateral wrist restraints for pt safety. · Fluids: s/p Albumin bolus. Otherwise N/A 
· Discussed with Satya Smith and Dr. Curt Mann Best Practices/ Safety Bundles: 
· Sepsis Bundle per Hospital Protocol · CAUTI Bundle · Electrolyte Replacement Bundle · Glycemic control goal <180; avoid Hypoglycemia · IHI ICU Bundles: ·  Vitale Bundle Followed and Vent Bundle Followed, Vent Day 1   
· [facility] · VAP bundle, Aim to keep peak plateau pressure 07-79CF H2O 
 · Aspiration Precautions - HOB >30' · Daily sedation holiday as indicated · SBT as tolerated/appropriate · Titrate FiO2 for SpO2 >94% · Aggressive Pulmonary Hygeiene · Stress ulcer prophylaxis: Pepcid · DVT prophylaxis: SQH 
· Need for Lines, martinez assessed. · Restraints need. · Palliative care evaluation - Not consulted ye The patient is: [x] acutely ill Risk of deterioration: [x] moderate  
 [] critically ill  [x] high  
 
[x]See my orders for details My assessment/plan was discussed with: 
[x]Nursing []PT/OT [x]Respiratory therapy [x]Dr. Cj Estrada []Family [] Sheila Bryson PA-C 
01/31/21 Pulmonary Critical Care Medicine [address] [place] [address] [facility] Pulmonary / Critical Care Physician: 
 
Chart and note reviewed. Data reviewed. Seen on rounds earlier today. I have independently evaluated and examined the patient. I agree with the exam, assessment and plans outlined by ELHAM Vela. In brief, my findings, evaluation and recommendations are as stated below: 
 
Pt intubated overnight for worsening tachypnea. This AM in on high vent settings of 100% and 12 PEEP. Heavily sedated. Planning for HD today. ABX per ID. Supportive care for [id number]. Rest of details and diagnostic/treatment plans per APC note. I have personally reviewed all pertinent data including labs, imaging and recommendations of treatment team providers. Total of 45 min critical care time spent at bedside during the course of care providing evaluation,management and care decisions and ordering appropriate treatment related to critical care problems exclusive of procedures. The reason for providing this level of medical care for this critically ill patient was due a critical illness that impaired one or more vital organ systems such that there was a high probability of imminent or life threatening deterioration in the patients condition. This care involved high complexity decision making to assess, manipulate, and support vital system functions, to treat this degree vital organ system failure and to prevent further life threatening deterioration of the patients condition.  
   
Tiffany Freeman MD 
9:10 PM

## 2021-01-31 NOTE — PROGRESS NOTES
[facility] 
(A Division of [address] [place] [address]) Follow-up Note Date of Admission: 1/23/2021       Date of Note:  1/31/2021 Summary:   
63 y/o AAM w/ HTN, HLD, ESRD-HD, Dilated CMO, CHF adm 1/23 due to shortness of breath. He had headache, fever and chills for 2 days and tested positive for Covid 8 days PTA at [facility] ED (1/15). Sent home but had worsening shortness of breath for which he presented to the [facility] ED on 1/22. Initial O2 Sat on 5 lpm NC was 90% and he was quickly placed on 40 lpm HFNC. CXR 1/23 patchy infiltrations/consolidations both mid/lower lungs greater on left. Chest CT extensive patchy consolidation pneumonia in bilateral lungs. Admitted 1/23, started on Ceftriaxone and Azithromycin. Remdesivir and dexamethasone started just past midnight 12/23. afebrile and with normal WBC count since admission but 101.3 1/30. Started on Vanc, zosyn. Intubated for airway protection 1/31 Interval History: 
Transferred to ICU due to respiratory distress and intubated at 2:00 am. Had developed fever of 101.3 earlier yesterday for which Vancomycin and zosyn were started. D/w Juana/ DANA Pedersen Current Antimicrobials:    Prior Antimicrobials: 
Vanc, zosyn 1/30 - 1 Ceftriaxone 1/23 - 3 Azithro 1/23 - 5 Remdesivir 1/23 - #5 Assessment: Rec / Plan:  
New fever - ? Etiology - CXR 1/30: worse trevon opacities c/w 1/29 - edema+/- pneumonia 
- agree with empiric vancomycin, zosyn for empiric HAP coverage -> continue Vancomycin, Zosyn pending sputum culture 
-> send spcx today 
-> dc Vanc if no MRSA isolated Acute Respiratory Failure with Hypoxia 
- had worsening resp distress on BiPAP 
- intubated 1/31 early am -> per PCCM Covid Pneumonia 
- fever, chills, headache, shortness of breath. Onset ~ 1/13 
- SARS Cov2 + 1/15 - Chest CT 1/23 extensive patchy consolidation pneumonia in bilateral lungs (likely all COVID, doubt secondary bacterial pneumonia) - completed remdesivir 1/27 
- still on azithromycin -> Continue dexamethasone  
-> no other covid specific therapy ESRD-HD -> per Renal  
Dilated CMO -> per Cardiology CHF   
HTN   
HLD   
H/o ETOH abuse Microbiology: 
 
 
Lines / Catheters: 
 
 
 
Patient Active Problem List  
Diagnosis Code  ESRD (end stage renal disease) on dialysis ([facility]) N18.6, Z99.2  Nonischemic cardiomyopathy ([facility]) I42.8  Diastolic CHF ([facility]) J39.29  Hyperlipidemia E78.5  Essential hypertension I10  
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 52A7  Systolic CHF, chronic ([facility]) I50.22  Severe obesity (BMI 35.0-39. 9) with comorbidity ([facility] E66.01  
 ESRD on dialysis ([facility] N18.6, Z99.2  Acute respiratory failure due to COVID-19 ([facility]) U07.1, J96.00 Current Facility-Administered Medications Medication Dose Route Frequency  chlorhexidine (PERIDEX) 0.12 % mouthwash 10 mL  10 mL Oral Q12H  
 midazolam (VERSED) injection 1-2 mg  1-2 mg IntraVENous Q30MIN PRN  
 fentaNYL citrate (PF) injection  mcg   mcg IntraVENous Q1H PRN  
 midazolam in normal saline (VERSED) 1 mg/mL infusion  1-10 mg/hr IntraVENous TITRATE  fentaNYL (PF) 900 mcg/30 ml infusion soln  0-200 mcg/hr IntraVENous TITRATE  
 NOREPINephrine (LEVOPHED) 8 mg in 5% dextrose 250mL (32 mcg/mL) infusion  0.5-50 mcg/min IntraVENous TITRATE  insulin lispro (HUMALOG) injection   SubCUTAneous Q6H  
 piperacillin-tazobactam (ZOSYN) 2.25 g in 0.9% sodium chloride (MBP/ADV) 50 mL MBP  2.25 g IntraVENous Q8H  
 Piperacillin-tazobactam (ZOSYN) 0.75 gm Supplemental Dosing by Pharmacy   Other Rx Dosing/Monitoring  VANCOMYCIN INFORMATION NOTE   Other Rx Dosing/Monitoring  [Held by provider] metoprolol tartrate (LOPRESSOR) tablet 25 mg  25 mg Oral BID  
  [START ON 2/1/2021] Vancomycin random level due 2/1/2021 at 0400  1 Each Other ONCE  
 albuterol-ipratropium (DUO-NEB) 2.5 MG-0.5 MG/3 ML  3 mL Nebulization Q4H RT  
 sodium chloride (OCEAN) 0.65 % nasal squeeze bottle 2 Spray  2 Spray Both Nostrils Q4H  
 midodrine (PROAMATINE) tablet 10 mg  10 mg Oral TID WITH MEALS  
 albumin human 25% (BUMINATE) solution 25 g  25 g IntraVENous DIALYSIS PRN  
 insulin glargine (LANTUS) injection 5 Units  5 Units SubCUTAneous DAILY  melatonin (rapid dissolve) tablet 5 mg  5 mg Oral QHS  glucose chewable tablet 16 g  4 Tab Oral PRN  
 glucagon (GLUCAGEN) injection 1 mg  1 mg IntraMUSCular PRN  
 dextrose (D50W) injection syrg 12.5-25 g  25-50 mL IntraVENous PRN  
 sevelamer carbonate (RENVELA) tab 800 mg  800 mg Oral TID WITH MEALS  sodium chloride (NS) flush 5-10 mL  5-10 mL IntraVENous PRN  
 sodium chloride (NS) flush 5-40 mL  5-40 mL IntraVENous Q8H  
 sodium chloride (NS) flush 5-40 mL  5-40 mL IntraVENous PRN  
 acetaminophen (TYLENOL) tablet 650 mg  650 mg Oral Q6H PRN Or  
 acetaminophen (TYLENOL) suppository 650 mg  650 mg Rectal Q6H PRN  polyethylene glycol (MIRALAX) packet 17 g  17 g Oral DAILY PRN  
 heparin (porcine) injection 5,000 Units  5,000 Units SubCUTAneous Q8H  
 ascorbic acid (vitamin C) (VITAMIN C) tablet 1,000 mg  1,000 mg Oral Q6H  
 zinc sulfate (ZINCATE) 220 (50) mg capsule 2 Cap  2 Cap Oral DAILY  aspirin tablet 325 mg  325 mg Oral DAILY  famotidine (PEPCID) tablet 20 mg  20 mg Oral DAILY  [Held by provider] carvediloL (COREG) tablet 3.125 mg  3.125 mg Oral BID WITH MEALS  pravastatin (PRAVACHOL) tablet 20 mg  20 mg Oral DAILY  dexamethasone (DECADRON) 4 mg/mL injection 6 mg  6 mg IntraVENous Q24H  
 midodrine (PROAMATINE) tablet 10 mg  10 mg Oral DIALYSIS PRN Review of Systems - Negative except as in interval history Objective: 
 
Visit Vitals /78 (BP 1 Location: Right lower arm, BP Patient Position: Supine) Pulse 99 Temp 98.7 °F (37.1 °C) Resp 28 Ht 5' 9\" (1.753 m) Wt 109.9 kg (242 lb 4.6 oz) SpO2 95% BMI 35.78 kg/m² Temp (24hrs), Av.4 °F (37.4 °C), Min:98.1 °F (36.7 °C), Max:101.1 °F (38.4 °C) General: Well developed, obese 62 y.o. BLACK male intubated in ICU  
ENT: ENT exam normal, no neck nodes or sinus tenderness Head: normocephalic, without obvious abnormality Mouth:  not examined. Orotracheal, orogastric tubes inplace Neck: supple, symmetrical, trachea midline Cardio:  regular rate and rhythm, S1, S2 normal, no murmur, click, rub or gallop Chest: inspection normal - no chest wall deformities or tenderness, respiratory effort normal 
Lungs: mild bibasilar rales, no wheezing Abdomen: soft, non-tender. Bowel sounds normal. No masses, no organomegaly. , decreased bowel sounds Extremities:  no redness or tenderness in the calves or thighs, no edema Lab results: 
 
Chemistry Recent Labs  
  21 
0745 21 * 134* 161*  135* 141  
K 5.3 4.1 4.5  
CL 99* 95* 97* CO2 22 22 20* BUN 95* 63* 85* CREA 13.60* 10.20* 12.90* CA 10.8* 10.2* 10.2* AGAP 18 18 24* BUCR 7* 6* 7*  68 53  
TP 8.7* 9.0* 8.3* ALB 3.5 4.0 3.3*  
GLOB 5.2* 5.0* 5.0* AGRAT 0.7* 0.8 0.7* CBC w/ Diff Recent Labs  
  21 
0745 21 
003 WBC 16.2* 12.3 11.0  
RBC 4.07* 4.34* 4.06* HGB 11.7* 12.5* 11.6* HCT 34.4* 35.5* 34.6*  
 379 383 GRANS 88* 81* 81* LYMPH 4* 10* 9* EOS 0 0 0 Microbiology All Micro Results Procedure Component Value Units Date/Time CULTURE, BLOOD FOR FUNGUS [[id number]] Collected: 21 1208 Order Status: Completed Specimen: Blood Updated: 21 0857 Special Requests: NO SPECIAL REQUESTS Culture result: NO FUNGUS ISOLATED 2 DAYS CULTURE, BLOOD [[id number]] Collected: 01/30/21 1506 Order Status: Completed Specimen: Blood Updated: 01/31/21 0857 Special Requests: NO SPECIAL REQUESTS Culture result: NO GROWTH AFTER 16 HOURS     
 CULTURE, BLOOD [[id number]] Collected: 01/30/21 1515 Order Status: Completed Specimen: Blood Updated: 01/31/21 0857 Special Requests: NO SPECIAL REQUESTS Culture result: NO GROWTH AFTER 16 HOURS     
 CULTURE, RESPIRATORY/SPUTUM/BRONCH Edwyna Edy STAIN [[id number]] Order Status: Sent Specimen: Sputum CULTURE, URINE [[id number]] Order Status: Sent Specimen: Cath Urine CULTURE, BLOOD [[id number]] Collected: 01/23/21 1116 Order Status: Completed Specimen: Blood Updated: 01/29/21 0720 Special Requests: NO SPECIAL REQUESTS Culture result: NO GROWTH 6 DAYS     
 CULTURE, BLOOD [[id number]] Collected: 01/23/21 1115 Order Status: Completed Specimen: Blood Updated: 01/29/21 0720 Special Requests: NO SPECIAL REQUESTS Culture result: NO GROWTH 6 DAYS     
 COVID-19 RAPID TEST [[id number]]  (Abnormal) Collected: 01/25/21 1415 Order Status: Completed Specimen: Nasopharyngeal Updated: 01/25/21 1518 Specimen source Nasopharyngeal     
  COVID-19 rapid test Detected Comment: CALLED TO AND CORRECTLY REPEATED BY: 
FADIA Vega [facility] 3N AT 8338 BY KDA 1/25/21 The specimen is POSITIVE for SARS-CoV-2, the novel coronavirus associated with COVID-19. This test has been authorized by the FDA under an Emergency Use Authorization (EUA) for use by authorized laboratories. Fact sheet for Healthcare Providers: ConventionUpdate.co.nz Fact sheet for Patients: ConventionUpdate.co.nz Methodology: Isothermal Nucleic Acid Amplification Jordyn Rivers MD, [facility] 1/31/2021  
3:45 PM

## 2021-01-31 NOTE — ROUTINE PROCESS
Bedside and Verbal shift [facility]. and [address] (oncoming nurse) by Earnest Schmitt (offgoing nurse). Report included the following information SBAR, Kardex, MAR and Recent Results. SITUATION:  
? Code Status: Full Code 
? Reason for Admission: Acute respiratory failure due to COVID-19 (HCC) [U07.1, J96.00] ? Hospital day: 7 
? Problem List:  
   
Hospital Problems  Date Reviewed: 1/3/2019 Codes Class Noted POA * (Principal) Acute respiratory failure due to COVID-19 [facility]) ICD-10-CM: U07.1, J96.00 
ICD-9-CM: 518.81, 079.89  1/23/2021 Unknown BACKGROUND:  
 Past Medical History:  
Past Medical History:  
Diagnosis Date  Arthritis of left knee 09/2017  
 moderate to severe, with effusion on xray  Chronic kidney disease ESRD  HD on MWF  
 Diastolic CHF ([facility]  Dilated cardiomyopathy ([facility]  History of alcohol abuse  History of echocardiogram 02/24/2014 Mod-marked LVE. EF 15%. Severe, diffuse hypk. Mild LVH. Gr 1 DDfx. Mod LAE. Mild-mod FIDELIA. Mild AoRE. No significant change from echo of 10/23/09.  History of gout  History of myocardial perfusion scan 05/25/2006 No evidence of ischemia or scarring. Gross LVE. EF 28%. Severe global hypk. Neg EKG on submaximal EST. Ex time 8 min 25 sec.  HTN (hypertension)  Hypercholesterolemia  Hypertensive cardiovascular disease Patient taking anticoagulants yes ASSESSMENT:  
? Changes in Assessment Throughout Shift: No changes noted ? Patient has Central Line: no Reasons if yes: n?a 
? Patient has Vitale Cath: no Reasons if yes: n?a  
 
? Last Vitals: 
  
Vitals:  
 01/30/21 0509 01/30/21 1025 01/30/21 1600 01/30/21 1705 BP: (!) 138/94 107/67 (!) 140/80 Pulse: (!) 134 (!) 127 (!) 120 Resp: (!) 56 (!) 32 28 Temp:  (!) 101.3 °F (38.5 °C) 99 °F (37.2 °C) TempSrc:      
SpO2: 99% 92% 99% 96% Weight:      
Height: ? IV and DRAINS (will only show if present) [REMOVED] Peripheral IV 01/23/21 Right Antecubital-Site Assessment: Clean, dry, & intact Hemodialysis Access-Site Assessment: Clean, dry, & intact [REMOVED] Peripheral IV 01/26/21 Anterior;Right Wrist-Site Assessment: Clean, dry, & intact Peripheral IV 01/30/21 Right Antecubital-Site Assessment: Clean, dry, & intact [REMOVED] Peripheral IV 01/23/21 Posterior;Right Hand-Site Assessment: Clean, dry, & intact ? WOUND (if present) Wound Type:  none Dressing present Dressing Present : No 
 Wound Concerns/Notes:  none ? PAIN Pain Assessment Pain Intensity 1: 0 (01/30/21 1600) Patient Stated Pain Goal: 0 
o Interventions for Pain:  none 
o Intervention effective: n?a 
o Time of last intervention: n/a  
o Reassessment Completed: yes ? Last 3 Weights: 
Last 3 Recorded Weights in this Encounter 01/25/21 1230 01/27/21 0554 01/28/21 0400 Weight: 104.3 kg (230 lb) 111.9 kg (246 lb 12.8 oz) 110 kg (242 lb 6.4 oz) Weight change: ? INTAKE/OUPUT Current Shift: No intake/output data recorded. Last three shifts: 01/29 0701 - 01/30 1900 In: 250 [P.O.:250] Out: 2300  
 
? LAB RESULTS Recent Labs  
  01/30/21 
0249 01/29/21 
0037 01/28/21 
2313 WBC 12.3 11.0 9.8 HGB 12.5* 11.6* 10.7* HCT 35.5* 34.6* 32.7*  
 383 336 Recent Labs  
  01/30/21 
0249 01/29/21 
0037 01/28/21 
4525 * 141 137  
K 4.1 4.5 4.4 * 161* 176* BUN 63* 85* 59* CREA 10.20* 12.90* 10.90* CA 10.2* 10.2* 9.4 RECOMMENDATIONS AND DISCHARGE PLANNING 1. Pending tests/procedures/ Plan of Care or Other Needs: Montior oxygenation and plan to transfer to ICU 2. Discharge plan for patient and Needs/Barriers: TBD 3. Estimated Discharge Date: TBD Posted on Whiteboard in Patients Room: yes 4. The patient's care plan was reviewed with the oncoming nurse. \"HEALS\" SAFETY CHECK Fall Risk Total Score: 4 Safety Measures: Safety Measures: Bed/Chair alarm on, Bed/Chair-Wheels locked, Bed in low position, Call light within reach, Fall prevention (comment), Gripper socks A safety check occurred in the patient's room between off going nurse and oncoming nurse listed above. The safety check included the below items Area Items H High Alert Medications ? Verify all high alert medication drips (heparin, PCA, etc.) E Equipment ? Suction is set up for ALL patients (with arabella) ? Red plugs utilized for all equipment (IV pumps, etc.) ? WOWs wiped down at end of shift. ? Room stocked with oxygen, suction, and other unit-specific supplies A Alarms ? Bed alarm is set for fall risk patients ? Ensure chair alarm is in place and activated if patient is up in a chair L Lines ? Check IV for any infiltration ? Vitale bag is empty if patient has a Vitale ? Tubing and IV bags are labeled Sherwood Noel Safety ? Room is clean, patient is clean, and equipment is clean. ? Hallways are clear from equipment besides carts. ? Fall bracelet on for fall risk patients ? Ensure room is clear and free of clutter ? Suction is set up for ALL patients (with arabella) ? Hallways are clear from equipment besides carts. ? Isolation precautions followed, supplies available outside room, sign posted Gavin Meehan

## 2021-01-31 NOTE — PROGRESS NOTES
Problem: Pressure Injury - Risk of 
Goal: *Prevention of pressure injury Description: Document Sotero Scale and appropriate interventions in the flowsheet. Outcome: Progressing Towards Goal 
Note: Pressure Injury Interventions: 
Sensory Interventions: Assess changes in LOC, Avoid rigorous massage over bony prominences, Check visual cues for pain, Pad between skin to skin, Pressure redistribution bed/mattress (bed type) Moisture Interventions: Absorbent underpads, Apply protective barrier, creams and emollients, Check for incontinence Q2 hours and as needed Activity Interventions: Increase time out of bed, Pressure redistribution bed/mattress(bed type) Mobility Interventions: Assess need for specialty bed, Pressure redistribution bed/mattress (bed type), HOB 30 degrees or less, Turn and reposition approx. every two hours(pillow and wedges) Nutrition Interventions: Document food/fluid/supplement intake Problem: Patient Education: Go to Patient Education Activity Goal: Patient/Family Education Outcome: Progressing Towards Goal 
  
Problem: Falls - Risk of 
Goal: *Absence of Falls Description: Document Yenni Minium Fall Risk and appropriate interventions in the flowsheet. Outcome: Progressing Towards Goal 
Note: Fall Risk Interventions: 
Mobility Interventions: Communicate number of staff needed for ambulation/transfer, Bed/chair exit alarm, Patient to call before getting OOB, PT Consult for mobility concerns Mentation Interventions: Adequate sleep, hydration, pain control, Bed/chair exit alarm, Evaluate medications/consider consulting pharmacy, More frequent rounding, Reorient patient Medication Interventions: Bed/chair exit alarm, Evaluate medications/consider consulting pharmacy Elimination Interventions: Bed/chair exit alarm, Call light in reach, Patient to call for help with toileting needs, Toileting schedule/hourly rounds Problem: Patient Education: Go to Patient Education Activity Goal: Patient/Family Education Outcome: Progressing Towards Goal 
  
Problem: Breathing Pattern - Ineffective Goal: *Absence of hypoxia Outcome: Progressing Towards Goal 
Goal: *Use of effective breathing techniques Outcome: Progressing Towards Goal 
  
Problem: Patient Education: Go to Patient Education Activity Goal: Patient/Family Education Outcome: Progressing Towards Goal 
  
Problem: Risk for Spread of Infection Goal: Prevent transmission of infectious organism to others Description: Prevent the transmission of infectious organisms to other patients, staff members, and visitors. Outcome: Progressing Towards Goal 
  
Problem: Patient Education:  Go to Education Activity Goal: Patient/Family Education Outcome: Progressing Towards Goal 
  
Problem: Patient Education: Go to Patient Education Activity Goal: Patient/Family Education Outcome: Progressing Towards Goal 
  
Problem: Patient Education: Go to Patient Education Activity Goal: Patient/Family Education Outcome: Progressing Towards Goal 
  
Problem: Nutrition Deficit Goal: *Optimize nutritional status Outcome: Progressing Towards Goal

## 2021-01-31 NOTE — PROGRESS NOTES
Pt intubated and placed on vent @02:05. Following settings below. Will continue too monitor and wean. Abg below Results for Shirley Pereira (MRN [id number]) as of 1/31/2021 03:50 Ref. Range 1/31/2021 03:10  
pH (POC) Latest Ref Range: 7.35 - 7.45   7.31 (L)  
pCO2 (POC) Latest Ref Range: 35.0 - 45.0 MMHG 36.2 pO2 (POC) Latest Ref Range: 80 - 100 MMHG 63 (L) HCO3 (POC) Latest Ref Range: 22 - 26 MMOL/L 18.1 (L)  
sO2 (POC) Latest Ref Range: 92 - 97 % 90 (L) Base deficit (POC) Latest Units: mmol/L 8  
FIO2 (POC) Latest Units: % 100 Specimen type (POC) Latest Units:   ARTERIAL Set Rate Latest Units: bpm 20 Site Latest Units:   RIGHT RADIAL Device: Latest Units:   VENT Total resp. rate Latest Units:   27 Mode Latest Units:   ASSIST CONTROL Tidal volume Latest Units: ml 500 PIP (POC) Latest Units:   29 PEEP/CPAP (POC) Latest Units: cmH2O 12 Allens test (POC) Latest Units:   YES Inspiratory Time Latest Units: sec 0.9

## 2021-01-31 NOTE — PROGRESS NOTES
01/31/21 0703  
Patient Observations  
Pulse (Heart Rate) 99  
Resp Rate 22  
O2 Sat (%) 97 %  
Airway - Endotracheal Tube 01/31/21  
Placement Date/Time: 01/31/21 (c) 0200   Number of Attempts: 1  Placement Verified: Auscultation;EtCO2  Airway Types: Endotracheal, cuffed  Airway Tube Size: 8 mm  Technique: Direct Laryngoscopy  Blade Type: Mac  Anesthesia ETT Insertion Depth: 24 cm ...  
Insertion Depth (cm) 25 cm  
Line [place] Lips  
Side Secured Centered  
Cuff Pressure  
(mlt)  
Vent Settings  
FIO2 (%) 100 %  
SpO2/FIO2 Ratio 97  
CMV Rate Set 20  
Back-Up Rate 20  
Vt Set (ml) 500 ml  
PEEP/VENT (cm H2O) 12 cm H20  
Insp Time (sec) 0.9 sec  
Insp Rise Time % 60 %  
Flow Trigger 2  
Ventilator Measurements  
Resp Rate Observed 22  
Vt Spont (ml) 508 ml  
Ve Observed (l/min) 11.05 l/min  
PIP Observed (cm H2O) 17 cm H2O  
Plateau Pressure (cm H2O) 26 cm H2O  
MAP (cm H2O) 14  
I:E Ratio Actual 1:2  
Static Compliance (ml/cm H20) 29 ml/cm H20  
Safety & Alarms  
Circuit Temperature  
(hme)  
Backup Mode Checked/Apnea Yes  
Pressure Max 45 cm H2O  
Ve Min 2  
Ve Max 25  
Vt Min 200 ml  
Vt Max 1000 ml  
RR Max 45  
Ambu Bag Yes  
Ambu Mask Yes  
ETCO2/TCM Min 20 mmHg  
ETCO2/TCM Max 50 mmHg  
Vent Method/Mode  
Ventilation Method Conventional  
Ventilator Mode Assist control;VC+

## 2021-01-31 NOTE — PROGRESS NOTES
[facility] UPDATE: 
 
12:25: Pt arrived to ICU. Currently on BiPAP, settings - 18/12, FiO2 100% with SpO2 93-94%. RR currently in the 40's to 50's but pt is AOx4, per bedside RN. BP stable, 116/71, HR in the 119, NSR. ABG x2 ordered from earlier today still not obtained. RT currently in room to obtain blood gas. Pt has had ongoing tachypnea throughout the day. Intubation has been discussed with pt, and he remains amiable to intubation if necessary. Pt remains a high risk for intubation as his tachypnea has not improved throughout the day and per chart, pt has also had persistent tachycardia throughout the day. After discussion with pt and his sister Kevin Foss ([phone]), decision was made to proceed with intubation. Anesthesia paged for intubation. Dr. Rosalinda Medina aware and agrees with above plan. See Intubation note for further details. Vent/sedation orders placed. Total critical care time - 45 mins excluding procedures, with complex decision making performed and > 50% time spent in face to face consultation. Ana Zavala PA-C 
 
01/31/21 Pulmonary Critical Care Medicine [facility]

## 2021-01-31 NOTE — PROGRESS NOTES
RENAL DAILY PROGRESS NOTE 62y M with ESRD, admitted with covid 19 pneumonia following for ESRD management. Subjective:  
 
 
Complaint:  
Overnight  Event noted Respiratory status getting worst and now intubated Requiring low dose vasopressure On higher fio2 and peep,  
cxr no improvement IMPRESSION:  
· ESRD on TTS at [address] unit, · Access left arm AVF · COVID Pneumonia · S/p intubation  1/31 for severe hypoxia · Hypoxic resp failure · Hypertension · Hx Diastolic CHF · Hyperlipidemia · Secondary hyperparathyroidism PLAN:  
· Will arrange HD today for fluid management, UF goal 1-2L as tolerated. · Discussed with Dr. Eloisa Allen  
   
  
 
 
 
Current Facility-Administered Medications Medication Dose Route Frequency  chlorhexidine (PERIDEX) 0.12 % mouthwash 10 mL  10 mL Oral Q12H  
 midazolam (VERSED) injection 1-2 mg  1-2 mg IntraVENous Q30MIN PRN  
 fentaNYL citrate (PF) injection  mcg   mcg IntraVENous Q1H PRN  
 midazolam in normal saline (VERSED) 1 mg/mL infusion  1-10 mg/hr IntraVENous TITRATE  fentaNYL (PF) 900 mcg/30 ml infusion soln  0-200 mcg/hr IntraVENous TITRATE  
 NOREPINephrine (LEVOPHED) 8 mg in 5% dextrose 250mL (32 mcg/mL) infusion  0.5-50 mcg/min IntraVENous TITRATE  insulin lispro (HUMALOG) injection   SubCUTAneous Q6H  
 piperacillin-tazobactam (ZOSYN) 2.25 g in 0.9% sodium chloride (MBP/ADV) 50 mL MBP  2.25 g IntraVENous Q8H  
 Piperacillin-tazobactam (ZOSYN) 0.75 gm Supplemental Dosing by Pharmacy   Other Rx Dosing/Monitoring  VANCOMYCIN INFORMATION NOTE   Other Rx Dosing/Monitoring  [Held by provider] metoprolol tartrate (LOPRESSOR) tablet 25 mg  25 mg Oral BID  [START ON 2/1/2021] Vancomycin random level due 2/1/2021 at 0400  1 Each Other ONCE  
 albuterol-ipratropium (DUO-NEB) 2.5 MG-0.5 MG/3 ML  3 mL Nebulization Q4H RT  
  sodium chloride (OCEAN) 0.65 % nasal squeeze bottle 2 Spray  2 Spray Both Nostrils Q4H  
 midodrine (PROAMATINE) tablet 10 mg  10 mg Oral TID WITH MEALS  
 albumin human 25% (BUMINATE) solution 25 g  25 g IntraVENous DIALYSIS PRN  
 insulin glargine (LANTUS) injection 5 Units  5 Units SubCUTAneous DAILY  melatonin (rapid dissolve) tablet 5 mg  5 mg Oral QHS  glucose chewable tablet 16 g  4 Tab Oral PRN  
 glucagon (GLUCAGEN) injection 1 mg  1 mg IntraMUSCular PRN  
 dextrose (D50W) injection syrg 12.5-25 g  25-50 mL IntraVENous PRN  
 sevelamer carbonate (RENVELA) tab 800 mg  800 mg Oral TID WITH MEALS  sodium chloride (NS) flush 5-10 mL  5-10 mL IntraVENous PRN  
 sodium chloride (NS) flush 5-40 mL  5-40 mL IntraVENous Q8H  
 sodium chloride (NS) flush 5-40 mL  5-40 mL IntraVENous PRN  
 acetaminophen (TYLENOL) tablet 650 mg  650 mg Oral Q6H PRN Or  
 acetaminophen (TYLENOL) suppository 650 mg  650 mg Rectal Q6H PRN  polyethylene glycol (MIRALAX) packet 17 g  17 g Oral DAILY PRN  
 heparin (porcine) injection 5,000 Units  5,000 Units SubCUTAneous Q8H  
 ascorbic acid (vitamin C) (VITAMIN C) tablet 1,000 mg  1,000 mg Oral Q6H  
 zinc sulfate (ZINCATE) 220 (50) mg capsule 2 Cap  2 Cap Oral DAILY  aspirin tablet 325 mg  325 mg Oral DAILY  famotidine (PEPCID) tablet 20 mg  20 mg Oral DAILY  [Held by provider] carvediloL (COREG) tablet 3.125 mg  3.125 mg Oral BID WITH MEALS  pravastatin (PRAVACHOL) tablet 20 mg  20 mg Oral DAILY  dexamethasone (DECADRON) 4 mg/mL injection 6 mg  6 mg IntraVENous Q24H  
 midodrine (PROAMATINE) tablet 10 mg  10 mg Oral DIALYSIS PRN Objective:  
 
Patient Vitals for the past 24 hrs: 
 Temp Pulse Resp BP SpO2  
01/31/21 0914  99 28  95 % 01/31/21 0800 98.7 °F (37.1 °C) 92 20 116/78 98 % 01/31/21 0703  99 22  97 % 01/31/21 0700  98 22 125/88 97 % 01/31/21 0630  65 27 (!) 165/103 98 % 01/31/21 0600  100 24 (!) 130/93 96 % 01/31/21 0545  (!) 103 22 100/61 95 % 01/31/21 0530  (!) 104 24 129/89 97 % 01/31/21 0515  94 28 (!) 164/114 97 % 01/31/21 0500  99 25 114/75 97 % 01/31/21 0445  99 26 (!) 147/102 98 % 01/31/21 0430  99 28 (!) 144/99 98 % 01/31/21 0415  98 26 (!) 141/96 98 % 01/31/21 0400 98.1 °F (36.7 °C) 91 29 (!) 160/99 97 % 01/31/21 0345  95 26 (!) 81/46 95 % 01/31/21 0330  (!) 103 28 120/87 97 % 01/31/21 0315  100 (!) 32 110/85 95 % 01/31/21 0300  (!) 104 (!) 34 (!) 83/53 93 % 01/31/21 0245  (!) 109 30 (!) 76/55 92 % 01/31/21 0230  (!) 114 30 (!) 79/51 (!) 89 % 01/31/21 0215  (!) 134 (!) 37 116/77 94 % 01/31/21 0205  (!) 136 (!) 56  95 % 01/31/21 0200  (!) 132 17 (!) 197/128 90 % 01/31/21 0145  (!) 116 (!) 38 94/65 100 % 01/31/21 0130  (!) 115 (!) 33 (!) 66/17 99 % 01/31/21 0115  (!) 114 (!) 35 (!) 87/63 98 % 01/31/21 0100  (!) 121 30 (!) 118/95 98 % 01/30/21 2357  (!) 113 (!) 56  95 % 01/30/21 2356 99.9 °F (37.7 °C) (!) 112 (!) 45  92 % 01/30/21 2000 (!) 101.1 °F (38.4 °C) (!) 116 (!) 67 129/80 93 % 01/30/21 1705     96 % 01/30/21 1600 99 °F (37.2 °C) (!) 120 28 (!) 140/80 99 % Weight change:  
 
 01/29 1901 - 01/31 0700 In: 634.8 [P.O.:250; I.V.:384.8] Out: 0 Intake/Output Summary (Last 24 hours) at 1/31/2021 1313 Last data filed at 1/31/2021 0700 Gross per 24 hour Intake 384.8 ml Output 0 ml Net 384.8 ml Patient was seen during the [place] pandemic. Patient with COVID infection . Patient is in isolation room due to COVID status and PPE is in short supply hence seen through glass window and d/w patients RN. Full contact physical exam was not possible due to patient's clinical condition, key findings seen by me are documented. In addition, I have also used findings documented by physicians who have recently examined thee patient as they are relevant to the patient's renal care. Intubated NSR on tele Data Review:  
 
LABS:  
Hematology:  
Recent Labs  
  01/31/21 
0745 01/30/21 
0249 01/29/21 
0037 WBC 16.2* 12.3 11.0 HGB 11.7* 12.5* 11.6* HCT 34.4* 35.5* 34.6* Chemistry:  
Recent Labs  
  01/31/21 
0745 01/30/21 
0249 01/29/21 
0037 BUN 95* 63* 85* CREA 13.60* 10.20* 12.90* CA 10.8* 10.2* 10.2* ALB 3.5 4.0 3.3*  
K 5.3 4.1 4.5  135* 141 CL 99* 95* 97* CO2 22 22 20* * 134* 161* Procedures/imaging: see electronic medical records for all procedures, Xrays and details which were not copied into this note but were reviewed prior to creation of Sonny Spangler MD 
1/31/2021

## 2021-01-31 NOTE — DIALYSIS
ACUTE HEMODIALYSIS FLOW SHEET 
 
HEMODIALYSIS ORDERS: Physician: Dr. Kareen Aguirre Dialyzer: Revaclear   Duration: 3 hr  BFR: 400   DFR: 800 Dialysate:  Temp 36.0   K+   2.0    Ca+  2.0   Na 138   Bicarb 35 Wt Readings from Last 1 Encounters:  
01/31/21 109.9 kg (242 lb 4.6 oz) Patient Chart [x]   Unable to Obtain []  Dry weight/UF Goal: 2000 ml Access:  LUE AVF Heparin [x]  Bolus    Units    [] Hourly    Units    []None Catheter locking solution: n/a  
Pre BP:   117/69    Pulse:  87   Respirations: 29   Temperature:  96.7 esophagus Tx: NSS   ml/Bolus   [x] N/A Labs: []  Pre  []  Post:   [x] N/A Additional Orders(medications, blood products, hypotension management): [x] Yes   [] No  
 
[x]  [facility] Consent Verified GRAFT/FISTULA ACCESS:   []N/A     []Right     [x]Left     [x]UE     []LE []AVG   [x]AVF     []Buttonhole    [x]Medical Aseptic Prep Utilized [x]No S/S infection  []Redness  []Drainage []Cultured  [] Swelling  [] Pain Bruit:   [x] Strong    [] Weak       Thrill :   [x] Strong    [] Weak Needle Gauge: 15   Length: 1 inch If access problem,  notified: []Yes     [x]N/A Please describe access if present and not used: N/A  
 
 
GENERAL ASSESSMENT:   
LUNGS:   SaO2%  95    [] Clear  [x] Coarse  [] Crackles  [] Wheezing 
                                              [x] Diminished     Location : []RLL   []LLL    [x]RUL  [x]DIEGO Cough: [x]Productive  []Dry  []N/A   Respirations:  []Easy  [x]Labored Therapy:  []RA  []NC l/min    Mask: []NRB  [] Venti    O2% [x]Ventilator 100% FiO2  [x]Intubated  [] Trach  [] BiPaP CARDIAC: [x]Regular      [] Irregular   [] Pericardial Rub  [] JVD [x]  Monitored  [x] Bedside  [] Remotely monitored EDEMA: [] None  [x]Generalized  [] Pitting [] 1    [] 2    [] 3    [] 4                [] Facial  [] Pedal  []  UE  [] LE  
 SKIN:   [] Warm  [] Hot     [] Cold   [x] Dry Cool    [] Pale   [] Diaphoretic    
             [] Flushed  [] Jaundiced  [] Cyanotic  [] Rash  [] Weeping LOC:    [] Alert      []Oriented:    [] Person     [] Place  []Time 
             [] Confused  [] Lethargic  [x] Medicated  [] Non-responsive GI / ABDOMEN:   
                 [] Flat    [] Distended    [] Soft    [] Firm   [x]  Obese 
                 [] Diarrhea  [x] Bowel Sounds  [] Nausea  [] Vomiting  / URINE ASSESSMENT:  
                [] Voiding   [] Oliguria  [x] Anuria   []  Vitale [] Incontinent  []  Incontinent Brief []  Fecal Management System PAIN:  [x] 0 []1  []2   []3   []4   []5   []6   []7   []8   []9   []10 Scale 0-10  Action/Follow Up: MOBILITY:  [x] Bed    [] Stretcher All Vitals and Treatment Details on Attached Flowsheet Hospital: [facility] Room # [id number] [] 1st Time Acute      [] Stat       [x] Routine      [] Urgent    
[] Acute Room  []  Bedside  [x] ICU/CCU  [] ER Isolation Precautions:  [x] Dialysis Droplet Plus ALLERGIES:    
No Known Allergies Code Status:  Full Code Hepatitis Status Lab Results Component Value Date/Time Hepatitis B surface Ag <0.10 01/23/2021 11:15 AM  
 Hepatitis B surface Ab 321.49 01/23/2021 11:15 AM  
 Hep B Core Ab, total NEGATIVE  08/26/2018 04:12 PM  
 Hep C Virus Ab <0.1 04/11/2017 04:35 PM  
  
 
Current Labs:     
Lab Results Component Value Date/Time WBC 16.2 (H) 01/31/2021 07:45 AM  
 Hemoglobin, POC 14.3 11/15/2018 10:13 AM  
 HGB 11.7 (L) 01/31/2021 07:45 AM  
 Hematocrit, POC 42 11/15/2018 10:13 AM  
 HCT 34.4 (L) 01/31/2021 07:45 AM  
 PLATELET 012 05/26/1426 07:45 AM  
 MCV 84.5 01/31/2021 07:45 AM  
 
Lab Results Component Value Date/Time  Sodium 139 01/31/2021 07:45 AM  
 Potassium 5.3 01/31/2021 07:45 AM  
 Chloride 99 (L) 01/31/2021 07:45 AM  
 CO2 22 01/31/2021 07:45 AM  
 Anion gap 18 01/31/2021 07:45 AM  
 Glucose 191 (H) 01/31/2021 07:45 AM  
 BUN 95 (H) 01/31/2021 07:45 AM  
 Creatinine 13.60 (H) 01/31/2021 07:45 AM  
 BUN/Creatinine ratio 7 (L) 01/31/2021 07:45 AM  
 GFR est AA 5 (L) 01/31/2021 07:45 AM  
 GFR est non-AA 4 (L) 01/31/2021 07:45 AM  
 Calcium 10.8 (H) 01/31/2021 07:45 AM  
 
  
 
DIET: 
DIET NUTRITIONAL SUPPLEMENTS 
DIET NPO 
 
 
PRIMARY NURSE REPORT:  
Pre Dialysis: LENNY Morejon     Time: 17:10  
  
 
EDUCATION:   
[x] Patient [] Other Knowledge Basis: []None [x]Minimal [] Substantial  
Barriers to learning  [x]N/A  
[] Access Care     [] S&S of infection  [] Fluid Management  [] K+   [x] Procedural   
[x]Albumin   [] Medications   [] Tx Options   [] Transplant   [] Diet   [] Other Teaching Tools:  [x] Explain  [] Demo  [] Handouts [] Video Patient response: [] Verbalized understanding  [] Teach back  [] Return demonstration 
 [x] Requires follow up [x]Time Out/Safety Check  [x] Extracorporeal Circuit Tested for integrity RO/HEMODIALYSIS MACHINE SAFETY CHECKS  Before each treatment:    
Machine Number:                   [facility] [x] Portable Machine #4/RO serial # [id number] Alarm Test:  Pass time 15:45 [x] RO/Machine Log Complete Machine Temp    36.0 Dialysate: pH  7.4    Conductivity: Meter 13.8     HD Machine  14.1      TCD: 13.8 Dialyzer Lot # [id number]    Blood Tubing Lot # [id number]     Saline Lot # [id number] CHLORINE TESTING-Before each treatment and every 4 hours Total Chlorine: [x] less than 0.1 ppm  Initial Time Check: 17:15       4 Hr/2nd Check Time:  
(if greater than 0.1 ppm from Primary then every 30 minutes from Secondary) TREATMENT INITIATION  with Dialysis Precautions: [x] All Connections Secured              [x] Saline Line Double Clamped  
[x] Venous Parameters Set               [x] Arterial Parameters Set [x] Prime Given 250ml NSS              [x]Air Foam Detector Engaged Treatment Initiation Note: 
17:13  Arrived to ICU, pt COVID positive, intubated, on 100% FiO2, sedated with Versed and Fentanyl, on Levophed. Pt has already been cannulated by previous HD RN, second time out done with pt. During Treatment Notes: 
17:19  LUE AVF accessed without any difficulty, pt tolerated well. Vascular access visible with arterial and venous line connections intact. 17:30  SBP decreased less than 90, asked primary RN to increase Levo, Levo was increased from 10mcg to 14mcg. Vascular access visible with arterial and venous line connections intact. 17:34  2000 units Heparin bolus given IVP via HD. Vascular access visible with arterial and venous line connections intact. 17:40  Hung 25g of Albumin IVPB for low BP, noted pt's HR increased to 120's and O2 sat dropping to 88% from 95%, RR increased, pt has his eyes open and coughing, informed primary RN pt is coughing and may need to be suctioned. Vascular access visible with arterial and venous line connections intact. 17:55  Called Dr. Aftab Maldonado to informed him that pt is not tolerating HD and since HD has started, levo has been increased to 14mcg from 10,  HR increased to 120's, and O2 sat decreased to 88% and pt already on 100% FiO2. Orders received to stop HD. 17:56  Ended HD, pt only received about 70ml out of 100ml of the Albumin. Medication Dose Volume Route Time Sharron Nurse, Title Albumin 25g 100 ml HD 17:40 Jessica Gonzáles RN Post Assessment Dialyzer Cleared:[] Good [x] Fair  [] Poor Blood processed:  13.1 L 
UF Removed:  387 Ml Post /75  Pulse  92 Resp  29 Temp 98.0 Oral   
Post Tx Vascular Access: 
AVF: Bleeding stopped with Arterial Pressure for 8 min Venous Pressure for 5 min Skin:[x] Warm  [x] Dry [] Diaphoretic     
         [] Flushed  [] Pale [] Cyanotic Pain: 
[x]0  []1 []2  []3 []4  []5  []6 []7 []8  []9  []10 Post Treatment Note: 
 18:27  HD completed at this time, pt tolerated well. Dressing clean, dry and intact. POST TREATMENT PRIMARY NURSE HANDOFF REPORT:  
Post Dialysis: LENNY Morejon                Time:  18:27 Abbreviations: AVG-arterial venous graft, AVF-arterial venous fistula, IJ-Internal Jugular, Subcl-Subclavian, Fem-Femoral, Tx-treatment, AP/HR-apical heart rate, DFR-dialysate flow rate, BFR-blood flow rate, AP-arterial pressure, -venous pressure, UF-ultrafiltrate, TMP-transmembrane pressure, Terrell-Venous, Art-Arterial, RO-Reverse Osmosis

## 2021-01-31 NOTE — PROGRESS NOTES
Problem: Pressure Injury - Risk of 
Goal: *Prevention of pressure injury Description: Document Sotero Scale and appropriate interventions in the flowsheet. Outcome: Progressing Towards Goal 
Variance Patient Condition Note: Pressure Injury Interventions: 
Sensory Interventions: Assess changes in LOC, Assess need for specialty bed, Avoid rigorous massage over bony prominences, Check visual cues for pain, Discuss PT/OT consult with provider, Float heels, Keep linens dry and wrinkle-free, Maintain/enhance activity level, Minimize linen layers, Monitor skin under medical devices, Pad between skin to skin, Pressure redistribution bed/mattress (bed type), Sit a 90-degree angle/use footstool if needed, Turn and reposition approx. every two hours (pillows and wedges if needed), Use 30-degree side-lying position Moisture Interventions: Absorbent underpads, Assess need for specialty bed, Check for incontinence Q2 hours and as needed Activity Interventions: Assess need for specialty bed Mobility Interventions: Assess need for specialty bed, Float heels, Pressure redistribution bed/mattress (bed type) Nutrition Interventions: Discuss nutritional consult with provider Problem: Falls - Risk of 
Goal: *Absence of Falls Description: Document [place] Mcdonnell Fall Risk and appropriate interventions in the flowsheet. Outcome: Progressing Towards Goal 
Variance Patient Condition Impact: High 
Note: Fall Risk Interventions: 
Mobility Interventions: Bed/chair exit alarm, Communicate number of staff needed for ambulation/transfer, Strengthening exercises (ROM-active/passive) Mentation Interventions: Adequate sleep, hydration, pain control, Evaluate medications/consider consulting pharmacy, Increase mobility, More frequent rounding, Reorient patient, Room close to nurse's station, Toileting rounds, Update white board Medication Interventions: Assess postural VS orthostatic hypotension, Evaluate medications/consider consulting pharmacy Elimination Interventions: Call light in reach, Toileting schedule/hourly rounds Problem: Breathing Pattern - Ineffective Goal: *Absence of hypoxia Outcome: Progressing Towards Goal 
Goal: *Use of effective breathing techniques Outcome: Progressing Towards Goal 
  
Problem: Nutrition Deficit Goal: *Optimize nutritional status Outcome: Progressing Towards Goal 
  
Problem: Ventilator Management Goal: *Adequate oxygenation and ventilation Outcome: Progressing Towards Goal 
Goal: *Patient maintains clear airway/free of aspiration Outcome: Progressing Towards Goal 
Goal: *Absence of infection signs and symptoms Outcome: Progressing Towards Goal 
Goal: *Normal spontaneous ventilation Outcome: Progressing Towards Goal 
  
Problem: Non-Violent Restraints Goal: *Removal from restraints as soon as assessed to be safe Outcome: Progressing Towards Goal 
Goal: *No harm/injury to patient while restraints in use Outcome: Progressing Towards Goal 
Goal: *Patient's dignity will be maintained Outcome: Progressing Towards Goal 
Goal: *Patient Specific Goal (EDIT GOAL, INSERT TEXT) Outcome: Progressing Towards Goal 
Goal: Non-violent Restaints:Standard Interventions Outcome: Progressing Towards Goal 
Goal: Non-violent Restraints:Patient Interventions Outcome: Progressing Towards Goal 
Goal: Patient/Family Education Outcome: Progressing Towards Goal

## 2021-01-31 NOTE — PROGRESS NOTES
Brief Progress Note Assessed patient the evening after trial BiPAP. Patient had no acute concerns and states he does not feel short of breath. He denies any chest pain. Patient had mask on so history otherwise is limited. Objective General: patient is significantly tachypneic, awake, alert, and following commands CV: tachycardia, no MRG Chest: diffuse rhonchi R>L, no wheezes or rales appreciated Neuro: answering questions, following commands A/p  
61 yo M admitted for COVID pneumonia. Has had worsening tachypnea over the past 24 hours with worsening opacities on cxr, fevers. Placed on BIPAP initial ABG on high flow showed worsening oxygenation PaO2 77. Bcx and abx restarted this afternoon. Re consulted ID. Discussed case with Dr. Linda Hill who has upgraded patient to ICU status and concern for potential respiratory failure in setting of increased tachypnea. - transferred to ICU, appreciate management  
- repeat ABG  
- ID consluted  
- continue vanc, zosyn - FU repeat blood cx Palma Solis MD PGY-3

## 2021-01-31 NOTE — ROUTINE PROCESS
Noted during dialysis patient HR increased to 120's, B/P decreased to the 45'C to 54'D systolic while on levophed at 10 mcg. Increased with titration to 14 mcg. Patient became awaken with eye's open and arms raised from the bed with diaphoresis noted. Patient sats on the screen reading 86% at 100 % FIO2. Patient suctioned with no secretions and respiratory was notified.

## 2021-01-31 NOTE — ANESTHESIA PROCEDURE NOTES
Emergent Intubation Performed by: Cordell Fajardo CRNA Authorized by: Cordell Fajardo CRNA Emergent Intubation: Location:  ICU Date/Time:  1/31/2021 2:00 AM 
Indications:  Respiratory failure Spontaneous Ventilation: present Level of Consciousness: awake Preoxygenated: Yes Airway Documentation: Airway:  ETT - Cuffed Technique:  Direct laryngoscopy Blade Type:  Luis Blade Size:  4 ETT size (mm):  8.0 ETT Line [place]:  Teeth ETT Insertion depth (cm):  24 Placement verified by: auscultation and EtCO2 Attempts:  1 Difficult airway: No

## 2021-02-01 NOTE — PROGRESS NOTES
Nutrition Assessment Type and Reason for Visit: Reassess, RD nutrition re-screen/LOS Nutrition Recommendations/Plan:  
- Start tube feeding of Vital High Protein at 10 mL/hr with 50 mL q 4 hour water flushes; monitor tolerance and ability to advance to goal rate of 60 mL/hr and add prosource BID (goal regimen to provide 1560 kcal, 156 gm protein, 1204 mL free water, 100% RDIs). - Add bowel regimen, change miralax to scheduled daily.  
- Discontinue oral supplements. Nutrition Assessment:  Intubated with plan to start tube feeding, on levophed and transitioning to CRRT today. Malnutrition Assessment: 
Malnutrition Status: At risk for malnutrition (specify)(COVID-19, HD) Estimated Daily Nutrient Needs: 
Energy (kcal):  6895-4994 Protein (g):  146-183 Fluid (ml/day):  750-1500 Nutrition Related Findings:  BM 1/26. Anuric s/p HD 1/27 (2 L UF)- unable to tolerate dialysis yesterday, CRRT today. Current Nutrition Therapies: DIET NUTRITIONAL SUPPLEMENTS Breakfast, Lunch, Dinner; [place] DIET NPO 
DIET TUBE FEEDING Anthropometric Measures: 
· Height:  5' 9\" (175.3 cm) · Current Body Wt:  110.4 kg (243 lb 6.2 oz) · BMI: 35.9 Nutrition Diagnosis:  
· Increased nutrient needs related to renal dysfunction as evidenced by dialysis · Inadequate oral intake related to cognitive or neurological impairment, impaired respiratory function as evidenced by NPO or clear liquid status due to medical condition, intubation Nutrition Intervention: 
Food and/or Nutrient Delivery: Continue NPO, Start tube feeding Nutrition Education and Counseling: Education not indicated Coordination of Nutrition Care: Interdisciplinary rounds, Continue to monitor while inpatient Goals: 
Nutritional needs will be met through adequate oral intake or nutrition support within the next 7 days Nutrition Monitoring and Evaluation:  
Behavioral-Environmental Outcomes: None identified Food/Nutrient Intake Outcomes: Enteral nutrition intake/tolerance Physical Signs/Symptoms Outcomes: Biochemical data, Constipation, GI status, Fluid status or edema, Hemodynamic status, Nutrition focused physical findings Discharge Planning: Too soon to determine Electronically signed by Sulaiman Watters RD, [address]  on 2/1/2021 at 1:12 PM 
 
Contact Number: [phone]

## 2021-02-01 NOTE — PROCEDURES
[facility] Pulmonary Specialist 
[facility] Procedure Note With Remigio Indication: Need for vasopressors Risks, benefits, alternatives explained and consent obtained by family. Time out performed. Patient positioned in reverse Trendelenburg. Central line Bundle: 
Full sterile barrier precautions used. 7-Step Sterility Protocol followed. (cap, mask, sterile gown, sterile gloves, large sterile sheet, hand hygiene, 2% chlorhexidine for cutaneous antisepsis) Using ultrasound guidance, Right femoral vein cannulated x 1 attempt(s) utilizing the modified Seldinger technique. Position of guidewire confirmed in vein using ultrasound prior to dilating. Guidewire was removed. Central venous catheter was placed without difficulty. no immediate complications encountered. Good blood return on all 3 ports. Catheter secured & Biopatch applied. Sterile Tegaderm placed. Supervised and assisted by Savanna Roper MD. Dr. Morgan Kaplan was present and immediately available during the entire procedure. Tracey Foy MD PGY-1 
[facility] Emergency Medicine 02/01/21

## 2021-02-01 NOTE — PROGRESS NOTES
OT chart reviewed. Pt with emergent intubation and transfer to ICU earlier this AM. Due to change in medical status, will discontinue current OT orders at this time. Please re-order when pt is medically appropriate to participate in skilled OT re-eval/treatment session. Thank you for this referral, Aaron Walker MS, OTR/L

## 2021-02-01 NOTE — PROCEDURES
[facility] Pulmonary Specialist 
Hemodialysis Catheter Procedure Note With Ultrasound Guidance Indication: Need for HD access Risks, benefits, alternatives explained and consent obtained by family. Time out performed. Patient positioned in reverse Trendelenburg. Central line Bundle: 
Full sterile barrier precautions used. 7-Step Sterility Protocol followed. (cap, mask, sterile gown, sterile gloves, large sterile sheet, hand hygiene, 2% chlorhexidine for cutaneous antisepsis) Using ultrasound guidance, Left femoral vein cannulated x 1 attempt(s) utilizing the modified Seldinger technique. Position of guidewire confirmed in vein using ultrasound prior to dilating. Guidewire was removed. Dialysis catheter was placed without difficulty. no immediate complications encountered. Good blood return on all 3 ports. Catheter secured & Biopatch applied. Sterile Tegaderm placed. Performed by: Renny Kimble MD 
First assist: Atif House MD PGY-1 
 
Dr. Robyn Otoole was present and immediately available during the entire procedure. Chato Michelle MD PGY-1 
Former[facility] Emergency Medicine 02/01/21

## 2021-02-01 NOTE — PROGRESS NOTES
[facility] 
 
 
CARDIOLOGY PROGRESS NOTE 
RECS: 
 
 
 
1. Acute Hypoxic respiratory failure -S/p intubation  1/31 for severe hypoxia. 2. COVID - 19 pneumonia with consolidations bilateral  lower lobes -worsening infection with fevers and leukocytosis-  antibiotics per ID recs. 3. Detectable troponin - 0.51, 0.42, 0.33- EKG SR, non-specific ST changes. Elevation likely due to demand in setting of respiratory failure. Continue aspirin 325 mg. Recent Echo shows preserve EF% 4. Hypotension- requiring pressor support also on midodrine 5. History of dilated cardiomyopathy -  (EF 15% 11/2016) -  EF improved on echo  9/2019, recent echo EF 55%-60%. NST 2018 intermediate risk NUC stress test Fixed defect consistent with prior myocardial infarction. 6. Chronic diastolic CHF - decompensated- requiring HD. 7. Hyperlipidemia - continue statin 8. ESRD on dialysis - HD today 9. Acute metabolic encephalopathy- with delirium 10. Dilated ascending aorta measuring 4.5 cm on echo 2019 -stable recent echo shows Moderate aortic root dilatation. (4.1 cm) 11. Hx of alcohol abuse. Continue supportive care Intubated on [facility] ventilator- elevated troponin likely from demand ischemia. On midodrine for borderline low bp. Above plan discussed with PCCM. Echo 1/21 · LV: Estimated LVEF is 55 - 60%. Visually measured ejection fraction. Normal cavity size and systolic function (ejection fraction normal). Moderate concentric hypertrophy. Wall motion: normal. 
· AV: Mild aortic valve regurgitation is present. · PA: Pulmonary arterial systolic pressure is 23 mmHg. · AO: Moderate aortic root dilatation. (4.1 cm) · COVID (+) ASSESSMENT: 
Hospital Problems  Date Reviewed: 1/3/2019 Codes Class Noted POA * (Principal) Acute respiratory failure due to COVID-19 [facility]) ICD-10-CM: U07.1, J96.00 
ICD-9-CM: 518.81, 079.89  1/23/2021 Unknown SUBJECTIVE: 
 
No chest pain SOB improving. on high flow O2 Spoke with patient OBJECTIVE: 
 
VS:  
Visit Vitals /78 Pulse (!) 101 Temp 98.8 °F (37.1 °C) Resp 12 Ht 5' 9\" (1.753 m) Wt 110.4 kg (243 lb 6.2 oz) SpO2 93% BMI 35.94 kg/m² Intake/Output Summary (Last 24 hours) at 2/1/2021 1221 Last data filed at 2/1/2021 1200 Gross per 24 hour Intake 1307.03 ml Output 0 ml Net 1307.03 ml TELE: Sinus tachycardia Limited physical exam due to COV-19 General:no distress noted on High flow O2. 40l/min hi flow 75% FiO2 NC SpO2 96%. HENT: Normocephalic, atraumatic.  Normal external eye. Neck :  no JVD Cardiac:  irregularly irregular rhythm on monitor Extremities:  +1  edema BLE  
  
 
 
 
Labs: Results:  
   
Chemistry Recent Labs 02/01/21 
1045 01/31/21 
0745 01/30/21 
0249 * 191* 134*  139 135*  
K 5.7* 5.3 4.1 CL 96* 99* 95* CO2 20* 22 22 * 95* 63* CREA 14.00* 13.60* 10.20* CA 10.6* 10.8* 10.2* AGAP 20* 18 18 BUCR 8* 7* 6* * 108 68 TP 7.6 8.7* 9.0* ALB 3.2* 3.5 4.0  
GLOB 4.4* 5.2* 5.0* AGRAT 0.7* 0.7* 0.8 CBC w/Diff Recent Labs 02/01/21 
1045 01/31/21 
0745 01/30/21 
0249 WBC 17.8* 16.2* 12.3 RBC 3.78* 4.07* 4.34* HGB 10.9* 11.7* 12.5*  
HCT 31.8* 34.4* 35.5*  321 379 GRANS 85* 88* 81* LYMPH 9* 4* 10* EOS 0 0 0 Cardiac Enzymes No results for input(s): CPK, CKND1, SHRAVAN in the last 72 hours. No lab exists for component: [place] Coagulation No results for input(s): PTP, INR, APTT, INREXT, INREXT in the last 72 hours. Lipid Panel Lab Results Component Value Date/Time Cholesterol, total 155 08/23/2018 03:50 PM  
 HDL Cholesterol 72 (H) 08/23/2018 03:50 PM  
 LDL, calculated 71 08/23/2018 03:50 PM  
 VLDL, calculated 12 08/23/2018 03:50 PM  
 Triglyceride 60 08/23/2018 03:50 PM  
 CHOL/HDL Ratio 2.2 08/23/2018 03:50 PM  
  
BNP No results for input(s): BNPP in the last 72 hours. Liver Enzymes Recent Labs 02/01/21 
1045  
TP 7.6 ALB 3.2* * Thyroid Studies Lab Results Component Value Date/Time TSH 1.17 08/23/2018 03:50 PM  
    
 
 
 
Roselia Muller NP-C Tat[place]:[phone] supervised I have independently evaluated the patient. All relevant labs and testing data's are reviewed. Care plan discussed and updated after review.  
 
Mukund Reveles MD

## 2021-02-01 NOTE — PROGRESS NOTES
PT treatment attempted and chart reviewed. Pt with emergent intubation and transfer to ICU earlier this AM. Due to change in medical status, will discontinue current PT orders at this time. Please re-order when pt is medically appropriate to participate in skilled PT re-eval/treatment session. 
  
Thank you for this referral, Nata Ryan PT DPT

## 2021-02-01 NOTE — PROGRESS NOTES
Discharge planning Reviewed chart. Patient remains intubated. MD plan is for CRRT. CM will continue to monitor and assist with transitional needs. SENTHIL Godinez, RN Pager # [phone] Care Manager

## 2021-02-01 NOTE — PROCEDURES
[facility] Pulmonary Specialist  Arterial  Line Procedure Note Indication: Blood pressure monitoring Emergent placement of line. Line Bundle: 
Full sterile barrier precautions used. 7-Step Sterility Protocol followed. (cap, mask, sterile gown, sterile gloves, large sterile sheet, hand hygiene, 2% chlorhexidine for cutaneous antisepsis) Right radial artery cannulated x 1 attempt(s) utilizing the modified Seldinger technique. Good blood return. Catheter secured & Biopatch applied. Sterile Tegaderm placed. Supervised and assisted by LI Quan Dr. was present and immediately available for the entire procedure.  
 
 
Tabby Lee MD PGY-1 
2/1/2021 
2:23 PM

## 2021-02-01 NOTE — ROUTINE PROCESS
Bedside and Verbal shift change report given to Tim Perez RN (oncoming nurse) by Melchor Latif RN (offgoing nurse). Report included the following information SBAR, Intake/Output, MAR, Recent Results, Cardiac Rhythm SR/ ST and Quality Measures.

## 2021-02-01 NOTE — PROGRESS NOTES
[facility] 
(A Division of [address] [place] [address]) Follow-up Note Date of Admission: 1/23/2021       Date of Note:  2/1/2021 Summary:   
61 y/o AAM w/ HTN, HLD, ESRD-HD, Dilated CMO, CHF adm 1/23 due to shortness of breath. He had headache, fever and chills for 2 days and tested positive for Covid 8 days PTA at [facility] ED (1/15). Sent home but had worsening shortness of breath for which he presented to the [facility] ED on 1/22. Initial O2 Sat on 5 lpm NC was 90% and he was quickly placed on 40 lpm HFNC. CXR 1/23 patchy infiltrations/consolidations both mid/lower lungs greater on left. Chest CT extensive patchy consolidation pneumonia in bilateral lungs. Admitted 1/23, started on Ceftriaxone and Azithromycin. Remdesivir and dexamethasone started just past midnight 12/23. afebrile and with normal WBC count since admission but 101.3 1/30. Started on Vanc, zosyn. Intubated for airway protection 1/31 Interval History: No further fever since 1/30 - 24 hours now. Wbc rising - even before hydrocortisone started today. Started on CRRT. Remains intubated, sedated, unresponsive. D/w Dr. Berto Hough Current Antimicrobials:    Prior Antimicrobials: 
Vanc, zosyn 1/30 - 2 Ceftriaxone 1/23 - 3 Azithro 1/23 - 5 Remdesivir 1/23 - #5 Assessment: Rec / Plan:  
Fever 1/30 
- ? Etiology - CXR 1/30: worse trevon opacities c/w 1/29 - edema+/- pneumonia 
- resolved on vanc, zosyn - CXR 2/1: no change trevon infiltrates - spcx sent 1/31 pending -> continue Vancomycin, Zosyn pending sputum culture 
-> monitor spcx 1/31 
-> dc Vanc if no MRSA isolated Acute Respiratory Failure with Hypoxia 
- had worsening resp distress on BiPAP 
- intubated 1/31 early am -> per PCCM Covid Pneumonia 
- fever, chills, headache, shortness of breath. Onset ~ 1/13 
- SARS Cov2 + 1/15 - Chest CT 1/23 extensive patchy consolidation pneumonia in bilateral lungs (likely all COVID, doubt secondary bacterial pneumonia) - completed remdesivir 1/27 
- still on azithromycin -> Continue dexamethasone  
-> no other covid specific therapy ESRD-HD -> per Renal  
Dilated CMO -> per Cardiology CHF   
HTN   
HLD   
H/o ETOH abuse Microbiology: 
 
 
Lines / Catheters: 
 
 
 
Patient Active Problem List  
Diagnosis Code  ESRD (end stage renal disease) on dialysis ([facility]) N18.6, Z99.2  Nonischemic cardiomyopathy ([facility]) I42.8  Diastolic CHF ([facility]) G63.55  Hyperlipidemia E78.5  Essential hypertension I10  
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 95I0  Systolic CHF, chronic ([facility]) I50.22  Severe obesity (BMI 35.0-39. 9) with comorbidity ([facility] E66.01  
 ESRD on dialysis ([facility] N18.6, Z99.2  Acute respiratory failure due to COVID-19 ([facility]) U07.1, J96.00 Current Facility-Administered Medications Medication Dose Route Frequency  hydrocortisone Sod Succ (PF) (SOLU-CORTEF) injection 50 mg  50 mg IntraVENous Q6H  
 polyethylene glycol (MIRALAX) packet 17 g  17 g Oral DAILY  [START ON 2/2/2021] insulin glargine (LANTUS) injection 10 Units  10 Units SubCUTAneous DAILY  piperacillin-tazobactam (ZOSYN) 3.375 g in 0.9% sodium chloride (MBP/ADV) 100 mL MBP  3.375 g IntraVENous Q6H  
 chlorhexidine (PERIDEX) 0.12 % mouthwash 10 mL  10 mL Oral Q12H  
 midazolam (VERSED) injection 1-2 mg  1-2 mg IntraVENous Q30MIN PRN  
 fentaNYL citrate (PF) injection  mcg   mcg IntraVENous Q1H PRN  
 midazolam in normal saline (VERSED) 1 mg/mL infusion  1-10 mg/hr IntraVENous TITRATE  fentaNYL (PF) 900 mcg/30 ml infusion soln  0-200 mcg/hr IntraVENous TITRATE  
 NOREPINephrine (LEVOPHED) 8 mg in 5% dextrose 250mL (32 mcg/mL) infusion  0.5-50 mcg/min IntraVENous TITRATE  insulin lispro (HUMALOG) injection   SubCUTAneous Q6H  
  Piperacillin-tazobactam (ZOSYN) 0.75 gm Supplemental Dosing by Pharmacy   Other Rx Dosing/Monitoring  VANCOMYCIN INFORMATION NOTE   Other Rx Dosing/Monitoring  [Held by provider] metoprolol tartrate (LOPRESSOR) tablet 25 mg  25 mg Oral BID  albuterol-ipratropium (DUO-NEB) 2.5 MG-0.5 MG/3 ML  3 mL Nebulization Q4H RT  
 sodium chloride (OCEAN) 0.65 % nasal squeeze bottle 2 Spray  2 Spray Both Nostrils Q4H  
 midodrine (PROAMATINE) tablet 10 mg  10 mg Oral TID WITH MEALS  
 albumin human 25% (BUMINATE) solution 25 g  25 g IntraVENous DIALYSIS PRN  
 melatonin (rapid dissolve) tablet 5 mg  5 mg Oral QHS  glucose chewable tablet 16 g  4 Tab Oral PRN  
 glucagon (GLUCAGEN) injection 1 mg  1 mg IntraMUSCular PRN  
 dextrose (D50W) injection syrg 12.5-25 g  25-50 mL IntraVENous PRN  
 sevelamer carbonate (RENVELA) tab 800 mg  800 mg Oral TID WITH MEALS  sodium chloride (NS) flush 5-10 mL  5-10 mL IntraVENous PRN  
 sodium chloride (NS) flush 5-40 mL  5-40 mL IntraVENous Q8H  
 sodium chloride (NS) flush 5-40 mL  5-40 mL IntraVENous PRN  
 acetaminophen (TYLENOL) tablet 650 mg  650 mg Oral Q6H PRN Or  
 acetaminophen (TYLENOL) suppository 650 mg  650 mg Rectal Q6H PRN  
 heparin (porcine) injection 5,000 Units  5,000 Units SubCUTAneous Q8H  
 ascorbic acid (vitamin C) (VITAMIN C) tablet 1,000 mg  1,000 mg Oral Q6H  
 zinc sulfate (ZINCATE) 220 (50) mg capsule 2 Cap  2 Cap Oral DAILY  aspirin tablet 325 mg  325 mg Oral DAILY  famotidine (PEPCID) tablet 20 mg  20 mg Oral DAILY  [Held by provider] carvediloL (COREG) tablet 3.125 mg  3.125 mg Oral BID WITH MEALS  pravastatin (PRAVACHOL) tablet 20 mg  20 mg Oral DAILY  midodrine (PROAMATINE) tablet 10 mg  10 mg Oral DIALYSIS PRN Review of Systems - Negative except as in interval history Objective: 
 
Visit Vitals /78 Pulse (!) 101 Temp 98.8 °F (37.1 °C) Resp 12 Ht 5' 9\" (1.753 m) Wt 110.4 kg (243 lb 6.2 oz) SpO2 93% BMI 35.94 kg/m² Temp (24hrs), Av.7 °F (36.5 °C), Min:96.7 °F (35.9 °C), Max:98.8 °F (37.1 °C) General: Well developed, obese 62 y.o. BLACK male intubated in ICU  
ENT: ENT exam normal, no neck nodes or sinus tenderness Head: normocephalic, without obvious abnormality Mouth:  not examined. Orotracheal, orogastric tubes inplace Neck: supple, symmetrical, trachea midline Cardio:  regular rate and rhythm, S1, S2 normal, no murmur, click, rub or gallop Chest: inspection normal - no chest wall deformities or tenderness, respiratory effort normal 
Lungs: mild bibasilar rales, no wheezing Abdomen: soft, non-tender. Bowel sounds normal. No masses, no organomegaly. , decreased bowel sounds Extremities:  no redness or tenderness in the calves or thighs, no edema Lab results: 
 
Chemistry Recent Labs 21 
1045 21 
0745 21 
0249 * 191* 134*  139 135*  
K 5.7* 5.3 4.1 CL 96* 99* 95* CO2 20* 22 22 * 95* 63* CREA 14.00* 13.60* 10.20* CA 10.6* 10.8* 10.2* AGAP 20* 18 18 BUCR 8* 7* 6* * 108 68 TP 7.6 8.7* 9.0* ALB 3.2* 3.5 4.0  
GLOB 4.4* 5.2* 5.0* AGRAT 0.7* 0.7* 0.8 CBC w/ Diff Recent Labs 21 
1045 21 
0745 21 
0249 WBC 17.8* 16.2* 12.3 RBC 3.78* 4.07* 4.34* HGB 10.9* 11.7* 12.5*  
HCT 31.8* 34.4* 35.5*  321 379 GRANS 85* 88* 81* LYMPH 9* 4* 10* EOS 0 0 0 Microbiology All Micro Results Procedure Component Value Units Date/Time CULTURE, BLOOD FOR FUNGUS [[id number]] Collected: 21 1208 Order Status: Completed Specimen: Blood Updated: 21 2379 Special Requests: NO SPECIAL REQUESTS Culture result: NO FUNGUS ISOLATED 3 DAYS     
 CULTURE, BLOOD [[id number]] Collected: 21 1506 Order Status: Completed Specimen: Blood Updated: 21 2262 Special Requests: NO SPECIAL REQUESTS Culture result: NO GROWTH 2 DAYS     
 CULTURE, BLOOD [[id number]] Collected: 01/30/21 1515 Order Status: Completed Specimen: Blood Updated: 02/01/21 8517 Special Requests: NO SPECIAL REQUESTS Culture result: NO GROWTH 2 DAYS     
 CULTURE, RESPIRATORY/SPUTUM/BRONCH Litzy Barton [[id number]] Collected: 01/31/21 1800 Order Status: Completed Specimen: Sputum,ET Suction Updated: 02/01/21 2593 CULTURE, RESPIRATORY/SPUTUM/BRONCH Arch Pickles STAIN [[id number]] Order Status: Canceled Specimen: Sputum CULTURE, URINE [[id number]] Order Status: Sent Specimen: Cath Urine CULTURE, BLOOD [[id number]] Collected: 01/23/21 1116 Order Status: Completed Specimen: Blood Updated: 01/29/21 0720 Special Requests: NO SPECIAL REQUESTS Culture result: NO GROWTH 6 DAYS     
 CULTURE, BLOOD [[id number]] Collected: 01/23/21 1115 Order Status: Completed Specimen: Blood Updated: 01/29/21 0720 Special Requests: NO SPECIAL REQUESTS Culture result: NO GROWTH 6 DAYS     
 COVID-19 RAPID TEST [[id number]]  (Abnormal) Collected: 01/25/21 1415 Order Status: Completed Specimen: Nasopharyngeal Updated: 01/25/21 1518 Specimen source Nasopharyngeal     
  COVID-19 rapid test Detected Comment: CALLED TO AND CORRECTLY REPEATED BY: 
FADIA Martinez [facility] 3N AT 4011 BY KDA 1/25/21 The specimen is POSITIVE for SARS-CoV-2, the novel coronavirus associated with COVID-19. This test has been authorized by the FDA under an Emergency Use Authorization (EUA) for use by authorized laboratories. Fact sheet for Healthcare Providers: ConventionUpdate.co.nz Fact sheet for Patients: ConventionUpdate.co.nz Methodology: Isothermal Nucleic Acid Amplification Hattie Mayorga MD, [facility] 2/1/2021  
3:45 PM

## 2021-02-01 NOTE — PROGRESS NOTES
RENAL DAILY PROGRESS NOTE 62y M with ESRD, admitted with covid 19 pneumonia following for ESRD management. Subjective:  
 
 
Complaint:  
Overnight  Event noted Yesterday he did not tolerate intermittent dialysis support Getting hypoxic and tachycardia during dialysis. Remains intubated, requiring max oxygen support and very high PEEP. Remains on very high dose of Levophed. IMPRESSION:  
· ESRD on TTS at [address] unit, · Access left arm AVF · COVID Pneumonia · S/p intubation  1/31 for severe hypoxia · Hypoxic resp failure · Hypertension · Hx Diastolic CHF · Hyperlipidemia · Secondary hyperparathyroidism PLAN:  
· Plan to start CVVHD F with 2K bath. Main goal for dialysis is for better volume management. · Plan to start give him net -50 cc/h and increase ultrafiltrate as tolerated. · For now hold anticoagulation for CRRT. Will discuss with ICU team will likely benefit from Heparin.  
   
  
 
While on CRRT please check serum chemistry, ionized calcium and magnesium every six hours. Replete calcium , magnesium and potassium per ICU protocol. Goal to keep at least  
Potassium at  4, magnesium at 2 , ionized calcium level between  1.1-1.35 mmol/L Notify MD when Serum sodium < 130 or >150 Serum potassium remain < 3  Or > 5.5 Severe cardiac arrhythmia  Or frequent PVC during CRRT. If Patient having persistent cardiac arrhythmia stop CRRT Check state serum chemistry , ionized calcium , magnesium. All electrolyte replacement should be done through IV pump. Current Facility-Administered Medications Medication Dose Route Frequency  hydrocortisone Sod Succ (PF) (SOLU-CORTEF) injection 50 mg  50 mg IntraVENous Q6H  
 polyethylene glycol (MIRALAX) packet 17 g  17 g Oral DAILY  [START ON 2/2/2021] insulin glargine (LANTUS) injection 10 Units  10 Units SubCUTAneous DAILY  piperacillin-tazobactam (ZOSYN) 3.375 g in 0.9% sodium chloride (MBP/ADV) 100 mL MBP  3.375 g IntraVENous Q6H  
 chlorhexidine (PERIDEX) 0.12 % mouthwash 10 mL  10 mL Oral Q12H  
 midazolam (VERSED) injection 1-2 mg  1-2 mg IntraVENous Q30MIN PRN  
 fentaNYL citrate (PF) injection  mcg   mcg IntraVENous Q1H PRN  
 midazolam in normal saline (VERSED) 1 mg/mL infusion  1-10 mg/hr IntraVENous TITRATE  fentaNYL (PF) 900 mcg/30 ml infusion soln  0-200 mcg/hr IntraVENous TITRATE  
 NOREPINephrine (LEVOPHED) 8 mg in 5% dextrose 250mL (32 mcg/mL) infusion  0.5-50 mcg/min IntraVENous TITRATE  insulin lispro (HUMALOG) injection   SubCUTAneous Q6H  
 Piperacillin-tazobactam (ZOSYN) 0.75 gm Supplemental Dosing by Pharmacy   Other Rx Dosing/Monitoring  VANCOMYCIN INFORMATION NOTE   Other Rx Dosing/Monitoring  [Held by provider] metoprolol tartrate (LOPRESSOR) tablet 25 mg  25 mg Oral BID  albuterol-ipratropium (DUO-NEB) 2.5 MG-0.5 MG/3 ML  3 mL Nebulization Q4H RT  
 sodium chloride (OCEAN) 0.65 % nasal squeeze bottle 2 Spray  2 Spray Both Nostrils Q4H  
 midodrine (PROAMATINE) tablet 10 mg  10 mg Oral TID WITH MEALS  
 albumin human 25% (BUMINATE) solution 25 g  25 g IntraVENous DIALYSIS PRN  
 melatonin (rapid dissolve) tablet 5 mg  5 mg Oral QHS  glucose chewable tablet 16 g  4 Tab Oral PRN  
 glucagon (GLUCAGEN) injection 1 mg  1 mg IntraMUSCular PRN  
 dextrose (D50W) injection syrg 12.5-25 g  25-50 mL IntraVENous PRN  
 sevelamer carbonate (RENVELA) tab 800 mg  800 mg Oral TID WITH MEALS  sodium chloride (NS) flush 5-10 mL  5-10 mL IntraVENous PRN  
 sodium chloride (NS) flush 5-40 mL  5-40 mL IntraVENous Q8H  
 sodium chloride (NS) flush 5-40 mL  5-40 mL IntraVENous PRN  
 acetaminophen (TYLENOL) tablet 650 mg  650 mg Oral Q6H PRN  Or  
 acetaminophen (TYLENOL) suppository 650 mg  650 mg Rectal Q6H PRN  
  heparin (porcine) injection 5,000 Units  5,000 Units SubCUTAneous Q8H  
 ascorbic acid (vitamin C) (VITAMIN C) tablet 1,000 mg  1,000 mg Oral Q6H  
 zinc sulfate (ZINCATE) 220 (50) mg capsule 2 Cap  2 Cap Oral DAILY  aspirin tablet 325 mg  325 mg Oral DAILY  famotidine (PEPCID) tablet 20 mg  20 mg Oral DAILY  [Held by provider] carvediloL (COREG) tablet 3.125 mg  3.125 mg Oral BID WITH MEALS  pravastatin (PRAVACHOL) tablet 20 mg  20 mg Oral DAILY  midodrine (PROAMATINE) tablet 10 mg  10 mg Oral DIALYSIS PRN Objective:  
 
Patient Vitals for the past 24 hrs: 
 Temp Pulse Resp BP SpO2  
02/01/21 1415  (!) 103 12  92 % 02/01/21 1400 97.7 °F (36.5 °C) (!) 105 12  92 % 02/01/21 1345  (!) 106 13  92 % 02/01/21 1330  (!) 107 12 109/79 93 % 02/01/21 1315  (!) 109 14 99/66 93 % 02/01/21 1300 97.8 °F (36.6 °C) (!) 106 14 97/75 92 % 02/01/21 1257  99 24  92 % 02/01/21 1245  99 12 (!) 135/94 92 % 02/01/21 1230  (!) 108 22 97/66 90 % 02/01/21 1215  (!) 108 12 111/73 93 % 02/01/21 1200 98.8 °F (37.1 °C) (!) 101 12 105/78 93 % 02/01/21 1145  (!) 105 12 111/71 92 % 02/01/21 1130  (!) 103 10 103/70 92 % 02/01/21 1115  (!) 104 12 107/75 92 % 02/01/21 1100  (!) 103 12 110/76 92 % 02/01/21 1045  (!) 105 12 106/68 92 % 02/01/21 1030  (!) 101 11 114/72 93 % 02/01/21 1015  (!) 101 12 110/80 93 % 02/01/21 1000  (!) 101 13 117/80 93 % 02/01/21 0945  (!) 102 13 121/77 92 % 02/01/21 0940  (!) 101 14 124/82 91 % 02/01/21 0935  (!) 105 16 111/76 91 % 02/01/21 0930  (!) 104 13 103/73 92 % 02/01/21 0925  (!) 104 12 92/71 92 % 02/01/21 0920  (!) 101 12 101/73 93 % 02/01/21 0915  99 12 108/73 92 % 02/01/21 0910  100 12 113/73 92 % 02/01/21 0905  (!) 102 12 111/73 92 % 02/01/21 0902  (!) 103 23  93 % 02/01/21 0900  (!) 102 13 116/77 93 % 02/01/21 0855    125/75   
02/01/21 0850    (!) 79/48  02/01/21 0845    (!) 81/52   
02/01/21 0830  (!) 109 27  91 % 02/01/21 0800 97.8 °F (36.6 °C) 89 21  91 % 02/01/21 0730  92 18  (!) 89 % 02/01/21 0700  87 22  91 % 02/01/21 0630  91 26  91 % 02/01/21 0600  90 21  92 % 02/01/21 0530  80 21 121/79 92 % 02/01/21 0500  99 21 (!) 72/49 90 % 02/01/21 0454  92 26  92 % 02/01/21 0430  89 21 116/76 92 % 02/01/21 0400 97.7 °F (36.5 °C) 88 20 115/76 92 % 02/01/21 0330  89 22 117/72 93 % 02/01/21 0300  88 22 119/75 92 % 02/01/21 0230  90 20 114/78 93 % 02/01/21 0200  88 23 127/77 93 % 02/01/21 0130  89 23 118/76 93 % 02/01/21 0100  90 24 117/76 93 % 02/01/21 0030  91 24 124/71 93 % 02/01/21 0000 98 °F (36.7 °C) 89 25 116/76 93 % 01/31/21 2335  89 22  94 % 01/31/21 2330  89 22 121/78 94 % 01/31/21 2300  89 23 128/81 95 % 01/31/21 2230  89 24 127/80 94 % 01/31/21 2200  90 19 117/77 94 % 01/31/21 2130  88 24 121/79 93 % 01/31/21 2100  93 25 113/74 93 % 01/31/21 2030  88 28 124/85 94 % 01/31/21 2012  88 25  96 % 01/31/21 2000 97.8 °F (36.6 °C) 87 23 121/81 96 % 01/31/21 1930    125/77   
01/31/21 1900  86 24 134/82 97 % 01/31/21 1830  95 24 102/66 98 % 01/31/21 1827 98 °F (36.7 °C) 92 29 122/75   
01/31/21 1801     91 % 01/31/21 1800  97 (!) 31 123/74 93 % 01/31/21 1756  96 (!) 34 117/80 92 % 01/31/21 1755     (!) 87 % 01/31/21 1752  (!) 105 (!) 31 98/61 92 % 01/31/21 1745  (!) 116 29 (!) 88/46 (!) 88 % 01/31/21 1737  (!) 123 (!) 31 114/71 90 % 01/31/21 1731  97 (!) 32 (!) 81/38 90 % 01/31/21 1730  96 (!) 32 (!) 76/49 91 % 01/31/21 1719  83 30 113/74 94 % 01/31/21 1713 (!) 96.7 °F (35.9 °C) 87 26 117/69 95 % 01/31/21 1639  85 25  95 % 01/31/21 1630  86 26 114/74 95 % 01/31/21 1615  85 28 118/72 95 % 01/31/21 1600 (!) 96.7 °F (35.9 °C) 86 24 107/75 95 % 01/31/21 1545  85 24 120/73 96 % 01/31/21 1530  85 24 107/76 96 % 01/31/21 1515  82 22 121/74 96 % 01/31/21 1500  84 21 103/68 95 % 01/31/21 1445  85 20 108/68 96 % Weight change: 0.5 kg (1 lb 1.6 oz) 01/30 1901 - 02/01 0700 In: 1829.9 [I.V.:1569.9] Out: 0 Intake/Output Summary (Last 24 hours) at 2/1/2021 1444 Last data filed at 2/1/2021 1400 Gross per 24 hour Intake 1115.4 ml Output 218 ml Net 897.4 ml Patient was seen during the [place] pandemic. Patient with COVID infection . Patient is in isolation room due to COVID status and PPE is in short supply hence seen through glass window and d/w patients RN. Full contact physical exam was not possible due to patient's clinical condition, key findings seen by me are documented. In addition, I have also used findings documented by physicians who have recently examined thee patient as they are relevant to the patient's renal care. Intubated NSR on tele Data Review:  
 
LABS:  
Hematology:  
Recent Labs 02/01/21 
1045 01/31/21 
0745 01/30/21 
0249 WBC 17.8* 16.2* 12.3 HGB 10.9* 11.7* 12.5*  
HCT 31.8* 34.4* 35.5* Chemistry:  
Recent Labs 02/01/21 
1045 01/31/21 
0745 01/30/21 
0249 * 95* 63* CREA 14.00* 13.60* 10.20* CA 10.6* 10.8* 10.2* ALB 3.2* 3.5 4.0  
K 5.7* 5.3 4.1  139 135* CL 96* 99* 95* CO2 20* 22 22 * 191* 134* Procedures/imaging: see electronic medical records for all procedures, Xrays and details which were not copied into this note but were reviewed prior to creation of Earlyne Fleischer, MD 
2/1/2021

## 2021-02-01 NOTE — PROGRESS NOTES
[facility] ICU Progress Note Name: Elana Weinberg : 1960 MRN: [id number] Date: 2021 9:10 AM 
  
[x]I have reviewed the flowsheet and previous days notes. Events overnight reviewed and discussed with nursing staff. Vital signs and records reviewed. [x]The patient is unable to give any meaningful history or review of systems because the patient is: 
[]Intubated [x]Sedated  
[]Unresponsive []The patient is critically ill on     
[x]Mechanical ventilation [x]Pressors []BiPAP [] The patient is: [x] acutely ill Risk of deterioration: [x] moderate  
 [] critically ill  [x] high  
 
[x]See my orders for details My assessment/plan was discussed with: 
[x]Nursing []PT/OT [x]Respiratory therapy []Family IMPRESSION:  
· Acute Hypoxic Respiratory failure -  COVID-19 PNA with worsening ARDS. Intubated on / persistent tachypnea. · COVID-19 PNA - with significant consolidations trevon lower lobes · ARDS, severe - likely worsened by fluid overload in the setting of COVID-19 pneumonia/sepsis. · CHFpEF/hypertrophic cardiomyopathy -  TTE shows concentric hypertrophy · ESRD on dialysis · Respiratory alkalosis from tachypnea/respiratory distress · History of dilated cardiomyopathy · Hypertension · Acute delirium:  Due to above Patient Active Problem List  
Diagnosis Code  ESRD (end stage renal disease) on dialysis (HCC) N18.6, Z99.2  Nonischemic cardiomyopathy (HCC) I42.8  Diastolic CHF ([facility]) L42.22  Hyperlipidemia E78.5  Essential hypertension I10  
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 32K1  Systolic CHF, chronic (HCC) I50.22  Severe obesity (BMI 35.0-39. 9) with comorbidity ([address] E66.01  
 ESRD on dialysis ([facility] N18.6, Z99.2  Acute respiratory failure due to COVID-19 (HCC) U07.1, J96.00 PLAN:  
 · Resp: Titrate FiO2 O2 for SpO2 >90%; optimize bronchial hygiene. Daily CXR and ABG while intubated. Con't duo-nebs q4. · I/D: f/u BxCx/UC, Sputum Cx's. F/U Fungitell. ID following. Trend LA q4 until normal. Trend Pro-Severiano PRN. Con't ABX - Vanc & Zosyn. Deescalate ABX once Cx's finalize. Trend temp & WBC curve. · Hem/Onc: Daily CBC; H/H, and plts are stable. · CVS: Currently weaning Levophed gtt --> suspect this hypotension is sedation related, D/C'd Precedex which is helping to improve BP. Actively titrate vasopressors aim MAP >65mmHg. May require CVL if unable to titrate off Levophed gtt, currently running through 18g PIV. Hold BP meds. · Metabolic: Daily BMP, Mag & Phos; monitor e-lytes; replace PRN 
· Renal: Trend Renal indices; +Vitale. Strict I/Os. Nephrology following --> Plan for HD vs. CRRT today · Endocrine: POC Glucose q6; SSI and Lantus. Con't vitamins. Con't Decadron 6mg q24 x10 day course. Monitor BS while on steroids. · GI: SUP, Trend LFTs, Zofran PRN for N/V. Start trickle feeds. · Musc/Skin: No acute issues, wound care · Neuro: D/C'd Precedex gtt 2/2 hypotension. Con't Versed and Fentanyl gtt for sedation. S/p 10mg Valium IVP for initial sedation. PRN Versed/ Fentanyl for breakthrough sedation needs. RASS 0 to -1. Bilateral wrist restraints for pt safety. · Fluids: s/p Albumin bolus. Otherwise N/A 
· Code status: Full Code Best Practices/ Safety Bundles: 
· Sepsis Bundle per Hospital Protocol · CAUTI Bundle · Electrolyte Replacement Bundle · Glycemic control goal <180; avoid Hypoglycemia · IHI ICU Bundles: ·  Vitale Bundle Followed and Vent Bundle Followed, Vent Day 2   
· [facility] Vent patients: · VAP bundle, Aim to keep peak plateau pressure 93-64IB H2O 
· Aspiration Precautions - HOB >30' · Daily sedation holiday as indicated · SBT as tolerated/appropriate · Titrate FiO2 for SpO2 >94% · Aggressive Pulmonary Hygeiene · Stress ulcer prophylaxis: Pepcid · DVT prophylaxis: SQH 
· Need for Lines, martinez assessed. · Restraints need. · Palliative care evaluation - Not consulted ye Subjective/History:  
  
Interval HPI: 
HPI per Dr. Julienne Wu Au a 62 y. o. male with PMH arthritis, CKD on HD MWF, gout HTN, HLD, now presenting with complaint of SOB.    Pt is known positive COVID for over 1wk.   Went to dialysis center and was noted to have a fever 100.6 here. [facility] had a headache, eye pain and chills .  No known Covid exposure.  Nominal pain diarrhea no chest pain or shortness of breath. Was seen in ER 1wk ago with above complaints and d/c home with CDC recommendations.  Over the past few days he has felt progressively worse and was found tripoding today when he was picked up for dialysis. [facility] currently states he feels fine and has no complaints. He denies sick contacts, [place], [place].  He missed dialysis on day of admission. Initial SpO2 90% on 5lpm NC so placed on HFC.   
CXR showed patchy infiltrations/consolidations in bilateral mid/lower lungs 
  
Subjective 02/01/21 Hospital Day: 2 Vent Day: 2 
- Plan for HD vs. CRRT today. Nephro following - Diet: will start trickle feeds today ROS:Review of systems not obtained due to patient factors. COVID Tx Review: ? Remdesivir - (01/23-01/27) ? Convalascent Plasma - Need to obtain consent, not ordered yet, defer to ID ? Decadron - Day 7 of  10 (01/24-02/03) ? Vitamins - Vit C, Zinc 
? SQH Medication Review · Pressors - Levophed gtt (weaning down) · Sedation - Versed & Fentanyl gtt with PRNs as well · Antibiotics - Vanc & Zosyn · Pain - PRN Fentanyl · GI/ DVT - Pepcid; SQH 
· Others (other gtts) Vital Signs:   
Visit Vitals /78 Pulse 99 Temp 97.8 °F (36.6 °C) Resp 24 Ht 5' 9\" (1.753 m) Wt 110.4 kg (243 lb 6.2 oz) SpO2 92% BMI 35.94 kg/m² O2 Device: Ventilator, Endotracheal tube, Heated, Humidifier O2 Flow Rate (L/min): 40 l/min Temp (24hrs), Av.7 °F (36.5 °C), Min:96.7 °F (35.9 °C), Max:98.8 °F (37.1 °C) Intake/Output:  
Last shift:      701 - 1900 In: 313.8 [I.V.:313.8] Out: 70 Last 3 shifts: 1901 -  0700 In: 1829.9 [I.V.:1569.9] Out: 0 Intake/Output Summary (Last 24 hours) at 2021 1407 Last data filed at 2021 1300 Gross per 24 hour Intake 1308.89 ml Output 71 ml Net 1237.89 ml Ventilator Settings: 
Mode Rate Tidal Volume Pressure FiO2 PEEP Assist control, VC+   500 ml    100 % 20 cm H20 Peak airway pressure: 36 cm H2O Minute ventilation: 13.3 l/min ARDS network Guidelines:  
Lung protective strategy and Plateau  Pressure goal < 30 cm H2O goals Oxygenation Goals PaO2 55-80 mm Hg or SaO2 88-95% PH goal 7.30-7.45 VAP bundle: 
Reviewed. Vinson tube to suction at 20-30 cm Hg. Maintain Vinson tube with 5-10ml air every 4 hours Routine oral care every 4 hours Elevation of head > 45 degree Daily sedation holiday and SBT evaluation starting at 6.00am. 
 
Physical Exam 
Constitutional:   
   General: He is not in acute distress. Comments: Intubated/sedated HENT:  
   Mouth/Throat:  
   Mouth: Mucous membranes are moist.  
Eyes:  
   Conjunctiva/sclera: Conjunctivae normal.  
Cardiovascular:  
   Rate and Rhythm: Normal rate and regular rhythm. Pulses: Normal pulses. Pulmonary:  
   Comments: Symmetrical chest rise, no accessory muscle use. Intubated + vent PEEP 16.0 FiO2 100% Abdominal:  
   General: There is no distension. Palpations: Abdomen is soft. Musculoskeletal:     
   General: No swelling. Right lower leg: No edema. Left lower leg: No edema. Skin: 
   General: Skin is warm. Findings: No lesion or rash. Neurological:  
   General: No focal deficit present. Devices:  ETT, OGT, Vitale DATA:  
 
Current Facility-Administered Medications Medication Dose Route Frequency  hydrocortisone Sod Succ (PF) (SOLU-CORTEF) injection 50 mg  50 mg IntraVENous Q6H  
 polyethylene glycol (MIRALAX) packet 17 g  17 g Oral DAILY  [START ON 2/2/2021] insulin glargine (LANTUS) injection 10 Units  10 Units SubCUTAneous DAILY  piperacillin-tazobactam (ZOSYN) 3.375 g in 0.9% sodium chloride (MBP/ADV) 100 mL MBP  3.375 g IntraVENous Q6H  
 chlorhexidine (PERIDEX) 0.12 % mouthwash 10 mL  10 mL Oral Q12H  
 midazolam (VERSED) injection 1-2 mg  1-2 mg IntraVENous Q30MIN PRN  
 fentaNYL citrate (PF) injection  mcg   mcg IntraVENous Q1H PRN  
 midazolam in normal saline (VERSED) 1 mg/mL infusion  1-10 mg/hr IntraVENous TITRATE  fentaNYL (PF) 900 mcg/30 ml infusion soln  0-200 mcg/hr IntraVENous TITRATE  
 NOREPINephrine (LEVOPHED) 8 mg in 5% dextrose 250mL (32 mcg/mL) infusion  0.5-50 mcg/min IntraVENous TITRATE  insulin lispro (HUMALOG) injection   SubCUTAneous Q6H  
 Piperacillin-tazobactam (ZOSYN) 0.75 gm Supplemental Dosing by Pharmacy   Other Rx Dosing/Monitoring  VANCOMYCIN INFORMATION NOTE   Other Rx Dosing/Monitoring  [Held by provider] metoprolol tartrate (LOPRESSOR) tablet 25 mg  25 mg Oral BID  albuterol-ipratropium (DUO-NEB) 2.5 MG-0.5 MG/3 ML  3 mL Nebulization Q4H RT  
 sodium chloride (OCEAN) 0.65 % nasal squeeze bottle 2 Spray  2 Spray Both Nostrils Q4H  
 midodrine (PROAMATINE) tablet 10 mg  10 mg Oral TID WITH MEALS  
 albumin human 25% (BUMINATE) solution 25 g  25 g IntraVENous DIALYSIS PRN  
 melatonin (rapid dissolve) tablet 5 mg  5 mg Oral QHS  glucose chewable tablet 16 g  4 Tab Oral PRN  
 glucagon (GLUCAGEN) injection 1 mg  1 mg IntraMUSCular PRN  
 dextrose (D50W) injection syrg 12.5-25 g  25-50 mL IntraVENous PRN  
 sevelamer carbonate (RENVELA) tab 800 mg  800 mg Oral TID WITH MEALS  sodium chloride (NS) flush 5-10 mL  5-10 mL IntraVENous PRN  
 sodium chloride (NS) flush 5-40 mL  5-40 mL IntraVENous Q8H  
  sodium chloride (NS) flush 5-40 mL  5-40 mL IntraVENous PRN  
 acetaminophen (TYLENOL) tablet 650 mg  650 mg Oral Q6H PRN Or  
 acetaminophen (TYLENOL) suppository 650 mg  650 mg Rectal Q6H PRN  
 heparin (porcine) injection 5,000 Units  5,000 Units SubCUTAneous Q8H  
 ascorbic acid (vitamin C) (VITAMIN C) tablet 1,000 mg  1,000 mg Oral Q6H  
 zinc sulfate (ZINCATE) 220 (50) mg capsule 2 Cap  2 Cap Oral DAILY  aspirin tablet 325 mg  325 mg Oral DAILY  famotidine (PEPCID) tablet 20 mg  20 mg Oral DAILY  [Held by provider] carvediloL (COREG) tablet 3.125 mg  3.125 mg Oral BID WITH MEALS  pravastatin (PRAVACHOL) tablet 20 mg  20 mg Oral DAILY  midodrine (PROAMATINE) tablet 10 mg  10 mg Oral DIALYSIS PRN Labs: Results:  
   
Chemistry Recent Labs 02/01/21 
1045 01/31/21 
0745 01/30/21 
0249 * 191* 134*  139 135*  
K 5.7* 5.3 4.1 CL 96* 99* 95* CO2 20* 22 22 * 95* 63* CREA 14.00* 13.60* 10.20* CA 10.6* 10.8* 10.2* AGAP 20* 18 18 BUCR 8* 7* 6* * 108 68 TP 7.6 8.7* 9.0* ALB 3.2* 3.5 4.0  
GLOB 4.4* 5.2* 5.0* AGRAT 0.7* 0.7* 0.8 CBC w/Diff Recent Labs 02/01/21 
1045 01/31/21 
0745 01/30/21 
0249 WBC 17.8* 16.2* 12.3 RBC 3.78* 4.07* 4.34* HGB 10.9* 11.7* 12.5*  
HCT 31.8* 34.4* 35.5*  321 379 GRANS 85* 88* 81* LYMPH 9* 4* 10* EOS 0 0 0 Coagulation No results for input(s): PTP, INR, APTT, INREXT, INREXT in the last 72 hours. Liver Enzymes Recent Labs 02/01/21 
1045  
TP 7.6 ALB 3.2* * ABG Lab Results Component Value Date/Time PHI 7.25 (L) 02/01/2021 05:07 AM  
 PCO2I 45.0 02/01/2021 05:07 AM  
 PO2I 60 (L) 02/01/2021 05:07 AM  
 HCO3I 19.9 (L) 02/01/2021 05:07 AM  
 FIO2I 100 02/01/2021 05:07 AM  
  
Microbiology Recent Labs  
  01/30/21 0499 52 06 34 01/30/21 
1506 CULT NO GROWTH 2 DAYS NO GROWTH 2 DAYS Telemetry: [x]Sinus []A-flutter []Paced []A-fib []Multiple PVCs Imaging: CXR [01/31/21]: FINDINGS: 
  
LINES/TUBES: Interval placement of endotracheal tube with tip approximately 4 to 
have centimeters from the maricarmen. 
  
MEDIASTINUM: Cardiac silhouette is within normal limits. 
  
LUNGS: Redemonstration of extensive bilateral pulmonary opacities. 
  
BONES/OTHER: No acute osseous abnormality. 
  
  
IMPRESSION Satisfactory placement of endotracheal tube. Otherwise, no significant change from prior. CTA CHEST W OR W WO CONT [01/23/21]: FINDINGS: 
  
PULMONARY ARTERIES: The contrast bolus is adequate. Although, moderate motion 
artifact noted which significantly compromises the evaluation of the small 
arterial branches. The right and left mainstem pulmonary arteries and their 
lobar branches appear patent without convincing evidence of intraluminal filling 
defect identified to suggest pulmonary embolism. There are questionable 
nonocclusive intraluminal linear filling defects in the segmental/subsegmental 
branches at anterior and lateral right upper lobe and right middle lobe, 
uncertain due to artifact or potential tiny PE. 
  
AORTA AND OTHER CARDIOVASCULAR STRUCTURES: Mild ectasia of thoracic aorta. No 
aortic aneurysm or dissection. Mild burden of  coronary artery calcifications. Heart Strain assessment: 
-  RV/LV ratio (normal <0.9): Normal 
-  Dysfunction or bowing of interventricular septum: None -  Main pulmonary artery enlargement (>30mm): Not present -  There is not visualization of contrast reflux from the right heart into the 
IVC/hepatic veins. 
  
LUNG PARENCHYMA: There are multiple patchy areas of airspace consolidations 
identified throughout both lungs, more pronounced in bilateral lower lobes and 
inferior left upper lobe. Patent airway. 
  
IMAGED THYROID: Unremarkable. 
  
MEDIASTINUM: Borderline adenopathy in bilateral chu and subcarinal 
mediastinum. .  
  
 PLEURAL SPACES AND CHEST WALL: No significant pleural effusion. Mild pleural 
thickening. 
  
VISUALIZED UPPER ABDOMEN: Very atrophic right kidney is partially seen. 
  
OSSEOUS STRUCTURES: Unremarkable.  
  
IMPRESSION: 
  
1. No convincing CT evidence of pulmonary embolism in mainstem and lobar 
arteries. Questionable nonocclusive intraluminal filling defects identified in 
right upper and middle lobe segmental subsegmental images, artifact versus tiny 
nonocclusive PE. 
  
2. Extensive patchy consolidation pneumonia in bilateral lungs, Covid infection 
is more concerned than pulmonary infarctions. Clinical correlation and 
short-term follow-up CT advised. 
  
Thank you for your referral 
 
[x]I have personally reviewed the patients radiographs []Radiographs reviewed with radiologist 
 []No change from prior, tubes and lines in adequate position []Improved   []Worsening 
 
  
   
  
 
  
Jonah Alberts MD, PGY1 [facility] Critical Care Rotation February 1, 2021 2:07 PM 
  
 
 
 
 
Pulmonary / Critical Care Physician: 
 
Chart and note reviewed. Data reviewed. Seen on rounds earlier today. I have independently evaluated and examined the patient. I agree with the exam, assessment and plan of Dr. Alexandre Yung, resident with my findings below. 60/M ARDS due to COVID 19 sepsis, JULIA requiring dialysis. Aggregate critical care time was 46  minutes, which includes only time during which I was engaged in work directly related to the patient's care as described below. Services included the following: 
-reviewing nursing notes and old charts 
-vital sign assessments  
-direct patient care 
-medication orders and management 
-interpreting and reviewing diagnostic studies/labs 
-re-evaluations 
-documentation time 
-Spoke with: Dr. Isacc Degroot During this entire length of time I was immediately available to the patient. The reason for providing this level of medical care for this critically ill patient was due a critical illness that impaired one or more vital organ systems such that there was a high probability of imminent or life threatening deterioration in the patients condition. This care involved high complexity decision making to assess, manipulate, and support vital system functions, to treat this degreee vital organ system failure and to prevent further life threatening deterioration of the patients condition Complex decision making was made in the evaluation and management plans during this consultation. More than 50% of time was spent in counseling and coordination of care including review of data and discussion with other team members. Helene Lopez MD 
 
 
[facility] Pulmonary, Critical Care, and Sleep Medicine

## 2021-02-01 NOTE — DIABETES MGMT
GLYCEMIC CONTROL PLAN OF CARE Assessment/Recommendations: 
Blood glucose this am 182 mg/dl Noted steroids changed from dexamethasone, to solucortef 50 mg every 6 hours. Recommend increasing lantus dose to 10 units daily Continue corrective insulin coverage as needed Already receiving very insulin resistant dosing. Will continue inpatient monitoring. Most recent blood glucose values: 
 
Results for Rosalind Haynes (MRN [id number]) as of 2/1/2021 11:21 Ref. Range 1/31/2021 08:49 1/31/2021 11:19 1/31/2021 17:43 2/1/2021 01:34 2/1/2021 05:45 GLUCOSE,FAST - POC Latest Ref Range: 70 - 110 mg/dL 171 (H) 179 (H) 172 (H) 222 (H) 182 (H) Current A1C of 6.1 % is equivalent to average blood glucose of 128 mg/dl over the past 2-3 months. Current hospital diabetes medications:  
lantus 5 units daily Lispro corrective insulin coverage every 6 hours Previous day's insulin requirements:  
lantus 5 units Lispro 6 units corrective insulin Home diabetes medications: 
None noted Diet:   
NPO Education:  ____Refer to Diabetes Education Record __x_Education not indicated at this time 
intubated Tim Hope RN CDE

## 2021-02-02 NOTE — PROGRESS NOTES
attended the interdisciplinary rounds for Charles Quan, who is a 62 y.o.,male. Patients Primary Language is: [place]. According to the patients EMR Taoist Affiliation is: [facility].  
 
The reason the Patient came to the hospital is:  
Patient Active Problem List  
 Diagnosis Date Noted  Acute respiratory failure due to COVID-19 [facility]) 01/23/2021  ESRD on dialysis ([address] 11/15/2018  Severe obesity (BMI 35.0-39. 9) with comorbidity ([address] 04/09/2018  Systolic CHF, chronic ([address] 04/06/2018  Idiopathic chronic gout of multiple sites without tophus 02/21/2017  Essential hypertension 11/17/2016  Hyperlipidemia 05/27/2014  Diastolic CHF ([address]  ESRD (end stage renal disease) on dialysis ([address] 02/24/2014  Nonischemic cardiomyopathy ([address] 02/24/2014 Plan: 
Chaplains will continue to follow and will provide pastoral care on an as needed/requested basis.  recommends bedside caregivers page  on duty if patient shows signs of acute spiritual or emotional distress. [address] [facility] [place] Board Certified [employer] [facility]ual [facility]  
[phone]

## 2021-02-02 NOTE — ROUTINE PROCESS
Called back patient's sister, Robin Clarke, with an update on the patient's condition. Explanation of ventilator support and CRRT procedures was provided. Patient's sister was given the opportunity to ask any additional questions, but did not have any further inquiries at this time. Patient's friend, Colby Nelson, called earlier asking for an update, but was told that his sister was the point of contact. Mrs. Juliano Wong was notified of this and was given  Nataliaduc's contact information to pass along updates, per her request. Patient's sister was grateful for the update and was very courteous to healthcare staff.

## 2021-02-02 NOTE — PROGRESS NOTES
Problem: Pressure Injury - Risk of 
Goal: *Prevention of pressure injury Description: Document Sotero Scale and appropriate interventions in the flowsheet. Outcome: Progressing Towards Goal 
Note: Pressure Injury Interventions: 
Sensory Interventions: Check visual cues for pain, Assess changes in LOC, Discuss PT/OT consult with provider, Keep linens dry and wrinkle-free, Minimize linen layers, Monitor skin under medical devices, Pad between skin to skin, Pressure redistribution bed/mattress (bed type), Turn and reposition approx. every two hours (pillows and wedges if needed) Moisture Interventions: Absorbent underpads, Apply protective barrier, creams and emollients, Check for incontinence Q2 hours and as needed, Maintain skin hydration (lotion/cream), Minimize layers, Moisture barrier Activity Interventions: Pressure redistribution bed/mattress(bed type), PT/OT evaluation Mobility Interventions: Pressure redistribution bed/mattress (bed type), PT/OT evaluation, Turn and reposition approx. every two hours(pillow and wedges) Nutrition Interventions: Document food/fluid/supplement intake Friction and Shear Interventions: Apply protective barrier, creams and emollients, HOB 30 degrees or less, Feet elevated on foot rest, Foam dressings/transparent film/skin sealants, Minimize layers Problem: Patient Education: Go to Patient Education Activity Goal: Patient/Family Education Outcome: Progressing Towards Goal 
  
Problem: Falls - Risk of 
Goal: *Absence of Falls Description: Document Glenny Stephenson Fall Risk and appropriate interventions in the flowsheet. Outcome: Progressing Towards Goal 
Note: Fall Risk Interventions: 
Mobility Interventions: Bed/chair exit alarm, PT Consult for mobility concerns, PT Consult for assist device competence, Strengthening exercises (ROM-active/passive) Mentation Interventions: Adequate sleep, hydration, pain control, Bed/chair exit alarm, Evaluate medications/consider consulting pharmacy, More frequent rounding, Reorient patient, Update white board Medication Interventions: Bed/chair exit alarm, Evaluate medications/consider consulting pharmacy Elimination Interventions: Toileting schedule/hourly rounds Problem: Patient Education: Go to Patient Education Activity Goal: Patient/Family Education Outcome: Progressing Towards Goal 
  
Problem: Breathing Pattern - Ineffective Goal: *Absence of hypoxia Outcome: Progressing Towards Goal 
Goal: *Use of effective breathing techniques Outcome: Progressing Towards Goal 
  
Problem: Risk for Spread of Infection Goal: Prevent transmission of infectious organism to others Description: Prevent the transmission of infectious organisms to other patients, staff members, and visitors. Outcome: Progressing Towards Goal 
  
Problem: Patient Education:  Go to Education Activity Goal: Patient/Family Education Outcome: Progressing Towards Goal 
  
Problem: Patient Education: Go to Patient Education Activity Goal: Patient/Family Education Outcome: Progressing Towards Goal 
  
Problem: Patient Education: Go to Patient Education Activity Goal: Patient/Family Education Outcome: Progressing Towards Goal 
  
Problem: Nutrition Deficit Goal: *Optimize nutritional status Outcome: Progressing Towards Goal 
  
Problem: Ventilator Management Goal: *Adequate oxygenation and ventilation Outcome: Progressing Towards Goal 
Goal: *Patient maintains clear airway/free of aspiration Outcome: Progressing Towards Goal 
Goal: *Absence of infection signs and symptoms Outcome: Progressing Towards Goal 
Goal: *Normal spontaneous ventilation Outcome: Progressing Towards Goal 
  
Problem: Patient Education: Go to Patient Education Activity Goal: Patient/Family Education Outcome: Progressing Towards Goal 
  
 Problem: Non-Violent Restraints Goal: *Removal from restraints as soon as assessed to be safe Outcome: Progressing Towards Goal 
Goal: *No harm/injury to patient while restraints in use Outcome: Progressing Towards Goal 
Goal: *Patient's dignity will be maintained Outcome: Progressing Towards Goal 
Goal: Non-violent Restaints:Standard Interventions Outcome: Progressing Towards Goal 
Goal: Non-violent Restraints:Patient Interventions Outcome: Progressing Towards Goal 
Goal: Patient/Family Education Outcome: Progressing Towards Goal 
  
Problem: Injury - Risk of, Adverse Drug Event Goal: *Absence of adverse drug events Outcome: Progressing Towards Goal 
Goal: *Absence of medication errors Outcome: Progressing Towards Goal 
Goal: *Knowledge of prescribed medications Outcome: Progressing Towards Goal 
  
Problem: Patient Education: Go to Patient Education Activity Goal: Patient/Family Education Outcome: Progressing Towards Goal 
  
Problem: Deep Venous Thrombosis - Risk of 
Goal: *Absence of deep venous thrombosis signs and symptoms(Stroke Metric) Outcome: Progressing Towards Goal 
Goal: *Absence of impaired coagulation signs and symptoms Outcome: Progressing Towards Goal 
Goal: *Knowledge of prescribed medications Outcome: Progressing Towards Goal 
Goal: *Absence of bleeding Outcome: Progressing Towards Goal 
  
Problem: Patient Education: Go to Patient Education Activity Goal: Patient/Family Education Outcome: Progressing Towards Goal

## 2021-02-02 NOTE — PROGRESS NOTES
RENAL DAILY PROGRESS NOTE 62y M with ESRD, admitted with covid 19 pneumonia following for ESRD management. Subjective:  
 
 
Complaint:  
Overnight  Event noted His CRRT machine clotted twice yesterday. Now on heparin and citrate for anticoagulation so far treatment is running without interruption Has been tolerated UF about -100 cc/h. Remain on vasopressors. IMPRESSION:  
· ESRD on TTS at [address] unit, worsening of hypoxia on this admission, started on CRRT on 2/1/21 · Access left arm AVF · COVID Pneumonia · S/p intubation  1/31 for severe hypoxia · Hypoxic resp failure · Hypotension, on vasopressure · Hx Diastolic CHF · Hyperlipidemia · Secondary hyperparathyroidism PLAN:  
· Plan to continue CRRT with UF goal 100 cc negative per hour. · Continue heparin, monitor platelet and hemoglobin. Titer rate per protocol. · Continue citrate, monitor calcium per protocol. 
   
  
Discussed with Dr. Pamela Flannery While on CRRT please check serum chemistry, ionized calcium and magnesium every six hours. Replete calcium , magnesium and potassium per ICU protocol. Goal to keep at least  
Potassium at  4, magnesium at 2 , ionized calcium level between  1.1-1.35 mmol/L Notify MD when Serum sodium < 130 or >150 Serum potassium remain < 3  Or > 5.5 Severe cardiac arrhythmia  Or frequent PVC during CRRT. If Patient having persistent cardiac arrhythmia stop CRRT Check state serum chemistry , ionized calcium , magnesium. All electrolyte replacement should be done through IV pump. Current Facility-Administered Medications Medication Dose Route Frequency  heparin 25,000 units in D5W 250 ml infusion  18-36 Units/kg/hr IntraVENous TITRATE  insulin glargine (LANTUS) injection 10 Units  10 Units SubCUTAneous Q12H  hydrocortisone Sod Succ (PF) (SOLU-CORTEF) injection 50 mg  50 mg IntraVENous Q6H  
  polyethylene glycol (MIRALAX) packet 17 g  17 g Oral DAILY  piperacillin-tazobactam (ZOSYN) 3.375 g in 0.9% sodium chloride (MBP/ADV) 100 mL MBP  3.375 g IntraVENous Q6H  
 anticoagulant citrate dextrose (ACD-A) infusion  200 mL/hr Hemodialysis CONTINUOUS  
 calcium gluconate 24 g in 500 ml NS IVPB  24 g IntraVENous TITRATE  chlorhexidine (PERIDEX) 0.12 % mouthwash 10 mL  10 mL Oral Q12H  
 midazolam (VERSED) injection 1-2 mg  1-2 mg IntraVENous Q30MIN PRN  
 fentaNYL citrate (PF) injection  mcg   mcg IntraVENous Q1H PRN  
 midazolam in normal saline (VERSED) 1 mg/mL infusion  1-10 mg/hr IntraVENous TITRATE  fentaNYL (PF) 900 mcg/30 ml infusion soln  0-200 mcg/hr IntraVENous TITRATE  
 NOREPINephrine (LEVOPHED) 8 mg in 5% dextrose 250mL (32 mcg/mL) infusion  0.5-50 mcg/min IntraVENous TITRATE  insulin lispro (HUMALOG) injection   SubCUTAneous Q6H  
 VANCOMYCIN INFORMATION NOTE   Other Rx Dosing/Monitoring  [Held by provider] metoprolol tartrate (LOPRESSOR) tablet 25 mg  25 mg Oral BID  albuterol-ipratropium (DUO-NEB) 2.5 MG-0.5 MG/3 ML  3 mL Nebulization Q4H RT  
 sodium chloride (OCEAN) 0.65 % nasal squeeze bottle 2 Spray  2 Spray Both Nostrils Q4H  
 midodrine (PROAMATINE) tablet 10 mg  10 mg Oral TID WITH MEALS  
 albumin human 25% (BUMINATE) solution 25 g  25 g IntraVENous DIALYSIS PRN  
 melatonin (rapid dissolve) tablet 5 mg  5 mg Oral QHS  glucose chewable tablet 16 g  4 Tab Oral PRN  
 glucagon (GLUCAGEN) injection 1 mg  1 mg IntraMUSCular PRN  
 dextrose (D50W) injection syrg 12.5-25 g  25-50 mL IntraVENous PRN  
 sevelamer carbonate (RENVELA) tab 800 mg  800 mg Oral TID WITH MEALS  sodium chloride (NS) flush 5-10 mL  5-10 mL IntraVENous PRN  
 sodium chloride (NS) flush 5-40 mL  5-40 mL IntraVENous Q8H  
 sodium chloride (NS) flush 5-40 mL  5-40 mL IntraVENous PRN  
 acetaminophen (TYLENOL) tablet 650 mg  650 mg Oral Q6H PRN  Or  
  acetaminophen (TYLENOL) suppository 650 mg  650 mg Rectal Q6H PRN  
 ascorbic acid (vitamin C) (VITAMIN C) tablet 1,000 mg  1,000 mg Oral Q6H  
 zinc sulfate (ZINCATE) 220 (50) mg capsule 2 Cap  2 Cap Oral DAILY  aspirin tablet 325 mg  325 mg Oral DAILY  famotidine (PEPCID) tablet 20 mg  20 mg Oral DAILY  [Held by provider] carvediloL (COREG) tablet 3.125 mg  3.125 mg Oral BID WITH MEALS  pravastatin (PRAVACHOL) tablet 20 mg  20 mg Oral DAILY  midodrine (PROAMATINE) tablet 10 mg  10 mg Oral DIALYSIS PRN Objective:  
 
Patient Vitals for the past 24 hrs: 
 Temp Pulse Resp BP SpO2  
02/02/21 1500 96.9 °F (36.1 °C) 98 20 131/70   
02/02/21 1430 (!) 96 °F (35.6 °C) 95 19    
02/02/21 1400 (!) 96 °F (35.6 °C) 100 23 123/62   
02/02/21 1330  95     
02/02/21 1300 (!) 95.9 °F (35.5 °C) 100 22 133/73   
02/02/21 1230 (!) 95.9 °F (35.5 °C) 93 23    
02/02/21 1200 97.8 °F (36.6 °C) 97 20 137/75 98 % 02/02/21 1130 (!) 96.2 °F (35.7 °C) 97 19    
02/02/21 1115  98 22    
02/02/21 1100 (!) 96.2 °F (35.7 °C) 98 23 130/63   
02/02/21 1030 (!) 96.2 °F (35.7 °C) 99 22    
02/02/21 1000 (!) 96.3 °F (35.7 °C) 97 11 (!) 142/78 97 % 02/02/21 0930 (!) 96.2 °F (35.7 °C) 100 12    
02/02/21 0900 (!) 96.2 °F (35.7 °C) 99 12 123/66 97 % 02/02/21 0830 (!) 96.2 °F (35.7 °C) 100 11    
02/02/21 0800 97 °F (36.1 °C) 100 12 129/72 98 % 02/02/21 0747   22  98 % 02/02/21 0730 (!) 95.6 °F (35.3 °C) (!) 103 13  97 % 02/02/21 0700 96.9 °F (36.1 °C) (!) 108 14 128/68 98 % 02/02/21 0600 97.2 °F (36.2 °C) (!) 115 15 130/63 96 % 02/02/21 0532  (!) 109 22  97 % 02/02/21 0500 97.2 °F (36.2 °C) (!) 111 23 125/60 97 % 02/02/21 0400 97.4 °F (36.3 °C) (!) 111 21 (!) 143/67 95 % 02/02/21 0300 97.5 °F (36.4 °C) (!) 117 15 (!) 130/57 94 % 02/02/21 0200 98 °F (36.7 °C) (!) 118 21 (!) 144/65 94 % 02/02/21 0100 97.6 °F (36.4 °C) (!) 120 22 (!) 158/63 95 % 02/02/21 0038  (!) 107 26  94 % 02/02/21 0000 97 °F (36.1 °C) (!) 111 20 (!) 143/54 92 % 02/01/21 2300 97.2 °F (36.2 °C) (!) 105 20 (!) 83/19 93 % 02/01/21 2200 (!) 96.6 °F (35.9 °C) (!) 106 19 (!) 178/72 95 % 02/01/21 2100 (!) 95.7 °F (35.4 °C) (!) 104 23 (!) 155/72 95 % 02/01/21 2000 (!) 96.7 °F (35.9 °C) (!) 105 24 (!) 153/73 94 % 02/01/21 1930  98 24  94 % 02/01/21 1900 (!) 96 °F (35.6 °C) (!) 103 20 (!) 160/66 93 % 02/01/21 1830  (!) 108 14  93 % 02/01/21 1800  (!) 108 18 (!) 156/71 94 % 02/01/21 1730  (!) 112 24  94 % 02/01/21 1700  (!) 110 25 (!) 141/70 94 % 02/01/21 1639  (!) 103 23  94 % 02/01/21 1630  (!) 103 23  94 % 02/01/21 1600 97.7 °F (36.5 °C) (!) 103 24  93 % Weight change: 0 kg (0 lb) 01/31 1901 - 02/02 0700 In: 2862.2 [I.V.:2632.2] Out: 3646 Intake/Output Summary (Last 24 hours) at 2/2/2021 1543 Last data filed at 2/2/2021 1500 Gross per 24 hour Intake 2700.69 ml Output 3957 ml Net -1256.31 ml Patient was seen during the [place] pandemic. Patient with COVID infection . Patient is in isolation room due to COVID status and PPE is in short supply hence seen through glass window and d/w patients RN. Full contact physical exam was not possible due to patient's clinical condition, key findings seen by me are documented. In addition, I have also used findings documented by physicians who have recently examined thee patient as they are relevant to the patient's renal care. Intubated NSR on tele Data Review:  
 
LABS:  
Hematology:  
Recent Labs 02/02/21 
0300 02/01/21 
1045 01/31/21 
0745 WBC 20.2* 17.8* 16.2* HGB 10.3* 10.9* 11.7* HCT 29.9* 31.8* 34.4* Chemistry:  
Recent Labs 02/02/21 
1200 02/02/21 
0800 02/02/21 
0300 02/01/21 
2218 02/01/21 
1800 02/01/21 
1430 02/01/21 
1045 01/31/21 
0745 BUN 72* 81* 94* 96* 104* 103* 115* 95* CREA 7.04* 8.33* 10.10* 10.40* 11.30* 11.80* 14.00* 13.60* CA 10.5* 10.3* 10.1 9.5 10.2* 10.3* 10.6* 10.8* ALB 3.0* 3.0* 3.0* 3.0* 3.1* 3.2* 3.2* 3.5  
K 4.4 4.6 4.9 5.0 5.1 5.1 5.7* 5.3 * 136 133* 134* 134* 137 136 139 CL 96* 98* 97* 96* 97* 99* 96* 99* CO2 22 22 19* 20* 19* 19* 20* 22 PHOS 7.5* 7.7* 8.5* 8.5* >9.0* 8.5*  --   --   
* 256* 262* 299* 229* 199* 196* 191* Procedures/imaging: see electronic medical records for all procedures, Xrays and details which were not copied into this note but were reviewed prior to creation of Andrae Urrutia MD 
2/2/2021

## 2021-02-02 NOTE — PROGRESS NOTES
Problem: Pressure Injury - Risk of 
Goal: *Prevention of pressure injury Description: Document Sotero Scale and appropriate interventions in the flowsheet. Outcome: Progressing Towards Goal 
Note: Pressure Injury Interventions: 
Sensory Interventions: Assess changes in LOC, Assess need for specialty bed, Avoid rigorous massage over bony prominences, Check visual cues for pain, Float heels, Keep linens dry and wrinkle-free, Monitor skin under medical devices, Pressure redistribution bed/mattress (bed type), Turn and reposition approx. every two hours (pillows and wedges if needed) Moisture Interventions: Absorbent underpads, Apply protective barrier, creams and emollients, Check for incontinence Q2 hours and as needed, Assess need for specialty bed, Minimize layers Activity Interventions: Assess need for specialty bed, Pressure redistribution bed/mattress(bed type) Mobility Interventions: Assess need for specialty bed, Float heels, Pressure redistribution bed/mattress (bed type), Turn and reposition approx. every two hours(pillow and wedges) Nutrition Interventions: Document food/fluid/supplement intake, Discuss nutritional consult with provider Friction and Shear Interventions: Apply protective barrier, creams and emollients, Feet elevated on foot rest, Foam dressings/transparent film/skin sealants, Lift sheet Problem: Patient Education: Go to Patient Education Activity Goal: Patient/Family Education Outcome: Progressing Towards Goal 
  
Problem: Falls - Risk of 
Goal: *Absence of Falls Description: Document Danne Lobe Fall Risk and appropriate interventions in the flowsheet. Outcome: Progressing Towards Goal 
Note: Fall Risk Interventions: 
Mobility Interventions: Bed/chair exit alarm, Strengthening exercises (ROM-active/passive) Mentation Interventions: Adequate sleep, hydration, pain control, Evaluate medications/consider consulting pharmacy, More frequent rounding, Reorient patient, Room close to nurse's station, Toileting rounds, Update white board Medication Interventions: Evaluate medications/consider consulting pharmacy Elimination Interventions: Toileting schedule/hourly rounds Problem: Patient Education: Go to Patient Education Activity Goal: Patient/Family Education Outcome: Progressing Towards Goal 
  
Problem: Breathing Pattern - Ineffective Goal: *Absence of hypoxia Outcome: Progressing Towards Goal 
Goal: *Use of effective breathing techniques Outcome: Progressing Towards Goal 
  
Problem: Patient Education: Go to Patient Education Activity Goal: Patient/Family Education Outcome: Progressing Towards Goal 
  
Problem: Risk for Spread of Infection Goal: Prevent transmission of infectious organism to others Description: Prevent the transmission of infectious organisms to other patients, staff members, and visitors. Outcome: Progressing Towards Goal 
  
Problem: Patient Education:  Go to Education Activity Goal: Patient/Family Education Outcome: Progressing Towards Goal 
  
Problem: Patient Education: Go to Patient Education Activity Goal: Patient/Family Education Outcome: Progressing Towards Goal 
  
Problem: Patient Education: Go to Patient Education Activity Goal: Patient/Family Education Outcome: Progressing Towards Goal 
  
Problem: Nutrition Deficit Goal: *Optimize nutritional status Outcome: Progressing Towards Goal 
  
Problem: Ventilator Management Goal: *Adequate oxygenation and ventilation Outcome: Progressing Towards Goal 
Goal: *Patient maintains clear airway/free of aspiration Outcome: Progressing Towards Goal 
Goal: *Absence of infection signs and symptoms Outcome: Progressing Towards Goal 
Goal: *Normal spontaneous ventilation Outcome: Progressing Towards Goal 
  
 Problem: Patient Education: Go to Patient Education Activity Goal: Patient/Family Education Outcome: Progressing Towards Goal 
  
Problem: Non-Violent Restraints Goal: *Removal from restraints as soon as assessed to be safe Outcome: Progressing Towards Goal 
Goal: *No harm/injury to patient while restraints in use Outcome: Progressing Towards Goal 
Goal: *Patient's dignity will be maintained Outcome: Progressing Towards Goal 
Goal: *Patient Specific Goal (EDIT GOAL, INSERT TEXT) Outcome: Progressing Towards Goal 
Goal: Non-violent Restaints:Standard Interventions Outcome: Progressing Towards Goal 
Goal: Non-violent Restraints:Patient Interventions Outcome: Progressing Towards Goal 
Goal: Patient/Family Education Outcome: Progressing Towards Goal 
  
Problem: Injury - Risk of, Adverse Drug Event Goal: *Absence of adverse drug events Outcome: Progressing Towards Goal 
Goal: *Absence of medication errors Outcome: Progressing Towards Goal 
Goal: *Knowledge of prescribed medications Outcome: Progressing Towards Goal 
  
Problem: Patient Education: Go to Patient Education Activity Goal: Patient/Family Education Outcome: Progressing Towards Goal 
  
Problem: Deep Venous Thrombosis - Risk of 
Goal: *Absence of deep venous thrombosis signs and symptoms(Stroke Metric) Outcome: Progressing Towards Goal 
Goal: *Absence of impaired coagulation signs and symptoms Outcome: Progressing Towards Goal 
Goal: *Knowledge of prescribed medications Outcome: Progressing Towards Goal 
Goal: *Absence of bleeding Outcome: Progressing Towards Goal 
  
Problem: Patient Education: Go to Patient Education Activity Goal: Patient/Family Education Outcome: Progressing Towards Goal

## 2021-02-02 NOTE — PROGRESS NOTES
[facility] [place] 
 
 
CARDIOLOGY PROGRESS NOTE 
RECS: 
 
 
 
1. Acute Hypoxic respiratory failure -S/p intubation  1/31 for severe hypoxia. 2. COVID - 19 pneumonia with consolidations bilateral  lower lobes -worsening infection with fevers and leukocytosis-  antibiotics per ID recs. 3. Detectable troponin - 0.51, 0.42, 0.33- EKG SR, non-specific ST changes. Elevation likely due to demand in setting of respiratory failure. Continue aspirin 325 mg. Recent Echo shows preserve EF% 4. Hypotension- requiring pressor support also on midodrine 5. History of dilated cardiomyopathy -  (EF 15% 11/2016) -  EF improved on echo  9/2019, recent echo EF 55%-60%. NST 2018 intermediate risk NUC stress test Fixed defect consistent with prior myocardial infarction. 6. Chronic diastolic CHF -compensated now. 7. Hyperlipidemia - continue statin 8. ESRD on dialysis - HD today 9. Acute metabolic encephalopathy- with delirium 10. Dilated ascending aorta measuring 4.5 cm on echo 2019 -stable recent echo shows Moderate aortic root dilatation. (4.1 cm) 11. Hx of alcohol abuse. Continue supportive care Intubated on [facility] ventilator- elevated troponin likely from demand ischemia. On pressors. Above plan discussed with PCCM. Echo 1/21 · LV: Estimated LVEF is 55 - 60%. Visually measured ejection fraction. Normal cavity size and systolic function (ejection fraction normal). Moderate concentric hypertrophy. Wall motion: normal. 
· AV: Mild aortic valve regurgitation is present. · PA: Pulmonary arterial systolic pressure is 23 mmHg. · AO: Moderate aortic root dilatation. (4.1 cm) · COVID (+) ASSESSMENT: 
Hospital Problems  Date Reviewed: 1/3/2019 Codes Class Noted POA * (Principal) Acute respiratory failure due to COVID-19 [facility]) ICD-10-CM: U07.1, J96.00 
ICD-9-CM: 518.81, 079.89  1/23/2021 Unknown SUBJECTIVE: Intubated and sedated OBJECTIVE: 
 
VS:  
 Visit Vitals /63 Pulse 98 Temp 97.8 °F (36.6 °C) Resp 23 Ht 5' 9\" (1.753 m) Wt 110.4 kg (243 lb 6.2 oz) SpO2 97% BMI 35.94 kg/m² Intake/Output Summary (Last 24 hours) at 2/2/2021 1206 Last data filed at 2/2/2021 1200 Gross per 24 hour Intake 2406.79 ml Output 3604 ml Net -1197.21 ml  
 
TELE: Sinus rhythm in the 90s and low 100s Limited physical exam due to COV-19. Patient seen across the glass door and discussed with other attendings. General: Intubated and sedated HENT: Normocephalic, atraumatic.  Normal external eye. Neck :  no JVD Cardiac: Regular rhythm on monitor Extremities:  No edema 
  
 
 
 
Labs: Results:  
   
Chemistry Recent Labs 02/02/21 
0800 02/02/21 
0300 02/01/21 2218 02/01/21 
1045 02/01/21 
1045 01/31/21 
0745 * 262* 299*   < > 196* 191*  133* 134*   < > 136 139  
K 4.6 4.9 5.0   < > 5.7* 5.3 CL 98* 97* 96*   < > 96* 99* CO2 22 19* 20*   < > 20* 22 BUN 81* 94* 96*   < > 115* 95* CREA 8.33* 10.10* 10.40*   < > 14.00* 13.60* CA 10.3* 10.1 9.5   < > 10.6* 10.8* AGAP 16 17 18   < > 20* 18 BUCR 10* 9* 9*   < > 8* 7* AP  --  146*  --   --  126* 108 TP  --  7.6  --   --  7.6 8.7* ALB 3.0* 3.0* 3.0*   < > 3.2* 3.5 GLOB  --  4.6*  --   --  4.4* 5.2* AGRAT  --  0.7*  --   --  0.7* 0.7*  
 < > = values in this interval not displayed. CBC w/Diff Recent Labs 02/02/21 
0300 02/01/21 
1045 01/31/21 
0745 WBC 20.2* 17.8* 16.2*  
RBC 3.60* 3.78* 4.07* HGB 10.3* 10.9* 11.7* HCT 29.9* 31.8* 34.4*  
 336 321 GRANS 85* 85* 88* LYMPH 6* 9* 4*  
EOS 0 0 0 Cardiac Enzymes No results for input(s): CPK, CKND1, SHRAVAN in the last 72 hours. No lab exists for component: Severiano Arias Coagulation Recent Labs 02/02/21 
0800 APTT >180.0* Lipid Panel Lab Results Component Value Date/Time  Cholesterol, total 155 08/23/2018 03:50 PM  
 HDL Cholesterol 72 (H) 08/23/2018 03:50 PM  
 LDL, calculated 71 08/23/2018 03:50 PM  
 VLDL, calculated 12 08/23/2018 03:50 PM  
 Triglyceride 60 08/23/2018 03:50 PM  
 CHOL/HDL Ratio 2.2 08/23/2018 03:50 PM  
  
BNP No results for input(s): BNPP in the last 72 hours. Liver Enzymes Recent Labs 02/02/21 
0800 02/02/21 
0300 TP  --  7.6 ALB 3.0* 3.0*  
AP  --  146* Thyroid Studies Lab Results Component Value Date/Time  TSH 1.17 08/23/2018 03:50 PM  
    
 
 
Dora Morton MD

## 2021-02-02 NOTE — DIABETES MGMT
GLYCEMIC CONTROL PLAN OF CARE Assessment/Recommendations: 
Blood glucose this am 262 mg/dl Noted steroids changed from dexamethasone, to solucortef 50 mg every 6 hours. Recommend increasing lantus dose to 10 units every 12 hours Continue corrective insulin coverage as needed Already receiving very insulin resistant dosing. Will continue inpatient monitoring. Most recent blood glucose values: 
 
 
 
Results for Carmita Palma (MRN [id number]) as of 2/2/2021 11:38 Ref. Range 2/1/2021 05:45 2/1/2021 11:57 2/1/2021 17:54 GLUCOSE,FAST - POC Latest Ref Range: 70 - 110 mg/dL 182 (H) 211 (H) 231 (H) Current A1C of 6.1 % is equivalent to average blood glucose of 128 mg/dl over the past 2-3 months. Current hospital diabetes medications:  
lantus 10 units daily Lispro corrective insulin coverage every 6 hours Previous day's insulin requirements:  
lantus 10 units Lispro 21 units corrective insulin Home diabetes medications: 
None noted Diet:   
NPO. TF VHP at 10 ml/hr Education:  ____Refer to Diabetes Education Record __x_Education not indicated at this time 
intubated James Johnson RN CDE Ext [phone]

## 2021-02-02 NOTE — PROGRESS NOTES
3 [facility] ICU Progress Note Name: Connie Elizondo : 1960 MRN: [id number] Date: 2021 9:10 AM 
  
[x]I have reviewed the flowsheet and previous days notes. Events overnight reviewed and discussed with nursing staff. Vital signs and records reviewed. [x]The patient is unable to give any meaningful history or review of systems because the patient is: 
[]Intubated [x]Sedated  
[]Unresponsive []The patient is critically ill on     
[x]Mechanical ventilation [x]Pressors []BiPAP [] The patient is: [x] acutely ill Risk of deterioration: [x] moderate  
 [] critically ill  [x] high  
 
[x]See my orders for details My assessment/plan was discussed with: 
[x]Nursing []PT/OT [x]Respiratory therapy []Family IMPRESSION:  
· Acute Hypoxic Respiratory failure -  COVID-19 PNA with worsening ARDS. Intubated on  persistent tachypnea. · COVID-19 PNA - with significant consolidations trevon lower lobes · ARDS, severe - likely worsened by fluid overload in the setting of COVID-19 pneumonia/sepsis. · CHFpEF/hypertrophic cardiomyopathy -  TTE shows concentric hypertrophy · ESRD on dialysis · Respiratory alkalosis from tachypnea/respiratory distress · History of dilated cardiomyopathy · Hypertension · Acute delirium:  Due to above Patient Active Problem List  
Diagnosis Code  ESRD (end stage renal disease) on dialysis (HCC) N18.6, Z99.2  Nonischemic cardiomyopathy (HCC) I42.8  Diastolic CHF ([facility]) N40.63  Hyperlipidemia E78.5  Essential hypertension I10  
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 32S0  Systolic CHF, chronic (HCC) I50.22  Severe obesity (BMI 35.0-39. 9) with comorbidity ([address] E66.01  
 ESRD on dialysis ([facility] [address] N18.6, Z99.2  Acute respiratory failure due to COVID-19 (HCC) U07.1, J96.00 PLAN:  
 · Resp: Titrate FiO2 O2 for SpO2 >90%; optimize bronchial hygiene. Daily CXR and ABG while intubated. Con't duo-nebs q4. · I/D: f/u BxCx/UC, Sputum Cx's. F/U Fungitell. ID following. Trend LA q4 until normal. Trend Pro-Severiano PRN. Con't ABX - Vanc & Zosyn. Deescalate ABX once Cx's finalize. Trend temp & WBC curve. · Hem/Onc: Daily CBC; H/H, and plts are stable. Started anticoagulant citrate dextrose and heparin gtt. · CVS: Continue Levophed gtt, titrate vasopressors as tolerated with goal MAP >65mmHg. Hold BP meds. · Metabolic: Daily BMP, Mag & Phos; monitor e-lytes; replace PRN 
· Renal: Trend Renal indices; +Martinez. Strict I/Os. Nephrology following. Continue CRRT. · Endocrine: POC Glucose q6; SSI and Lantus. Con't vitamins. Con't Decadron 6mg q24 x10 day course. Monitor BS while on steroids. · GI: SUP, Trend LFTs, Zofran PRN for N/V. Plan to start trickle feeds. · Musc/Skin: No acute issues, wound care · Neuro: D/C'd Precedex gtt 2/2 hypotension. Con't Versed and Fentanyl gtt for sedation. S/p 10mg Valium IVP for initial sedation. PRN Versed/ Fentanyl for breakthrough sedation needs. RASS 0 to -1. Bilateral wrist restraints for pt safety. · Fluids: none · Code status: Full Code Best Practices/ Safety Bundles: 
· Sepsis Bundle per Hospital Protocol · CAUTI Bundle · Electrolyte Replacement Bundle · Glycemic control goal <180; avoid Hypoglycemia · IHI ICU Bundles: ·  Martinez Bundle Followed and Vent Bundle Followed, Vent Day 3  
· [facility] Vent patients: · VAP bundle, Aim to keep peak plateau pressure 41-04DB H2O 
· Aspiration Precautions - HOB >30' · Daily sedation holiday as indicated · SBT as tolerated/appropriate · Titrate FiO2 for SpO2 >94% · Aggressive Pulmonary Hygeiene · Stress ulcer prophylaxis: Pepcid · DVT prophylaxis: SQH 
· Need for Lines, martinez assessed. · Restraints need. · Palliative care evaluation - Not consulted ye Subjective/History:  
  
Interval HPI: 
 HPI per Dr. Geremias Horne a 62 y. o. male with PMH arthritis, CKD on HD MWF, gout HTN, HLD, now presenting with complaint of SOB.    Pt is known positive COVID for over 1wk.   Went to dialysis center and was noted to have a fever 100.6 here. [facility] had a headache, eye pain and chills .  No known Covid exposure.  Nominal pain diarrhea no chest pain or shortness of breath. Was seen in ER 1wk ago with above complaints and d/c home with CDC recommendations.  Over the past few days he has felt progressively worse and was found tripoding today when he was picked up for dialysis. [facility] currently states he feels fine and has no complaints. He denies sick contacts, CP, [place].  He missed dialysis on day of admission. Initial SpO2 90% on 5lpm NC so placed on HFC.   
CXR showed patchy infiltrations/consolidations in bilateral mid/lower lungs 
  
Subjective 21 Hospital Day: 3 Vent Day: 3 
- Heparin gtt started O/N for clotting 
- Continue CRRT today. Nephro following ROS:Review of systems not obtained due to patient factors. COVID Tx Review: ? Remdesivir - (-) ? Convalascent Plasma - Need to obtain consent, not ordered yet, defer to ID ? Decadron - Day 7 of  10 (-) ? Vitamins - Vit C, Zinc 
? SQH Medication Review · Pressors - Levophed gtt (weaning down) · Sedation - Versed & Fentanyl gtt with PRNs as well · Antibiotics - Vanc & Zosyn · Pain - PRN Fentanyl · GI/ DVT - Pepcid; SQH 
· Others (other gtts) Vital Signs:   
Visit Vitals /73 Pulse 100 Temp 97.8 °F (36.6 °C) Resp 22 Ht 5' 9\" (1.753 m) Wt 110.4 kg (243 lb 6.2 oz) SpO2 98% BMI 35.94 kg/m² O2 Device: Endotracheal tube, Ventilator O2 Flow Rate (L/min): 40 l/min Temp (24hrs), Av.2 °F (36.2 °C), Min:95.7 °F (35.4 °C), Max:98 °F (36.7 °C) Intake/Output:  
Last shift:      701 - 1900 In: 684 [I.V.:569] Out: 1298 Last 3 shifts: 1901 - 700 In: 2862.2 [I.V.:2632.2] Out: 1671 Intake/Output Summary (Last 24 hours) at 2/2/2021 1342 Last data filed at 2/2/2021 1300 Gross per 24 hour Intake 2583.3 ml Output 3718 ml Net -1134.7 ml  
 
 
Ventilator Settings: 
Mode Rate Tidal Volume Pressure FiO2 PEEP Assist control, VC+   500 ml    100 % 20 cm H20 Peak airway pressure: 36 cm H2O Minute ventilation: 14 l/min ARDS network Guidelines:  
Lung protective strategy and Plateau  Pressure goal < 30 cm H2O goals Oxygenation Goals PaO2 55-80 mm Hg or SaO2 88-95% PH goal 7.30-7.45 VAP bundle: 
Reviewed. [place] tube to suction at 20-30 cm Hg. Maintain [place] tube with 5-10ml air every 4 hours Routine oral care every 4 hours Elevation of head > 45 degree Daily sedation holiday and SBT evaluation starting at 6.00am. 
 
Physical Exam 
Constitutional:   
   General: He is not in acute distress. Comments: Intubated/sedated Cardiovascular:  
   Rate and Rhythm: Regular rhythm. Tachycardia present. Pulses: Normal pulses. Pulmonary:  
   Comments: Symmetrical chest rise, no accessory muscle use. Coarse breath sounds throughout. Intubated + vent PEEP 20.0 FiO2 100% Abdominal:  
   General: There is no distension. Palpations: Abdomen is soft. Musculoskeletal:     
   General: No swelling. Right lower leg: No edema. Left lower leg: No edema. Skin: 
   General: Skin is warm. Findings: No lesion or rash. Neurological:  
   General: No focal deficit present. Devices:  ETT, OGT, Vitale DATA:  
 
Current Facility-Administered Medications Medication Dose Route Frequency  heparin 25,000 units in D5W 250 ml infusion  18-36 Units/kg/hr IntraVENous TITRATE  insulin glargine (LANTUS) injection 10 Units  10 Units SubCUTAneous Q12H  hydrocortisone Sod Succ (PF) (SOLU-CORTEF) injection 50 mg  50 mg IntraVENous Q6H  
 polyethylene glycol (MIRALAX) packet 17 g  17 g Oral DAILY  piperacillin-tazobactam (ZOSYN) 3.375 g in 0.9% sodium chloride (MBP/ADV) 100 mL MBP  3.375 g IntraVENous Q6H  
 anticoagulant citrate dextrose (ACD-A) infusion  200 mL/hr Hemodialysis CONTINUOUS  
 calcium gluconate 24 g in 500 ml NS IVPB  24 g IntraVENous TITRATE  chlorhexidine (PERIDEX) 0.12 % mouthwash 10 mL  10 mL Oral Q12H  
 midazolam (VERSED) injection 1-2 mg  1-2 mg IntraVENous Q30MIN PRN  
 fentaNYL citrate (PF) injection  mcg   mcg IntraVENous Q1H PRN  
 midazolam in normal saline (VERSED) 1 mg/mL infusion  1-10 mg/hr IntraVENous TITRATE  fentaNYL (PF) 900 mcg/30 ml infusion soln  0-200 mcg/hr IntraVENous TITRATE  
 NOREPINephrine (LEVOPHED) 8 mg in 5% dextrose 250mL (32 mcg/mL) infusion  0.5-50 mcg/min IntraVENous TITRATE  insulin lispro (HUMALOG) injection   SubCUTAneous Q6H  
 Piperacillin-tazobactam (ZOSYN) 0.75 gm Supplemental Dosing by Pharmacy   Other Rx Dosing/Monitoring  VANCOMYCIN INFORMATION NOTE   Other Rx Dosing/Monitoring  [Held by provider] metoprolol tartrate (LOPRESSOR) tablet 25 mg  25 mg Oral BID  albuterol-ipratropium (DUO-NEB) 2.5 MG-0.5 MG/3 ML  3 mL Nebulization Q4H RT  
 sodium chloride (OCEAN) 0.65 % nasal squeeze bottle 2 Spray  2 Spray Both Nostrils Q4H  
 midodrine (PROAMATINE) tablet 10 mg  10 mg Oral TID WITH MEALS  
 albumin human 25% (BUMINATE) solution 25 g  25 g IntraVENous DIALYSIS PRN  
 melatonin (rapid dissolve) tablet 5 mg  5 mg Oral QHS  glucose chewable tablet 16 g  4 Tab Oral PRN  
 glucagon (GLUCAGEN) injection 1 mg  1 mg IntraMUSCular PRN  
 dextrose (D50W) injection syrg 12.5-25 g  25-50 mL IntraVENous PRN  
 sevelamer carbonate (RENVELA) tab 800 mg  800 mg Oral TID WITH MEALS  sodium chloride (NS) flush 5-10 mL  5-10 mL IntraVENous PRN  
 sodium chloride (NS) flush 5-40 mL  5-40 mL IntraVENous Q8H  
 sodium chloride (NS) flush 5-40 mL  5-40 mL IntraVENous PRN  
  acetaminophen (TYLENOL) tablet 650 mg  650 mg Oral Q6H PRN Or  
 acetaminophen (TYLENOL) suppository 650 mg  650 mg Rectal Q6H PRN  
 ascorbic acid (vitamin C) (VITAMIN C) tablet 1,000 mg  1,000 mg Oral Q6H  
 zinc sulfate (ZINCATE) 220 (50) mg capsule 2 Cap  2 Cap Oral DAILY  aspirin tablet 325 mg  325 mg Oral DAILY  famotidine (PEPCID) tablet 20 mg  20 mg Oral DAILY  [Held by provider] carvediloL (COREG) tablet 3.125 mg  3.125 mg Oral BID WITH MEALS  pravastatin (PRAVACHOL) tablet 20 mg  20 mg Oral DAILY  midodrine (PROAMATINE) tablet 10 mg  10 mg Oral DIALYSIS PRN Labs: Results:  
   
Chemistry Recent Labs 02/02/21 
1200 02/02/21 
0800 02/02/21 
0300 02/01/21 
1045 02/01/21 
1045 01/31/21 
0745 * 256* 262*   < > 196* 191* * 136 133*   < > 136 139  
K 4.4 4.6 4.9   < > 5.7* 5.3 CL 96* 98* 97*   < > 96* 99* CO2 22 22 19*   < > 20* 22 BUN 72* 81* 94*   < > 115* 95* CREA 7.04* 8.33* 10.10*   < > 14.00* 13.60* CA 10.5* 10.3* 10.1   < > 10.6* 10.8* AGAP 15 16 17   < > 20* 18 BUCR 10* 10* 9*   < > 8* 7* AP  --   --  146*  --  126* 108 TP  --   --  7.6  --  7.6 8.7* ALB 3.0* 3.0* 3.0*   < > 3.2* 3.5 GLOB  --   --  4.6*  --  4.4* 5.2* AGRAT  --   --  0.7*  --  0.7* 0.7*  
 < > = values in this interval not displayed. CBC w/Diff Recent Labs 02/02/21 
0300 02/01/21 
1045 01/31/21 
0745 WBC 20.2* 17.8* 16.2*  
RBC 3.60* 3.78* 4.07* HGB 10.3* 10.9* 11.7* HCT 29.9* 31.8* 34.4*  
 336 321 GRANS 85* 85* 88* LYMPH 6* 9* 4*  
EOS 0 0 0 Coagulation Recent Labs 02/02/21 
0800 APTT >180.0* Liver Enzymes Recent Labs 02/02/21 
1200 02/02/21 
0300 02/02/21 
0300 TP  --   --  7.6 ALB 3.0*   < > 3.0*  
AP  --   --  146*  
 < > = values in this interval not displayed. ABG Lab Results Component Value Date/Time  PHI 7.27 (L) 02/02/2021 05:39 AM  
 PCO2I 46.4 (H) 02/02/2021 05:39 AM  
 PO2I 64 (L) 02/02/2021 05:39 AM  
 HCO3I 21.5 (L) 02/02/2021 05:39 AM  
 FIO2I 100 02/02/2021 05:39 AM  
  
Microbiology Recent Labs  
  01/31/21 
1800 01/30/21 0499 52 06 34 01/30/21 
1506 CULT PENDING NO GROWTH 3 DAYS NO GROWTH 3 DAYS Telemetry: [x]Sinus []A-flutter []Paced []A-fib []Multiple PVCs Imaging: CXR [01/31/21]: FINDINGS: 
  
LINES/TUBES: Interval placement of endotracheal tube with tip approximately 4 to 
have centimeters from the maricarmen. 
  
MEDIASTINUM: Cardiac silhouette is within normal limits. 
  
LUNGS: Redemonstration of extensive bilateral pulmonary opacities. 
  
BONES/OTHER: No acute osseous abnormality. 
  
  
IMPRESSION Satisfactory placement of endotracheal tube. Otherwise, no significant change from prior. CTA CHEST W OR W WO CONT [01/23/21]: FINDINGS: 
  
PULMONARY ARTERIES: The contrast bolus is adequate. Although, moderate motion 
artifact noted which significantly compromises the evaluation of the small 
arterial branches. The right and left mainstem pulmonary arteries and their 
lobar branches appear patent without convincing evidence of intraluminal filling 
defect identified to suggest pulmonary embolism. There are questionable 
nonocclusive intraluminal linear filling defects in the segmental/subsegmental 
branches at anterior and lateral right upper lobe and right middle lobe, 
uncertain due to artifact or potential tiny PE. 
  
AORTA AND OTHER CARDIOVASCULAR STRUCTURES: Mild ectasia of thoracic aorta. No 
aortic aneurysm or dissection. Mild burden of  coronary artery calcifications. Heart Strain assessment: 
-  RV/LV ratio (normal <0.9): Normal 
-  Dysfunction or bowing of interventricular septum: None -  Main pulmonary artery enlargement (>30mm): Not present -  There is not visualization of contrast reflux from the right heart into the 
IVC/hepatic veins. 
  
LUNG PARENCHYMA: There are multiple patchy areas of airspace consolidations identified throughout both lungs, more pronounced in bilateral lower lobes and 
inferior left upper lobe. Patent airway. 
  
IMAGED THYROID: Unremarkable. 
  
MEDIASTINUM: Borderline adenopathy in bilateral chu and subcarinal 
mediastinum. .  
  
PLEURAL SPACES AND CHEST WALL: No significant pleural effusion. Mild pleural 
thickening. 
  
VISUALIZED UPPER ABDOMEN: Very atrophic right kidney is partially seen. 
  
OSSEOUS STRUCTURES: Unremarkable.  
  
IMPRESSION: 
  
1. No convincing CT evidence of pulmonary embolism in mainstem and lobar 
arteries. Questionable nonocclusive intraluminal filling defects identified in 
right upper and middle lobe segmental subsegmental images, artifact versus tiny 
nonocclusive PE. 
  
2. Extensive patchy consolidation pneumonia in bilateral lungs, Covid infection 
is more concerned than pulmonary infarctions. Clinical correlation and 
short-term follow-up CT advised. 
  
Thank you for your referral 
 
[x]I have personally reviewed the patients radiographs []Radiographs reviewed with radiologist 
 []No change from prior, tubes and lines in adequate position []Improved   []Worsening 
 
  
   
  
Juliet Fabian MD, PGY1 [facility] Rotation February 1, 2021 1:42 PM

## 2021-02-02 NOTE — PROGRESS NOTES
Nutrition Note Plan to start trickle feeding once able to obtain feeding pump- ordered but not started yesterday; remains on CRRT and levophed with no recent BM (since 1/26) and started on bowel regimen yesterday (17 gm miralax once daily), anuric with elevated phos- trending down and receiving renvela. Recommendations/Plan  
- Start tube feeding of Vital High Protein at 10 mL/hr with 50 mL q 4 hour water flushes; monitor tolerance and ability to advance to goal rate of 60 mL/hr and add prosource BID (goal regimen to provide 1560 kcal, 156 gm protein, 1204 mL free water, 100% RDIs). - Continue bowel regimen. Electronically signed by Neha Fulton RD, [address]  on 2/2/2021 at 1:00 PM 
 
Contact: [phone]

## 2021-02-03 NOTE — PROGRESS NOTES
attended the interdisciplinary rounds for Judith Sunshine, who is a 62 y.o.,male. Patients Primary Language is: [place]. According to the patients EMR Mormon Affiliation is: [place].  
 
The reason the Patient came to the hospital is:  
Patient Active Problem List  
 Diagnosis Date Noted  Acute respiratory failure due to COVID-19 [facility]) 01/23/2021  ESRD on dialysis ([facility] [address] 11/15/2018  Severe obesity (BMI 35.0-39. 9) with comorbidity ([address] 04/09/2018  Systolic CHF, chronic ([address] 04/06/2018  Idiopathic chronic gout of multiple sites without tophus 02/21/2017  Essential hypertension 11/17/2016  Hyperlipidemia 05/27/2014  Diastolic CHF ([address]  ESRD (end stage renal disease) on dialysis ([address] 02/24/2014  Nonischemic cardiomyopathy ([facility] [address] 02/24/2014 Plan: 
Chaplains will continue to follow and will provide pastoral care on an as needed/requested basis.  recommends bedside caregivers page  on duty if patient shows signs of acute spiritual or emotional distress. [address] [facility] Board Certified [facility]  
[phone]

## 2021-02-03 NOTE — PROGRESS NOTES
Discharge planning Reviewed chart. Patient remains intubated and on CRRT. CM will continue to monitor and assist for transitional needs. TORY DarlingN, RN Pager # [phone] Care Manager

## 2021-02-03 NOTE — PROGRESS NOTES
Problem: Pressure Injury - Risk of 
Goal: *Prevention of pressure injury 
Description: Document Sotero Scale and appropriate interventions in the flowsheet. 
Outcome: Progressing Towards Goal 
Note: Pressure Injury Interventions: 
Sensory Interventions: Assess changes in LOC, Assess need for specialty bed, Avoid rigorous massage over bony prominences, Check visual cues for pain, Float heels, Keep linens dry and wrinkle-free, Monitor skin under medical devices, Pressure redistribution bed/mattress (bed type), Suspension boots, Turn and reposition approx. every two hours (pillows and wedges if needed) 
 
Moisture Interventions: Absorbent underpads, Apply protective barrier, creams and emollients, Assess need for specialty bed, Check for incontinence Q2 hours and as needed, Maintain skin hydration (lotion/cream) 
 
Activity Interventions: Assess need for specialty bed, Pressure redistribution bed/mattress(bed type) 
 
Mobility Interventions: Assess need for specialty bed, Float heels, Pressure redistribution bed/mattress (bed type), Suspension boots, Turn and reposition approx. every two hours(pillow and wedges) 
 
Nutrition Interventions: Document food/fluid/supplement intake, Discuss nutritional consult with provider 
 
Friction and Shear Interventions: Apply protective barrier, creams and emollients, Foam dressings/transparent film/skin sealants, Feet elevated on foot rest, Lift sheet, Transferring/repositioning devices 
 
  
 
 
 
  
Problem: Patient Education: Go to Patient Education Activity 
Goal: Patient/Family Education 
Outcome: Progressing Towards Goal 
  
Problem: Falls - Risk of 
Goal: *Absence of Falls 
Description: Document Maggie Fall Risk and appropriate interventions in the flowsheet. 
Outcome: Progressing Towards Goal 
Note: Fall Risk Interventions: 
Mobility Interventions: Communicate number of staff needed for ambulation/transfer, Strengthening exercises (ROM-active/passive) 
 
 Mentation Interventions: Adequate sleep, hydration, pain control, Evaluate medications/consider consulting pharmacy, More frequent rounding, Reorient patient, Room close to nurse's station, Toileting rounds, Update white board Medication Interventions: Evaluate medications/consider consulting pharmacy Elimination Interventions: Bed/chair exit alarm, Call light in reach, Toileting schedule/hourly rounds Problem: Patient Education: Go to Patient Education Activity Goal: Patient/Family Education Outcome: Progressing Towards Goal 
  
Problem: Breathing Pattern - Ineffective Goal: *Absence of hypoxia Outcome: Progressing Towards Goal 
Goal: *Use of effective breathing techniques Outcome: Progressing Towards Goal 
  
Problem: Patient Education: Go to Patient Education Activity Goal: Patient/Family Education Outcome: Progressing Towards Goal 
  
Problem: Risk for Spread of Infection Goal: Prevent transmission of infectious organism to others Description: Prevent the transmission of infectious organisms to other patients, staff members, and visitors. Outcome: Progressing Towards Goal 
  
Problem: Patient Education:  Go to Education Activity Goal: Patient/Family Education Outcome: Progressing Towards Goal 
  
Problem: Patient Education: Go to Patient Education Activity Goal: Patient/Family Education Outcome: Progressing Towards Goal 
  
Problem: Patient Education: Go to Patient Education Activity Goal: Patient/Family Education Outcome: Progressing Towards Goal 
  
Problem: Nutrition Deficit Goal: *Optimize nutritional status Outcome: Progressing Towards Goal 
  
Problem: Ventilator Management Goal: *Adequate oxygenation and ventilation Outcome: Progressing Towards Goal 
Goal: *Patient maintains clear airway/free of aspiration Outcome: Progressing Towards Goal 
Goal: *Absence of infection signs and symptoms Outcome: Progressing Towards Goal 
Goal: *Normal spontaneous ventilation Outcome: Progressing Towards Goal 
  
Problem: Patient Education: Go to Patient Education Activity Goal: Patient/Family Education Outcome: Progressing Towards Goal 
  
Problem: Non-Violent Restraints Goal: *Removal from restraints as soon as assessed to be safe Outcome: Progressing Towards Goal 
Goal: *No harm/injury to patient while restraints in use Outcome: Progressing Towards Goal 
Goal: *Patient's dignity will be maintained Outcome: Progressing Towards Goal 
Goal: *Patient Specific Goal (EDIT GOAL, INSERT TEXT) Outcome: Progressing Towards Goal 
Goal: Non-violent Restaints:Standard Interventions Outcome: Progressing Towards Goal 
Goal: Non-violent Restraints:Patient Interventions Outcome: Progressing Towards Goal 
Goal: Patient/Family Education Outcome: Progressing Towards Goal 
  
Problem: Injury - Risk of, Adverse Drug Event Goal: *Absence of adverse drug events Outcome: Progressing Towards Goal 
Goal: *Absence of medication errors Outcome: Progressing Towards Goal 
Goal: *Knowledge of prescribed medications Outcome: Progressing Towards Goal 
  
Problem: Patient Education: Go to Patient Education Activity Goal: Patient/Family Education Outcome: Progressing Towards Goal 
  
Problem: Deep Venous Thrombosis - Risk of 
Goal: *Absence of deep venous thrombosis signs and symptoms(Stroke Metric) Outcome: Progressing Towards Goal 
Goal: *Absence of impaired coagulation signs and symptoms Outcome: Progressing Towards Goal 
Goal: *Knowledge of prescribed medications Outcome: Progressing Towards Goal 
Goal: *Absence of bleeding Outcome: Progressing Towards Goal 
  
Problem: Patient Education: Go to Patient Education Activity Goal: Patient/Family Education Outcome: Progressing Towards Goal

## 2021-02-03 NOTE — PROGRESS NOTES
[facility] 
(A Division of [address] [place] [address]) Follow-up Note Date of Admission: 1/23/2021       Date of Note:  2/3/2021 Summary:   
63 y/o AAM w/ HTN, HLD, ESRD-HD, Dilated CMO, CHF adm 1/23 due to shortness of breath. He had headache, fever and chills for 2 days and tested positive for Covid 8 days PTA at [address] [place] [address] ED (1/15). Sent home but had worsening shortness of breath for which he presented to the  [facility] ED on 1/22. Initial O2 Sat on 5 lpm NC was 90% and he was quickly placed on 40 lpm HFNC. CXR 1/23 patchy infiltrations/consolidations both mid/lower lungs greater on left. Chest CT extensive patchy consolidation pneumonia in bilateral lungs. Admitted 1/23, started on Ceftriaxone and Azithromycin. Remdesivir and dexamethasone started just past midnight 12/23. afebrile and with normal WBC count since admission but 101.3 1/30. Started on Vanc, zosyn. Intubated for airway protection 1/31 Interval History: 
Remains intubated, unresponsive, on CRRT. Afebrile but WBC hovering in mid-20's Current Antimicrobials:    Prior Antimicrobials: 
Zosyn 1/30 - 4 Ceftriaxone 1/23 - 3 Azithro 1/23 - 5 Remdesivir 1/23 - #5 Vanc 1/30 - 4 Assessment: Rec / Plan:  
Fever 1/30 
- ? Etiology. Started on empiric HAP coverage 1/30 
- CXR 1/30: worse trevon opacities c/w 1/29 - edema+/- pneumonia 
- resolved on vanc, zosyn - CXR 2/1: no change trevon infiltrates - CXR 2/3: no change 
- spcx sent 1/31 Normal RF -> continue Zosyn 3 more days 
-> dc Vanc  
-> monitor spcx 1/31 Acute Respiratory Failure with Hypoxia 
- had worsening resp distress on BiPAP 
- intubated 1/31 early am -> per PCCM Covid Pneumonia 
- fever, chills, headache, shortness of breath. Onset ~ 1/13 
- SARS Cov2 + 1/15 
- Chest CT 1/23 extensive patchy consolidation pneumonia in bilateral lungs (likely all COVID, doubt secondary bacterial pneumonia) - completed remdesivir 1/27 - still on azithromycin -> steroids per PCCM  
-> no other covid specific therapy ESRD-HD -> per Renal  
Dilated CMO -> per Cardiology CHF   
HTN   
HLD   
H/o ETOH abuse Microbiology: 
 
 
Lines / Catheters: 
 
 
 
Patient Active Problem List  
Diagnosis Code  ESRD (end stage renal disease) on dialysis (HCC) N18.6, Z99.2  Nonischemic cardiomyopathy (HCC) I42.8  Diastolic CHF (HCC) U95.63  Hyperlipidemia E78.5  Essential hypertension I10  
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 78S8  Systolic CHF, chronic (HCC) I50.22  Severe obesity (BMI 35.0-39. 9) with comorbidity ([facility] E66.01  
 ESRD on dialysis ([facility] N18.6, Z99.2  Acute respiratory failure due to COVID-19 ([facility]) U07.1, J96.00 Current Facility-Administered Medications Medication Dose Route Frequency  insulin glargine (LANTUS) injection 15 Units  15 Units SubCUTAneous P96O  
 multivit-folic acid-herbal 322 (WELLESSE PLUS) oral liquid 30 mL  30 mL Per NG tube DAILY  ascorbic acid (vitamin C) (VITAMIN C) tablet 500 mg  500 mg Oral BID  polyethylene glycol (MIRALAX) packet 17 g  17 g Oral BID  heparin 25,000 units in D5W 250 ml infusion  18-36 Units/kg/hr IntraVENous TITRATE  
 NOREPINephrine (LEVOPHED) 8 mg in 5% dextrose 250mL (32 mcg/mL) infusion  0.5-50 mcg/min IntraVENous TITRATE  midodrine (PROAMATINE) tablet 10 mg  10 mg Oral Q8H  
 hydrocortisone Sod Succ (PF) (SOLU-CORTEF) injection 50 mg  50 mg IntraVENous Q6H  
 piperacillin-tazobactam (ZOSYN) 3.375 g in 0.9% sodium chloride (MBP/ADV) 100 mL MBP  3.375 g IntraVENous Q6H  
 anticoagulant citrate dextrose (ACD-A) infusion  200 mL/hr Hemodialysis CONTINUOUS  
 calcium gluconate 24 g in 500 ml NS IVPB  24 g IntraVENous TITRATE  chlorhexidine (PERIDEX) 0.12 % mouthwash 10 mL  10 mL Oral Q12H  
 midazolam (VERSED) injection 1-2 mg  1-2 mg IntraVENous Q30MIN PRN  
  fentaNYL citrate (PF) injection  mcg   mcg IntraVENous Q1H PRN  
 midazolam in normal saline (VERSED) 1 mg/mL infusion  1-10 mg/hr IntraVENous TITRATE  fentaNYL (PF) 900 mcg/30 ml infusion soln  0-200 mcg/hr IntraVENous TITRATE  insulin lispro (HUMALOG) injection   SubCUTAneous Q6H  
 VANCOMYCIN INFORMATION NOTE   Other Rx Dosing/Monitoring  [Held by provider] metoprolol tartrate (LOPRESSOR) tablet 25 mg  25 mg Oral BID  albuterol-ipratropium (DUO-NEB) 2.5 MG-0.5 MG/3 ML  3 mL Nebulization Q4H RT  
 albumin human 25% (BUMINATE) solution 25 g  25 g IntraVENous DIALYSIS PRN  
 melatonin (rapid dissolve) tablet 5 mg  5 mg Oral QHS  glucose chewable tablet 16 g  4 Tab Oral PRN  
 glucagon (GLUCAGEN) injection 1 mg  1 mg IntraMUSCular PRN  
 dextrose (D50W) injection syrg 12.5-25 g  25-50 mL IntraVENous PRN  
 sevelamer carbonate (RENVELA) tab 800 mg  800 mg Oral TID WITH MEALS  sodium chloride (NS) flush 5-10 mL  5-10 mL IntraVENous PRN  
 sodium chloride (NS) flush 5-40 mL  5-40 mL IntraVENous Q8H  
 sodium chloride (NS) flush 5-40 mL  5-40 mL IntraVENous PRN  
 acetaminophen (TYLENOL) tablet 650 mg  650 mg Oral Q6H PRN Or  
 acetaminophen (TYLENOL) suppository 650 mg  650 mg Rectal Q6H PRN  zinc sulfate (ZINCATE) 220 (50) mg capsule 2 Cap  2 Cap Oral DAILY  aspirin tablet 325 mg  325 mg Oral DAILY  famotidine (PEPCID) tablet 20 mg  20 mg Oral DAILY  [Held by provider] carvediloL (COREG) tablet 3.125 mg  3.125 mg Oral BID WITH MEALS  pravastatin (PRAVACHOL) tablet 20 mg  20 mg Oral DAILY  midodrine (PROAMATINE) tablet 10 mg  10 mg Oral DIALYSIS PRN Review of Systems - Negative except as in interval history Objective: 
 
Visit Vitals BP (!) 93/47 (BP 1 Location: Right leg, BP Patient Position: At rest) Pulse (!) 121 Temp 97.8 °F (36.6 °C) Resp 28 Ht 5' 9\" (1.753 m) Wt 110.4 kg (243 lb 6.2 oz) SpO2 94% BMI 35.94 kg/m² Temp (24hrs), Av °F (36.1 °C), Min:96 °F (35.6 °C), Max:97.8 °F (36.6 °C) General: Well developed, obese 62 y.o. BLACK male intubated in ICU  
ENT: ENT exam normal, no neck nodes or sinus tenderness Head: normocephalic, without obvious abnormality Mouth:  not examined. Orotracheal, orogastric tubes inplace Neck: supple, symmetrical, trachea midline Cardio:  regular rate and rhythm, S1, S2 normal, no murmur, click, rub or gallop Chest: inspection normal - no chest wall deformities or tenderness, respiratory effort normal 
Lungs: mild bibasilar rales, no wheezing Abdomen: soft, non-tender. Bowel sounds normal. No masses, no organomegaly. , decreased bowel sounds Extremities:  no redness or tenderness in the calves or thighs, no edema Lab results: 
 
Chemistry Recent Labs 21 
1026 21 
0400 21 
2200 * 266* 245*  135* 135* K 4.0 4.1 4.0  
CL 93* 94* 95* CO2 28 23 25 BUN 44* 48* 53* CREA 4.15* 4.49* 5.16* CA 10.7* 10.4* 10.5* AGAP 15 18 15 BUCR 11* 11* 10* * 183* 165* TP 7.1 8.4* 8.2 ALB 3.1* 3.1* 3.2*  
GLOB 4.0 5.3* 5.0* AGRAT 0.8 0.6* 0.6* CBC w/ Diff Recent Labs 21 
1026 21 
0400 21 
2200 WBC 25.9* 26.5* 25.0*  
RBC 3.45* 3.89* 3.79* HGB 9.7* 11.1* 11.0*  
HCT 29.5* 32.8* 31.8*  
 259 247 GRANS 68 75 77* LYMPH 9* 9* 8*  
EOS 0 0 0 Microbiology All Micro Results Procedure Component Value Units Date/Time CULTURE, RESPIRATORY/SPUTUM/BRONCH Lyndsay Espinoza [[id number]] Collected: 21 1800 Order Status: Completed Specimen: Sputum,ET Suction Updated: 21 1206 Special Requests: NO SPECIAL REQUESTS     
  GRAM STAIN OCCASIONAL WBCS SEEN     
      
  RARE EPITHELIAL CELLS SEEN  
     
      
  OCCASIONAL GRAM POSITIVE COCCI IN PAIRS Culture result: FEW NORMAL RESPIRATORY TANJA  
     
 CULTURE, BLOOD FOR FUNGUS [[id number]] Collected: 21 8591 Order Status: Completed Specimen: Blood Updated: 02/03/21 0756 Special Requests: NO SPECIAL REQUESTS Culture result: NO FUNGUS ISOLATED 5 DAYS     
 CULTURE, BLOOD [[id number]] Collected: 01/30/21 1506 Order Status: Completed Specimen: Blood Updated: 02/03/21 0755 Special Requests: NO SPECIAL REQUESTS Culture result: NO GROWTH 4 DAYS     
 CULTURE, BLOOD [[id number]] Collected: 01/30/21 1515 Order Status: Completed Specimen: Blood Updated: 02/03/21 0755 Special Requests: NO SPECIAL REQUESTS Culture result: NO GROWTH 4 DAYS     
 CULTURE, RESPIRATORY/SPUTUM/BRONCH Radha Vinicio STAIN [[id number]] Order Status: Canceled Specimen: Sputum CULTURE, URINE [[id number]] Collected: 01/30/21 1130 Order Status: Canceled Specimen: Cath Urine CULTURE, BLOOD [[id number]] Collected: 01/23/21 1116 Order Status: Completed Specimen: Blood Updated: 01/29/21 0720 Special Requests: NO SPECIAL REQUESTS Culture result: NO GROWTH 6 DAYS     
 CULTURE, BLOOD [[id number]] Collected: 01/23/21 1115 Order Status: Completed Specimen: Blood Updated: 01/29/21 0720 Special Requests: NO SPECIAL REQUESTS Culture result: NO GROWTH 6 DAYS     
 COVID-19 RAPID TEST [[id number]]  (Abnormal) Collected: 01/25/21 1415 Order Status: Completed Specimen: Nasopharyngeal Updated: 01/25/21 1518 Specimen source Nasopharyngeal     
  COVID-19 rapid test Detected Comment: CALLED TO AND CORRECTLY REPEATED BY: 
FADIA Chiu [address] AT 0664 BY KDA 1/25/21 The specimen is POSITIVE for SARS-CoV-2, the novel coronavirus associated with COVID-19. This test has been authorized by the FDA under an Emergency Use Authorization (EUA) for use by authorized laboratories. Fact sheet for Healthcare Providers: ConventionUpdate.co.nz Fact sheet for Patients: ConventionUpdate.co.nz Methodology: Isothermal Nucleic Acid Amplification Zoila Fernandez MD, [facility] 2/3/2021  
3:45 PM

## 2021-02-03 NOTE — FAMILY MEETING
Extent of care discussion by phone with family. FADIA Kennedy and myself. Family; brother and both sisters, wish to move forward with comfort care. Discussed this by phone with each independently.  is arranging zoom call prior to comfort care. Chuck Louis MD, [place] [id number] Critical Care Fellow 2/3/2021, 2:56 PM

## 2021-02-03 NOTE — PROGRESS NOTES
Death Pronouncement Note Patient lying in bed, mouth closed, eyes closed. Pupils fixed and equal bilaterally. No response to verbal or painful stimuli. No heart or lung sounds auscultated. No carotid or peripheral pulses. Death officially pronounced at 47, 2/3/2021 Medical Examiner notified: No, does not meet criteria Family Notified: Kim Perry PA-C   
02/03/21 Pulmonary, Critical Care Medicine [facility]

## 2021-02-03 NOTE — PROGRESS NOTES
[facility] ICU Progress Note Name: Ebenezer Ashton : 1960 MRN: [id number] Date: 2/3/2021 9:10 AM 
  
[x]I have reviewed the flowsheet and previous days notes. Events overnight reviewed and discussed with nursing staff. Vital signs and records reviewed. [x]The patient is unable to give any meaningful history or review of systems because the patient is: 
[x]Intubated [x]Sedated  
[]Unresponsive []The patient is critically ill on     
[x]Mechanical ventilation [x]Pressors []BiPAP [] The patient is: [x] acutely ill Risk of deterioration: [x] moderate  
 [] critically ill  [x] high  
 
[x]See my orders for details My assessment/plan was discussed with: 
[x]Nursing []PT/OT [x]Respiratory therapy []Family IMPRESSION:  
· Acute Hypoxic Respiratory failure -  COVID-19 PNA with worsening ARDS. Intubated on  persistent tachypnea. · COVID-19 PNA - with significant consolidations trevon lower lobes · ARDS, severe - likely worsened by fluid overload in the setting of COVID-19 pneumonia/sepsis. · CHFpEF/hypertrophic cardiomyopathy -  TTE shows concentric hypertrophy · ESRD on dialysis · Respiratory alkalosis from tachypnea/respiratory distress · History of dilated cardiomyopathy · Hypertension · Acute delirium:  Due to above Patient Active Problem List  
Diagnosis Code  ESRD (end stage renal disease) on dialysis (HCC) N18.6, Z99.2  Nonischemic cardiomyopathy (HCC) I42.8  Diastolic CHF ([facility]) Y07.47  Hyperlipidemia E78.5  Essential hypertension I10  
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 06U8  Systolic CHF, chronic ([facility]) I50.22  Severe obesity (BMI 35.0-39. 9) with comorbidity ([address] E66.01  
 ESRD on dialysis ([facility] N18.6, Z99.2  Acute respiratory failure due to COVID-19 (HCC) U07.1, J96.00 PLAN:  
 · Resp: Titrate FiO2 O2 for SpO2 >90%; optimize bronchial hygiene. Daily CXR and ABG while intubated. Con't duo-nebs q4. · I/D: f/u BxCx/UC, Sputum Cx's. F/U Fungitell. ID following. Trend LA q4 until normal. Trend Pro-Sveeriano PRN. Con't ABX - Vanc & Zosyn. Deescalate ABX once Cx's finalize. Trend temp & WBC curve. · Hem/Onc: Daily CBC; H/H, and plts are stable. Started anticoagulant citrate dextrose and heparin gtt. · CVS: Continue Levophed gtt and midodrine. Titrate vasopressors as tolerated with goal MAP >65mmHg. Hold BP meds. · Metabolic: Daily BMP, Mag & Phos; monitor e-lytes; replace PRN 
· Renal: Trend Renal indices; +Martinez. Strict I/Os. Nephrology following. Continue CRRT. · Endocrine: POC Glucose q6; SSI and Lantus. Con't vitamins. · GI: SUP, Trend LFTs, Zofran PRN for N/V. Plan to start advance feeds today. · Musc/Skin: No acute issues, wound care · Neuro: D/C'd Precedex gtt 2/2 hypotension. Con't Versed and Fentanyl gtt for sedation. S/p 10mg Valium IVP for initial sedation. PRN Versed/ Fentanyl for breakthrough sedation needs. RASS 0 to -1. Bilateral wrist restraints for pt safety. · Fluids: none · Code status: Full Code Best Practices/ Safety Bundles: 
· Sepsis Bundle per Hospital Protocol · CAUTI Bundle · Electrolyte Replacement Bundle · Glycemic control goal <180; avoid Hypoglycemia · IHI ICU Bundles: ·  Martinez Bundle Followed and Vent Bundle Followed, Vent Day 4  
· [facility] Vent patients: · VAP bundle, Aim to keep peak plateau pressure 62-70ZA H2O 
· Aspiration Precautions - HOB >30' · Daily sedation holiday as indicated · SBT as tolerated/appropriate · Titrate FiO2 for SpO2 >94% · Aggressive Pulmonary Hygeiene · Stress ulcer prophylaxis: Pepcid · DVT prophylaxis: SQH 
· Need for Lines, martinez assessed. · Restraints need. · Palliative care evaluation - Not consulted ye Subjective/History:  
  
Interval HPI: 
HPI per Dr. Emily Jones Jah Skinner a 62 y. o. male with PMH arthritis, CKD on HD MWF, gout HTN, HLD, now presenting with complaint of SOB.    Pt is known positive COVID for over 1wk.   Went to dialysis center and was noted to have a fever 100.6 here. [facility] had a headache, eye pain and chills .  No known Covid exposure.  Nominal pain diarrhea no chest pain or shortness of breath. Was seen in ER 1wk ago with above complaints and d/c home with CDC recommendations.  Over the past few days he has felt progressively worse and was found tripoding today when he was picked up for dialysis. [facility] currently states he feels fine and has no complaints. He denies sick contacts, [place], [place].  He missed dialysis on day of admission. Initial SpO2 90% on 5lpm NC so placed on HFC.   
CXR showed patchy infiltrations/consolidations in bilateral mid/lower lungs 
  
Subjective 21 Hospital Day: 4 Vent Day: 4 
- no acute overnight events 
- Continue CRRT today. Nephro following 
- advance feeds today - Plan to discuss with family re: goals of care ROS:Review of systems not obtained due to patient factors. COVID Tx Review: ? Remdesivir - (-) ? Convalascent Plasma - Need to obtain consent, not ordered yet, defer to ID ? Decadron - Day 7 of  10 (-) ? Vitamins - Vit C, Zinc 
? SQH Medication Review · Pressors - Levophed gtt (weaning down) · Sedation - Versed & Fentanyl gtt with PRNs as well · Antibiotics - Vanc & Zosyn · Pain - PRN Fentanyl · GI/ DVT - Pepcid; SQH 
· Others (other gtts) Vital Signs:   
Visit Vitals BP (!) 100/54 (BP 1 Location: Right leg, BP Patient Position: At rest) Pulse (!) 120 Temp 97.5 °F (36.4 °C) Resp 30 Ht 5' 9\" (1.753 m) Wt 110.4 kg (243 lb 6.2 oz) SpO2 96% BMI 35.94 kg/m² O2 Device: Endotracheal tube, Heated, Humidifier, Ventilator O2 Flow Rate (L/min): 40 l/min Temp (24hrs), Av.7 °F (35.9 °C), Min:95.9 °F (35.5 °C), Max:97.8 °F (36.6 °C) Intake/Output: Last shift:      02/03 0701 - 02/03 1900 In: 157.8 [I.V.:157.8] Out: 31 Last 3 shifts: 02/01 1901 - 02/03 0700 In: 4542.3 [I.V.:3907.3] Out: 7443 Intake/Output Summary (Last 24 hours) at 2/3/2021 0957 Last data filed at 2/3/2021 0900 Gross per 24 hour Intake 3065.63 ml Output 4877 ml Net -1811.37 ml Ventilator Settings: 
Mode Rate Tidal Volume Pressure FiO2 PEEP Assist control   500 ml    100 % 20 cm H20 Peak airway pressure: 23 cm H2O Minute ventilation: 16.2 l/min ARDS network Guidelines:  
Lung protective strategy and Plateau  Pressure goal < 30 cm H2O goals Oxygenation Goals PaO2 55-80 mm Hg or SaO2 88-95% PH goal 7.30-7.45 VAP bundle: 
Reviewed. Miriam tube to suction at 20-30 cm Hg. Maintain Miriam tube with 5-10ml air every 4 hours Routine oral care every 4 hours Elevation of head > 45 degree Daily sedation holiday and SBT evaluation starting at 6.00am. 
 
Physical Exam 
Constitutional:   
   General: He is not in acute distress. Appearance: He is obese. Comments: Intubated/sedated Cardiovascular:  
   Rate and Rhythm: Regular rhythm. Tachycardia present. Pulses: Normal pulses. Pulmonary:  
   Comments: Symmetrical chest rise. Coarse breath sounds and use of accessory muscle. Coarse breath sounds throughout. Intubated + vent PEEP 20.0 FiO2 100% Abdominal:  
   General: There is distension. Palpations: Abdomen is soft. Musculoskeletal:     
   General: No swelling. Right lower leg: No edema. Left lower leg: No edema. Skin: 
   General: Skin is warm. Findings: No lesion or rash. Neurological:  
   General: No focal deficit present. Devices:  ETT, OGT, Vitale DATA:  
 
Current Facility-Administered Medications Medication Dose Route Frequency  vancomycin (VANCOCIN) 1,000 mg in 0.9% sodium chloride 250 mL (VIAL-MATE)  1,000 mg IntraVENous ONCE  
  insulin glargine (LANTUS) injection 15 Units  15 Units SubCUTAneous Q12H  
 insulin glargine (LANTUS) injection 5 Units  5 Units SubCUTAneous ONCE  
 multivit-folic acid-herbal 497 (WELLESSE PLUS) oral liquid 30 mL  30 mL Per NG tube DAILY  ascorbic acid (vitamin C) (VITAMIN C) tablet 500 mg  500 mg Oral BID  polyethylene glycol (MIRALAX) packet 17 g  17 g Oral BID  heparin 25,000 units in D5W 250 ml infusion  18-36 Units/kg/hr IntraVENous TITRATE  
 NOREPINephrine (LEVOPHED) 8 mg in 5% dextrose 250mL (32 mcg/mL) infusion  0.5-50 mcg/min IntraVENous TITRATE  midodrine (PROAMATINE) tablet 10 mg  10 mg Oral Q8H  
 hydrocortisone Sod Succ (PF) (SOLU-CORTEF) injection 50 mg  50 mg IntraVENous Q6H  
 piperacillin-tazobactam (ZOSYN) 3.375 g in 0.9% sodium chloride (MBP/ADV) 100 mL MBP  3.375 g IntraVENous Q6H  
 anticoagulant citrate dextrose (ACD-A) infusion  200 mL/hr Hemodialysis CONTINUOUS  
 calcium gluconate 24 g in 500 ml NS IVPB  24 g IntraVENous TITRATE  chlorhexidine (PERIDEX) 0.12 % mouthwash 10 mL  10 mL Oral Q12H  
 midazolam (VERSED) injection 1-2 mg  1-2 mg IntraVENous Q30MIN PRN  
 fentaNYL citrate (PF) injection  mcg   mcg IntraVENous Q1H PRN  
 midazolam in normal saline (VERSED) 1 mg/mL infusion  1-10 mg/hr IntraVENous TITRATE  fentaNYL (PF) 900 mcg/30 ml infusion soln  0-200 mcg/hr IntraVENous TITRATE  insulin lispro (HUMALOG) injection   SubCUTAneous Q6H  
 VANCOMYCIN INFORMATION NOTE   Other Rx Dosing/Monitoring  [Held by provider] metoprolol tartrate (LOPRESSOR) tablet 25 mg  25 mg Oral BID  albuterol-ipratropium (DUO-NEB) 2.5 MG-0.5 MG/3 ML  3 mL Nebulization Q4H RT  
 albumin human 25% (BUMINATE) solution 25 g  25 g IntraVENous DIALYSIS PRN  
 melatonin (rapid dissolve) tablet 5 mg  5 mg Oral QHS  glucose chewable tablet 16 g  4 Tab Oral PRN  
 glucagon (GLUCAGEN) injection 1 mg  1 mg IntraMUSCular PRN  
  dextrose (D50W) injection syrg 12.5-25 g  25-50 mL IntraVENous PRN  
 sevelamer carbonate (RENVELA) tab 800 mg  800 mg Oral TID WITH MEALS  sodium chloride (NS) flush 5-10 mL  5-10 mL IntraVENous PRN  
 sodium chloride (NS) flush 5-40 mL  5-40 mL IntraVENous Q8H  
 sodium chloride (NS) flush 5-40 mL  5-40 mL IntraVENous PRN  
 acetaminophen (TYLENOL) tablet 650 mg  650 mg Oral Q6H PRN Or  
 acetaminophen (TYLENOL) suppository 650 mg  650 mg Rectal Q6H PRN  zinc sulfate (ZINCATE) 220 (50) mg capsule 2 Cap  2 Cap Oral DAILY  aspirin tablet 325 mg  325 mg Oral DAILY  famotidine (PEPCID) tablet 20 mg  20 mg Oral DAILY  [Held by provider] carvediloL (COREG) tablet 3.125 mg  3.125 mg Oral BID WITH MEALS  pravastatin (PRAVACHOL) tablet 20 mg  20 mg Oral DAILY  midodrine (PROAMATINE) tablet 10 mg  10 mg Oral DIALYSIS PRN Labs: Results:  
   
Chemistry Recent Labs 02/03/21 0400 02/02/21 2200 02/02/21 
1600 02/02/21 
0300 02/02/21 
0300 * 245* 248*   < > 262* * 135* 136   < > 133* K 4.1 4.0 4.4   < > 4.9 CL 94* 95* 95*   < > 97* CO2 23 25 25   < > 19* BUN 48* 53* 64*   < > 94* CREA 4.49* 5.16* 6.30*   < > 10.10* CA 10.4* 10.5* 10.1   < > 10.1 AGAP 18 15 16   < > 17 BUCR 11* 10* 10*   < > 9* * 165*  --   --  146* TP 8.4* 8.2  --   --  7.6 ALB 3.1* 3.2* 3.1*   < > 3.0*  
GLOB 5.3* 5.0*  --   --  4.6* AGRAT 0.6* 0.6*  --   --  0.7*  
 < > = values in this interval not displayed. CBC w/Diff Recent Labs 02/03/21 
0400 02/02/21 2200 02/02/21 
0300 WBC 26.5* 25.0* 20.2*  
RBC 3.89* 3.79* 3.60* HGB 11.1* 11.0* 10.3* HCT 32.8* 31.8* 29.9*  
 247 262 GRANS 75 77* 85* LYMPH 9* 8* 6*  
EOS 0 0 0 Coagulation Recent Labs 02/03/21 
0400 02/02/21 
2200 PTP 15.7* 15.8* INR 1.3* 1.3* APTT 94.3* >180.0* Liver Enzymes Recent Labs 02/03/21 
0400 TP 8.4* ALB 3.1* * ABG Lab Results Component Value Date/Time PHI 7.39 02/03/2021 03:13 AM  
 PCO2I 40.5 02/03/2021 03:13 AM  
 PO2I 51 (L) 02/03/2021 03:13 AM  
 HCO3I 24.3 02/03/2021 03:13 AM  
 FIO2I 75 02/03/2021 03:13 AM  
  
Microbiology Recent Labs  
  01/31/21 
1800 CULT NO GROWTH AFTER 19 HOURS Telemetry: [x]Sinus []A-flutter []Paced []A-fib []Multiple PVCs Imaging: CXR [01/31/21]: FINDINGS: 
  
LINES/TUBES: Interval placement of endotracheal tube with tip approximately 4 to 
have centimeters from the maricarmen. 
  
MEDIASTINUM: Cardiac silhouette is within normal limits. 
  
LUNGS: Redemonstration of extensive bilateral pulmonary opacities. 
  
BONES/OTHER: No acute osseous abnormality. 
  
  
IMPRESSION Satisfactory placement of endotracheal tube. Otherwise, no significant change from prior. CTA CHEST W OR W WO CONT [01/23/21]: FINDINGS: 
  
PULMONARY ARTERIES: The contrast bolus is adequate. Although, moderate motion 
artifact noted which significantly compromises the evaluation of the small 
arterial branches. The right and left mainstem pulmonary arteries and their 
lobar branches appear patent without convincing evidence of intraluminal filling 
defect identified to suggest pulmonary embolism. There are questionable 
nonocclusive intraluminal linear filling defects in the segmental/subsegmental 
branches at anterior and lateral right upper lobe and right middle lobe, 
uncertain due to artifact or potential tiny PE. 
  
AORTA AND OTHER CARDIOVASCULAR STRUCTURES: Mild ectasia of thoracic aorta. No 
aortic aneurysm or dissection. Mild burden of  coronary artery calcifications. Heart Strain assessment: 
-  RV/LV ratio (normal <0.9): Normal 
-  Dysfunction or bowing of interventricular septum: None -  Main pulmonary artery enlargement (>30mm): Not present -  There is not visualization of contrast reflux from the right heart into the 
IVC/hepatic veins. 
  
 LUNG PARENCHYMA: There are multiple patchy areas of airspace consolidations 
identified throughout both lungs, more pronounced in bilateral lower lobes and 
inferior left upper lobe. Patent airway. 
  
IMAGED THYROID: Unremarkable. 
  
MEDIASTINUM: Borderline adenopathy in bilateral chu and subcarinal 
mediastinum. .  
  
PLEURAL SPACES AND CHEST WALL: No significant pleural effusion. Mild pleural 
thickening. 
  
VISUALIZED UPPER ABDOMEN: Very atrophic right kidney is partially seen. 
  
OSSEOUS STRUCTURES: Unremarkable.  
  
IMPRESSION: 
  
1. No convincing CT evidence of pulmonary embolism in mainstem and lobar 
arteries. Questionable nonocclusive intraluminal filling defects identified in 
right upper and middle lobe segmental subsegmental images, artifact versus tiny 
nonocclusive PE. 
  
2. Extensive patchy consolidation pneumonia in bilateral lungs, Covid infection 
is more concerned than pulmonary infarctions. Clinical correlation and 
short-term follow-up CT advised. 
  
Thank you for your referral 
 
[x]I have personally reviewed the patients radiographs []Radiographs reviewed with radiologist 
 []No change from prior, tubes and lines in adequate position []Improved   []Worsening 
 
  
   
  
Patsy Zepeda MD, PGY1 [facility] February 3, 2021 9:58 AM

## 2021-02-03 NOTE — PROGRESS NOTES
[facility] 
 
 
CARDIOLOGY PROGRESS NOTE 
RECS: 
 
 
 
1. Acute Hypoxic respiratory failure -S/p intubation  1/31 for severe hypoxia. 2. COVID - 19 pneumonia with consolidations bilateral  lower lobes -worsening infection with fevers and leukocytosis-  antibiotics per ID recs. 3. Detectable troponin - 0.51, 0.42, 0.33- EKG SR, non-specific ST changes. Elevation likely due to demand in setting of respiratory failure. Continue aspirin 325 mg. Recent Echo shows preserve EF% 4. Hypotension- requiring pressor support also on midodrine 5. History of dilated cardiomyopathy -  (EF 15% 11/2016) -  EF improved on echo  9/2019, recent echo EF 55%-60%. NST 2018 intermediate risk NUC stress test Fixed defect consistent with prior myocardial infarction. 6. Chronic diastolic CHF -compensated now. 7. Hyperlipidemia - continue statin 8. ESRD on dialysis - HD today 9. Acute metabolic encephalopathy- with delirium 10. Dilated ascending aorta measuring 4.5 cm on echo 2019 -stable recent echo shows Moderate aortic root dilatation. (4.1 cm) 11. Hx of alcohol abuse. Remains critically sick. Developing tachycardia and requiring pressors for hypotension. Intubated on [facility] ventilator- elevated troponin likely from demand ischemia. On pressors. Above plan discussed with PCCM. Prognosis appears poor at this point. Echo 1/21 · LV: Estimated LVEF is 55 - 60%. Visually measured ejection fraction. Normal cavity size and systolic function (ejection fraction normal). Moderate concentric hypertrophy. Wall motion: normal. 
· AV: Mild aortic valve regurgitation is present. · PA: Pulmonary arterial systolic pressure is 23 mmHg. · AO: Moderate aortic root dilatation. (4.1 cm) · COVID (+) ASSESSMENT: 
Hospital Problems  Date Reviewed: 1/3/2019 Codes Class Noted POA  * (Principal) Acute respiratory failure due to COVID-19 [facility]) ICD-10-CM: U07.1, J96.00 
 ICD-9-CM: 518.81, 079.89  1/23/2021 Unknown SUBJECTIVE: Intubated and sedated OBJECTIVE: 
 
VS:  
Visit Vitals BP (!) 112/55 (BP 1 Location: Right leg, BP Patient Position: At rest) Pulse (!) 119 Temp 97.5 °F (36.4 °C) Resp 27 Ht 5' 9\" (1.753 m) Wt 110.4 kg (243 lb 6.2 oz) SpO2 96% BMI 35.94 kg/m² Intake/Output Summary (Last 24 hours) at 2/3/2021 1132 Last data filed at 2/3/2021 1100 Gross per 24 hour Intake 3337.88 ml Output 4413 ml Net -1075.12 ml TELE: Sinus tachycardia Limited physical exam due to COV-19. Patient seen across the glass door and discussed with attendings. General: Intubated and sedated HENT: Normocephalic, atraumatic.  Normal external eye. Neck :  no JVD Cardiac: Regular rhythm on monitor Extremities:  No edema 
  
 
 
 
Labs: Results:  
   
Chemistry Recent Labs 02/03/21 
0400 02/02/21 2200 02/02/21 
1600 02/02/21 
0300 02/02/21 
0300 * 245* 248*   < > 262* * 135* 136   < > 133* K 4.1 4.0 4.4   < > 4.9 CL 94* 95* 95*   < > 97* CO2 23 25 25   < > 19* BUN 48* 53* 64*   < > 94* CREA 4.49* 5.16* 6.30*   < > 10.10* CA 10.4* 10.5* 10.1   < > 10.1 AGAP 18 15 16   < > 17 BUCR 11* 10* 10*   < > 9* * 165*  --   --  146* TP 8.4* 8.2  --   --  7.6 ALB 3.1* 3.2* 3.1*   < > 3.0*  
GLOB 5.3* 5.0*  --   --  4.6* AGRAT 0.6* 0.6*  --   --  0.7*  
 < > = values in this interval not displayed. CBC w/Diff Recent Labs 02/03/21 
0400 02/02/21 2200 02/02/21 
0300 WBC 26.5* 25.0* 20.2*  
RBC 3.89* 3.79* 3.60* HGB 11.1* 11.0* 10.3* HCT 32.8* 31.8* 29.9*  
 247 262 GRANS 75 77* 85* LYMPH 9* 8* 6*  
EOS 0 0 0 Cardiac Enzymes Recent Labs 02/03/21 
0400 02/02/21 2200 CPK 74 75 CKND1 3.2 2.7 Coagulation Recent Labs 02/03/21 
0400 02/02/21 2200 PTP 15.7* 15.8* INR 1.3* 1.3* APTT 94.3* >180.0* Lipid Panel Lab Results Component Value Date/Time Cholesterol, total 155 08/23/2018 03:50 PM  
 HDL Cholesterol 72 (H) 08/23/2018 03:50 PM  
 LDL, calculated 71 08/23/2018 03:50 PM  
 VLDL, calculated 12 08/23/2018 03:50 PM  
 Triglyceride 60 08/23/2018 03:50 PM  
 CHOL/HDL Ratio 2.2 08/23/2018 03:50 PM  
  
BNP No results for input(s): BNPP in the last 72 hours. Liver Enzymes Recent Labs 02/03/21 
0400 TP 8.4* ALB 3.1* * Thyroid Studies Lab Results Component Value Date/Time  TSH 1.17 08/23/2018 03:50 PM  
    
 
 
Steve Melo MD

## 2021-02-03 NOTE — PROGRESS NOTES
RENAL DAILY PROGRESS NOTE 62y M with ESRD, admitted with covid 19 pneumonia following for ESRD management. Subjective:  
 
 
Complaint:  
Overnight  Event noted Renaldo Rainey IMPRESSION:  
· ESRD on TTS at [address] unit, worsening of hypoxia on this admission, started on CRRT on 2/1/21 · Access left arm AVF · COVID Pneumonia · S/p intubation  1/31 for severe hypoxia · Hypoxic resp failure · Hypotension, on vasopressure · Hx Diastolic CHF · Hyperlipidemia · Secondary hyperparathyroidism PLAN:  
No significant improvement from critical care illness. Family request for comfort care. We will be available if any question or concern Discussed with Dr. Anjum Romo Current Facility-Administered Medications Medication Dose Route Frequency  morphine injection 1 mg  1 mg IntraVENous Q15MIN PRN  
 LORazepam (ATIVAN) injection 1 mg  1 mg IntraVENous Q1H PRN Objective:  
 
Patient Vitals for the past 24 hrs: 
 Temp Pulse Resp BP SpO2  
02/03/21 1707 98.1 °F (36.7 °C) (!) 120 25    
02/03/21 1552  (!) 126 30  94 % 02/03/21 1400 97.8 °F (36.6 °C) (!) 121 28 (!) 93/47   
02/03/21 1300 97.6 °F (36.4 °C) (!) 119 (!) 33    
02/03/21 1221  (!) 120 28  94 % 02/03/21 1200 97.5 °F (36.4 °C) (!) 120 (!) 32 (!) 95/47   
02/03/21 1100 97.5 °F (36.4 °C) (!) 119 27 (!) 112/55   
02/03/21 1000 97.5 °F (36.4 °C) (!) 119 27 (!) 119/56   
02/03/21 0900 97.5 °F (36.4 °C) (!) 120 30 (!) 100/54 96 % 02/03/21 0800 97.3 °F (36.3 °C) (!) 118 24 (!) 109/50 99 % 02/03/21 0700 97.3 °F (36.3 °C) (!) 117 19 (!) 118/49 99 % 02/03/21 0600 97.8 °F (36.6 °C) (!) 120 26 (!) 114/51 99 % 02/03/21 0500 97.5 °F (36.4 °C) (!) 120 28 (!) 123/55 99 % 02/03/21 0400 97.4 °F (36.3 °C) (!) 120 29 (!) 125/57 98 % 02/03/21 0310  (!) 116 27  92 % 02/03/21 0300 97.2 °F (36.2 °C) (!) 115 27 (!) 121/58 91 % 02/03/21 0200 97 °F (36.1 °C) (!) 114 26 111/61 91 % 02/03/21 0100 97 °F (36.1 °C) (!) 115 26 122/62 92 % 02/03/21 0025  (!) 109 24  92 % 02/03/21 0000 (!) 96.6 °F (35.9 °C) (!) 111 22 (!) 118/52 92 % 02/02/21 2300 97 °F (36.1 °C) (!) 111 23 125/61 93 % 02/02/21 2200 (!) 96.4 °F (35.8 °C) (!) 109 15 (!) 113/50 95 % 02/02/21 2100 (!) 96.5 °F (35.8 °C) (!) 109 17 (!) 141/56 93 % 02/02/21 2039  (!) 105 25  95 % 02/02/21 2000 (!) 96.1 °F (35.6 °C) (!) 104 14 131/74 95 % 02/02/21 1900 (!) 96 °F (35.6 °C) 99 13 (!) 146/84 95 % 02/02/21 1800 (!) 96.2 °F (35.7 °C) (!) 102 21 132/69 95 % Weight change:  
 
 02/01 1901 - 02/03 0700 In: 4542.3 [I.V.:3907.3] Out: 4780 Intake/Output Summary (Last 24 hours) at 2/3/2021 1736 Last data filed at 2/3/2021 1300 Gross per 24 hour Intake 3031.41 ml Output 3032 ml Net -0.59 ml Patient was seen during the [facility] pandemic. Patient with COVID infection . Patient is in isolation room due to COVID status and PPE is in short supply hence seen through glass window and d/w patients RN. Full contact physical exam was not possible due to patient's clinical condition, key findings seen by me are documented. In addition, I have also used findings documented by physicians who have recently examined thee patient as they are relevant to the patient's renal care. Intubated NSR on tele Data Review:  
 
LABS:  
Hematology:  
Recent Labs 02/03/21 
1026 02/03/21 
0400 02/02/21 
2200 02/02/21 
0300 02/01/21 
1045 WBC 25.9* 26.5* 25.0* 20.2* 17.8* HGB 9.7* 11.1* 11.0* 10.3* 10.9* HCT 29.5* 32.8* 31.8* 29.9* 31.8* Chemistry:  
Recent Labs 02/03/21 
1026 02/03/21 
0400 02/02/21 
2200 02/02/21 
1600 02/02/21 
1200 02/02/21 
0800 02/02/21 
0300 02/01/21 
2218 02/01/21 
1800 02/01/21 
1430 02/01/21 
1045 BUN 44* 48* 53* 64* 72* 81* 94* 96* 104* 103* 115* CREA 4.15* 4.49* 5.16* 6.30* 7.04* 8.33* 10.10* 10.40* 11.30* 11.80* 14.00* CA 10.7* 10.4* 10.5* 10.1 10.5* 10.3* 10.1 9.5 10.2* 10.3* 10.6* ALB 3.1* 3.1* 3.2* 3.1* 3.0* 3.0* 3.0* 3.0* 3.1* 3.2* 3.2*  
K 4.0 4.1 4.0 4.4 4.4 4.6 4.9 5.0 5.1 5.1 5.7*  135* 135* 136 133* 136 133* 134* 134* 137 136 CL 93* 94* 95* 95* 96* 98* 97* 96* 97* 99* 96* CO2 28 23 25 25 22 22 19* 20* 19* 19* 20* PHOS 5.1* 5.8* 5.9* 7.1* 7.5* 7.7* 8.5* 8.5* >9.0* 8.5*  --   
* 266* 245* 248* 253* 256* 262* 299* 229* 199* 196* Procedures/imaging: see electronic medical records for all procedures, Xrays and details which were not copied into this note but were reviewed prior to creation of Jaqueline Resendez MD 
2/3/2021

## 2021-02-03 NOTE — PROGRESS NOTES
Nutrition Note Tolerating trickle tube feeding, remains on CRRT and pressure support with plan to advance feeds as tolerated. Lantus dose increased for hyperglycemia. Still no recent BM and receiving miralax daily- plan to increase to BID per discussion with MD. Anuric, last BM 1/26 and OGT replaced this morning per nursing. Phos elevated on phos binder and dialysis, improving. Recommendations/Plan - Advance tube feeding of Vital High Protein as tolerated by 10 mL q 8 hours to goal rate of 60 mL/hr with 50 mL q 4 hour water flushes, add prosource BID and daily multivitamin (goal regimen to provide 1560 kcal, 156 gm protein, 1204 mL free water, 100% RDIs). - Continue bowel regimen- dose increased per discussion with MD during interdisciplinary rounds. Electronically signed by Halie Joya RD, [address]  on 2/3/2021 at 10:02 AM 
 
Contact: [phone]

## 2021-02-03 NOTE — DISCHARGE SUMMARY
. 
Death Summary Patient: Ari Jasso               Sex: male          DOA: 2021 YOB: 1960      Age:  62 y.o.        LOS:  LOS: 11 days Admit Date: 2021 Date of death: 2/3/2021 Admission Diagnoses: Acute respiratory failure due to COVID-19 (HCC) [U07.1, J96.00] Discharge Condition:   Hospital Course: 
Lor Bland a 62 y. o. male with PMH arthritis, CKD on HD MWF, gout HTN, HLD, now presenting with complaint of SOB.    Pt was known positive COVID for over 1wk upon presentation to the ED. Over the past few days he has felt progressively worse and was found tripoding today when he was picked up for dialysis. Initial SpO2 90% on 5lpm NC so placed on HFNC. CXR on presentation showed patchy infiltrates/cconsolidations in the bilateral long spaces. Continued to have worsening oxygenation with increasing FiO2 requirements. Was placed on BiPAP, continued to have worsening WOB/tachypnea and worsening mentation/delirium. Upgraded to ICU 21. Patient intubated 21. Patient continued to deteriorate from a respiratory standpoint and hemodynamically. Additionally required CRRT. 2/3/21 patient's siblings decided to transition the patient to comfort measures only w/ compassionate extubation. Patient is unmarried and has no children. Patient was compassionately extubated this evening. Time of death - 46 Cause of death: hypoxic respiratory failure 2/2 COVID 19 Associated factors: ESRD Was the ME contacted? No, does not meet criteria Was the family notified: yes Consults: Treatment Team: Attending Provider: Antione Clayton MD; Consulting Provider: Aurelia Jones MD; Physician: Joseph Love MD; Consulting Provider: Jayleen Garcia MD; Dietitian: Lindsey Rogers RD; Consulting Provider: Laney Davis MD; Consulting Provider: Sally Phillips MD; Utilization Review: Natalie Szymanski RN; Primary Nurse: FADIA Rodriguez PA-C  
02/03/21 Pulmonary, Critical Care Medicine [address] [place] [address] [facility]

## 2021-02-04 NOTE — PROGRESS NOTES
responded to Death of  Odilon Dozier, who was a 62 y.o.,male, The  provided the following Interventions: 
Attempted to provide crisis pastoral care, pastoral support and grief interventions to Ubaldo Parker and Adore Schafer through phone calls but was unable to reach them. Offered prayers on behalf of the patient. Chart reviewed. Plan: 
Chaplains will continue to follow and will provide pastoral care on an as needed/requested basis and grief support for the family. [facility], [address] [facility]  
[phone]

## 2021-02-04 NOTE — PROGRESS NOTES
visited with the family of Steph Slater, who is a 62 y.o.,male. The  provided the following Interventions: 
Initiated a relationship of care and support by calling both sisters Donald Toro and Jesus Negin to set up zoom. Both sisters don't have a means to do zoom call and was okay not being able to see their brother transitioned to comfort care. Compassionate support provided. Offered prayer and assurance of continued prayers on patient's behalf. Plan: 
Chaplains will continue to follow and will provide pastoral care on an as needed/requested basis.  recommends bedside caregivers page  on duty if patient shows signs of acute spiritual or emotional distress. [facility] [address], [address] [place]  
[phone]

## 2021-02-05 LAB
BACTERIA SPEC CULT: NORMAL
BACTERIA SPEC CULT: NORMAL
SERVICE CMNT-IMP: NORMAL
SERVICE CMNT-IMP: NORMAL

## 2021-03-01 LAB
BACTERIA SPEC CULT: NORMAL
SERVICE CMNT-IMP: NORMAL

## 2021-03-28 RX ORDER — CARVEDILOL 3.12 MG/1
TABLET ORAL
Qty: 180 TAB | Refills: 0 | OUTPATIENT
Start: 2021-03-28

## 2023-02-16 NOTE — DIALYSIS
ACUTE HEMODIALYSIS FLOW SHEET 
 
HEMODIALYSIS ORDERS: Physician: Ruel Chacko Dialyzer: revaclear         Duration: 3.5 hr  BFR: 350   DFR: 600 Dialysate:  Temp *37 K+   2    Ca+  3 Na 138 Bicarb 30 Weight:  111.5 kg    Bed Scale [x]     Unable to Obtain []      Dry weight/UF Goal: 3000 Access CVL Needle Gauge Heparin []  Bolus      Units    [] Hourly       Units    [x]None Catheter locking solution heparin Pre BP:   159/86    Pulse:     80     Temperature:   98.4  Respirations: 16  Tx: NS       ml/Bolus  Other        [x] N/A Labs: Pre        Post:        [x] N/A Additional Orders(medications, blood products, hypotension management):       [x] N/A [x] Time Out/Safety Check  [x] [facility] Consent Verified CATHETER ACCESS: []N/A   [x]Right   []Left   [x]IJ     []Fem [] First use X-ray verified     [x]Tunnel                [] Non Tunneled [x]No S/S infection  []Redness  []Drainage []Cultured []Swelling []Pain  
[x]Medical Aseptic Prep Utilized   [x]Dressing Changed  [] Biopatch  Date:      
[]Clotted   [x]Patent   Flows: [x]Good  []Poor  []Reversed If access problem,  notified: []Yes    []N/A  Date:        
 
GRAFT/FISTULA ACCESS:  [x]N/A     []Right     []Left     []UE     []LE []AVG   []AVF        []Buttonhole    []Medical Aseptic Prep Utilized []No S/S infection  []Redness  []Drainage []Cultured []Swelling []Pain Bruit:   [] Strong    [] Weak       Thrill :   [] Strong    [] Weak Needle Gauge:    Length: If access problem,  notified: []Yes     [x]N/A  Date:       
Please describe access if present and not used:  
 
 
GENERAL ASSESSMENT:   
LUNGS:  Rate  SaO2%        [x] N/A    [x] Clear  [] Coarse  [] Crackles  [] Wheezing 
      [] Diminished     Location : []RLL   []LLL    []RUL  []DIEGO Cough: []Productive  []Dry  [x]N/A   Respirations:  [x]Easy  []Labored Therapy:  [x]RA  []NC  l/min    Mask: []NRB []Venti       O2% []Ventilator  []Intubated  [] Trach  [] BiPaP CARDIAC: [x]Regular      [] Irregular   [] Pericardial Rub  [] JVD []  Monitored  [] Bedside  [] Remotely monitored [] N/A  Rhythm: EDEMA: [] None  [x]Generalized  [] Pitting [] 1    [] 2    [] 3    [] 4 [] Facial  [] Pedal  []  UE  [] LE  
SKIN:   [x] Warm  [] Hot     [] Cold   [x] Dry     [] Pale   [] Diaphoretic    
             [] Flushed  [] Jaundiced  [] Cyanotic  [] Rash  [] Weeping LOC:    [x] Alert      [x]Oriented:    [x] Person     [x] Place  [x]Time 
             [] Confused  [] Lethargic  [] Medicated  [] Non-responsive GI / ABDOMEN:  [] Flat    [] Distended    [x] Soft    [] Firm   []  Obese 
                           [] Diarrhea  [x] Bowel Sounds  [] Nausea  [] Vomiting  / URINE ASSESSMENT:[] Voiding   [x] Oliguria  [] Anuria   []  Vitale [] Incontinent    []  Incontinent Brief      []  Bathroom Privileges PAIN: [x] 0 []1  []2   []3   []4   []5   []6   []7   []8   []9   []10 Scale 0-10  Action/Follow Up: MOBILITY:  [] Amb    [] Amb/Assist    [x] Bed    [] Wheelchair  [] Stretcher All Vitals and Treatment Details on Attached Flowsheet Hospital: [facility] Room # 5308 [] 1st Time Acute  [] Stat  [x] Routine  [] Urgent [x] Acute Room  []  Bedside  [] ICU/CCU  [] ER Isolation Precautions:  [x] Dialysis   [] Airborne   [] Contact    [] Reverse Special Considerations:         [] Blood Consent Verified [x]N/A ALLERGIES:   [x] NKA Code Status:  [x] Full Code  [] DNR  [] Other HBsAg ONLY: Date Drawn 08/26/18         [x]Negative []Positive []Unknown HBsAb: Date 08/29/18    [x] Susceptible   [] [id number] []Not Drawn  [] Drawn Current Labs:    Date of Labs: Today [x] Cut and paste current labs here. DIET:  [x] Renal    [] Other     [] NPO     []  Diabetic PRIMARY NURSE REPORT: First initial/Last name/Title Pre Dialysis: Ivan Huynh RN    Time: 4150 EDUCATION:   
[x] Patient [] Other         Knowledge Basis: []None [x]Minimal [] Substantial  
Barriers to learning  [x]N/A  
[] Access Care     [] S&S of infection     [] Fluid Management     []K+     [x]Procedural   
[]Albumin     [] Medications     [] Tx Options     [] Transplant     [] Diet     [] Other Teaching Tools:  [x] Explain  [] Demo  [] Handouts [] Video Patient response:   [x] Verbalized understanding  [] Teach back  [] Return demonstration [] Requires follow up Inappropriate due to         
 
[address] Before each treatment:    
Machine Number:                   [address]  [x] Unit Machine # 5 with centralized RO 
                                [] Portable Machine #1/RO serial # [id number] [] Portable Machine #2/RO serial # [id number] [] Portable Machine #3/RO serial # [id number] [facility] 
                                [] Portable Machine #11/RO serial # [id number] [] Portable Machine #12/RO serial # [id number] [] Portable Machine #13/RO serial #  [id number] Alarm Test:  Pass time 0900         Other:        
[x] RO/Machine Log Complete Temp    37            [x]Extracorporeal Circuit Tested for integrity Dialysate: pH  7.4 Conductivity: Meter   14     HD Machine   14                  TCD: 14 
Dialyzer Lot # [id number]        Blood Tubing Lot # [id number]     Saline Lot #  [id number] CHLORINE TESTING-Before each treatment and every 4 hours Total Chlorine: [x] less than 0.1 ppm  Time: 1000 4 Hr/2nd Check Time:   
(if greater than 0.1 ppm from Primary then every 30 minutes from Secondary) TREATMENT INITIATION  with Dialysis Precautions:  
[x] All Connections Secured                 [x] Saline Line Double Clamped  
[x] Venous Parameters Set                  [x] Arterial Parameters Set [x] Prime Given  250 ml               [x]Air Foam Detector Engaged Treatment Initiation Note: pt arrived in stable condition via bed CVL accessed and treatment initiated without difficulty. Dr Trinity Galindo at bedside; vo to place on 2.5Ca bath Medication Dose Volume Route Initials Dialyzer Cleared: [] Good [x] Fair  [] Poor Blood processed:  69.3 L 
UF Removed  3000 Ml Post Wt: 108.3    kg POst BP:   127/79       Pulse: 82      Respirations: 16  Temperature: 98.5 
  
  epogen    8000u      2ml                  Post Tx Vascular Access: AVF/AVG: Bleeding stopped Art  min. Terrell. Min   N/A 
  
     hectorol 2mcg           1ml             Catheter: Locking solution: Heparin 1:1000 Art. 1.6  Terrell. 1.6 Post Assessment:  
  
                              Skin:  [x] Warm  [x] Dry [] Diaphoretic    [] Flushed  [] Pale [] Cyanotic [facility] Signatures Title Initials  Time 1345 Lungs: [x] Clear    [] Course  [] Crackles  [] Wheezing [] Diminished Lachelle Glassing, RN    Cardiac: [x] Regular   [] Irregular   [] Monitor  [] N/A  Rhythm:      
    Edema:  [] None    [x] General     [] Facial   [] Pedal    [] UE    [] LE  
    Pain: [x]0  []1  []2   []3  []4   []5   []6   []7   []8   []9   []10 Post Treatment Note: HD well tolerated. 3L UF removed. No acute distress noted during or post HD treatment. POST TREATMENT PRIMARY NURSE HANDOFF REPORT:  
 
First initial/Last name/Title Post Dialysis: Devan Woodward RN Time:  [id number] Abbreviations: AVG-arterial venous graft, AVF-arterial venous fistula, IJ-Internal Jugular, Subcl-Subclavian, Fem-Femoral, Tx-treatment, AP/HR-apical heart rate, DFR-dialysate flow rate, BFR-blood flow rate, AP-arterial pressure, -venous pressure, UF-ultrafiltrate, TMP-transmembrane pressure, Terrell-Venous, Art-Arterial, RO-Reverse Osmosis Statement Selected

## (undated) DEVICE — Device

## (undated) DEVICE — INTENDED FOR TISSUE SEPARATION, AND OTHER PROCEDURES THAT REQUIRE A SHARP SURGICAL BLADE TO PUNCTURE OR CUT.: Brand: BARD-PARKER SAFETY BLADES SIZE 11, STERILE

## (undated) DEVICE — SUTURE PERMA-HAND SZ 2-0 L24IN NONABSORBABLE BLK W/O NDL SA75H

## (undated) DEVICE — (D)PREP SKN CHLRAPRP APPL 26ML -- CONVERT TO ITEM 371833

## (undated) DEVICE — SUTURE MCRYL 3-0 L27IN ABSRB VLT SH L26MM 1/2 CIR Y316H

## (undated) DEVICE — SUTURE PERMA-HAND SZ 3-0 L24IN NONABSORBABLE BLK W/O NDL SA74H

## (undated) DEVICE — SUTURE GOR TX SZ 5-0 L30IN NONABSORBABLE L12MM TTC-12 3/8 6M10A

## (undated) DEVICE — GLOVE SURG SZ 7.5 L11.73IN FNGR THK9.8MIL STRW LTX POLYMER

## (undated) DEVICE — REM POLYHESIVE ADULT PATIENT RETURN ELECTRODE: Brand: VALLEYLAB

## (undated) DEVICE — KIT CLN UP BON SECOURS MARYV

## (undated) DEVICE — SUTURE MCRYL SZ 4-0 L18IN ABSRB UD L19MM PS-2 3/8 CIR PRIM Y496G

## (undated) DEVICE — GLOVE SURG SZ 7 L11.33IN FNGR THK9.8MIL STRW LTX POLYMER

## (undated) DEVICE — APPLIER CLP L9.375IN APER 2.1MM CLS L3.8MM 20 SM TI CLP

## (undated) DEVICE — APPLICATOR BNDG 1MM ADH PREMIERPRO EXOFIN